# Patient Record
Sex: FEMALE | Race: WHITE | NOT HISPANIC OR LATINO
[De-identification: names, ages, dates, MRNs, and addresses within clinical notes are randomized per-mention and may not be internally consistent; named-entity substitution may affect disease eponyms.]

---

## 2017-03-26 ENCOUNTER — RX RENEWAL (OUTPATIENT)
Age: 82
End: 2017-03-26

## 2017-04-04 ENCOUNTER — APPOINTMENT (OUTPATIENT)
Dept: INTERNAL MEDICINE | Facility: CLINIC | Age: 82
End: 2017-04-04

## 2017-04-04 VITALS
BODY MASS INDEX: 24.41 KG/M2 | WEIGHT: 143 LBS | TEMPERATURE: 97.9 F | DIASTOLIC BLOOD PRESSURE: 62 MMHG | HEIGHT: 64 IN | SYSTOLIC BLOOD PRESSURE: 110 MMHG

## 2017-06-20 ENCOUNTER — LABORATORY RESULT (OUTPATIENT)
Age: 82
End: 2017-06-20

## 2017-06-20 ENCOUNTER — NON-APPOINTMENT (OUTPATIENT)
Age: 82
End: 2017-06-20

## 2017-06-20 ENCOUNTER — APPOINTMENT (OUTPATIENT)
Dept: INTERNAL MEDICINE | Facility: CLINIC | Age: 82
End: 2017-06-20

## 2017-06-20 VITALS
TEMPERATURE: 97.5 F | SYSTOLIC BLOOD PRESSURE: 140 MMHG | WEIGHT: 144 LBS | BODY MASS INDEX: 24.59 KG/M2 | DIASTOLIC BLOOD PRESSURE: 88 MMHG | HEIGHT: 64 IN

## 2017-06-20 DIAGNOSIS — H91.90 UNSPECIFIED HEARING LOSS, UNSPECIFIED EAR: ICD-10-CM

## 2017-06-20 RX ORDER — FAMOTIDINE 40 MG/1
40 TABLET, FILM COATED ORAL
Qty: 90 | Refills: 3 | Status: DISCONTINUED | COMMUNITY
Start: 2017-04-04 | End: 2017-06-20

## 2017-06-23 LAB
ALBUMIN SERPL ELPH-MCNC: 3.9 G/DL
ALP BLD-CCNC: 66 U/L
ALT SERPL-CCNC: 14 U/L
ANION GAP SERPL CALC-SCNC: 15 MMOL/L
APPEARANCE: CLEAR
AST SERPL-CCNC: 25 U/L
BASOPHILS # BLD AUTO: 0.08 K/UL
BASOPHILS NFR BLD AUTO: 3.5 %
BILIRUB SERPL-MCNC: 0.4 MG/DL
BILIRUBIN URINE: NEGATIVE
BLOOD URINE: NEGATIVE
BUN SERPL-MCNC: 14 MG/DL
CALCIUM SERPL-MCNC: 9.4 MG/DL
CHLORIDE SERPL-SCNC: 101 MMOL/L
CHOLEST SERPL-MCNC: 194 MG/DL
CHOLEST/HDLC SERPL: 3 RATIO
CO2 SERPL-SCNC: 22 MMOL/L
COLOR: YELLOW
CREAT SERPL-MCNC: 0.83 MG/DL
EOSINOPHIL # BLD AUTO: 0.02 K/UL
EOSINOPHIL NFR BLD AUTO: 0.9 %
GLUCOSE QUALITATIVE U: NORMAL MG/DL
GLUCOSE SERPL-MCNC: 80 MG/DL
HCT VFR BLD CALC: 42.5 %
HDLC SERPL-MCNC: 65 MG/DL
HGB BLD-MCNC: 13.1 G/DL
KETONES URINE: NEGATIVE
LDLC SERPL CALC-MCNC: 116 MG/DL
LDLC SERPL DIRECT ASSAY-MCNC: 122 MG/DL
LEUKOCYTE ESTERASE URINE: NEGATIVE
LYMPHOCYTES # BLD AUTO: 1.49 K/UL
LYMPHOCYTES NFR BLD AUTO: 64.3 %
MAN DIFF?: YES
MCHC RBC-ENTMCNC: 28.5 PG
MCHC RBC-ENTMCNC: 30.8 GM/DL
MCV RBC AUTO: 92.6 FL
MONOCYTES # BLD AUTO: 0.5 K/UL
MONOCYTES NFR BLD AUTO: 21.7 %
NEUTROPHILS # BLD AUTO: 0.08 K/UL
NEUTROPHILS NFR BLD AUTO: 3.5 %
NITRITE URINE: NEGATIVE
PH URINE: 7
PLATELET # BLD AUTO: 217 K/UL
POTASSIUM SERPL-SCNC: 4.7 MMOL/L
PROT SERPL-MCNC: 8 G/DL
PROTEIN URINE: NEGATIVE MG/DL
RBC # BLD: 4.59 M/UL
RBC # FLD: 14.6 %
SODIUM SERPL-SCNC: 138 MMOL/L
SPECIFIC GRAVITY URINE: 1.01
T4 FREE SERPL-MCNC: 1.3 NG/DL
TRIGL SERPL-MCNC: 67 MG/DL
TSH SERPL-ACNC: 1.23 UIU/ML
UROBILINOGEN URINE: NORMAL MG/DL
WBC # FLD AUTO: 2.32 K/UL

## 2017-06-28 ENCOUNTER — RX RENEWAL (OUTPATIENT)
Age: 82
End: 2017-06-28

## 2017-10-31 ENCOUNTER — MEDICATION RENEWAL (OUTPATIENT)
Age: 82
End: 2017-10-31

## 2017-11-02 ENCOUNTER — RX RENEWAL (OUTPATIENT)
Age: 82
End: 2017-11-02

## 2018-01-29 ENCOUNTER — TRANSCRIPTION ENCOUNTER (OUTPATIENT)
Age: 83
End: 2018-01-29

## 2018-03-07 ENCOUNTER — RX RENEWAL (OUTPATIENT)
Age: 83
End: 2018-03-07

## 2018-05-09 ENCOUNTER — RX RENEWAL (OUTPATIENT)
Age: 83
End: 2018-05-09

## 2018-07-03 ENCOUNTER — APPOINTMENT (OUTPATIENT)
Dept: INTERNAL MEDICINE | Facility: CLINIC | Age: 83
End: 2018-07-03
Payer: MEDICARE

## 2018-07-03 ENCOUNTER — LABORATORY RESULT (OUTPATIENT)
Age: 83
End: 2018-07-03

## 2018-07-03 VITALS
BODY MASS INDEX: 23.9 KG/M2 | WEIGHT: 140 LBS | TEMPERATURE: 97.3 F | HEIGHT: 64 IN | SYSTOLIC BLOOD PRESSURE: 122 MMHG | DIASTOLIC BLOOD PRESSURE: 70 MMHG

## 2018-07-03 DIAGNOSIS — M81.0 AGE-RELATED OSTEOPOROSIS W/OUT CURRENT PATHOLOGICAL FRACTURE: ICD-10-CM

## 2018-07-03 DIAGNOSIS — K21.9 GASTRO-ESOPHAGEAL REFLUX DISEASE W/OUT ESOPHAGITIS: ICD-10-CM

## 2018-07-03 PROCEDURE — 94010 BREATHING CAPACITY TEST: CPT

## 2018-07-03 PROCEDURE — 36415 COLL VENOUS BLD VENIPUNCTURE: CPT

## 2018-07-03 PROCEDURE — 99214 OFFICE O/P EST MOD 30 MIN: CPT | Mod: 25

## 2018-07-03 PROCEDURE — 93000 ELECTROCARDIOGRAM COMPLETE: CPT

## 2018-07-03 NOTE — PHYSICAL EXAM
[No Acute Distress] : no acute distress [Well Nourished] : well nourished [Well Developed] : well developed [No JVD] : no jugular venous distention [Supple] : supple [No Lymphadenopathy] : no lymphadenopathy [No Respiratory Distress] : no respiratory distress  [Clear to Auscultation] : lungs were clear to auscultation bilaterally [No Accessory Muscle Use] : no accessory muscle use [Normal Rate] : normal rate  [Regular Rhythm] : with a regular rhythm [Normal S1, S2] : normal S1 and S2 [No Carotid Bruits] : no carotid bruits [No Edema] : there was no peripheral edema [Soft] : abdomen soft [Non Tender] : non-tender [Non-distended] : non-distended [No HSM] : no HSM [Normal Bowel Sounds] : normal bowel sounds [de-identified] : 2/6 SM [de-identified] : RUE with lymphedema [de-identified] : right supraclavicular area with single red flat tiny lesion

## 2018-07-03 NOTE — HISTORY OF PRESENT ILLNESS
[de-identified] : \par Has a rash right neck for 10 days; burning; one singular bump\par Has appointment for dermatology next week\par \par Sees cardiology for palpitations - did echo; may get a stress test - but palpitations have subsided\par \par h/o breast cancer reviewed:\par \par s/p left MRM, then right lumpectomy with RT - then had calcifications found - so had MRM on right  with reconstruction ; has not followed up with breast surgeon or plastic surgeon\par \par \par \par

## 2018-07-03 NOTE — ASSESSMENT
[FreeTextEntry1] : \par ?shingles - single lesion; can see derm\par \par osteoporosis - DEXA\par \par mur,ur - sees cardiology\par \par \par \par hcm\par colonoscopy 2019\par gyn\par

## 2018-07-03 NOTE — HEALTH RISK ASSESSMENT
[Very Good] : ~his/her~  mood as very good [] : No [MammogramComments] : has not seen gyn [ColonoscopyDate] : 2014 [ColonoscopyComments] : polyp [de-identified] : Patient sees an ophthalmologist regularly [de-identified] : Patient sees a dentist regularly

## 2018-07-03 NOTE — REVIEW OF SYSTEMS
[Constipation] : constipation [Negative] : Genitourinary [Fever] : no fever [Night Sweats] : no night sweats [FreeTextEntry7] : drinks senna tea

## 2018-07-05 LAB
APPEARANCE: CLEAR
BASOPHILS # BLD AUTO: 0.02 K/UL
BASOPHILS NFR BLD AUTO: 0.8 %
BILIRUBIN URINE: NEGATIVE
BLOOD URINE: NEGATIVE
CHOLEST SERPL-MCNC: 203 MG/DL
CHOLEST/HDLC SERPL: 2.9 RATIO
COLOR: YELLOW
EOSINOPHIL # BLD AUTO: 0.06 K/UL
EOSINOPHIL NFR BLD AUTO: 2.5 %
GLUCOSE QUALITATIVE U: NEGATIVE MG/DL
HCT VFR BLD CALC: 41.9 %
HDLC SERPL-MCNC: 69 MG/DL
HGB BLD-MCNC: 12.8 G/DL
IMM GRANULOCYTES NFR BLD AUTO: 0 %
KETONES URINE: NEGATIVE
LDLC SERPL CALC-MCNC: 114 MG/DL
LDLC SERPL DIRECT ASSAY-MCNC: 124 MG/DL
LEUKOCYTE ESTERASE URINE: NEGATIVE
LYMPHOCYTES # BLD AUTO: 1.33 K/UL
LYMPHOCYTES NFR BLD AUTO: 55.9 %
MAN DIFF?: NORMAL
MCHC RBC-ENTMCNC: 27.6 PG
MCHC RBC-ENTMCNC: 30.5 GM/DL
MCV RBC AUTO: 90.3 FL
MONOCYTES # BLD AUTO: 0.45 K/UL
MONOCYTES NFR BLD AUTO: 18.9 %
NEUTROPHILS # BLD AUTO: 0.52 K/UL
NEUTROPHILS NFR BLD AUTO: 21.9 %
NITRITE URINE: NEGATIVE
PH URINE: 7.5
PLATELET # BLD AUTO: 218 K/UL
PROTEIN URINE: NEGATIVE MG/DL
RBC # BLD: 4.64 M/UL
RBC # FLD: 14.7 %
SPECIFIC GRAVITY URINE: 1.01
T4 FREE SERPL-MCNC: 1.4 NG/DL
TRIGL SERPL-MCNC: 100 MG/DL
TSH SERPL-ACNC: 1.97 UIU/ML
UROBILINOGEN URINE: NEGATIVE MG/DL
WBC # FLD AUTO: 2.38 K/UL

## 2018-07-18 ENCOUNTER — RX RENEWAL (OUTPATIENT)
Age: 83
End: 2018-07-18

## 2018-09-13 ENCOUNTER — RX RENEWAL (OUTPATIENT)
Age: 83
End: 2018-09-13

## 2018-10-04 ENCOUNTER — RESULT REVIEW (OUTPATIENT)
Age: 83
End: 2018-10-04

## 2018-12-17 ENCOUNTER — RX RENEWAL (OUTPATIENT)
Age: 83
End: 2018-12-17

## 2019-01-02 ENCOUNTER — TRANSCRIPTION ENCOUNTER (OUTPATIENT)
Age: 84
End: 2019-01-02

## 2019-06-18 ENCOUNTER — RX RENEWAL (OUTPATIENT)
Age: 84
End: 2019-06-18

## 2019-07-02 ENCOUNTER — RX RENEWAL (OUTPATIENT)
Age: 84
End: 2019-07-02

## 2019-07-30 ENCOUNTER — APPOINTMENT (OUTPATIENT)
Dept: INTERNAL MEDICINE | Facility: CLINIC | Age: 84
End: 2019-07-30
Payer: MEDICARE

## 2019-07-30 ENCOUNTER — LABORATORY RESULT (OUTPATIENT)
Age: 84
End: 2019-07-30

## 2019-07-30 VITALS
WEIGHT: 144 LBS | SYSTOLIC BLOOD PRESSURE: 110 MMHG | DIASTOLIC BLOOD PRESSURE: 60 MMHG | OXYGEN SATURATION: 97 % | HEIGHT: 64 IN | BODY MASS INDEX: 24.59 KG/M2 | HEART RATE: 76 BPM

## 2019-07-30 DIAGNOSIS — Z00.00 ENCOUNTER FOR GENERAL ADULT MEDICAL EXAMINATION W/OUT ABNORMAL FINDINGS: ICD-10-CM

## 2019-07-30 PROCEDURE — G0438: CPT

## 2019-07-30 PROCEDURE — 99214 OFFICE O/P EST MOD 30 MIN: CPT | Mod: 25

## 2019-07-30 PROCEDURE — 36415 COLL VENOUS BLD VENIPUNCTURE: CPT

## 2019-07-30 PROCEDURE — 93000 ELECTROCARDIOGRAM COMPLETE: CPT

## 2019-07-30 PROCEDURE — 94010 BREATHING CAPACITY TEST: CPT

## 2019-07-31 LAB
ALBUMIN SERPL ELPH-MCNC: 4 G/DL
ALP BLD-CCNC: 59 U/L
ALT SERPL-CCNC: 15 U/L
ANION GAP SERPL CALC-SCNC: 12 MMOL/L
AST SERPL-CCNC: 26 U/L
BASOPHILS # BLD AUTO: 0.03 K/UL
BASOPHILS NFR BLD AUTO: 1.4 %
BILIRUB SERPL-MCNC: 0.5 MG/DL
BUN SERPL-MCNC: 16 MG/DL
CALCIUM SERPL-MCNC: 9.1 MG/DL
CHLORIDE SERPL-SCNC: 105 MMOL/L
CHOLEST SERPL-MCNC: 196 MG/DL
CHOLEST/HDLC SERPL: 3 RATIO
CO2 SERPL-SCNC: 23 MMOL/L
CREAT SERPL-MCNC: 0.76 MG/DL
EOSINOPHIL # BLD AUTO: 0.02 K/UL
EOSINOPHIL NFR BLD AUTO: 1 %
GLUCOSE SERPL-MCNC: 84 MG/DL
HCT VFR BLD CALC: 40.3 %
HDLC SERPL-MCNC: 66 MG/DL
HGB BLD-MCNC: 12.6 G/DL
IMM GRANULOCYTES NFR BLD AUTO: 0 %
LDLC SERPL CALC-MCNC: 119 MG/DL
LDLC SERPL DIRECT ASSAY-MCNC: 130 MG/DL
LYMPHOCYTES # BLD AUTO: 1.09 K/UL
LYMPHOCYTES NFR BLD AUTO: 52.2 %
MAN DIFF?: NORMAL
MCHC RBC-ENTMCNC: 28.3 PG
MCHC RBC-ENTMCNC: 31.3 GM/DL
MCV RBC AUTO: 90.6 FL
MONOCYTES # BLD AUTO: 0.58 K/UL
MONOCYTES NFR BLD AUTO: 27.8 %
NEUTROPHILS # BLD AUTO: 0.37 K/UL
NEUTROPHILS NFR BLD AUTO: 17.6 %
PLATELET # BLD AUTO: 164 K/UL
POTASSIUM SERPL-SCNC: 4.3 MMOL/L
PROT SERPL-MCNC: 7.6 G/DL
RBC # BLD: 4.45 M/UL
RBC # FLD: 14 %
SODIUM SERPL-SCNC: 140 MMOL/L
T4 FREE SERPL-MCNC: 1.3 NG/DL
TRIGL SERPL-MCNC: 56 MG/DL
TSH SERPL-ACNC: 1.85 UIU/ML
WBC # FLD AUTO: 2.09 K/UL

## 2019-08-07 ENCOUNTER — APPOINTMENT (OUTPATIENT)
Dept: INTERNAL MEDICINE | Facility: CLINIC | Age: 84
End: 2019-08-07
Payer: MEDICARE

## 2019-08-07 PROCEDURE — 77080 DXA BONE DENSITY AXIAL: CPT | Mod: GA

## 2019-09-23 ENCOUNTER — MEDICATION RENEWAL (OUTPATIENT)
Age: 84
End: 2019-09-23

## 2019-09-24 ENCOUNTER — RX RENEWAL (OUTPATIENT)
Age: 84
End: 2019-09-24

## 2019-10-12 ENCOUNTER — TRANSCRIPTION ENCOUNTER (OUTPATIENT)
Age: 84
End: 2019-10-12

## 2019-12-04 ENCOUNTER — APPOINTMENT (OUTPATIENT)
Dept: INTERNAL MEDICINE | Facility: CLINIC | Age: 84
End: 2019-12-04
Payer: MEDICARE

## 2019-12-04 VITALS
HEART RATE: 73 BPM | TEMPERATURE: 98.1 F | SYSTOLIC BLOOD PRESSURE: 120 MMHG | WEIGHT: 142 LBS | DIASTOLIC BLOOD PRESSURE: 80 MMHG | BODY MASS INDEX: 24.37 KG/M2 | OXYGEN SATURATION: 96 %

## 2019-12-04 PROCEDURE — 94010 BREATHING CAPACITY TEST: CPT

## 2019-12-04 PROCEDURE — 99213 OFFICE O/P EST LOW 20 MIN: CPT | Mod: 25

## 2020-01-02 ENCOUNTER — RX RENEWAL (OUTPATIENT)
Age: 85
End: 2020-01-02

## 2020-02-21 ENCOUNTER — TRANSCRIPTION ENCOUNTER (OUTPATIENT)
Age: 85
End: 2020-02-21

## 2020-03-03 ENCOUNTER — APPOINTMENT (OUTPATIENT)
Dept: INTERNAL MEDICINE | Facility: CLINIC | Age: 85
End: 2020-03-03
Payer: MEDICARE

## 2020-03-03 VITALS
HEIGHT: 64 IN | BODY MASS INDEX: 24.59 KG/M2 | OXYGEN SATURATION: 96 % | DIASTOLIC BLOOD PRESSURE: 80 MMHG | WEIGHT: 144 LBS | HEART RATE: 79 BPM | TEMPERATURE: 97.6 F | SYSTOLIC BLOOD PRESSURE: 140 MMHG

## 2020-03-03 PROCEDURE — 99214 OFFICE O/P EST MOD 30 MIN: CPT | Mod: 25

## 2020-03-03 PROCEDURE — 93000 ELECTROCARDIOGRAM COMPLETE: CPT

## 2020-03-03 RX ORDER — PRAMOXINE HYDROCHLORIDE AND HYDROCORTISONE ACETATE 10; 25 MG/G; MG/G
2.5-1 CREAM TOPICAL
Qty: 1 | Refills: 3 | Status: DISCONTINUED | COMMUNITY
Start: 2018-05-09 | End: 2020-03-03

## 2020-03-03 NOTE — CONSULT LETTER
[( Thank you for referring [unfilled] for consultation for _____ )] : Thank you for referring [unfilled] for consultation for [unfilled] [Consult Closing:] : Thank you very much for allowing me to participate in the care of this patient.  If you have any questions, please do not hesitate to contact me. [Please see my note below.] : Please see my note below.

## 2020-03-03 NOTE — PHYSICAL EXAM
[No Acute Distress] : no acute distress [Well Nourished] : well nourished [Well-Appearing] : well-appearing [Well Developed] : well developed [Normal Sclera/Conjunctiva] : normal sclera/conjunctiva [EOMI] : extraocular movements intact [PERRL] : pupils equal round and reactive to light [Normal Outer Ear/Nose] : the outer ears and nose were normal in appearance [Normal Oropharynx] : the oropharynx was normal [No JVD] : no jugular venous distention [No Lymphadenopathy] : no lymphadenopathy [Supple] : supple [No Respiratory Distress] : no respiratory distress  [Thyroid Normal, No Nodules] : the thyroid was normal and there were no nodules present [No Accessory Muscle Use] : no accessory muscle use [Clear to Auscultation] : lungs were clear to auscultation bilaterally [Normal Rate] : normal rate  [Regular Rhythm] : with a regular rhythm [Normal S1, S2] : normal S1 and S2 [No Carotid Bruits] : no carotid bruits [No Abdominal Bruit] : a ~M bruit was not heard ~T in the abdomen [Pedal Pulses Present] : the pedal pulses are present [No Varicosities] : no varicosities [No Palpable Aorta] : no palpable aorta [No Extremity Clubbing/Cyanosis] : no extremity clubbing/cyanosis [Non Tender] : non-tender [Soft] : abdomen soft [Non-distended] : non-distended [No Masses] : no abdominal mass palpated [No HSM] : no HSM [Normal Bowel Sounds] : normal bowel sounds [Normal Anterior Cervical Nodes] : no anterior cervical lymphadenopathy [Normal Posterior Cervical Nodes] : no posterior cervical lymphadenopathy [No CVA Tenderness] : no CVA  tenderness [No Spinal Tenderness] : no spinal tenderness [No Joint Swelling] : no joint swelling [Grossly Normal Strength/Tone] : grossly normal strength/tone [Coordination Grossly Intact] : coordination grossly intact [No Rash] : no rash [Normal Gait] : normal gait [No Focal Deficits] : no focal deficits [Normal Affect] : the affect was normal [Deep Tendon Reflexes (DTR)] : deep tendon reflexes were 2+ and symmetric [Normal Insight/Judgement] : insight and judgment were intact [de-identified] : 2/6 SM [de-identified] : RUE lymphedema without change

## 2020-03-03 NOTE — HISTORY OF PRESENT ILLNESS
[No Adverse Anesthesia Reaction] : no adverse anesthesia reaction in self or family member [(Patient denies any chest pain, claudication, dyspnea on exertion, orthopnea, palpitations or syncope)] : Patient denies any chest pain, claudication, dyspnea on exertion, orthopnea, palpitations or syncope [Atrial Fibrillation] : no atrial fibrillation [Coronary Artery Disease] : no coronary artery disease [Recent Myocardial Infarction] : no recent myocardial infarction [Asthma] : no asthma [COPD] : no COPD [Sleep Apnea] : no sleep apnea [Smoker] : not a smoker [Chronic Anticoagulation] : no chronic anticoagulation [Chronic Kidney Disease] : no chronic kidney disease [Diabetes] : no diabetes [FreeTextEntry1] : Cataract surgeries [FreeTextEntry2] : right 3/9/20; left 3/23/20 [FreeTextEntry3] : Dr. Rodrigues [FreeTextEntry4] : \par Scheduled for cataract surgery (first right eye 3/9/20, then left eye 3/23/20)

## 2020-03-03 NOTE — ASSESSMENT
[Patient Optimized for Surgery] : Patient optimized for surgery [FreeTextEntry4] : \par There are no absolute contraindications to the proposed procedure at this time.\par

## 2020-06-22 ENCOUNTER — APPOINTMENT (OUTPATIENT)
Dept: INTERNAL MEDICINE | Facility: CLINIC | Age: 85
End: 2020-06-22
Payer: MEDICARE

## 2020-06-22 ENCOUNTER — LABORATORY RESULT (OUTPATIENT)
Age: 85
End: 2020-06-22

## 2020-06-22 VITALS
WEIGHT: 141 LBS | SYSTOLIC BLOOD PRESSURE: 114 MMHG | TEMPERATURE: 98.6 F | DIASTOLIC BLOOD PRESSURE: 78 MMHG | BODY MASS INDEX: 24.2 KG/M2

## 2020-06-22 DIAGNOSIS — Z01.818 ENCOUNTER FOR OTHER PREPROCEDURAL EXAMINATION: ICD-10-CM

## 2020-06-22 PROCEDURE — 99213 OFFICE O/P EST LOW 20 MIN: CPT | Mod: 25

## 2020-06-22 PROCEDURE — 36415 COLL VENOUS BLD VENIPUNCTURE: CPT

## 2020-06-24 ENCOUNTER — APPOINTMENT (OUTPATIENT)
Dept: INTERNAL MEDICINE | Facility: CLINIC | Age: 85
End: 2020-06-24
Payer: MEDICARE

## 2020-06-24 DIAGNOSIS — R21 RASH AND OTHER NONSPECIFIC SKIN ERUPTION: ICD-10-CM

## 2020-06-24 DIAGNOSIS — Z87.09 PERSONAL HISTORY OF OTHER DISEASES OF THE RESPIRATORY SYSTEM: ICD-10-CM

## 2020-06-24 DIAGNOSIS — Z87.19 PERSONAL HISTORY OF OTHER DISEASES OF THE DIGESTIVE SYSTEM: ICD-10-CM

## 2020-06-24 DIAGNOSIS — H26.9 UNSPECIFIED CATARACT: ICD-10-CM

## 2020-06-24 DIAGNOSIS — L03.90 CELLULITIS, UNSPECIFIED: ICD-10-CM

## 2020-06-24 DIAGNOSIS — Z86.69 PERSONAL HISTORY OF OTHER DISEASES OF THE NERVOUS SYSTEM AND SENSE ORGANS: ICD-10-CM

## 2020-06-24 LAB
ALBUMIN SERPL ELPH-MCNC: 3.9 G/DL
ALP BLD-CCNC: 64 U/L
ALT SERPL-CCNC: 12 U/L
ANION GAP SERPL CALC-SCNC: 11 MMOL/L
AST SERPL-CCNC: 27 U/L
BASOPHILS # BLD AUTO: 0.02 K/UL
BASOPHILS NFR BLD AUTO: 0.9 %
BILIRUB SERPL-MCNC: 0.3 MG/DL
BUN SERPL-MCNC: 17 MG/DL
CALCIUM SERPL-MCNC: 9.6 MG/DL
CHLORIDE SERPL-SCNC: 101 MMOL/L
CO2 SERPL-SCNC: 24 MMOL/L
CREAT SERPL-MCNC: 0.65 MG/DL
EOSINOPHIL # BLD AUTO: 0.02 K/UL
EOSINOPHIL NFR BLD AUTO: 0.9 %
GLUCOSE SERPL-MCNC: 83 MG/DL
HCT VFR BLD CALC: 39.1 %
HGB BLD-MCNC: 12.2 G/DL
LYMPHOCYTES # BLD AUTO: 1.47 K/UL
LYMPHOCYTES NFR BLD AUTO: 55.6 %
MAN DIFF?: NORMAL
MCHC RBC-ENTMCNC: 28.5 PG
MCHC RBC-ENTMCNC: 31.2 GM/DL
MCV RBC AUTO: 91.4 FL
MONOCYTES # BLD AUTO: 0.5 K/UL
MONOCYTES NFR BLD AUTO: 19.1 %
NEUTROPHILS # BLD AUTO: 0.62 K/UL
NEUTROPHILS NFR BLD AUTO: 23.5 %
PLATELET # BLD AUTO: 197 K/UL
POTASSIUM SERPL-SCNC: 4.4 MMOL/L
PROT SERPL-MCNC: 7.7 G/DL
RBC # BLD: 4.28 M/UL
RBC # FLD: 13.1 %
SAVE SPECIMEN: NORMAL
SODIUM SERPL-SCNC: 136 MMOL/L
WBC # FLD AUTO: 2.64 K/UL

## 2020-06-24 PROCEDURE — 99441: CPT | Mod: 95

## 2020-10-22 ENCOUNTER — RX RENEWAL (OUTPATIENT)
Age: 85
End: 2020-10-22

## 2020-10-27 ENCOUNTER — APPOINTMENT (OUTPATIENT)
Dept: INTERNAL MEDICINE | Facility: CLINIC | Age: 85
End: 2020-10-27
Payer: MEDICARE

## 2020-10-27 ENCOUNTER — LABORATORY RESULT (OUTPATIENT)
Age: 85
End: 2020-10-27

## 2020-10-27 PROCEDURE — 99214 OFFICE O/P EST MOD 30 MIN: CPT | Mod: PD

## 2020-10-28 ENCOUNTER — INPATIENT (INPATIENT)
Facility: HOSPITAL | Age: 85
LOS: 1 days | Discharge: HOME | DRG: 948 | End: 2020-10-30
Attending: INTERNAL MEDICINE | Admitting: INTERNAL MEDICINE
Payer: MEDICARE

## 2020-10-28 ENCOUNTER — APPOINTMENT (OUTPATIENT)
Dept: INTERNAL MEDICINE | Facility: CLINIC | Age: 85
End: 2020-10-28
Payer: MEDICARE

## 2020-10-28 VITALS
WEIGHT: 143.96 LBS | HEIGHT: 63 IN | SYSTOLIC BLOOD PRESSURE: 117 MMHG | TEMPERATURE: 97 F | DIASTOLIC BLOOD PRESSURE: 76 MMHG | HEART RATE: 84 BPM | RESPIRATION RATE: 18 BRPM | OXYGEN SATURATION: 97 %

## 2020-10-28 DIAGNOSIS — Z98.49 CATARACT EXTRACTION STATUS, UNSPECIFIED EYE: Chronic | ICD-10-CM

## 2020-10-28 DIAGNOSIS — D72.819 DECREASED WHITE BLOOD CELL COUNT, UNSPECIFIED: ICD-10-CM

## 2020-10-28 DIAGNOSIS — Z90.13 ACQUIRED ABSENCE OF BILATERAL BREASTS AND NIPPLES: Chronic | ICD-10-CM

## 2020-10-28 DIAGNOSIS — R11.0 NAUSEA: ICD-10-CM

## 2020-10-28 LAB
ALBUMIN SERPL ELPH-MCNC: 3 G/DL — LOW (ref 3.3–5)
ALBUMIN SERPL ELPH-MCNC: 3.1 G/DL — LOW (ref 3.3–5)
ALBUMIN SERPL ELPH-MCNC: 3.9 G/DL
ALP BLD-CCNC: 435 U/L
ALP SERPL-CCNC: 412 U/L — HIGH (ref 40–120)
ALP SERPL-CCNC: 413 U/L — HIGH (ref 40–120)
ALT FLD-CCNC: 137 U/L — HIGH (ref 10–45)
ALT FLD-CCNC: 155 U/L — HIGH (ref 10–45)
ALT SERPL-CCNC: 208 U/L
ANION GAP SERPL CALC-SCNC: 11 MMOL/L — SIGNIFICANT CHANGE UP (ref 5–17)
ANION GAP SERPL CALC-SCNC: 11 MMOL/L — SIGNIFICANT CHANGE UP (ref 5–17)
ANION GAP SERPL CALC-SCNC: 18 MMOL/L
ANISOCYTOSIS BLD QL: SLIGHT — SIGNIFICANT CHANGE UP
APPEARANCE UR: ABNORMAL
APTT BLD: 22 SEC — LOW (ref 27.5–35.5)
AST SERPL-CCNC: 109 U/L — HIGH (ref 10–40)
AST SERPL-CCNC: 145 U/L
AST SERPL-CCNC: 94 U/L — HIGH (ref 10–40)
BACTERIA # UR AUTO: ABNORMAL
BASE EXCESS BLDV CALC-SCNC: 3.1 MMOL/L — HIGH (ref -2–2)
BASOPHILS # BLD AUTO: 0 K/UL — SIGNIFICANT CHANGE UP (ref 0–0.2)
BASOPHILS # BLD AUTO: 0.01 K/UL
BASOPHILS NFR BLD AUTO: 0 % — SIGNIFICANT CHANGE UP (ref 0–2)
BASOPHILS NFR BLD AUTO: 0.6 %
BILIRUB SERPL-MCNC: 2 MG/DL — HIGH (ref 0.2–1.2)
BILIRUB SERPL-MCNC: 2.6 MG/DL — HIGH (ref 0.2–1.2)
BILIRUB SERPL-MCNC: 2.9 MG/DL
BILIRUB UR-MCNC: ABNORMAL
BUN SERPL-MCNC: 12 MG/DL
BUN SERPL-MCNC: 12 MG/DL — SIGNIFICANT CHANGE UP (ref 7–23)
BUN SERPL-MCNC: 9 MG/DL — SIGNIFICANT CHANGE UP (ref 7–23)
CA-I SERPL-SCNC: 1.1 MMOL/L — LOW (ref 1.12–1.3)
CALCIUM SERPL-MCNC: 8.6 MG/DL — SIGNIFICANT CHANGE UP (ref 8.4–10.5)
CALCIUM SERPL-MCNC: 8.7 MG/DL — SIGNIFICANT CHANGE UP (ref 8.4–10.5)
CALCIUM SERPL-MCNC: 9.1 MG/DL
CHLORIDE BLDV-SCNC: 104 MMOL/L — SIGNIFICANT CHANGE UP (ref 96–108)
CHLORIDE SERPL-SCNC: 104 MMOL/L — SIGNIFICANT CHANGE UP (ref 96–108)
CHLORIDE SERPL-SCNC: 93 MMOL/L
CHLORIDE SERPL-SCNC: 99 MMOL/L — SIGNIFICANT CHANGE UP (ref 96–108)
CO2 BLDV-SCNC: 28 MMOL/L — SIGNIFICANT CHANGE UP (ref 22–30)
CO2 SERPL-SCNC: 19 MMOL/L
CO2 SERPL-SCNC: 23 MMOL/L — SIGNIFICANT CHANGE UP (ref 22–31)
CO2 SERPL-SCNC: 24 MMOL/L — SIGNIFICANT CHANGE UP (ref 22–31)
COLOR SPEC: ABNORMAL
COMMENT - URINE: SIGNIFICANT CHANGE UP
CREAT SERPL-MCNC: 0.68 MG/DL — SIGNIFICANT CHANGE UP (ref 0.5–1.3)
CREAT SERPL-MCNC: 0.7 MG/DL — SIGNIFICANT CHANGE UP (ref 0.5–1.3)
CREAT SERPL-MCNC: 0.78 MG/DL
DACRYOCYTES BLD QL SMEAR: SLIGHT — SIGNIFICANT CHANGE UP
DIFF PNL FLD: ABNORMAL
ELLIPTOCYTES BLD QL SMEAR: SLIGHT — SIGNIFICANT CHANGE UP
EOSINOPHIL # BLD AUTO: 0.07 K/UL
EOSINOPHIL # BLD AUTO: 0.17 K/UL — SIGNIFICANT CHANGE UP (ref 0–0.5)
EOSINOPHIL NFR BLD AUTO: 4.4 %
EOSINOPHIL NFR BLD AUTO: 6.2 % — HIGH (ref 0–6)
EPI CELLS # UR: 1 /HPF — SIGNIFICANT CHANGE UP
GAS PNL BLDV: 132 MMOL/L — LOW (ref 135–145)
GAS PNL BLDV: SIGNIFICANT CHANGE UP
GAS PNL BLDV: SIGNIFICANT CHANGE UP
GIANT PLATELETS BLD QL SMEAR: PRESENT — SIGNIFICANT CHANGE UP
GLUCOSE BLDV-MCNC: 111 MG/DL — HIGH (ref 70–99)
GLUCOSE SERPL-MCNC: 114 MG/DL — HIGH (ref 70–99)
GLUCOSE SERPL-MCNC: 126 MG/DL
GLUCOSE SERPL-MCNC: 135 MG/DL — HIGH (ref 70–99)
GLUCOSE UR QL: NEGATIVE — SIGNIFICANT CHANGE UP
HCO3 BLDV-SCNC: 27 MMOL/L — SIGNIFICANT CHANGE UP (ref 21–29)
HCT VFR BLD CALC: 34.9 % — SIGNIFICANT CHANGE UP (ref 34.5–45)
HCT VFR BLD CALC: 42.9 %
HCT VFR BLDA CALC: 43 % — SIGNIFICANT CHANGE UP (ref 39–50)
HGB BLD CALC-MCNC: 13.9 G/DL — SIGNIFICANT CHANGE UP (ref 11.5–15.5)
HGB BLD-MCNC: 11.5 G/DL — SIGNIFICANT CHANGE UP (ref 11.5–15.5)
HGB BLD-MCNC: 13.6 G/DL
HYALINE CASTS # UR AUTO: 0 /LPF — SIGNIFICANT CHANGE UP (ref 0–7)
IMM GRANULOCYTES NFR BLD AUTO: 0.6 %
INR BLD: 1.19 RATIO — HIGH (ref 0.88–1.16)
KETONES UR-MCNC: NEGATIVE — SIGNIFICANT CHANGE UP
LACTATE BLDV-MCNC: 1.4 MMOL/L — SIGNIFICANT CHANGE UP (ref 0.7–2)
LEUKOCYTE ESTERASE UR-ACNC: ABNORMAL
LIDOCAIN IGE QN: 29 U/L — SIGNIFICANT CHANGE UP (ref 7–60)
LYMPHOCYTES # BLD AUTO: 0.49 K/UL
LYMPHOCYTES # BLD AUTO: 1.03 K/UL — SIGNIFICANT CHANGE UP (ref 1–3.3)
LYMPHOCYTES # BLD AUTO: 38 % — SIGNIFICANT CHANGE UP (ref 13–44)
LYMPHOCYTES NFR BLD AUTO: 30.6 %
MACROCYTES BLD QL: SLIGHT — SIGNIFICANT CHANGE UP
MAN DIFF?: NORMAL
MANUAL SMEAR VERIFICATION: SIGNIFICANT CHANGE UP
MCHC RBC-ENTMCNC: 28.1 PG — SIGNIFICANT CHANGE UP (ref 27–34)
MCHC RBC-ENTMCNC: 28.5 PG
MCHC RBC-ENTMCNC: 31.7 GM/DL
MCHC RBC-ENTMCNC: 33 GM/DL — SIGNIFICANT CHANGE UP (ref 32–36)
MCV RBC AUTO: 85.3 FL — SIGNIFICANT CHANGE UP (ref 80–100)
MCV RBC AUTO: 89.7 FL
MONOCYTES # BLD AUTO: 0.39 K/UL
MONOCYTES # BLD AUTO: 0.7 K/UL — SIGNIFICANT CHANGE UP (ref 0–0.9)
MONOCYTES NFR BLD AUTO: 24.4 %
MONOCYTES NFR BLD AUTO: 25.7 % — HIGH (ref 2–14)
NEUTROPHILS # BLD AUTO: 0.63 K/UL
NEUTROPHILS # BLD AUTO: 0.82 K/UL — LOW (ref 1.8–7.4)
NEUTROPHILS NFR BLD AUTO: 30.1 % — LOW (ref 43–77)
NEUTROPHILS NFR BLD AUTO: 39.4 %
NITRITE UR-MCNC: NEGATIVE — SIGNIFICANT CHANGE UP
PCO2 BLDV: 40 MMHG — SIGNIFICANT CHANGE UP (ref 35–50)
PH BLDV: 7.44 — SIGNIFICANT CHANGE UP (ref 7.35–7.45)
PH UR: 6 — SIGNIFICANT CHANGE UP (ref 5–8)
PLAT MORPH BLD: ABNORMAL
PLATELET # BLD AUTO: 111 K/UL
PLATELET # BLD AUTO: 131 K/UL — LOW (ref 150–400)
PO2 BLDV: 61 MMHG — HIGH (ref 25–45)
POIKILOCYTOSIS BLD QL AUTO: SLIGHT — SIGNIFICANT CHANGE UP
POTASSIUM BLDV-SCNC: 3.7 MMOL/L — SIGNIFICANT CHANGE UP (ref 3.5–5.3)
POTASSIUM SERPL-MCNC: 4 MMOL/L — SIGNIFICANT CHANGE UP (ref 3.5–5.3)
POTASSIUM SERPL-MCNC: 4.1 MMOL/L — SIGNIFICANT CHANGE UP (ref 3.5–5.3)
POTASSIUM SERPL-SCNC: 4 MMOL/L
POTASSIUM SERPL-SCNC: 4 MMOL/L — SIGNIFICANT CHANGE UP (ref 3.5–5.3)
POTASSIUM SERPL-SCNC: 4.1 MMOL/L — SIGNIFICANT CHANGE UP (ref 3.5–5.3)
PROT SERPL-MCNC: 7.2 G/DL — SIGNIFICANT CHANGE UP (ref 6–8.3)
PROT SERPL-MCNC: 7.2 G/DL — SIGNIFICANT CHANGE UP (ref 6–8.3)
PROT SERPL-MCNC: 8 G/DL
PROT UR-MCNC: SIGNIFICANT CHANGE UP
PROTHROM AB SERPL-ACNC: 14.2 SEC — HIGH (ref 10.6–13.6)
RBC # BLD: 4.09 M/UL — SIGNIFICANT CHANGE UP (ref 3.8–5.2)
RBC # BLD: 4.78 M/UL
RBC # FLD: 13.2 % — SIGNIFICANT CHANGE UP (ref 10.3–14.5)
RBC # FLD: 13.9 %
RBC BLD AUTO: ABNORMAL
RBC CASTS # UR COMP ASSIST: 3 /HPF — SIGNIFICANT CHANGE UP (ref 0–4)
SAO2 % BLDV: 92 % — HIGH (ref 67–88)
SARS-COV-2 RNA SPEC QL NAA+PROBE: SIGNIFICANT CHANGE UP
SAVE SPECIMEN: NORMAL
SMUDGE CELLS # BLD: PRESENT — SIGNIFICANT CHANGE UP
SODIUM SERPL-SCNC: 131 MMOL/L
SODIUM SERPL-SCNC: 134 MMOL/L — LOW (ref 135–145)
SODIUM SERPL-SCNC: 138 MMOL/L — SIGNIFICANT CHANGE UP (ref 135–145)
SP GR SPEC: 1.01 — SIGNIFICANT CHANGE UP (ref 1.01–1.02)
UROBILINOGEN FLD QL: NEGATIVE — SIGNIFICANT CHANGE UP
WBC # BLD: 2.72 K/UL — LOW (ref 3.8–10.5)
WBC # FLD AUTO: 1.6 K/UL
WBC # FLD AUTO: 2.72 K/UL — LOW (ref 3.8–10.5)
WBC UR QL: 8 /HPF — HIGH (ref 0–5)

## 2020-10-28 PROCEDURE — 99220: CPT | Mod: CS

## 2020-10-28 PROCEDURE — 99442: CPT | Mod: 95

## 2020-10-28 PROCEDURE — 76705 ECHO EXAM OF ABDOMEN: CPT | Mod: 26,RT

## 2020-10-28 PROCEDURE — 93010 ELECTROCARDIOGRAM REPORT: CPT

## 2020-10-28 PROCEDURE — 74177 CT ABD & PELVIS W/CONTRAST: CPT | Mod: 26

## 2020-10-28 RX ORDER — CEFTRIAXONE 500 MG/1
1000 INJECTION, POWDER, FOR SOLUTION INTRAMUSCULAR; INTRAVENOUS ONCE
Refills: 0 | Status: COMPLETED | OUTPATIENT
Start: 2020-10-28 | End: 2020-10-28

## 2020-10-28 RX ORDER — ONDANSETRON 8 MG/1
4 TABLET, FILM COATED ORAL ONCE
Refills: 0 | Status: COMPLETED | OUTPATIENT
Start: 2020-10-28 | End: 2020-10-28

## 2020-10-28 RX ORDER — SODIUM CHLORIDE 9 MG/ML
500 INJECTION INTRAMUSCULAR; INTRAVENOUS; SUBCUTANEOUS ONCE
Refills: 0 | Status: COMPLETED | OUTPATIENT
Start: 2020-10-28 | End: 2020-10-28

## 2020-10-28 RX ORDER — SODIUM CHLORIDE 9 MG/ML
1000 INJECTION INTRAMUSCULAR; INTRAVENOUS; SUBCUTANEOUS
Refills: 0 | Status: DISCONTINUED | OUTPATIENT
Start: 2020-10-28 | End: 2020-10-30

## 2020-10-28 RX ADMIN — ONDANSETRON 4 MILLIGRAM(S): 8 TABLET, FILM COATED ORAL at 13:55

## 2020-10-28 RX ADMIN — SODIUM CHLORIDE 60 MILLILITER(S): 9 INJECTION INTRAMUSCULAR; INTRAVENOUS; SUBCUTANEOUS at 20:21

## 2020-10-28 RX ADMIN — CEFTRIAXONE 100 MILLIGRAM(S): 500 INJECTION, POWDER, FOR SOLUTION INTRAMUSCULAR; INTRAVENOUS at 17:34

## 2020-10-28 RX ADMIN — SODIUM CHLORIDE 500 MILLILITER(S): 9 INJECTION INTRAMUSCULAR; INTRAVENOUS; SUBCUTANEOUS at 16:14

## 2020-10-28 NOTE — ED CDU PROVIDER INITIAL DAY NOTE - MEDICAL DECISION MAKING DETAILS
Attending MD Harris:  85y F pmhx GERD, Breast Ca s/p b/l mastectomy presents to ED c/o hematuria x 3 days. Pt reports dark urine last week, now with blood in urine. Pt referred to ED by PCP Dr. Gomez due to abnormal labs yesterday, elevated LFTs.   CT abdomen negative for acute pathology. RUQ US without gallstones. Gallbladder wall thickening. Right hepatic lobe calcified granuloma. Plan for trending LFTs, IVF, antiemetics, with Hepatology consult in CDU. see attending attestation

## 2020-10-28 NOTE — ED PROVIDER NOTE - NS ED ROS FT
GEN: Pt elderly, fatigued in NAD, A&Ox3.  PSYCH: Affect and mood appropriate.  EYES: Sclera white w/o injection, EOMI, PERRLA.   ENT: MM dry. Neck supple FROM. Airway patent.  RESP: No chest wall tenderness, CTA b/l, no wheezes, rales, or rhonchi.   CARDIAC: RRR, clear distinct S1, S2, +murmur.  ABD: Abdomen soft, +epigastric ttp. No CVAT b/l.  MSK: Moving all extremities.  NEURO: No focal motor or sensory deficits.  VASC: Radial and dorsalis pedis pulses 2+ b/l. No edema or calf tenderness.  SKIN: No rashes or lesions.

## 2020-10-28 NOTE — ED PROVIDER NOTE - PROGRESS NOTE DETAILS
Received sign out, pt's urine reviewed, started on rocephin, urine culture sent. Possible bilirubinemia causing discoloration of urine that appeared bloody. Will admit patient, Dr. Gomez paged. RGUJRAL Pt d/w on call Dr. Celeste, family updated, will admit to CDU for hepatology consult, "angeline" emailed with pt information. -Fracisco Polanco PA-C

## 2020-10-28 NOTE — ED CDU PROVIDER INITIAL DAY NOTE - OBJECTIVE STATEMENT
85y F pmhx GERD, Breast Ca s/p b/l mastectomy presents to ED c/o hematuria x 3 days. Pt reports dark urine last week, now with blood in urine. Pt referred to ED by PCP Dr. Gomez due to abnormal labs yesterday, elevated LFTs as per HIE. Pt also endorses poor appetite 2/2 nausea and has noticed "white flakes" in her stool. Able to PO tolerate. +Urinary frequency Denies fever, chills, cough, abdominal pain, vomiting, dysuria, diarrhea, dark or bloody stools.  ED course: LFTs and Bilirubin elevated. CT abdomen negative for acute pathology. RUQ US without gallstones. Gallbladder wall thickening. Right hepatic lobe calcified granuloma. Plan for trending LFTs, IVF, antiemetics, with Hepatology consult in CDU. Steady ambulation.

## 2020-10-28 NOTE — ED CDU PROVIDER INITIAL DAY NOTE - NS ED ROS FT
Constitutional: No fever or chills  Eyes: No visual changes  CV: No chest pain or lower extremity edema  Resp: No SOB no cough  GI: No abd pain. + nausea No vomiting. No diarrhea. No constipation.   : No dysuria +hematuria.   MSK: No musculoskeletal pain  Skin: No rash  Psych: No complaints   Neuro: No headache. No numbness or tingling. No weakness.  +decreased PO intake

## 2020-10-28 NOTE — ED ADULT NURSE NOTE - OBJECTIVE STATEMENT
Pt is a 85y F with hx: GERD, Breast Ca s/p b/l mastectomy came to ED c/o hematuria x 3 days. Pt reports dark urine last week, now with blood in urine. Pt was told by her PMD to come to the ER. Pt also c/o poor appetite. Pt alert and orientedX4. No distress. Breathing easy and non labored. Pt ambulatory. No chilss. no diaphoresis. Pt mildly anxious. Emotional support offered.

## 2020-10-28 NOTE — ED CDU PROVIDER DISPOSITION NOTE - NSFOLLOWUPINSTRUCTIONS_ED_ALL_ED_FT
1. Rest. Stay hydrated.   2. Continue your current home medications.  3. Follow-up with your medical doctor in 2-3 days.  Bring your results with you for follow-up.  4. Return to the ER if you have any new or worsening symptoms, yellowing of your skin, vomiting, inability to eat/drink, abdominal pain, back pain, chest pain, difficulty breathing, fevers, chills, weakness, or any other concerns.    ***HEPATOLOGY RECCS 68

## 2020-10-28 NOTE — ED PROVIDER NOTE - CLINICAL SUMMARY MEDICAL DECISION MAKING FREE TEXT BOX
Attending MD Harris:  85y F pmhx GERD, Breast Ca s/p b/l mastectomy presents to ED c/o hematuria x 3 days. Pt reports dark urine last week, now with blood in urine. Pt referred to ED by PCP Dr. Gomez due to abnormal labs yesterday, elevated LFTs.   CT abdomen negative for acute pathology. RUQ US without gallstones. Gallbladder wall thickening. Right hepatic lobe calcified granuloma. Plan for trending LFTs, IVF, antiemetics, with Hepatology consult in CDU.

## 2020-10-28 NOTE — ED PROVIDER NOTE - ATTENDING CONTRIBUTION TO CARE
Attending MD Harris:  I personally have seen and examined this patient.  PAt note reviewed and agree on plan of care and except where noted.

## 2020-10-28 NOTE — ED CDU PROVIDER INITIAL DAY NOTE - ATTENDING CONTRIBUTION TO CARE
Attending MD Harris:   I personally have seen and examined this patient.  Physician assistant note reviewed and agree on plan of care and except where noted. RENITAUJNEFTALY 84yo f presented with transaminitis and hyperbilirubinemia. CT and US reviewed. Admit patient to CDU to treat for UTI, monitor LFTS and hepatology consult.

## 2020-10-28 NOTE — ED CDU PROVIDER INITIAL DAY NOTE - DETAILS
elevated LFTs  -trend labs  -vitals every 4 hours  -frequent reevaluations  -MCAKY Berrios  -serial abdominal exams  -IVF  -antiemetics  -Hepatology consult placed by ED team elevated LFTs  -trend labs  -abx for UTI  -vitals every 4 hours  -frequent reevaluations  -MCKAY Gujral  -serial abdominal exams  -IVF  -antiemetics  -Hepatology consult placed by ED team

## 2020-10-28 NOTE — ED CDU PROVIDER INITIAL DAY NOTE - PHYSICAL EXAMINATION
GEN: Pt elderly, A&Ox3.  PSYCH: Affect and mood appropriate.  EYES: Sclera white w/o injection, EOMI, PERRLA.   ENT: MM dry. Neck supple FROM. Airway patent.  RESP: No chest wall tenderness, CTA b/l, no wheezes, rales, or rhonchi.   CARDIAC: RRR, clear distinct S1, S2, +murmur.  ABD: Abdomen soft, +epigastric ttp. No CVAT b/l.  MSK: Moving all extremities.  NEURO: No focal motor or sensory deficits.  VASC: No edema or calf tenderness.  SKIN: No rashes or lesions.

## 2020-10-28 NOTE — ED CDU PROVIDER DISPOSITION NOTE - ATTENDING CONTRIBUTION TO CARE
Attending MD Harris:   I personally have seen and examined this patient.  Physician assistant note reviewed and agree on plan of care and except where noted.  85y F pmhx GERD, Breast Ca s/p b/l mastectomy presents to ED c/o hematuria x 3 days. Pt reports dark urine last week, now with blood in urine. Pt referred to ED by PCP Dr. Gomez due to abnormal labs yesterday, elevated LFTs.   Hepatology consult in CDU recommended admission for further workup.

## 2020-10-28 NOTE — ED PROVIDER NOTE - OBJECTIVE STATEMENT
85y F pmhx GERD, Breast Ca s/p b/l mastectomy presents to ED c/o hematuria x 3 days. Pt reports dark urine last week, now with blood in urine. Pt referred to ED by PCP Dr. Gomez due to abnormal labs yesterday, elevated LFTs as per HIE. Pt also endorses poor appetite 2/2 nausea and has noticed "white flakes" in her stool. Denies fever, chills, abdominal pain, vomiting, dysuria, diarrhea, dark or bloody stools.

## 2020-10-28 NOTE — ED PROVIDER NOTE - PHYSICAL EXAMINATION
Exam: A & O x 3, NAD, HEENT WNL and no facial asymmetry; lungs CTAB, HEART: Reg rate. Normal S1, S2. loud systolic murmur heard throughout precordium; abdomen soft NTND; extremities with no edema; skin with no rashes, neuro exam non focal with no motor or sensory deficits.

## 2020-10-28 NOTE — ED CDU PROVIDER DISPOSITION NOTE - CLINICAL COURSE
85y F pmhx GERD, Breast Ca s/p b/l mastectomy presents to ED c/o hematuria x 3 days. Pt reports dark urine last week, now with blood in urine. Pt referred to ED by PCP Dr. Gomez due to abnormal labs yesterday, elevated LFTs as per HIE. Pt also endorses poor appetite 2/2 nausea and has noticed "white flakes" in her stool. Able to PO tolerate. +Urinary frequency Denies fever, chills, cough, abdominal pain, vomiting, dysuria, diarrhea, dark or bloody stools.  ED course: LFTs and Bilirubin elevated. CT abdomen negative for acute pathology. RUQ US without gallstones. Gallbladder wall thickening. Right hepatic lobe calcified granuloma. Plan for trending LFTs, IVF, antiemetics, with Hepatology consult in CDU. 85y F pmhx GERD, Breast Ca s/p b/l mastectomy presents to ED c/o hematuria x 3 days. Pt reports dark urine last week, now with blood in urine. Pt referred to ED by PCP Dr. Gomez due to abnormal labs yesterday, elevated LFTs as per HIE. Pt also endorses poor appetite 2/2 nausea and has noticed "white flakes" in her stool. Able to PO tolerate. +Urinary frequency Denies fever, chills, cough, abdominal pain, vomiting, dysuria, diarrhea, dark or bloody stools.  ED course: LFTs and Bilirubin elevated. CT abdomen negative for acute pathology. RUQ US without gallstones. Gallbladder wall thickening. Right hepatic lobe calcified granuloma. Plan for trending LFTs, IVF, antiemetics, with Hepatology consult in CDU.   as per Hepatology- pt needs to be admitted for MRI. pt admitted to medicine.

## 2020-10-28 NOTE — ED CDU PROVIDER INITIAL DAY NOTE - FAMILY HISTORY
Father  Still living? Unknown  Family history of MI (myocardial infarction), Age at diagnosis: Age Unknown

## 2020-10-29 DIAGNOSIS — K21.9 GASTRO-ESOPHAGEAL REFLUX DISEASE WITHOUT ESOPHAGITIS: ICD-10-CM

## 2020-10-29 DIAGNOSIS — C50.919 MALIGNANT NEOPLASM OF UNSPECIFIED SITE OF UNSPECIFIED FEMALE BREAST: ICD-10-CM

## 2020-10-29 DIAGNOSIS — M81.0 AGE-RELATED OSTEOPOROSIS WITHOUT CURRENT PATHOLOGICAL FRACTURE: ICD-10-CM

## 2020-10-29 DIAGNOSIS — Z29.9 ENCOUNTER FOR PROPHYLACTIC MEASURES, UNSPECIFIED: ICD-10-CM

## 2020-10-29 DIAGNOSIS — R01.1 CARDIAC MURMUR, UNSPECIFIED: ICD-10-CM

## 2020-10-29 DIAGNOSIS — R74.01 ELEVATION OF LEVELS OF LIVER TRANSAMINASE LEVELS: ICD-10-CM

## 2020-10-29 LAB
ALBUMIN SERPL ELPH-MCNC: 2.9 G/DL — LOW (ref 3.3–5)
ALP SERPL-CCNC: 405 U/L — HIGH (ref 40–120)
ALT FLD-CCNC: 127 U/L — HIGH (ref 10–45)
ANION GAP SERPL CALC-SCNC: 12 MMOL/L — SIGNIFICANT CHANGE UP (ref 5–17)
AST SERPL-CCNC: 81 U/L — HIGH (ref 10–40)
BILIRUB SERPL-MCNC: 2 MG/DL — HIGH (ref 0.2–1.2)
BUN SERPL-MCNC: 8 MG/DL — SIGNIFICANT CHANGE UP (ref 7–23)
CALCIUM SERPL-MCNC: 8.1 MG/DL — LOW (ref 8.4–10.5)
CHLORIDE SERPL-SCNC: 105 MMOL/L — SIGNIFICANT CHANGE UP (ref 96–108)
CO2 SERPL-SCNC: 21 MMOL/L — LOW (ref 22–31)
CREAT SERPL-MCNC: 0.65 MG/DL — SIGNIFICANT CHANGE UP (ref 0.5–1.3)
GGT SERPL-CCNC: 382 U/L — HIGH (ref 8–40)
GLUCOSE SERPL-MCNC: 94 MG/DL — SIGNIFICANT CHANGE UP (ref 70–99)
HAV IGM SER-ACNC: ABNORMAL
HAV IGM SER-ACNC: SIGNIFICANT CHANGE UP
HBV CORE IGM SER-ACNC: SIGNIFICANT CHANGE UP
HBV CORE IGM SER-ACNC: SIGNIFICANT CHANGE UP
HBV SURFACE AG SER-ACNC: SIGNIFICANT CHANGE UP
HBV SURFACE AG SER-ACNC: SIGNIFICANT CHANGE UP
HCT VFR BLD CALC: 33.3 % — LOW (ref 34.5–45)
HCV AB S/CO SERPL IA: 0.2 S/CO — SIGNIFICANT CHANGE UP (ref 0–0.99)
HCV AB S/CO SERPL IA: 0.24 S/CO — SIGNIFICANT CHANGE UP (ref 0–0.99)
HCV AB SERPL-IMP: SIGNIFICANT CHANGE UP
HCV AB SERPL-IMP: SIGNIFICANT CHANGE UP
HGB BLD-MCNC: 10.6 G/DL — LOW (ref 11.5–15.5)
MCHC RBC-ENTMCNC: 27.8 PG — SIGNIFICANT CHANGE UP (ref 27–34)
MCHC RBC-ENTMCNC: 31.8 GM/DL — LOW (ref 32–36)
MCV RBC AUTO: 87.4 FL — SIGNIFICANT CHANGE UP (ref 80–100)
NRBC # BLD: 0 /100 WBCS — SIGNIFICANT CHANGE UP (ref 0–0)
PLATELET # BLD AUTO: 123 K/UL — LOW (ref 150–400)
POTASSIUM SERPL-MCNC: 3.7 MMOL/L — SIGNIFICANT CHANGE UP (ref 3.5–5.3)
POTASSIUM SERPL-SCNC: 3.7 MMOL/L — SIGNIFICANT CHANGE UP (ref 3.5–5.3)
PROT SERPL-MCNC: 6.8 G/DL — SIGNIFICANT CHANGE UP (ref 6–8.3)
RBC # BLD: 3.81 M/UL — SIGNIFICANT CHANGE UP (ref 3.8–5.2)
RBC # FLD: 13.5 % — SIGNIFICANT CHANGE UP (ref 10.3–14.5)
SODIUM SERPL-SCNC: 138 MMOL/L — SIGNIFICANT CHANGE UP (ref 135–145)
WBC # BLD: 3.3 K/UL — LOW (ref 3.8–10.5)
WBC # FLD AUTO: 3.3 K/UL — LOW (ref 3.8–10.5)

## 2020-10-29 PROCEDURE — 71046 X-RAY EXAM CHEST 2 VIEWS: CPT | Mod: 26

## 2020-10-29 PROCEDURE — 99217: CPT | Mod: CS

## 2020-10-29 PROCEDURE — 99222 1ST HOSP IP/OBS MODERATE 55: CPT | Mod: GC

## 2020-10-29 PROCEDURE — 99223 1ST HOSP IP/OBS HIGH 75: CPT

## 2020-10-29 RX ORDER — ZOLPIDEM TARTRATE 10 MG/1
5 TABLET ORAL AT BEDTIME
Refills: 0 | Status: DISCONTINUED | OUTPATIENT
Start: 2020-10-29 | End: 2020-10-30

## 2020-10-29 RX ORDER — IBANDRONATE SODIUM 150 MG/1
1 TABLET ORAL
Qty: 0 | Refills: 0 | DISCHARGE

## 2020-10-29 RX ORDER — PANTOPRAZOLE SODIUM 20 MG/1
1 TABLET, DELAYED RELEASE ORAL
Qty: 0 | Refills: 0 | DISCHARGE

## 2020-10-29 RX ORDER — CEFTRIAXONE 500 MG/1
1000 INJECTION, POWDER, FOR SOLUTION INTRAMUSCULAR; INTRAVENOUS EVERY 24 HOURS
Refills: 0 | Status: DISCONTINUED | OUTPATIENT
Start: 2020-10-29 | End: 2020-10-29

## 2020-10-29 RX ORDER — IBUPROFEN 200 MG
400 TABLET ORAL ONCE
Refills: 0 | Status: COMPLETED | OUTPATIENT
Start: 2020-10-29 | End: 2020-10-29

## 2020-10-29 RX ORDER — ONDANSETRON 8 MG/1
4 TABLET, FILM COATED ORAL EVERY 6 HOURS
Refills: 0 | Status: DISCONTINUED | OUTPATIENT
Start: 2020-10-29 | End: 2020-10-30

## 2020-10-29 RX ORDER — PANTOPRAZOLE SODIUM 20 MG/1
40 TABLET, DELAYED RELEASE ORAL
Refills: 0 | Status: DISCONTINUED | OUTPATIENT
Start: 2020-10-29 | End: 2020-10-29

## 2020-10-29 RX ORDER — PANTOPRAZOLE SODIUM 20 MG/1
40 TABLET, DELAYED RELEASE ORAL
Refills: 0 | Status: DISCONTINUED | OUTPATIENT
Start: 2020-10-29 | End: 2020-10-30

## 2020-10-29 RX ORDER — SENNA PLUS 8.6 MG/1
1 TABLET ORAL
Qty: 0 | Refills: 0 | DISCHARGE

## 2020-10-29 RX ADMIN — ONDANSETRON 4 MILLIGRAM(S): 8 TABLET, FILM COATED ORAL at 18:20

## 2020-10-29 RX ADMIN — Medication 400 MILLIGRAM(S): at 19:20

## 2020-10-29 RX ADMIN — CEFTRIAXONE 100 MILLIGRAM(S): 500 INJECTION, POWDER, FOR SOLUTION INTRAMUSCULAR; INTRAVENOUS at 15:29

## 2020-10-29 RX ADMIN — Medication 400 MILLIGRAM(S): at 18:20

## 2020-10-29 RX ADMIN — Medication 10 MILLIGRAM(S): at 21:40

## 2020-10-29 NOTE — H&P ADULT - NSICDXFAMILYHX_GEN_ALL_CORE_FT
FAMILY HISTORY:  FH: Alzheimers disease, father  FH: cerebral aneurysm, mother  of this    Father  Still living? Unknown  Family history of MI (myocardial infarction), Age at diagnosis: Age Unknown

## 2020-10-29 NOTE — CONSULT NOTE ADULT - ATTENDING COMMENTS
Patient with nausea and developed dark urine and itching and now with cholestatic liver tests.  Suggest abdominal MRI w/wo contrast and with MRCP to define biliary anatomy and r/o biliary stone.  Also pursue evaluation for other causes of liver disease.

## 2020-10-29 NOTE — H&P ADULT - PROBLEM SELECTOR PLAN 1
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  - does not appear to have active infection will DC ceftriaxone    Thrombocytopenia  - monitor counts

## 2020-10-29 NOTE — H&P ADULT - NSICDXPASTSURGICALHX_GEN_ALL_CORE_FT
PAST SURGICAL HISTORY:  H/O bilateral mastectomy + breast implants    History of cataract surgery, unspecified laterality

## 2020-10-29 NOTE — H&P ADULT - PROBLEM SELECTOR PLAN 5
dvt ppx lovenox  diet: regular  consults: hepatology  -ambien for insomnia (home med) - Has very loud systolic murmur on physical exam, patient says she sees Dr. Pj Denise from cardiology and has had echocardiograms in the past  - she is asymptomatic - no chest pain/syncope/dyspnea/MEADE  - ordered echo, alternatively can reach out to cardiologist's office tomorrow for past echo results

## 2020-10-29 NOTE — ED CDU PROVIDER SUBSEQUENT DAY NOTE - MEDICAL DECISION MAKING DETAILS
Calm
Attending MD Harris:  85y F pmhx GERD, Breast Ca s/p b/l mastectomy presents to ED c/o hematuria x 3 days. Pt reports dark urine last week, now with blood in urine. Pt referred to ED by PCP Dr. Gomez due to abnormal labs yesterday, elevated LFTs.   CT abdomen negative for acute pathology. RUQ US without gallstones. Gallbladder wall thickening. Right hepatic lobe calcified granuloma. Plan for trending LFTs, IVF, antiemetics, with Hepatology consult in CDU.

## 2020-10-29 NOTE — CONSULT NOTE ADULT - SUBJECTIVE AND OBJECTIVE BOX
PATIENT: GINO GRAHAM, MRN: [id number]    CHIEF COMPLAINT: Patient is a 85y old  Female who presents with a chief complaint of     HPI: 84yo F w/ h/o GERD, breast CA (s/p B/L lumpectomy followed by B/L mastectomy 40-50y ago), RUE lymphedema, osteoarthritis/osteoporosis (on monthly ibandronate), HLD, sent to the ED by o/p GI (and PCP) Dr. Gomez for elevated LFTs. Pt has also complained of nausea a/w poor appetite, increased burping/gas, and blood in her urine for the past 2-3d. At time of encounter pt reports resolution of her nausea/discomfort, has improved appetite and is tolerating her meals, and reports that her urine is no longer discolored. Does report 4-5/10, nonradiating RUQ abdominal pain that she noted during her RUQ US, but states that the only thing that exacerbates this pain is very deep palpation, and it is not present otherwise. Denies fever, chills, vomiting, dysuria, diarrhea, dark or bloody stools. Denies sick contacts, or recent travel (returned from trip to [place] in Feb; last out of country travel - [id number]). Denies use of new medications, or use of any supplements/herbal remedies. Reports compliance with prescribed medications.    Last EGD 20y ago, to evaluate heartburn, as per pt unremarkable. Last colonoscopy and echo 6-7y ago, also WNL as per pt. Ex-smoker (smoked 2-3 cigarettes x10y; quit many years ago). Denies alcohol use (drinks ~1 glass of wine/month). Denies illicit drug use. Denies personal or family history of GI disorders/Fer (except for grandaughter +celiac disease), diabetes, heart disease, autoimmune/thyroid disorders. Ambulates independently, able to complete all ADLs independently. Lives alone w/ her ; has had three children, all of whom are healthy.     REVIEW OF SYSTEMS:    Constitutional:     [X ] negative [ ] fevers [ ] chills [ ] weight loss [ ] weight gain  HEENT:                  [X ] negative [ ] dry eyes [ ] eye irritation [ ] postnasal drip [ ] nasal congestion  CV:                         [X ] negative  [ ] chest pain [ ] orthopnea [ ] palpitations [ ] murmur  Resp:                     [X ] negative [ ] cough [ ] shortness of breath [ ] dyspnea [ ] wheezing [ ] sputum [ ] hemoptysis  GI:                          [X ] negative [ ] nausea [ ] vomiting [ ] diarrhea [ ] constipation [ ] abd pain [ ] dysphagia   :                        [X ] negative [ ] dysuria [ ] nocturia [ ] hematuria [ ] increased urinary frequency  Musculoskeletal: [X ] negative [ ] back pain [ ] myalgias [ ] arthralgias [ ] fracture  Skin:                       [X ] negative [ ] rash [ ] itch  Neurological:        [X ] negative [ ] headache [ ] dizziness [ ] syncope [ ] weakness [ ] numbness  Psychiatric:           [X ] negative [ ] anxiety [ ] depression  Endocrine:            [X ] negative [ ] diabetes [ ] thyroid problem  Heme/Lymph:      [X ] negative [ ] anemia [ ] bleeding problem  Allergic/Immune: [ X] negative [ ] itchy eyes [ ] nasal discharge [ ] hives [ ] angioedema    [X ] All other systems negative  [ ] Unable to assess ROS because ________.    MEDICATIONS:  MEDICATIONS  (STANDING):  sodium chloride 0.9%. 1000 milliLiter(s) (60 mL/Hr) IV Continuous <Continuous>    MEDICATIONS  (PRN):    ALLERGIES: Allergies    No Known Allergies    Intolerances        OBJECTIVE:  ICU Vital Signs Last 24 Hrs  T(C): 36.4 (29 Oct 2020 08:00), Max: 37.2 (29 Oct 2020 04:00)  T(F): 97.5 (29 Oct 2020 08:00), Max: 98.9 (29 Oct 2020 04:00)  HR: 81 (29 Oct 2020 08:00) (70 - 94)  BP: 108/70 (29 Oct 2020 08:00) (108/70 - 145/78)  BP(mean): --  ABP: --  ABP(mean): --  RR: 18 (29 Oct 2020 08:00) (17 - 20)  SpO2: 96% (29 Oct 2020 08:00) (88% - 97%)      CAPILLARY BLOOD GLUCOSE  CAPILLARY BLOOD GLUCOSE        I&O's Summary    Daily Height in cm: 160.02 (28 Oct 2020 12:21)    Daily     PHYSICAL EXAMINATION:  General: Comfortable, no acute distress, cooperative with exam.  HEENT: PERRLA, EOMI, moist mucous membranes.  Respiratory: CTAB, normal respiratory effort, no coughing, wheezes, crackles, or rales.  CV: RRR, S1S2, +murmur, no rubs or gallops. No JVD. Distal pulses intact.  Abdominal: Soft, nontender, nondistended, no rebound or guarding, normal bowel sounds.  Neurology: AOx3, no focal neuro defects, LUGO x 4.  Extremities: trace edema, + Peripheral pulses.  Incisions:   Tubes:    LABS:                          10.6   3.30  )-----------( 123      ( 29 Oct 2020 06:20 )             33.3     10-29    138  |  105  |  8   ----------------------------<  94  3.7   |  21<L>  |  0.65    Ca    8.1<L>      29 Oct 2020 06:20    TPro  6.8  /  Alb  2.9<L>  /  TBili  2.0<H>  /  DBili  x   /  AST  81<H>  /  ALT  127<H>  /  AlkPhos  405<H>  10-29    LIVER FUNCTIONS - ( 29 Oct 2020 06:20 )  Alb: 2.9 g/dL / Pro: 6.8 g/dL / ALK PHOS: 405 U/L / ALT: 127 U/L / AST: 81 U/L / GGT: x           PT/INR - ( 28 Oct 2020 13:49 )   PT: 14.2 sec;   INR: 1.19 ratio       PTT - ( 28 Oct 2020 13:49 )  PTT:22.0 sec    Urinalysis Basic - ( 28 Oct 2020 15:43 )    Color: Dark Yellow / Appearance: Slightly Turbid / S.012 / pH: x  Gluc: x / Ketone: Negative  / Bili: Small / Urobili: Negative   Blood: x / Protein: Trace / Nitrite: Negative   Leuk Esterase: Moderate / RBC: 3 /hpf / WBC 8 /HPF   Sq Epi: x / Non Sq Epi: 1 /hpf / Bacteria: Few    TELEMETRY: N/A    EKG: N/A    IMAGING:   < from: CT Abdomen and Pelvis w/ IV Cont (10.28.20 @ 15:30) >    EXAM:  CT ABDOMEN AND PELVIS IC                        PROCEDURE DATE:  10/28/2020      INTERPRETATION:  CLINICAL INFORMATION: Epigastric pain.    COMPARISON: None.    PROCEDURE:  CT of the Abdomen and Pelvis was performed with intravenous contrast.  Intravenous contrast: 90 ml Omnipaque 350. 10 ml discarded.  Oral contrast: None.  Sagittal and coronal reformats were performed.    FINDINGS:  LOWER CHEST: Bilateral breast implants with contained rupture. Cardiomegaly. Coronary calcification. Mild bibasilar subsegmental atelectasis.    LIVER: Right hepatic lobe calcified granuloma. A few subcentimeter hypodense hepatic foci, too small to characterize.  BILE DUCTS: Normal caliber.  GALLBLADDER: Within normal limits.  SPLEEN: Subcentimeter hypodense focus, too small to characterize.  PANCREAS: Within normal limits.  ADRENALS: Within normal limits.  KIDNEYS/URETERS: A 3 mm nonobstructing left renal calculus. No hydronephrosis. Bilateral renal cysts.    BLADDER: Within normal limits.  REPRODUCTIVE ORGANS: Uterus and adnexa within normal limits.    BOWEL: Small hiatal hernia. No bowel obstruction. Appendix not visualized. No right lower quadrant inflammation.  PERITONEUM: No ascites.  VESSELS: Atherosclerotic changes.  RETROPERITONEUM/LYMPH NODES: Several subcentimeter short axis upper retroperitoneal nodes, nonspecific.  ABDOMINAL WALL: Within normal limits.  BONES: Degenerative changes.    IMPRESSION:  No acute pathology.    ANNE MENDEZ MD; Attending Radiologist  This document has been electronically signed. Oct 28 2020  3:45PM    < end of copied text >

## 2020-10-29 NOTE — H&P ADULT - ASSESSMENT
86 yo female w/pmh GERD, breast CA (s/p B/L lumpectomy followed by B/L mastectomy 40-50y ago), RUE lymphedema, osteoarthritis/osteoporosis (on monthly ibandronate), HLD, sent to the ED by o/p GI (and PCP) Dr. Gomez for elevated LFTs. Hepatology consulted. US abdomen and CT abdomen/pelvis were negative for stones. Admitted for further work-up including MRCP.

## 2020-10-29 NOTE — ED CDU PROVIDER SUBSEQUENT DAY NOTE - PROGRESS NOTE DETAILS
At 3am patient complained of 10/10 headache, asking to take her own Tylenol. Took Tylenol 1000mg. At 4am patient states that headache improved that she was able to fall asleep. Pt O2 sat 87% on room air with good waves form. Denies SOB. Not tachypneic. Speaking in full sentences. Placed on 2L NC O2 sat increased to 93%-94%. MCKAY Mccray, will order CXR and monitor closely. - Izabela Umana PA-C Transport at bedside to  patient. Patient states that she is unsure if she wants to have xrays performed as she was told yesterday that anything she does in observation might not be covered by her insurance. Explained that we believe that this test is very important as she is hypoxic. Will call rep to speak with patient. - Izabela Umana PA-C Patient spoke with Rep Boo. Patient OK with xray. En route to xray at this time. - Izabela Umana PA-C CXR negative for acute pathology. Patient denies upper respiratory symptoms.   O2 sat in the low-mid 90s while patient awake. O2 sat appears to drop to 89% on room air while sleeping. O2 sat decrease appears at this time corresponds with patient sleeping. Patient evaluated by Dr. Holbrook at bedside, plan to ambulate patient with O2 sat and monitor closely. - Izabela Umana PA-C Patient ambulated without respiratory distress.  O2 sat average 90-91%.   Lowest 88%. - Izabela Umana PA-C MCKAY Robbins. As patient not in any respiratory distress, with negative CXR would continue to monitor in CDU. - Izabela Umana PA-C CDU NOTE PA [place]t: pt resting comfortably, feels well without complaint. NAD VSS. abdomen- soft, NT, ND. CDU NOTE MARGARITO Martin: pt resting comfortably, feels well without complaint. NAD VSS.  pt seen by Hepatology resident- Hepatology attending will be by later for final recs. CDU NOTE MARGARITO Martin: as per Hepatology- admit for MRCP.  pt admitted to medicine.

## 2020-10-29 NOTE — H&P ADULT - NSHPLABSRESULTS_GEN_ALL_CORE
Personally reviewed:    EKG: LHV, QTc 469, NSR, no ST changes    CXR: clear lungs, no effusions or infiltrates, b/l lower opacities are breast implants

## 2020-10-29 NOTE — H&P ADULT - HISTORY OF PRESENT ILLNESS
84 yo female w/pmh GERD, breast CA (s/p B/L lumpectomy followed by B/L mastectomy 40-50y ago), RUE lymphedema, osteoarthritis/osteoporosis (on monthly ibandronate), HLD, sent to the ED by o/p GI (and PCP) Dr. Gomez for elevated LFTs. Patient also describes decreased appetite over the last three days w/o weight loss, nausea, dark urine, and constipation. She has been able to eat only some of the foods that are given here during meals. No vomiting. She denies any changes in color of her skin. She originally thought there was blood in her urine, causing it to be so dark. No melena or hematochezia. Denies fevers, chills, chest pain, dyspnea. She denies taking any OTC herbal meds or supplements. Currently also having a headache. Denies abdominal pain, says she only had some in the right/center of her abdomen during ultrasound exam. No family history of liver disease.    In the ED, patient was hemodynamically stable, afebrile, had elevated AST/ALT/ALP/bili. Patient was admitted to CDU at first, hepatology consulted. US abdomen and CT abdomen/pelvis were negative for stones. Patient admitted for MRCP and further work-up.

## 2020-10-29 NOTE — CONSULT NOTE ADULT - ASSESSMENT
ASSESSMENT: HPI: 86yo F w/ h/o GERD, breast CA (s/p B/L lumpectomy followed by B/L mastectomy 40-50y ago), RUE lymphedema, osteoarthritis/osteoporosis (on monthly ibandronate), HLD, sent to the ED by o/p GI (and PCP) Dr. Gomez for elevated LFTs. CT A/P, RUQ US negative.    IMPRESSION:  #Transaminitis:    RECOMMENDATIONS:    *INCOMPLETE NOTE*  All recommendations are not final; PENDING ATTENDING JANA Chau MD  Internal Medicine PGY-1  Hepatology Consult Service ASSESSMENT: HPI: 84yo F w/ h/o GERD, breast CA (s/p B/L lumpectomy followed by B/L mastectomy 40-50y ago), RUE lymphedema, osteoarthritis/osteoporosis (on monthly ibandronate), HLD, sent to the ED by o/p GI (and PCP) Dr. Gomez for elevated liver enzymes. CT A/P, RUQ US negative.    IMPRESSION:  #Elevated Liver Enzymes: Pts clinical symptoms (nausea, dark urine, itching) and cholestatic pattern on labs suggestive of bile duct obstruction; differential includes autoimmune hepatitis although less likely. Viral hepatitis panel negative. Liver enzymes still elevated but improving. Pt will need further imaging of the biliary system.    RECOMMENDATIONS:  -MRI w/ and w/out contrast with MRCP to evaluate for bile duct obstruction  -Please send KRYSTYNA, AMA, SMA, quantitative immunoglobulins, and GGT to kay for autoimmune hepatitis    All recommendations are not final; PENDING ATTENDING KAY Chau MD  Internal Medicine PGY-1  Hepatology Consult Service

## 2020-10-29 NOTE — ED ADULT NURSE REASSESSMENT NOTE - NS ED NURSE REASSESS COMMENT FT1
Pt found to be hypoxic @ 88% on room air. Pt placed on 2L NC & is sating 91-94%. MARGARITO Gurrola aware. Will continue to monitor,
Report received from OMAR Narayanan. pt in no acute distress. pt to ambulate to bathroom to give clean catch urine sample. VS seen as updated
pt going off unit to CT
pt off unit at US, pt to receive ceftriaxone upon return to ED
Pt received from OMAR Gonzalez. Pt oriented to CDU & plan of care was discussed. Pt states she is feeling well. Pt denies any pain at the moment and denies any urinary symptoms. Pt denies any nausea and is requesting food. Safety & comfort measures maintained. Call bell in reach. Will continue to monitor.

## 2020-10-29 NOTE — H&P ADULT - NSHPSOCIALHISTORY_GEN_ALL_CORE
used to smoke cigarettes quit many years ago, drinks a glass of wine once a month, no illicit drug use

## 2020-10-29 NOTE — ED CDU PROVIDER SUBSEQUENT DAY NOTE - ATTENDING CONTRIBUTION TO CARE
Attending MD Harris:  I personally have seen and examined this patient.  Resident note reviewed and agree on plan of care and except where noted.

## 2020-10-29 NOTE — H&P ADULT - NSHPPHYSICALEXAM_GEN_ALL_CORE
Vital Signs Last 24 Hrs  T(C): 36.8 (29 Oct 2020 15:58), Max: 37.2 (29 Oct 2020 04:00)  T(F): 98.3 (29 Oct 2020 15:58), Max: 98.9 (29 Oct 2020 04:00)  HR: 84 (29 Oct 2020 15:58) (72 - 94)  BP: 126/78 (29 Oct 2020 15:58) (108/70 - 145/78)  BP(mean): --  RR: 18 (29 Oct 2020 15:58) (17 - 18)  SpO2: 93% (29 Oct 2020 15:58) (88% - 96%)    PHYSICAL EXAM:  GENERAL:  Well appearing, in NAD  HEAD:  NCAT  EYES: PERRLA, conjunctiva clear no scleral icterus  NECK: Supple, No JVD  CHEST/LUNG: CTA B/L. No w/r/r.  HEART: Reg rate. Normal S1, S2. loud systolic murmur heard throughout precordium  ABDOMEN: SNTND. Bowel sounds present  EXTREMITIES:  2+ Peripheral Pulses, No clubbing, cyanosis, edema.  PSYCH: AAOx3, appropriate affect  NEUROLOGY: grossly non-focal  SKIN: No rashes or lesions, no jaundice

## 2020-10-29 NOTE — ED CDU PROVIDER SUBSEQUENT DAY NOTE - HISTORY
Patient seen at bedside. VSS. Patient resting comfortably, no complaints.  Will reevaluate.  Plan for Hepatology consult and trending LFT in the setting of dark urine and elevated outpatient LFTs.- Izabela Umana PA-C

## 2020-10-29 NOTE — H&P ADULT - NSHPREVIEWOFSYSTEMS_GEN_ALL_CORE
REVIEW OF SYSTEMS:    CONSTITUTIONAL: No weakness, weight loss, fevers or chills  EYES/ENT: No visual changes;  No vertigo or throat pain   NECK: No pain or stiffness  RESPIRATORY: No cough, wheezing, hemoptysis; No shortness of breath  CARDIOVASCULAR: No chest pain or palpitations, MEADE  GASTROINTESTINAL: No abdominal or epigastric pain. No vomiting, or hematemesis; No diarrhea. No melena or hematochezia. +nausea, constipation.   GENITOURINARY: No dysuria, frequency, +dark urine  NEUROLOGICAL: No numbness or weakness, AAOX3  SKIN: No itching, rashes  MUSCULOSKELETAL: no joint erythema, no joint swelling  PSYCHIATRIC: no depression, no anxiety

## 2020-10-30 ENCOUNTER — TRANSCRIPTION ENCOUNTER (OUTPATIENT)
Age: 85
End: 2020-10-30

## 2020-10-30 VITALS
SYSTOLIC BLOOD PRESSURE: 119 MMHG | HEART RATE: 80 BPM | RESPIRATION RATE: 18 BRPM | DIASTOLIC BLOOD PRESSURE: 74 MMHG | TEMPERATURE: 98 F | OXYGEN SATURATION: 93 %

## 2020-10-30 DIAGNOSIS — E87.8 OTHER DISORDERS OF ELECTROLYTE AND FLUID BALANCE, NOT ELSEWHERE CLASSIFIED: ICD-10-CM

## 2020-10-30 DIAGNOSIS — K59.00 CONSTIPATION, UNSPECIFIED: ICD-10-CM

## 2020-10-30 LAB
ALBUMIN SERPL ELPH-MCNC: 2.9 G/DL — LOW (ref 3.3–5)
ALP SERPL-CCNC: 397 U/L — HIGH (ref 40–120)
ALT FLD-CCNC: 104 U/L — HIGH (ref 10–45)
ANION GAP SERPL CALC-SCNC: 9 MMOL/L — SIGNIFICANT CHANGE UP (ref 5–17)
APPEARANCE: CLEAR
APTT BLD: 32.3 SEC — SIGNIFICANT CHANGE UP (ref 27.5–35.5)
AST SERPL-CCNC: 61 U/L — HIGH (ref 10–40)
BASOPHILS # BLD AUTO: 0.03 K/UL — SIGNIFICANT CHANGE UP (ref 0–0.2)
BASOPHILS NFR BLD AUTO: 1 % — SIGNIFICANT CHANGE UP (ref 0–2)
BILIRUB SERPL-MCNC: 1.1 MG/DL — SIGNIFICANT CHANGE UP (ref 0.2–1.2)
BILIRUBIN URINE: ABNORMAL
BLOOD URINE: NORMAL
BUN SERPL-MCNC: 9 MG/DL — SIGNIFICANT CHANGE UP (ref 7–23)
CALCIUM SERPL-MCNC: 8.1 MG/DL — LOW (ref 8.4–10.5)
CHLORIDE SERPL-SCNC: 104 MMOL/L — SIGNIFICANT CHANGE UP (ref 96–108)
CO2 SERPL-SCNC: 24 MMOL/L — SIGNIFICANT CHANGE UP (ref 22–31)
COLOR: ABNORMAL
CREAT SERPL-MCNC: 0.54 MG/DL — SIGNIFICANT CHANGE UP (ref 0.5–1.3)
CULTURE RESULTS: NO GROWTH — SIGNIFICANT CHANGE UP
EOSINOPHIL # BLD AUTO: 0.2 K/UL — SIGNIFICANT CHANGE UP (ref 0–0.5)
EOSINOPHIL NFR BLD AUTO: 7 % — HIGH (ref 0–6)
GLUCOSE QUALITATIVE U: NEGATIVE
GLUCOSE SERPL-MCNC: 82 MG/DL — SIGNIFICANT CHANGE UP (ref 70–99)
HCT VFR BLD CALC: 31.3 % — LOW (ref 34.5–45)
HGB BLD-MCNC: 10.4 G/DL — LOW (ref 11.5–15.5)
IGA FLD-MCNC: 943 MG/DL — HIGH (ref 84–499)
IGG FLD-MCNC: 1646 MG/DL — SIGNIFICANT CHANGE UP (ref 610–1660)
IGM SERPL-MCNC: 544 MG/DL — HIGH (ref 35–242)
INR BLD: 1.12 RATIO — SIGNIFICANT CHANGE UP (ref 0.88–1.16)
KETONES URINE: NORMAL
LEUKOCYTE ESTERASE URINE: NEGATIVE
LYMPHOCYTES # BLD AUTO: 0.99 K/UL — LOW (ref 1–3.3)
LYMPHOCYTES # BLD AUTO: 35 % — SIGNIFICANT CHANGE UP (ref 13–44)
MAGNESIUM SERPL-MCNC: 1.9 MG/DL — SIGNIFICANT CHANGE UP (ref 1.6–2.6)
MANUAL SMEAR VERIFICATION: SIGNIFICANT CHANGE UP
MCHC RBC-ENTMCNC: 28.6 PG — SIGNIFICANT CHANGE UP (ref 27–34)
MCHC RBC-ENTMCNC: 33.2 GM/DL — SIGNIFICANT CHANGE UP (ref 32–36)
MCV RBC AUTO: 86 FL — SIGNIFICANT CHANGE UP (ref 80–100)
MONOCYTES # BLD AUTO: 0.43 K/UL — SIGNIFICANT CHANGE UP (ref 0–0.9)
MONOCYTES NFR BLD AUTO: 15 % — HIGH (ref 2–14)
NEUTROPHILS # BLD AUTO: 1.19 K/UL — LOW (ref 1.8–7.4)
NEUTROPHILS NFR BLD AUTO: 42 % — LOW (ref 43–77)
NITRITE URINE: NEGATIVE
NRBC # BLD: 0 /100 — SIGNIFICANT CHANGE UP (ref 0–0)
PH URINE: 6.5
PHOSPHATE SERPL-MCNC: 2.2 MG/DL — LOW (ref 2.5–4.5)
PLAT MORPH BLD: NORMAL — SIGNIFICANT CHANGE UP
PLATELET # BLD AUTO: 131 K/UL — LOW (ref 150–400)
POTASSIUM SERPL-MCNC: 3.4 MMOL/L — LOW (ref 3.5–5.3)
POTASSIUM SERPL-SCNC: 3.4 MMOL/L — LOW (ref 3.5–5.3)
PROT SERPL-MCNC: 7.1 G/DL — SIGNIFICANT CHANGE UP (ref 6–8.3)
PROTEIN URINE: NORMAL
PROTHROM AB SERPL-ACNC: 13.1 SEC — SIGNIFICANT CHANGE UP (ref 10.6–13.6)
RBC # BLD: 3.64 M/UL — LOW (ref 3.8–5.2)
RBC # FLD: 13.6 % — SIGNIFICANT CHANGE UP (ref 10.3–14.5)
RBC BLD AUTO: SIGNIFICANT CHANGE UP
SARS-COV-2 IGG SERPL QL IA: NEGATIVE — SIGNIFICANT CHANGE UP
SARS-COV-2 IGM SERPL IA-ACNC: <3.8 AU/ML — SIGNIFICANT CHANGE UP
SODIUM SERPL-SCNC: 137 MMOL/L — SIGNIFICANT CHANGE UP (ref 135–145)
SPECIFIC GRAVITY URINE: 1.01
SPECIMEN SOURCE: SIGNIFICANT CHANGE UP
UROBILINOGEN URINE: NORMAL
WBC # BLD: 2.84 K/UL — LOW (ref 3.8–10.5)
WBC # FLD AUTO: 2.84 K/UL — LOW (ref 3.8–10.5)

## 2020-10-30 PROCEDURE — U0003: CPT

## 2020-10-30 PROCEDURE — 86255 FLUORESCENT ANTIBODY SCREEN: CPT

## 2020-10-30 PROCEDURE — 86038 ANTINUCLEAR ANTIBODIES: CPT

## 2020-10-30 PROCEDURE — 96374 THER/PROPH/DIAG INJ IV PUSH: CPT

## 2020-10-30 PROCEDURE — 86769 SARS-COV-2 COVID-19 ANTIBODY: CPT

## 2020-10-30 PROCEDURE — 84295 ASSAY OF SERUM SODIUM: CPT

## 2020-10-30 PROCEDURE — 99285 EMERGENCY DEPT VISIT HI MDM: CPT | Mod: 25

## 2020-10-30 PROCEDURE — 82784 ASSAY IGA/IGD/IGG/IGM EACH: CPT

## 2020-10-30 PROCEDURE — 82565 ASSAY OF CREATININE: CPT

## 2020-10-30 PROCEDURE — 83735 ASSAY OF MAGNESIUM: CPT

## 2020-10-30 PROCEDURE — 82803 BLOOD GASES ANY COMBINATION: CPT

## 2020-10-30 PROCEDURE — 82330 ASSAY OF CALCIUM: CPT

## 2020-10-30 PROCEDURE — 74177 CT ABD & PELVIS W/CONTRAST: CPT

## 2020-10-30 PROCEDURE — 84100 ASSAY OF PHOSPHORUS: CPT

## 2020-10-30 PROCEDURE — 80074 ACUTE HEPATITIS PANEL: CPT

## 2020-10-30 PROCEDURE — 83605 ASSAY OF LACTIC ACID: CPT

## 2020-10-30 PROCEDURE — 85610 PROTHROMBIN TIME: CPT

## 2020-10-30 PROCEDURE — 85014 HEMATOCRIT: CPT

## 2020-10-30 PROCEDURE — 96376 TX/PRO/DX INJ SAME DRUG ADON: CPT

## 2020-10-30 PROCEDURE — 85025 COMPLETE CBC W/AUTO DIFF WBC: CPT

## 2020-10-30 PROCEDURE — 85730 THROMBOPLASTIN TIME PARTIAL: CPT

## 2020-10-30 PROCEDURE — 74183 MRI ABD W/O CNTR FLWD CNTR: CPT | Mod: 26

## 2020-10-30 PROCEDURE — 82977 ASSAY OF GGT: CPT

## 2020-10-30 PROCEDURE — 99239 HOSP IP/OBS DSCHRG MGMT >30: CPT

## 2020-10-30 PROCEDURE — 74183 MRI ABD W/O CNTR FLWD CNTR: CPT

## 2020-10-30 PROCEDURE — 82947 ASSAY GLUCOSE BLOOD QUANT: CPT

## 2020-10-30 PROCEDURE — 83690 ASSAY OF LIPASE: CPT

## 2020-10-30 PROCEDURE — A9585: CPT

## 2020-10-30 PROCEDURE — 71046 X-RAY EXAM CHEST 2 VIEWS: CPT

## 2020-10-30 PROCEDURE — 93005 ELECTROCARDIOGRAM TRACING: CPT

## 2020-10-30 PROCEDURE — 85018 HEMOGLOBIN: CPT

## 2020-10-30 PROCEDURE — 99232 SBSQ HOSP IP/OBS MODERATE 35: CPT | Mod: GC

## 2020-10-30 PROCEDURE — 82435 ASSAY OF BLOOD CHLORIDE: CPT

## 2020-10-30 PROCEDURE — 96375 TX/PRO/DX INJ NEW DRUG ADDON: CPT

## 2020-10-30 PROCEDURE — G0378: CPT

## 2020-10-30 PROCEDURE — 87086 URINE CULTURE/COLONY COUNT: CPT

## 2020-10-30 PROCEDURE — 84132 ASSAY OF SERUM POTASSIUM: CPT

## 2020-10-30 PROCEDURE — 81001 URINALYSIS AUTO W/SCOPE: CPT

## 2020-10-30 PROCEDURE — 76705 ECHO EXAM OF ABDOMEN: CPT

## 2020-10-30 PROCEDURE — 86381 MITOCHONDRIAL ANTIBODY EACH: CPT

## 2020-10-30 PROCEDURE — 80053 COMPREHEN METABOLIC PANEL: CPT

## 2020-10-30 RX ORDER — POTASSIUM CHLORIDE 20 MEQ
40 PACKET (EA) ORAL ONCE
Refills: 0 | Status: COMPLETED | OUTPATIENT
Start: 2020-10-30 | End: 2020-10-30

## 2020-10-30 RX ORDER — POLYETHYLENE GLYCOL 3350 17 G/17G
17 POWDER, FOR SOLUTION ORAL ONCE
Refills: 0 | Status: COMPLETED | OUTPATIENT
Start: 2020-10-30 | End: 2020-10-30

## 2020-10-30 RX ORDER — SENNA PLUS 8.6 MG/1
2 TABLET ORAL ONCE
Refills: 0 | Status: COMPLETED | OUTPATIENT
Start: 2020-10-30 | End: 2020-10-30

## 2020-10-30 RX ORDER — SODIUM,POTASSIUM PHOSPHATES 278-250MG
1 POWDER IN PACKET (EA) ORAL
Refills: 0 | Status: DISCONTINUED | OUTPATIENT
Start: 2020-10-30 | End: 2020-10-30

## 2020-10-30 RX ADMIN — POLYETHYLENE GLYCOL 3350 17 GRAM(S): 17 POWDER, FOR SOLUTION ORAL at 12:49

## 2020-10-30 RX ADMIN — SODIUM CHLORIDE 60 MILLILITER(S): 9 INJECTION INTRAMUSCULAR; INTRAVENOUS; SUBCUTANEOUS at 06:32

## 2020-10-30 RX ADMIN — Medication 1 TABLET(S): at 12:52

## 2020-10-30 RX ADMIN — PANTOPRAZOLE SODIUM 40 MILLIGRAM(S): 20 TABLET, DELAYED RELEASE ORAL at 12:48

## 2020-10-30 RX ADMIN — Medication 1 TABLET(S): at 12:48

## 2020-10-30 RX ADMIN — Medication 1 PACKET(S): at 17:08

## 2020-10-30 RX ADMIN — Medication 40 MILLIEQUIVALENT(S): at 12:49

## 2020-10-30 RX ADMIN — SENNA PLUS 2 TABLET(S): 8.6 TABLET ORAL at 12:48

## 2020-10-30 NOTE — PROGRESS NOTE ADULT - ATTENDING COMMENTS
Patient presented with nausea and pruritus , now resolved and cholestatic liver tests now improving. MRI preliminary results do not show abnormality of biliary tract.  Suggest await final read of MRI and if no abnormality identified, suggest patient be discharged in care of her primary gastroenterologist Dr Seferino Gomez.  No evidence of infection and no indication for continued antibiotics.  It is possible patient passed gallstone.

## 2020-10-30 NOTE — PROGRESS NOTE ADULT - SUBJECTIVE AND OBJECTIVE BOX
PATIENT: GINO GRAHAM, MRN: [id number]    CHIEF COMPLAINT: Patient is a 85y old  Female who presents with a chief complaint of dark urine (29 Oct 2020 17:24)    INTERVAL HISTORY/OVERNIGHT EVENTS: No overnight events. At bedside, patient has been sleeping well. Denies N/V/D/C. Denies abdominal pain. Denies chest pain or SOB, cough. Has been ambulating with assistance. Oriented to person, place, and time. Breathing comfortably on room air.    REVIEW OF SYSTEMS:    Constitutional:     [X ] negative [ ] fevers [ ] chills [ ] weight loss [ ] weight gain  HEENT:                  [X ] negative [ ] dry eyes [ ] eye irritation [ ] postnasal drip [ ] nasal congestion  CV:                         [X ] negative  [ ] chest pain [ ] orthopnea [ ] palpitations [ ] murmur  Resp:                     [X ] negative [ ] cough [ ] shortness of breath [ ] dyspnea [ ] wheezing [ ] sputum [ ] hemoptysis  GI:                          [X ] negative [ ] nausea [ ] vomiting [ ] diarrhea [ ] constipation [ ] abd pain [ ] dysphagia   :                        [X ] negative [ ] dysuria [ ] nocturia [ ] hematuria [ ] increased urinary frequency  Musculoskeletal: [X ] negative [ ] back pain [ ] myalgias [ ] arthralgias [ ] fracture  Skin:                       [X ] negative [ ] rash [ ] itch  Neurological:        [X ] negative [ ] headache [ ] dizziness [ ] syncope [ ] weakness [ ] numbness  Psychiatric:           [X ] negative [ ] anxiety [ ] depression  Endocrine:            [X ] negative [ ] diabetes [ ] thyroid problem  Heme/Lymph:      [X ] negative [ ] anemia [ ] bleeding problem  Allergic/Immune: [X ] negative [ ] itchy eyes [ ] nasal discharge [ ] hives [ ] angioedema    [X ] All other systems negative  [ ] Unable to assess ROS because ________.    MEDICATIONS:  MEDICATIONS  (STANDING):  calcium carbonate 1250 mG  + Vitamin D (OsCal 500 + D) 1 Tablet(s) Oral daily  multivitamin 1 Tablet(s) Oral daily  pantoprazole    Tablet 40 milliGRAM(s) Oral two times a day  potassium chloride    Tablet ER 40 milliEquivalent(s) Oral once  potassium phosphate / sodium phosphate Powder (PHOS-NaK) 1 Packet(s) Oral two times a day  sodium chloride 0.9%. 1000 milliLiter(s) (60 mL/Hr) IV Continuous <Continuous>    MEDICATIONS  (PRN):  ondansetron Injectable 4 milliGRAM(s) IV Push every 6 hours PRN Nausea  zolpidem 5 milliGRAM(s) Oral at bedtime PRN Insomnia      ALLERGIES: Allergies    No Known Allergies    Intolerances        OBJECTIVE:  ICU Vital Signs Last 24 Hrs  T(C): 36.6 (30 Oct 2020 04:33), Max: 37.5 (29 Oct 2020 18:36)  T(F): 97.9 (30 Oct 2020 04:33), Max: 99.5 (29 Oct 2020 18:36)  HR: 84 (30 Oct 2020 04:33) (80 - 90)  BP: 135/75 (30 Oct 2020 04:33) (114/71 - 138/77)  BP(mean): --  ABP: --  ABP(mean): --  RR: 18 (30 Oct 2020 04:33) (16 - 18)  SpO2: 92% (30 Oct 2020 04:33) (92% - 95%)      CAPILLARY BLOOD GLUCOSE  CAPILLARY BLOOD GLUCOSE      I&O's Summary    Daily Height in cm: 160.02 (29 Oct 2020 18:36)    Daily     PHYSICAL EXAMINATION:  General: Comfortable, no acute distress, cooperative with exam.  HEENT: PERRLA, EOMI, moist mucous membranes.  Respiratory: CTAB, normal respiratory effort, no coughing, wheezes, crackles, or rales.  CV: RRR, S1S2, +murmur, no rubs or gallops. No JVD. Distal pulses intact.  Abdominal: Soft, nontender, nondistended, no rebound or guarding, normal bowel sounds.  Neurology: AOx3, no focal neuro defects, LUGO x 4.  Extremities: trace edema, + Peripheral pulses.  Incisions:   Tubes:    LABS:                          10.4   2.84  )-----------( 131      ( 30 Oct 2020 07:06 )             31.3     10-30    137  |  104  |  9   ----------------------------<  82  3.4<L>   |  24  |  0.54    Ca    8.1<L>      30 Oct 2020 07:03  Phos  2.2     10-30  Mg     1.9     10-30    TPro  7.1  /  Alb  2.9<L>  /  TBili  1.1  /  DBili  x   /  AST  61<H>  /  ALT  104<H>  /  AlkPhos  397<H>  10-30    LIVER FUNCTIONS - ( 30 Oct 2020 07:03 )  Alb: 2.9 g/dL / Pro: 7.1 g/dL / ALK PHOS: 397 U/L / ALT: 104 U/L / AST: 61 U/L / GGT: x           PT/INR - ( 30 Oct 2020 08:37 )   PT: 13.1 sec;   INR: 1.12 ratio      PTT - ( 30 Oct 2020 08:37 )  PTT:32.3 sec        Urinalysis Basic - ( 28 Oct 2020 15:43 )    Color: Dark Yellow / Appearance: Slightly Turbid / S.012 / pH: x  Gluc: x / Ketone: Negative  / Bili: Small / Urobili: Negative   Blood: x / Protein: Trace / Nitrite: Negative   Leuk Esterase: Moderate / RBC: 3 /hpf / WBC 8 /HPF   Sq Epi: x / Non Sq Epi: 1 /hpf / Bacteria: Few      TELEMETRY:     EKG:     IMAGING: No new images or tests

## 2020-10-30 NOTE — PROGRESS NOTE ADULT - ASSESSMENT
ASSESSMENT: HPI: 86yo F w/ h/o GERD, breast CA (s/p B/L lumpectomy followed by B/L mastectomy 40-50y ago), RUE lymphedema, osteoarthritis/osteoporosis (on monthly ibandronate), HLD, sent to the ED by o/p GI (and PCP) Dr. Gomez for elevated liver enzymes. CT A/P, RUQ US negative.    IMPRESSION:  #Elevated Liver Enzymes: Pts clinical symptoms (nausea, dark urine, itching) and cholestatic pattern on labs suggestive of bile duct obstruction; differential includes autoimmune hepatitis although less likely. Viral hepatitis panel negative. GGT elevated. Liver enzymes still elevated but improving. Pt will need further imaging of the biliary system.    RECOMMENDATIONS:  -F/u MRI w/ and w/out contrast with MRCP to evaluate for bile duct obstruction  -F/u KRYSTYNA, AMA, SMA, quantitative immunoglobulins, to eval for autoimmune hepatitis    *INCOMPLETE NOTE*  All recommendations are not final; PENDING ATTENDING EVAL    Meghann Chau MD  Internal Medicine PGY-1  Hepatology Consult Service ASSESSMENT: HPI: 86yo F w/ h/o GERD, breast CA (s/p B/L lumpectomy followed by B/L mastectomy 40-50y ago), RUE lymphedema, osteoarthritis/osteoporosis (on monthly ibandronate), HLD, sent to the ED by o/p GI (and PCP) Dr. Gomez for elevated liver enzymes. CT A/P, RUQ US negative.    IMPRESSION:  #Elevated Liver Enzymes: Pts clinical symptoms (nausea, dark urine, itching) and cholestatic pattern on labs suggestive of bile duct obstruction; differential includes autoimmune hepatitis although less likely. Viral hepatitis panel negative. GGT elevated. Liver enzymes still elevated but improving. Pt will need further imaging of the biliary system.    RECOMMENDATIONS:  -F/u MRI w/ and w/out contrast with MRCP; likely negative, but pending final read  -If MR/MRCP negative, pt likely can be discharged w/ close follow up in 1wk with outpatient GI/PCP Dr. Gomez for further evaluation (EGD, EUS)  -F/u KRYSTYNA, AMA, SMA, quantitative immunoglobulins, to eval for autoimmune hepatitis    All recommendations are not final; PENDING ATTENDING JANA Chau MD  Internal Medicine PGY-1  Hepatology Consult Service

## 2020-10-30 NOTE — DISCHARGE NOTE PROVIDER - HOSPITAL COURSE
84 y/o F wit PMHx of GERD, breast CA (s/p B/L lumpectomy followed by B/L mastectomy 40-50y ago), RUE lymphedema, osteoarthritis/osteoporosis (on monthly ibandronate), HLD, sent to the ED by o/p GI (and PCP) Dr. Gomez for elevated liver enzymes. CT A/P, RUQ US negative. s/p MRCP which showed no abnormalities. Pt cleared for DC by hepatology with follow with Dr. Courtney and Dr. Gomez in 1 week.     Pt cleared for DC by Dr. Jama (medicine attending) to follow up with GI, PCP and hepatology as OP. 86 y/o F wit PMHx of GERD, breast CA (s/p B/L lumpectomy followed by B/L mastectomy 40-50y ago), RUE lymphedema, osteoarthritis/osteoporosis (on monthly ibandronate), HLD, sent to the ED by o/p GI (and PCP) Dr. Gomez for elevated liver enzymes. CT A/P, RUQ US negative. s/p MRCP which showed no abnormalities. Pt cleared for DC by hepatology with follow with Dr. Gomez in 1 week for EGD/EUS and results of lab work that was sent here.      Pt cleared for DC by Dr. Jama (medicine attending) to follow up with GI, PCP and hepatology as OP.

## 2020-10-30 NOTE — DISCHARGE NOTE PROVIDER - NSDCCPCAREPLAN_GEN_ALL_CORE_FT
PRINCIPAL DISCHARGE DIAGNOSIS  Diagnosis: Transaminitis  Assessment and Plan of Treatment: Sent to the ED by o/p GI (and PCP) Dr. Gomez for elevated liver enzymes. CT A/P, RUQ US negative. s/p MRCP which showed no abnormalities. Pt cleared for DC by hepatology with follow with Dr. Courtney and Dr. Gomez in 1 week.          PRINCIPAL DISCHARGE DIAGNOSIS  Diagnosis: Transaminitis  Assessment and Plan of Treatment: Sent to the ED by o/p GI (and PCP) Dr. Gomez for elevated liver enzymes. CT A/P, RUQ US negative. s/p MRCP which showed no abnormalities. Pt cleared for DC by hepatology with follow with Dr. Gomez in 1 week for EGD/EUS and results of lab work that was sent here.

## 2020-10-30 NOTE — PROGRESS NOTE ADULT - SUBJECTIVE AND OBJECTIVE BOX
[facility] Division of Hospital Medicine  Saundra Jama MD  Pager (M-F, 8A-5P): [phone]  Other Times:  [phone]    Patient is a 85y old  Female who presents with a chief complaint of dark urine (29 Oct 2020 17:24)      SUBJECTIVE / OVERNIGHT EVENTS:    Overnight, no significant acute event was reported.    VS: afebrile, 84, 135/75, RA    Patient was examined this morning. She reports feeling ok. Urine color is improving to yellow. She still feels contipated and passed small amount of stool yesterday after receiving the Doculax supp. She denies any headache, fever, dizziness, chest pain, N/V, chest pain, abdominal pain, dysuria.       ADDITIONAL REVIEW OF SYSTEMS:    MEDICATIONS  (STANDING):  calcium carbonate 1250 mG  + Vitamin D (OsCal 500 + D) 1 Tablet(s) Oral daily  multivitamin 1 Tablet(s) Oral daily  pantoprazole    Tablet 40 milliGRAM(s) Oral two times a day  potassium chloride    Tablet ER 40 milliEquivalent(s) Oral once  potassium phosphate / sodium phosphate Powder (PHOS-NaK) 1 Packet(s) Oral two times a day  sodium chloride 0.9%. 1000 milliLiter(s) (60 mL/Hr) IV Continuous <Continuous>    MEDICATIONS  (PRN):  ondansetron Injectable 4 milliGRAM(s) IV Push every 6 hours PRN Nausea  zolpidem 5 milliGRAM(s) Oral at bedtime PRN Insomnia      CAPILLARY BLOOD GLUCOSE        I&O's Summary      PHYSICAL EXAM:  Vital Signs Last 24 Hrs  T(C): 36.6 (30 Oct 2020 04:33), Max: 37.5 (29 Oct 2020 18:36)  T(F): 97.9 (30 Oct 2020 04:33), Max: 99.5 (29 Oct 2020 18:36)  HR: 84 (30 Oct 2020 04:33) (80 - 90)  BP: 135/75 (30 Oct 2020 04:33) (114/71 - 138/77)  BP(mean): --  RR: 18 (30 Oct 2020 04:33) (16 - 18)  SpO2: 92% (30 Oct 2020 04:33) (92% - 95%)      GENERAL:  Well appearing, well developed, in NAD  EYES: PERRL, conjunctiva clear no scleral icterus  NECK: Supple, No JVD  CHEST/LUNG: CTA B/L. No w/r/r  HEART: Reg rate. Normal S1, S2. loud systolic murmur heard throughout precordium  ABDOMEN: SNTND. Bowel sounds present  EXTREMITIES:  2+ Peripheral Pulses, No clubbing, cyanosis, edema.  PSYCH: AAOx3, appropriate affect  NEUROLOGY: grossly non-focal  SKIN: No rashes or lesions, no jaundice      LABS:                        10.4   2.84  )-----------( 131      ( 30 Oct 2020 07:06 )             31.3     10-30    137  |  104  |  9   ----------------------------<  82  3.4<L>   |  24  |  0.54    Ca    8.1<L>      30 Oct 2020 07:03  Phos  2.2     10-30  Mg     1.9     10-30    TPro  7.1  /  Alb  2.9<L>  /  TBili  1.1  /  DBili  x   /  AST  61<H>  /  ALT  104<H>  /  AlkPhos  397<H>  10-30    PT/INR - ( 30 Oct 2020 08:37 )   PT: 13.1 sec;   INR: 1.12 ratio         PTT - ( 30 Oct 2020 08:37 )  PTT:32.3 sec      Urinalysis Basic - ( 28 Oct 2020 15:43 )    Color: Dark Yellow / Appearance: Slightly Turbid / S.012 / pH: x  Gluc: x / Ketone: Negative  / Bili: Small / Urobili: Negative   Blood: x / Protein: Trace / Nitrite: Negative   Leuk Esterase: Moderate / RBC: 3 /hpf / WBC 8 /HPF   Sq Epi: x / Non Sq Epi: 1 /hpf / Bacteria: Few        Culture - Urine (collected 28 Oct 2020 19:05)  Source: .Urine Clean Catch (Midstream)  Final Report (30 Oct 2020 04:28):    No growth        RADIOLOGY & ADDITIONAL TESTS:  Results Reviewed:   Imaging Personally Reviewed:  Electrocardiogram Personally Reviewed:    COORDINATION OF CARE:  Care Discussed with Consultants/Other Providers [Y/N]:  Prior or Outpatient Records Reviewed [Y/N]:

## 2020-10-30 NOTE — DISCHARGE NOTE NURSING/CASE MANAGEMENT/SOCIAL WORK - PATIENT PORTAL LINK FT
You can access the FollowMyHealth Patient Portal offered by [facility] by registering at the following website: [email or web address]. By joining [employer]’s FollowMyHealth portal, you will also be able to view your health information using other applications (apps) compatible with our system.

## 2020-10-30 NOTE — PROGRESS NOTE ADULT - PROBLEM SELECTOR PLAN 5
- Has very loud systolic murmur on physical exam, patient says she sees Dr. Pj Denise from cardiology and has had echocardiograms in the past  - she is asymptomatic - no chest pain/syncope/dyspnea/MEADE  - F/U ECHO , will try reaching out to cardiologist's office for past echo results

## 2020-10-30 NOTE — PROGRESS NOTE ADULT - ATTENDING COMMENTS
Case and plan discussed with ACP: Veronika Case and plan discussed with ACP: Veronika    MRI/MRCP: No hepatobiliary abnormality. no biliary ductal dilatation or choledocholithiasis.    Case discussed with Dr. Courtney.    Patient to follow up outpatient with PCP/GI : Dr. Gomez (sent an email to notify of discharge plan and follow-up as he is not in office today); outpatient EGD, EUS; F/U AMA, KRYSTYNA, SMA, Ig quant    Discharge planning >40 minutes

## 2020-10-30 NOTE — PROGRESS NOTE ADULT - PROBLEM SELECTOR PLAN 1
- CT abd/pelv: right hepatic lobe calcified granuloma, but normal gallbladder and ducts  - hepatitis A/B/C serologies negative  - US abdomen: no stones or sludge, diffuse gallbladder wall thickening, measuring up to 1.2 cm  - LFTs slightly trended down  -   - Thrombocytopenia PLTc 131 - monitor    Hepatology team consulted - consult note reviewed. Plan for MRI/MRCP today    Plan  - Trend CMP, INR daily  - F/U MRI/MRCP results  - F/U KRYSTYNA, AMA, SMA, Ig quant  - F/U hepatology team for further recs

## 2020-10-30 NOTE — DISCHARGE NOTE PROVIDER - NSDCMRMEDTOKEN_GEN_ALL_CORE_FT
Ambien 5 mg oral tablet: 1 tab(s) orally once a day (at bedtime)  Calcium 500+D oral tablet, chewable: 1 tab(s) orally 2 times a day  ibandronate 150 mg oral tablet: 1 tab(s) orally once a month  Multiple Vitamins oral tablet: 1 tab(s) orally once a day  pantoprazole 40 mg oral delayed release tablet: 1 tab(s) orally 2 times a day  Senna 8.6 mg oral tablet: 1 tab(s) orally once a day (at bedtime)

## 2020-10-31 LAB
ANA PAT FLD IF-IMP: ABNORMAL
ANA TITR SER: ABNORMAL
MITOCHONDRIA AB SER-ACNC: SIGNIFICANT CHANGE UP
SMOOTH MUSCLE AB SER-ACNC: ABNORMAL

## 2020-11-02 ENCOUNTER — NON-APPOINTMENT (OUTPATIENT)
Age: 85
End: 2020-11-02

## 2020-11-02 PROBLEM — C50.919 MALIGNANT NEOPLASM OF UNSPECIFIED SITE OF UNSPECIFIED FEMALE BREAST: Chronic | Status: ACTIVE | Noted: 2020-10-28

## 2020-11-10 ENCOUNTER — APPOINTMENT (OUTPATIENT)
Dept: INTERNAL MEDICINE | Facility: CLINIC | Age: 85
End: 2020-11-10
Payer: MEDICARE

## 2020-11-10 VITALS
WEIGHT: 140 LBS | BODY MASS INDEX: 23.9 KG/M2 | DIASTOLIC BLOOD PRESSURE: 70 MMHG | HEIGHT: 64 IN | SYSTOLIC BLOOD PRESSURE: 120 MMHG | TEMPERATURE: 97.9 F

## 2020-11-10 DIAGNOSIS — R82.998 OTHER ABNORMAL FINDINGS IN URINE: ICD-10-CM

## 2020-11-10 PROCEDURE — 99214 OFFICE O/P EST MOD 30 MIN: CPT

## 2020-11-11 PROBLEM — R82.998 DARK URINE: Status: ACTIVE | Noted: 2020-10-27

## 2020-12-05 ENCOUNTER — LABORATORY RESULT (OUTPATIENT)
Age: 85
End: 2020-12-05

## 2020-12-05 ENCOUNTER — APPOINTMENT (OUTPATIENT)
Dept: INTERNAL MEDICINE | Facility: CLINIC | Age: 85
End: 2020-12-05
Payer: MEDICARE

## 2020-12-05 VITALS
DIASTOLIC BLOOD PRESSURE: 80 MMHG | HEIGHT: 64 IN | SYSTOLIC BLOOD PRESSURE: 138 MMHG | WEIGHT: 140 LBS | OXYGEN SATURATION: 98 % | BODY MASS INDEX: 23.9 KG/M2 | TEMPERATURE: 97.3 F | HEART RATE: 80 BPM

## 2020-12-05 DIAGNOSIS — R79.89 OTHER SPECIFIED ABNORMAL FINDINGS OF BLOOD CHEMISTRY: ICD-10-CM

## 2020-12-05 DIAGNOSIS — K81.9 CHOLECYSTITIS, UNSPECIFIED: ICD-10-CM

## 2020-12-05 DIAGNOSIS — Z09 ENCOUNTER FOR FOLLOW-UP EXAMINATION AFTER COMPLETED TREATMENT FOR CONDITIONS OTHER THAN MALIGNANT NEOPLASM: ICD-10-CM

## 2020-12-05 PROCEDURE — 36415 COLL VENOUS BLD VENIPUNCTURE: CPT

## 2020-12-05 PROCEDURE — 99214 OFFICE O/P EST MOD 30 MIN: CPT | Mod: 25

## 2020-12-11 ENCOUNTER — NON-APPOINTMENT (OUTPATIENT)
Age: 85
End: 2020-12-11

## 2020-12-11 LAB
ALBUMIN SERPL ELPH-MCNC: 3.7 G/DL
ALP BLD-CCNC: 80 U/L
ALT SERPL-CCNC: 15 U/L
AMYLASE/CREAT SERPL: 72 U/L
ANION GAP SERPL CALC-SCNC: 11 MMOL/L
AST SERPL-CCNC: 28 U/L
BASOPHILS # BLD AUTO: 0.05 K/UL
BASOPHILS NFR BLD AUTO: 1.9 %
BILIRUB SERPL-MCNC: 0.5 MG/DL
BUN SERPL-MCNC: 15 MG/DL
CALCIUM SERPL-MCNC: 9.6 MG/DL
CHLORIDE SERPL-SCNC: 102 MMOL/L
CO2 SERPL-SCNC: 24 MMOL/L
CREAT SERPL-MCNC: 0.66 MG/DL
EOSINOPHIL # BLD AUTO: 0.04 K/UL
EOSINOPHIL NFR BLD AUTO: 1.5 %
GLUCOSE SERPL-MCNC: 77 MG/DL
HCT VFR BLD CALC: 38.2 %
HGB BLD-MCNC: 11.6 G/DL
IMM GRANULOCYTES NFR BLD AUTO: 0 %
LPL SERPL-CCNC: 22 U/L
LYMPHOCYTES # BLD AUTO: 0.88 K/UL
LYMPHOCYTES NFR BLD AUTO: 32.7 %
MAN DIFF?: NORMAL
MCHC RBC-ENTMCNC: 27.8 PG
MCHC RBC-ENTMCNC: 30.4 GM/DL
MCV RBC AUTO: 91.6 FL
MONOCYTES # BLD AUTO: 0.68 K/UL
MONOCYTES NFR BLD AUTO: 25.3 %
NEUTROPHILS # BLD AUTO: 1.04 K/UL
NEUTROPHILS NFR BLD AUTO: 38.6 %
PLATELET # BLD AUTO: 182 K/UL
POTASSIUM SERPL-SCNC: 4.4 MMOL/L
PROT SERPL-MCNC: 8.4 G/DL
RBC # BLD: 4.17 M/UL
RBC # FLD: 14.6 %
SAVE SPECIMEN: NORMAL
SODIUM SERPL-SCNC: 137 MMOL/L
WBC # FLD AUTO: 2.69 K/UL

## 2020-12-28 ENCOUNTER — RX RENEWAL (OUTPATIENT)
Age: 85
End: 2020-12-28

## 2021-04-26 ENCOUNTER — APPOINTMENT (OUTPATIENT)
Dept: GASTROENTEROLOGY | Facility: CLINIC | Age: 86
End: 2021-04-26
Payer: MEDICARE

## 2021-04-26 ENCOUNTER — LABORATORY RESULT (OUTPATIENT)
Age: 86
End: 2021-04-26

## 2021-04-26 VITALS
BODY MASS INDEX: 24.59 KG/M2 | HEART RATE: 75 BPM | TEMPERATURE: 97.9 F | DIASTOLIC BLOOD PRESSURE: 70 MMHG | HEIGHT: 64 IN | WEIGHT: 144 LBS | OXYGEN SATURATION: 99 % | SYSTOLIC BLOOD PRESSURE: 130 MMHG

## 2021-04-26 PROCEDURE — 99214 OFFICE O/P EST MOD 30 MIN: CPT | Mod: 25

## 2021-04-26 PROCEDURE — 36415 COLL VENOUS BLD VENIPUNCTURE: CPT

## 2021-04-26 NOTE — HISTORY OF PRESENT ILLNESS
[FreeTextEntry1] : \par was hospitalized at [facility] 10/28/20 - 10/30/20 with elevated LFTs; had been seen for dark urine and sdizziness\par In [facility], had US, CT, MRCP, all (-); she was discharged home\par Has felt fine since\par Had normal LFTs in December 2020\par \par Feels fine - but has bubbles in urine for the last 3-4 weeks\par A little darker than normal\par no fevers\par no abd pain - just gassy at times\par takes senna tea to keep regular\par No dysuria\par no change in weight\par \par Last colonoscopy 7 years ago (had a polyp)\par

## 2021-04-26 NOTE — ASSESSMENT
[FreeTextEntry1] : \par Pneumaturia\par \par Check UA, labs\par Order CT AP\par Colonoscopy\par Ultimately likely will need  as well

## 2021-04-28 ENCOUNTER — OUTPATIENT (OUTPATIENT)
Dept: OUTPATIENT SERVICES | Facility: HOSPITAL | Age: 86
LOS: 1 days | End: 2021-04-28
Payer: MEDICARE

## 2021-04-28 ENCOUNTER — RESULT REVIEW (OUTPATIENT)
Age: 86
End: 2021-04-28

## 2021-04-28 ENCOUNTER — APPOINTMENT (OUTPATIENT)
Dept: CT IMAGING | Facility: CLINIC | Age: 86
End: 2021-04-28
Payer: MEDICARE

## 2021-04-28 DIAGNOSIS — Z90.13 ACQUIRED ABSENCE OF BILATERAL BREASTS AND NIPPLES: Chronic | ICD-10-CM

## 2021-04-28 DIAGNOSIS — Z98.49 CATARACT EXTRACTION STATUS, UNSPECIFIED EYE: Chronic | ICD-10-CM

## 2021-04-28 DIAGNOSIS — R39.89 OTHER SYMPTOMS AND SIGNS INVOLVING THE GENITOURINARY SYSTEM: ICD-10-CM

## 2021-04-28 PROCEDURE — 74177 CT ABD & PELVIS W/CONTRAST: CPT | Mod: 26,MG

## 2021-04-28 PROCEDURE — G1004: CPT

## 2021-04-28 PROCEDURE — 74177 CT ABD & PELVIS W/CONTRAST: CPT

## 2021-05-08 ENCOUNTER — TRANSCRIPTION ENCOUNTER (OUTPATIENT)
Age: 86
End: 2021-05-08

## 2021-05-17 DIAGNOSIS — Z01.818 ENCOUNTER FOR OTHER PREPROCEDURAL EXAMINATION: ICD-10-CM

## 2021-05-18 ENCOUNTER — APPOINTMENT (OUTPATIENT)
Dept: DISASTER EMERGENCY | Facility: CLINIC | Age: 86
End: 2021-05-18

## 2021-05-18 LAB — SARS-COV-2 N GENE NPH QL NAA+PROBE: NOT DETECTED

## 2021-05-22 ENCOUNTER — APPOINTMENT (OUTPATIENT)
Dept: GASTROENTEROLOGY | Facility: CLINIC | Age: 86
End: 2021-05-22
Payer: MEDICARE

## 2021-05-22 DIAGNOSIS — R39.89 OTHER SYMPTOMS AND SIGNS INVOLVING THE GENITOURINARY SYSTEM: ICD-10-CM

## 2021-05-22 PROCEDURE — 45385 COLONOSCOPY W/LESION REMOVAL: CPT

## 2021-05-22 PROCEDURE — 45380 COLONOSCOPY AND BIOPSY: CPT | Mod: 59

## 2021-07-08 ENCOUNTER — APPOINTMENT (OUTPATIENT)
Dept: UROLOGY | Facility: CLINIC | Age: 86
End: 2021-07-08
Payer: MEDICARE

## 2021-07-08 VITALS
HEIGHT: 64 IN | WEIGHT: 142 LBS | HEART RATE: 73 BPM | DIASTOLIC BLOOD PRESSURE: 71 MMHG | TEMPERATURE: 98 F | BODY MASS INDEX: 24.24 KG/M2 | SYSTOLIC BLOOD PRESSURE: 170 MMHG

## 2021-07-08 DIAGNOSIS — R82.90 UNSPECIFIED ABNORMAL FINDINGS IN URINE: ICD-10-CM

## 2021-07-08 PROCEDURE — 99203 OFFICE O/P NEW LOW 30 MIN: CPT

## 2021-07-08 NOTE — REVIEW OF SYSTEMS
[Constipation] : constipation [Heartburn] : heartburn [Wake up at night to urinate  How many times?  ___] : wakes up to urinate [unfilled] times during the night [Itching] : itching [Negative] : Heme/Lymph

## 2021-07-14 PROBLEM — R82.90 ABNORMAL URINE FINDINGS: Status: ACTIVE | Noted: 2021-07-14

## 2021-07-14 NOTE — ASSESSMENT
[FreeTextEntry1] : We discussed the patient's complaints of bubbles in the urine.  I have explained to her that without true passage of air in her urine, it is highly unlikely that she has any significant abnormality.  I do not think bubbles in the toilet bowl represent any significant issue.  Her urinalysis is normal.  Her renal function is normal.  We discussed pneumaturia in general.  She has no risk factors for developing a fistula to the urinary tract and I do not think that there is any concern here for fistula.  I do not think she needs any further urologic evaluation at this time.  If she develops any new onset urinary complaints I did advise her to contact me at any time.  Otherwise she can follow-up as needed.

## 2021-07-14 NOTE — HISTORY OF PRESENT ILLNESS
[FreeTextEntry1] : Silvia Cloud presents to the office today.  She is an 86-year-old woman referred by Dr. Gomez for evaluation of possible pneumaturia.  The patient had described bubbles in her urine.  She had been hospitalized last year with dark-colored urine but no hematuria.  She has not had any urinary discomfort and also denies any changes in urinary patterns including any urgency or frequency.  There has been no incontinence.  The patient has reported "bubbles in the urine."  I asked her more specifically about this.  I have asked her if she feels any air passing per urethra or whether or not the bubble she is describing are present in the toilet bowl after she passes her urine.  It is the latter and she denies passing any air per urethra.  She has also undergone a relatively recent urinalyses which have not shown any significant abnormalities to suggest infection.  She otherwise feels well.  She has no suprapubic pain or discomfort.  She denies any history of pelvic radiation, diverticulitis, or pelvic malignancy.

## 2021-07-27 ENCOUNTER — RX RENEWAL (OUTPATIENT)
Age: 86
End: 2021-07-27

## 2021-08-19 ENCOUNTER — RX RENEWAL (OUTPATIENT)
Age: 86
End: 2021-08-19

## 2021-08-20 ENCOUNTER — RX RENEWAL (OUTPATIENT)
Age: 86
End: 2021-08-20

## 2021-09-20 ENCOUNTER — RX RENEWAL (OUTPATIENT)
Age: 86
End: 2021-09-20

## 2021-10-05 ENCOUNTER — RX RENEWAL (OUTPATIENT)
Age: 86
End: 2021-10-05

## 2022-02-25 LAB
ALBUMIN SERPL ELPH-MCNC: 3.8 G/DL
ALP BLD-CCNC: 69 U/L
ALT SERPL-CCNC: 14 U/L
ANION GAP SERPL CALC-SCNC: 10 MMOL/L
APPEARANCE: CLEAR
APPEARANCE: CLEAR
AST SERPL-CCNC: 28 U/L
BACTERIA: NEGATIVE
BASOPHILS # BLD AUTO: 0.02 K/UL
BASOPHILS NFR BLD AUTO: 0.9 %
BILIRUB SERPL-MCNC: 0.3 MG/DL
BILIRUBIN URINE: NEGATIVE
BILIRUBIN URINE: NEGATIVE
BLOOD URINE: NEGATIVE
BLOOD URINE: NEGATIVE
BUN SERPL-MCNC: 17 MG/DL
CALCIUM SERPL-MCNC: 9.5 MG/DL
CHLORIDE SERPL-SCNC: 105 MMOL/L
CO2 SERPL-SCNC: 23 MMOL/L
COLOR: COLORLESS
COLOR: COLORLESS
CREAT SERPL-MCNC: 0.77 MG/DL
EOSINOPHIL # BLD AUTO: 0.06 K/UL
EOSINOPHIL NFR BLD AUTO: 2.6 %
GLUCOSE QUALITATIVE U: NEGATIVE
GLUCOSE QUALITATIVE U: NEGATIVE
GLUCOSE SERPL-MCNC: 88 MG/DL
HCT VFR BLD CALC: 38.2 %
HGB BLD-MCNC: 12.2 G/DL
HYALINE CASTS: 0 /LPF
KETONES URINE: NEGATIVE
KETONES URINE: NEGATIVE
LEUKOCYTE ESTERASE URINE: NEGATIVE
LEUKOCYTE ESTERASE URINE: NEGATIVE
LYMPHOCYTES # BLD AUTO: 1.2 K/UL
LYMPHOCYTES NFR BLD AUTO: 49.6 %
MAN DIFF?: NORMAL
MCHC RBC-ENTMCNC: 28.2 PG
MCHC RBC-ENTMCNC: 31.9 GM/DL
MCV RBC AUTO: 88.4 FL
MICROSCOPIC-UA: NORMAL
MONOCYTES # BLD AUTO: 0.45 K/UL
MONOCYTES NFR BLD AUTO: 18.6 %
NEUTROPHILS # BLD AUTO: 0.68 K/UL
NEUTROPHILS NFR BLD AUTO: 27.4 %
NITRITE URINE: NEGATIVE
NITRITE URINE: NEGATIVE
PH URINE: 6
PH URINE: 6
PLATELET # BLD AUTO: 212 K/UL
POTASSIUM SERPL-SCNC: 4.6 MMOL/L
PROT SERPL-MCNC: 8.3 G/DL
PROTEIN URINE: NEGATIVE
PROTEIN URINE: NEGATIVE
RBC # BLD: 4.32 M/UL
RBC # FLD: 14.3 %
RED BLOOD CELLS URINE: 1 /HPF
SODIUM SERPL-SCNC: 138 MMOL/L
SPECIFIC GRAVITY URINE: 1.01
SPECIFIC GRAVITY URINE: 1.01
SQUAMOUS EPITHELIAL CELLS: 0 /HPF
UROBILINOGEN URINE: NORMAL
UROBILINOGEN URINE: NORMAL
WBC # FLD AUTO: 2.41 K/UL
WHITE BLOOD CELLS URINE: 0 /HPF

## 2022-03-03 ENCOUNTER — RX RENEWAL (OUTPATIENT)
Age: 87
End: 2022-03-03

## 2022-03-10 ENCOUNTER — NON-APPOINTMENT (OUTPATIENT)
Age: 87
End: 2022-03-10

## 2022-03-14 ENCOUNTER — NON-APPOINTMENT (OUTPATIENT)
Age: 87
End: 2022-03-14

## 2022-03-14 ENCOUNTER — APPOINTMENT (OUTPATIENT)
Dept: CARDIOLOGY | Facility: CLINIC | Age: 87
End: 2022-03-14
Payer: MEDICARE

## 2022-03-14 VITALS
WEIGHT: 140 LBS | BODY MASS INDEX: 24.03 KG/M2 | OXYGEN SATURATION: 96 % | SYSTOLIC BLOOD PRESSURE: 128 MMHG | DIASTOLIC BLOOD PRESSURE: 60 MMHG | HEART RATE: 74 BPM

## 2022-03-14 VITALS — DIASTOLIC BLOOD PRESSURE: 72 MMHG | SYSTOLIC BLOOD PRESSURE: 124 MMHG

## 2022-03-14 DIAGNOSIS — R01.1 CARDIAC MURMUR, UNSPECIFIED: ICD-10-CM

## 2022-03-14 PROCEDURE — 99204 OFFICE O/P NEW MOD 45 MIN: CPT

## 2022-03-14 PROCEDURE — 93000 ELECTROCARDIOGRAM COMPLETE: CPT

## 2022-03-15 ENCOUNTER — APPOINTMENT (OUTPATIENT)
Dept: CARDIOLOGY | Facility: CLINIC | Age: 87
End: 2022-03-15
Payer: MEDICARE

## 2022-03-15 PROCEDURE — 93306 TTE W/DOPPLER COMPLETE: CPT

## 2022-03-17 ENCOUNTER — NON-APPOINTMENT (OUTPATIENT)
Age: 87
End: 2022-03-17

## 2022-03-24 ENCOUNTER — APPOINTMENT (OUTPATIENT)
Dept: CARDIOLOGY | Facility: CLINIC | Age: 87
End: 2022-03-24
Payer: MEDICARE

## 2022-03-24 ENCOUNTER — NON-APPOINTMENT (OUTPATIENT)
Age: 87
End: 2022-03-24

## 2022-03-24 VITALS
OXYGEN SATURATION: 96 % | DIASTOLIC BLOOD PRESSURE: 60 MMHG | BODY MASS INDEX: 24.03 KG/M2 | HEART RATE: 67 BPM | SYSTOLIC BLOOD PRESSURE: 130 MMHG | WEIGHT: 140 LBS

## 2022-03-24 PROCEDURE — 99214 OFFICE O/P EST MOD 30 MIN: CPT

## 2022-03-24 PROCEDURE — 93000 ELECTROCARDIOGRAM COMPLETE: CPT

## 2022-03-24 NOTE — REASON FOR VISIT
[Arrhythmia/ECG Abnorrmalities] : arrhythmia/ECG abnormalities [Structural Heart and Valve Disease] : structural heart and valve disease [Hypertension] : hypertension show

## 2022-03-24 NOTE — HISTORY OF PRESENT ILLNESS
[FreeTextEntry1] : PCP: Dr. Andry Gomez.  \par She has a longstanding history of a heart murmur.  \par She denies a history of hypertension, diabetes mellitus.  \par She is currently on no cardiac medications.\par Her last echocardiogram was 7/13/16: LVEF 70%, BSH, Increased E/e'. LVOT 5 mm Hg, increasing to 15 mm Hg with Valsava. Mild AR. Moderate MR/TR.\par Had normal nuclear stress test 3/4/2010.\par In [place], she experienced intermittent "pressure" or an "achy" feeling in her left upper chest occasionally radiating to her right upper chest, for approximately 2 months. It would typically last 10 to 15 minutes, and recur 4 to 5 days later.  The discomfort was nonexertional, and was unrelated to breathing, eating, or position.  It was not associated with shortness of breath, palpitations, or dizziness. She has not had any chest discomfort since returning from [place] 3 weeks ago.  \par She denies shortness of breath, palpitations, and dizziness.

## 2022-03-25 ENCOUNTER — RESULT CHARGE (OUTPATIENT)
Age: 87
End: 2022-03-25

## 2022-03-25 ENCOUNTER — TRANSCRIPTION ENCOUNTER (OUTPATIENT)
Age: 87
End: 2022-03-25

## 2022-03-26 ENCOUNTER — RX RENEWAL (OUTPATIENT)
Age: 87
End: 2022-03-26

## 2022-03-26 VITALS — SYSTOLIC BLOOD PRESSURE: 124 MMHG | DIASTOLIC BLOOD PRESSURE: 72 MMHG

## 2022-03-26 NOTE — REASON FOR VISIT
[Arrhythmia/ECG Abnorrmalities] : arrhythmia/ECG abnormalities [Structural Heart and Valve Disease] : structural heart and valve disease [Hypertension] : hypertension

## 2022-04-06 ENCOUNTER — NON-APPOINTMENT (OUTPATIENT)
Age: 87
End: 2022-04-06

## 2022-04-11 ENCOUNTER — APPOINTMENT (OUTPATIENT)
Dept: INTERNAL MEDICINE | Facility: CLINIC | Age: 87
End: 2022-04-11

## 2022-04-26 ENCOUNTER — NON-APPOINTMENT (OUTPATIENT)
Age: 87
End: 2022-04-26

## 2022-04-26 ENCOUNTER — APPOINTMENT (OUTPATIENT)
Dept: CARDIOLOGY | Facility: CLINIC | Age: 87
End: 2022-04-26
Payer: MEDICARE

## 2022-04-26 VITALS — BODY MASS INDEX: 24.03 KG/M2 | WEIGHT: 140 LBS | HEART RATE: 71 BPM | OXYGEN SATURATION: 96 %

## 2022-04-26 PROCEDURE — 93000 ELECTROCARDIOGRAM COMPLETE: CPT

## 2022-04-26 PROCEDURE — 99214 OFFICE O/P EST MOD 30 MIN: CPT

## 2022-04-28 ENCOUNTER — RESULT CHARGE (OUTPATIENT)
Age: 87
End: 2022-04-28

## 2022-09-28 ENCOUNTER — RX RENEWAL (OUTPATIENT)
Age: 87
End: 2022-09-28

## 2022-10-17 ENCOUNTER — RX RENEWAL (OUTPATIENT)
Age: 87
End: 2022-10-17

## 2022-10-27 ENCOUNTER — LABORATORY RESULT (OUTPATIENT)
Age: 87
End: 2022-10-27

## 2022-10-27 ENCOUNTER — NON-APPOINTMENT (OUTPATIENT)
Age: 87
End: 2022-10-27

## 2022-10-27 ENCOUNTER — APPOINTMENT (OUTPATIENT)
Dept: CARDIOLOGY | Facility: CLINIC | Age: 87
End: 2022-10-27

## 2022-10-27 VITALS
OXYGEN SATURATION: 95 % | BODY MASS INDEX: 23.22 KG/M2 | SYSTOLIC BLOOD PRESSURE: 102 MMHG | HEART RATE: 66 BPM | WEIGHT: 136 LBS | HEIGHT: 64 IN | DIASTOLIC BLOOD PRESSURE: 70 MMHG

## 2022-10-27 VITALS — DIASTOLIC BLOOD PRESSURE: 68 MMHG | SYSTOLIC BLOOD PRESSURE: 122 MMHG

## 2022-10-27 PROCEDURE — 93306 TTE W/DOPPLER COMPLETE: CPT

## 2022-10-27 PROCEDURE — 99214 OFFICE O/P EST MOD 30 MIN: CPT

## 2022-10-27 PROCEDURE — 93000 ELECTROCARDIOGRAM COMPLETE: CPT

## 2022-10-27 PROCEDURE — 36415 COLL VENOUS BLD VENIPUNCTURE: CPT

## 2022-10-27 PROCEDURE — 93040 RHYTHM ECG WITH REPORT: CPT | Mod: 59

## 2022-10-27 NOTE — HISTORY OF PRESENT ILLNESS
[FreeTextEntry1] : 10/27/2022 - Office visit:\par She has had several episodes over the last 6 to 7 weeks of waking up at night with her heart racing.  She had a particularly significant episode several nights ago, lasting 45 minutes.  There was no associated chest pain, shortness of breath, or dizziness.  She denies chest pain, shortness of breath, and dizziness.\par

## 2022-11-02 ENCOUNTER — APPOINTMENT (OUTPATIENT)
Dept: CARDIOLOGY | Facility: CLINIC | Age: 87
End: 2022-11-02

## 2022-11-02 PROCEDURE — ZZZZZ: CPT

## 2022-11-06 LAB
ALBUMIN SERPL ELPH-MCNC: 3.5 G/DL
ALP BLD-CCNC: 63 U/L
ALT SERPL-CCNC: 12 U/L
ANION GAP SERPL CALC-SCNC: 13 MMOL/L
AST SERPL-CCNC: 25 U/L
BASOPHILS # BLD AUTO: 0.04 K/UL
BASOPHILS NFR BLD AUTO: 1.6 %
BILIRUB SERPL-MCNC: 0.4 MG/DL
BUN SERPL-MCNC: 21 MG/DL
CALCIUM SERPL-MCNC: 8.9 MG/DL
CHLORIDE SERPL-SCNC: 103 MMOL/L
CO2 SERPL-SCNC: 22 MMOL/L
CREAT SERPL-MCNC: 0.83 MG/DL
EGFR: 69 ML/MIN/1.73M2
EOSINOPHIL # BLD AUTO: 0.07 K/UL
EOSINOPHIL NFR BLD AUTO: 3.1 %
HCT VFR BLD CALC: 35.8 %
HGB BLD-MCNC: 11.5 G/DL
LYMPHOCYTES # BLD AUTO: 0.87 K/UL
LYMPHOCYTES NFR BLD AUTO: 37.2 %
MAGNESIUM SERPL-MCNC: 2.1 MG/DL
MAN DIFF?: NORMAL
MCHC RBC-ENTMCNC: 28.5 PG
MCHC RBC-ENTMCNC: 32.1 GM/DL
MCV RBC AUTO: 88.6 FL
MONOCYTES # BLD AUTO: 0.56 K/UL
MONOCYTES NFR BLD AUTO: 24 %
NEUTROPHILS # BLD AUTO: 0.8 K/UL
NEUTROPHILS NFR BLD AUTO: 34.1 %
PLATELET # BLD AUTO: 186 K/UL
POTASSIUM SERPL-SCNC: 4.8 MMOL/L
PROT SERPL-MCNC: 8 G/DL
RBC # BLD: 4.04 M/UL
RBC # FLD: 15.2 %
SODIUM SERPL-SCNC: 138 MMOL/L
T3 SERPL-MCNC: 119 NG/DL
T3RU NFR SERPL: 0.8 TBI
T4 SERPL-MCNC: 8.9 UG/DL
TSH SERPL-ACNC: 1.95 UIU/ML
WBC # FLD AUTO: 2.35 K/UL

## 2022-11-20 ENCOUNTER — INPATIENT (INPATIENT)
Facility: HOSPITAL | Age: 87
LOS: 0 days | Discharge: HOME | DRG: 149 | End: 2022-11-21
Attending: INTERNAL MEDICINE | Admitting: PSYCHIATRY & NEUROLOGY
Payer: COMMERCIAL

## 2022-11-20 VITALS
RESPIRATION RATE: 16 BRPM | SYSTOLIC BLOOD PRESSURE: 170 MMHG | WEIGHT: 139.99 LBS | HEIGHT: 62 IN | HEART RATE: 73 BPM | TEMPERATURE: 98 F | DIASTOLIC BLOOD PRESSURE: 71 MMHG | OXYGEN SATURATION: 96 %

## 2022-11-20 DIAGNOSIS — Z90.13 ACQUIRED ABSENCE OF BILATERAL BREASTS AND NIPPLES: Chronic | ICD-10-CM

## 2022-11-20 DIAGNOSIS — Z98.49 CATARACT EXTRACTION STATUS, UNSPECIFIED EYE: Chronic | ICD-10-CM

## 2022-11-20 DIAGNOSIS — R42 DIZZINESS AND GIDDINESS: ICD-10-CM

## 2022-11-20 LAB
ALBUMIN SERPL ELPH-MCNC: 3.7 G/DL — SIGNIFICANT CHANGE UP (ref 3.3–5)
ALP SERPL-CCNC: 71 U/L — SIGNIFICANT CHANGE UP (ref 40–120)
ALT FLD-CCNC: 15 U/L — SIGNIFICANT CHANGE UP (ref 10–45)
ANION GAP SERPL CALC-SCNC: 12 MMOL/L — SIGNIFICANT CHANGE UP (ref 5–17)
ANISOCYTOSIS BLD QL: SLIGHT — SIGNIFICANT CHANGE UP
APTT BLD: 28.2 SEC — SIGNIFICANT CHANGE UP (ref 27.5–35.5)
AST SERPL-CCNC: 40 U/L — SIGNIFICANT CHANGE UP (ref 10–40)
BASOPHILS # BLD AUTO: 0.02 K/UL — SIGNIFICANT CHANGE UP (ref 0–0.2)
BASOPHILS NFR BLD AUTO: 0.9 % — SIGNIFICANT CHANGE UP (ref 0–2)
BILIRUB SERPL-MCNC: 0.5 MG/DL — SIGNIFICANT CHANGE UP (ref 0.2–1.2)
BUN SERPL-MCNC: 20 MG/DL — SIGNIFICANT CHANGE UP (ref 7–23)
CALCIUM SERPL-MCNC: 9 MG/DL — SIGNIFICANT CHANGE UP (ref 8.4–10.5)
CHLORIDE SERPL-SCNC: 103 MMOL/L — SIGNIFICANT CHANGE UP (ref 96–108)
CO2 SERPL-SCNC: 21 MMOL/L — LOW (ref 22–31)
CREAT SERPL-MCNC: 0.61 MG/DL — SIGNIFICANT CHANGE UP (ref 0.5–1.3)
DACRYOCYTES BLD QL SMEAR: SLIGHT — SIGNIFICANT CHANGE UP
EGFR: 88 ML/MIN/1.73M2 — SIGNIFICANT CHANGE UP
EOSINOPHIL # BLD AUTO: 0.03 K/UL — SIGNIFICANT CHANGE UP (ref 0–0.5)
EOSINOPHIL NFR BLD AUTO: 1.8 % — SIGNIFICANT CHANGE UP (ref 0–6)
FLUAV AG NPH QL: SIGNIFICANT CHANGE UP
FLUBV AG NPH QL: SIGNIFICANT CHANGE UP
GLUCOSE SERPL-MCNC: 107 MG/DL — HIGH (ref 70–99)
HCT VFR BLD CALC: 39.6 % — SIGNIFICANT CHANGE UP (ref 34.5–45)
HGB BLD-MCNC: 12.5 G/DL — SIGNIFICANT CHANGE UP (ref 11.5–15.5)
INR BLD: 1.12 RATIO — SIGNIFICANT CHANGE UP (ref 0.88–1.16)
LYMPHOCYTES # BLD AUTO: 0.31 K/UL — LOW (ref 1–3.3)
LYMPHOCYTES # BLD AUTO: 16.8 % — SIGNIFICANT CHANGE UP (ref 13–44)
MANUAL SMEAR VERIFICATION: SIGNIFICANT CHANGE UP
MCHC RBC-ENTMCNC: 28.5 PG — SIGNIFICANT CHANGE UP (ref 27–34)
MCHC RBC-ENTMCNC: 31.6 GM/DL — LOW (ref 32–36)
MCV RBC AUTO: 90.2 FL — SIGNIFICANT CHANGE UP (ref 80–100)
MONOCYTES # BLD AUTO: 0.41 K/UL — SIGNIFICANT CHANGE UP (ref 0–0.9)
MONOCYTES NFR BLD AUTO: 22.1 % — HIGH (ref 2–14)
NEUTROPHILS # BLD AUTO: 0.99 K/UL — LOW (ref 1.8–7.4)
NEUTROPHILS NFR BLD AUTO: 51.3 % — SIGNIFICANT CHANGE UP (ref 43–77)
NEUTS BAND # BLD: 2.7 % — SIGNIFICANT CHANGE UP (ref 0–8)
OVALOCYTES BLD QL SMEAR: SLIGHT — SIGNIFICANT CHANGE UP
PLAT MORPH BLD: NORMAL — SIGNIFICANT CHANGE UP
PLATELET # BLD AUTO: 163 K/UL — SIGNIFICANT CHANGE UP (ref 150–400)
POIKILOCYTOSIS BLD QL AUTO: SLIGHT — SIGNIFICANT CHANGE UP
POTASSIUM SERPL-MCNC: 4.5 MMOL/L — SIGNIFICANT CHANGE UP (ref 3.5–5.3)
POTASSIUM SERPL-SCNC: 4.5 MMOL/L — SIGNIFICANT CHANGE UP (ref 3.5–5.3)
PROT SERPL-MCNC: 9 G/DL — HIGH (ref 6–8.3)
PROTHROM AB SERPL-ACNC: 12.9 SEC — SIGNIFICANT CHANGE UP (ref 10.5–13.4)
RBC # BLD: 4.39 M/UL — SIGNIFICANT CHANGE UP (ref 3.8–5.2)
RBC # FLD: 14.4 % — SIGNIFICANT CHANGE UP (ref 10.3–14.5)
RBC BLD AUTO: ABNORMAL
RSV RNA NPH QL NAA+NON-PROBE: SIGNIFICANT CHANGE UP
SARS-COV-2 RNA SPEC QL NAA+PROBE: DETECTED
SODIUM SERPL-SCNC: 136 MMOL/L — SIGNIFICANT CHANGE UP (ref 135–145)
TROPONIN T, HIGH SENSITIVITY RESULT: 8 NG/L — SIGNIFICANT CHANGE UP (ref 0–51)
VARIANT LYMPHS # BLD: 4.4 % — SIGNIFICANT CHANGE UP (ref 0–6)
WBC # BLD: 1.84 K/UL — LOW (ref 3.8–10.5)
WBC # FLD AUTO: 1.84 K/UL — LOW (ref 3.8–10.5)

## 2022-11-20 PROCEDURE — 99285 EMERGENCY DEPT VISIT HI MDM: CPT | Mod: CS

## 2022-11-20 PROCEDURE — 70498 CT ANGIOGRAPHY NECK: CPT | Mod: 26,MA

## 2022-11-20 PROCEDURE — 70496 CT ANGIOGRAPHY HEAD: CPT | Mod: 26,MA

## 2022-11-20 PROCEDURE — 71045 X-RAY EXAM CHEST 1 VIEW: CPT | Mod: 26

## 2022-11-20 PROCEDURE — 74177 CT ABD & PELVIS W/CONTRAST: CPT | Mod: 26,MA

## 2022-11-20 RX ORDER — POLYETHYLENE GLYCOL 3350 17 G/17G
17 POWDER, FOR SOLUTION ORAL DAILY
Refills: 0 | Status: DISCONTINUED | OUTPATIENT
Start: 2022-11-20 | End: 2022-11-21

## 2022-11-20 RX ORDER — CLOPIDOGREL BISULFATE 75 MG/1
75 TABLET, FILM COATED ORAL DAILY
Refills: 0 | Status: DISCONTINUED | OUTPATIENT
Start: 2022-11-21 | End: 2022-11-21

## 2022-11-20 RX ORDER — ASPIRIN/CALCIUM CARB/MAGNESIUM 324 MG
81 TABLET ORAL DAILY
Refills: 0 | Status: DISCONTINUED | OUTPATIENT
Start: 2022-11-20 | End: 2022-11-21

## 2022-11-20 RX ORDER — ZOLPIDEM TARTRATE 10 MG/1
1 TABLET ORAL
Qty: 0 | Refills: 0 | DISCHARGE

## 2022-11-20 RX ORDER — MECLIZINE HCL 12.5 MG
12.5 TABLET ORAL ONCE
Refills: 0 | Status: COMPLETED | OUTPATIENT
Start: 2022-11-20 | End: 2022-11-20

## 2022-11-20 RX ORDER — CLOPIDOGREL BISULFATE 75 MG/1
300 TABLET, FILM COATED ORAL ONCE
Refills: 0 | Status: COMPLETED | OUTPATIENT
Start: 2022-11-20 | End: 2022-11-20

## 2022-11-20 RX ORDER — SODIUM CHLORIDE 9 MG/ML
1000 INJECTION INTRAMUSCULAR; INTRAVENOUS; SUBCUTANEOUS ONCE
Refills: 0 | Status: COMPLETED | OUTPATIENT
Start: 2022-11-20 | End: 2022-11-20

## 2022-11-20 RX ORDER — LANOLIN ALCOHOL/MO/W.PET/CERES
5 CREAM (GRAM) TOPICAL AT BEDTIME
Refills: 0 | Status: DISCONTINUED | OUTPATIENT
Start: 2022-11-20 | End: 2022-11-21

## 2022-11-20 RX ORDER — SENNA PLUS 8.6 MG/1
2 TABLET ORAL AT BEDTIME
Refills: 0 | Status: DISCONTINUED | OUTPATIENT
Start: 2022-11-20 | End: 2022-11-21

## 2022-11-20 RX ORDER — METOPROLOL TARTRATE 50 MG
1 TABLET ORAL
Qty: 0 | Refills: 0 | DISCHARGE

## 2022-11-20 RX ORDER — ATORVASTATIN CALCIUM 80 MG/1
80 TABLET, FILM COATED ORAL AT BEDTIME
Refills: 0 | Status: DISCONTINUED | OUTPATIENT
Start: 2022-11-20 | End: 2022-11-21

## 2022-11-20 RX ADMIN — SODIUM CHLORIDE 1000 MILLILITER(S): 9 INJECTION INTRAMUSCULAR; INTRAVENOUS; SUBCUTANEOUS at 14:46

## 2022-11-20 RX ADMIN — ATORVASTATIN CALCIUM 80 MILLIGRAM(S): 80 TABLET, FILM COATED ORAL at 23:07

## 2022-11-20 RX ADMIN — SENNA PLUS 2 TABLET(S): 8.6 TABLET ORAL at 23:07

## 2022-11-20 RX ADMIN — Medication 12.5 MILLIGRAM(S): at 14:45

## 2022-11-20 RX ADMIN — Medication 5 MILLIGRAM(S): at 23:06

## 2022-11-20 RX ADMIN — CLOPIDOGREL BISULFATE 300 MILLIGRAM(S): 75 TABLET, FILM COATED ORAL at 23:06

## 2022-11-20 RX ADMIN — Medication 81 MILLIGRAM(S): at 23:07

## 2022-11-20 NOTE — H&P ADULT - HISTORY OF PRESENT ILLNESS
87y (1935) woman with a PMHx significant h/o breast ca in the past presenting with dizziness, n/v, and headache. Symptoms began this am when she woke up at 7am. Describes the dizziness as seeing the room spinning and being unable to walk and vomited one time. dizziness has decreased through out the day but walking is still significantly difficult. Patient witnessed almost falling on exam. Nausea and headache have resolved. Nothing like this has ever happened before. Dizziness with movement such as head turning and improves with holding still.

## 2022-11-20 NOTE — ED PROVIDER NOTE - OBJECTIVE STATEMENT
Attending Poornima Celeste: 87 y female h/o breast ca in the past presenting with n/v and headache. symptoms began this am when she woke up at 7am. last normal around 11pm. pt reports posterior headache and dizziness associated with n/v. no h/o similar in the past. no falls or head trauma. symptmos unchnaged with moving her eye. no vision changes or eye pain. pt denies any abdominal pain. no dysuria. no hearing changes. did just submit a holter monitor. no chest pain or sob

## 2022-11-20 NOTE — CONSULT NOTE ADULT - SUBJECTIVE AND OBJECTIVE BOX
Neurology - Consult Note    -  Spectra: [id number] ([facility]), [phone] ([facility])  -    HPI: Patient GINO GRAHAM is a 87y (1935) woman with a PMHx significant h/o breast ca in the past presenting with dizziness, n/v, and headache. Symptoms began this am when she woke up at 7am. Last normal around 11pm. patient reports posterior headache and dizziness.       Review of Systems: refer to hpi    Allergies:      PMHx/PSHx/Family Hx: As above, otherwise see below   Breast cancer      Social Hx:  No current use of tobacco, alcohol, or illicit drugs      Medications:  MEDICATIONS  (STANDING):    MEDICATIONS  (PRN):      Vitals:  T(C): 36.7 (11-20-22 @ 15:35), Max: 36.7 (11-20-22 @ 15:35)  HR: 78 (11-20-22 @ 15:35) (73 - 78)  BP: 140/70 (11-20-22 @ 15:35) (140/70 - 170/71)  RR: 18 (11-20-22 @ 15:35) (16 - 19)  SpO2: 97% (11-20-22 @ 15:35) (96% - 97%)      Neurologic Exam:  Mental status - Awake, Alert, Oriented to person, place, and time. Speech fluent, repetition and naming intact. Follows simple commands. Attention/concentration, recent and remote memory (including registration and recall), and fund of knowledge intact    Cranial nerves - PERRLA, VFF, EOMI, face sensation (V1-V3) intact b/l, facial strength intact without asymmetry b/l, hearing intact b/l, palate with symmetric elevation, trapezius 5/5 strength b/l, tongue midline on protrusion with full lateral movement    Motor - Normal bulk and tone throughout. No pronator drift.  Strength testing            Deltoid      Biceps      Triceps         R            5                 5               5                     5                               L             5                 5               5                     5                                           Hip Flexion      Knee Extension    Dorsiflexion    Plantar Flexion  R              5                           5                       5                           5                          L              5                           5                        5                           5                               Sensation - Light touch intact throughout    DTR's -             Biceps      Triceps     Brachioradialis      Patellar    Ankle    Toes/plantar response  R             2+             2+                  2+                       2+            2+                 Down  L              2+             2+                 2+                        2+           2+                 Down    Coordination - Finger to Nose intact b/l. No tremors appreciated    Gait and station - Normal casual gait. Romberg (-)    Labs:                        12.5   1.84  )-----------( 163      ( 20 Nov 2022 12:26 )             39.6     11-20    136  |  103  |  20  ----------------------------<  107<H>  4.5   |  21<L>  |  0.61    Ca    9.0      20 Nov 2022 12:26  Phos  3.5     11-20  Mg     2.0     11-20    TPro  9.0<H>  /  Alb  3.7  /  TBili  0.5  /  DBili  x   /  AST  40  /  ALT  15  /  AlkPhos  71  11-20      POCT Blood Glucose.: 90 mg/dL (20 Nov 2022 13:13)    LIVER FUNCTIONS - ( 20 Nov 2022 12:26 )  Alb: 3.7 g/dL / Pro: 9.0 g/dL / ALK PHOS: 71 U/L / ALT: 15 U/L / AST: 40 U/L / GGT: x           PT/INR - ( 20 Nov 2022 12:26 )   PT: 12.9 sec;   INR: 1.12 ratio       PTT - ( 20 Nov 2022 12:26 )  PTT:28.2 sec    Radiology:  CT Head No Cont:  (20 Nov 2022 12:51)  NONCONTRAST HEAD CT SCAN:  No acute intracranial hemorrhage or mass effect.  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  CT ANGIOGRAPHY BRAIN:  Poor visualization of the intracranial portion of the left vertebral artery, which appears to terminate as the left PICA.

## 2022-11-20 NOTE — H&P ADULT - ATTENDING COMMENTS
Ms. Peck is an 87-year-old woman with history of breast CA, who presented with dizziness sudden onset of nausea and vomiting.  Today on neurological exam she has no focal neurological deficit.  Her dizziness has resolved.  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  She has right dominant vertebral artery  She was also diagnosed with asymptomatic Covid.  Likely diagnosis is peripheral vertigo, recommend she receive vestibular therapy as outpatient and perhaps an ENT consult if necessary  At this point there is no indication to add Plavix, therefore we will continue her only on aspirin 81 mg.  No objection to discharge if cleared from physical therapy standpoint.

## 2022-11-20 NOTE — ED PROVIDER NOTE - PROGRESS NOTE DETAILS
Peter PATEL PGY-3: pt reassessed at bedside. COVID+. lab and CT results discussed with her and family at bedside, all questions answered.  Still endorsing dizziness exacerbated with movment; will give meclizine and reassess. will ambulate, if able to walk will dc home. Pt informed of enlarged thyroid and need to follow up with brain MRI Peter PATEL PGY-3: pt reassessed at bedside, feeling improved after meclizine. will ambulate and discharge Peter PATEL PGY-3: patient failed ambulation. Stated improvement of sxs after meclizine, however fell to the R after taking ~3 steps. Neuro paged Peter PATEL PGY-3: spoke with neuro who will come eval patient Peter PATEL PGY-3: neuro at bedside

## 2022-11-20 NOTE — ED PROVIDER NOTE - IV ALTEPLASE EXCL REL HIDDEN
Benefits, risks, and possible complications of procedure explained to patient/caregiver who verbalized understanding and gave verbal consent. show

## 2022-11-20 NOTE — ED PROVIDER NOTE - PHYSICAL EXAMINATION
Attending Poornima Celeste: Gen: NAD, heent: atrauamtic, eomi, perrla, mmm, op pink, uvula midline, neck; nttp, no nuchal rigidity, chest: nttp, no crepitus, cv: rrr, +murmur, lungs: ctab, abd: soft, nontender, nondistended, no peritoneal signs, +BS, no guarding, ext: wwp, neg homans, skin: no rash, neuro: awake and alert, following commands, speech clear, sensation and strength intact, no focal deficits, no protonator drift

## 2022-11-20 NOTE — H&P ADULT - NSHPPHYSICALEXAM_GEN_ALL_CORE
Neurologic Exam:  Mental status - Awake, Alert, Oriented to person, place, and time. Speech fluent, repetition and naming intact. Follows simple commands. Attention/concentration, recent and remote memory (including registration and recall), and fund of knowledge intact    Cranial nerves - PERRLA, VFF, EOMI, face sensation (V1-V3) intact b/l, facial strength intact without asymmetry b/l, hearing intact b/l, palate with symmetric elevation, trapezius 5/5 strength b/l, tongue midline on protrusion with full lateral movement    Motor - . No pronator drift.  Strength testing            Deltoid      Biceps      Triceps         R            5                 5               5                     5                               L             5                 5               5                     5                                           Hip Flexion      Knee Extension    Dorsiflexion    Plantar Flexion  R              5                           5                       5                           5                          L              5                           5                        5                           5                               Sensation - Light touch intact throughout    DTR's -             Biceps      Triceps     Brachioradialis      Patellar    Ankle    Toes/plantar response  R             2+             2+                  2+                       2+            2+                 Down  L              2+             2+                 2+                        2+           2+                 Down    Coordination - Finger to Nose intact b/l. heel to shin intact. No tremors appreciated    Gait and station - deferred due to recent almost fall on ED physicians exam and sways even when standing with eyes open, Romberg (+)    HINTS  leftward beating nystagmus on leftward gaze  head impulse exam: corrective horizontal saccades (with multiples)  vertical skew: left eye adjusts horizontal on vertical skew

## 2022-11-20 NOTE — H&P ADULT - ASSESSMENT
Assessment: 87y (1935) woman with a PMHx significant h/o breast ca in the past presenting with dizziness, n/v, and headache. Symptoms began this am when she woke up at 7am. Describes the dizziness as seeing the room spinning and being unable to walk and vomited one time. dizziness has decreased through out the day but walking is still significantly difficult. Patient witnessed almost falling on exam. Nausea and headache have resolved. Nothing like this has ever happened before. Dizziness with movement such as head turning and improves with holding still. HINTS exam notable for leftward beating nystagmus on leftward gazehead impulse exam: corrective horizontal saccades (with multiples)vertical skew: left eye adjusts horizontal on vertical skew. CTA shows severe attenuation of the distal V3 segment of the left vertebral artery      Impression sudden onset of vertigo upon waking with nausea, headache, gait disturbances (improving) 2/2 brain dysfunction. unknown etiology at this time pending work-up      Plan  []  Aspirin 81 and Plavix 300mg loading dose then [id number] for 3 weeks followed by ASA 81 alone per CHANCE trial  []  Atorvastatin 80, titrate to LDL<70  []  MRI brain w/o  []  MRA H/N, with NOVA  [] TTE   []  DVT prophylaxis   []  Telemonitoring;  Neurochecks per unit protocol  []  Permissive HTN up to 220/120 mmHg for first 24 hours after the symptom onset followed by gradual normotension.   []  Please send HbA1C and Lipid Panel  []  PT/OT evaluation  []  NPO until clears Dysphagia screen, otherwise Swallow evaluation      Case discussed with Stroke Fellow Dr. Jon Dela Cruz

## 2022-11-20 NOTE — ED PROVIDER NOTE - ATTENDING CONTRIBUTION TO CARE
Attending MD Poornima Celeste:  I personally have seen and examined this patient.  Resident note reviewed and agree on plan of care and except where noted.  See HPI, PE, and MDM for details.

## 2022-11-20 NOTE — ED ADULT NURSE NOTE - OBJECTIVE STATEMENT
88 y/o F A&Ox3 PMH Breast CA PSH mastectomy presents to the ED via EMS c/o dizziness. Pt reports waking up this morning @ 0700 feeling "like the room was spinning". Pt endorses nausea and vomiting. Upon ED arrival pt is well appearing. Breathing is even and unlabored. Skin is warm, dry & in tact. Neuro checks being completed and placed in pt paper chart. Denies CP, SOB, HA, numbness, tingling, vision changes, urinary s/s. IV access obtained. Call bell within reach, comfort & safety provided.

## 2022-11-20 NOTE — ED PROVIDER NOTE - CLINICAL SUMMARY MEDICAL DECISION MAKING FREE TEXT BOX
Peter PATEL PGY-3: 86 yo F no med hx presneting with dizziness and posterior HA since 7 AM today (5 hrs ago). Pt out of tpa window, will obtain CT/CTA head and neck. Cardiac workup, reassess. Peter PATEL PGY-3: 88 yo F no med hx presneting with dizziness and posterior HA since 7 AM today (5 hrs ago). Pt out of tpa window. will obtain CT/CTA head and neck to eval posterior circulation. Labs, imaging, and reassess. Peter PATEL PGY-3: 86 yo F no med hx presneting with dizziness and posterior HA since 7 AM today (5 hrs ago). Pt out of tpa window. will obtain CT/CTA head and neck to eval posterior circulation. Labs, imaging, and reassess.  Attending Poornima Celeste: 86 yo female presenting with dizziness, headache and not feeling well. noticed symptoms when she woke up this am. last known normal last evening. no falls or trauma. no h/o similar. on exam pt awake and alert. normal finger to nose and no protonotar drift pt with diffiuculy amblating. out of window for TPA. unclear exact last known normal and less liekly large vessel occlusion based on exam. no evidence of metabolic derrangement. no bruit on exam. pt is found to be covid positive which could be contributing as well. cta head and neck ordered to evaluate for dissection or occlusion, will likely dw neuro.

## 2022-11-20 NOTE — ED PROVIDER NOTE - NSFOLLOWUPINSTRUCTIONS_ED_ALL_ED_FT
You were prescribed meclizine for dizziness. Take your meclizine medication every 8 hours as needed for dizziness. If you do not feel dizzy do not take medication.     Follow up with a neurologist. You need to get a follow up brain MRI for further imaging; bring your paperwork with you to your appointments (see CT results attached).     You were fund to     Dizziness    WHAT YOU NEED TO KNOW   Dizziness is a common problem. It makes you feel unsteady or light-headed. You may feel like you are about to pass out (faint). Dizziness can lead to getting hurt if you stumble or fall. Dizziness can be caused by many things, including:    Medicines.  Not having enough water in your body (dehydration).  Illness.    Follow these instructions at home:  Eating and drinking    Drink enough fluid to keep your pee (urine) clear or pale yellow. This helps to keep you from getting dehydrated. Try to drink more clear fluids, such as water.  Do not drink alcohol.  Limit how much caffeine you drink or eat, if your doctor tells you to do that.  Limit how much salt (sodium) you drink or eat, if your doctor tells you to do that.     Activity    Avoid making quick movements.  When you stand up from sitting in a chair, steady yourself until you feel okay.  In the morning, first sit up on the side of the bed. When you feel okay, stand slowly while you hold onto something. Do this until you know that your balance is fine.  If you need to  one place for a long time, move your legs often. Tighten and relax the muscles in your legs while you are standing.  Do not drive or use heavy machinery if you feel dizzy.  Avoid bending down if you feel dizzy. Place items in your home so you can reach them easily without leaning over.     Lifestyle    Do not use any products that contain nicotine or tobacco, such as cigarettes and e-cigarettes. If you need help quitting, ask your doctor.  Try to lower your stress level. You can do this by using methods such as yoga or meditation. Talk with your doctor if you need help.    General instructions    Watch your dizziness for any changes.  Take over-the-counter and prescription medicines only as told by your doctor. Talk with your doctor if you think that you are dizzy because of a medicine that you are taking.  Tell a friend or a family member that you are feeling dizzy. If he or she notices any changes in your behavior, have this person call your doctor.  Keep all follow-up visits as told by your doctor. This is important.    Contact a doctor if:  Your dizziness does not go away.  Your dizziness or light-headedness gets worse.  You feel sick to your stomach (nauseous).  You have trouble hearing.  You have new symptoms.  You are unsteady on your feet.  You feel like the room is spinning.    Get help right away if:  You throw up (vomit) or have watery poop (diarrhea), and you cannot eat or drink anything.  You have trouble:  Talking.  Walking.  Swallowing.  Using your arms, hands, or legs.  You feel generally weak.  You are not thinking clearly, or you have trouble forming sentences. A friend or family member may notice this.  You have:  Chest pain.  Pain in your belly (abdomen).  Shortness of breath.  Sweating.  Your vision changes.  You are bleeding.  You have a very bad headache.  You have neck pain or a stiff neck.  You have a fever.    These symptoms may be an emergency. Do not wait to see if the symptoms will go away. Get medical help right away. Call your local emergency services (911 in the [place] Do not drive yourself to the hospital.    Summary  Dizziness makes you feel unsteady or light-headed. You may feel like you are about to pass out (faint).  Drink enough fluid to keep your pee (urine) clear or pale yellow. Do not drink alcohol.  Avoid making quick movements if you feel dizzy.  Watch your dizziness for any changes.    ADDITIONAL NOTES AND INSTRUCTIONS    Please follow up with your Primary MD in 24-48 hr.  Seek immediate medical care for any new/worsening signs or symptoms.

## 2022-11-20 NOTE — H&P ADULT - NSHPLABSRESULTS_GEN_ALL_CORE
Labs:                        12.5   1.84  )-----------( 163      ( 20 Nov 2022 12:26 )             39.6     11-20    136  |  103  |  20  ----------------------------<  107<H>  4.5   |  21<L>  |  0.61    Ca    9.0      20 Nov 2022 12:26  Phos  3.5     11-20  Mg     2.0     11-20    TPro  9.0<H>  /  Alb  3.7  /  TBili  0.5  /  DBili  x   /  AST  40  /  ALT  15  /  AlkPhos  71  11-20      POCT Blood Glucose.: 90 mg/dL (20 Nov 2022 13:13)    LIVER FUNCTIONS - ( 20 Nov 2022 12:26 )  Alb: 3.7 g/dL / Pro: 9.0 g/dL / ALK PHOS: 71 U/L / ALT: 15 U/L / AST: 40 U/L / GGT: x           PT/INR - ( 20 Nov 2022 12:26 )   PT: 12.9 sec;   INR: 1.12 ratio       PTT - ( 20 Nov 2022 12:26 )  PTT:28.2 sec    Radiology:  CT Head No Cont:  (20 Nov 2022 12:51)  NONCONTRAST HEAD CT SCAN:  No acute intracranial hemorrhage or mass effect.  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  CT ANGIOGRAPHY BRAIN:  Poor visualization of the intracranial portion of the left vertebral artery, which appears to terminate as the left PICA.

## 2022-11-21 ENCOUNTER — TRANSCRIPTION ENCOUNTER (OUTPATIENT)
Age: 87
End: 2022-11-21

## 2022-11-21 VITALS
RESPIRATION RATE: 23 BRPM | DIASTOLIC BLOOD PRESSURE: 71 MMHG | OXYGEN SATURATION: 96 % | HEART RATE: 91 BPM | SYSTOLIC BLOOD PRESSURE: 136 MMHG

## 2022-11-21 DIAGNOSIS — I63.9 CEREBRAL INFARCTION, UNSPECIFIED: ICD-10-CM

## 2022-11-21 DIAGNOSIS — R00.2 PALPITATIONS: ICD-10-CM

## 2022-11-21 DIAGNOSIS — I10 ESSENTIAL (PRIMARY) HYPERTENSION: ICD-10-CM

## 2022-11-21 LAB
A1C WITH ESTIMATED AVERAGE GLUCOSE RESULT: 5.4 % — SIGNIFICANT CHANGE UP (ref 4–5.6)
ANION GAP SERPL CALC-SCNC: 11 MMOL/L — SIGNIFICANT CHANGE UP (ref 5–17)
BUN SERPL-MCNC: 13 MG/DL — SIGNIFICANT CHANGE UP (ref 7–23)
CALCIUM SERPL-MCNC: 8.7 MG/DL — SIGNIFICANT CHANGE UP (ref 8.4–10.5)
CHLORIDE SERPL-SCNC: 105 MMOL/L — SIGNIFICANT CHANGE UP (ref 96–108)
CHOLEST SERPL-MCNC: 151 MG/DL — SIGNIFICANT CHANGE UP
CO2 SERPL-SCNC: 23 MMOL/L — SIGNIFICANT CHANGE UP (ref 22–31)
CREAT SERPL-MCNC: 0.64 MG/DL — SIGNIFICANT CHANGE UP (ref 0.5–1.3)
EGFR: 87 ML/MIN/1.73M2 — SIGNIFICANT CHANGE UP
ESTIMATED AVERAGE GLUCOSE: 108 MG/DL — SIGNIFICANT CHANGE UP (ref 68–114)
GLUCOSE SERPL-MCNC: 81 MG/DL — SIGNIFICANT CHANGE UP (ref 70–99)
HCT VFR BLD CALC: 36.1 % — SIGNIFICANT CHANGE UP (ref 34.5–45)
HDLC SERPL-MCNC: 50 MG/DL — LOW
HGB BLD-MCNC: 11.6 G/DL — SIGNIFICANT CHANGE UP (ref 11.5–15.5)
LIPID PNL WITH DIRECT LDL SERPL: 89 MG/DL — SIGNIFICANT CHANGE UP
MCHC RBC-ENTMCNC: 28.4 PG — SIGNIFICANT CHANGE UP (ref 27–34)
MCHC RBC-ENTMCNC: 32.1 GM/DL — SIGNIFICANT CHANGE UP (ref 32–36)
MCV RBC AUTO: 88.5 FL — SIGNIFICANT CHANGE UP (ref 80–100)
NON HDL CHOLESTEROL: 100 MG/DL — SIGNIFICANT CHANGE UP
NRBC # BLD: 0 /100 WBCS — SIGNIFICANT CHANGE UP (ref 0–0)
PA ADP PRP-ACNC: 288 PRU — SIGNIFICANT CHANGE UP (ref 194–417)
PLATELET # BLD AUTO: 173 K/UL — SIGNIFICANT CHANGE UP (ref 150–400)
POTASSIUM SERPL-MCNC: 3.4 MMOL/L — LOW (ref 3.5–5.3)
POTASSIUM SERPL-SCNC: 3.4 MMOL/L — LOW (ref 3.5–5.3)
RBC # BLD: 4.08 M/UL — SIGNIFICANT CHANGE UP (ref 3.8–5.2)
RBC # FLD: 14.4 % — SIGNIFICANT CHANGE UP (ref 10.3–14.5)
SODIUM SERPL-SCNC: 139 MMOL/L — SIGNIFICANT CHANGE UP (ref 135–145)
TRIGL SERPL-MCNC: 59 MG/DL — SIGNIFICANT CHANGE UP
WBC # BLD: 1.99 K/UL — LOW (ref 3.8–10.5)
WBC # FLD AUTO: 1.99 K/UL — LOW (ref 3.8–10.5)

## 2022-11-21 PROCEDURE — 74177 CT ABD & PELVIS W/CONTRAST: CPT | Mod: MA

## 2022-11-21 PROCEDURE — 99285 EMERGENCY DEPT VISIT HI MDM: CPT

## 2022-11-21 PROCEDURE — 97161 PT EVAL LOW COMPLEX 20 MIN: CPT

## 2022-11-21 PROCEDURE — 85576 BLOOD PLATELET AGGREGATION: CPT

## 2022-11-21 PROCEDURE — 84484 ASSAY OF TROPONIN QUANT: CPT

## 2022-11-21 PROCEDURE — 83735 ASSAY OF MAGNESIUM: CPT

## 2022-11-21 PROCEDURE — 70553 MRI BRAIN STEM W/O & W/DYE: CPT

## 2022-11-21 PROCEDURE — 84100 ASSAY OF PHOSPHORUS: CPT

## 2022-11-21 PROCEDURE — 70547 MR ANGIOGRAPHY NECK W/O DYE: CPT

## 2022-11-21 PROCEDURE — 80061 LIPID PANEL: CPT

## 2022-11-21 PROCEDURE — 87637 SARSCOV2&INF A&B&RSV AMP PRB: CPT

## 2022-11-21 PROCEDURE — 36415 COLL VENOUS BLD VENIPUNCTURE: CPT

## 2022-11-21 PROCEDURE — 97165 OT EVAL LOW COMPLEX 30 MIN: CPT

## 2022-11-21 PROCEDURE — 36000 PLACE NEEDLE IN VEIN: CPT

## 2022-11-21 PROCEDURE — 70496 CT ANGIOGRAPHY HEAD: CPT | Mod: MA

## 2022-11-21 PROCEDURE — 80053 COMPREHEN METABOLIC PANEL: CPT

## 2022-11-21 PROCEDURE — 70450 CT HEAD/BRAIN W/O DYE: CPT | Mod: MA

## 2022-11-21 PROCEDURE — 70547 MR ANGIOGRAPHY NECK W/O DYE: CPT | Mod: 26

## 2022-11-21 PROCEDURE — 82550 ASSAY OF CK (CPK): CPT

## 2022-11-21 PROCEDURE — 99223 1ST HOSP IP/OBS HIGH 75: CPT | Mod: FS

## 2022-11-21 PROCEDURE — 70498 CT ANGIOGRAPHY NECK: CPT | Mod: MA

## 2022-11-21 PROCEDURE — 80048 BASIC METABOLIC PNL TOTAL CA: CPT

## 2022-11-21 PROCEDURE — 85730 THROMBOPLASTIN TIME PARTIAL: CPT

## 2022-11-21 PROCEDURE — A9585: CPT

## 2022-11-21 PROCEDURE — 70544 MR ANGIOGRAPHY HEAD W/O DYE: CPT | Mod: 26,59

## 2022-11-21 PROCEDURE — 71045 X-RAY EXAM CHEST 1 VIEW: CPT

## 2022-11-21 PROCEDURE — 82962 GLUCOSE BLOOD TEST: CPT

## 2022-11-21 PROCEDURE — 83036 HEMOGLOBIN GLYCOSYLATED A1C: CPT

## 2022-11-21 PROCEDURE — 85025 COMPLETE CBC W/AUTO DIFF WBC: CPT

## 2022-11-21 PROCEDURE — 70544 MR ANGIOGRAPHY HEAD W/O DYE: CPT

## 2022-11-21 PROCEDURE — 70553 MRI BRAIN STEM W/O & W/DYE: CPT | Mod: 26

## 2022-11-21 PROCEDURE — 85610 PROTHROMBIN TIME: CPT

## 2022-11-21 PROCEDURE — 85027 COMPLETE CBC AUTOMATED: CPT

## 2022-11-21 RX ORDER — ENOXAPARIN SODIUM 100 MG/ML
40 INJECTION SUBCUTANEOUS EVERY 24 HOURS
Refills: 0 | Status: DISCONTINUED | OUTPATIENT
Start: 2022-11-21 | End: 2022-11-21

## 2022-11-21 RX ORDER — ASPIRIN/CALCIUM CARB/MAGNESIUM 324 MG
1 TABLET ORAL
Qty: 0 | Refills: 0 | DISCHARGE
Start: 2022-11-21

## 2022-11-21 RX ORDER — POTASSIUM CHLORIDE 20 MEQ
40 PACKET (EA) ORAL ONCE
Refills: 0 | Status: COMPLETED | OUTPATIENT
Start: 2022-11-21 | End: 2022-11-21

## 2022-11-21 RX ADMIN — Medication 1 TABLET(S): at 15:09

## 2022-11-21 RX ADMIN — Medication 40 MILLIEQUIVALENT(S): at 08:55

## 2022-11-21 RX ADMIN — ENOXAPARIN SODIUM 40 MILLIGRAM(S): 100 INJECTION SUBCUTANEOUS at 14:41

## 2022-11-21 RX ADMIN — Medication 81 MILLIGRAM(S): at 14:40

## 2022-11-21 NOTE — DISCHARGE NOTE PROVIDER - CARE PROVIDERS DIRECT ADDRESSES
,[email or web address],DirectAddress_Unknown

## 2022-11-21 NOTE — DISCHARGE NOTE PROVIDER - PROVIDER TOKENS
PROVIDER:[TOKEN:[[id number]],FOLLOWUP:[2 weeks]],PROVIDER:[TOKEN:[[id number]],FOLLOWUP:[2 weeks]]

## 2022-11-21 NOTE — OCCUPATIONAL THERAPY INITIAL EVALUATION ADULT - PERTINENT HX OF CURRENT PROBLEM, REHAB EVAL
87y (1935) woman with a PMHx significant h/o breast ca in the past presenting with dizziness, n/v, and headache. Describes the dizziness as seeing the room spinning and being unable to walk and vomited one time. dizziness has decreased through out the day but walking is still significantly difficult. Patient witnessed almost falling on exam. Nausea and headache have resolved. Nothing like this has ever happened before. Dizziness with movement such as head turning and improves with holding still.     NONCONTRAST HEAD CT SCAN 11/20: No acute intracranial hemorrhage or mass effect.  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 No hemodynamically significant stenosis of the internal carotid arteries by NASCET criteria.  CT ANGIOGRAPHY BRAIN 11/20: Poor visualization of the intracranial portion of the left vertebral artery, which appears to terminate as the left PICA. Otherwise, no large vessel occlusion, significant stenosis, or saccular aneurysm.

## 2022-11-21 NOTE — PHYSICAL THERAPY INITIAL EVALUATION ADULT - GENERAL OBSERVATIONS, REHAB EVAL
received supine with head of bed elevated +external female catheter, +bilateral sequential compression devices

## 2022-11-21 NOTE — PHYSICAL THERAPY INITIAL EVALUATION ADULT - PERTINENT HX OF CURRENT PROBLEM, REHAB EVAL
PMHx: breast ca. presenting with dizziness, n/v, & headache, began this am when she woke up at 7am. Describes the dizziness as seeing the room spinning and being unable to walk and vomited one time. dizziness has decreased through out the day but walking is still significantly difficult. Pt witnessed almost falling on exam. Nausea and headache have resolved. Dizziness with movement such as head turning and improves with holding still. HINTS exam notable for leftward beating nystagmus on leftward gazehead impulse exam: corrective horizontal saccades (with multiples)vertical skew: left eye adjusts horizontal on vertical skew. CTA shows severe attenuation of the distal V3 segment of the left vertebral artery    CTH: No acute ICH or mass effect.  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 No hemodynamically significant stenosis of the ICAs by NASCET criteria.  CTA BRAIN: Poor visualization of the intracranial portion of the L vertebral artery, which appears to terminate as the L PICA.  CT abd/pelvis: neg PMHx: breast ca. presenting with dizziness, n/v, & headache, began this am when she woke up at 7am. Describes the dizziness as seeing the room spinning and being unable to walk and vomited one time. dizziness has decreased through out the day but walking is still significantly difficult. Pt witnessed almost falling on exam. Nausea and headache have resolved. Dizziness with movement such as head turning and improves with holding still. HINTS exam notable for leftward beating nystagmus on leftward gaze head impulse exam: corrective horizontal saccades (with multiples)vertical skew: left eye adjusts horizontal on vertical skew. CTA shows severe attenuation of the distal V3 segment of the left vertebral artery    CTH: No acute ICH or mass effect.  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 No hemodynamically significant stenosis of the ICAs by NASCET criteria.  CTA BRAIN: Poor visualization of the intracranial portion of the L vertebral artery, which appears to terminate as the L PICA.  CT abd/pelvis: neg

## 2022-11-21 NOTE — DISCHARGE NOTE PROVIDER - CARE PROVIDER_API CALL
Teresita Borja)  Otolaryngology  [address] [place] [address], [address]  [place] [address]  Phone: [phone]  Fax: [phone]  Follow Up Time: 2 weeks    Bharat Puga ()  Cardiology; Internal Medicine  [address] [facility], [address]  [place] [address]  Phone: [phone]  Fax: [phone]  Follow Up Time: 2 weeks

## 2022-11-21 NOTE — DISCHARGE NOTE PROVIDER - HOSPITAL COURSE
Ms. Peck is an 87-year-old woman with history of breast CA, who presented with dizziness sudden onset of nausea and vomiting.  Today on neurological exam she has no focal neurological deficit.  Her dizziness has resolved.  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  She has right dominant vertebral artery  She was also diagnosed with asymptomatic Covid.  Likely diagnosis is peripheral vertigo, recommend she receive vestibular therapy as outpatient and followup with ENT as outpatient.   At this point there is no indication to add Plavix, therefore we will continue her only on aspirin 81 mg.  Neurologically clear for discharge. PT/OT cleared patient for home. Rolling walker given to patient.

## 2022-11-21 NOTE — CONSULT NOTE ADULT - ASSESSMENT
Assessment:       Impression      Plan
87y female with h/o breast ca in the past presenting with dizziness, n/v, and headache. Nausea and headache have resolved. She had history of palpitations, just finished wearing a holter monitor.  She had COVID in August and tested + again now, no sick contact, no recent fevers, no resp or GI symptoms  She has leukopenia and was seen by heme-onc in a past and as per pt had BM bx    PLAN:  Pt is not interested in Rx with RDV or PAxlovid which was offered to her  I also advised to f/u with heme-onc again due to ongoing leukopenia, which may be due to COVID, but she had hematologic w/u in a past  No absolute objections to dc from ID viewpoint
87y (1935) woman with a PMHx significant h/o breast ca in the past presenting with dizziness, n/v, and headache.

## 2022-11-21 NOTE — DISCHARGE NOTE NURSING/CASE MANAGEMENT/SOCIAL WORK - NSDCPEFALRISK_GEN_ALL_CORE
For information on Fall & Injury Prevention, visit: [email or web address] OR  [email or web address] OR  https://www.cdc.gov/steadi/patient.html

## 2022-11-21 NOTE — CONSULT NOTE ADULT - SUBJECTIVE AND OBJECTIVE BOX
HPI:   Patient is a 87y female with    REVIEW OF SYSTEMS:  All other review of systems negative (Comprehensive ROS)    PAST MEDICAL & SURGICAL HISTORY:  Breast cancer  s/p b/l mastectomy      H/O bilateral mastectomy  + breast implants      History of cataract surgery, unspecified laterality          Allergies    No Known Allergies    Intolerances        Antimicrobials Day #      Other Medications:  aspirin  chewable 81 milliGRAM(s) Oral daily  atorvastatin 80 milliGRAM(s) Oral at bedtime  clopidogrel Tablet 75 milliGRAM(s) Oral daily  enoxaparin Injectable 40 milliGRAM(s) SubCutaneous every 24 hours  melatonin 5 milliGRAM(s) Oral at bedtime  multivitamin 1 Tablet(s) Oral daily  polyethylene glycol 3350 17 Gram(s) Oral daily PRN  senna 2 Tablet(s) Oral at bedtime      FAMILY HISTORY:  Family history of MI (myocardial infarction) (Father)    FH: Alzheimers disease  father    FH: cerebral aneurysm  mother  of this        SOCIAL HISTORY:  Smoking:     ETOH:     Drug Use:     Single     T(F): 97.8 (22 @ 08:00), Max: 98.1 (22 @ 15:35)  HR: 75 (22 @ 10:23)  BP: 143/80 (22 @ 10:23)  RR: 31 (22 @ 10:23)  SpO2: 95% (22 @ 10:23)  Wt(kg): --    PHYSICAL EXAM:  General: alert, no acute distress  Eyes:  anicteric, no conjunctival injection, no discharge  Oropharynx: no lesions or injection 	  Neck: supple, without adenopathy  Lungs: clear to auscultation  Heart: regular rate and rhythm; no murmur, rubs or gallops  Abdomen: soft, nondistended, nontender, without mass or organomegaly  Skin: no lesions  Extremities: no clubbing, cyanosis, or edema  Neurologic: alert, oriented, moves all extremities    LAB RESULTS:                        11.6   1.99  )-----------( 173      ( 2022 05:13 )             36.1         139  |  105  |  13  ----------------------------<  81  3.4<L>   |  23  |  0.64    Ca    8.7      2022 05:13  Phos  3.5     11-20  Mg     2.0     -20    TPro  9.0<H>  /  Alb  3.7  /  TBili  0.5  /  DBili  x   /  AST  40  /  ALT  15  /  AlkPhos  71  11-20    LIVER FUNCTIONS - ( 2022 12:26 )  Alb: 3.7 g/dL / Pro: 9.0 g/dL / ALK PHOS: 71 U/L / ALT: 15 U/L / AST: 40 U/L / GGT: x               MICROBIOLOGY REVIEWED:    RADIOLOGY REVIEWED:   HPI:   Patient is a 87y female with h/o breast ca in the past presenting with dizziness, n/v, and headache. Nausea and headache have resolved. She had history of palpitations, just finished wearing a holter monitor.  She had COVID in August and tested + again now, no sick contact, no recent fevers, no resp or GI symptoms    REVIEW OF SYSTEMS:  All other review of systems negative (Comprehensive ROS)    PAST MEDICAL & SURGICAL HISTORY:  Breast cancer  s/p b/l mastectomy      H/O bilateral mastectomy  + breast implants      History of cataract surgery, unspecified laterality          Allergies    No Known Allergies    Intolerances        Antimicrobials Day #      Other Medications:  aspirin  chewable 81 milliGRAM(s) Oral daily  atorvastatin 80 milliGRAM(s) Oral at bedtime  clopidogrel Tablet 75 milliGRAM(s) Oral daily  enoxaparin Injectable 40 milliGRAM(s) SubCutaneous every 24 hours  melatonin 5 milliGRAM(s) Oral at bedtime  multivitamin 1 Tablet(s) Oral daily  polyethylene glycol 3350 17 Gram(s) Oral daily PRN  senna 2 Tablet(s) Oral at bedtime      FAMILY HISTORY:  Family history of MI (myocardial infarction) (Father)    FH: Alzheimers disease  father    FH: cerebral aneurysm  mother  of this        SOCIAL HISTORY:  Smoking:     ETOH:     Drug Use:     Single     T(F): 97.8 (22 @ 08:00), Max: 98.1 (22 @ 15:35)  HR: 75 (22 @ 10:23)  BP: 143/80 (22 @ 10:23)  RR: 31 (22 @ 10:23)  SpO2: 95% (22 @ 10:23)  Wt(kg): --    PHYSICAL EXAM:  General: alert, no acute distress  Eyes:  anicteric, no conjunctival injection, no discharge  Oropharynx: no lesions or injection 	  Neck: supple, without adenopathy  Lungs: clear to auscultation  Heart: regular rate and rhythm; no murmur, rubs or gallops  Abdomen: soft, nondistended, nontender, without mass or organomegaly  Skin: no lesions  Extremities: no clubbing, cyanosis, or edema  Neurologic: alert, oriented, moves all extremities    LAB RESULTS:                        11.6   1.99  )-----------( 173      ( 2022 05:13 )             36.1         139  |  105  |  13  ----------------------------<  81  3.4<L>   |  23  |  0.64    Ca    8.7      2022 05:13  Phos  3.5       Mg     2.0         TPro  9.0<H>  /  Alb  3.7  /  TBili  0.5  /  DBili  x   /  AST  40  /  ALT  15  /  AlkPhos  71  11-20    LIVER FUNCTIONS - ( 2022 12:26 )  Alb: 3.7 g/dL / Pro: 9.0 g/dL / ALK PHOS: 71 U/L / ALT: 15 U/L / AST: 40 U/L / GGT: x               MICROBIOLOGY REVIEWED:    RADIOLOGY REVIEWED:

## 2022-11-21 NOTE — CONSULT NOTE ADULT - SUBJECTIVE AND OBJECTIVE BOX
CHIEF COMPLAINT:  Stroke     HISTORY OF PRESENT ILLNESS: 87y (1935) woman with a PMHx significant h/o breast ca in the past presenting with dizziness, n/v, and headache. Symptoms began this am when she woke up at 7am. Describes the dizziness as seeing the room spinning and being unable to walk and vomited one time. dizziness has decreased through out the day but walking is still significantly difficult. Patient witnessed almost falling on exam. Nausea and headache have resolved. Nothing like this has ever happened before. Dizziness with movement such as head turning and improves with holding still.     Cardiology consulted for cardiac management.  Pt OOB in chair, just finished working with PT.  Pt with history of palpitations, just finished wearing a holter monitor.  Follows with Dr. Miller, Cardiologist.  Currently denies SOB, chest pain, palpitations.     PAST MEDICAL & SURGICAL HISTORY:  Breast cancer  s/p b/l mastectomy    H/O bilateral mastectomy  + breast implants    History of cataract surgery, unspecified laterality    MEDICATIONS:  aspirin  chewable 81 milliGRAM(s) Oral daily  clopidogrel Tablet 75 milliGRAM(s) Oral daily    melatonin 5 milliGRAM(s) Oral at bedtime    polyethylene glycol 3350 17 Gram(s) Oral daily PRN  senna 2 Tablet(s) Oral at bedtime    atorvastatin 80 milliGRAM(s) Oral at bedtime    multivitamin 1 Tablet(s) Oral daily    FAMILY HISTORY:  Family history of MI (myocardial infarction) (Father)    FH: Alzheimers disease  father    FH: cerebral aneurysm  mother  of this    SOCIAL HISTORY:    [ ] Non-smoker  [ ] Smoker  [ ] Alcohol    Allergies    No Known Allergies    Intolerances    REVIEW OF SYSTEMS:  CONSTITUTIONAL: No fever, weight loss, + fatigue  EYES: No eye pain, visual disturbances, or discharge  ENMT:  No difficulty hearing, tinnitus, vertigo; No sinus or throat pain  NECK: No pain or stiffness  RESPIRATORY: No cough, wheezing, chills or hemoptysis; No Shortness of Breath  CARDIOVASCULAR: No chest pain, palpitations, passing out, dizziness, or leg swelling  GASTROINTESTINAL: No abdominal or epigastric pain. No nausea, vomiting, or hematemesis; No diarrhea or constipation. No melena or hematochezia.  GENITOURINARY: No dysuria, frequency, hematuria, or incontinence  NEUROLOGICAL: No headaches, memory loss, loss of strength, numbness, or tremors  SKIN: No itching, burning, rashes, or lesions   LYMPH Nodes: No enlarged glands  ENDOCRINE: No heat or cold intolerance; No hair loss  MUSCULOSKELETAL: No joint pain or swelling; No muscle, back, or extremity pain  PSYCHIATRIC: No depression, anxiety, mood swings, or difficulty sleeping  HEME/LYMPH: No easy bruising, or bleeding gums  ALLERY AND IMMUNOLOGIC: No hives or eczema	    [ ] All others negative	  [ ] Unable to obtain    PHYSICAL EXAM:  T(C): 36.6 (22 @ 08:00), Max: 36.7 (22 @ 15:35)  HR: 75 (22 @ 10:23) (73 - 86)  BP: 143/80 (22 @ 10:23) (109/62 - 170/71)  RR: 31 (22 @ 10:23) (16 - 31)  SpO2: 95% (22 @ 10:23) (92% - 98%)  Wt(kg): --  I&O's Summary    2022 07:01  -  2022 07:00  --------------------------------------------------------  IN: 350 mL / OUT: 1200 mL / NET: -850 mL    Appearance: Normal	  HEENT:   Normal oral mucosa, PERRL, EOMI	  Lymphatic: No lymphadenopathy  Cardiovascular: Normal S1 S2, No JVD, No murmurs, No edema  Respiratory: Lungs clear to auscultation	  Psychiatry: A & O x 3, Mood & affect appropriate  Gastrointestinal:  Soft, Non-tender, + BS	  Skin: No rashes, No ecchymoses, No cyanosis	  Mental status - Awake, Alert, Oriented to person, place, and time. Speech fluent, repetition and naming intact. Follows simple commands. Attention/concentration, recent and remote memory (including registration and recall), and fund of knowledge intact  Cranial nerves - PERRLA, VFF, EOMI, face sensation (V1-V3) intact b/l, facial strength intact without asymmetry b/l, hearing intact b/l, palate with symmetric elevation, trapezius 5/5 strength b/l, tongue midline on protrusion with full lateral movement  Motor - . No pronator drift.  Strength testing            Deltoid      Biceps      Triceps         R            5                 5               5                     5                               L             5                 5               5                     5                                         Hip Flexion      Knee Extension    Dorsiflexion    Plantar Flexion  R              5                           5                       5                           5                          L              5                           5                        5                           5                             Sensation - Light touch intact throughout  DTR's -             Biceps      Triceps     Brachioradialis      Patellar    Ankle    Toes/plantar response  R             2+             2+                  2+                       2+            2+                 Down  L              2+             2+                 2+                        2+           2+                 Down  Coordination - Finger to Nose intact b/l. heel to shin intact. No tremors appreciated  Gait and station - deferred due to recent almost fall on ED physicians exam and sways even when standing with eyes open, Romberg (+)  Vascular: Peripheral pulses palpable 2+ bilaterally    TELEMETRY: SR, with frequent PAT x3 secs, SVT 6.4 secs to 150s  ECG:  	  RADIOLOGY: < from: CT Abdomen and Pelvis w/ IV Cont (22 @ 13:05) >  ACC: [id number] EXAM:  CT ABDOMEN AND PELVIS IC                          PROCEDURE DATE:  2022      INTERPRETATION:  CLINICAL INFORMATION: Abdominal pain    COMPARISON: CT abdomen pelvis 2021    CONTRAST/COMPLICATIONS:  IV Contrast: Omnipaque 350  80 cc administered   20 cc discarded  Oral Contrast: NONE  Complications: None reported at time of study completion    PROCEDURE:  CT of the Abdomen and Pelvis was performed.  Sagittal and coronal reformats were performed.    FINDINGS:  LOWER CHEST: Bilateral breast implants, with evidence of contained   rupture of the right implant. Cardiomegaly. Small right pleural effusion.   Bibasilar intralobular septal thickening. Subsegmental atelectasis versus   scarring right lower lobe posteromedially.    LIVER: Left hepatic lobe subcentimeter hypodensity too small to   characterize. Several calcified granulomata.  BILE DUCTS: Normal caliber.  GALLBLADDER: Within normal limits.  SPLEEN: Within normal limits.  PANCREAS: Some degree of atrophy  ADRENALS: Within normal limits.  KIDNEYS/URETERS: Bilateral renal cysts. Bilateral extrarenal pelvises.   Contrast is seen within the collecting system. Small nonobstructing left   renal calculus.    BLADDER: Layering contrast material.  REPRODUCTIVE ORGANS: Uterus and adnexa within normal limits.    BOWEL: No bowel obstruction. Appendix is not visualized. Small hiatal   hernia.  PERITONEUM: No ascites.  VESSELS: Atherosclerotic changes.  RETROPERITONEUM/LYMPH NODES: No lymphadenopathy.  ABDOMINAL WALL: Bilateral gluteal injection granulomas.  BONES: Degenerative changes most notably at the L4-5 disc space with   vacuum disc phenomenon.    IMPRESSION:    No acute pathology within the abdomen or pelvis to suggest etiology for   patient's pain.    --- End of Report ---    < end of copied text >  < from: CT Angio Neck w/ IV Cont (22 @ 12:52) >  ACC: [id number] EXAM:  CT BRAIN                        ACC: [id number] EXAM:  CT ANGIO NECK (W)AW IC                        ACC: [id number] EXAM:  CT ANGIO BRAIN (W)AW IC                          PROCEDURE DATE:  2022      INTERPRETATION:  CLINICAL INFORMATION: Dizziness.    TECHNIQUE:  1.  Noncontrast head CT scan was performed reconstructed into 5 mm thick   axial slices.  2.  Contrast enhanced CT angiography of the neck and head was performed.     MIP reformats were generated.    Intravenous contrast: 160 cc of Omnipaque-350 mg/ml were administered,   and 0 cc were discarded.    COMPARISON STUDY: None.    FINDINGS:  NONCONTRAST HEAD CT SCAN:  There is no acute intra-axial or extra axial hemorrhage. There is no mass   effect or shift of the midline. The basal cisterns are not effaced. There   is age appropriate cerebral volume loss with prominence of the ventricles   and sulci. Patchy periventricular and subcortical white matter   hypodensities are nonspecific, although likely dueto mild chronic   microangiopathy. Gray-white matter differentiation is preserved. There is   no CT evidence of an acute, large territorial infarct.    Left maxillary sinus polypoid mucosal thickening. Small mucous retention   cyst versus polyp in the left sphenoid sinus. Bilateral intraocular lens   replacements.    The calvarium and skull base are intact.    CT ANGIOGRAPHY NECK:  Thoracic aorta and branch vessels:  No occlusion, flow limiting stenosis   or dissection. Calcified and noncalcified plaque of the aorta and great   vessel origins.    Right carotid system: No occlusion, hemodynamically significant carotid   stenosis using NASCET criteria or dissection.    Left carotid system: No occlusion, hemodynamically significant carotid   stenosis using NASCET criteria or dissection.    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   Otherwise, patent bilateral vertebral arteries, with severe attenuation   of the distal V3 segment of the left vertebral artery just as it pierces   the dura. Short segment irregularity of the right distal V2 segment at   the C2 level.    Soft tissues of the neck: Enlarged heterogenous thyroid gland..    Visualized spine: Degenerative changes..    Visualized upper chest:  Bilateral bronchiectasis with right apical   consolidation likely representing fibrosis. Additional patchy nonspecific   groundglass opacities in both lung apices. Layering right pleural   effusion. Partially imaged bilateral breast implants.    CT ANGIOGRAPHY BRAIN:  Internal carotid arteries: No occlusion, flow-limiting stenosis or   aneurysm. Atherosclerotic calcifications of the bilateral cavernous and   supraclinoid ICAs.    Anterior cerebral arteries: No occlusion, flow-limiting stenosis or   aneurysm    Middle cerebral arteries: No occlusion, flow-limiting stenosis or   aneurysm.    Anterior communicating artery: Patent without aneurysm.    Posterior communicating arteries: Patent bilaterally without aneurysm.    Posterior cerebral arteries: No occlusion, flow-limiting stenosis or   aneurysm.    Vertebrobasilar: Poor visualization of the left vertebral artery V4   segment, which appears to terminate as the left posterior inferior   cerebellar artery. Mild atherosclerotic calcifications of the  intracranial right V4 segment without significant stenosis.    IMPRESSION:  NONCONTRAST HEAD CT SCAN:  No acute intracranial hemorrhage or mass effect.    CT ANGIOGRAPHY NECK:  Severe attenuation of the distal V3 segment of the left vertebral artery   extending intracranially, which may be due to a combination of congenital   hypoplasia and atherosclerotic disease, although further evaluation MRI   is offered in the absence of any prior outside studies to exclude   dissection.    No hemodynamically significant stenosis of the internal carotid arteries   by NASCET criteria.    CT ANGIOGRAPHY BRAIN:  Poor visualization of the intracranial portion of the left vertebral   artery, which appears to terminate as the left PICA.    Otherwise, no large vessel occlusion, significant stenosis, or saccular   aneurysm.    --- End of Report ---    < end of copied text >  < from: CT Angio Head w/ IV Cont (22 @ 12:51) >  ACC: [id number] EXAM:  CT BRAIN                        ACC: [id number] EXAM:  CT ANGIO NECK (W)AW IC                        ACC: [id number] EXAM:  CT ANGIO BRAIN (W)AW IC                          PROCEDURE DATE:  2022      INTERPRETATION:  CLINICAL INFORMATION: Dizziness.    TECHNIQUE:  1.  Noncontrast head CT scan was performed reconstructed into 5 mm thick   axial slices.  2.  Contrast enhanced CT angiography of the neck and head was performed.     MIP reformats were generated.    Intravenous contrast: 160 cc of Omnipaque-350 mg/ml were administered,   and 0 cc were discarded.    COMPARISON STUDY: None.    FINDINGS:  NONCONTRAST HEAD CT SCAN:  There is no acute intra-axial or extra axial hemorrhage. There is no mass   effect or shift of the midline. The basal cisterns are not effaced. There   is age appropriate cerebral volume loss with prominence of the ventricles   and sulci. Patchy periventricular and subcortical white matter   hypodensities are nonspecific, although likely dueto mild chronic   microangiopathy. Gray-white matter differentiation is preserved. There is   no CT evidence of an acute, large territorial infarct.    Left maxillary sinus polypoid mucosal thickening. Small mucous retention   cyst versus polyp in the left sphenoid sinus. Bilateral intraocular lens   replacements.    The calvarium and skull base are intact.    CT ANGIOGRAPHY NECK:  Thoracic aorta and branch vessels:  No occlusion, flow limiting stenosis   or dissection. Calcified and noncalcified plaque of the aorta and great   vessel origins.    Right carotid system: No occlusion, hemodynamically significant carotid   stenosis using NASCET criteria or dissection.    Left carotid system: No occlusion, hemodynamically significant carotid   stenosis using NASCET criteria or dissection.    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   Otherwise, patent bilateral vertebral arteries, with severe attenuation   of the distal V3 segment of the left vertebral artery just as it pierces   the dura. Short segment irregularity of the right distal V2 segment at   the C2 level.    Soft tissues of the neck: Enlarged heterogenous thyroid gland..    Visualized spine: Degenerative changes..    Visualized upper chest:  Bilateral bronchiectasis with right apical   consolidation likely representing fibrosis. Additional patchy nonspecific   groundglass opacities in both lung apices. Layering right pleural   effusion. Partially imaged bilateral breast implants.    CT ANGIOGRAPHY BRAIN:  Internal carotid arteries: No occlusion, flow-limiting stenosis or   aneurysm. Atherosclerotic calcifications of the bilateral cavernous and   supraclinoid ICAs.    Anterior cerebral arteries: No occlusion, flow-limiting stenosis or   aneurysm    Middle cerebral arteries: No occlusion, flow-limiting stenosis or   aneurysm.    Anterior communicating artery: Patent without aneurysm.    Posterior communicating arteries: Patent bilaterally without aneurysm.    Posterior cerebral arteries: No occlusion, flow-limiting stenosis or   aneurysm.    Vertebrobasilar: Poor visualization of the left vertebral artery V4   segment, which appears to terminate as the left posterior inferior   cerebellar artery. Mild atherosclerotic calcifications of the  intracranial right V4 segment without significant stenosis.    IMPRESSION:  NONCONTRAST HEAD CT SCAN:  No acute intracranial hemorrhage or mass effect.    CT ANGIOGRAPHY NECK:  Severe attenuation of the distal V3 segment of the left vertebral artery   extending intracranially, which may be due to a combination of congenital   hypoplasia and atherosclerotic disease, although further evaluation MRI   is offered in the absence of any prior outside studies to exclude   dissection.    No hemodynamically significant stenosis of the internal carotid arteries   by NASCET criteria.    CT ANGIOGRAPHY BRAIN:  Poor visualization of the intracranial portion of the left vertebral   artery, which appears to terminate as the left PICA.    Otherwise, no large vessel occlusion, significant stenosis, or saccular   aneurysm.    --- End of Report ---    < end of copied text >    OTHER: 	  	  LABS:	 	    CARDIAC MARKERS: Troponin T, High Sensitivity Result: 8: Specimen not hemolyzed                      11.6   1.99  )-----------( 173      ( 2022 05:13 )             36.1         139  |  105  |  13  ----------------------------<  81  3.4<L>   |  23  |  0.64    Ca    8.7      2022 05:13  Phos  3.5       Mg     2.0         TPro  9.0<H>  /  Alb  3.7  /  TBili  0.5  /  DBili  x   /  AST  40  /  ALT  15  /  AlkPhos  71      proBNP:   Lipid Profile: Lipid Profile (22 @ 05:13)   Cholesterol, Serum: 151 mg/dL   Triglycerides, Serum: 59 mg/dL   HDL Cholesterol, Serum: 50 mg/dL   Non HDL Cholesterol: 100 LDL Cholesterol Calculated: 89 mg/dL (22 @ 05:13)   HgA1c: A1C with Estimated Average Glucose Result: 5.4: Method: Immunoassay    TSH:

## 2022-11-21 NOTE — CONSULT NOTE ADULT - PROBLEM SELECTOR RECOMMENDATION 9
CTA shows severe attenuation of the distal V3 segment of the left vertebral artery  ASA/Plavix/Statin  pending TTE  monitor on tele   neuro checks  management as per stroke team

## 2022-11-21 NOTE — DISCHARGE NOTE NURSING/CASE MANAGEMENT/SOCIAL WORK - PATIENT PORTAL LINK FT
You can access the FollowMyHealth Patient Portal offered by [facility] by registering at the following website: [email or web address]. By joining [email or web address]’s FollowMyHealth portal, you will also be able to view your health information using other applications (apps) compatible with our system.

## 2022-11-21 NOTE — DISCHARGE NOTE PROVIDER - NSDCCPCAREPLAN_GEN_ALL_CORE_FT
PRINCIPAL DISCHARGE DIAGNOSIS  Diagnosis: Peripheral vertigo  Assessment and Plan of Treatment: Please follow up with neurologist in 1 week. The office will call you to schedule an appointment, if you do not hear from them please call [phone]. Continue taking medications as prescribed. If you were prescribed a statin for your cholesterol please make sure you have your liver enzymes checked with your primary care physician. Monitor your blood pressure. Reduce fat, cholesterol and salt in your diet. Increase intake of fruits and vegetables. Limit alcohol to minimum and do not smoke. You may be at risk for falling, make changes to your home to help you walk easier. Keep up to date on vaccinations.  If you experience any symptoms of facial drooping, slurred speech, arm or leg weakness, severe headache, vision changes or any worsening symptoms, notify provider immediately and return to ER.

## 2022-11-21 NOTE — DISCHARGE NOTE PROVIDER - NSDCMRMEDTOKEN_GEN_ALL_CORE_FT
aspirin 81 mg oral tablet, chewable: 1 tab(s) orally once a day  Calcium 500+D oral tablet, chewable: 1 tab(s) orally 2 times a day  ibandronate 150 mg oral tablet: 1 tab(s) orally once a month  metoprolol succinate 25 mg oral tablet, extended release: 1 tab(s) orally once a day  Multiple Vitamins oral tablet: 1 tab(s) orally once a day  outpatient occupational therapy: outpatient occupational therapy    diagnosis: peripheral vertigo  outpatient physical therapy: outpatient physical therapy    diagnosis: peripheral vertigo  outpatient vestibular therapy: outpatient vestibular therapy    diagnosis: stroke  pantoprazole 40 mg oral delayed release tablet: 1 tab(s) orally 2 times a day  Senna 8.6 mg oral tablet: 1 tab(s) orally once a day (at bedtime)

## 2022-11-21 NOTE — CONSULT NOTE ADULT - PROBLEM SELECTOR RECOMMENDATION 2
hx of palpitations, just finished wearing holter monitor x2 wks    - was started on metoprolol?  tele with PACs, frequent PAT x3secs, SVT x6secs to 150s  restart toprol 25mg PO daily  monitor on tele

## 2022-12-01 ENCOUNTER — NON-APPOINTMENT (OUTPATIENT)
Age: 87
End: 2022-12-01

## 2022-12-09 ENCOUNTER — RX RENEWAL (OUTPATIENT)
Age: 87
End: 2022-12-09

## 2022-12-16 ENCOUNTER — RX RENEWAL (OUTPATIENT)
Age: 87
End: 2022-12-16

## 2022-12-16 RX ORDER — VERAPAMIL HYDROCHLORIDE 120 MG/1
120 CAPSULE, EXTENDED RELEASE ORAL
Qty: 90 | Refills: 3 | Status: DISCONTINUED | COMMUNITY
Start: 2022-04-06 | End: 2022-12-16

## 2022-12-29 ENCOUNTER — NON-APPOINTMENT (OUTPATIENT)
Age: 87
End: 2022-12-29

## 2022-12-29 ENCOUNTER — APPOINTMENT (OUTPATIENT)
Dept: CARDIOLOGY | Facility: CLINIC | Age: 87
End: 2022-12-29
Payer: MEDICARE

## 2022-12-29 VITALS — DIASTOLIC BLOOD PRESSURE: 66 MMHG | SYSTOLIC BLOOD PRESSURE: 112 MMHG

## 2022-12-29 VITALS
WEIGHT: 144 LBS | DIASTOLIC BLOOD PRESSURE: 64 MMHG | BODY MASS INDEX: 24.72 KG/M2 | OXYGEN SATURATION: 95 % | SYSTOLIC BLOOD PRESSURE: 110 MMHG | HEART RATE: 68 BPM

## 2022-12-29 DIAGNOSIS — U07.1 COVID-19: ICD-10-CM

## 2022-12-29 DIAGNOSIS — R60.9 EDEMA, UNSPECIFIED: ICD-10-CM

## 2022-12-29 PROCEDURE — 93000 ELECTROCARDIOGRAM COMPLETE: CPT

## 2022-12-29 PROCEDURE — 93040 RHYTHM ECG WITH REPORT: CPT | Mod: 59

## 2022-12-29 PROCEDURE — 99214 OFFICE O/P EST MOD 30 MIN: CPT | Mod: CS

## 2023-01-02 ENCOUNTER — NON-APPOINTMENT (OUTPATIENT)
Age: 88
End: 2023-01-02

## 2023-02-04 NOTE — ED ADULT NURSE NOTE - NS ED NOTE  TALK SOMEONE YN
No Detail Level: Simple Number Of Curettages: 3 Size Of Lesion In Cm: 1 Size Of Lesion After Curettage: 1.4 Add Intralesional Injection: No Anesthesia Type: 1% lidocaine with epinephrine Cautery Type: cryotherapy What Was Performed First?: Curettage Final Size Statement: The size of the lesion after curettage was Additional Information: (Optional): The wound was cleaned, and a pressure dressing was applied.  The patient received detailed post-op instructions. Consent was obtained from the patient. The risks, benefits and alternatives to therapy were discussed in detail. Specifically, the risks of infection, scarring, bleeding, prolonged wound healing, nerve injury, incomplete removal, allergy to anesthesia and recurrence were addressed. Alternatives to ED&C, such as: surgical removal and XRT were also discussed.  Prior to the procedure, the treatment site was clearly identified and confirmed by the patient. All components of Universal Protocol/PAUSE Rule completed. Post-Care Instructions: I reviewed with the patient in detail post-care instructions. Patient is to keep the area dry for 48 hours, and not to engage in any swimming until the area is healed. Should the patient develop any fevers, chills, bleeding, severe pain patient will contact the office immediately. Bill As A Line Item Or As Units: Line Item

## 2023-03-20 ENCOUNTER — RX RENEWAL (OUTPATIENT)
Age: 88
End: 2023-03-20

## 2023-03-28 NOTE — PHYSICAL THERAPY INITIAL EVALUATION ADULT - LEVEL OF INDEPENDENCE: STAND/SIT, REHAB EVAL
Anesthesia Transfer of Care Note    Patient: Micaela Hernandez    Procedure(s) Performed: Procedure(s) (LRB):  HYSTERECTOMY, TOTAL, LAPAROSCOPIC (N/A)  POSSIBLE HYSTERECTOMY, TOTAL, ABDOMINAL (N/A)    Patient location: PACU    Anesthesia Type: general    Transport from OR: Transported from OR on room air with adequate spontaneous ventilation    Post assessment: no apparent anesthetic complications    Post vital signs: stable    Level of consciousness: awake    Nausea/Vomiting: no nausea/vomiting    Complications: none    Transfer of care protocol was followed      Last vitals:   Visit Vitals  BP (!) 148/66   Pulse 80   Temp 36.4 °C (97.5 °F) (Oral)   Resp (!) 21   Wt 134 kg (295 lb 6 oz)   LMP 02/27/2023 (Approximate)   SpO2 100%   Breastfeeding No   BMI 52.32 kg/m²     
contact guard

## 2023-05-12 ENCOUNTER — NON-APPOINTMENT (OUTPATIENT)
Age: 88
End: 2023-05-12

## 2023-05-12 ENCOUNTER — APPOINTMENT (OUTPATIENT)
Dept: CARDIOLOGY | Facility: CLINIC | Age: 88
End: 2023-05-12
Payer: MEDICARE

## 2023-05-12 VITALS
HEART RATE: 67 BPM | OXYGEN SATURATION: 95 % | DIASTOLIC BLOOD PRESSURE: 64 MMHG | WEIGHT: 131 LBS | BODY MASS INDEX: 22.49 KG/M2 | SYSTOLIC BLOOD PRESSURE: 140 MMHG

## 2023-05-12 DIAGNOSIS — I34.0 NONRHEUMATIC MITRAL (VALVE) INSUFFICIENCY: ICD-10-CM

## 2023-05-12 DIAGNOSIS — R00.2 PALPITATIONS: ICD-10-CM

## 2023-05-12 PROCEDURE — 93000 ELECTROCARDIOGRAM COMPLETE: CPT

## 2023-05-12 PROCEDURE — 93040 RHYTHM ECG WITH REPORT: CPT | Mod: 59

## 2023-05-12 PROCEDURE — 99214 OFFICE O/P EST MOD 30 MIN: CPT

## 2023-05-21 ENCOUNTER — RX RENEWAL (OUTPATIENT)
Age: 88
End: 2023-05-21

## 2023-05-30 ENCOUNTER — RESULT CHARGE (OUTPATIENT)
Age: 88
End: 2023-05-30

## 2023-05-31 PROBLEM — R00.2 PALPITATIONS: Status: ACTIVE | Noted: 2022-10-27

## 2023-05-31 PROBLEM — I34.0 MITRAL REGURGITATION: Status: ACTIVE | Noted: 2022-03-14

## 2023-06-08 ENCOUNTER — APPOINTMENT (OUTPATIENT)
Dept: GASTROENTEROLOGY | Facility: CLINIC | Age: 88
End: 2023-06-08
Payer: MEDICARE

## 2023-06-08 ENCOUNTER — LABORATORY RESULT (OUTPATIENT)
Age: 88
End: 2023-06-08

## 2023-06-08 VITALS
BODY MASS INDEX: 22.15 KG/M2 | HEIGHT: 63 IN | DIASTOLIC BLOOD PRESSURE: 81 MMHG | RESPIRATION RATE: 18 BRPM | WEIGHT: 125 LBS | SYSTOLIC BLOOD PRESSURE: 127 MMHG | OXYGEN SATURATION: 96 % | HEART RATE: 78 BPM

## 2023-06-08 PROCEDURE — 36415 COLL VENOUS BLD VENIPUNCTURE: CPT

## 2023-06-08 PROCEDURE — 99214 OFFICE O/P EST MOD 30 MIN: CPT | Mod: 25

## 2023-06-13 ENCOUNTER — NON-APPOINTMENT (OUTPATIENT)
Age: 88
End: 2023-06-13

## 2023-06-14 LAB
ALBUMIN MFR SERPL ELPH: 41.6 %
ALBUMIN SERPL ELPH-MCNC: 3.7 G/DL
ALBUMIN SERPL-MCNC: 3.6 G/DL
ALBUMIN/GLOB SERPL: 0.7 RATIO
ALP BLD-CCNC: 65 U/L
ALPHA1 GLOB MFR SERPL ELPH: 3.7 %
ALPHA1 GLOB SERPL ELPH-MCNC: 0.3 G/DL
ALPHA2 GLOB MFR SERPL ELPH: 8.7 %
ALPHA2 GLOB SERPL ELPH-MCNC: 0.7 G/DL
ALT SERPL-CCNC: 15 U/L
AMYLASE/CREAT SERPL: 76 U/L
ANION GAP SERPL CALC-SCNC: 13 MMOL/L
AST SERPL-CCNC: 44 U/L
B-GLOBULIN MFR SERPL ELPH: 17.9 %
B-GLOBULIN SERPL ELPH-MCNC: 1.5 G/DL
BILIRUB SERPL-MCNC: 0.4 MG/DL
BUN SERPL-MCNC: 22 MG/DL
CALCIUM SERPL-MCNC: 9.3 MG/DL
CHLORIDE SERPL-SCNC: 102 MMOL/L
CO2 SERPL-SCNC: 24 MMOL/L
CREAT SERPL-MCNC: 1.02 MG/DL
DEPRECATED KAPPA LC FREE/LAMBDA SER: 1.5 RATIO
EGFR: 54 ML/MIN/1.73M2
ESTIMATED AVERAGE GLUCOSE: 114 MG/DL
GAMMA GLOB FLD ELPH-MCNC: 2.4 G/DL
GAMMA GLOB MFR SERPL ELPH: 28.1 %
GLUCOSE SERPL-MCNC: 98 MG/DL
HBA1C MFR BLD HPLC: 5.6 %
IGA SER QL IEP: 1071 MG/DL
IGG SER QL IEP: 2365 MG/DL
IGM SER QL IEP: 176 MG/DL
INTERPRETATION SERPL IEP-IMP: NORMAL
KAPPA LC CSF-MCNC: 6.59 MG/DL
KAPPA LC SERPL-MCNC: 9.86 MG/DL
LPL SERPL-CCNC: 33 U/L
M PROTEIN MFR SERPL ELPH: 3.5 %
M PROTEIN SPEC IFE-MCNC: NORMAL
MONOCLON BAND OBS SERPL: 0.3 G/DL
POTASSIUM SERPL-SCNC: 5.3 MMOL/L
PROT SERPL-MCNC: 8.6 G/DL
SODIUM SERPL-SCNC: 139 MMOL/L
T4 FREE SERPL-MCNC: 1.5 NG/DL
TSH SERPL-ACNC: 2.8 UIU/ML

## 2023-06-16 ENCOUNTER — OUTPATIENT (OUTPATIENT)
Dept: OUTPATIENT SERVICES | Facility: HOSPITAL | Age: 88
LOS: 1 days | Discharge: HOME | End: 2023-06-16

## 2023-06-16 DIAGNOSIS — D47.2 MONOCLONAL GAMMOPATHY: ICD-10-CM

## 2023-06-16 DIAGNOSIS — D69.6 THROMBOCYTOPENIA, UNSPECIFIED: ICD-10-CM

## 2023-06-16 DIAGNOSIS — D70.9 NEUTROPENIA, UNSPECIFIED: ICD-10-CM

## 2023-06-16 DIAGNOSIS — Z98.49 CATARACT EXTRACTION STATUS, UNSPECIFIED EYE: Chronic | ICD-10-CM

## 2023-06-16 DIAGNOSIS — Z90.13 ACQUIRED ABSENCE OF BILATERAL BREASTS AND NIPPLES: Chronic | ICD-10-CM

## 2023-06-19 ENCOUNTER — LABORATORY RESULT (OUTPATIENT)
Age: 88
End: 2023-06-19

## 2023-06-19 ENCOUNTER — RESULT REVIEW (OUTPATIENT)
Age: 88
End: 2023-06-19

## 2023-06-19 ENCOUNTER — APPOINTMENT (OUTPATIENT)
Age: 88
End: 2023-06-19
Payer: MEDICARE

## 2023-06-19 VITALS
SYSTOLIC BLOOD PRESSURE: 159 MMHG | WEIGHT: 131.59 LBS | DIASTOLIC BLOOD PRESSURE: 72 MMHG | HEART RATE: 66 BPM | RESPIRATION RATE: 17 BRPM | OXYGEN SATURATION: 97 % | BODY MASS INDEX: 23.32 KG/M2 | HEIGHT: 62.99 IN

## 2023-06-19 LAB
BASOPHILS # BLD AUTO: 0 K/UL — SIGNIFICANT CHANGE UP (ref 0–0.2)
BASOPHILS NFR BLD AUTO: 0 % — SIGNIFICANT CHANGE UP (ref 0–2)
EOSINOPHIL # BLD AUTO: 0.03 K/UL — SIGNIFICANT CHANGE UP (ref 0–0.5)
EOSINOPHIL NFR BLD AUTO: 1 % — SIGNIFICANT CHANGE UP (ref 0–6)
ERYTHROCYTE [SEDIMENTATION RATE] IN BLOOD: 58 MM/HR — HIGH (ref 0–20)
HCT VFR BLD CALC: 37.7 % — SIGNIFICANT CHANGE UP (ref 34.5–45)
HGB BLD-MCNC: 11.9 G/DL — SIGNIFICANT CHANGE UP (ref 11.5–15.5)
LYMPHOCYTES # BLD AUTO: 0.85 K/UL — LOW (ref 1–3.3)
LYMPHOCYTES # BLD AUTO: 31 % — SIGNIFICANT CHANGE UP (ref 13–44)
MCHC RBC-ENTMCNC: 28.4 PG — SIGNIFICANT CHANGE UP (ref 27–34)
MCHC RBC-ENTMCNC: 31.6 G/DL — LOW (ref 32–36)
MCV RBC AUTO: 90 FL — SIGNIFICANT CHANGE UP (ref 80–100)
MONOCYTES # BLD AUTO: 0.47 K/UL — SIGNIFICANT CHANGE UP (ref 0–0.9)
MONOCYTES NFR BLD AUTO: 17 % — HIGH (ref 2–14)
NEUTROPHILS # BLD AUTO: 1.4 K/UL — LOW (ref 1.8–7.4)
NEUTROPHILS NFR BLD AUTO: 51 % — SIGNIFICANT CHANGE UP (ref 43–77)
NRBC # BLD: 0 /100 — SIGNIFICANT CHANGE UP (ref 0–0)
NRBC # BLD: SIGNIFICANT CHANGE UP /100 WBCS (ref 0–0)
PLAT MORPH BLD: NORMAL — SIGNIFICANT CHANGE UP
PLATELET # BLD AUTO: 181 K/UL — SIGNIFICANT CHANGE UP (ref 150–400)
RBC # BLD: 4.19 M/UL — SIGNIFICANT CHANGE UP (ref 3.8–5.2)
RBC # FLD: 14.6 % — HIGH (ref 10.3–14.5)
RBC BLD AUTO: SIGNIFICANT CHANGE UP
WBC # BLD: 2.75 K/UL — LOW (ref 3.8–10.5)
WBC # FLD AUTO: 2.75 K/UL — LOW (ref 3.8–10.5)

## 2023-06-19 PROCEDURE — 99205 OFFICE O/P NEW HI 60 MIN: CPT

## 2023-06-19 NOTE — ASSESSMENT
[FreeTextEntry1] : Ms. GRAHAM 's questions were answered to her satisfaction. \par She  expressed her  understanding and willingness to comply with the above recommendations, and  will return to the office in 2-3 weeks.\par \par \par \par \par

## 2023-06-19 NOTE — HISTORY OF PRESENT ILLNESS
[de-identified] : 88F, non-smoker PMHx tachycardia , GERD, osteoarthritis/osteoporosis, breast cancer, sciatica, insomnia, s/p cataract surgery, referred for hematologic consultation of chronic neutropenia. \par Ms. GRAHAM was hospitalized in 2022 with sudden onset nausea ,vomiting and dizziness; physical exam showed no focal neurologic deficit.  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  \par She was diagnosed with peripheral vertigo and was recommended vestibular therapy.  She is compliant with aspirin; her medication list includes metoprolol, ibandronate, zolpidem, pantoprazole and vitamin supplements.  A CT A/P from 2022 showed no acute pathology within the abdomen, chest or pelvis.\par Physical examination is remarkable for a patient younger than stated age, with bilateral mastectomy and silicone breast reconstruction, wearing hearing aids\par Patient's chief complaints are weight loss (approximately 10 pounds in the past 4-month), fatigue, occasional vertigo, and lack of energy\par Patient gives family history of; endorses no pertinent family history of hematologic disorders.  Her mother  at age 49 of a stroke; father  at age 81 and had tuberculosis laboratory values consistent with leukopenia since at least , with WBC fluctuant between 1.6 and 3.3K, with high percentage of monocytes around 30%, and an ANC fluctuant between 600 and 1,200 .  Her platelet count has also been fluctuant close to lower range of normal between 115 and 130,000 for the past 2 years.  Patient denies recent traveling or close contact with family members.  She had bone marrow studies in the past for neutropenia, but was told they were unrevealing, hence she interrupted regular hematologic follow-up.\par Patient is of [employer] descent, , has 3 children, denies tobacco or alcohol use, she is retired.  Here accompanied by daughter, Teresita.\par \par \par  [FreeTextEntry1] : expectant surveillance\par \par

## 2023-06-19 NOTE — REASON FOR VISIT
[Initial Consultation] : an initial consultation for [Family Member] : family member [FreeTextEntry2] : neutropenia; monocytosis.

## 2023-06-19 NOTE — CONSULT LETTER
[Dear  ___] : Dear  [unfilled], [Consult Letter:] : I had the pleasure of evaluating your patient, [unfilled]. [Please see my note below.] : Please see my note below. [Consult Closing:] : Thank you very much for allowing me to participate in the care of this patient.  If you have any questions, please do not hesitate to contact me. [Sincerely,] : Sincerely, [FreeTextEntry2] : Andry Gomez MD [FreeTextEntry3] : YASMEEN DOUGLASS M.D.\par Hematology/ Oncology\par [facility] \par [facility]\par [address]\par [place], [place] [address]\par Telephone: [phone]\par Fax: [phone]\par \par \par \par \par \par

## 2023-06-25 ENCOUNTER — RX RENEWAL (OUTPATIENT)
Age: 88
End: 2023-06-25

## 2023-06-26 ENCOUNTER — RESULT REVIEW (OUTPATIENT)
Age: 88
End: 2023-06-26

## 2023-06-26 ENCOUNTER — APPOINTMENT (OUTPATIENT)
Age: 88
End: 2023-06-26
Payer: MEDICARE

## 2023-06-26 ENCOUNTER — LABORATORY RESULT (OUTPATIENT)
Age: 88
End: 2023-06-26

## 2023-06-26 VITALS
BODY MASS INDEX: 23.24 KG/M2 | WEIGHT: 131.15 LBS | RESPIRATION RATE: 16 BRPM | TEMPERATURE: 98 F | HEART RATE: 60 BPM | SYSTOLIC BLOOD PRESSURE: 150 MMHG | OXYGEN SATURATION: 96 % | DIASTOLIC BLOOD PRESSURE: 67 MMHG

## 2023-06-26 DIAGNOSIS — D70.4 CYCLIC NEUTROPENIA: ICD-10-CM

## 2023-06-26 LAB
BASOPHILS # BLD AUTO: 0.03 K/UL — SIGNIFICANT CHANGE UP (ref 0–0.2)
BASOPHILS NFR BLD AUTO: 1.2 % — SIGNIFICANT CHANGE UP (ref 0–2)
EOSINOPHIL # BLD AUTO: 0.04 K/UL — SIGNIFICANT CHANGE UP (ref 0–0.5)
EOSINOPHIL NFR BLD AUTO: 1.6 % — SIGNIFICANT CHANGE UP (ref 0–6)
HCT VFR BLD CALC: 35 % — SIGNIFICANT CHANGE UP (ref 34.5–45)
HGB BLD-MCNC: 11.3 G/DL — LOW (ref 11.5–15.5)
IMM GRANULOCYTES NFR BLD AUTO: 0.4 % — SIGNIFICANT CHANGE UP (ref 0–0.9)
LYMPHOCYTES # BLD AUTO: 0.92 K/UL — LOW (ref 1–3.3)
LYMPHOCYTES # BLD AUTO: 35.9 % — SIGNIFICANT CHANGE UP (ref 13–44)
MCHC RBC-ENTMCNC: 28.5 PG — SIGNIFICANT CHANGE UP (ref 27–34)
MCHC RBC-ENTMCNC: 32.3 G/DL — SIGNIFICANT CHANGE UP (ref 32–36)
MCV RBC AUTO: 88.2 FL — SIGNIFICANT CHANGE UP (ref 80–100)
MONOCYTES # BLD AUTO: 0.65 K/UL — SIGNIFICANT CHANGE UP (ref 0–0.9)
MONOCYTES NFR BLD AUTO: 25.4 % — HIGH (ref 2–14)
NEUTROPHILS # BLD AUTO: 0.91 K/UL — LOW (ref 1.8–7.4)
NEUTROPHILS NFR BLD AUTO: 35.5 % — LOW (ref 43–77)
NRBC # BLD: 0 /100 WBCS — SIGNIFICANT CHANGE UP (ref 0–0)
PLATELET # BLD AUTO: 144 K/UL — LOW (ref 150–400)
RBC # BLD: 3.97 M/UL — SIGNIFICANT CHANGE UP (ref 3.8–5.2)
RBC # FLD: 14.2 % — SIGNIFICANT CHANGE UP (ref 10.3–14.5)
WBC # BLD: 2.56 K/UL — LOW (ref 3.8–10.5)
WBC # FLD AUTO: 2.56 K/UL — LOW (ref 3.8–10.5)

## 2023-06-26 PROCEDURE — 38222 DX BONE MARROW BX & ASPIR: CPT

## 2023-06-26 NOTE — PROCEDURE
[Bone Marrow Biopsy] : bone marrow biopsy [Bone Marrow Aspiration] : bone marrow aspiration  [Patient] : the patient [Verbal Consent Obtained] : verbal consent was obtained prior to the procedure [Patient identification verified] : patient identification verified [Procedure verified and consent obtained] : procedure verified and consent obtained [Laterality verified and correct site marked] : laterality verified and correct site marked [Left] : site: left [Correct positioning] : correct positioning [Prone] : prone [Superior iliac spine was identified] : the superior iliac spine was identified. [The left posterior iliac crest was prepped with betadine and draped, using sterile technique.] : The left posterior iliac crest was prepped with betadine and draped, using sterile technique. [Lidocaine was injected and into the periosteum overlying the site.] : Lidocaine was injected and into the periosteum overlying the site. [Aspirate] : aspirate [Cytogenetics] : cytogenetics [FISH] : FISH [Biopsy] : biopsy [Flow Cytometry] : flow cytometry [] : The patient was instructed to remove the bandage the following AM. The patient may bathe. Acetaminophen may be taken for discomfort, as per package directions.If there are any other problems, the patient was instructed to call the office. The patient verbalized understanding, and is aware of the office contact numbers. [FreeTextEntry1] : neutropenia [FreeTextEntry2] : 10 cc of lidocaine was used for the procedure. \par \par WBC: 2.56\par Hgb: 11.3\par Hct: 35.0\par Plts: 144\par \par Bone marrow aspiration and biopsy were done. MDS and CMMoL panel requested.\par

## 2023-07-07 NOTE — ED PROVIDER NOTE - PROGRESS NOTE ADDITIONAL2
Occupational Therapy Progress Note/ Updated Plan of Care     Name: Maxx Castro  Clinic Number: [id number]    Therapy Diagnosis:   Encounter Diagnoses   Name Primary?    At moderate risk for impaired skin integrity Yes    PVD (peripheral vascular disease)     Venous stasis dermatitis of both lower extremities      Physician: Ramirez Cardenas NP    Visit Date: 7/7/2023  Physician Orders: Evaluation and treatment  Medical Diagnosis: I89.0 (ICD-10-CM) - Lymphedema  Evaluation Date: 1/12/2023  Insurance Authorization period Expiration: 01/12/2022-01/06/2025  Plan of Care Certification Period: 04/12/2023-07/12/2023     Visit # / Visits Authortized: 21/99  Time In: 7:30  Time Out: 8:30  Total Billable Time: 60 minutes- Manual therapy     Precautions: Standard  Subjective     Patient reports: He reports that the skin of his legs is feeling thicker.  He has had a couple of scrapes on the legs and in the past they would weep; however, this time they healed quickly without weeping.  He was compliant with the compression bandage wearing schedule.  Functional change: none    Pain: 2/10  Location: bilateral lower legs     Objective     Response to previous treatment: He had a visible and measurable reduction of swelling in Bilateral lower extremities.  There is an area of darkened skin that is becoming more mobile on the anterior surface of the lower legs.  The soft tissue restriction is loosing on the mid lower leg.  The skin is not as dry and flaky and is more substantial.  The skin color is lightening. Hair growth is returning on the anterior surface of the lower legs.  Treatment:   Maxx received the following manual therapy techniques:- Manual Lymphatic Drainage was performed and compression bandages and kinesio tape was applied to the: Bilateral lower extremities for 60 minutes.    MANUAL LYMPHATIC DRAINAGE:    While seated with both legs in a dependent position drainage of entire Lower Extremities especially lower leg, ankle, and  foot with return proximally,  Use of Aquaphor due to dryness.   Educated in self massage to abdominal areas, Both inguinal areas, thigh, and remaining Lower extremities within reach.  Moisturizer was applied to Bilateral lower extremities.    Kinesio tape was applied to the Bilateral lower extremities in a basketweave application with 25% tension from proximal to distal on the medial and lateral surface to increase lymph uptake and direct lymph flow.     Accupressure applied to the malleolus and flexion and extension and rotations on Bilateral lower extremities.    He was placed into the compression stockings of 20-30 mmHG and shown how to wili using a folding technique and gloves.      Home Exercises Provided and Patient Education Provided     Education provided:      PATIENT/FAMILY Education: bandaging wear schedule,  Home Exercise Program,  Beginning of self massage,  Self or assisted bandaging, compression options, and Risk reduction    Written Home Exercises Provided: Don demonstrated good  understanding of the education provided.       Lower Extremity Girth Measurements (in centimeters)  LANDMARK  Right Lower Extremity  Left Lower Extremity    Superior border of the Patella +10 65.5 65.5   Knee 55 53.5   40 cm  48 49   30 cm 50 51   20 cm  39.5 37   10 cm  30 30   Malleolus 29.7 30.5   Ankle- heel to dorsum of foot 34.5 36   Arch 25.8 25.5   Total Girth Measurement  378 378   Total Girth Difference 0.3     Total Girth Reduction  7.8  7.5   Bilateral Limb Involvement  Moderate- 10-cm-15 cm TGD or < 5 cm GGD  Moderate/Severe- 15.1-20 cm TGD or 5.1 cm -10 cm GGD  Severe => 20 cm TGD or >10 cm GGD  Assessment   The legs were visibly and measurably smaller. There is some soft tissue restriction on Bilateral lower extremities anterior surface that is preventing good lymph drainage.  Kinesio tape is being used as a manual technique to increase skin mobility, lymph uptake and direct lymph flow.  He tolerated Manual  lymph drainage without an issue.  He reports that normally if he bumps his leg and it causes a small sore that it used to weep from the sore and not coagulate but this time it did not weep.  He now has hair growing on her Left lower extremity. Skin integrity is improving.  He was measured for compression stockings and provided with ordering information. He has received his compression stockings. He was shown how to wili the compression stockings using a folding technique and gloves.  He has had a reduction of swelling in Bilateral lower extremities and increase in skin integrity.  He is now ready to be placed on hold for 4 weeks to allow him to independently manage his condition.  He will return in 4 weeks for a follow up reassessment.     Maxx Is progressing well towards his goals.   Patient prognosis is Good.     Patient will continue to benefit from skilled outpatient occupational therapy to address the deficits listed in the problem list box on initial evaluation, provide pt/family education and to maximize pt's level of independence in the home and community environment.     Patient's spiritual, cultural and educational needs considered and pt agreeable to plan of care and goals.     Anticipated barriers to occupational therapy: limited lower body reach    Goals:      Short Term Goals: (4 weeks)  Met   Patient to be Independent and compliant with Home Exercise Program to increase lymphatic flow.  Met   Decrease girth in Bilateral lower extremities by 8 cm for improved functional use of Bilateral Lower Extremities.  Met  Patient will demonstrate 100% knowledge of lymphedema precautions and signs of infection.   Met   Patient will perform self-bandaging techniques.  Met   Patient will perform self lymph drainage techniques to increase lymph uptake and direct lymph flow.  Met   Patient will tolerate daily activities with multilayered bandaging or compression garment.  In Progress  Skin integrity improve to Good,  skin will be superficially hydrated, fungal infection will resolve, color will fade to pink and temperature will be room temperature.     Long Term Goals: (8 weeks)  In Progress  Decrease girth in Bilateral lower extremities by 12 cm for improved functional use of Bilateral lower extremities.  Met   Patient will show reduction in girth to mild or less with improved contour of limb.  Met   Patient to wili/doff compression garment with daily compliance.   In Progress  Patient will demonstrate the ability to independently manage condition by discharge.     Plan      Lymphedema therapy will be placed on hold for 4 weeks to allow him to independently manage his condition.  He will return in 4 weeks for a follow up reassessment.      MIHIR Saeed, MARIPOSAT                             Additional Progress Note...

## 2023-07-10 ENCOUNTER — APPOINTMENT (OUTPATIENT)
Age: 88
End: 2023-07-10
Payer: MEDICARE

## 2023-07-10 DIAGNOSIS — D72.819 DECREASED WHITE BLOOD CELL COUNT, UNSPECIFIED: ICD-10-CM

## 2023-07-10 PROCEDURE — 99215 OFFICE O/P EST HI 40 MIN: CPT

## 2023-07-10 NOTE — HISTORY OF PRESENT ILLNESS
[de-identified] : 88F, non-smoker PMHx tachycardia , GERD, osteoarthritis/osteoporosis, breast cancer, sciatica, insomnia, s/p cataract surgery, referred for hematologic consultation of chronic neutropenia. 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  Lymphocyte immunophenotypic findings show no diagnostic abnormalities\par \par \par  [FreeTextEntry1] : expectant surveillance\par \par  [de-identified] : Patient returning for follow-up to discuss results of bone marrow studies from 6/26/2023.  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  She tolerated procedure well with no residual local discomfort.\par Hematologic profile consistent with mild thrombocytopenia and WBC ~2.5K; no reported infections.\par Accompanied by daughter, Teresita.

## 2023-07-10 NOTE — ASSESSMENT
[FreeTextEntry1] : Ms. GRAHAM 's questions were answered to her satisfaction. \par She  expressed her  understanding and willingness to comply with the above recommendations, and  will return to the office in 6 months.\par \par

## 2023-07-19 RX ORDER — HYDROCORTISONE ACETATE, PRAMOXINE HCL 2.5; 1 G/100G; G/100G
2.5-1 CREAM TOPICAL
Qty: 30 | Refills: 2 | Status: ACTIVE | COMMUNITY
Start: 2021-08-20 | End: 1900-01-01

## 2023-08-13 ENCOUNTER — RX RENEWAL (OUTPATIENT)
Age: 88
End: 2023-08-13

## 2023-09-13 ENCOUNTER — RX RENEWAL (OUTPATIENT)
Age: 88
End: 2023-09-13

## 2023-10-12 ENCOUNTER — APPOINTMENT (OUTPATIENT)
Dept: CARDIOLOGY | Facility: CLINIC | Age: 88
End: 2023-10-12
Payer: MEDICARE

## 2023-10-12 VITALS
WEIGHT: 130 LBS | DIASTOLIC BLOOD PRESSURE: 72 MMHG | SYSTOLIC BLOOD PRESSURE: 140 MMHG | BODY MASS INDEX: 23.04 KG/M2 | HEART RATE: 66 BPM | OXYGEN SATURATION: 95 %

## 2023-10-12 DIAGNOSIS — Q24.8 OTHER SPECIFIED CONGENITAL MALFORMATIONS OF HEART: ICD-10-CM

## 2023-10-12 DIAGNOSIS — R07.9 CHEST PAIN, UNSPECIFIED: ICD-10-CM

## 2023-10-12 DIAGNOSIS — R94.31 ABNORMAL ELECTROCARDIOGRAM [ECG] [EKG]: ICD-10-CM

## 2023-10-12 DIAGNOSIS — R06.09 OTHER FORMS OF DYSPNEA: ICD-10-CM

## 2023-10-12 DIAGNOSIS — I47.10 SUPRAVENTRICULAR TACHYCARDIA, UNSPECIFIED: ICD-10-CM

## 2023-10-12 PROCEDURE — 99215 OFFICE O/P EST HI 40 MIN: CPT

## 2023-10-12 PROCEDURE — 93040 RHYTHM ECG WITH REPORT: CPT | Mod: 59

## 2023-10-12 PROCEDURE — 93000 ELECTROCARDIOGRAM COMPLETE: CPT

## 2023-10-16 ENCOUNTER — NON-APPOINTMENT (OUTPATIENT)
Age: 88
End: 2023-10-16

## 2023-10-16 ENCOUNTER — RESULT CHARGE (OUTPATIENT)
Age: 88
End: 2023-10-16

## 2023-10-17 ENCOUNTER — NON-APPOINTMENT (OUTPATIENT)
Age: 88
End: 2023-10-17

## 2023-10-17 PROBLEM — I47.10 SVT (SUPRAVENTRICULAR TACHYCARDIA): Status: ACTIVE | Noted: 2022-12-29

## 2023-10-17 PROBLEM — Q24.8 LEFT VENTRICULAR OUTFLOW TRACT OBSTRUCTION: Status: ACTIVE | Noted: 2022-03-26

## 2023-10-17 PROBLEM — R07.9 CHEST PAIN: Status: ACTIVE | Noted: 2022-03-14

## 2023-10-17 PROBLEM — R06.09 DOE (DYSPNEA ON EXERTION): Status: ACTIVE | Noted: 2023-10-17

## 2023-10-17 PROBLEM — R94.31 ABNORMAL EKG: Status: ACTIVE | Noted: 2022-03-14

## 2023-10-17 RX ORDER — METOPROLOL SUCCINATE 25 MG/1
25 TABLET, EXTENDED RELEASE ORAL DAILY
Qty: 360 | Refills: 3 | Status: ACTIVE | COMMUNITY
Start: 2022-03-18

## 2023-10-27 ENCOUNTER — APPOINTMENT (OUTPATIENT)
Dept: INTERNAL MEDICINE | Facility: CLINIC | Age: 88
End: 2023-10-27
Payer: MEDICARE

## 2023-10-27 VITALS
HEIGHT: 62.99 IN | TEMPERATURE: 98.6 F | HEART RATE: 74 BPM | DIASTOLIC BLOOD PRESSURE: 80 MMHG | SYSTOLIC BLOOD PRESSURE: 148 MMHG | OXYGEN SATURATION: 95 % | WEIGHT: 130 LBS | BODY MASS INDEX: 23.04 KG/M2

## 2023-10-27 DIAGNOSIS — R05.9 COUGH, UNSPECIFIED: ICD-10-CM

## 2023-10-27 DIAGNOSIS — J02.9 ACUTE PHARYNGITIS, UNSPECIFIED: ICD-10-CM

## 2023-10-27 DIAGNOSIS — R53.83 OTHER FATIGUE: ICD-10-CM

## 2023-10-27 DIAGNOSIS — J20.9 ACUTE BRONCHITIS, UNSPECIFIED: ICD-10-CM

## 2023-10-27 PROCEDURE — 99214 OFFICE O/P EST MOD 30 MIN: CPT

## 2023-10-27 RX ORDER — AMOXICILLIN AND CLAVULANATE POTASSIUM 875; 125 MG/1; MG/1
875-125 TABLET, COATED ORAL
Qty: 14 | Refills: 0 | Status: DISCONTINUED | COMMUNITY
Start: 2023-10-27 | End: 2023-10-27

## 2023-10-30 LAB
INFLUENZA A RESULT: NOT DETECTED
INFLUENZA B RESULT: NOT DETECTED
RESP SYN VIRUS RESULT: NOT DETECTED
S PYO DNA THROAT QL NAA+PROBE: NOT DETECTED
SARS-COV-2 RESULT: NOT DETECTED

## 2023-10-31 ENCOUNTER — APPOINTMENT (OUTPATIENT)
Dept: CARDIOLOGY | Facility: CLINIC | Age: 88
End: 2023-10-31

## 2023-12-01 ENCOUNTER — APPOINTMENT (OUTPATIENT)
Dept: INTERNAL MEDICINE | Facility: CLINIC | Age: 88
End: 2023-12-01
Payer: MEDICARE

## 2023-12-01 VITALS
BODY MASS INDEX: 23.37 KG/M2 | WEIGHT: 127 LBS | HEART RATE: 71 BPM | OXYGEN SATURATION: 97 % | DIASTOLIC BLOOD PRESSURE: 79 MMHG | SYSTOLIC BLOOD PRESSURE: 125 MMHG | HEIGHT: 62 IN

## 2023-12-01 DIAGNOSIS — R63.4 ABNORMAL WEIGHT LOSS: ICD-10-CM

## 2023-12-01 PROCEDURE — 99214 OFFICE O/P EST MOD 30 MIN: CPT

## 2023-12-04 ENCOUNTER — LABORATORY RESULT (OUTPATIENT)
Age: 88
End: 2023-12-04

## 2023-12-04 ENCOUNTER — APPOINTMENT (OUTPATIENT)
Dept: GASTROENTEROLOGY | Facility: CLINIC | Age: 88
End: 2023-12-04
Payer: MEDICARE

## 2023-12-04 PROCEDURE — 43239 EGD BIOPSY SINGLE/MULTIPLE: CPT

## 2023-12-05 LAB
ALBUMIN SERPL ELPH-MCNC: 3.4 G/DL
ALP BLD-CCNC: 72 U/L
ALT SERPL-CCNC: 14 U/L
AMYLASE/CREAT SERPL: 76 U/L
ANION GAP SERPL CALC-SCNC: 11 MMOL/L
AST SERPL-CCNC: 27 U/L
BILIRUB SERPL-MCNC: 0.3 MG/DL
BUN SERPL-MCNC: 19 MG/DL
CALCIUM SERPL-MCNC: 9.4 MG/DL
CHLORIDE SERPL-SCNC: 103 MMOL/L
CO2 SERPL-SCNC: 24 MMOL/L
CREAT SERPL-MCNC: 0.82 MG/DL
EGFR: 70 ML/MIN/1.73M2
GLUCOSE SERPL-MCNC: 152 MG/DL
LPL SERPL-CCNC: 28 U/L
POTASSIUM SERPL-SCNC: 4.5 MMOL/L
PROT SERPL-MCNC: 8.2 G/DL
SODIUM SERPL-SCNC: 137 MMOL/L
T4 FREE SERPL-MCNC: 1.4 NG/DL
TSH SERPL-ACNC: 1.9 UIU/ML

## 2024-02-09 NOTE — PATIENT PROFILE ADULT - FALL HARM RISK - HARM RISK INTERVENTIONS
Xray Chest 1 View- PORTABLE-Urgent
PRINCIPAL DISCHARGE DIAGNOSIS  Diagnosis: Influenza A  Assessment and Plan of Treatment: Continue tamiflu as ordered. Follow up with Dr Oakes      SECONDARY DISCHARGE DIAGNOSES  Diagnosis: Acute respiratory failure with hypoxia  Assessment and Plan of Treatment:     
Assistance with ambulation/Assistance OOB with selected safe patient handling equipment/Communicate Risk of Fall with Harm to all staff/Discuss with provider need for PT consult/Monitor gait and stability/Provide patient with walking aids - walker, cane, crutches/Reinforce activity limits and safety measures with patient and family/Sit up slowly, dangle for a short time, stand at bedside before walking/Tailored Fall Risk Interventions/Visual Cue: Yellow wristband and red socks/Bed in lowest position, wheels locked, appropriate side rails in place/Call bell, personal items and telephone in reach/Instruct patient to call for assistance before getting out of bed or chair/Non-slip footwear when patient is out of bed/[place] to call system/Physically safe environment - no spills, clutter or unnecessary equipment/Purposeful Proactive Rounding/Room/bathroom lighting operational, light cord in reach

## 2024-02-20 ENCOUNTER — OUTPATIENT (OUTPATIENT)
Dept: OUTPATIENT SERVICES | Facility: HOSPITAL | Age: 89
LOS: 1 days | Discharge: HOME | End: 2024-02-20

## 2024-02-20 DIAGNOSIS — Z98.49 CATARACT EXTRACTION STATUS, UNSPECIFIED EYE: Chronic | ICD-10-CM

## 2024-02-20 DIAGNOSIS — D70.9 NEUTROPENIA, UNSPECIFIED: ICD-10-CM

## 2024-02-20 DIAGNOSIS — D47.2 MONOCLONAL GAMMOPATHY: ICD-10-CM

## 2024-02-20 DIAGNOSIS — D69.6 THROMBOCYTOPENIA, UNSPECIFIED: ICD-10-CM

## 2024-02-20 DIAGNOSIS — Z90.13 ACQUIRED ABSENCE OF BILATERAL BREASTS AND NIPPLES: Chronic | ICD-10-CM

## 2024-03-04 ENCOUNTER — APPOINTMENT (OUTPATIENT)
Dept: HEMATOLOGY ONCOLOGY | Facility: CLINIC | Age: 89
End: 2024-03-04
Payer: MEDICARE

## 2024-03-05 NOTE — HISTORY OF PRESENT ILLNESS
[de-identified] : Here for follow up  after  8 month since last encounter.  Underwent EGD in December 2024 due to weight loss (Dr. Andry Gomez); findings consistent with erosive gastritis.  Patient started on PPI.  Remains minimally active, states her appetite is fair.  Denies B symptoms.  Reports no recent hospitalization.  Most recent blood work showed WBC 2.56, platelet 144,000 and hemoglobin 11.3 g/dL.. Ms. GRAHAM's medication list reviewed. Continues follow up with her general practitioner. Accompanied by daughter, Teresita. [de-identified] : 88F, non-smoker PMHx tachycardia , GERD, osteoarthritis/osteoporosis, breast cancer, sciatica, insomnia, s/p cataract surgery, referred for hematologic consultation of chronic neutropenia.   CASE SYNOPSIS: 6/26/2023  Bone marrow aspirate/biopsy: Cellular marrow (40%) with slightly erythroid predominant, maturing trilineage hematopoiesis. Plasma cell neoplasm: 5% plasma cells by differential counts, 3-5% by  immunostain; monotypic plasma cell population (0.66% of total cells; 81.3% of plasma cell) detected by flow cytometry. Onkosight Myeloid Seq: Detected  ASXL1 p.[id number] S variant of potential clinical significance Flow cytometry:  Myeloid immunophenotypic findings show normal myeloid granularity, no increase in myeloid immaturity, and normal myelomonocytic antigens maturation pattern.  Lymphocyte immunophenotypic findings show no diagnostic abnormalities  12/4/2023-EGD: Nonerosive gastritis.  [FreeTextEntry1] : expectant surveillance\par  \par

## 2024-03-18 ENCOUNTER — RESULT REVIEW (OUTPATIENT)
Age: 89
End: 2024-03-18

## 2024-03-18 ENCOUNTER — APPOINTMENT (OUTPATIENT)
Dept: HEMATOLOGY ONCOLOGY | Facility: CLINIC | Age: 89
End: 2024-03-18
Payer: MEDICARE

## 2024-03-18 DIAGNOSIS — D75.9 DISEASE OF BLOOD AND BLOOD-FORMING ORGANS, UNSPECIFIED: ICD-10-CM

## 2024-03-18 DIAGNOSIS — D47.2 MONOCLONAL GAMMOPATHY: ICD-10-CM

## 2024-03-18 LAB
ALBUMIN SERPL ELPH-MCNC: 3.6 G/DL
ALP BLD-CCNC: 71 U/L
ALT SERPL-CCNC: 10 U/L
ANION GAP SERPL CALC-SCNC: 10 MMOL/L
AST SERPL-CCNC: 26 U/L
B2 MICROGLOB SERPL-MCNC: 2.7 MG/L
BASOPHILS # BLD AUTO: 0.04 K/UL — SIGNIFICANT CHANGE UP (ref 0–0.2)
BASOPHILS NFR BLD AUTO: 1.9 % — SIGNIFICANT CHANGE UP (ref 0–2)
BILIRUB SERPL-MCNC: 0.4 MG/DL
BUN SERPL-MCNC: 18 MG/DL
CALCIUM SERPL-MCNC: 8.9 MG/DL
CHLORIDE SERPL-SCNC: 104 MMOL/L
CO2 SERPL-SCNC: 24 MMOL/L
CREAT SERPL-MCNC: 0.79 MG/DL
EGFR: 73 ML/MIN/1.73M2
EOSINOPHIL # BLD AUTO: 0.03 K/UL — SIGNIFICANT CHANGE UP (ref 0–0.5)
EOSINOPHIL NFR BLD AUTO: 1.4 % — SIGNIFICANT CHANGE UP (ref 0–6)
ERYTHROCYTE [SEDIMENTATION RATE] IN BLOOD: 44 MM/HR — HIGH (ref 0–20)
GLUCOSE SERPL-MCNC: 162 MG/DL
HCT VFR BLD CALC: 37.7 % — SIGNIFICANT CHANGE UP (ref 34.5–45)
HGB BLD-MCNC: 12.2 G/DL — SIGNIFICANT CHANGE UP (ref 11.5–15.5)
IMM GRANULOCYTES NFR BLD AUTO: 0 % — SIGNIFICANT CHANGE UP (ref 0–0.9)
LDH SERPL-CCNC: 170 U/L
LYMPHOCYTES # BLD AUTO: 0.85 K/UL — LOW (ref 1–3.3)
LYMPHOCYTES # BLD AUTO: 40.9 % — SIGNIFICANT CHANGE UP (ref 13–44)
MCHC RBC-ENTMCNC: 28.8 PG — SIGNIFICANT CHANGE UP (ref 27–34)
MCHC RBC-ENTMCNC: 32.4 G/DL — SIGNIFICANT CHANGE UP (ref 32–36)
MCV RBC AUTO: 88.9 FL — SIGNIFICANT CHANGE UP (ref 80–100)
MONOCYTES # BLD AUTO: 0.59 K/UL — SIGNIFICANT CHANGE UP (ref 0–0.9)
MONOCYTES NFR BLD AUTO: 28.4 % — HIGH (ref 2–14)
NEUTROPHILS # BLD AUTO: 0.57 K/UL — LOW (ref 1.8–7.4)
NEUTROPHILS NFR BLD AUTO: 27.4 % — LOW (ref 43–77)
NRBC # BLD: 0 /100 WBCS — SIGNIFICANT CHANGE UP (ref 0–0)
PLAT MORPH BLD: NORMAL — SIGNIFICANT CHANGE UP
PLATELET # BLD AUTO: 191 K/UL — SIGNIFICANT CHANGE UP (ref 150–400)
POTASSIUM SERPL-SCNC: 4.5 MMOL/L
PROT SERPL-MCNC: 7.6 G/DL
RBC # BLD: 4.24 M/UL — SIGNIFICANT CHANGE UP (ref 3.8–5.2)
RBC # FLD: 14.4 % — SIGNIFICANT CHANGE UP (ref 10.3–14.5)
RBC BLD AUTO: ABNORMAL
SODIUM SERPL-SCNC: 138 MMOL/L
TARGETS BLD QL SMEAR: SLIGHT — SIGNIFICANT CHANGE UP
WBC # BLD: 2.08 K/UL — LOW (ref 3.8–10.5)
WBC # FLD AUTO: 2.08 K/UL — LOW (ref 3.8–10.5)

## 2024-03-18 PROCEDURE — 99214 OFFICE O/P EST MOD 30 MIN: CPT

## 2024-03-18 NOTE — HISTORY OF PRESENT ILLNESS
[de-identified] : 88F, non-smoker PMHx tachycardia , GERD, osteoarthritis/osteoporosis, breast cancer, sciatica, insomnia, s/p cataract surgery, referred for hematologic consultation of chronic neutropenia. \par  \par  CASE SYNOPSIS:\par  6/26/2023 \par  Bone marrow aspirate/biopsy:\par  Cellular marrow (40%) with slightly erythroid predominant, maturing trilineage hematopoiesis.\par  Plasma cell neoplasm: 5% plasma cells by differential counts, 3-5% by  immunostain; monotypic plasma cell population (0.66% of total cells; 81.3% of plasma cell) detected by flow cytometry.\par  Onkosight Myeloid Seq: Detected  ASXL1 p.[id number] S variant of potential clinical significance\par  Flow cytometry: \par  Myeloid immunophenotypic findings show normal myeloid granularity, no increase in myeloid immaturity, and normal myelomonocytic antigens maturation pattern.  Lymphocyte immunophenotypic findings show no diagnostic abnormalities\par  \par  \par   [FreeTextEntry1] : expectant surveillance\par  \par   [de-identified] : Here for follow up after 8 month since last encounter. Underwent EGD in December 2024 due to weight loss (Dr. Andry Gomez); findings consistent with erosive gastritis. Patient started on PPI. Remains minimally active, states her appetite is fair. Denies B symptoms. Reports no recent hospitalization. Most recent blood work showed WBC 2.56, platelet 144,000 and hemoglobin 11.3 g/dL.. Ms. GRAHAM's medication list reviewed. Continues follow up with her general practitioner. Accompanied by daughter, Teresita.

## 2024-03-18 NOTE — ASSESSMENT
[FreeTextEntry1] : Ms. GRAHAM 's questions were answered to her satisfaction.  She  expressed her  understanding and willingness to comply with the above recommendations, and  will return to the office in 6 months.

## 2024-03-27 LAB — M PROTEIN SPEC IFE-MCNC: NORMAL

## 2024-04-11 ENCOUNTER — RX RENEWAL (OUTPATIENT)
Age: 89
End: 2024-04-11

## 2024-05-10 ENCOUNTER — NON-APPOINTMENT (OUTPATIENT)
Age: 89
End: 2024-05-10

## 2024-06-06 ENCOUNTER — NON-APPOINTMENT (OUTPATIENT)
Age: 89
End: 2024-06-06

## 2024-06-06 ENCOUNTER — APPOINTMENT (OUTPATIENT)
Dept: CARDIOLOGY | Facility: CLINIC | Age: 89
End: 2024-06-06
Payer: MEDICARE

## 2024-06-06 VITALS
HEIGHT: 62 IN | OXYGEN SATURATION: 95 % | BODY MASS INDEX: 24.29 KG/M2 | HEART RATE: 70 BPM | SYSTOLIC BLOOD PRESSURE: 116 MMHG | WEIGHT: 132 LBS | DIASTOLIC BLOOD PRESSURE: 71 MMHG

## 2024-06-06 PROCEDURE — 99205 OFFICE O/P NEW HI 60 MIN: CPT

## 2024-06-06 PROCEDURE — 93000 ELECTROCARDIOGRAM COMPLETE: CPT

## 2024-06-06 RX ORDER — BENZONATATE 200 MG/1
200 CAPSULE ORAL 3 TIMES DAILY
Qty: 30 | Refills: 1 | Status: DISCONTINUED | COMMUNITY
Start: 2023-10-27 | End: 2024-06-06

## 2024-06-06 RX ORDER — METHYLPREDNISOLONE 4 MG/1
4 TABLET ORAL
Qty: 1 | Refills: 0 | Status: DISCONTINUED | COMMUNITY
Start: 2023-10-27 | End: 2024-06-06

## 2024-06-07 NOTE — ASSESSMENT
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 The blood pressure would easily tolerate the addition of a non-dihydropyridine calcium channel blocker, nor would these agents, with their modest effects, have much impact on gradients of this magnitude.   Plan: Initiate process to prescribe mavacamten.   The mechanism of LVOT obstruction was demonstrated to Ms. Cloud with a representative echo image. The rationale for the use of mavacamten, and the method by which this agent is dispensed and monitored, was discussed at length.   Total time, including time spent reviewing previous echocardiograms, 75 minutes.

## 2024-06-07 NOTE — CARDIOLOGY SUMMARY
[de-identified] : Sinus rhythm @ 71/min. Left atrial enlargement. Left ventricular hypertrophy with repolarization abnormality.

## 2024-06-07 NOTE — REASON FOR VISIT
[FreeTextEntry1] : New referral for HCM. Ms. Silvia Cloud is a delightful 89-year-old woman with obstructive hypertrophic cardiomyopathy. Treatment with metoprolol succinate was started three to four years ago, despite which she has NYHA Class 2-3 symptoms. She states that she walks on level ground "normally". When questioned, she admits that she walks slower than she used to but has been attributing this gradual decline to the passage of time. She finds it arduous to ascend inclined.  I reviewed the images from an echocardiogram performed within the [facility] system from 10/27/22 and the report of an outside echocardiogram from 5/31/2024. These studies are concordant. The more recent study (from six days ago) reports a hyperdynamic (EF ~ 80%). The septal thickness is approximately 1.5 cm. The peak Valsalva LVOT gradient was 107 mmHg.

## 2024-06-07 NOTE — REVIEW OF SYSTEMS
[Dyspnea on exertion] : dyspnea during exertion [Negative] : Psychiatric [Chest Discomfort] : no chest discomfort [Lower Ext Edema] : no extremity edema [Palpitations] : no palpitations [Syncope] : no syncope [FreeTextEntry5] : See HPI

## 2024-06-07 NOTE — PHYSICAL EXAM
[Normal S1, S2] : normal S1, S2 [Murmur] : murmur [Normal] : alert and oriented, normal memory [de-identified] : Grade 2/6 apical systolic murmur

## 2024-06-12 RX ORDER — MAVACAMTEN 5 MG/1
5 CAPSULE, GELATIN COATED ORAL
Qty: 30 | Refills: 3 | Status: ACTIVE | COMMUNITY
Start: 2024-06-11 | End: 1900-01-01

## 2024-06-26 DIAGNOSIS — I42.2 OTHER HYPERTROPHIC CARDIOMYOPATHY: ICD-10-CM

## 2024-07-09 ENCOUNTER — RX RENEWAL (OUTPATIENT)
Age: 89
End: 2024-07-09

## 2024-07-16 ENCOUNTER — OUTPATIENT (OUTPATIENT)
Dept: OUTPATIENT SERVICES | Facility: HOSPITAL | Age: 89
LOS: 1 days | End: 2024-07-16
Payer: MEDICARE

## 2024-07-16 ENCOUNTER — APPOINTMENT (OUTPATIENT)
Dept: CV DIAGNOSITCS | Facility: HOSPITAL | Age: 89
End: 2024-07-16

## 2024-07-16 ENCOUNTER — RESULT REVIEW (OUTPATIENT)
Age: 89
End: 2024-07-16

## 2024-07-16 DIAGNOSIS — Z98.49 CATARACT EXTRACTION STATUS, UNSPECIFIED EYE: Chronic | ICD-10-CM

## 2024-07-16 DIAGNOSIS — Z90.13 ACQUIRED ABSENCE OF BILATERAL BREASTS AND NIPPLES: Chronic | ICD-10-CM

## 2024-07-16 DIAGNOSIS — I42.2 OTHER HYPERTROPHIC CARDIOMYOPATHY: ICD-10-CM

## 2024-07-16 PROCEDURE — 93306 TTE W/DOPPLER COMPLETE: CPT | Mod: 26

## 2024-07-16 PROCEDURE — 93306 TTE W/DOPPLER COMPLETE: CPT

## 2024-08-02 ENCOUNTER — APPOINTMENT (OUTPATIENT)
Dept: CV DIAGNOSITCS | Facility: HOSPITAL | Age: 89
End: 2024-08-02

## 2024-08-02 ENCOUNTER — RESULT REVIEW (OUTPATIENT)
Age: 89
End: 2024-08-02

## 2024-08-02 ENCOUNTER — OUTPATIENT (OUTPATIENT)
Dept: OUTPATIENT SERVICES | Facility: HOSPITAL | Age: 89
LOS: 1 days | End: 2024-08-02
Payer: MEDICARE

## 2024-08-02 DIAGNOSIS — I42.2 OTHER HYPERTROPHIC CARDIOMYOPATHY: ICD-10-CM

## 2024-08-02 DIAGNOSIS — Z90.13 ACQUIRED ABSENCE OF BILATERAL BREASTS AND NIPPLES: Chronic | ICD-10-CM

## 2024-08-02 DIAGNOSIS — Z98.49 CATARACT EXTRACTION STATUS, UNSPECIFIED EYE: Chronic | ICD-10-CM

## 2024-08-02 PROCEDURE — 93306 TTE W/DOPPLER COMPLETE: CPT | Mod: 26

## 2024-08-02 PROCEDURE — 93306 TTE W/DOPPLER COMPLETE: CPT

## 2024-08-02 PROCEDURE — 76376 3D RENDER W/INTRP POSTPROCES: CPT | Mod: 26

## 2024-08-02 PROCEDURE — 76376 3D RENDER W/INTRP POSTPROCES: CPT

## 2024-08-20 ENCOUNTER — RX RENEWAL (OUTPATIENT)
Age: 89
End: 2024-08-20

## 2024-09-15 ENCOUNTER — OUTPATIENT (OUTPATIENT)
Dept: OUTPATIENT SERVICES | Facility: HOSPITAL | Age: 89
LOS: 1 days | Discharge: HOME | End: 2024-09-15

## 2024-09-15 DIAGNOSIS — Z90.13 ACQUIRED ABSENCE OF BILATERAL BREASTS AND NIPPLES: Chronic | ICD-10-CM

## 2024-09-15 DIAGNOSIS — D47.2 MONOCLONAL GAMMOPATHY: ICD-10-CM

## 2024-09-15 DIAGNOSIS — D70.9 NEUTROPENIA, UNSPECIFIED: ICD-10-CM

## 2024-09-15 DIAGNOSIS — Z98.49 CATARACT EXTRACTION STATUS, UNSPECIFIED EYE: Chronic | ICD-10-CM

## 2024-09-15 DIAGNOSIS — D69.6 THROMBOCYTOPENIA, UNSPECIFIED: ICD-10-CM

## 2024-09-17 ENCOUNTER — RESULT REVIEW (OUTPATIENT)
Age: 89
End: 2024-09-17

## 2024-09-17 ENCOUNTER — APPOINTMENT (OUTPATIENT)
Dept: HEMATOLOGY ONCOLOGY | Facility: CLINIC | Age: 89
End: 2024-09-17

## 2024-09-17 LAB
BASOPHILS # BLD AUTO: 0.03 K/UL — SIGNIFICANT CHANGE UP (ref 0–0.2)
BASOPHILS NFR BLD AUTO: 1.3 % — SIGNIFICANT CHANGE UP (ref 0–2)
EOSINOPHIL # BLD AUTO: 0.03 K/UL — SIGNIFICANT CHANGE UP (ref 0–0.5)
EOSINOPHIL NFR BLD AUTO: 1.3 % — SIGNIFICANT CHANGE UP (ref 0–6)
ERYTHROCYTE [SEDIMENTATION RATE] IN BLOOD: 39 MM/HR — HIGH (ref 0–20)
HCT VFR BLD CALC: 37.4 % — SIGNIFICANT CHANGE UP (ref 34.5–45)
HGB BLD-MCNC: 12 G/DL — SIGNIFICANT CHANGE UP (ref 11.5–15.5)
IMM GRANULOCYTES NFR BLD AUTO: 0.4 % — SIGNIFICANT CHANGE UP (ref 0–0.9)
LYMPHOCYTES # BLD AUTO: 0.58 K/UL — LOW (ref 1–3.3)
LYMPHOCYTES # BLD AUTO: 25.7 % — SIGNIFICANT CHANGE UP (ref 13–44)
MCHC RBC-ENTMCNC: 28.5 PG — SIGNIFICANT CHANGE UP (ref 27–34)
MCHC RBC-ENTMCNC: 32.1 G/DL — SIGNIFICANT CHANGE UP (ref 32–36)
MCV RBC AUTO: 88.8 FL — SIGNIFICANT CHANGE UP (ref 80–100)
MONOCYTES # BLD AUTO: 0.62 K/UL — SIGNIFICANT CHANGE UP (ref 0–0.9)
MONOCYTES NFR BLD AUTO: 27.4 % — HIGH (ref 2–14)
NEUTROPHILS # BLD AUTO: 0.99 K/UL — LOW (ref 1.8–7.4)
NEUTROPHILS NFR BLD AUTO: 43.9 % — SIGNIFICANT CHANGE UP (ref 43–77)
NRBC # BLD: 0 /100 WBCS — SIGNIFICANT CHANGE UP (ref 0–0)
NRBC BLD-RTO: 0 /100 WBCS — SIGNIFICANT CHANGE UP (ref 0–0)
PLAT MORPH BLD: NORMAL — SIGNIFICANT CHANGE UP
PLATELET # BLD AUTO: 163 K/UL — SIGNIFICANT CHANGE UP (ref 150–400)
RBC # BLD: 4.21 M/UL — SIGNIFICANT CHANGE UP (ref 3.8–5.2)
RBC # FLD: 15.2 % — HIGH (ref 10.3–14.5)
RBC BLD AUTO: SIGNIFICANT CHANGE UP
WBC # BLD: 2.26 K/UL — LOW (ref 3.8–10.5)
WBC # FLD AUTO: 2.26 K/UL — LOW (ref 3.8–10.5)

## 2024-09-18 LAB
ALBUMIN SERPL ELPH-MCNC: 3.5 G/DL
ALP BLD-CCNC: 71 U/L
ALT SERPL-CCNC: 11 U/L
ANION GAP SERPL CALC-SCNC: 9 MMOL/L
AST SERPL-CCNC: 24 U/L
B2 MICROGLOB SERPL-MCNC: 2.7 MG/L
BILIRUB SERPL-MCNC: 0.3 MG/DL
BUN SERPL-MCNC: 18 MG/DL
CALCIUM SERPL-MCNC: 9.1 MG/DL
CHLORIDE SERPL-SCNC: 104 MMOL/L
CO2 SERPL-SCNC: 27 MMOL/L
CREAT SERPL-MCNC: 0.8 MG/DL
DEPRECATED KAPPA LC FREE/LAMBDA SER: 1.4 RATIO
EGFR: 71 ML/MIN/1.73M2
FERRITIN SERPL-MCNC: 167 NG/ML
GLUCOSE SERPL-MCNC: 174 MG/DL
IGA SER QL IEP: 928 MG/DL
IGG SER QL IEP: 1982 MG/DL
IGM SER QL IEP: 148 MG/DL
KAPPA LC CSF-MCNC: 4.91 MG/DL
KAPPA LC SERPL-MCNC: 6.86 MG/DL
LDH SERPL-CCNC: 166 U/L
POTASSIUM SERPL-SCNC: 4.2 MMOL/L
PROT SERPL-MCNC: 7.4 G/DL
SODIUM SERPL-SCNC: 140 MMOL/L

## 2024-09-22 LAB
ALBUMIN MFR SERPL ELPH: 45.7 %
ALBUMIN SERPL-MCNC: 3.4 G/DL
ALBUMIN/GLOB SERPL: 0.8 RATIO
ALPHA1 GLOB MFR SERPL ELPH: 3.7 %
ALPHA1 GLOB SERPL ELPH-MCNC: 0.3 G/DL
ALPHA2 GLOB MFR SERPL ELPH: 8 %
ALPHA2 GLOB SERPL ELPH-MCNC: 0.6 G/DL
B-GLOBULIN MFR SERPL ELPH: 15.5 %
B-GLOBULIN SERPL ELPH-MCNC: 1.1 G/DL
GAMMA GLOB FLD ELPH-MCNC: 2 G/DL
GAMMA GLOB MFR SERPL ELPH: 27.1 %
INTERPRETATION SERPL IEP-IMP: NORMAL
M PROTEIN MFR SERPL ELPH: 2.7 %
M PROTEIN SPEC IFE-MCNC: NORMAL
MONOCLON BAND OBS SERPL: 0.2 G/DL
PROT SERPL-MCNC: 7.4 G/DL
PROT SERPL-MCNC: 7.4 G/DL

## 2024-09-23 ENCOUNTER — APPOINTMENT (OUTPATIENT)
Dept: HEMATOLOGY ONCOLOGY | Facility: CLINIC | Age: 89
End: 2024-09-23
Payer: MEDICARE

## 2024-09-23 DIAGNOSIS — D75.9 DISEASE OF BLOOD AND BLOOD-FORMING ORGANS, UNSPECIFIED: ICD-10-CM

## 2024-09-23 DIAGNOSIS — D47.2 MONOCLONAL GAMMOPATHY: ICD-10-CM

## 2024-09-23 PROCEDURE — 99214 OFFICE O/P EST MOD 30 MIN: CPT

## 2024-09-23 NOTE — HISTORY OF PRESENT ILLNESS
[de-identified] : 89F, non-smoker PMHx tachycardia, GERD, osteoarthritis/osteoporosis, breast cancer, sciatica, insomnia, s/p cataract surgery, referred for hematologic consultation of chronic neutropenia.   CASE SYNOPSIS: 6/26/2023  Bone marrow aspirate/biopsy: Cellular marrow (40%) with slightly erythroid predominant, maturing trilineage hematopoiesis. Plasma cell neoplasm: 5% plasma cells by differential counts, 3-5% by  immunostain; monotypic plasma cell population (0.66% of total cells; 81.3% of plasma cell) detected by flow cytometry. Onkosight Myeloid Seq: Detected  ASXL1 p.[id number] S variant of potential clinical significance Flow cytometry:  Myeloid immunophenotypic findings show normal myeloid granularity, no increase in myeloid immaturity, and normal myelomonocytic antigens maturation pattern.   Lymphocyte immunophenotypic findings show no diagnostic abnormalities    [FreeTextEntry1] : expectant surveillance\par  \par   [de-identified] : Returning for regular hematologic follow-up.  Physical examination unchanged and patient denies new complaints.  So WBC remains below 3K, patient reports no infectious diseases, no hospitalization, no need for antibiotic treatment.  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  Reports no changes in medication.  Recently started on Camzyos for obstructive hypertrophic cardiomyopathy, but shortly thereafter she had to interrupt due to poor tolerance to the medication.  She is here accompanied by her daughter, Teresita.

## 2024-09-23 NOTE — HISTORY OF PRESENT ILLNESS
[de-identified] : 89F, non-smoker PMHx tachycardia, GERD, osteoarthritis/osteoporosis, breast cancer, sciatica, insomnia, s/p cataract surgery, referred for hematologic consultation of chronic neutropenia.   CASE SYNOPSIS: 6/26/2023  Bone marrow aspirate/biopsy: Cellular marrow (40%) with slightly erythroid predominant, maturing trilineage hematopoiesis. Plasma cell neoplasm: 5% plasma cells by differential counts, 3-5% by  immunostain; monotypic plasma cell population (0.66% of total cells; 81.3% of plasma cell) detected by flow cytometry. Onkosight Myeloid Seq: Detected  ASXL1 p.[id number] S variant of potential clinical significance Flow cytometry:  Myeloid immunophenotypic findings show normal myeloid granularity, no increase in myeloid immaturity, and normal myelomonocytic antigens maturation pattern.   Lymphocyte immunophenotypic findings show no diagnostic abnormalities    [FreeTextEntry1] : expectant surveillance\par  \par   [de-identified] : Returning for regular hematologic follow-up.  Physical examination unchanged and patient denies new complaints.  So WBC remains below 3K, patient reports no infectious diseases, no hospitalization, no need for antibiotic treatment.  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  Reports no changes in medication.  Recently started on Camzyos for obstructive hypertrophic cardiomyopathy, but shortly thereafter she had to interrupt due to poor tolerance to the medication.  She is here accompanied by her daughter, Teresita.

## 2024-10-19 ENCOUNTER — RX RENEWAL (OUTPATIENT)
Age: 89
End: 2024-10-19

## 2024-12-02 ENCOUNTER — RX RENEWAL (OUTPATIENT)
Age: 89
End: 2024-12-02

## 2024-12-19 ENCOUNTER — APPOINTMENT (OUTPATIENT)
Dept: INTERNAL MEDICINE | Facility: CLINIC | Age: 89
End: 2024-12-19

## 2025-01-01 ENCOUNTER — INPATIENT (INPATIENT)
Facility: HOSPITAL | Age: 89
LOS: 20 days | Discharge: HOSPICE | DRG: 195 | End: 2025-06-28
Attending: INTERNAL MEDICINE | Admitting: INTERNAL MEDICINE
Payer: MEDICARE

## 2025-01-01 VITALS
DIASTOLIC BLOOD PRESSURE: 75 MMHG | TEMPERATURE: 98 F | OXYGEN SATURATION: 100 % | RESPIRATION RATE: 19 BRPM | SYSTOLIC BLOOD PRESSURE: 108 MMHG | HEART RATE: 82 BPM

## 2025-01-01 VITALS
TEMPERATURE: 98 F | SYSTOLIC BLOOD PRESSURE: 100 MMHG | HEIGHT: 62 IN | RESPIRATION RATE: 20 BRPM | HEART RATE: 121 BPM | DIASTOLIC BLOOD PRESSURE: 65 MMHG | OXYGEN SATURATION: 96 %

## 2025-01-01 DIAGNOSIS — Z98.49 CATARACT EXTRACTION STATUS, UNSPECIFIED EYE: Chronic | ICD-10-CM

## 2025-01-01 DIAGNOSIS — J96.01 ACUTE RESPIRATORY FAILURE WITH HYPOXIA: ICD-10-CM

## 2025-01-01 DIAGNOSIS — I31.39 OTHER PERICARDIAL EFFUSION (NONINFLAMMATORY): ICD-10-CM

## 2025-01-01 DIAGNOSIS — I48.20 CHRONIC ATRIAL FIBRILLATION, UNSPECIFIED: ICD-10-CM

## 2025-01-01 DIAGNOSIS — Z90.13 ACQUIRED ABSENCE OF BILATERAL BREASTS AND NIPPLES: Chronic | ICD-10-CM

## 2025-01-01 DIAGNOSIS — Z51.5 ENCOUNTER FOR PALLIATIVE CARE: ICD-10-CM

## 2025-01-01 DIAGNOSIS — I21.A1 MYOCARDIAL INFARCTION TYPE 2: ICD-10-CM

## 2025-01-01 DIAGNOSIS — A41.9 SEPSIS, UNSPECIFIED ORGANISM: ICD-10-CM

## 2025-01-01 DIAGNOSIS — J18.9 PNEUMONIA, UNSPECIFIED ORGANISM: ICD-10-CM

## 2025-01-01 DIAGNOSIS — R52 PAIN, UNSPECIFIED: ICD-10-CM

## 2025-01-01 DIAGNOSIS — R53.2 FUNCTIONAL QUADRIPLEGIA: ICD-10-CM

## 2025-01-01 DIAGNOSIS — J90 PLEURAL EFFUSION, NOT ELSEWHERE CLASSIFIED: ICD-10-CM

## 2025-01-01 DIAGNOSIS — I95.9 HYPOTENSION, UNSPECIFIED: ICD-10-CM

## 2025-01-01 DIAGNOSIS — I42.1 OBSTRUCTIVE HYPERTROPHIC CARDIOMYOPATHY: ICD-10-CM

## 2025-01-01 DIAGNOSIS — Z71.89 OTHER SPECIFIED COUNSELING: ICD-10-CM

## 2025-01-01 LAB
A1C WITH ESTIMATED AVERAGE GLUCOSE RESULT: 6.1 % — HIGH (ref 4–5.6)
ALBUMIN FLD-MCNC: 0.8 G/DL — SIGNIFICANT CHANGE UP
ALBUMIN FLD-MCNC: 1.9 G/DL — SIGNIFICANT CHANGE UP
ALBUMIN SERPL ELPH-MCNC: 2.2 G/DL — LOW (ref 3.3–5)
ALBUMIN SERPL ELPH-MCNC: 2.3 G/DL — LOW (ref 3.3–5)
ALBUMIN SERPL ELPH-MCNC: 2.3 G/DL — LOW (ref 3.3–5)
ALBUMIN SERPL ELPH-MCNC: 2.4 G/DL — LOW (ref 3.3–5)
ALBUMIN SERPL ELPH-MCNC: 2.5 G/DL — LOW (ref 3.3–5)
ALBUMIN SERPL ELPH-MCNC: 2.6 G/DL — LOW (ref 3.3–5)
ALBUMIN SERPL ELPH-MCNC: 2.7 G/DL — LOW (ref 3.3–5)
ALBUMIN SERPL ELPH-MCNC: 2.8 G/DL — LOW (ref 3.3–5)
ALP SERPL-CCNC: 127 U/L — HIGH (ref 40–120)
ALP SERPL-CCNC: 129 U/L — HIGH (ref 40–120)
ALP SERPL-CCNC: 133 U/L — HIGH (ref 40–120)
ALP SERPL-CCNC: 134 U/L — HIGH (ref 40–120)
ALP SERPL-CCNC: 137 U/L — HIGH (ref 40–120)
ALP SERPL-CCNC: 147 U/L — HIGH (ref 40–120)
ALP SERPL-CCNC: 158 U/L — HIGH (ref 40–120)
ALP SERPL-CCNC: 168 U/L — HIGH (ref 40–120)
ALP SERPL-CCNC: 173 U/L — HIGH (ref 40–120)
ALP SERPL-CCNC: 192 U/L — HIGH (ref 40–120)
ALP SERPL-CCNC: 198 U/L — HIGH (ref 40–120)
ALP SERPL-CCNC: 218 U/L — HIGH (ref 40–120)
ALP SERPL-CCNC: 233 U/L — HIGH (ref 40–120)
ALT FLD-CCNC: 18 U/L — SIGNIFICANT CHANGE UP (ref 10–45)
ALT FLD-CCNC: 19 U/L — SIGNIFICANT CHANGE UP (ref 10–45)
ALT FLD-CCNC: 20 U/L — SIGNIFICANT CHANGE UP (ref 10–45)
ALT FLD-CCNC: 20 U/L — SIGNIFICANT CHANGE UP (ref 10–45)
ALT FLD-CCNC: 22 U/L — SIGNIFICANT CHANGE UP (ref 10–45)
ALT FLD-CCNC: 23 U/L — SIGNIFICANT CHANGE UP (ref 10–45)
ALT FLD-CCNC: 28 U/L — SIGNIFICANT CHANGE UP (ref 10–45)
ALT FLD-CCNC: 29 U/L — SIGNIFICANT CHANGE UP (ref 10–45)
ALT FLD-CCNC: 30 U/L — SIGNIFICANT CHANGE UP (ref 10–45)
ALT FLD-CCNC: 35 U/L — SIGNIFICANT CHANGE UP (ref 10–45)
ALT FLD-CCNC: 38 U/L — SIGNIFICANT CHANGE UP (ref 10–45)
ALT FLD-CCNC: 43 U/L — SIGNIFICANT CHANGE UP (ref 10–45)
ALT FLD-CCNC: 46 U/L — HIGH (ref 10–45)
ANION GAP SERPL CALC-SCNC: 10 MMOL/L — SIGNIFICANT CHANGE UP (ref 5–17)
ANION GAP SERPL CALC-SCNC: 11 MMOL/L — SIGNIFICANT CHANGE UP (ref 5–17)
ANION GAP SERPL CALC-SCNC: 12 MMOL/L — SIGNIFICANT CHANGE UP (ref 5–17)
ANION GAP SERPL CALC-SCNC: 13 MMOL/L — SIGNIFICANT CHANGE UP (ref 5–17)
ANION GAP SERPL CALC-SCNC: 16 MMOL/L — SIGNIFICANT CHANGE UP (ref 5–17)
ANION GAP SERPL CALC-SCNC: 8 MMOL/L — SIGNIFICANT CHANGE UP (ref 5–17)
ANION GAP SERPL CALC-SCNC: 8 MMOL/L — SIGNIFICANT CHANGE UP (ref 5–17)
ANION GAP SERPL CALC-SCNC: 9 MMOL/L — SIGNIFICANT CHANGE UP (ref 5–17)
ANISOCYTOSIS BLD QL: SIGNIFICANT CHANGE UP
ANISOCYTOSIS BLD QL: SLIGHT — SIGNIFICANT CHANGE UP
ANISOCYTOSIS BLD QL: SLIGHT — SIGNIFICANT CHANGE UP
APPEARANCE UR: CLEAR — SIGNIFICANT CHANGE UP
APTT BLD: 25.1 SEC — LOW (ref 26.1–36.8)
APTT BLD: 25.1 SEC — LOW (ref 26.1–36.8)
APTT BLD: 25.9 SEC — LOW (ref 26.1–36.8)
APTT BLD: 27.9 SEC — SIGNIFICANT CHANGE UP (ref 26.1–36.8)
APTT BLD: 51.4 SEC — HIGH (ref 26.1–36.8)
APTT BLD: 55.7 SEC — HIGH (ref 26.1–36.8)
APTT BLD: 65.1 SEC — HIGH (ref 26.1–36.8)
AST SERPL-CCNC: 23 U/L — SIGNIFICANT CHANGE UP (ref 10–40)
AST SERPL-CCNC: 25 U/L — SIGNIFICANT CHANGE UP (ref 10–40)
AST SERPL-CCNC: 27 U/L — SIGNIFICANT CHANGE UP (ref 10–40)
AST SERPL-CCNC: 27 U/L — SIGNIFICANT CHANGE UP (ref 10–40)
AST SERPL-CCNC: 28 U/L — SIGNIFICANT CHANGE UP (ref 10–40)
AST SERPL-CCNC: 28 U/L — SIGNIFICANT CHANGE UP (ref 10–40)
AST SERPL-CCNC: 32 U/L — SIGNIFICANT CHANGE UP (ref 10–40)
AST SERPL-CCNC: 35 U/L — SIGNIFICANT CHANGE UP (ref 10–40)
AST SERPL-CCNC: 41 U/L — HIGH (ref 10–40)
AST SERPL-CCNC: 47 U/L — HIGH (ref 10–40)
AST SERPL-CCNC: 51 U/L — HIGH (ref 10–40)
AST SERPL-CCNC: 53 U/L — HIGH (ref 10–40)
AST SERPL-CCNC: 61 U/L — HIGH (ref 10–40)
B PERT IGG+IGM PNL SER: ABNORMAL
B PERT IGG+IGM PNL SER: ABNORMAL
BASE EXCESS BLDA CALC-SCNC: 1.3 MMOL/L — SIGNIFICANT CHANGE UP (ref -2–3)
BASE EXCESS BLDA CALC-SCNC: 4 MMOL/L — HIGH (ref -2–3)
BASOPHILS # BLD AUTO: 0 K/UL — SIGNIFICANT CHANGE UP (ref 0–0.2)
BASOPHILS # BLD AUTO: 0 K/UL — SIGNIFICANT CHANGE UP (ref 0–0.2)
BASOPHILS # BLD AUTO: 0.03 K/UL — SIGNIFICANT CHANGE UP (ref 0–0.2)
BASOPHILS # BLD AUTO: 0.04 K/UL — SIGNIFICANT CHANGE UP (ref 0–0.2)
BASOPHILS # BLD AUTO: 0.14 K/UL — SIGNIFICANT CHANGE UP (ref 0–0.2)
BASOPHILS NFR BLD AUTO: 0 % — SIGNIFICANT CHANGE UP (ref 0–2)
BASOPHILS NFR BLD AUTO: 0 % — SIGNIFICANT CHANGE UP (ref 0–2)
BASOPHILS NFR BLD AUTO: 1 % — SIGNIFICANT CHANGE UP (ref 0–2)
BASOPHILS NFR BLD AUTO: 1.2 % — SIGNIFICANT CHANGE UP (ref 0–2)
BASOPHILS NFR BLD AUTO: 1.3 % — SIGNIFICANT CHANGE UP (ref 0–2)
BASOPHILS NFR BLD AUTO: 1.9 % — SIGNIFICANT CHANGE UP (ref 0–2)
BASOPHILS NFR BLD AUTO: 5.3 % — HIGH (ref 0–2)
BILIRUB SERPL-MCNC: 0.3 MG/DL — SIGNIFICANT CHANGE UP (ref 0.2–1.2)
BILIRUB SERPL-MCNC: 0.4 MG/DL — SIGNIFICANT CHANGE UP (ref 0.2–1.2)
BILIRUB SERPL-MCNC: 0.5 MG/DL — SIGNIFICANT CHANGE UP (ref 0.2–1.2)
BILIRUB SERPL-MCNC: 0.6 MG/DL — SIGNIFICANT CHANGE UP (ref 0.2–1.2)
BILIRUB SERPL-MCNC: 0.7 MG/DL — SIGNIFICANT CHANGE UP (ref 0.2–1.2)
BILIRUB SERPL-MCNC: 0.7 MG/DL — SIGNIFICANT CHANGE UP (ref 0.2–1.2)
BILIRUB SERPL-MCNC: 0.8 MG/DL — SIGNIFICANT CHANGE UP (ref 0.2–1.2)
BILIRUB SERPL-MCNC: 0.9 MG/DL — SIGNIFICANT CHANGE UP (ref 0.2–1.2)
BILIRUB UR-MCNC: NEGATIVE — SIGNIFICANT CHANGE UP
BLD GP AB SCN SERPL QL: NEGATIVE — SIGNIFICANT CHANGE UP
BLD GP AB SCN SERPL QL: NEGATIVE — SIGNIFICANT CHANGE UP
BUN SERPL-MCNC: 18 MG/DL — SIGNIFICANT CHANGE UP (ref 7–23)
BUN SERPL-MCNC: 19 MG/DL — SIGNIFICANT CHANGE UP (ref 7–23)
BUN SERPL-MCNC: 20 MG/DL — SIGNIFICANT CHANGE UP (ref 7–23)
BUN SERPL-MCNC: 20 MG/DL — SIGNIFICANT CHANGE UP (ref 7–23)
BUN SERPL-MCNC: 22 MG/DL — SIGNIFICANT CHANGE UP (ref 7–23)
BUN SERPL-MCNC: 23 MG/DL — SIGNIFICANT CHANGE UP (ref 7–23)
BUN SERPL-MCNC: 25 MG/DL — HIGH (ref 7–23)
BUN SERPL-MCNC: 28 MG/DL — HIGH (ref 7–23)
BUN SERPL-MCNC: 31 MG/DL — HIGH (ref 7–23)
BUN SERPL-MCNC: 34 MG/DL — HIGH (ref 7–23)
BURR CELLS BLD QL SMEAR: PRESENT — SIGNIFICANT CHANGE UP
CALCIUM SERPL-MCNC: 8 MG/DL — LOW (ref 8.4–10.5)
CALCIUM SERPL-MCNC: 8.1 MG/DL — LOW (ref 8.4–10.5)
CALCIUM SERPL-MCNC: 8.3 MG/DL — LOW (ref 8.4–10.5)
CALCIUM SERPL-MCNC: 8.4 MG/DL — SIGNIFICANT CHANGE UP (ref 8.4–10.5)
CALCIUM SERPL-MCNC: 8.5 MG/DL — SIGNIFICANT CHANGE UP (ref 8.4–10.5)
CALCIUM SERPL-MCNC: 8.6 MG/DL — SIGNIFICANT CHANGE UP (ref 8.4–10.5)
CALCIUM SERPL-MCNC: 8.6 MG/DL — SIGNIFICANT CHANGE UP (ref 8.4–10.5)
CALCIUM SERPL-MCNC: 8.8 MG/DL — SIGNIFICANT CHANGE UP (ref 8.4–10.5)
CALCIUM SERPL-MCNC: 9 MG/DL — SIGNIFICANT CHANGE UP (ref 8.4–10.5)
CALCIUM SERPL-MCNC: 9 MG/DL — SIGNIFICANT CHANGE UP (ref 8.4–10.5)
CHLORIDE SERPL-SCNC: 100 MMOL/L — SIGNIFICANT CHANGE UP (ref 96–108)
CHLORIDE SERPL-SCNC: 101 MMOL/L — SIGNIFICANT CHANGE UP (ref 96–108)
CHLORIDE SERPL-SCNC: 102 MMOL/L — SIGNIFICANT CHANGE UP (ref 96–108)
CHLORIDE SERPL-SCNC: 104 MMOL/L — SIGNIFICANT CHANGE UP (ref 96–108)
CHLORIDE SERPL-SCNC: 96 MMOL/L — SIGNIFICANT CHANGE UP (ref 96–108)
CHLORIDE SERPL-SCNC: 96 MMOL/L — SIGNIFICANT CHANGE UP (ref 96–108)
CHLORIDE SERPL-SCNC: 97 MMOL/L — SIGNIFICANT CHANGE UP (ref 96–108)
CHLORIDE SERPL-SCNC: 98 MMOL/L — SIGNIFICANT CHANGE UP (ref 96–108)
CHLORIDE SERPL-SCNC: 99 MMOL/L — SIGNIFICANT CHANGE UP (ref 96–108)
CHOLEST SERPL-MCNC: 129 MG/DL — SIGNIFICANT CHANGE UP
CO2 BLDA-SCNC: 26 MMOL/L — HIGH (ref 19–24)
CO2 BLDA-SCNC: 31 MMOL/L — HIGH (ref 19–24)
CO2 SERPL-SCNC: 19 MMOL/L — LOW (ref 22–31)
CO2 SERPL-SCNC: 20 MMOL/L — LOW (ref 22–31)
CO2 SERPL-SCNC: 21 MMOL/L — LOW (ref 22–31)
CO2 SERPL-SCNC: 21 MMOL/L — LOW (ref 22–31)
CO2 SERPL-SCNC: 22 MMOL/L — SIGNIFICANT CHANGE UP (ref 22–31)
CO2 SERPL-SCNC: 23 MMOL/L — SIGNIFICANT CHANGE UP (ref 22–31)
CO2 SERPL-SCNC: 25 MMOL/L — SIGNIFICANT CHANGE UP (ref 22–31)
CO2 SERPL-SCNC: 26 MMOL/L — SIGNIFICANT CHANGE UP (ref 22–31)
CO2 SERPL-SCNC: 26 MMOL/L — SIGNIFICANT CHANGE UP (ref 22–31)
COLOR FLD: ABNORMAL
COLOR FLD: ABNORMAL
COLOR SPEC: SIGNIFICANT CHANGE UP
CREAT SERPL-MCNC: 0.54 MG/DL — SIGNIFICANT CHANGE UP (ref 0.5–1.3)
CREAT SERPL-MCNC: 0.57 MG/DL — SIGNIFICANT CHANGE UP (ref 0.5–1.3)
CREAT SERPL-MCNC: 0.58 MG/DL — SIGNIFICANT CHANGE UP (ref 0.5–1.3)
CREAT SERPL-MCNC: 0.58 MG/DL — SIGNIFICANT CHANGE UP (ref 0.5–1.3)
CREAT SERPL-MCNC: 0.61 MG/DL — SIGNIFICANT CHANGE UP (ref 0.5–1.3)
CREAT SERPL-MCNC: 0.62 MG/DL — SIGNIFICANT CHANGE UP (ref 0.5–1.3)
CREAT SERPL-MCNC: 0.63 MG/DL — SIGNIFICANT CHANGE UP (ref 0.5–1.3)
CREAT SERPL-MCNC: 0.63 MG/DL — SIGNIFICANT CHANGE UP (ref 0.5–1.3)
CREAT SERPL-MCNC: 0.65 MG/DL — SIGNIFICANT CHANGE UP (ref 0.5–1.3)
CREAT SERPL-MCNC: 0.68 MG/DL — SIGNIFICANT CHANGE UP (ref 0.5–1.3)
CREAT SERPL-MCNC: 0.68 MG/DL — SIGNIFICANT CHANGE UP (ref 0.5–1.3)
CREAT SERPL-MCNC: 0.7 MG/DL — SIGNIFICANT CHANGE UP (ref 0.5–1.3)
CREAT SERPL-MCNC: 0.77 MG/DL — SIGNIFICANT CHANGE UP (ref 0.5–1.3)
CREAT SERPL-MCNC: 0.81 MG/DL — SIGNIFICANT CHANGE UP (ref 0.5–1.3)
CREAT SERPL-MCNC: 0.84 MG/DL — SIGNIFICANT CHANGE UP (ref 0.5–1.3)
CREAT SERPL-MCNC: 0.89 MG/DL — SIGNIFICANT CHANGE UP (ref 0.5–1.3)
CREAT SERPL-MCNC: 0.89 MG/DL — SIGNIFICANT CHANGE UP (ref 0.5–1.3)
CULTURE RESULTS: SIGNIFICANT CHANGE UP
D DIMER BLD IA.RAPID-MCNC: 5056 NG/ML DDU — HIGH
DIFF PNL FLD: NEGATIVE — SIGNIFICANT CHANGE UP
EGFR: 62 ML/MIN/1.73M2 — SIGNIFICANT CHANGE UP
EGFR: 67 ML/MIN/1.73M2 — SIGNIFICANT CHANGE UP
EGFR: 67 ML/MIN/1.73M2 — SIGNIFICANT CHANGE UP
EGFR: 70 ML/MIN/1.73M2 — SIGNIFICANT CHANGE UP
EGFR: 70 ML/MIN/1.73M2 — SIGNIFICANT CHANGE UP
EGFR: 74 ML/MIN/1.73M2 — SIGNIFICANT CHANGE UP
EGFR: 74 ML/MIN/1.73M2 — SIGNIFICANT CHANGE UP
EGFR: 83 ML/MIN/1.73M2 — SIGNIFICANT CHANGE UP
EGFR: 83 ML/MIN/1.73M2 — SIGNIFICANT CHANGE UP
EGFR: 84 ML/MIN/1.73M2 — SIGNIFICANT CHANGE UP
EGFR: 85 ML/MIN/1.73M2 — SIGNIFICANT CHANGE UP
EGFR: 86 ML/MIN/1.73M2 — SIGNIFICANT CHANGE UP
EGFR: 87 ML/MIN/1.73M2 — SIGNIFICANT CHANGE UP
EGFR: 88 ML/MIN/1.73M2 — SIGNIFICANT CHANGE UP
EGFR: 88 ML/MIN/1.73M2 — SIGNIFICANT CHANGE UP
EOSINOPHIL # BLD AUTO: 0 K/UL — SIGNIFICANT CHANGE UP (ref 0–0.5)
EOSINOPHIL # BLD AUTO: 0 K/UL — SIGNIFICANT CHANGE UP (ref 0–0.5)
EOSINOPHIL # BLD AUTO: 0.04 K/UL — SIGNIFICANT CHANGE UP (ref 0–0.5)
EOSINOPHIL # BLD AUTO: 0.06 K/UL — SIGNIFICANT CHANGE UP (ref 0–0.5)
EOSINOPHIL # BLD AUTO: 0.08 K/UL — SIGNIFICANT CHANGE UP (ref 0–0.5)
EOSINOPHIL # BLD AUTO: 0.1 K/UL — SIGNIFICANT CHANGE UP (ref 0–0.5)
EOSINOPHIL # BLD AUTO: 0.14 K/UL — SIGNIFICANT CHANGE UP (ref 0–0.5)
EOSINOPHIL NFR BLD AUTO: 0 % — SIGNIFICANT CHANGE UP (ref 0–6)
EOSINOPHIL NFR BLD AUTO: 0 % — SIGNIFICANT CHANGE UP (ref 0–6)
EOSINOPHIL NFR BLD AUTO: 1.2 % — SIGNIFICANT CHANGE UP (ref 0–6)
EOSINOPHIL NFR BLD AUTO: 1.9 % — SIGNIFICANT CHANGE UP (ref 0–6)
EOSINOPHIL NFR BLD AUTO: 2.6 % — SIGNIFICANT CHANGE UP (ref 0–6)
EOSINOPHIL NFR BLD AUTO: 4.8 % — SIGNIFICANT CHANGE UP (ref 0–6)
EOSINOPHIL NFR BLD AUTO: 5.3 % — SIGNIFICANT CHANGE UP (ref 0–6)
ESTIMATED AVERAGE GLUCOSE: 128 MG/DL — HIGH (ref 68–114)
FLUAV AG NPH QL: SIGNIFICANT CHANGE UP
FLUAV AG NPH QL: SIGNIFICANT CHANGE UP
FLUBV AG NPH QL: SIGNIFICANT CHANGE UP
FLUBV AG NPH QL: SIGNIFICANT CHANGE UP
FLUID INTAKE SUBSTANCE CLASS: SIGNIFICANT CHANGE UP
FLUID INTAKE SUBSTANCE CLASS: SIGNIFICANT CHANGE UP
GAS PNL BLDA: SIGNIFICANT CHANGE UP
GAS PNL BLDV: SIGNIFICANT CHANGE UP
GLUCOSE BLDC GLUCOMTR-MCNC: 108 MG/DL — HIGH (ref 70–99)
GLUCOSE BLDC GLUCOMTR-MCNC: 114 MG/DL — HIGH (ref 70–99)
GLUCOSE BLDC GLUCOMTR-MCNC: 116 MG/DL — HIGH (ref 70–99)
GLUCOSE BLDC GLUCOMTR-MCNC: 125 MG/DL — HIGH (ref 70–99)
GLUCOSE BLDC GLUCOMTR-MCNC: 131 MG/DL — HIGH (ref 70–99)
GLUCOSE BLDC GLUCOMTR-MCNC: 136 MG/DL — HIGH (ref 70–99)
GLUCOSE BLDC GLUCOMTR-MCNC: 137 MG/DL — HIGH (ref 70–99)
GLUCOSE BLDC GLUCOMTR-MCNC: 140 MG/DL — HIGH (ref 70–99)
GLUCOSE BLDC GLUCOMTR-MCNC: 142 MG/DL — HIGH (ref 70–99)
GLUCOSE BLDC GLUCOMTR-MCNC: 143 MG/DL — HIGH (ref 70–99)
GLUCOSE BLDC GLUCOMTR-MCNC: 221 MG/DL — HIGH (ref 70–99)
GLUCOSE BLDC GLUCOMTR-MCNC: 90 MG/DL — SIGNIFICANT CHANGE UP (ref 70–99)
GLUCOSE BLDC GLUCOMTR-MCNC: 95 MG/DL — SIGNIFICANT CHANGE UP (ref 70–99)
GLUCOSE FLD-MCNC: 103 MG/DL — SIGNIFICANT CHANGE UP
GLUCOSE FLD-MCNC: 109 MG/DL — SIGNIFICANT CHANGE UP
GLUCOSE SERPL-MCNC: 101 MG/DL — HIGH (ref 70–99)
GLUCOSE SERPL-MCNC: 104 MG/DL — HIGH (ref 70–99)
GLUCOSE SERPL-MCNC: 105 MG/DL — HIGH (ref 70–99)
GLUCOSE SERPL-MCNC: 106 MG/DL — HIGH (ref 70–99)
GLUCOSE SERPL-MCNC: 107 MG/DL — HIGH (ref 70–99)
GLUCOSE SERPL-MCNC: 108 MG/DL — HIGH (ref 70–99)
GLUCOSE SERPL-MCNC: 109 MG/DL — HIGH (ref 70–99)
GLUCOSE SERPL-MCNC: 110 MG/DL — HIGH (ref 70–99)
GLUCOSE SERPL-MCNC: 112 MG/DL — HIGH (ref 70–99)
GLUCOSE SERPL-MCNC: 118 MG/DL — HIGH (ref 70–99)
GLUCOSE SERPL-MCNC: 118 MG/DL — HIGH (ref 70–99)
GLUCOSE SERPL-MCNC: 134 MG/DL — HIGH (ref 70–99)
GLUCOSE SERPL-MCNC: 138 MG/DL — HIGH (ref 70–99)
GLUCOSE SERPL-MCNC: 145 MG/DL — HIGH (ref 70–99)
GLUCOSE SERPL-MCNC: 168 MG/DL — HIGH (ref 70–99)
GLUCOSE SERPL-MCNC: 183 MG/DL — HIGH (ref 70–99)
GLUCOSE SERPL-MCNC: 95 MG/DL — SIGNIFICANT CHANGE UP (ref 70–99)
GLUCOSE UR QL: NEGATIVE MG/DL — SIGNIFICANT CHANGE UP
GRAM STN FLD: SIGNIFICANT CHANGE UP
GRAM STN FLD: SIGNIFICANT CHANGE UP
HCO3 BLDA-SCNC: 25 MMOL/L — SIGNIFICANT CHANGE UP (ref 21–28)
HCO3 BLDA-SCNC: 29 MMOL/L — HIGH (ref 21–28)
HCT VFR BLD CALC: 27.4 % — LOW (ref 34.5–45)
HCT VFR BLD CALC: 29.5 % — LOW (ref 34.5–45)
HCT VFR BLD CALC: 30 % — LOW (ref 34.5–45)
HCT VFR BLD CALC: 30.1 % — LOW (ref 34.5–45)
HCT VFR BLD CALC: 31.5 % — LOW (ref 34.5–45)
HCT VFR BLD CALC: 31.9 % — LOW (ref 34.5–45)
HCT VFR BLD CALC: 32.1 % — LOW (ref 34.5–45)
HCT VFR BLD CALC: 32.4 % — LOW (ref 34.5–45)
HCT VFR BLD CALC: 32.5 % — LOW (ref 34.5–45)
HCT VFR BLD CALC: 33.1 % — LOW (ref 34.5–45)
HCT VFR BLD CALC: 34 % — LOW (ref 34.5–45)
HCT VFR BLD CALC: 34.6 % — SIGNIFICANT CHANGE UP (ref 34.5–45)
HCT VFR BLD CALC: 36.4 % — SIGNIFICANT CHANGE UP (ref 34.5–45)
HCT VFR BLD CALC: 36.7 % — SIGNIFICANT CHANGE UP (ref 34.5–45)
HCT VFR BLD CALC: 37.4 % — SIGNIFICANT CHANGE UP (ref 34.5–45)
HDLC SERPL-MCNC: 24 MG/DL — LOW
HGB BLD-MCNC: 10 G/DL — LOW (ref 11.5–15.5)
HGB BLD-MCNC: 10.1 G/DL — LOW (ref 11.5–15.5)
HGB BLD-MCNC: 10.1 G/DL — LOW (ref 11.5–15.5)
HGB BLD-MCNC: 10.2 G/DL — LOW (ref 11.5–15.5)
HGB BLD-MCNC: 10.3 G/DL — LOW (ref 11.5–15.5)
HGB BLD-MCNC: 10.3 G/DL — LOW (ref 11.5–15.5)
HGB BLD-MCNC: 10.5 G/DL — LOW (ref 11.5–15.5)
HGB BLD-MCNC: 10.5 G/DL — LOW (ref 11.5–15.5)
HGB BLD-MCNC: 11 G/DL — LOW (ref 11.5–15.5)
HGB BLD-MCNC: 11.2 G/DL — LOW (ref 11.5–15.5)
HGB BLD-MCNC: 11.6 G/DL — SIGNIFICANT CHANGE UP (ref 11.5–15.5)
HGB BLD-MCNC: 8.6 G/DL — LOW (ref 11.5–15.5)
HGB BLD-MCNC: 9.2 G/DL — LOW (ref 11.5–15.5)
HGB BLD-MCNC: 9.4 G/DL — LOW (ref 11.5–15.5)
HGB BLD-MCNC: 9.8 G/DL — LOW (ref 11.5–15.5)
HOROWITZ INDEX BLDA+IHG-RTO: 36 — SIGNIFICANT CHANGE UP
HYPOCHROMIA BLD QL: SLIGHT — SIGNIFICANT CHANGE UP
IMM GRANULOCYTES NFR BLD AUTO: 0.5 % — SIGNIFICANT CHANGE UP (ref 0–0.9)
IMM GRANULOCYTES NFR BLD AUTO: 1 % — HIGH (ref 0–0.9)
IMM GRANULOCYTES NFR BLD AUTO: 1 % — HIGH (ref 0–0.9)
IMM GRANULOCYTES NFR BLD AUTO: 1.2 % — HIGH (ref 0–0.9)
INR BLD: 1.3 RATIO — HIGH (ref 0.85–1.16)
INR BLD: 1.36 RATIO — HIGH (ref 0.85–1.16)
INR BLD: 1.45 RATIO — HIGH (ref 0.85–1.16)
INR BLD: 1.67 RATIO — HIGH (ref 0.85–1.16)
INR BLD: 2.22 RATIO — HIGH (ref 0.85–1.16)
KETONES UR QL: ABNORMAL MG/DL
LDH SERPL L TO P-CCNC: 122 U/L — SIGNIFICANT CHANGE UP
LDH SERPL L TO P-CCNC: 1935 U/L — SIGNIFICANT CHANGE UP
LDH SERPL L TO P-CCNC: 263 U/L — HIGH (ref 50–242)
LDLC SERPL-MCNC: 89 MG/DL — SIGNIFICANT CHANGE UP
LEUKOCYTE ESTERASE UR-ACNC: NEGATIVE — SIGNIFICANT CHANGE UP
LIPID PNL WITH DIRECT LDL SERPL: 89 MG/DL — SIGNIFICANT CHANGE UP
LYMPHOCYTES # BLD AUTO: 0.14 K/UL — LOW (ref 1–3.3)
LYMPHOCYTES # BLD AUTO: 0.21 K/UL — LOW (ref 1–3.3)
LYMPHOCYTES # BLD AUTO: 0.35 K/UL — LOW (ref 1–3.3)
LYMPHOCYTES # BLD AUTO: 0.44 K/UL — LOW (ref 1–3.3)
LYMPHOCYTES # BLD AUTO: 0.48 K/UL — LOW (ref 1–3.3)
LYMPHOCYTES # BLD AUTO: 0.55 K/UL — LOW (ref 1–3.3)
LYMPHOCYTES # BLD AUTO: 10.3 % — LOW (ref 13–44)
LYMPHOCYTES # BLD AUTO: 15.4 % — SIGNIFICANT CHANGE UP (ref 13–44)
LYMPHOCYTES # BLD AUTO: 17.9 % — SIGNIFICANT CHANGE UP (ref 13–44)
LYMPHOCYTES # BLD AUTO: 2.44 K/UL — SIGNIFICANT CHANGE UP (ref 1–3.3)
LYMPHOCYTES # BLD AUTO: 21.3 % — SIGNIFICANT CHANGE UP (ref 13–44)
LYMPHOCYTES # BLD AUTO: 3 % — LOW (ref 13–44)
LYMPHOCYTES # BLD AUTO: 5.3 % — LOW (ref 13–44)
LYMPHOCYTES # BLD AUTO: 60.5 % — HIGH (ref 13–44)
LYMPHOCYTES # FLD: 8 % — SIGNIFICANT CHANGE UP
LYMPHOCYTES # FLD: 81 % — SIGNIFICANT CHANGE UP
MACROCYTES BLD QL: SLIGHT — SIGNIFICANT CHANGE UP
MAGNESIUM SERPL-MCNC: 2.1 MG/DL — SIGNIFICANT CHANGE UP (ref 1.6–2.6)
MAGNESIUM SERPL-MCNC: 2.1 MG/DL — SIGNIFICANT CHANGE UP (ref 1.6–2.6)
MAGNESIUM SERPL-MCNC: 2.2 MG/DL — SIGNIFICANT CHANGE UP (ref 1.6–2.6)
MAGNESIUM SERPL-MCNC: 2.3 MG/DL — SIGNIFICANT CHANGE UP (ref 1.6–2.6)
MAGNESIUM SERPL-MCNC: 2.4 MG/DL — SIGNIFICANT CHANGE UP (ref 1.6–2.6)
MANUAL SMEAR VERIFICATION: SIGNIFICANT CHANGE UP
MCHC RBC-ENTMCNC: 26.7 PG — LOW (ref 27–34)
MCHC RBC-ENTMCNC: 27 PG — SIGNIFICANT CHANGE UP (ref 27–34)
MCHC RBC-ENTMCNC: 27 PG — SIGNIFICANT CHANGE UP (ref 27–34)
MCHC RBC-ENTMCNC: 27.3 PG — SIGNIFICANT CHANGE UP (ref 27–34)
MCHC RBC-ENTMCNC: 27.4 PG — SIGNIFICANT CHANGE UP (ref 27–34)
MCHC RBC-ENTMCNC: 27.4 PG — SIGNIFICANT CHANGE UP (ref 27–34)
MCHC RBC-ENTMCNC: 27.5 PG — SIGNIFICANT CHANGE UP (ref 27–34)
MCHC RBC-ENTMCNC: 27.6 PG — SIGNIFICANT CHANGE UP (ref 27–34)
MCHC RBC-ENTMCNC: 27.7 PG — SIGNIFICANT CHANGE UP (ref 27–34)
MCHC RBC-ENTMCNC: 27.8 PG — SIGNIFICANT CHANGE UP (ref 27–34)
MCHC RBC-ENTMCNC: 28.1 PG — SIGNIFICANT CHANGE UP (ref 27–34)
MCHC RBC-ENTMCNC: 28.2 PG — SIGNIFICANT CHANGE UP (ref 27–34)
MCHC RBC-ENTMCNC: 28.3 PG — SIGNIFICANT CHANGE UP (ref 27–34)
MCHC RBC-ENTMCNC: 30.2 G/DL — LOW (ref 32–36)
MCHC RBC-ENTMCNC: 30.3 G/DL — LOW (ref 32–36)
MCHC RBC-ENTMCNC: 30.5 G/DL — LOW (ref 32–36)
MCHC RBC-ENTMCNC: 30.7 G/DL — LOW (ref 32–36)
MCHC RBC-ENTMCNC: 30.7 G/DL — LOW (ref 32–36)
MCHC RBC-ENTMCNC: 30.8 G/DL — LOW (ref 32–36)
MCHC RBC-ENTMCNC: 30.9 G/DL — LOW (ref 32–36)
MCHC RBC-ENTMCNC: 31 G/DL — LOW (ref 32–36)
MCHC RBC-ENTMCNC: 31.1 G/DL — LOW (ref 32–36)
MCHC RBC-ENTMCNC: 31.2 G/DL — LOW (ref 32–36)
MCHC RBC-ENTMCNC: 31.4 G/DL — LOW (ref 32–36)
MCHC RBC-ENTMCNC: 31.9 G/DL — LOW (ref 32–36)
MCHC RBC-ENTMCNC: 32.1 G/DL — SIGNIFICANT CHANGE UP (ref 32–36)
MCHC RBC-ENTMCNC: 32.1 G/DL — SIGNIFICANT CHANGE UP (ref 32–36)
MCHC RBC-ENTMCNC: 33.9 G/DL — SIGNIFICANT CHANGE UP (ref 32–36)
MCV RBC AUTO: 83.1 FL — SIGNIFICANT CHANGE UP (ref 80–100)
MCV RBC AUTO: 87 FL — SIGNIFICANT CHANGE UP (ref 80–100)
MCV RBC AUTO: 87.6 FL — SIGNIFICANT CHANGE UP (ref 80–100)
MCV RBC AUTO: 87.7 FL — SIGNIFICANT CHANGE UP (ref 80–100)
MCV RBC AUTO: 87.8 FL — SIGNIFICANT CHANGE UP (ref 80–100)
MCV RBC AUTO: 88 FL — SIGNIFICANT CHANGE UP (ref 80–100)
MCV RBC AUTO: 88.2 FL — SIGNIFICANT CHANGE UP (ref 80–100)
MCV RBC AUTO: 88.3 FL — SIGNIFICANT CHANGE UP (ref 80–100)
MCV RBC AUTO: 88.5 FL — SIGNIFICANT CHANGE UP (ref 80–100)
MCV RBC AUTO: 88.8 FL — SIGNIFICANT CHANGE UP (ref 80–100)
MCV RBC AUTO: 88.9 FL — SIGNIFICANT CHANGE UP (ref 80–100)
MCV RBC AUTO: 88.9 FL — SIGNIFICANT CHANGE UP (ref 80–100)
MCV RBC AUTO: 89.3 FL — SIGNIFICANT CHANGE UP (ref 80–100)
MCV RBC AUTO: 89.4 FL — SIGNIFICANT CHANGE UP (ref 80–100)
MCV RBC AUTO: 89.7 FL — SIGNIFICANT CHANGE UP (ref 80–100)
MICROCYTES BLD QL: SLIGHT — SIGNIFICANT CHANGE UP
MICROCYTES BLD QL: SLIGHT — SIGNIFICANT CHANGE UP
MONOCYTES # BLD AUTO: 0 K/UL — SIGNIFICANT CHANGE UP (ref 0–0.9)
MONOCYTES # BLD AUTO: 0.62 K/UL — SIGNIFICANT CHANGE UP (ref 0–0.9)
MONOCYTES # BLD AUTO: 0.7 K/UL — SIGNIFICANT CHANGE UP (ref 0–0.9)
MONOCYTES # BLD AUTO: 0.81 K/UL — SIGNIFICANT CHANGE UP (ref 0–0.9)
MONOCYTES # BLD AUTO: 0.84 K/UL — SIGNIFICANT CHANGE UP (ref 0–0.9)
MONOCYTES # BLD AUTO: 0.9 K/UL — SIGNIFICANT CHANGE UP (ref 0–0.9)
MONOCYTES # BLD AUTO: 1.42 K/UL — HIGH (ref 0–0.9)
MONOCYTES NFR BLD AUTO: 0 % — LOW (ref 2–14)
MONOCYTES NFR BLD AUTO: 20 % — HIGH (ref 2–14)
MONOCYTES NFR BLD AUTO: 22.5 % — HIGH (ref 2–14)
MONOCYTES NFR BLD AUTO: 23 % — HIGH (ref 2–14)
MONOCYTES NFR BLD AUTO: 26.3 % — HIGH (ref 2–14)
MONOCYTES NFR BLD AUTO: 26.5 % — HIGH (ref 2–14)
MONOCYTES NFR BLD AUTO: 40.6 % — HIGH (ref 2–14)
MONOS+MACROS # FLD: 1 % — SIGNIFICANT CHANGE UP
MONOS+MACROS # FLD: 6 % — SIGNIFICANT CHANGE UP
MRSA PCR RESULT.: SIGNIFICANT CHANGE UP
NEUTROPHILS # BLD AUTO: 0.64 K/UL — LOW (ref 1.8–7.4)
NEUTROPHILS # BLD AUTO: 1.58 K/UL — LOW (ref 1.8–7.4)
NEUTROPHILS # BLD AUTO: 1.59 K/UL — LOW (ref 1.8–7.4)
NEUTROPHILS # BLD AUTO: 1.65 K/UL — LOW (ref 1.8–7.4)
NEUTROPHILS # BLD AUTO: 1.8 K/UL — SIGNIFICANT CHANGE UP (ref 1.8–7.4)
NEUTROPHILS # BLD AUTO: 2.03 K/UL — SIGNIFICANT CHANGE UP (ref 1.8–7.4)
NEUTROPHILS # BLD AUTO: 5.47 K/UL — SIGNIFICANT CHANGE UP (ref 1.8–7.4)
NEUTROPHILS NFR BLD AUTO: 30.9 % — LOW (ref 43–77)
NEUTROPHILS NFR BLD AUTO: 36.7 % — LOW (ref 43–77)
NEUTROPHILS NFR BLD AUTO: 51.2 % — SIGNIFICANT CHANGE UP (ref 43–77)
NEUTROPHILS NFR BLD AUTO: 57.9 % — SIGNIFICANT CHANGE UP (ref 43–77)
NEUTROPHILS NFR BLD AUTO: 59.6 % — SIGNIFICANT CHANGE UP (ref 43–77)
NEUTROPHILS NFR BLD AUTO: 60.2 % — SIGNIFICANT CHANGE UP (ref 43–77)
NEUTROPHILS NFR BLD AUTO: 77 % — SIGNIFICANT CHANGE UP (ref 43–77)
NEUTROPHILS-BODY FLUID: 13 % — SIGNIFICANT CHANGE UP
NEUTROPHILS-BODY FLUID: 91 % — SIGNIFICANT CHANGE UP
NEUTS BAND # BLD: 0.9 % — SIGNIFICANT CHANGE UP (ref 0–8)
NEUTS BAND # BLD: 2.8 % — SIGNIFICANT CHANGE UP (ref 0–8)
NEUTS BAND NFR BLD: 0.9 % — SIGNIFICANT CHANGE UP (ref 0–8)
NEUTS BAND NFR BLD: 2.8 % — SIGNIFICANT CHANGE UP (ref 0–8)
NIGHT BLUE STAIN TISS: SIGNIFICANT CHANGE UP
NITRITE UR-MCNC: NEGATIVE — SIGNIFICANT CHANGE UP
NON-GYNECOLOGICAL CYTOLOGY STUDY: SIGNIFICANT CHANGE UP
NONHDLC SERPL-MCNC: 105 MG/DL — SIGNIFICANT CHANGE UP
NRBC # BLD AUTO: 0 K/UL — SIGNIFICANT CHANGE UP (ref 0–0)
NRBC # BLD: 0 /100 WBCS — SIGNIFICANT CHANGE UP (ref 0–0)
NRBC # FLD: 0 K/UL — SIGNIFICANT CHANGE UP (ref 0–0)
NRBC BLD AUTO-RTO: 0 /100 WBCS — SIGNIFICANT CHANGE UP (ref 0–0)
NRBC BLD AUTO-RTO: 1 /100 WBCS — HIGH (ref 0–0)
NRBC BLD-RTO: 0 /100 WBCS — SIGNIFICANT CHANGE UP (ref 0–0)
NT-PROBNP SERPL-SCNC: HIGH PG/ML (ref 0–300)
NT-PROBNP SERPL-SCNC: HIGH PG/ML (ref 0–300)
OVALOCYTES BLD QL SMEAR: SLIGHT — SIGNIFICANT CHANGE UP
PCO2 BLDA: 33 MMHG — SIGNIFICANT CHANGE UP (ref 32–45)
PCO2 BLDA: 45 MMHG — SIGNIFICANT CHANGE UP (ref 32–45)
PH BLDA: 7.42 — SIGNIFICANT CHANGE UP (ref 7.35–7.45)
PH BLDA: 7.48 — HIGH (ref 7.35–7.45)
PH FLD: 7.55 — SIGNIFICANT CHANGE UP
PH UR: 5.5 — SIGNIFICANT CHANGE UP (ref 5–8)
PHOSPHATE SERPL-MCNC: 2.6 MG/DL — SIGNIFICANT CHANGE UP (ref 2.5–4.5)
PHOSPHATE SERPL-MCNC: 2.7 MG/DL — SIGNIFICANT CHANGE UP (ref 2.5–4.5)
PHOSPHATE SERPL-MCNC: 2.8 MG/DL — SIGNIFICANT CHANGE UP (ref 2.5–4.5)
PHOSPHATE SERPL-MCNC: 2.9 MG/DL — SIGNIFICANT CHANGE UP (ref 2.5–4.5)
PHOSPHATE SERPL-MCNC: 2.9 MG/DL — SIGNIFICANT CHANGE UP (ref 2.5–4.5)
PHOSPHATE SERPL-MCNC: 3 MG/DL — SIGNIFICANT CHANGE UP (ref 2.5–4.5)
PHOSPHATE SERPL-MCNC: 3 MG/DL — SIGNIFICANT CHANGE UP (ref 2.5–4.5)
PHOSPHATE SERPL-MCNC: 3.1 MG/DL — SIGNIFICANT CHANGE UP (ref 2.5–4.5)
PHOSPHATE SERPL-MCNC: 3.2 MG/DL — SIGNIFICANT CHANGE UP (ref 2.5–4.5)
PLAT MORPH BLD: NORMAL — SIGNIFICANT CHANGE UP
PLATELET # BLD AUTO: 261 K/UL — SIGNIFICANT CHANGE UP (ref 150–400)
PLATELET # BLD AUTO: 287 K/UL — SIGNIFICANT CHANGE UP (ref 150–400)
PLATELET # BLD AUTO: 287 K/UL — SIGNIFICANT CHANGE UP (ref 150–400)
PLATELET # BLD AUTO: 294 K/UL — SIGNIFICANT CHANGE UP (ref 150–400)
PLATELET # BLD AUTO: 306 K/UL — SIGNIFICANT CHANGE UP (ref 150–400)
PLATELET # BLD AUTO: 309 K/UL — SIGNIFICANT CHANGE UP (ref 150–400)
PLATELET # BLD AUTO: 310 K/UL — SIGNIFICANT CHANGE UP (ref 150–400)
PLATELET # BLD AUTO: 349 K/UL — SIGNIFICANT CHANGE UP (ref 150–400)
PLATELET # BLD AUTO: 367 K/UL — SIGNIFICANT CHANGE UP (ref 150–400)
PLATELET # BLD AUTO: 417 K/UL — HIGH (ref 150–400)
PLATELET # BLD AUTO: 431 K/UL — HIGH (ref 150–400)
PLATELET # BLD AUTO: 432 K/UL — HIGH (ref 150–400)
PLATELET # BLD AUTO: 452 K/UL — HIGH (ref 150–400)
PLATELET # BLD AUTO: 466 K/UL — HIGH (ref 150–400)
PLATELET # BLD AUTO: 467 K/UL — HIGH (ref 150–400)
PMV BLD: 10 FL — SIGNIFICANT CHANGE UP (ref 7–13)
PMV BLD: 10.1 FL — SIGNIFICANT CHANGE UP (ref 7–13)
PMV BLD: 9.5 FL — SIGNIFICANT CHANGE UP (ref 7–13)
PMV BLD: 9.9 FL — SIGNIFICANT CHANGE UP (ref 7–13)
PO2 BLDA: 110 MMHG — HIGH (ref 83–108)
PO2 BLDA: 82 MMHG — LOW (ref 83–108)
POIKILOCYTOSIS BLD QL AUTO: SIGNIFICANT CHANGE UP
POIKILOCYTOSIS BLD QL AUTO: SLIGHT — SIGNIFICANT CHANGE UP
POLYCHROMASIA BLD QL SMEAR: SIGNIFICANT CHANGE UP
POLYCHROMASIA BLD QL SMEAR: SLIGHT — SIGNIFICANT CHANGE UP
POLYCHROMASIA BLD QL SMEAR: SLIGHT — SIGNIFICANT CHANGE UP
POTASSIUM SERPL-MCNC: 4.4 MMOL/L — SIGNIFICANT CHANGE UP (ref 3.5–5.3)
POTASSIUM SERPL-MCNC: 4.6 MMOL/L — SIGNIFICANT CHANGE UP (ref 3.5–5.3)
POTASSIUM SERPL-MCNC: 4.7 MMOL/L — SIGNIFICANT CHANGE UP (ref 3.5–5.3)
POTASSIUM SERPL-MCNC: 4.8 MMOL/L — SIGNIFICANT CHANGE UP (ref 3.5–5.3)
POTASSIUM SERPL-MCNC: 5 MMOL/L — SIGNIFICANT CHANGE UP (ref 3.5–5.3)
POTASSIUM SERPL-MCNC: 5.2 MMOL/L — SIGNIFICANT CHANGE UP (ref 3.5–5.3)
POTASSIUM SERPL-MCNC: 5.3 MMOL/L — SIGNIFICANT CHANGE UP (ref 3.5–5.3)
POTASSIUM SERPL-MCNC: 5.3 MMOL/L — SIGNIFICANT CHANGE UP (ref 3.5–5.3)
POTASSIUM SERPL-MCNC: 5.4 MMOL/L — HIGH (ref 3.5–5.3)
POTASSIUM SERPL-MCNC: 5.5 MMOL/L — HIGH (ref 3.5–5.3)
POTASSIUM SERPL-MCNC: 5.6 MMOL/L — HIGH (ref 3.5–5.3)
POTASSIUM SERPL-MCNC: 5.7 MMOL/L — HIGH (ref 3.5–5.3)
POTASSIUM SERPL-MCNC: 6.3 MMOL/L — CRITICAL HIGH (ref 3.5–5.3)
POTASSIUM SERPL-SCNC: 4.4 MMOL/L — SIGNIFICANT CHANGE UP (ref 3.5–5.3)
POTASSIUM SERPL-SCNC: 4.6 MMOL/L — SIGNIFICANT CHANGE UP (ref 3.5–5.3)
POTASSIUM SERPL-SCNC: 4.7 MMOL/L — SIGNIFICANT CHANGE UP (ref 3.5–5.3)
POTASSIUM SERPL-SCNC: 4.8 MMOL/L — SIGNIFICANT CHANGE UP (ref 3.5–5.3)
POTASSIUM SERPL-SCNC: 5 MMOL/L — SIGNIFICANT CHANGE UP (ref 3.5–5.3)
POTASSIUM SERPL-SCNC: 5.2 MMOL/L — SIGNIFICANT CHANGE UP (ref 3.5–5.3)
POTASSIUM SERPL-SCNC: 5.3 MMOL/L — SIGNIFICANT CHANGE UP (ref 3.5–5.3)
POTASSIUM SERPL-SCNC: 5.3 MMOL/L — SIGNIFICANT CHANGE UP (ref 3.5–5.3)
POTASSIUM SERPL-SCNC: 5.4 MMOL/L — HIGH (ref 3.5–5.3)
POTASSIUM SERPL-SCNC: 5.5 MMOL/L — HIGH (ref 3.5–5.3)
POTASSIUM SERPL-SCNC: 5.6 MMOL/L — HIGH (ref 3.5–5.3)
POTASSIUM SERPL-SCNC: 5.7 MMOL/L — HIGH (ref 3.5–5.3)
POTASSIUM SERPL-SCNC: 6.3 MMOL/L — CRITICAL HIGH (ref 3.5–5.3)
PROT FLD-MCNC: 2.1 G/DL — SIGNIFICANT CHANGE UP
PROT FLD-MCNC: 4.3 G/DL — SIGNIFICANT CHANGE UP
PROT SERPL-MCNC: 6.1 G/DL — SIGNIFICANT CHANGE UP (ref 6–8.3)
PROT SERPL-MCNC: 6.2 G/DL — SIGNIFICANT CHANGE UP (ref 6–8.3)
PROT SERPL-MCNC: 6.3 G/DL — SIGNIFICANT CHANGE UP (ref 6–8.3)
PROT SERPL-MCNC: 6.5 G/DL — SIGNIFICANT CHANGE UP (ref 6–8.3)
PROT SERPL-MCNC: 6.5 G/DL — SIGNIFICANT CHANGE UP (ref 6–8.3)
PROT SERPL-MCNC: 6.7 G/DL — SIGNIFICANT CHANGE UP (ref 6–8.3)
PROT SERPL-MCNC: 6.8 G/DL — SIGNIFICANT CHANGE UP (ref 6–8.3)
PROT SERPL-MCNC: 6.9 G/DL — SIGNIFICANT CHANGE UP (ref 6–8.3)
PROT SERPL-MCNC: 6.9 G/DL — SIGNIFICANT CHANGE UP (ref 6–8.3)
PROT SERPL-MCNC: 7 G/DL — SIGNIFICANT CHANGE UP (ref 6–8.3)
PROT UR-MCNC: SIGNIFICANT CHANGE UP MG/DL
PROTHROM AB SERPL-ACNC: 14.8 SEC — HIGH (ref 9.9–13.4)
PROTHROM AB SERPL-ACNC: 15.6 SEC — HIGH (ref 9.9–13.4)
PROTHROM AB SERPL-ACNC: 16.5 SEC — HIGH (ref 9.9–13.4)
PROTHROM AB SERPL-ACNC: 19.1 SEC — HIGH (ref 9.9–13.4)
PROTHROM AB SERPL-ACNC: 25.1 SEC — HIGH (ref 9.9–13.4)
RBC # BLD: 3.12 M/UL — LOW (ref 3.8–5.2)
RBC # BLD: 3.36 M/UL — LOW (ref 3.8–5.2)
RBC # BLD: 3.39 M/UL — LOW (ref 3.8–5.2)
RBC # BLD: 3.59 M/UL — LOW (ref 3.8–5.2)
RBC # BLD: 3.59 M/UL — LOW (ref 3.8–5.2)
RBC # BLD: 3.62 M/UL — LOW (ref 3.8–5.2)
RBC # BLD: 3.64 M/UL — LOW (ref 3.8–5.2)
RBC # BLD: 3.66 M/UL — LOW (ref 3.8–5.2)
RBC # BLD: 3.67 M/UL — LOW (ref 3.8–5.2)
RBC # BLD: 3.76 M/UL — LOW (ref 3.8–5.2)
RBC # BLD: 3.84 M/UL — SIGNIFICANT CHANGE UP (ref 3.8–5.2)
RBC # BLD: 3.89 M/UL — SIGNIFICANT CHANGE UP (ref 3.8–5.2)
RBC # BLD: 4.07 M/UL — SIGNIFICANT CHANGE UP (ref 3.8–5.2)
RBC # BLD: 4.17 M/UL — SIGNIFICANT CHANGE UP (ref 3.8–5.2)
RBC # BLD: 4.19 M/UL — SIGNIFICANT CHANGE UP (ref 3.8–5.2)
RBC # FLD: 14 % — SIGNIFICANT CHANGE UP (ref 10.3–14.5)
RBC # FLD: 14.4 % — SIGNIFICANT CHANGE UP (ref 10.3–14.5)
RBC # FLD: 14.6 % — HIGH (ref 10.3–14.5)
RBC # FLD: 15.1 % — HIGH (ref 10.3–14.5)
RBC # FLD: 15.1 % — HIGH (ref 10.3–14.5)
RBC # FLD: 15.2 % — HIGH (ref 10.3–14.5)
RBC # FLD: 15.6 % — HIGH (ref 10.3–14.5)
RBC # FLD: 15.7 % — HIGH (ref 10.3–14.5)
RBC # FLD: 15.8 % — HIGH (ref 10.3–14.5)
RBC # FLD: 15.8 % — HIGH (ref 10.3–14.5)
RBC # FLD: 15.9 % — HIGH (ref 10.3–14.5)
RBC # FLD: 15.9 % — HIGH (ref 10.3–14.5)
RBC # FLD: 16.2 % — HIGH (ref 10.3–14.5)
RBC # FLD: 16.3 % — HIGH (ref 10.3–14.5)
RBC # FLD: 16.3 % — HIGH (ref 10.3–14.5)
RBC BLD AUTO: ABNORMAL
RBC BLD AUTO: ABNORMAL
RBC BLD AUTO: SIGNIFICANT CHANGE UP
RCV VOL RI: <2000 CELLS/UL — SIGNIFICANT CHANGE UP
RCV VOL RI: SIGNIFICANT CHANGE UP CELLS/UL
RH IG SCN BLD-IMP: POSITIVE — SIGNIFICANT CHANGE UP
RH IG SCN BLD-IMP: POSITIVE — SIGNIFICANT CHANGE UP
RSV RNA NPH QL NAA+NON-PROBE: SIGNIFICANT CHANGE UP
RSV RNA NPH QL NAA+NON-PROBE: SIGNIFICANT CHANGE UP
S AUREUS DNA NOSE QL NAA+PROBE: SIGNIFICANT CHANGE UP
SAO2 % BLDA: 98.2 % — HIGH (ref 94–98)
SAO2 % BLDA: 99.8 % — HIGH (ref 94–98)
SARS-COV-2 RNA SPEC QL NAA+PROBE: SIGNIFICANT CHANGE UP
SARS-COV-2 RNA SPEC QL NAA+PROBE: SIGNIFICANT CHANGE UP
SCHISTOCYTES BLD QL AUTO: SLIGHT — SIGNIFICANT CHANGE UP
SODIUM SERPL-SCNC: 127 MMOL/L — LOW (ref 135–145)
SODIUM SERPL-SCNC: 128 MMOL/L — LOW (ref 135–145)
SODIUM SERPL-SCNC: 129 MMOL/L — LOW (ref 135–145)
SODIUM SERPL-SCNC: 130 MMOL/L — LOW (ref 135–145)
SODIUM SERPL-SCNC: 131 MMOL/L — LOW (ref 135–145)
SODIUM SERPL-SCNC: 132 MMOL/L — LOW (ref 135–145)
SODIUM SERPL-SCNC: 132 MMOL/L — LOW (ref 135–145)
SODIUM SERPL-SCNC: 134 MMOL/L — LOW (ref 135–145)
SODIUM SERPL-SCNC: 135 MMOL/L — SIGNIFICANT CHANGE UP (ref 135–145)
SODIUM SERPL-SCNC: 136 MMOL/L — SIGNIFICANT CHANGE UP (ref 135–145)
SODIUM SERPL-SCNC: 136 MMOL/L — SIGNIFICANT CHANGE UP (ref 135–145)
SODIUM SERPL-SCNC: 138 MMOL/L — SIGNIFICANT CHANGE UP (ref 135–145)
SOURCE RESPIRATORY: SIGNIFICANT CHANGE UP
SOURCE RESPIRATORY: SIGNIFICANT CHANGE UP
SP GR SPEC: 1.03 — SIGNIFICANT CHANGE UP (ref 1–1.03)
SPECIMEN SOURCE: SIGNIFICANT CHANGE UP
T4 FREE SERPL-MCNC: 1.6 NG/DL — SIGNIFICANT CHANGE UP (ref 0.9–1.8)
TOTAL NUCLEATED CELL COUNT, BODY FLUID: 7 CELLS/UL — SIGNIFICANT CHANGE UP
TOTAL NUCLEATED CELL COUNT, BODY FLUID: 78 CELLS/UL — SIGNIFICANT CHANGE UP
TRIGL SERPL-MCNC: 80 MG/DL — SIGNIFICANT CHANGE UP
TROPONIN T, HIGH SENSITIVITY RESULT: 274 NG/L — HIGH (ref 0–51)
TROPONIN T, HIGH SENSITIVITY RESULT: 307 NG/L — HIGH (ref 0–51)
TROPONIN T, HIGH SENSITIVITY RESULT: 338 NG/L — HIGH (ref 0–51)
TSH SERPL-MCNC: 3.45 UIU/ML — SIGNIFICANT CHANGE UP (ref 0.27–4.2)
TUBE TYPE: SIGNIFICANT CHANGE UP
TUBE TYPE: SIGNIFICANT CHANGE UP
UFH PPP CHRO-ACNC: 0.05 IU/ML — LOW (ref 0.3–0.7)
UFH PPP CHRO-ACNC: 0.11 IU/ML — LOW (ref 0.3–0.7)
UROBILINOGEN FLD QL: 1 MG/DL — SIGNIFICANT CHANGE UP (ref 0.2–1)
WBC # BLD: 2.07 K/UL — LOW (ref 3.8–10.5)
WBC # BLD: 2.7 K/UL — LOW (ref 3.8–10.5)
WBC # BLD: 3.08 K/UL — LOW (ref 3.8–10.5)
WBC # BLD: 3.11 K/UL — LOW (ref 3.8–10.5)
WBC # BLD: 3.4 K/UL — LOW (ref 3.8–10.5)
WBC # BLD: 3.45 K/UL — LOW (ref 3.8–10.5)
WBC # BLD: 3.6 K/UL — LOW (ref 3.8–10.5)
WBC # BLD: 3.88 K/UL — SIGNIFICANT CHANGE UP (ref 3.8–10.5)
WBC # BLD: 4.02 K/UL — SIGNIFICANT CHANGE UP (ref 3.8–10.5)
WBC # BLD: 4.03 K/UL — SIGNIFICANT CHANGE UP (ref 3.8–10.5)
WBC # BLD: 4.48 K/UL — SIGNIFICANT CHANGE UP (ref 3.8–10.5)
WBC # BLD: 4.52 K/UL — SIGNIFICANT CHANGE UP (ref 3.8–10.5)
WBC # BLD: 5.47 K/UL — SIGNIFICANT CHANGE UP (ref 3.8–10.5)
WBC # BLD: 5.8 K/UL — SIGNIFICANT CHANGE UP (ref 3.8–10.5)
WBC # BLD: 7.11 K/UL — SIGNIFICANT CHANGE UP (ref 3.8–10.5)
WBC # FLD AUTO: 2.07 K/UL — LOW (ref 3.8–10.5)
WBC # FLD AUTO: 2.7 K/UL — LOW (ref 3.8–10.5)
WBC # FLD AUTO: 3.08 K/UL — LOW (ref 3.8–10.5)
WBC # FLD AUTO: 3.11 K/UL — LOW (ref 3.8–10.5)
WBC # FLD AUTO: 3.4 K/UL — LOW (ref 3.8–10.5)
WBC # FLD AUTO: 3.45 K/UL — LOW (ref 3.8–10.5)
WBC # FLD AUTO: 3.6 K/UL — LOW (ref 3.8–10.5)
WBC # FLD AUTO: 3.88 K/UL — SIGNIFICANT CHANGE UP (ref 3.8–10.5)
WBC # FLD AUTO: 4.02 K/UL — SIGNIFICANT CHANGE UP (ref 3.8–10.5)
WBC # FLD AUTO: 4.03 K/UL — SIGNIFICANT CHANGE UP (ref 3.8–10.5)
WBC # FLD AUTO: 4.48 K/UL — SIGNIFICANT CHANGE UP (ref 3.8–10.5)
WBC # FLD AUTO: 4.52 K/UL — SIGNIFICANT CHANGE UP (ref 3.8–10.5)
WBC # FLD AUTO: 5.47 K/UL — SIGNIFICANT CHANGE UP (ref 3.8–10.5)
WBC # FLD AUTO: 5.8 K/UL — SIGNIFICANT CHANGE UP (ref 3.8–10.5)
WBC # FLD AUTO: 7.11 K/UL — SIGNIFICANT CHANGE UP (ref 3.8–10.5)
WBC COUNT.: 7 CELLS/UL — SIGNIFICANT CHANGE UP

## 2025-01-01 PROCEDURE — 71275 CT ANGIOGRAPHY CHEST: CPT

## 2025-01-01 PROCEDURE — 85027 COMPLETE CBC AUTOMATED: CPT

## 2025-01-01 PROCEDURE — 87040 BLOOD CULTURE FOR BACTERIA: CPT

## 2025-01-01 PROCEDURE — 84155 ASSAY OF PROTEIN SERUM: CPT

## 2025-01-01 PROCEDURE — 89051 BODY FLUID CELL COUNT: CPT

## 2025-01-01 PROCEDURE — 85018 HEMOGLOBIN: CPT

## 2025-01-01 PROCEDURE — 93010 ELECTROCARDIOGRAM REPORT: CPT

## 2025-01-01 PROCEDURE — 85610 PROTHROMBIN TIME: CPT

## 2025-01-01 PROCEDURE — 84484 ASSAY OF TROPONIN QUANT: CPT

## 2025-01-01 PROCEDURE — 87640 STAPH A DNA AMP PROBE: CPT

## 2025-01-01 PROCEDURE — 80048 BASIC METABOLIC PNL TOTAL CA: CPT

## 2025-01-01 PROCEDURE — 86900 BLOOD TYPING SEROLOGIC ABO: CPT

## 2025-01-01 PROCEDURE — 85025 COMPLETE CBC W/AUTO DIFF WBC: CPT

## 2025-01-01 PROCEDURE — 88341 IMHCHEM/IMCYTCHM EA ADD ANTB: CPT

## 2025-01-01 PROCEDURE — 93005 ELECTROCARDIOGRAM TRACING: CPT

## 2025-01-01 PROCEDURE — 80053 COMPREHEN METABOLIC PANEL: CPT

## 2025-01-01 PROCEDURE — 71045 X-RAY EXAM CHEST 1 VIEW: CPT | Mod: 26

## 2025-01-01 PROCEDURE — 82803 BLOOD GASES ANY COMBINATION: CPT

## 2025-01-01 PROCEDURE — 99498 ADVNCD CARE PLAN ADDL 30 MIN: CPT | Mod: 25

## 2025-01-01 PROCEDURE — 82945 GLUCOSE OTHER FLUID: CPT

## 2025-01-01 PROCEDURE — 94640 AIRWAY INHALATION TREATMENT: CPT

## 2025-01-01 PROCEDURE — 97162 PT EVAL MOD COMPLEX 30 MIN: CPT

## 2025-01-01 PROCEDURE — 36600 WITHDRAWAL OF ARTERIAL BLOOD: CPT

## 2025-01-01 PROCEDURE — 88342 IMHCHEM/IMCYTCHM 1ST ANTB: CPT

## 2025-01-01 PROCEDURE — 82435 ASSAY OF BLOOD CHLORIDE: CPT

## 2025-01-01 PROCEDURE — 82962 GLUCOSE BLOOD TEST: CPT

## 2025-01-01 PROCEDURE — 97116 GAIT TRAINING THERAPY: CPT

## 2025-01-01 PROCEDURE — 83615 LACTATE (LD) (LDH) ENZYME: CPT

## 2025-01-01 PROCEDURE — 99233 SBSQ HOSP IP/OBS HIGH 50: CPT | Mod: FS

## 2025-01-01 PROCEDURE — 97110 THERAPEUTIC EXERCISES: CPT

## 2025-01-01 PROCEDURE — 99291 CRITICAL CARE FIRST HOUR: CPT

## 2025-01-01 PROCEDURE — 97530 THERAPEUTIC ACTIVITIES: CPT

## 2025-01-01 PROCEDURE — 93308 TTE F-UP OR LMTD: CPT

## 2025-01-01 PROCEDURE — 85730 THROMBOPLASTIN TIME PARTIAL: CPT

## 2025-01-01 PROCEDURE — 99231 SBSQ HOSP IP/OBS SF/LOW 25: CPT

## 2025-01-01 PROCEDURE — 88112 CYTOPATH CELL ENHANCE TECH: CPT | Mod: 26

## 2025-01-01 PROCEDURE — 70450 CT HEAD/BRAIN W/O DYE: CPT

## 2025-01-01 PROCEDURE — 36415 COLL VENOUS BLD VENIPUNCTURE: CPT

## 2025-01-01 PROCEDURE — 84295 ASSAY OF SERUM SODIUM: CPT

## 2025-01-01 PROCEDURE — 87102 FUNGUS ISOLATION CULTURE: CPT

## 2025-01-01 PROCEDURE — 88112 CYTOPATH CELL ENHANCE TECH: CPT

## 2025-01-01 PROCEDURE — C1769: CPT

## 2025-01-01 PROCEDURE — P9059: CPT

## 2025-01-01 PROCEDURE — 83735 ASSAY OF MAGNESIUM: CPT

## 2025-01-01 PROCEDURE — 71045 X-RAY EXAM CHEST 1 VIEW: CPT

## 2025-01-01 PROCEDURE — 99231 SBSQ HOSP IP/OBS SF/LOW 25: CPT | Mod: FS,25

## 2025-01-01 PROCEDURE — 93308 TTE F-UP OR LMTD: CPT | Mod: 26

## 2025-01-01 PROCEDURE — 82042 OTHER SOURCE ALBUMIN QUAN EA: CPT

## 2025-01-01 PROCEDURE — 83880 ASSAY OF NATRIURETIC PEPTIDE: CPT

## 2025-01-01 PROCEDURE — 85014 HEMATOCRIT: CPT

## 2025-01-01 PROCEDURE — 99292 CRITICAL CARE ADDL 30 MIN: CPT

## 2025-01-01 PROCEDURE — 36620 INSERTION CATHETER ARTERY: CPT

## 2025-01-01 PROCEDURE — 84100 ASSAY OF PHOSPHORUS: CPT

## 2025-01-01 PROCEDURE — 99291 CRITICAL CARE FIRST HOUR: CPT | Mod: 25

## 2025-01-01 PROCEDURE — 86901 BLOOD TYPING SEROLOGIC RH(D): CPT

## 2025-01-01 PROCEDURE — 84443 ASSAY THYROID STIM HORMONE: CPT

## 2025-01-01 PROCEDURE — 83036 HEMOGLOBIN GLYCOSYLATED A1C: CPT

## 2025-01-01 PROCEDURE — 99223 1ST HOSP IP/OBS HIGH 75: CPT | Mod: GC

## 2025-01-01 PROCEDURE — 87070 CULTURE OTHR SPECIMN AEROBIC: CPT

## 2025-01-01 PROCEDURE — 33019 PERQ PRCRD DRG INSJ CATH CT: CPT

## 2025-01-01 PROCEDURE — 84439 ASSAY OF FREE THYROXINE: CPT

## 2025-01-01 PROCEDURE — 85520 HEPARIN ASSAY: CPT

## 2025-01-01 PROCEDURE — P9045: CPT

## 2025-01-01 PROCEDURE — 84132 ASSAY OF SERUM POTASSIUM: CPT

## 2025-01-01 PROCEDURE — 82330 ASSAY OF CALCIUM: CPT

## 2025-01-01 PROCEDURE — 0241U: CPT

## 2025-01-01 PROCEDURE — 83986 ASSAY PH BODY FLUID NOS: CPT

## 2025-01-01 PROCEDURE — 99233 SBSQ HOSP IP/OBS HIGH 50: CPT

## 2025-01-01 PROCEDURE — 85379 FIBRIN DEGRADATION QUANT: CPT

## 2025-01-01 PROCEDURE — 99497 ADVNCD CARE PLAN 30 MIN: CPT | Mod: 25

## 2025-01-01 PROCEDURE — 99233 SBSQ HOSP IP/OBS HIGH 50: CPT | Mod: 25

## 2025-01-01 PROCEDURE — 74177 CT ABD & PELVIS W/CONTRAST: CPT

## 2025-01-01 PROCEDURE — 84157 ASSAY OF PROTEIN OTHER: CPT

## 2025-01-01 PROCEDURE — 86850 RBC ANTIBODY SCREEN: CPT

## 2025-01-01 PROCEDURE — C1729: CPT

## 2025-01-01 PROCEDURE — 80061 LIPID PANEL: CPT

## 2025-01-01 PROCEDURE — 81003 URINALYSIS AUTO W/O SCOPE: CPT

## 2025-01-01 PROCEDURE — 87116 MYCOBACTERIA CULTURE: CPT

## 2025-01-01 PROCEDURE — 87205 SMEAR GRAM STAIN: CPT

## 2025-01-01 PROCEDURE — 99231 SBSQ HOSP IP/OBS SF/LOW 25: CPT | Mod: FS

## 2025-01-01 PROCEDURE — 71250 CT THORAX DX C-: CPT

## 2025-01-01 PROCEDURE — 87075 CULTR BACTERIA EXCEPT BLOOD: CPT

## 2025-01-01 PROCEDURE — 87641 MR-STAPH DNA AMP PROBE: CPT

## 2025-01-01 PROCEDURE — 71250 CT THORAX DX C-: CPT | Mod: 26

## 2025-01-01 PROCEDURE — 96374 THER/PROPH/DIAG INJ IV PUSH: CPT

## 2025-01-01 PROCEDURE — 87015 SPECIMEN INFECT AGNT CONCNTJ: CPT

## 2025-01-01 PROCEDURE — 36573 INSJ PICC RS&I 5 YR+: CPT

## 2025-01-01 PROCEDURE — 71275 CT ANGIOGRAPHY CHEST: CPT | Mod: 26

## 2025-01-01 PROCEDURE — 88305 TISSUE EXAM BY PATHOLOGIST: CPT

## 2025-01-01 PROCEDURE — 88342 IMHCHEM/IMCYTCHM 1ST ANTB: CPT | Mod: 26

## 2025-01-01 PROCEDURE — 32555 ASPIRATE PLEURA W/ IMAGING: CPT

## 2025-01-01 PROCEDURE — 83605 ASSAY OF LACTIC ACID: CPT

## 2025-01-01 PROCEDURE — 93325 DOPPLER ECHO COLOR FLOW MAPG: CPT | Mod: 26

## 2025-01-01 PROCEDURE — 74177 CT ABD & PELVIS W/CONTRAST: CPT | Mod: 26

## 2025-01-01 PROCEDURE — 96375 TX/PRO/DX INJ NEW DRUG ADDON: CPT

## 2025-01-01 PROCEDURE — 93325 DOPPLER ECHO COLOR FLOW MAPG: CPT

## 2025-01-01 PROCEDURE — 76937 US GUIDE VASCULAR ACCESS: CPT | Mod: 26,59

## 2025-01-01 PROCEDURE — 87637 SARSCOV2&INF A&B&RSV AMP PRB: CPT

## 2025-01-01 PROCEDURE — 99285 EMERGENCY DEPT VISIT HI MDM: CPT

## 2025-01-01 PROCEDURE — 36430 TRANSFUSION BLD/BLD COMPNT: CPT

## 2025-01-01 PROCEDURE — 82947 ASSAY GLUCOSE BLOOD QUANT: CPT

## 2025-01-01 PROCEDURE — 76937 US GUIDE VASCULAR ACCESS: CPT | Mod: 26

## 2025-01-01 PROCEDURE — 99223 1ST HOSP IP/OBS HIGH 75: CPT

## 2025-01-01 PROCEDURE — 99291 CRITICAL CARE FIRST HOUR: CPT | Mod: GC

## 2025-01-01 PROCEDURE — 70450 CT HEAD/BRAIN W/O DYE: CPT | Mod: 26

## 2025-01-01 PROCEDURE — 88341 IMHCHEM/IMCYTCHM EA ADD ANTB: CPT | Mod: 26

## 2025-01-01 PROCEDURE — 99231 SBSQ HOSP IP/OBS SF/LOW 25: CPT | Mod: 25

## 2025-01-01 PROCEDURE — 99232 SBSQ HOSP IP/OBS MODERATE 35: CPT

## 2025-01-01 PROCEDURE — 88305 TISSUE EXAM BY PATHOLOGIST: CPT | Mod: 26

## 2025-01-01 PROCEDURE — 87206 SMEAR FLUORESCENT/ACID STAI: CPT

## 2025-01-01 RX ORDER — METOPROLOL SUCCINATE 50 MG/1
12.5 TABLET, EXTENDED RELEASE ORAL EVERY 12 HOURS
Refills: 0 | Status: DISCONTINUED | OUTPATIENT
Start: 2025-01-01 | End: 2025-01-01

## 2025-01-01 RX ORDER — APIXABAN 2.5 MG/1
2.5 TABLET, FILM COATED ORAL
Refills: 0 | Status: DISCONTINUED | OUTPATIENT
Start: 2025-01-01 | End: 2025-01-01

## 2025-01-01 RX ORDER — DEXTROMETHORPHAN HBR, GUAIFENESIN 20; 200 MG/10ML; MG/10ML
5 SOLUTION ORAL
Qty: 600 | Refills: 0
Start: 2025-01-01 | End: 2025-07-25

## 2025-01-01 RX ORDER — WHITE PETROLATUM 1 G/G
1 OINTMENT TOPICAL EVERY 12 HOURS
Refills: 0 | Status: DISCONTINUED | OUTPATIENT
Start: 2025-01-01 | End: 2025-01-01

## 2025-01-01 RX ORDER — LEVOFLOXACIN 25 MG/ML
1 SOLUTION ORAL
Qty: 2 | Refills: 0 | DISCHARGE
Start: 2025-01-01 | End: 2025-01-01

## 2025-01-01 RX ORDER — LEVALBUTEROL HYDROCHLORIDE 1.25 MG/3ML
3 SOLUTION RESPIRATORY (INHALATION)
Qty: 360 | Refills: 0
Start: 2025-01-01 | End: 2025-07-25

## 2025-01-01 RX ORDER — LORAZEPAM 4 MG/ML
0.5 VIAL (ML) INJECTION
Qty: 90 | Refills: 0
Start: 2025-01-01 | End: 2025-07-25

## 2025-01-01 RX ORDER — ACETAMINOPHEN 500 MG/5ML
650 LIQUID (ML) ORAL EVERY 6 HOURS
Refills: 0 | Status: COMPLETED | OUTPATIENT
Start: 2025-01-01 | End: 2025-01-01

## 2025-01-01 RX ORDER — METOPROLOL SUCCINATE 50 MG/1
37.5 TABLET, EXTENDED RELEASE ORAL EVERY 6 HOURS
Refills: 0 | Status: DISCONTINUED | OUTPATIENT
Start: 2025-01-01 | End: 2025-01-01

## 2025-01-01 RX ORDER — DEXTROSE 50 % IN WATER 50 %
50 SYRINGE (ML) INTRAVENOUS ONCE
Refills: 0 | Status: COMPLETED | OUTPATIENT
Start: 2025-01-01 | End: 2025-01-01

## 2025-01-01 RX ORDER — TRAZODONE HCL 100 MG
50 TABLET ORAL ONCE
Refills: 0 | Status: COMPLETED | OUTPATIENT
Start: 2025-01-01 | End: 2025-01-01

## 2025-01-01 RX ORDER — VANCOMYCIN HCL IN 5 % DEXTROSE 1.5G/250ML
750 PLASTIC BAG, INJECTION (ML) INTRAVENOUS EVERY 24 HOURS
Refills: 0 | Status: DISCONTINUED | OUTPATIENT
Start: 2025-01-01 | End: 2025-01-01

## 2025-01-01 RX ORDER — SODIUM CHLORIDE 9 G/1000ML
500 INJECTION, SOLUTION INTRAVENOUS ONCE
Refills: 0 | Status: COMPLETED | OUTPATIENT
Start: 2025-01-01 | End: 2025-01-01

## 2025-01-01 RX ORDER — INSULIN LISPRO 100 U/ML
INJECTION, SOLUTION INTRAVENOUS; SUBCUTANEOUS AT BEDTIME
Refills: 0 | Status: DISCONTINUED | OUTPATIENT
Start: 2025-01-01 | End: 2025-01-01

## 2025-01-01 RX ORDER — HEPARIN SODIUM 1000 [USP'U]/ML
INJECTION INTRAVENOUS; SUBCUTANEOUS
Qty: 25000 | Refills: 0 | Status: DISCONTINUED | OUTPATIENT
Start: 2025-01-01 | End: 2025-01-01

## 2025-01-01 RX ORDER — IPRATROPIUM BROMIDE AND ALBUTEROL SULFATE .5; 2.5 MG/3ML; MG/3ML
3 SOLUTION RESPIRATORY (INHALATION) EVERY 6 HOURS
Refills: 0 | Status: DISCONTINUED | OUTPATIENT
Start: 2025-01-01 | End: 2025-01-01

## 2025-01-01 RX ORDER — HEPARIN SODIUM 1000 [USP'U]/ML
5000 INJECTION INTRAVENOUS; SUBCUTANEOUS EVERY 8 HOURS
Refills: 0 | Status: DISCONTINUED | OUTPATIENT
Start: 2025-01-01 | End: 2025-01-01

## 2025-01-01 RX ORDER — METOPROLOL SUCCINATE 50 MG/1
12.5 TABLET, EXTENDED RELEASE ORAL EVERY 6 HOURS
Refills: 0 | Status: DISCONTINUED | OUTPATIENT
Start: 2025-01-01 | End: 2025-01-01

## 2025-01-01 RX ORDER — MIDODRINE HYDROCHLORIDE 5 MG/1
5 TABLET ORAL EVERY 8 HOURS
Refills: 0 | Status: DISCONTINUED | OUTPATIENT
Start: 2025-01-01 | End: 2025-01-01

## 2025-01-01 RX ORDER — METHYLPREDNISOLONE ACETATE 80 MG/ML
40 INJECTION, SUSPENSION INTRA-ARTICULAR; INTRALESIONAL; INTRAMUSCULAR; SOFT TISSUE ONCE
Refills: 0 | Status: COMPLETED | OUTPATIENT
Start: 2025-01-01 | End: 2025-01-01

## 2025-01-01 RX ORDER — FUROSEMIDE 10 MG/ML
20 INJECTION INTRAMUSCULAR; INTRAVENOUS ONCE
Refills: 0 | Status: COMPLETED | OUTPATIENT
Start: 2025-01-01 | End: 2025-01-01

## 2025-01-01 RX ORDER — CEFTRIAXONE 500 MG/1
2000 INJECTION, POWDER, FOR SOLUTION INTRAMUSCULAR; INTRAVENOUS EVERY 24 HOURS
Refills: 0 | Status: COMPLETED | OUTPATIENT
Start: 2025-01-01 | End: 2025-01-01

## 2025-01-01 RX ORDER — ALBUMIN (HUMAN) 12.5 G/50ML
250 INJECTION, SOLUTION INTRAVENOUS ONCE
Refills: 0 | Status: COMPLETED | OUTPATIENT
Start: 2025-01-01 | End: 2025-01-01

## 2025-01-01 RX ORDER — NOREPINEPHRINE BITARTRATE 8 MG
0.02 SOLUTION INTRAVENOUS
Qty: 8 | Refills: 0 | Status: DISCONTINUED | OUTPATIENT
Start: 2025-01-01 | End: 2025-01-01

## 2025-01-01 RX ORDER — METOPROLOL SUCCINATE 50 MG/1
25 TABLET, EXTENDED RELEASE ORAL
Refills: 0 | Status: DISCONTINUED | OUTPATIENT
Start: 2025-01-01 | End: 2025-01-01

## 2025-01-01 RX ORDER — METOPROLOL SUCCINATE 50 MG/1
50 TABLET, EXTENDED RELEASE ORAL
Refills: 0 | Status: DISCONTINUED | OUTPATIENT
Start: 2025-01-01 | End: 2025-01-01

## 2025-01-01 RX ORDER — ALPRAZOLAM 0.5 MG
0.25 TABLET, EXTENDED RELEASE 24 HR ORAL ONCE
Refills: 0 | Status: DISCONTINUED | OUTPATIENT
Start: 2025-01-01 | End: 2025-01-01

## 2025-01-01 RX ORDER — OXYCODONE HYDROCHLORIDE AND ACETAMINOPHEN 10; 325 MG/1; MG/1
1 TABLET ORAL EVERY 4 HOURS
Refills: 0 | Status: DISCONTINUED | OUTPATIENT
Start: 2025-01-01 | End: 2025-01-01

## 2025-01-01 RX ORDER — ACETAMINOPHEN 500 MG/5ML
1000 LIQUID (ML) ORAL EVERY 6 HOURS
Refills: 0 | Status: COMPLETED | OUTPATIENT
Start: 2025-01-01 | End: 2025-01-01

## 2025-01-01 RX ORDER — NALOXONE HYDROCHLORIDE 0.4 MG/ML
1 INJECTION, SOLUTION INTRAMUSCULAR; INTRAVENOUS; SUBCUTANEOUS
Qty: 1 | Refills: 0
Start: 2025-01-01 | End: 2025-07-26

## 2025-01-01 RX ORDER — SENNA 187 MG
2 TABLET ORAL AT BEDTIME
Refills: 0 | Status: DISCONTINUED | OUTPATIENT
Start: 2025-01-01 | End: 2025-01-01

## 2025-01-01 RX ORDER — INSULIN LISPRO 100 U/ML
INJECTION, SOLUTION INTRAVENOUS; SUBCUTANEOUS
Refills: 0 | Status: DISCONTINUED | OUTPATIENT
Start: 2025-01-01 | End: 2025-01-01

## 2025-01-01 RX ORDER — DEXTROMETHORPHAN HBR, GUAIFENESIN 20; 200 MG/10ML; MG/10ML
5 SOLUTION ORAL EVERY 6 HOURS
Refills: 0 | Status: DISCONTINUED | OUTPATIENT
Start: 2025-01-01 | End: 2025-01-01

## 2025-01-01 RX ORDER — ASPIRIN 325 MG
81 TABLET ORAL DAILY
Refills: 0 | Status: DISCONTINUED | OUTPATIENT
Start: 2025-01-01 | End: 2025-01-01

## 2025-01-01 RX ORDER — ACETAMINOPHEN 500 MG/5ML
1000 LIQUID (ML) ORAL ONCE
Refills: 0 | Status: COMPLETED | OUTPATIENT
Start: 2025-01-01 | End: 2025-01-01

## 2025-01-01 RX ORDER — BISACODYL 5 MG
10 TABLET, DELAYED RELEASE (ENTERIC COATED) ORAL DAILY
Refills: 0 | Status: DISCONTINUED | OUTPATIENT
Start: 2025-01-01 | End: 2025-01-01

## 2025-01-01 RX ORDER — POLYETHYLENE GLYCOL 3350 17 G/17G
17 POWDER, FOR SOLUTION ORAL DAILY
Refills: 0 | Status: DISCONTINUED | OUTPATIENT
Start: 2025-01-01 | End: 2025-01-01

## 2025-01-01 RX ORDER — CEFEPIME 2 G/20ML
1000 INJECTION, POWDER, FOR SOLUTION INTRAVENOUS EVERY 8 HOURS
Refills: 0 | Status: DISCONTINUED | OUTPATIENT
Start: 2025-01-01 | End: 2025-01-01

## 2025-01-01 RX ORDER — VANCOMYCIN HCL IN 5 % DEXTROSE 1.5G/250ML
1000 PLASTIC BAG, INJECTION (ML) INTRAVENOUS ONCE
Refills: 0 | Status: COMPLETED | OUTPATIENT
Start: 2025-01-01 | End: 2025-01-01

## 2025-01-01 RX ORDER — METOPROLOL SUCCINATE 50 MG/1
5 TABLET, EXTENDED RELEASE ORAL ONCE
Refills: 0 | Status: COMPLETED | OUTPATIENT
Start: 2025-01-01 | End: 2025-01-01

## 2025-01-01 RX ORDER — QUETIAPINE FUMARATE 25 MG/1
25 TABLET ORAL ONCE
Refills: 0 | Status: COMPLETED | OUTPATIENT
Start: 2025-01-01 | End: 2025-01-01

## 2025-01-01 RX ORDER — BENZONATATE 100 MG
100 CAPSULE ORAL EVERY 8 HOURS
Refills: 0 | Status: DISCONTINUED | OUTPATIENT
Start: 2025-01-01 | End: 2025-01-01

## 2025-01-01 RX ORDER — METOPROLOL SUCCINATE 50 MG/1
1 TABLET, EXTENDED RELEASE ORAL
Qty: 30 | Refills: 0
Start: 2025-01-01 | End: 2025-07-27

## 2025-01-01 RX ORDER — GLYCOPYRROLATE 0.2 MG/ML
1 INJECTION INTRAMUSCULAR; INTRAVENOUS EVERY 8 HOURS
Refills: 0 | Status: DISCONTINUED | OUTPATIENT
Start: 2025-01-01 | End: 2025-01-01

## 2025-01-01 RX ORDER — ERYTHROMYCIN 5 MG/G
1 OINTMENT OPHTHALMIC
Refills: 0 | Status: DISCONTINUED | OUTPATIENT
Start: 2025-01-01 | End: 2025-01-01

## 2025-01-01 RX ORDER — SODIUM ZIRCONIUM CYCLOSILICATE 5 G/5G
5 POWDER, FOR SUSPENSION ORAL ONCE
Refills: 0 | Status: COMPLETED | OUTPATIENT
Start: 2025-01-01 | End: 2025-01-01

## 2025-01-01 RX ORDER — MIDODRINE HYDROCHLORIDE 5 MG/1
20 TABLET ORAL EVERY 8 HOURS
Refills: 0 | Status: DISCONTINUED | OUTPATIENT
Start: 2025-01-01 | End: 2025-01-01

## 2025-01-01 RX ORDER — QUETIAPINE FUMARATE 25 MG/1
12.5 TABLET ORAL AT BEDTIME
Refills: 0 | Status: DISCONTINUED | OUTPATIENT
Start: 2025-01-01 | End: 2025-01-01

## 2025-01-01 RX ORDER — APIXABAN 2.5 MG/1
2.5 TABLET, FILM COATED ORAL EVERY 12 HOURS
Refills: 0 | Status: DISCONTINUED | OUTPATIENT
Start: 2025-01-01 | End: 2025-01-01

## 2025-01-01 RX ORDER — HEPARIN SODIUM 1000 [USP'U]/ML
5000 INJECTION INTRAVENOUS; SUBCUTANEOUS EVERY 6 HOURS
Refills: 0 | Status: DISCONTINUED | OUTPATIENT
Start: 2025-01-01 | End: 2025-01-01

## 2025-01-01 RX ORDER — ONDANSETRON HCL/PF 4 MG/2 ML
4 VIAL (ML) INJECTION ONCE
Refills: 0 | Status: COMPLETED | OUTPATIENT
Start: 2025-01-01 | End: 2025-01-01

## 2025-01-01 RX ORDER — ACETAMINOPHEN 500 MG/5ML
650 LIQUID (ML) ORAL ONCE
Refills: 0 | Status: COMPLETED | OUTPATIENT
Start: 2025-01-01 | End: 2025-01-01

## 2025-01-01 RX ORDER — LORAZEPAM 4 MG/ML
0.5 VIAL (ML) INJECTION
Qty: 1 | Refills: 0
Start: 2025-01-01 | End: 2025-07-25

## 2025-01-01 RX ORDER — NALOXONE HYDROCHLORIDE 0.4 MG/ML
1 INJECTION, SOLUTION INTRAMUSCULAR; INTRAVENOUS; SUBCUTANEOUS
Qty: 1 | Refills: 0
Start: 2025-01-01 | End: 2025-07-25

## 2025-01-01 RX ORDER — METOPROLOL SUCCINATE 50 MG/1
25 TABLET, EXTENDED RELEASE ORAL EVERY 6 HOURS
Refills: 0 | Status: DISCONTINUED | OUTPATIENT
Start: 2025-01-01 | End: 2025-01-01

## 2025-01-01 RX ORDER — GLYCOPYRROLATE 0.2 MG/ML
1 INJECTION INTRAMUSCULAR; INTRAVENOUS
Qty: 90 | Refills: 0
Start: 2025-01-01 | End: 2025-07-25

## 2025-01-01 RX ORDER — ACETAMINOPHEN 500 MG/5ML
650 LIQUID (ML) ORAL EVERY 6 HOURS
Refills: 0 | Status: DISCONTINUED | OUTPATIENT
Start: 2025-01-01 | End: 2025-01-01

## 2025-01-01 RX ORDER — ONDANSETRON HCL/PF 4 MG/2 ML
4 VIAL (ML) INJECTION ONCE
Refills: 0 | Status: DISCONTINUED | OUTPATIENT
Start: 2025-01-01 | End: 2025-01-01

## 2025-01-01 RX ORDER — BENZONATATE 100 MG
1 CAPSULE ORAL
Qty: 15 | Refills: 0
Start: 2025-01-01 | End: 2025-01-01

## 2025-01-01 RX ORDER — LANOLIN/MINERAL OIL/PETROLATUM
1 OINTMENT (GRAM) OPHTHALMIC (EYE)
Refills: 0 | Status: DISCONTINUED | OUTPATIENT
Start: 2025-01-01 | End: 2025-01-01

## 2025-01-01 RX ORDER — PHENYLEPHRINE HCL IN 0.9% NACL 0.5 MG/5ML
0.1 SYRINGE (ML) INTRAVENOUS
Qty: 40 | Refills: 0 | Status: DISCONTINUED | OUTPATIENT
Start: 2025-01-01 | End: 2025-01-01

## 2025-01-01 RX ORDER — MELATONIN 5 MG
3 TABLET ORAL AT BEDTIME
Refills: 0 | Status: DISCONTINUED | OUTPATIENT
Start: 2025-01-01 | End: 2025-01-01

## 2025-01-01 RX ORDER — MELATONIN 5 MG
5 TABLET ORAL AT BEDTIME
Refills: 0 | Status: DISCONTINUED | OUTPATIENT
Start: 2025-01-01 | End: 2025-01-01

## 2025-01-01 RX ORDER — ASPIRIN 325 MG
324 TABLET ORAL ONCE
Refills: 0 | Status: COMPLETED | OUTPATIENT
Start: 2025-01-01 | End: 2025-01-01

## 2025-01-01 RX ORDER — LEVALBUTEROL HYDROCHLORIDE 1.25 MG/3ML
0.63 SOLUTION RESPIRATORY (INHALATION) EVERY 6 HOURS
Refills: 0 | Status: DISCONTINUED | OUTPATIENT
Start: 2025-01-01 | End: 2025-01-01

## 2025-01-01 RX ORDER — ACETAMINOPHEN 500 MG/5ML
100 LIQUID (ML) ORAL ONCE
Refills: 0 | Status: DISCONTINUED | OUTPATIENT
Start: 2025-01-01 | End: 2025-01-01

## 2025-01-01 RX ORDER — CEFEPIME 2 G/20ML
2000 INJECTION, POWDER, FOR SOLUTION INTRAVENOUS ONCE
Refills: 0 | Status: COMPLETED | OUTPATIENT
Start: 2025-01-01 | End: 2025-01-01

## 2025-01-01 RX ORDER — HEPARIN SODIUM 1000 [USP'U]/ML
5000 INJECTION INTRAVENOUS; SUBCUTANEOUS EVERY 12 HOURS
Refills: 0 | Status: DISCONTINUED | OUTPATIENT
Start: 2025-01-01 | End: 2025-01-01

## 2025-01-01 RX ORDER — OXYCODONE HYDROCHLORIDE 30 MG/1
10 TABLET ORAL EVERY 4 HOURS
Refills: 0 | Status: DISCONTINUED | OUTPATIENT
Start: 2025-01-01 | End: 2025-01-01

## 2025-01-01 RX ORDER — BISACODYL 5 MG
1 TABLET, DELAYED RELEASE (ENTERIC COATED) ORAL
Qty: 30 | Refills: 0
Start: 2025-01-01 | End: 2025-07-26

## 2025-01-01 RX ORDER — METOPROLOL SUCCINATE 50 MG/1
50 TABLET, EXTENDED RELEASE ORAL EVERY 6 HOURS
Refills: 0 | Status: DISCONTINUED | OUTPATIENT
Start: 2025-01-01 | End: 2025-01-01

## 2025-01-01 RX ORDER — HEPARIN SODIUM 1000 [USP'U]/ML
2500 INJECTION INTRAVENOUS; SUBCUTANEOUS EVERY 6 HOURS
Refills: 0 | Status: DISCONTINUED | OUTPATIENT
Start: 2025-01-01 | End: 2025-01-01

## 2025-01-01 RX ORDER — MIDODRINE HYDROCHLORIDE 5 MG/1
30 TABLET ORAL EVERY 8 HOURS
Refills: 0 | Status: DISCONTINUED | OUTPATIENT
Start: 2025-01-01 | End: 2025-01-01

## 2025-01-01 RX ORDER — LORAZEPAM 4 MG/ML
0.5 VIAL (ML) INJECTION EVERY 4 HOURS
Refills: 0 | Status: DISCONTINUED | OUTPATIENT
Start: 2025-01-01 | End: 2025-01-01

## 2025-01-01 RX ADMIN — METOPROLOL SUCCINATE 50 MILLIGRAM(S): 50 TABLET, EXTENDED RELEASE ORAL at 05:09

## 2025-01-01 RX ADMIN — LEVALBUTEROL HYDROCHLORIDE 0.63 MILLIGRAM(S): 1.25 SOLUTION RESPIRATORY (INHALATION) at 12:56

## 2025-01-01 RX ADMIN — LEVALBUTEROL HYDROCHLORIDE 0.63 MILLIGRAM(S): 1.25 SOLUTION RESPIRATORY (INHALATION) at 23:14

## 2025-01-01 RX ADMIN — Medication 5 MILLIGRAM(S): at 21:09

## 2025-01-01 RX ADMIN — Medication 4 MILLILITER(S): at 17:28

## 2025-01-01 RX ADMIN — APIXABAN 2.5 MILLIGRAM(S): 2.5 TABLET, FILM COATED ORAL at 09:53

## 2025-01-01 RX ADMIN — Medication 40 MILLIGRAM(S): at 07:03

## 2025-01-01 RX ADMIN — Medication 0.5 MILLIGRAM(S): at 18:50

## 2025-01-01 RX ADMIN — Medication 1 APPLICATION(S): at 06:01

## 2025-01-01 RX ADMIN — SODIUM CHLORIDE 500 MILLILITER(S): 9 INJECTION, SOLUTION INTRAVENOUS at 12:42

## 2025-01-01 RX ADMIN — IPRATROPIUM BROMIDE AND ALBUTEROL SULFATE 3 MILLILITER(S): .5; 2.5 SOLUTION RESPIRATORY (INHALATION) at 23:20

## 2025-01-01 RX ADMIN — LEVALBUTEROL HYDROCHLORIDE 0.63 MILLIGRAM(S): 1.25 SOLUTION RESPIRATORY (INHALATION) at 00:19

## 2025-01-01 RX ADMIN — QUETIAPINE FUMARATE 12.5 MILLIGRAM(S): 25 TABLET ORAL at 21:10

## 2025-01-01 RX ADMIN — Medication 2 TABLET(S): at 21:09

## 2025-01-01 RX ADMIN — METOPROLOL SUCCINATE 50 MILLIGRAM(S): 50 TABLET, EXTENDED RELEASE ORAL at 18:07

## 2025-01-01 RX ADMIN — MIDODRINE HYDROCHLORIDE 30 MILLIGRAM(S): 5 TABLET ORAL at 05:19

## 2025-01-01 RX ADMIN — Medication 650 MILLIGRAM(S): at 23:20

## 2025-01-01 RX ADMIN — Medication 100 MILLIGRAM(S): at 13:31

## 2025-01-01 RX ADMIN — Medication 3 MILLIGRAM(S): at 21:14

## 2025-01-01 RX ADMIN — METOPROLOL SUCCINATE 50 MILLIGRAM(S): 50 TABLET, EXTENDED RELEASE ORAL at 13:04

## 2025-01-01 RX ADMIN — METOPROLOL SUCCINATE 50 MILLIGRAM(S): 50 TABLET, EXTENDED RELEASE ORAL at 14:30

## 2025-01-01 RX ADMIN — LEVALBUTEROL HYDROCHLORIDE 0.63 MILLIGRAM(S): 1.25 SOLUTION RESPIRATORY (INHALATION) at 18:51

## 2025-01-01 RX ADMIN — METOPROLOL SUCCINATE 50 MILLIGRAM(S): 50 TABLET, EXTENDED RELEASE ORAL at 06:06

## 2025-01-01 RX ADMIN — METOPROLOL SUCCINATE 50 MILLIGRAM(S): 50 TABLET, EXTENDED RELEASE ORAL at 17:52

## 2025-01-01 RX ADMIN — MIDODRINE HYDROCHLORIDE 5 MILLIGRAM(S): 5 TABLET ORAL at 14:51

## 2025-01-01 RX ADMIN — Medication 5 MILLIGRAM(S): at 22:10

## 2025-01-01 RX ADMIN — LEVALBUTEROL HYDROCHLORIDE 0.63 MILLIGRAM(S): 1.25 SOLUTION RESPIRATORY (INHALATION) at 12:02

## 2025-01-01 RX ADMIN — Medication 1 APPLICATION(S): at 21:02

## 2025-01-01 RX ADMIN — METOPROLOL SUCCINATE 50 MILLIGRAM(S): 50 TABLET, EXTENDED RELEASE ORAL at 13:52

## 2025-01-01 RX ADMIN — LEVALBUTEROL HYDROCHLORIDE 0.63 MILLIGRAM(S): 1.25 SOLUTION RESPIRATORY (INHALATION) at 17:27

## 2025-01-01 RX ADMIN — APIXABAN 2.5 MILLIGRAM(S): 2.5 TABLET, FILM COATED ORAL at 18:06

## 2025-01-01 RX ADMIN — LEVALBUTEROL HYDROCHLORIDE 0.63 MILLIGRAM(S): 1.25 SOLUTION RESPIRATORY (INHALATION) at 00:15

## 2025-01-01 RX ADMIN — Medication 5 MILLIGRAM(S): at 21:34

## 2025-01-01 RX ADMIN — Medication 40 MILLIGRAM(S): at 05:53

## 2025-01-01 RX ADMIN — Medication 6 MILLIGRAM(S): at 15:00

## 2025-01-01 RX ADMIN — Medication 400 MILLIGRAM(S): at 21:12

## 2025-01-01 RX ADMIN — WHITE PETROLATUM 1 APPLICATION(S): 1 OINTMENT TOPICAL at 18:22

## 2025-01-01 RX ADMIN — LEVALBUTEROL HYDROCHLORIDE 0.63 MILLIGRAM(S): 1.25 SOLUTION RESPIRATORY (INHALATION) at 12:42

## 2025-01-01 RX ADMIN — APIXABAN 2.5 MILLIGRAM(S): 2.5 TABLET, FILM COATED ORAL at 18:50

## 2025-01-01 RX ADMIN — Medication 100 MILLIGRAM(S): at 21:09

## 2025-01-01 RX ADMIN — Medication 1 APPLICATION(S): at 12:34

## 2025-01-01 RX ADMIN — Medication 3 MILLIGRAM(S): at 22:39

## 2025-01-01 RX ADMIN — LEVALBUTEROL HYDROCHLORIDE 0.63 MILLIGRAM(S): 1.25 SOLUTION RESPIRATORY (INHALATION) at 11:54

## 2025-01-01 RX ADMIN — LEVALBUTEROL HYDROCHLORIDE 0.63 MILLIGRAM(S): 1.25 SOLUTION RESPIRATORY (INHALATION) at 23:07

## 2025-01-01 RX ADMIN — MIDODRINE HYDROCHLORIDE 30 MILLIGRAM(S): 5 TABLET ORAL at 13:20

## 2025-01-01 RX ADMIN — METOPROLOL SUCCINATE 50 MILLIGRAM(S): 50 TABLET, EXTENDED RELEASE ORAL at 21:38

## 2025-01-01 RX ADMIN — CEFEPIME 100 MILLIGRAM(S): 2 INJECTION, POWDER, FOR SOLUTION INTRAVENOUS at 05:58

## 2025-01-01 RX ADMIN — Medication 100 MILLIGRAM(S): at 05:42

## 2025-01-01 RX ADMIN — MIDODRINE HYDROCHLORIDE 30 MILLIGRAM(S): 5 TABLET ORAL at 21:02

## 2025-01-01 RX ADMIN — Medication 250 MILLIGRAM(S): at 23:20

## 2025-01-01 RX ADMIN — CEFTRIAXONE 100 MILLIGRAM(S): 500 INJECTION, POWDER, FOR SOLUTION INTRAMUSCULAR; INTRAVENOUS at 21:29

## 2025-01-01 RX ADMIN — Medication 1 APPLICATION(S): at 10:00

## 2025-01-01 RX ADMIN — Medication 500 MILLILITER(S): at 00:46

## 2025-01-01 RX ADMIN — FUROSEMIDE 20 MILLIGRAM(S): 10 INJECTION INTRAMUSCULAR; INTRAVENOUS at 09:35

## 2025-01-01 RX ADMIN — Medication 3 MILLIGRAM(S): at 22:23

## 2025-01-01 RX ADMIN — Medication 6 MILLIGRAM(S): at 13:57

## 2025-01-01 RX ADMIN — IPRATROPIUM BROMIDE AND ALBUTEROL SULFATE 3 MILLILITER(S): .5; 2.5 SOLUTION RESPIRATORY (INHALATION) at 05:04

## 2025-01-01 RX ADMIN — Medication 40 MILLIGRAM(S): at 05:42

## 2025-01-01 RX ADMIN — LEVALBUTEROL HYDROCHLORIDE 0.63 MILLIGRAM(S): 1.25 SOLUTION RESPIRATORY (INHALATION) at 18:07

## 2025-01-01 RX ADMIN — Medication 0.25 MILLIGRAM(S): at 02:30

## 2025-01-01 RX ADMIN — Medication 5 MILLIGRAM(S): at 21:24

## 2025-01-01 RX ADMIN — Medication 100 MILLIGRAM(S): at 14:30

## 2025-01-01 RX ADMIN — LEVALBUTEROL HYDROCHLORIDE 0.63 MILLIGRAM(S): 1.25 SOLUTION RESPIRATORY (INHALATION) at 11:47

## 2025-01-01 RX ADMIN — LEVALBUTEROL HYDROCHLORIDE 0.63 MILLIGRAM(S): 1.25 SOLUTION RESPIRATORY (INHALATION) at 11:00

## 2025-01-01 RX ADMIN — Medication 5 MILLIGRAM(S): at 03:50

## 2025-01-01 RX ADMIN — Medication 40 MILLIGRAM(S): at 06:02

## 2025-01-01 RX ADMIN — Medication 1 APPLICATION(S): at 12:57

## 2025-01-01 RX ADMIN — LEVALBUTEROL HYDROCHLORIDE 0.63 MILLIGRAM(S): 1.25 SOLUTION RESPIRATORY (INHALATION) at 06:23

## 2025-01-01 RX ADMIN — Medication 1 DROP(S): at 06:25

## 2025-01-01 RX ADMIN — Medication 500 MILLILITER(S): at 13:04

## 2025-01-01 RX ADMIN — CEFTRIAXONE 100 MILLIGRAM(S): 500 INJECTION, POWDER, FOR SOLUTION INTRAMUSCULAR; INTRAVENOUS at 22:10

## 2025-01-01 RX ADMIN — LEVALBUTEROL HYDROCHLORIDE 0.63 MILLIGRAM(S): 1.25 SOLUTION RESPIRATORY (INHALATION) at 05:42

## 2025-01-01 RX ADMIN — METOPROLOL SUCCINATE 50 MILLIGRAM(S): 50 TABLET, EXTENDED RELEASE ORAL at 05:20

## 2025-01-01 RX ADMIN — Medication 2 MILLIGRAM(S): at 21:34

## 2025-01-01 RX ADMIN — Medication 3 MILLIGRAM(S): at 16:51

## 2025-01-01 RX ADMIN — Medication 40 MILLIGRAM(S): at 05:17

## 2025-01-01 RX ADMIN — METOPROLOL SUCCINATE 50 MILLIGRAM(S): 50 TABLET, EXTENDED RELEASE ORAL at 12:42

## 2025-01-01 RX ADMIN — Medication 6 MILLIGRAM(S): at 13:28

## 2025-01-01 RX ADMIN — LEVALBUTEROL HYDROCHLORIDE 0.63 MILLIGRAM(S): 1.25 SOLUTION RESPIRATORY (INHALATION) at 18:50

## 2025-01-01 RX ADMIN — Medication 650 MILLIGRAM(S): at 13:09

## 2025-01-01 RX ADMIN — LEVALBUTEROL HYDROCHLORIDE 0.63 MILLIGRAM(S): 1.25 SOLUTION RESPIRATORY (INHALATION) at 05:47

## 2025-01-01 RX ADMIN — Medication 2 MILLIGRAM(S): at 02:56

## 2025-01-01 RX ADMIN — LEVALBUTEROL HYDROCHLORIDE 0.63 MILLIGRAM(S): 1.25 SOLUTION RESPIRATORY (INHALATION) at 17:10

## 2025-01-01 RX ADMIN — Medication 50 MILLIGRAM(S): at 22:54

## 2025-01-01 RX ADMIN — Medication 40 MILLIGRAM(S): at 05:20

## 2025-01-01 RX ADMIN — CEFEPIME 100 MILLIGRAM(S): 2 INJECTION, POWDER, FOR SOLUTION INTRAVENOUS at 14:50

## 2025-01-01 RX ADMIN — QUETIAPINE FUMARATE 12.5 MILLIGRAM(S): 25 TABLET ORAL at 21:02

## 2025-01-01 RX ADMIN — Medication 400 MILLIGRAM(S): at 10:11

## 2025-01-01 RX ADMIN — LEVALBUTEROL HYDROCHLORIDE 0.63 MILLIGRAM(S): 1.25 SOLUTION RESPIRATORY (INHALATION) at 17:32

## 2025-01-01 RX ADMIN — Medication 4 MILLILITER(S): at 17:32

## 2025-01-01 RX ADMIN — METOPROLOL SUCCINATE 50 MILLIGRAM(S): 50 TABLET, EXTENDED RELEASE ORAL at 18:09

## 2025-01-01 RX ADMIN — METOPROLOL SUCCINATE 5 MILLIGRAM(S): 50 TABLET, EXTENDED RELEASE ORAL at 23:27

## 2025-01-01 RX ADMIN — POLYETHYLENE GLYCOL 3350 17 GRAM(S): 17 POWDER, FOR SOLUTION ORAL at 12:51

## 2025-01-01 RX ADMIN — LEVALBUTEROL HYDROCHLORIDE 0.63 MILLIGRAM(S): 1.25 SOLUTION RESPIRATORY (INHALATION) at 05:41

## 2025-01-01 RX ADMIN — MIDODRINE HYDROCHLORIDE 5 MILLIGRAM(S): 5 TABLET ORAL at 21:14

## 2025-01-01 RX ADMIN — Medication 5 MILLIGRAM(S): at 21:17

## 2025-01-01 RX ADMIN — Medication 5 MILLIGRAM(S): at 23:08

## 2025-01-01 RX ADMIN — MIDODRINE HYDROCHLORIDE 30 MILLIGRAM(S): 5 TABLET ORAL at 13:18

## 2025-01-01 RX ADMIN — Medication 500 MILLILITER(S): at 03:17

## 2025-01-01 RX ADMIN — Medication 40 MILLIGRAM(S): at 05:41

## 2025-01-01 RX ADMIN — LEVALBUTEROL HYDROCHLORIDE 0.63 MILLIGRAM(S): 1.25 SOLUTION RESPIRATORY (INHALATION) at 17:28

## 2025-01-01 RX ADMIN — Medication 1 DROP(S): at 17:08

## 2025-01-01 RX ADMIN — Medication 500 MICROGRAM(S): at 13:34

## 2025-01-01 RX ADMIN — METOPROLOL SUCCINATE 50 MILLIGRAM(S): 50 TABLET, EXTENDED RELEASE ORAL at 18:10

## 2025-01-01 RX ADMIN — HEPARIN SODIUM 1200 UNIT(S)/HR: 1000 INJECTION INTRAVENOUS; SUBCUTANEOUS at 15:00

## 2025-01-01 RX ADMIN — APIXABAN 2.5 MILLIGRAM(S): 2.5 TABLET, FILM COATED ORAL at 18:42

## 2025-01-01 RX ADMIN — LEVALBUTEROL HYDROCHLORIDE 0.63 MILLIGRAM(S): 1.25 SOLUTION RESPIRATORY (INHALATION) at 04:06

## 2025-01-01 RX ADMIN — Medication 81 MILLIGRAM(S): at 13:04

## 2025-01-01 RX ADMIN — Medication 324 MILLIGRAM(S): at 21:58

## 2025-01-01 RX ADMIN — CEFTRIAXONE 100 MILLIGRAM(S): 500 INJECTION, POWDER, FOR SOLUTION INTRAMUSCULAR; INTRAVENOUS at 22:04

## 2025-01-01 RX ADMIN — METOPROLOL SUCCINATE 25 MILLIGRAM(S): 50 TABLET, EXTENDED RELEASE ORAL at 05:04

## 2025-01-01 RX ADMIN — METOPROLOL SUCCINATE 50 MILLIGRAM(S): 50 TABLET, EXTENDED RELEASE ORAL at 12:14

## 2025-01-01 RX ADMIN — Medication 2 TABLET(S): at 21:14

## 2025-01-01 RX ADMIN — POLYETHYLENE GLYCOL 3350 17 GRAM(S): 17 POWDER, FOR SOLUTION ORAL at 13:04

## 2025-01-01 RX ADMIN — Medication 5 MILLIGRAM(S): at 21:29

## 2025-01-01 RX ADMIN — METOPROLOL SUCCINATE 12.5 MILLIGRAM(S): 50 TABLET, EXTENDED RELEASE ORAL at 05:19

## 2025-01-01 RX ADMIN — CEFTRIAXONE 100 MILLIGRAM(S): 500 INJECTION, POWDER, FOR SOLUTION INTRAMUSCULAR; INTRAVENOUS at 21:04

## 2025-01-01 RX ADMIN — METOPROLOL SUCCINATE 50 MILLIGRAM(S): 50 TABLET, EXTENDED RELEASE ORAL at 12:32

## 2025-01-01 RX ADMIN — Medication 650 MILLIGRAM(S): at 15:52

## 2025-01-01 RX ADMIN — MIDODRINE HYDROCHLORIDE 20 MILLIGRAM(S): 5 TABLET ORAL at 21:02

## 2025-01-01 RX ADMIN — Medication 1 APPLICATION(S): at 05:06

## 2025-01-01 RX ADMIN — MIDODRINE HYDROCHLORIDE 20 MILLIGRAM(S): 5 TABLET ORAL at 06:02

## 2025-01-01 RX ADMIN — METOPROLOL SUCCINATE 50 MILLIGRAM(S): 50 TABLET, EXTENDED RELEASE ORAL at 12:10

## 2025-01-01 RX ADMIN — Medication 5 MILLIGRAM(S): at 22:14

## 2025-01-01 RX ADMIN — HEPARIN SODIUM 1200 UNIT(S)/HR: 1000 INJECTION INTRAVENOUS; SUBCUTANEOUS at 07:15

## 2025-01-01 RX ADMIN — METOPROLOL SUCCINATE 37.5 MILLIGRAM(S): 50 TABLET, EXTENDED RELEASE ORAL at 21:29

## 2025-01-01 RX ADMIN — Medication 6 MILLIGRAM(S): at 22:05

## 2025-01-01 RX ADMIN — Medication 4 MILLIGRAM(S): at 21:58

## 2025-01-01 RX ADMIN — Medication 1000 MILLIGRAM(S): at 02:43

## 2025-01-01 RX ADMIN — APIXABAN 2.5 MILLIGRAM(S): 2.5 TABLET, FILM COATED ORAL at 17:11

## 2025-01-01 RX ADMIN — Medication 1000 MILLIGRAM(S): at 21:42

## 2025-01-01 RX ADMIN — MIDODRINE HYDROCHLORIDE 5 MILLIGRAM(S): 5 TABLET ORAL at 14:18

## 2025-01-01 RX ADMIN — CEFTRIAXONE 100 MILLIGRAM(S): 500 INJECTION, POWDER, FOR SOLUTION INTRAMUSCULAR; INTRAVENOUS at 22:14

## 2025-01-01 RX ADMIN — LEVALBUTEROL HYDROCHLORIDE 0.63 MILLIGRAM(S): 1.25 SOLUTION RESPIRATORY (INHALATION) at 12:51

## 2025-01-01 RX ADMIN — Medication 2 MILLIGRAM(S): at 21:49

## 2025-01-01 RX ADMIN — Medication 40 MILLIGRAM(S): at 08:51

## 2025-01-01 RX ADMIN — Medication 2 TABLET(S): at 21:34

## 2025-01-01 RX ADMIN — METOPROLOL SUCCINATE 5 MILLIGRAM(S): 50 TABLET, EXTENDED RELEASE ORAL at 18:26

## 2025-01-01 RX ADMIN — Medication 5 MILLIGRAM(S): at 21:59

## 2025-01-01 RX ADMIN — Medication 250 MILLIGRAM(S): at 00:30

## 2025-01-01 RX ADMIN — METOPROLOL SUCCINATE 50 MILLIGRAM(S): 50 TABLET, EXTENDED RELEASE ORAL at 23:07

## 2025-01-01 RX ADMIN — LEVALBUTEROL HYDROCHLORIDE 0.63 MILLIGRAM(S): 1.25 SOLUTION RESPIRATORY (INHALATION) at 11:27

## 2025-01-01 RX ADMIN — Medication 2 MILLIGRAM(S): at 02:41

## 2025-01-01 RX ADMIN — Medication 2.37 MICROGRAM(S)/KG/MIN: at 06:25

## 2025-01-01 RX ADMIN — Medication 1 DROP(S): at 05:20

## 2025-01-01 RX ADMIN — Medication 4 MILLILITER(S): at 05:48

## 2025-01-01 RX ADMIN — LEVALBUTEROL HYDROCHLORIDE 0.63 MILLIGRAM(S): 1.25 SOLUTION RESPIRATORY (INHALATION) at 18:23

## 2025-01-01 RX ADMIN — Medication 1 APPLICATION(S): at 13:34

## 2025-01-01 RX ADMIN — SODIUM ZIRCONIUM CYCLOSILICATE 5 GRAM(S): 5 POWDER, FOR SUSPENSION ORAL at 18:43

## 2025-01-01 RX ADMIN — QUETIAPINE FUMARATE 12.5 MILLIGRAM(S): 25 TABLET ORAL at 21:59

## 2025-01-01 RX ADMIN — Medication 650 MILLIGRAM(S): at 15:39

## 2025-01-01 RX ADMIN — LEVALBUTEROL HYDROCHLORIDE 0.63 MILLIGRAM(S): 1.25 SOLUTION RESPIRATORY (INHALATION) at 18:42

## 2025-01-01 RX ADMIN — Medication 40 MILLIGRAM(S): at 06:24

## 2025-01-01 RX ADMIN — MIDODRINE HYDROCHLORIDE 5 MILLIGRAM(S): 5 TABLET ORAL at 05:04

## 2025-01-01 RX ADMIN — CEFEPIME 100 MILLIGRAM(S): 2 INJECTION, POWDER, FOR SOLUTION INTRAVENOUS at 21:02

## 2025-01-01 RX ADMIN — Medication 40 MILLIGRAM(S): at 09:53

## 2025-01-01 RX ADMIN — LEVALBUTEROL HYDROCHLORIDE 0.63 MILLIGRAM(S): 1.25 SOLUTION RESPIRATORY (INHALATION) at 17:34

## 2025-01-01 RX ADMIN — Medication 100 MILLIGRAM(S): at 06:23

## 2025-01-01 RX ADMIN — METOPROLOL SUCCINATE 50 MILLIGRAM(S): 50 TABLET, EXTENDED RELEASE ORAL at 18:50

## 2025-01-01 RX ADMIN — Medication 1 APPLICATION(S): at 13:01

## 2025-01-01 RX ADMIN — Medication 40 MILLIGRAM(S): at 06:06

## 2025-01-01 RX ADMIN — Medication 100 MILLIGRAM(S): at 13:45

## 2025-01-01 RX ADMIN — LEVALBUTEROL HYDROCHLORIDE 0.63 MILLIGRAM(S): 1.25 SOLUTION RESPIRATORY (INHALATION) at 12:14

## 2025-01-01 RX ADMIN — CEFEPIME 100 MILLIGRAM(S): 2 INJECTION, POWDER, FOR SOLUTION INTRAVENOUS at 22:03

## 2025-01-01 RX ADMIN — Medication 100 MILLIGRAM(S): at 13:01

## 2025-01-01 RX ADMIN — LEVALBUTEROL HYDROCHLORIDE 0.63 MILLIGRAM(S): 1.25 SOLUTION RESPIRATORY (INHALATION) at 12:16

## 2025-01-01 RX ADMIN — POLYETHYLENE GLYCOL 3350 17 GRAM(S): 17 POWDER, FOR SOLUTION ORAL at 13:01

## 2025-01-01 RX ADMIN — LEVALBUTEROL HYDROCHLORIDE 0.63 MILLIGRAM(S): 1.25 SOLUTION RESPIRATORY (INHALATION) at 23:40

## 2025-01-01 RX ADMIN — Medication 3 MILLIGRAM(S): at 09:09

## 2025-01-01 RX ADMIN — APIXABAN 2.5 MILLIGRAM(S): 2.5 TABLET, FILM COATED ORAL at 08:51

## 2025-01-01 RX ADMIN — METOPROLOL SUCCINATE 50 MILLIGRAM(S): 50 TABLET, EXTENDED RELEASE ORAL at 17:11

## 2025-01-01 RX ADMIN — Medication 1000 MILLILITER(S): at 20:56

## 2025-01-01 RX ADMIN — LEVALBUTEROL HYDROCHLORIDE 0.63 MILLIGRAM(S): 1.25 SOLUTION RESPIRATORY (INHALATION) at 11:15

## 2025-01-01 RX ADMIN — LEVALBUTEROL HYDROCHLORIDE 0.63 MILLIGRAM(S): 1.25 SOLUTION RESPIRATORY (INHALATION) at 23:31

## 2025-01-01 RX ADMIN — METOPROLOL SUCCINATE 50 MILLIGRAM(S): 50 TABLET, EXTENDED RELEASE ORAL at 05:17

## 2025-01-01 RX ADMIN — LEVALBUTEROL HYDROCHLORIDE 0.63 MILLIGRAM(S): 1.25 SOLUTION RESPIRATORY (INHALATION) at 18:01

## 2025-01-01 RX ADMIN — METOPROLOL SUCCINATE 25 MILLIGRAM(S): 50 TABLET, EXTENDED RELEASE ORAL at 03:50

## 2025-01-01 RX ADMIN — LEVALBUTEROL HYDROCHLORIDE 0.63 MILLIGRAM(S): 1.25 SOLUTION RESPIRATORY (INHALATION) at 05:01

## 2025-01-01 RX ADMIN — APIXABAN 2.5 MILLIGRAM(S): 2.5 TABLET, FILM COATED ORAL at 05:05

## 2025-01-01 RX ADMIN — METOPROLOL SUCCINATE 25 MILLIGRAM(S): 50 TABLET, EXTENDED RELEASE ORAL at 05:06

## 2025-01-01 RX ADMIN — METOPROLOL SUCCINATE 50 MILLIGRAM(S): 50 TABLET, EXTENDED RELEASE ORAL at 06:43

## 2025-01-01 RX ADMIN — Medication 1000 MILLILITER(S): at 20:58

## 2025-01-01 RX ADMIN — LEVALBUTEROL HYDROCHLORIDE 0.63 MILLIGRAM(S): 1.25 SOLUTION RESPIRATORY (INHALATION) at 05:14

## 2025-01-01 RX ADMIN — Medication 1 APPLICATION(S): at 05:19

## 2025-01-01 RX ADMIN — ERYTHROMYCIN 1 APPLICATION(S): 5 OINTMENT OPHTHALMIC at 05:42

## 2025-01-01 RX ADMIN — METOPROLOL SUCCINATE 50 MILLIGRAM(S): 50 TABLET, EXTENDED RELEASE ORAL at 23:43

## 2025-01-01 RX ADMIN — Medication 650 MILLIGRAM(S): at 19:12

## 2025-01-01 RX ADMIN — Medication 650 MILLIGRAM(S): at 11:50

## 2025-01-01 RX ADMIN — LEVALBUTEROL HYDROCHLORIDE 0.63 MILLIGRAM(S): 1.25 SOLUTION RESPIRATORY (INHALATION) at 23:05

## 2025-01-01 RX ADMIN — LEVALBUTEROL HYDROCHLORIDE 0.63 MILLIGRAM(S): 1.25 SOLUTION RESPIRATORY (INHALATION) at 00:01

## 2025-01-01 RX ADMIN — Medication 0.5 MILLIGRAM(S): at 23:24

## 2025-01-01 RX ADMIN — ERYTHROMYCIN 1 APPLICATION(S): 5 OINTMENT OPHTHALMIC at 16:32

## 2025-01-01 RX ADMIN — Medication 1 APPLICATION(S): at 14:36

## 2025-01-01 RX ADMIN — LEVALBUTEROL HYDROCHLORIDE 0.63 MILLIGRAM(S): 1.25 SOLUTION RESPIRATORY (INHALATION) at 02:38

## 2025-01-01 RX ADMIN — METOPROLOL SUCCINATE 50 MILLIGRAM(S): 50 TABLET, EXTENDED RELEASE ORAL at 23:23

## 2025-01-01 RX ADMIN — WHITE PETROLATUM 1 APPLICATION(S): 1 OINTMENT TOPICAL at 05:48

## 2025-01-01 RX ADMIN — Medication 100 MILLIGRAM(S): at 13:04

## 2025-01-01 RX ADMIN — Medication 650 MILLIGRAM(S): at 23:50

## 2025-01-01 RX ADMIN — MIDODRINE HYDROCHLORIDE 5 MILLIGRAM(S): 5 TABLET ORAL at 21:37

## 2025-01-01 RX ADMIN — METOPROLOL SUCCINATE 50 MILLIGRAM(S): 50 TABLET, EXTENDED RELEASE ORAL at 11:54

## 2025-01-01 RX ADMIN — Medication 2 TABLET(S): at 21:59

## 2025-01-01 RX ADMIN — Medication 1 APPLICATION(S): at 12:42

## 2025-01-01 RX ADMIN — Medication 1 APPLICATION(S): at 13:22

## 2025-01-01 RX ADMIN — Medication 5 MILLIGRAM(S): at 22:06

## 2025-01-01 RX ADMIN — Medication 2.37 MICROGRAM(S)/KG/MIN: at 20:59

## 2025-01-01 RX ADMIN — LEVALBUTEROL HYDROCHLORIDE 0.63 MILLIGRAM(S): 1.25 SOLUTION RESPIRATORY (INHALATION) at 11:28

## 2025-01-01 RX ADMIN — LEVALBUTEROL HYDROCHLORIDE 0.63 MILLIGRAM(S): 1.25 SOLUTION RESPIRATORY (INHALATION) at 01:07

## 2025-01-01 RX ADMIN — Medication 40 MILLIGRAM(S): at 05:06

## 2025-01-01 RX ADMIN — Medication 400 MILLIGRAM(S): at 02:28

## 2025-01-01 RX ADMIN — Medication 2 MILLIGRAM(S): at 04:24

## 2025-01-01 RX ADMIN — Medication 100 MILLIGRAM(S): at 05:53

## 2025-01-01 RX ADMIN — Medication 1000 MILLIGRAM(S): at 10:41

## 2025-01-01 RX ADMIN — LEVALBUTEROL HYDROCHLORIDE 0.63 MILLIGRAM(S): 1.25 SOLUTION RESPIRATORY (INHALATION) at 17:52

## 2025-01-01 RX ADMIN — Medication 100 MILLIGRAM(S): at 15:39

## 2025-01-01 RX ADMIN — METOPROLOL SUCCINATE 37.5 MILLIGRAM(S): 50 TABLET, EXTENDED RELEASE ORAL at 17:36

## 2025-01-01 RX ADMIN — LEVALBUTEROL HYDROCHLORIDE 0.63 MILLIGRAM(S): 1.25 SOLUTION RESPIRATORY (INHALATION) at 05:28

## 2025-01-01 RX ADMIN — Medication 5 MILLIGRAM(S): at 21:03

## 2025-01-01 RX ADMIN — Medication 6 MILLIGRAM(S): at 01:49

## 2025-01-01 RX ADMIN — Medication 100 MILLIGRAM(S): at 05:48

## 2025-01-01 RX ADMIN — Medication 5 MILLIGRAM(S): at 22:11

## 2025-01-01 RX ADMIN — METOPROLOL SUCCINATE 50 MILLIGRAM(S): 50 TABLET, EXTENDED RELEASE ORAL at 18:24

## 2025-01-01 RX ADMIN — Medication 5 MILLIGRAM(S): at 21:25

## 2025-01-01 RX ADMIN — Medication 650 MILLIGRAM(S): at 16:52

## 2025-01-01 RX ADMIN — METOPROLOL SUCCINATE 50 MILLIGRAM(S): 50 TABLET, EXTENDED RELEASE ORAL at 05:42

## 2025-01-01 RX ADMIN — Medication 250 MILLIGRAM(S): at 21:38

## 2025-01-01 RX ADMIN — APIXABAN 2.5 MILLIGRAM(S): 2.5 TABLET, FILM COATED ORAL at 18:07

## 2025-01-01 RX ADMIN — CEFTRIAXONE 100 MILLIGRAM(S): 500 INJECTION, POWDER, FOR SOLUTION INTRAMUSCULAR; INTRAVENOUS at 21:28

## 2025-01-01 RX ADMIN — Medication 40 MILLIGRAM(S): at 05:27

## 2025-01-01 RX ADMIN — LEVALBUTEROL HYDROCHLORIDE 0.63 MILLIGRAM(S): 1.25 SOLUTION RESPIRATORY (INHALATION) at 11:12

## 2025-01-01 RX ADMIN — METOPROLOL SUCCINATE 50 MILLIGRAM(S): 50 TABLET, EXTENDED RELEASE ORAL at 02:18

## 2025-01-01 RX ADMIN — Medication 3 MILLIGRAM(S): at 21:37

## 2025-01-01 RX ADMIN — Medication 6 MILLIGRAM(S): at 00:49

## 2025-01-01 RX ADMIN — LEVALBUTEROL HYDROCHLORIDE 0.63 MILLIGRAM(S): 1.25 SOLUTION RESPIRATORY (INHALATION) at 23:39

## 2025-01-01 RX ADMIN — METOPROLOL SUCCINATE 50 MILLIGRAM(S): 50 TABLET, EXTENDED RELEASE ORAL at 18:04

## 2025-01-01 RX ADMIN — Medication 0.5 MILLIGRAM(S): at 10:09

## 2025-01-01 RX ADMIN — Medication 2 TABLET(S): at 21:24

## 2025-01-01 RX ADMIN — Medication 40 MILLIGRAM(S): at 05:48

## 2025-01-01 RX ADMIN — METOPROLOL SUCCINATE 25 MILLIGRAM(S): 50 TABLET, EXTENDED RELEASE ORAL at 17:14

## 2025-01-01 RX ADMIN — LEVALBUTEROL HYDROCHLORIDE 0.63 MILLIGRAM(S): 1.25 SOLUTION RESPIRATORY (INHALATION) at 06:06

## 2025-01-01 RX ADMIN — Medication 5 MILLIGRAM(S): at 21:30

## 2025-01-01 RX ADMIN — Medication 1000 MILLIGRAM(S): at 02:45

## 2025-01-01 RX ADMIN — Medication 400 MILLIGRAM(S): at 14:34

## 2025-01-01 RX ADMIN — WHITE PETROLATUM 1 APPLICATION(S): 1 OINTMENT TOPICAL at 16:32

## 2025-01-01 RX ADMIN — Medication 100 MILLIGRAM(S): at 22:06

## 2025-01-01 RX ADMIN — METOPROLOL SUCCINATE 50 MILLIGRAM(S): 50 TABLET, EXTENDED RELEASE ORAL at 05:27

## 2025-01-01 RX ADMIN — METOPROLOL SUCCINATE 50 MILLIGRAM(S): 50 TABLET, EXTENDED RELEASE ORAL at 12:57

## 2025-01-01 RX ADMIN — Medication 5 MILLIGRAM(S): at 22:15

## 2025-01-01 RX ADMIN — Medication 1000 MILLIGRAM(S): at 11:51

## 2025-01-01 RX ADMIN — Medication 2 TABLET(S): at 21:19

## 2025-01-01 RX ADMIN — HEPARIN SODIUM 1100 UNIT(S)/HR: 1000 INJECTION INTRAVENOUS; SUBCUTANEOUS at 22:04

## 2025-01-01 RX ADMIN — Medication 5 MILLIGRAM(S): at 22:04

## 2025-01-01 RX ADMIN — METOPROLOL SUCCINATE 50 MILLIGRAM(S): 50 TABLET, EXTENDED RELEASE ORAL at 23:08

## 2025-01-01 RX ADMIN — LEVALBUTEROL HYDROCHLORIDE 0.63 MILLIGRAM(S): 1.25 SOLUTION RESPIRATORY (INHALATION) at 06:02

## 2025-01-01 RX ADMIN — POLYETHYLENE GLYCOL 3350 17 GRAM(S): 17 POWDER, FOR SOLUTION ORAL at 12:41

## 2025-01-01 RX ADMIN — METOPROLOL SUCCINATE 50 MILLIGRAM(S): 50 TABLET, EXTENDED RELEASE ORAL at 00:01

## 2025-01-01 RX ADMIN — LEVALBUTEROL HYDROCHLORIDE 0.63 MILLIGRAM(S): 1.25 SOLUTION RESPIRATORY (INHALATION) at 05:27

## 2025-01-01 RX ADMIN — HEPARIN SODIUM 2500 UNIT(S): 1000 INJECTION INTRAVENOUS; SUBCUTANEOUS at 04:46

## 2025-01-01 RX ADMIN — Medication 6 MILLIGRAM(S): at 14:30

## 2025-01-01 RX ADMIN — MIDODRINE HYDROCHLORIDE 30 MILLIGRAM(S): 5 TABLET ORAL at 06:23

## 2025-01-01 RX ADMIN — CEFEPIME 100 MILLIGRAM(S): 2 INJECTION, POWDER, FOR SOLUTION INTRAVENOUS at 06:02

## 2025-01-01 RX ADMIN — MIDODRINE HYDROCHLORIDE 20 MILLIGRAM(S): 5 TABLET ORAL at 14:56

## 2025-01-01 RX ADMIN — APIXABAN 2.5 MILLIGRAM(S): 2.5 TABLET, FILM COATED ORAL at 06:06

## 2025-01-01 RX ADMIN — Medication 4 MILLILITER(S): at 05:27

## 2025-01-01 RX ADMIN — Medication 100 MILLIGRAM(S): at 21:19

## 2025-01-01 RX ADMIN — METOPROLOL SUCCINATE 50 MILLIGRAM(S): 50 TABLET, EXTENDED RELEASE ORAL at 12:03

## 2025-01-01 RX ADMIN — IPRATROPIUM BROMIDE AND ALBUTEROL SULFATE 3 MILLILITER(S): .5; 2.5 SOLUTION RESPIRATORY (INHALATION) at 05:16

## 2025-01-01 RX ADMIN — IPRATROPIUM BROMIDE AND ALBUTEROL SULFATE 3 MILLILITER(S): .5; 2.5 SOLUTION RESPIRATORY (INHALATION) at 12:55

## 2025-01-01 RX ADMIN — Medication 2 MILLIGRAM(S): at 22:45

## 2025-01-01 RX ADMIN — Medication 40 MILLIGRAM(S): at 05:04

## 2025-01-01 RX ADMIN — LEVALBUTEROL HYDROCHLORIDE 0.63 MILLIGRAM(S): 1.25 SOLUTION RESPIRATORY (INHALATION) at 23:08

## 2025-01-01 RX ADMIN — Medication 100 MILLIGRAM(S): at 21:17

## 2025-01-01 RX ADMIN — Medication 5 UNIT(S): at 02:17

## 2025-01-01 RX ADMIN — WHITE PETROLATUM 1 APPLICATION(S): 1 OINTMENT TOPICAL at 05:42

## 2025-01-01 RX ADMIN — Medication 400 MILLIGRAM(S): at 02:30

## 2025-01-01 RX ADMIN — Medication 40 MILLIGRAM(S): at 06:23

## 2025-01-01 RX ADMIN — METOPROLOL SUCCINATE 50 MILLIGRAM(S): 50 TABLET, EXTENDED RELEASE ORAL at 05:48

## 2025-01-01 RX ADMIN — LEVALBUTEROL HYDROCHLORIDE 0.63 MILLIGRAM(S): 1.25 SOLUTION RESPIRATORY (INHALATION) at 00:05

## 2025-01-01 RX ADMIN — METOPROLOL SUCCINATE 25 MILLIGRAM(S): 50 TABLET, EXTENDED RELEASE ORAL at 12:12

## 2025-01-01 RX ADMIN — CEFEPIME 100 MILLIGRAM(S): 2 INJECTION, POWDER, FOR SOLUTION INTRAVENOUS at 05:19

## 2025-01-01 RX ADMIN — LEVALBUTEROL HYDROCHLORIDE 0.63 MILLIGRAM(S): 1.25 SOLUTION RESPIRATORY (INHALATION) at 12:32

## 2025-01-01 RX ADMIN — Medication 650 MILLIGRAM(S): at 11:20

## 2025-01-01 RX ADMIN — APIXABAN 2.5 MILLIGRAM(S): 2.5 TABLET, FILM COATED ORAL at 18:04

## 2025-01-01 RX ADMIN — Medication 40 MILLIGRAM(S): at 06:43

## 2025-01-01 RX ADMIN — Medication 5 MILLIGRAM(S): at 22:00

## 2025-01-01 RX ADMIN — CEFTRIAXONE 100 MILLIGRAM(S): 500 INJECTION, POWDER, FOR SOLUTION INTRAMUSCULAR; INTRAVENOUS at 23:08

## 2025-01-01 RX ADMIN — MIDODRINE HYDROCHLORIDE 20 MILLIGRAM(S): 5 TABLET ORAL at 21:59

## 2025-01-01 RX ADMIN — Medication 2 TABLET(S): at 23:43

## 2025-01-01 RX ADMIN — Medication 50 MILLILITER(S): at 02:17

## 2025-01-01 RX ADMIN — MIDODRINE HYDROCHLORIDE 5 MILLIGRAM(S): 5 TABLET ORAL at 05:20

## 2025-01-01 RX ADMIN — METOPROLOL SUCCINATE 25 MILLIGRAM(S): 50 TABLET, EXTENDED RELEASE ORAL at 18:13

## 2025-01-01 RX ADMIN — METOPROLOL SUCCINATE 50 MILLIGRAM(S): 50 TABLET, EXTENDED RELEASE ORAL at 05:53

## 2025-01-01 RX ADMIN — Medication 6 MILLIGRAM(S): at 23:10

## 2025-01-01 RX ADMIN — METOPROLOL SUCCINATE 25 MILLIGRAM(S): 50 TABLET, EXTENDED RELEASE ORAL at 12:04

## 2025-01-01 RX ADMIN — MIDODRINE HYDROCHLORIDE 30 MILLIGRAM(S): 5 TABLET ORAL at 21:28

## 2025-01-01 RX ADMIN — METOPROLOL SUCCINATE 12.5 MILLIGRAM(S): 50 TABLET, EXTENDED RELEASE ORAL at 14:01

## 2025-01-01 RX ADMIN — Medication 650 MILLIGRAM(S): at 17:34

## 2025-01-01 RX ADMIN — POLYETHYLENE GLYCOL 3350 17 GRAM(S): 17 POWDER, FOR SOLUTION ORAL at 12:56

## 2025-01-01 RX ADMIN — HEPARIN SODIUM 1200 UNIT(S)/HR: 1000 INJECTION INTRAVENOUS; SUBCUTANEOUS at 04:44

## 2025-01-01 RX ADMIN — METOPROLOL SUCCINATE 50 MILLIGRAM(S): 50 TABLET, EXTENDED RELEASE ORAL at 18:06

## 2025-01-01 RX ADMIN — Medication 100 MILLIGRAM(S): at 21:24

## 2025-01-01 RX ADMIN — APIXABAN 2.5 MILLIGRAM(S): 2.5 TABLET, FILM COATED ORAL at 05:20

## 2025-01-01 RX ADMIN — QUETIAPINE FUMARATE 25 MILLIGRAM(S): 25 TABLET ORAL at 20:17

## 2025-01-01 RX ADMIN — METOPROLOL SUCCINATE 50 MILLIGRAM(S): 50 TABLET, EXTENDED RELEASE ORAL at 05:44

## 2025-01-01 RX ADMIN — Medication 2 MILLIGRAM(S): at 09:58

## 2025-01-01 RX ADMIN — Medication 3 MILLIGRAM(S): at 09:25

## 2025-01-01 RX ADMIN — ALBUMIN (HUMAN) 166.67 MILLILITER(S): 12.5 INJECTION, SOLUTION INTRAVENOUS at 10:43

## 2025-01-01 RX ADMIN — APIXABAN 2.5 MILLIGRAM(S): 2.5 TABLET, FILM COATED ORAL at 06:23

## 2025-01-01 RX ADMIN — LEVALBUTEROL HYDROCHLORIDE 0.63 MILLIGRAM(S): 1.25 SOLUTION RESPIRATORY (INHALATION) at 13:04

## 2025-01-01 RX ADMIN — IPRATROPIUM BROMIDE AND ALBUTEROL SULFATE 3 MILLILITER(S): .5; 2.5 SOLUTION RESPIRATORY (INHALATION) at 18:42

## 2025-01-01 RX ADMIN — Medication 650 MILLIGRAM(S): at 16:30

## 2025-01-01 RX ADMIN — MIDODRINE HYDROCHLORIDE 5 MILLIGRAM(S): 5 TABLET ORAL at 13:04

## 2025-01-01 RX ADMIN — Medication 650 MILLIGRAM(S): at 18:42

## 2025-01-01 RX ADMIN — Medication 4 MILLILITER(S): at 17:33

## 2025-01-01 RX ADMIN — METOPROLOL SUCCINATE 50 MILLIGRAM(S): 50 TABLET, EXTENDED RELEASE ORAL at 00:20

## 2025-01-01 RX ADMIN — CEFTRIAXONE 100 MILLIGRAM(S): 500 INJECTION, POWDER, FOR SOLUTION INTRAMUSCULAR; INTRAVENOUS at 21:43

## 2025-01-01 RX ADMIN — APIXABAN 2.5 MILLIGRAM(S): 2.5 TABLET, FILM COATED ORAL at 05:17

## 2025-01-01 RX ADMIN — LEVALBUTEROL HYDROCHLORIDE 0.63 MILLIGRAM(S): 1.25 SOLUTION RESPIRATORY (INHALATION) at 05:16

## 2025-01-01 RX ADMIN — Medication 0.5 MILLIGRAM(S): at 00:20

## 2025-01-01 RX ADMIN — Medication 1 APPLICATION(S): at 07:03

## 2025-01-01 RX ADMIN — METOPROLOL SUCCINATE 25 MILLIGRAM(S): 50 TABLET, EXTENDED RELEASE ORAL at 21:28

## 2025-01-01 RX ADMIN — Medication 100 MILLIGRAM(S): at 21:34

## 2025-01-01 RX ADMIN — CEFEPIME 100 MILLIGRAM(S): 2 INJECTION, POWDER, FOR SOLUTION INTRAVENOUS at 13:05

## 2025-01-01 RX ADMIN — Medication 400 MILLIGRAM(S): at 11:11

## 2025-01-01 RX ADMIN — Medication 40 MILLIGRAM(S): at 05:19

## 2025-01-01 RX ADMIN — LEVALBUTEROL HYDROCHLORIDE 0.63 MILLIGRAM(S): 1.25 SOLUTION RESPIRATORY (INHALATION) at 13:52

## 2025-01-01 RX ADMIN — METOPROLOL SUCCINATE 12.5 MILLIGRAM(S): 50 TABLET, EXTENDED RELEASE ORAL at 21:02

## 2025-01-01 RX ADMIN — POLYETHYLENE GLYCOL 3350 17 GRAM(S): 17 POWDER, FOR SOLUTION ORAL at 11:59

## 2025-01-01 RX ADMIN — POLYETHYLENE GLYCOL 3350 17 GRAM(S): 17 POWDER, FOR SOLUTION ORAL at 18:51

## 2025-01-01 RX ADMIN — Medication 81 MILLIGRAM(S): at 12:55

## 2025-01-01 RX ADMIN — Medication 5 MILLIGRAM(S): at 21:19

## 2025-01-01 RX ADMIN — Medication 50 MILLILITER(S): at 18:37

## 2025-01-01 RX ADMIN — METOPROLOL SUCCINATE 50 MILLIGRAM(S): 50 TABLET, EXTENDED RELEASE ORAL at 23:31

## 2025-01-01 RX ADMIN — MIDODRINE HYDROCHLORIDE 20 MILLIGRAM(S): 5 TABLET ORAL at 06:00

## 2025-01-01 RX ADMIN — ERYTHROMYCIN 1 APPLICATION(S): 5 OINTMENT OPHTHALMIC at 05:48

## 2025-01-01 RX ADMIN — LEVALBUTEROL HYDROCHLORIDE 0.63 MILLIGRAM(S): 1.25 SOLUTION RESPIRATORY (INHALATION) at 11:44

## 2025-01-01 RX ADMIN — HEPARIN SODIUM 1100 UNIT(S)/HR: 1000 INJECTION INTRAVENOUS; SUBCUTANEOUS at 00:10

## 2025-01-01 RX ADMIN — METOPROLOL SUCCINATE 50 MILLIGRAM(S): 50 TABLET, EXTENDED RELEASE ORAL at 00:09

## 2025-01-01 RX ADMIN — IPRATROPIUM BROMIDE AND ALBUTEROL SULFATE 3 MILLILITER(S): .5; 2.5 SOLUTION RESPIRATORY (INHALATION) at 22:46

## 2025-01-01 RX ADMIN — HEPARIN SODIUM 1100 UNIT(S)/HR: 1000 INJECTION INTRAVENOUS; SUBCUTANEOUS at 07:45

## 2025-01-01 RX ADMIN — Medication 2 TABLET(S): at 22:06

## 2025-01-01 RX ADMIN — METOPROLOL SUCCINATE 50 MILLIGRAM(S): 50 TABLET, EXTENDED RELEASE ORAL at 11:47

## 2025-01-01 RX ADMIN — HEPARIN SODIUM 5000 UNIT(S): 1000 INJECTION INTRAVENOUS; SUBCUTANEOUS at 18:13

## 2025-01-01 RX ADMIN — METOPROLOL SUCCINATE 50 MILLIGRAM(S): 50 TABLET, EXTENDED RELEASE ORAL at 06:23

## 2025-01-01 RX ADMIN — LEVALBUTEROL HYDROCHLORIDE 0.63 MILLIGRAM(S): 1.25 SOLUTION RESPIRATORY (INHALATION) at 18:09

## 2025-01-01 RX ADMIN — Medication 3 MILLIGRAM(S): at 21:39

## 2025-01-01 RX ADMIN — METOPROLOL SUCCINATE 50 MILLIGRAM(S): 50 TABLET, EXTENDED RELEASE ORAL at 12:51

## 2025-01-01 RX ADMIN — Medication 650 MILLIGRAM(S): at 14:09

## 2025-01-01 RX ADMIN — CEFEPIME 100 MILLIGRAM(S): 2 INJECTION, POWDER, FOR SOLUTION INTRAVENOUS at 14:18

## 2025-01-01 RX ADMIN — Medication 2 MILLIGRAM(S): at 10:20

## 2025-01-01 RX ADMIN — LEVALBUTEROL HYDROCHLORIDE 0.63 MILLIGRAM(S): 1.25 SOLUTION RESPIRATORY (INHALATION) at 14:34

## 2025-01-01 RX ADMIN — CEFTRIAXONE 100 MILLIGRAM(S): 500 INJECTION, POWDER, FOR SOLUTION INTRAMUSCULAR; INTRAVENOUS at 21:59

## 2025-01-01 RX ADMIN — CEFTRIAXONE 100 MILLIGRAM(S): 500 INJECTION, POWDER, FOR SOLUTION INTRAMUSCULAR; INTRAVENOUS at 21:10

## 2025-01-01 RX ADMIN — LEVALBUTEROL HYDROCHLORIDE 0.63 MILLIGRAM(S): 1.25 SOLUTION RESPIRATORY (INHALATION) at 09:04

## 2025-01-01 RX ADMIN — Medication 1000 MILLIGRAM(S): at 14:55

## 2025-01-01 RX ADMIN — CEFEPIME 100 MILLIGRAM(S): 2 INJECTION, POWDER, FOR SOLUTION INTRAVENOUS at 21:13

## 2025-01-01 RX ADMIN — POLYETHYLENE GLYCOL 3350 17 GRAM(S): 17 POWDER, FOR SOLUTION ORAL at 12:14

## 2025-01-01 RX ADMIN — IPRATROPIUM BROMIDE AND ALBUTEROL SULFATE 3 MILLILITER(S): .5; 2.5 SOLUTION RESPIRATORY (INHALATION) at 12:42

## 2025-01-01 RX ADMIN — METOPROLOL SUCCINATE 5 MILLIGRAM(S): 50 TABLET, EXTENDED RELEASE ORAL at 00:37

## 2025-01-01 RX ADMIN — LEVALBUTEROL HYDROCHLORIDE 0.63 MILLIGRAM(S): 1.25 SOLUTION RESPIRATORY (INHALATION) at 06:47

## 2025-01-01 RX ADMIN — LEVALBUTEROL HYDROCHLORIDE 0.63 MILLIGRAM(S): 1.25 SOLUTION RESPIRATORY (INHALATION) at 18:18

## 2025-01-01 RX ADMIN — LEVALBUTEROL HYDROCHLORIDE 0.63 MILLIGRAM(S): 1.25 SOLUTION RESPIRATORY (INHALATION) at 06:39

## 2025-01-01 RX ADMIN — METOPROLOL SUCCINATE 50 MILLIGRAM(S): 50 TABLET, EXTENDED RELEASE ORAL at 23:48

## 2025-01-01 RX ADMIN — APIXABAN 2.5 MILLIGRAM(S): 2.5 TABLET, FILM COATED ORAL at 18:09

## 2025-01-01 RX ADMIN — Medication 40 MILLIGRAM(S): at 05:44

## 2025-01-01 RX ADMIN — LEVALBUTEROL HYDROCHLORIDE 0.63 MILLIGRAM(S): 1.25 SOLUTION RESPIRATORY (INHALATION) at 23:49

## 2025-01-01 RX ADMIN — LEVALBUTEROL HYDROCHLORIDE 0.63 MILLIGRAM(S): 1.25 SOLUTION RESPIRATORY (INHALATION) at 18:06

## 2025-01-01 RX ADMIN — Medication 1000 MILLILITER(S): at 19:11

## 2025-01-01 RX ADMIN — LEVALBUTEROL HYDROCHLORIDE 0.63 MILLIGRAM(S): 1.25 SOLUTION RESPIRATORY (INHALATION) at 00:08

## 2025-01-01 RX ADMIN — CEFEPIME 100 MILLIGRAM(S): 2 INJECTION, POWDER, FOR SOLUTION INTRAVENOUS at 05:04

## 2025-01-01 RX ADMIN — CEFEPIME 100 MILLIGRAM(S): 2 INJECTION, POWDER, FOR SOLUTION INTRAVENOUS at 23:20

## 2025-01-01 RX ADMIN — Medication 1 APPLICATION(S): at 06:24

## 2025-01-01 RX ADMIN — DEXTROMETHORPHAN HBR, GUAIFENESIN 5 MILLILITER(S): 20; 200 SOLUTION ORAL at 18:10

## 2025-01-01 RX ADMIN — LEVALBUTEROL HYDROCHLORIDE 0.63 MILLIGRAM(S): 1.25 SOLUTION RESPIRATORY (INHALATION) at 23:23

## 2025-01-01 RX ADMIN — Medication 5 MILLIGRAM(S): at 21:28

## 2025-01-01 RX ADMIN — Medication 1 APPLICATION(S): at 05:20

## 2025-01-01 RX ADMIN — METOPROLOL SUCCINATE 50 MILLIGRAM(S): 50 TABLET, EXTENDED RELEASE ORAL at 18:43

## 2025-01-01 RX ADMIN — LEVALBUTEROL HYDROCHLORIDE 0.63 MILLIGRAM(S): 1.25 SOLUTION RESPIRATORY (INHALATION) at 23:45

## 2025-01-01 RX ADMIN — APIXABAN 2.5 MILLIGRAM(S): 2.5 TABLET, FILM COATED ORAL at 05:53

## 2025-01-01 RX ADMIN — ERYTHROMYCIN 1 APPLICATION(S): 5 OINTMENT OPHTHALMIC at 18:22

## 2025-01-01 RX ADMIN — IPRATROPIUM BROMIDE AND ALBUTEROL SULFATE 3 MILLILITER(S): .5; 2.5 SOLUTION RESPIRATORY (INHALATION) at 18:00

## 2025-01-01 RX ADMIN — LEVALBUTEROL HYDROCHLORIDE 0.63 MILLIGRAM(S): 1.25 SOLUTION RESPIRATORY (INHALATION) at 05:53

## 2025-01-01 RX ADMIN — LEVALBUTEROL HYDROCHLORIDE 0.63 MILLIGRAM(S): 1.25 SOLUTION RESPIRATORY (INHALATION) at 05:17

## 2025-01-01 RX ADMIN — LEVALBUTEROL HYDROCHLORIDE 0.63 MILLIGRAM(S): 1.25 SOLUTION RESPIRATORY (INHALATION) at 17:15

## 2025-01-01 RX ADMIN — METHYLPREDNISOLONE ACETATE 40 MILLIGRAM(S): 80 INJECTION, SUSPENSION INTRA-ARTICULAR; INTRALESIONAL; INTRAMUSCULAR; SOFT TISSUE at 13:33

## 2025-01-01 RX ADMIN — POLYETHYLENE GLYCOL 3350 17 GRAM(S): 17 POWDER, FOR SOLUTION ORAL at 13:52

## 2025-01-12 NOTE — PATIENT PROFILE ADULT - NSTRANSFERBELONGINGSDISPO_GEN_A_NUR
-- DO NOT REPLY / DO NOT REPLY ALL --  -- This inbox is not monitored. If this was sent to the wrong provider or department, reroute message to P [facility] Reroute pool. --  -- Message is from Engagement Center Operations ([facility]) --      Message Type:  Refill Medication   Refill request for Pended medication named:   Preferred pharmacy verified, and selected.   [address]    Is the patient OUT of Medication?  Yes and After Hours- route as high priority to [facility] CLINICAL MSG pool. Patient has been advised it will be addressed within 1 business day.    Message: PATIENT IS CALLING IN BECAUSE HER  IS OUT OF MEDICATION .                  
with patient

## 2025-01-29 NOTE — ED CDU PROVIDER SUBSEQUENT DAY NOTE - NS_EDPROVIDERDISPOUSERTYPE_ED_A_ED
Chief Complaint: dizziness    History of Present Illness:   Dejan Mooney is a 74 y.o. male who presents with dizziness.  Patient states that has been having balance problems for the past 4 months that have slowly been worsening.  He has yet to follow-up with PT and has his first appointment in the first week of February.  He states that sometimes when he will walk he feels like he is tilting left and right and will have to hold onto his wife to balance.  He has not fallen or lost consciousness.  Denies any vision or hearing changes.  He does report associated severe and frequent headaches that he describes feel different than his previous headaches.  It is noticeable behind his eyes and diffusely around his head.      Past Medical History:   Diagnosis Date    BBB (bundle branch block)     Diabetes     insulin and oral medication    Heart burn     Hyperlipidemia     Hypertension     Indigestion     Pain     knee, shoulder    Pneumonia 1974    Psychiatric problem     depression    Renal calculus 7/2013    kidney stones    Skin rash 3/16/2022    Snoring     sleep apnea questionairre completed       Past Surgical History:   Procedure Laterality Date    RECOVERY  5/13/2014    Performed by [facility] at [facility]    OTHER  2/2014    right knee    KNEE ARTHROSCOPY  8/22/2013    Performed by Maurisio Alfaro M.D. at SURGERY [facility]    MENISCECTOMY, KNEE, MEDIAL  8/22/2013    Performed by Maurisio Alfaro M.D. at SURGERY [facility]    SHOULDER ARTHROTOMY  2003    right    KNEE ARTHROSCOPY  1990    left    KNEE ARTHROSCOPY  1985    right    ORIF, ANKLE  1984    left    ORIF, KNEE  1970    left    TONSILLECTOMY  as child       Family History   Problem Relation Age of Onset    Heart Disease Mother     Hypertension Mother     Heart Disease Father     Hypertension Father     Atrial fibrillation Brother        Social History     Socioeconomic History     Call placed to pharmacy. Spoke with [facility]. Rx on file for Adderall XR 30mg.  Pharmacy will contact pt when ready for .    Marital status:      Spouse name: Not on file    Number of children: Not on file    Years of education: Not on file    Highest education level: Not on file   Occupational History    Not on file   Tobacco Use    Smoking status: Never    Smokeless tobacco: Never   Vaping Use    Vaping status: Never Used   Substance and Sexual Activity    Alcohol use: Yes     Alcohol/week: 1.2 oz     Types: 2 Standard drinks or equivalent per week     Comment: 1x/week    Drug use: No    Sexual activity: Not on file   Other Topics Concern    Not on file   Social History Narrative    Not on file     Social Drivers of Health     Financial Resource Strain: Not on file   Food Insecurity: Not on file   Transportation Needs: Not on file   Physical Activity: Not on file   Stress: Not on file   Social Connections: Not on file   Intimate Partner Violence: Not on file   Housing Stability: Not on file       Current Outpatient Medications   Medication Sig Dispense Refill    OZEMPIC, 2 MG/DOSE, 8 MG/3ML Solution Pen-injector INJECT 2MG UNDER THE SKIN ONCE A WEEK      fluconazole (DIFLUCAN) 150 MG tablet Take 1 Tablet by mouth every 7 days. 4 Tablet 0    Insulin Pen Needle 32 G x 4 mm (BD PEN NEEDLE ANNETTE U/F) 200 Units every day at 6 PM. 100 Each 2    Insulin Degludec (TRESIBA FLEXTOUCH) 100 UNIT/ML Solution Pen-injector       ezetimibe (ZETIA) 10 MG Tab Take 1 Tablet by mouth every day. 100 Tablet 3    hydroCHLOROthiazide 25 MG Tab Take 1 Tablet by mouth every day. 100 Tablet 3    irbesartan (AVAPRO) 300 MG Tab Take 1 Tablet by mouth every day. 100 Tablet 3    carvedilol (COREG) 25 MG Tab Take 1 Tablet by mouth 2 times a day with meals. 60 Tablet 2    metFORMIN (GLUCOPHAGE) 500 MG Tab TAKE 1 TABLET BY MOUTH IN THE MORNING AND 2 TABLETS IN THE EVENING 270 Tablet 0    SSD 1 % Cream APPLY THIN LAYER TO WOUND EVERY DAY      zolpidem (AMBIEN) 10 MG Tab Take 10 mg by mouth at bedtime as needed for Sleep.      gabapentin (NEURONTIN) 800 MG tablet  "Take 1 Tablet by mouth 3 times a day. 90 Tablet 2    Continuous Blood Gluc Sensor (FREESTYLE HANH 14 DAY SENSOR) Misc FREESTYLE HANH 14 DAY SENSOR      BD PEN NEEDLE MICRO U/F 32G X 6 MM Misc       ASPIRIN 81 MG PO TABS Take 81 mg by mouth every day.      atorvastatin (LIPITOR) 40 MG Tab Take 1 Tablet by mouth every day.       No current facility-administered medications for this visit.       Allergies   Allergen Reactions    Penicillins Anaphylaxis, Hives, Swelling and Shortness of Breath    Norvasc [Amlodipine]      Leg swelling       Review of Systems:  Review of Systems   Constitutional:  Negative for fever.   Respiratory:  Negative for shortness of breath.    Cardiovascular:  Negative for chest pain and palpitations.   Gastrointestinal:  Negative for nausea.   Neurological:  Positive for dizziness and headaches. Negative for weakness.        Medications:     Current Outpatient Medications:     Ozempic (2 MG/DOSE), INJECT 2MG UNDER THE SKIN ONCE A WEEK, Taking    fluconazole, 150 mg, Oral, Q7 DAYS, Taking    BD Pen Needle Grace U/F, 200 Units, Does not apply, DAILY AT 1800, Taking    Tresiba FlexTouch, , Taking    ezetimibe, 10 mg, Oral, DAILY, Taking    hydroCHLOROthiazide, 25 mg, Oral, DAILY, Taking    irbesartan, 300 mg, Oral, DAILY, Taking    carvedilol, 25 mg, Oral, BID WITH MEALS, Taking    metFORMIN, TAKE 1 TABLET BY MOUTH IN THE MORNING AND 2 TABLETS IN THE EVENING, Taking    SSD, APPLY THIN LAYER TO WOUND EVERY DAY, Taking    zolpidem, 10 mg, Oral, HS PRN, Taking    gabapentin, 800 mg, Oral, TID, Taking    FreeStyle Hanh 14 Day Sensor, FREESTYLE HANH 14 DAY SENSOR, Taking    BD Pen Needle Micro U/F, , Taking    aspirin, 81 mg, Oral, DAILY, Taking    atorvastatin, 1 Tablet, Oral, QDAY     Objective:  Vitals:   /53 (BP Location: Left arm, Patient Position: Sitting, BP Cuff Size: Adult)   Pulse 96   Temp 36.4 °C (97.6 °F) (Temporal)   Ht 1.88 m (6' 2\")   Wt 102 kg (224 lb)   SpO2 98%  Body " mass index is 28.76 kg/m².    Physical Exam  Constitutional:       General: He is not in acute distress.     Appearance: Normal appearance.   HENT:      Head: Normocephalic and atraumatic.   Eyes:      Conjunctiva/sclera: Conjunctivae normal.   Cardiovascular:      Heart sounds: Normal heart sounds.   Pulmonary:      Effort: Pulmonary effort is normal.      Breath sounds: Normal breath sounds.   Skin:     Comments: Dry scaly skin around the ankles    Neurological:      General: No focal deficit present.      Mental Status: He is oriented to person, place, and time.      Cranial Nerves: No cranial nerve deficit.      Sensory: No sensory deficit.      Motor: No weakness.      Coordination: Coordination normal.      Gait: Gait normal.      Deep Tendon Reflexes: Reflexes normal.          Results:    Lab Results   Component Value Date/Time    CHOLSTRLTOT 139 01/08/2025 06:07 AM    CHOLSTRLTOT 144 06/17/2013 07:12 AM    LDL 71 06/17/2013 07:12 AM    HDL 36 (L) 01/08/2025 06:07 AM    HDL 31 (A) 06/17/2013 07:12 AM    TRIGLYCERIDE 132 01/08/2025 06:07 AM    TRIGLYCERIDE 211 (H) 06/17/2013 07:12 AM       Lab Results   Component Value Date/Time    SODIUM 141 01/08/2025 06:07 AM    SODIUM 135 07/07/2020 05:40 PM    POTASSIUM 3.8 01/08/2025 06:07 AM    POTASSIUM 4.0 07/07/2020 05:40 PM    CHLORIDE 101 01/08/2025 06:07 AM    CHLORIDE 97 07/07/2020 05:40 PM    CO2 26 01/08/2025 06:07 AM    CO2 21 07/07/2020 05:40 PM    GLUCOSE 109 (H) 01/08/2025 06:07 AM    GLUCOSE 181 (H) 07/07/2020 05:40 PM    BUN 29 (H) 01/08/2025 06:07 AM    BUN 29 (H) 07/07/2020 05:40 PM    CREATININE 1.00 01/08/2025 06:07 AM    CREATININE 1.21 07/07/2020 05:40 PM    BUNCREATRAT 29 (H) 01/08/2025 06:07 AM     Lab Results   Component Value Date/Time    ALKPHOSPHAT 105 01/08/2025 06:07 AM    ALKPHOSPHAT 76 07/07/2020 05:40 PM    ASTSGOT 21 01/08/2025 06:07 AM    ASTSGOT 15 07/07/2020 05:40 PM    ALTSGPT 18 01/08/2025 06:07 AM    ALTSGPT 17 07/07/2020 05:40 PM     TBILIRUBIN 0.5 01/08/2025 06:07 AM    TBILIRUBIN 1.0 07/07/2020 05:40 PM        Lab Results   Component Value Date/Time    WBC 6.0 06/17/2024 08:05 AM    WBC 13.1 (H) 07/07/2020 05:40 PM    RBC 4.16 06/17/2024 08:05 AM    RBC 4.89 07/07/2020 05:40 PM    HEMOGLOBIN 12.6 (L) 06/17/2024 08:05 AM    HEMOGLOBIN 14.6 07/07/2020 05:40 PM    HEMATOCRIT 38.2 06/17/2024 08:05 AM    HEMATOCRIT 43.8 07/07/2020 05:40 PM    MCV 92 06/17/2024 08:05 AM    MCV 89.6 07/07/2020 05:40 PM    MCH 30.3 06/17/2024 08:05 AM    MCH 29.9 07/07/2020 05:40 PM    MCHC 33.0 06/17/2024 08:05 AM    MCHC 33.3 (L) 07/07/2020 05:40 PM    MPV 9.2 07/07/2020 05:40 PM    NEUTSPOLYS 88.40 (H) 07/07/2020 05:40 PM    LYMPHOCYTES 4.10 (L) 07/07/2020 05:40 PM    MONOCYTES 6.40 07/07/2020 05:40 PM    EOSINOPHILS 0.00 07/07/2020 05:40 PM    BASOPHILS 0.30 07/07/2020 05:40 PM    HYPOCHROMIA 1+ 06/17/2013 07:12 AM        Assessment and Plan:    #Dizziness  Patient with dizziness described as balance problems for the past 4 months that been worsening. When he will walk from the store to his car he feels like he will be tilting left and right will have to hold onto his wife for balance.  He has associated lightheadedness while getting up as well as severe headaches that are occurring more frequently. No syncopal episodes, falls, vision changes, or hearing changes   He does have history of BPPV but notes that this feels different.  It is not a vertigo like sensation, thus less likely inner ear problem, like Ménière's.  He also does not have any hearing change and saw an audiologist who told him that his hearing was perfect  Possibly worsening of his peripheral neuropathy  Vitamin B12 within normal limits  No gait abnormalities noted when testing in office  Orthostatics negative  - Will obtain MRI brain with and without contrast for further evaluation  -Referral to neurology for further evaluation as well  - Strict return precautions given    #Lower extremity  pruritus  Patient states that he normally gets a fungal infection of his lower extremities after being on antibiotics.  He was on doxycycline for some foot ulcers and he has noticed that his lower legs have been dry, scaly, and itchy.  He has tried antifungal creams without any relief.  He normally takes fluconazole for in the past which will relieve it  - Start fluconazole 150 mg weekly for 4 weeks    **Chronic conditions:     #CAD  #Dyslipidemia  Following with cardiology.  Goal LDL less than 55   LDL in May: 26  On aspirin, atorvastatin, Zetia     #Hypertension  Following with cardiology   Blood pressures well controlled on carvedilol 25 mg bid, irbesartan 300 mg daily, and HCTZ 25 mg daily  BP initially low in clinic.  However, when performing orthostatics his BP was ranging from 120-130     #T2DM  #Neuropathy  #Microalbuminemia  Following with endocrinology  Last A1c (6/2024): 6.7%  Recent A1c (11/2024): 6.3%  Monofilament test (11/2024): No sensation felt in all points  No open wounds seen at previous visit  Following with [facility] for retinal screening  Microalbumin/creatinine ratio (1/2025): 67  Vitamin B12 (1/2025): Within normal limits    #Neuropathy  Following with pain management on gabapentin     #Insomnia  Following pain management on Ambien             #Diarrhea resolved  Likely secondary to malabsorption  Has family history of celiac to his brother being diagnosed in his 70s  Last colonoscopy in 2021 showed 1 diminutive sessile polyp in the rectosigmoid colon with recommendation for repeat in 5 to 10 years  Gastric emptying study normal  Stool tests negative    Follow Up:  Return in about 6 weeks (around 3/12/2025).      Attending Attestation (For Attendings USE Only)...

## 2025-03-27 ENCOUNTER — APPOINTMENT (OUTPATIENT)
Dept: HEMATOLOGY ONCOLOGY | Facility: CLINIC | Age: 89
End: 2025-03-27

## 2025-03-27 ENCOUNTER — RESULT REVIEW (OUTPATIENT)
Age: 89
End: 2025-03-27

## 2025-03-27 ENCOUNTER — OUTPATIENT (OUTPATIENT)
Dept: OUTPATIENT SERVICES | Facility: HOSPITAL | Age: 89
LOS: 1 days | Discharge: HOME | End: 2025-03-27

## 2025-03-27 DIAGNOSIS — Z90.13 ACQUIRED ABSENCE OF BILATERAL BREASTS AND NIPPLES: Chronic | ICD-10-CM

## 2025-03-27 DIAGNOSIS — Z98.49 CATARACT EXTRACTION STATUS, UNSPECIFIED EYE: Chronic | ICD-10-CM

## 2025-03-27 DIAGNOSIS — D47.2 MONOCLONAL GAMMOPATHY: ICD-10-CM

## 2025-03-27 DIAGNOSIS — D69.6 THROMBOCYTOPENIA, UNSPECIFIED: ICD-10-CM

## 2025-03-27 LAB
ALBUMIN SERPL ELPH-MCNC: 3.6 G/DL
ALP BLD-CCNC: 87 U/L
ALT SERPL-CCNC: 12 U/L
ANION GAP SERPL CALC-SCNC: 7 MMOL/L
AST SERPL-CCNC: 24 U/L
B2 MICROGLOB SERPL-MCNC: 2.9 MG/L
BASOPHILS # BLD AUTO: 0.02 K/UL — SIGNIFICANT CHANGE UP (ref 0–0.2)
BASOPHILS NFR BLD AUTO: 1 % — SIGNIFICANT CHANGE UP (ref 0–2)
BILIRUB SERPL-MCNC: 0.4 MG/DL
BUN SERPL-MCNC: 19 MG/DL
CALCIUM SERPL-MCNC: 9.1 MG/DL
CHLORIDE SERPL-SCNC: 105 MMOL/L
CO2 SERPL-SCNC: 27 MMOL/L
CREAT SERPL-MCNC: 0.82 MG/DL
EGFRCR SERPLBLD CKD-EPI 2021: 69 ML/MIN/1.73M2
EOSINOPHIL # BLD AUTO: 0.04 K/UL — SIGNIFICANT CHANGE UP (ref 0–0.5)
EOSINOPHIL NFR BLD AUTO: 2 % — SIGNIFICANT CHANGE UP (ref 0–6)
ERYTHROCYTE [SEDIMENTATION RATE] IN BLOOD: 45 MM/HR — HIGH (ref 0–20)
GLUCOSE SERPL-MCNC: 80 MG/DL
HCT VFR BLD CALC: 37.1 % — SIGNIFICANT CHANGE UP (ref 34.5–45)
HGB BLD-MCNC: 12 G/DL — SIGNIFICANT CHANGE UP (ref 11.5–15.5)
LDH SERPL-CCNC: 194 U/L
LYMPHOCYTES # BLD AUTO: 0.68 K/UL — LOW (ref 1–3.3)
LYMPHOCYTES # BLD AUTO: 36 % — SIGNIFICANT CHANGE UP (ref 13–44)
MCHC RBC-ENTMCNC: 28.7 PG — SIGNIFICANT CHANGE UP (ref 27–34)
MCHC RBC-ENTMCNC: 32.3 G/DL — SIGNIFICANT CHANGE UP (ref 32–36)
MCV RBC AUTO: 88.8 FL — SIGNIFICANT CHANGE UP (ref 80–100)
MONOCYTES # BLD AUTO: 0.53 K/UL — SIGNIFICANT CHANGE UP (ref 0–0.9)
MONOCYTES NFR BLD AUTO: 28 % — HIGH (ref 2–14)
NEUTROPHILS # BLD AUTO: 0.62 K/UL — LOW (ref 1.8–7.4)
NEUTROPHILS NFR BLD AUTO: 33 % — LOW (ref 43–77)
NRBC # BLD: 0 /100 WBCS — SIGNIFICANT CHANGE UP (ref 0–0)
NRBC BLD AUTO-RTO: SIGNIFICANT CHANGE UP /100 WBCS (ref 0–0)
NRBC BLD-RTO: 0 /100 WBCS — SIGNIFICANT CHANGE UP (ref 0–0)
PLAT MORPH BLD: NORMAL — SIGNIFICANT CHANGE UP
PLATELET # BLD AUTO: 170 K/UL — SIGNIFICANT CHANGE UP (ref 150–400)
POTASSIUM SERPL-SCNC: 4.7 MMOL/L
PROT SERPL-MCNC: 7.7 G/DL
RBC # BLD: 4.18 M/UL — SIGNIFICANT CHANGE UP (ref 3.8–5.2)
RBC # FLD: 14.9 % — HIGH (ref 10.3–14.5)
RBC BLD AUTO: SIGNIFICANT CHANGE UP
SODIUM SERPL-SCNC: 139 MMOL/L
WBC # BLD: 1.89 K/UL — LOW (ref 3.8–10.5)
WBC # FLD AUTO: 1.89 K/UL — LOW (ref 3.8–10.5)

## 2025-03-28 LAB
DEPRECATED KAPPA LC FREE/LAMBDA SER: 1.19 RATIO
IGA SER QL IEP: 785 MG/DL
IGG SER QL IEP: 2134 MG/DL
IGM SER QL IEP: 154 MG/DL
KAPPA LC CSF-MCNC: 4.96 MG/DL
KAPPA LC SERPL-MCNC: 5.92 MG/DL

## 2025-03-31 ENCOUNTER — APPOINTMENT (OUTPATIENT)
Dept: HEMATOLOGY ONCOLOGY | Facility: CLINIC | Age: 89
End: 2025-03-31
Payer: MEDICARE

## 2025-03-31 DIAGNOSIS — D75.9 DISEASE OF BLOOD AND BLOOD-FORMING ORGANS, UNSPECIFIED: ICD-10-CM

## 2025-03-31 DIAGNOSIS — D47.2 MONOCLONAL GAMMOPATHY: ICD-10-CM

## 2025-03-31 PROCEDURE — 99214 OFFICE O/P EST MOD 30 MIN: CPT

## 2025-04-01 LAB
ALBUMIN MFR SERPL ELPH: 46.3 %
ALBUMIN SERPL-MCNC: 3.6 G/DL
ALBUMIN/GLOB SERPL: 0.9 RATIO
ALPHA1 GLOB MFR SERPL ELPH: 4.1 %
ALPHA1 GLOB SERPL ELPH-MCNC: 0.3 G/DL
ALPHA2 GLOB MFR SERPL ELPH: 8.1 %
ALPHA2 GLOB SERPL ELPH-MCNC: 0.6 G/DL
B-GLOBULIN MFR SERPL ELPH: 15.7 %
B-GLOBULIN SERPL ELPH-MCNC: 1.2 G/DL
GAMMA GLOB FLD ELPH-MCNC: 2 G/DL
GAMMA GLOB MFR SERPL ELPH: 25.8 %
INTERPRETATION SERPL IEP-IMP: NORMAL
M PROTEIN MFR SERPL ELPH: 3.2 %
M PROTEIN SPEC IFE-MCNC: NORMAL
MONOCLON BAND OBS SERPL: 0.2 G/DL
PROT SERPL-MCNC: 7.7 G/DL
PROT SERPL-MCNC: 7.7 G/DL

## 2025-04-14 ENCOUNTER — RESULT REVIEW (OUTPATIENT)
Age: 89
End: 2025-04-14

## 2025-04-14 ENCOUNTER — APPOINTMENT (OUTPATIENT)
Dept: HEMATOLOGY ONCOLOGY | Facility: CLINIC | Age: 89
End: 2025-04-14

## 2025-04-14 DIAGNOSIS — D70.4 CYCLIC NEUTROPENIA: ICD-10-CM

## 2025-04-14 LAB
BASOPHILS # BLD AUTO: 0.04 K/UL — SIGNIFICANT CHANGE UP (ref 0–0.2)
BASOPHILS NFR BLD AUTO: 2.1 % — HIGH (ref 0–2)
EOSINOPHIL # BLD AUTO: 0.07 K/UL — SIGNIFICANT CHANGE UP (ref 0–0.5)
EOSINOPHIL NFR BLD AUTO: 3.7 % — SIGNIFICANT CHANGE UP (ref 0–6)
ERYTHROCYTE [SEDIMENTATION RATE] IN BLOOD: 54 MM/HR — HIGH (ref 0–20)
HCT VFR BLD CALC: 37.2 % — SIGNIFICANT CHANGE UP (ref 34.5–45)
HGB BLD-MCNC: 12 G/DL — SIGNIFICANT CHANGE UP (ref 11.5–15.5)
IMM GRANULOCYTES NFR BLD AUTO: 0 % — SIGNIFICANT CHANGE UP (ref 0–0.9)
LYMPHOCYTES # BLD AUTO: 0.67 K/UL — LOW (ref 1–3.3)
LYMPHOCYTES # BLD AUTO: 35.8 % — SIGNIFICANT CHANGE UP (ref 13–44)
MCHC RBC-ENTMCNC: 29 PG — SIGNIFICANT CHANGE UP (ref 27–34)
MCHC RBC-ENTMCNC: 32.3 G/DL — SIGNIFICANT CHANGE UP (ref 32–36)
MCV RBC AUTO: 89.9 FL — SIGNIFICANT CHANGE UP (ref 80–100)
MONOCYTES # BLD AUTO: 0.6 K/UL — SIGNIFICANT CHANGE UP (ref 0–0.9)
MONOCYTES NFR BLD AUTO: 32.1 % — HIGH (ref 2–14)
NEUTROPHILS # BLD AUTO: 0.49 K/UL — LOW (ref 1.8–7.4)
NEUTROPHILS NFR BLD AUTO: 26.3 % — LOW (ref 43–77)
NRBC BLD AUTO-RTO: 0 /100 WBCS — SIGNIFICANT CHANGE UP (ref 0–0)
PLATELET # BLD AUTO: 197 K/UL — SIGNIFICANT CHANGE UP (ref 150–400)
RBC # BLD: 4.14 M/UL — SIGNIFICANT CHANGE UP (ref 3.8–5.2)
RBC # FLD: 14.8 % — HIGH (ref 10.3–14.5)
WBC # BLD: 1.87 K/UL — LOW (ref 3.8–10.5)
WBC # FLD AUTO: 1.87 K/UL — LOW (ref 3.8–10.5)

## 2025-05-29 ENCOUNTER — INPATIENT (INPATIENT)
Facility: HOSPITAL | Age: 89
LOS: 6 days | Discharge: NURSING, REHAB OR LONG-TERM CARE | DRG: 948 | End: 2025-06-05
Attending: INTERNAL MEDICINE | Admitting: INTERNAL MEDICINE
Payer: MEDICARE

## 2025-05-29 VITALS
RESPIRATION RATE: 18 BRPM | HEIGHT: 62 IN | OXYGEN SATURATION: 92 % | WEIGHT: 119.93 LBS | HEART RATE: 98 BPM | TEMPERATURE: 100 F | DIASTOLIC BLOOD PRESSURE: 71 MMHG | SYSTOLIC BLOOD PRESSURE: 117 MMHG

## 2025-05-29 DIAGNOSIS — R41.82 ALTERED MENTAL STATUS, UNSPECIFIED: ICD-10-CM

## 2025-05-29 DIAGNOSIS — Z98.49 CATARACT EXTRACTION STATUS, UNSPECIFIED EYE: Chronic | ICD-10-CM

## 2025-05-29 DIAGNOSIS — Z90.13 ACQUIRED ABSENCE OF BILATERAL BREASTS AND NIPPLES: Chronic | ICD-10-CM

## 2025-05-29 LAB
ALBUMIN SERPL ELPH-MCNC: 3.2 G/DL — LOW (ref 3.3–5)
ALP SERPL-CCNC: 105 U/L — SIGNIFICANT CHANGE UP (ref 40–120)
ALT FLD-CCNC: 17 U/L — SIGNIFICANT CHANGE UP (ref 10–45)
ANION GAP SERPL CALC-SCNC: 13 MMOL/L — SIGNIFICANT CHANGE UP (ref 5–17)
APPEARANCE UR: CLEAR — SIGNIFICANT CHANGE UP
APTT BLD: 33.7 SEC — SIGNIFICANT CHANGE UP (ref 26.1–36.8)
AST SERPL-CCNC: 31 U/L — SIGNIFICANT CHANGE UP (ref 10–40)
BACTERIA # UR AUTO: NEGATIVE /HPF — SIGNIFICANT CHANGE UP
BASOPHILS # BLD AUTO: 0 K/UL — SIGNIFICANT CHANGE UP (ref 0–0.2)
BASOPHILS NFR BLD AUTO: 0 % — SIGNIFICANT CHANGE UP (ref 0–2)
BILIRUB SERPL-MCNC: 1.3 MG/DL — HIGH (ref 0.2–1.2)
BILIRUB UR-MCNC: ABNORMAL
BUN SERPL-MCNC: 14 MG/DL — SIGNIFICANT CHANGE UP (ref 7–23)
CALCIUM SERPL-MCNC: 8.9 MG/DL — SIGNIFICANT CHANGE UP (ref 8.4–10.5)
CAST: 2 /LPF — SIGNIFICANT CHANGE UP (ref 0–4)
CHLORIDE SERPL-SCNC: 98 MMOL/L — SIGNIFICANT CHANGE UP (ref 96–108)
CO2 SERPL-SCNC: 20 MMOL/L — LOW (ref 22–31)
COLOR SPEC: SIGNIFICANT CHANGE UP
CREAT SERPL-MCNC: 0.75 MG/DL — SIGNIFICANT CHANGE UP (ref 0.5–1.3)
DIFF PNL FLD: ABNORMAL
EGFR: 77 ML/MIN/1.73M2 — SIGNIFICANT CHANGE UP
EGFR: 77 ML/MIN/1.73M2 — SIGNIFICANT CHANGE UP
EOSINOPHIL # BLD AUTO: 0 K/UL — SIGNIFICANT CHANGE UP (ref 0–0.5)
EOSINOPHIL NFR BLD AUTO: 0 % — SIGNIFICANT CHANGE UP (ref 0–6)
FLUAV AG NPH QL: SIGNIFICANT CHANGE UP
FLUBV AG NPH QL: SIGNIFICANT CHANGE UP
GIANT PLATELETS BLD QL SMEAR: PRESENT — SIGNIFICANT CHANGE UP
GLUCOSE SERPL-MCNC: 122 MG/DL — HIGH (ref 70–99)
GLUCOSE UR QL: NEGATIVE MG/DL — SIGNIFICANT CHANGE UP
HCT VFR BLD CALC: 34.1 % — LOW (ref 34.5–45)
HGB BLD-MCNC: 11.2 G/DL — LOW (ref 11.5–15.5)
INR BLD: 2.08 RATIO — HIGH (ref 0.85–1.16)
KETONES UR QL: 40 MG/DL
LEUKOCYTE ESTERASE UR-ACNC: ABNORMAL
LYMPHOCYTES # BLD AUTO: 0.12 K/UL — LOW (ref 1–3.3)
LYMPHOCYTES # BLD AUTO: 3.5 % — LOW (ref 13–44)
MANUAL SMEAR VERIFICATION: SIGNIFICANT CHANGE UP
MCHC RBC-ENTMCNC: 27.8 PG — SIGNIFICANT CHANGE UP (ref 27–34)
MCHC RBC-ENTMCNC: 32.8 G/DL — SIGNIFICANT CHANGE UP (ref 32–36)
MCV RBC AUTO: 84.6 FL — SIGNIFICANT CHANGE UP (ref 80–100)
METAMYELOCYTES # FLD: 1.8 % — HIGH (ref 0–0)
METAMYELOCYTES NFR BLD: 1.8 % — HIGH (ref 0–0)
MONOCYTES # BLD AUTO: 0.99 K/UL — HIGH (ref 0–0.9)
MONOCYTES NFR BLD AUTO: 28.1 % — HIGH (ref 2–14)
NEUTROPHILS # BLD AUTO: 2.36 K/UL — SIGNIFICANT CHANGE UP (ref 1.8–7.4)
NEUTROPHILS NFR BLD AUTO: 45.6 % — SIGNIFICANT CHANGE UP (ref 43–77)
NEUTS BAND # BLD: 21 % — HIGH (ref 0–8)
NEUTS BAND NFR BLD: 21 % — HIGH (ref 0–8)
NITRITE UR-MCNC: NEGATIVE — SIGNIFICANT CHANGE UP
PH UR: 5.5 — SIGNIFICANT CHANGE UP (ref 5–8)
PLAT MORPH BLD: ABNORMAL
PLATELET # BLD AUTO: 159 K/UL — SIGNIFICANT CHANGE UP (ref 150–400)
POTASSIUM SERPL-MCNC: 4 MMOL/L — SIGNIFICANT CHANGE UP (ref 3.5–5.3)
POTASSIUM SERPL-SCNC: 4 MMOL/L — SIGNIFICANT CHANGE UP (ref 3.5–5.3)
PROT SERPL-MCNC: 8.2 G/DL — SIGNIFICANT CHANGE UP (ref 6–8.3)
PROT UR-MCNC: 100 MG/DL
PROTHROM AB SERPL-ACNC: 23.8 SEC — HIGH (ref 9.9–13.4)
RBC # BLD: 4.03 M/UL — SIGNIFICANT CHANGE UP (ref 3.8–5.2)
RBC # FLD: 13.6 % — SIGNIFICANT CHANGE UP (ref 10.3–14.5)
RBC BLD AUTO: SIGNIFICANT CHANGE UP
RBC CASTS # UR COMP ASSIST: 39 /HPF — HIGH (ref 0–4)
RSV RNA NPH QL NAA+NON-PROBE: SIGNIFICANT CHANGE UP
SARS-COV-2 RNA SPEC QL NAA+PROBE: SIGNIFICANT CHANGE UP
SODIUM SERPL-SCNC: 131 MMOL/L — LOW (ref 135–145)
SOURCE RESPIRATORY: SIGNIFICANT CHANGE UP
SP GR SPEC: 1.03 — SIGNIFICANT CHANGE UP (ref 1–1.03)
SQUAMOUS # UR AUTO: 4 /HPF — SIGNIFICANT CHANGE UP (ref 0–5)
UROBILINOGEN FLD QL: 1 MG/DL — SIGNIFICANT CHANGE UP (ref 0.2–1)
WBC # BLD: 3.54 K/UL — LOW (ref 3.8–10.5)
WBC # FLD AUTO: 3.54 K/UL — LOW (ref 3.8–10.5)
WBC UR QL: 2 /HPF — SIGNIFICANT CHANGE UP (ref 0–5)

## 2025-05-29 PROCEDURE — 71260 CT THORAX DX C+: CPT | Mod: 26

## 2025-05-29 PROCEDURE — 99285 EMERGENCY DEPT VISIT HI MDM: CPT | Mod: GC

## 2025-05-29 PROCEDURE — 70450 CT HEAD/BRAIN W/O DYE: CPT | Mod: 26

## 2025-05-29 PROCEDURE — 72125 CT NECK SPINE W/O DYE: CPT | Mod: 26

## 2025-05-29 PROCEDURE — 74177 CT ABD & PELVIS W/CONTRAST: CPT | Mod: 26

## 2025-05-29 PROCEDURE — 93010 ELECTROCARDIOGRAM REPORT: CPT

## 2025-05-29 PROCEDURE — 71045 X-RAY EXAM CHEST 1 VIEW: CPT | Mod: 26

## 2025-05-29 RX ORDER — PIPERACILLIN-TAZO-DEXTROSE,ISO 3.375G/5
3.38 IV SOLUTION, PIGGYBACK PREMIX FROZEN(ML) INTRAVENOUS ONCE
Refills: 0 | Status: COMPLETED | OUTPATIENT
Start: 2025-05-29 | End: 2025-05-29

## 2025-05-29 RX ORDER — DIPHENHYDRAMINE HCL 12.5MG/5ML
50 ELIXIR ORAL ONCE
Refills: 0 | Status: COMPLETED | OUTPATIENT
Start: 2025-05-29 | End: 2025-05-29

## 2025-05-29 RX ORDER — ONDANSETRON HCL/PF 4 MG/2 ML
4 VIAL (ML) INJECTION ONCE
Refills: 0 | Status: COMPLETED | OUTPATIENT
Start: 2025-05-29 | End: 2025-05-29

## 2025-05-29 RX ORDER — ACETAMINOPHEN 500 MG/5ML
650 LIQUID (ML) ORAL EVERY 6 HOURS
Refills: 0 | Status: DISCONTINUED | OUTPATIENT
Start: 2025-05-29 | End: 2025-06-05

## 2025-05-29 RX ORDER — DIPHENHYDRAMINE HCL 12.5MG/5ML
50 ELIXIR ORAL ONCE
Refills: 0 | Status: DISCONTINUED | OUTPATIENT
Start: 2025-05-29 | End: 2025-05-29

## 2025-05-29 RX ORDER — AZITHROMYCIN 250 MG
500 CAPSULE ORAL ONCE
Refills: 0 | Status: COMPLETED | OUTPATIENT
Start: 2025-05-29 | End: 2025-05-29

## 2025-05-29 RX ORDER — ACETAMINOPHEN 500 MG/5ML
1000 LIQUID (ML) ORAL ONCE
Refills: 0 | Status: COMPLETED | OUTPATIENT
Start: 2025-05-29 | End: 2025-05-29

## 2025-05-29 RX ORDER — FUROSEMIDE 10 MG/ML
40 INJECTION INTRAMUSCULAR; INTRAVENOUS ONCE
Refills: 0 | Status: COMPLETED | OUTPATIENT
Start: 2025-05-29 | End: 2025-05-29

## 2025-05-29 RX ORDER — APIXABAN 2.5 MG/1
2.5 TABLET, FILM COATED ORAL EVERY 12 HOURS
Refills: 0 | Status: DISCONTINUED | OUTPATIENT
Start: 2025-05-29 | End: 2025-06-05

## 2025-05-29 RX ORDER — METOPROLOL SUCCINATE 50 MG/1
100 TABLET, EXTENDED RELEASE ORAL DAILY
Refills: 0 | Status: DISCONTINUED | OUTPATIENT
Start: 2025-05-29 | End: 2025-06-05

## 2025-05-29 RX ORDER — METHYLPREDNISOLONE ACETATE 80 MG/ML
125 INJECTION, SUSPENSION INTRA-ARTICULAR; INTRALESIONAL; INTRAMUSCULAR; SOFT TISSUE ONCE
Refills: 0 | Status: COMPLETED | OUTPATIENT
Start: 2025-05-29 | End: 2025-05-29

## 2025-05-29 RX ADMIN — Medication 250 MILLIGRAM(S): at 16:14

## 2025-05-29 RX ADMIN — Medication 4 MILLIGRAM(S): at 14:08

## 2025-05-29 RX ADMIN — Medication 50 MILLIGRAM(S): at 15:56

## 2025-05-29 RX ADMIN — METHYLPREDNISOLONE ACETATE 125 MILLIGRAM(S): 80 INJECTION, SUSPENSION INTRA-ARTICULAR; INTRALESIONAL; INTRAMUSCULAR; SOFT TISSUE at 15:56

## 2025-05-29 RX ADMIN — Medication 20 MILLIGRAM(S): at 16:14

## 2025-05-29 RX ADMIN — Medication 400 MILLIGRAM(S): at 15:56

## 2025-05-29 RX ADMIN — Medication 200 GRAM(S): at 15:24

## 2025-05-29 RX ADMIN — Medication 1000 MILLILITER(S): at 16:19

## 2025-05-29 RX ADMIN — APIXABAN 2.5 MILLIGRAM(S): 2.5 TABLET, FILM COATED ORAL at 19:11

## 2025-05-29 NOTE — ED PROVIDER NOTE - CLINICAL SUMMARY MEDICAL DECISION MAKING FREE TEXT BOX
88-year-old female presented from [facility] with altered mental status and weakness fatigue for the last 24 hours has a low-grade fever  and several episodes of diarrhea yesterday   will obtain infectious metabolic workup cultures IV hydration and reassess ZR

## 2025-05-29 NOTE — PATIENT PROFILE ADULT - FALL HARM RISK - HARM RISK INTERVENTIONS

## 2025-05-29 NOTE — H&P ADULT - NSHPPHYSICALEXAM_GEN_ALL_CORE
General: WN/WD NAD  PERRLA  Respiratory: CTA B/L  CV: S1S2+  Abdominal: Soft, NT, ND +BS, Last BM  Extremities: No edema, + peripheral pulses  Skin Normal

## 2025-05-29 NOTE — ED ADULT NURSE NOTE - OBJECTIVE STATEMENT
89F BIBEMS from home with c/o slight fever, generalized weakness for few days and also c/o diarrhea that started Monday but denies any more diarrhea, also c/o mild abdominal and pelvic pain. Low grade fever in the ED, patient usually walks by herself as per patient but now c/o easy fatigability and nausea.   Patient with h/o HTN and Breast cancer with bilateral mastectomy and breast implants.   Left AC 20g IV access inserted, sepsis labs sent pending results.

## 2025-05-29 NOTE — ED PROVIDER NOTE - PHYSICAL EXAMINATION
GENERAL: NAD, lying in bed comfortably  HEAD:  Atraumatic, normocephalic  EYES: EOMI, PERRLA, conjunctiva and sclera clear  HEART: Regular rate and rhythm, no murmurs, rubs, or gallops  LUNGS: Unlabored respirations.  RUL RLL crackles greater RLL  ABDOMEN: Soft, Suprapubic tenderness, nondistended, +BS  EXTREMITIES: 2+ peripheral pulses bilaterally. No clubbing, cyanosis, or edema  NERVOUS SYSTEM:  A&Ox3, moving all extremities, no focal deficits   SKIN: No rashes or lesions

## 2025-05-29 NOTE — ED PROVIDER NOTE - OBJECTIVE STATEMENT
89-year-old female  brought by EMS from [facility] assisted living for altered mental status  and acting not like herself for the last 24 hours   according to the daughter who is near bedside  she states that mom has breast cancer status postmastectomy  history of A-fib in December on Eliquis    yesterday she did not feel well had diarrhea and today was called by the nursing home stating that she is a little bit more lethargic and feels very weak    patient denies any symptoms at present time

## 2025-05-29 NOTE — H&P ADULT - NSHPLABSRESULTS_GEN_ALL_CORE
Lab Results:  CBC  CBC Full  -  ( 29 May 2025 13:48 )  WBC Count : 3.54 K/uL  RBC Count : 4.03 M/uL  Hemoglobin : 11.2 g/dL  Hematocrit : 34.1 %  Platelet Count - Automated : 159 K/uL  Mean Cell Volume : 84.6 fl  Mean Cell Hemoglobin : 27.8 pg  Mean Cell Hemoglobin Concentration : 32.8 g/dL  Auto Neutrophil # : x  Auto Lymphocyte # : x  Auto Monocyte # : x  Auto Eosinophil # : x  Auto Basophil # : x  Auto Neutrophil % : x  Auto Lymphocyte % : x  Auto Monocyte % : x  Auto Eosinophil % : x  Auto Basophil % : x    .		Differential:	[] Automated		[] Manual  Chemistry                        11.2   3.54  )-----------( 159      ( 29 May 2025 13:48 )             34.1     05-29    131[L]  |  98  |  14  ----------------------------<  122[H]  4.0   |  20[L]  |  0.75    Ca    8.9      29 May 2025 13:48    TPro  8.2  /  Alb  3.2[L]  /  TBili  1.3[H]  /  DBili  x   /  AST  31  /  ALT  17  /  AlkPhos  105  05-29    LIVER FUNCTIONS - ( 29 May 2025 13:48 )  Alb: 3.2 g/dL / Pro: 8.2 g/dL / ALK PHOS: 105 U/L / ALT: 17 U/L / AST: 31 U/L / GGT: x           PT/INR - ( 29 May 2025 13:48 )   PT: 23.8 sec;   INR: 2.08 ratio         PTT - ( 29 May 2025 13:48 )  PTT:33.7 sec  Urinalysis Basic - ( 29 May 2025 16:21 )    Color: Dark Yellow / Appearance: Clear / S.027 / pH: x  Gluc: x / Ketone: x  / Bili: Small / Urobili: 1.0 mg/dL   Blood: x / Protein: 100 mg/dL / Nitrite: Negative   Leuk Esterase: Trace / RBC: 39 /HPF / WBC 2 /HPF   Sq Epi: x / Non Sq Epi: 4 /HPF / Bacteria: Negative /HPF            MICROBIOLOGY/CULTURES:      RADIOLOGY RESULTS: reviewed

## 2025-05-29 NOTE — ED PROVIDER NOTE - CARE PLAN
Principal Discharge DX:	AMS (altered mental status)   1 Principal Discharge DX:	AMS (altered mental status)  Secondary Diagnosis:	Pneumonia

## 2025-05-29 NOTE — ED PROVIDER NOTE - PROGRESS NOTE DETAILS
Patient got 86 on room air and placed on 3 L now satting well patient with no history of trauma not sedentary no lower extremity edema or tenderness Pt got pruritis and erythema after zosyn treated with benadry solumedrol, and famotidine

## 2025-05-29 NOTE — PATIENT PROFILE ADULT - PUBLIC BENEFITS
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 She was recently admitted to [facility] with severe pneumonia and in the setting of the pneumonia, she experienced paroxysms of AFib with RVR and associated chest discomfort. She was started on apixaban for anticoagulation and diltiazem for rate and rhythm control. Neither beta-blockers nor amiodarone would have been ideal choices given her restrictive airway disease. She was discharged home on those medications with new home oxygen and CPAP at night, and she completed three weeks of CardioNet monitor that was put on prior to discharge.\par There was no evidence of atrial fibrillation on the monitor, and she has remained symptom free.\par \par Hospitalized in [place], [place] recently with small bowel obstruction that corrected with NG tube decompression. She never went back into AFib. No surgical intervention was necessary. She has since been seen by her GI (Shar Givens MD) and colorectal surgeon (Hari Gonzalez MD) who agree that nothing further needs to be done at this time.\par \par EKG today shows normal sinus rhythm.\par \par 12/10/2018 - Patient had an upper airway infection over the Thanksgiving Holiday. She was prescribed doxycycline, which expedited her improvement in symptoms. No further cough.\par \par Pulmonologist: Sujit Hu MD [phone]\par Hepatologist: Zachary Hoffmann MD [phone]
no

## 2025-05-29 NOTE — ED ADULT NURSE NOTE - NSFALLHARMRISKINTERV_ED_ALL_ED

## 2025-05-29 NOTE — ED ADULT NURSE NOTE - NSICDXFAMILYHX_GEN_ALL_CORE_FT
Subjective   Patient ID: Manuela Lopez is a 8 y.o. female who presents for Fever (Up to 101.6), Sore Throat, and Headache.  Manuela is here with her Grandmother. She has had a fever and sore throat for one day.         Review of Systems   Constitutional:  Positive for activity change and fever.   HENT:  Positive for sore throat.    Respiratory: Negative.     Cardiovascular: Negative.    Gastrointestinal: Negative.    Skin:  Negative for rash.   Neurological: Negative.    Psychiatric/Behavioral: Negative.         Objective   Physical Exam  HENT:      Head: Normocephalic.      Right Ear: Tympanic membrane normal.      Left Ear: Tympanic membrane normal.      Mouth/Throat:      Mouth: Mucous membranes are moist.      Pharynx: Posterior oropharyngeal erythema present.      Comments: Tonsils 2+ and very red   Eyes:      Conjunctiva/sclera: Conjunctivae normal.   Cardiovascular:      Rate and Rhythm: Normal rate.      Heart sounds: Normal heart sounds.   Pulmonary:      Effort: Pulmonary effort is normal.      Breath sounds: Normal breath sounds.   Abdominal:      Palpations: Abdomen is soft.   Musculoskeletal:      Cervical back: Normal range of motion.   Lymphadenopathy:      Cervical: Cervical adenopathy present.   Skin:     Findings: No rash.   Neurological:      General: No focal deficit present.      Mental Status: She is alert.   Psychiatric:         Mood and Affect: Mood normal.         Assessment/Plan   Diagnoses and all orders for this visit:  Tonsillitis  -     POCT rapid strep A  Strep throat  -     amoxicillin (Amoxil) 400 mg/5 mL suspension; Take 10 mL (800 mg) by mouth 2 times a day for 10 days.    You can gargle with Salt water as needed    You can use ibuprofen or Tylenol every 6-8 hours for fever and discomfort.  Increase Fluids and Rest  Recheck as needed       FAMILY HISTORY:  FH: Alzheimers disease, father  FH: cerebral aneurysm, mother  of this    Father  Still living? Unknown  Family history of MI (myocardial infarction), Age at diagnosis: Age Unknown

## 2025-05-29 NOTE — ED ADULT TRIAGE NOTE - INTERNATIONAL TRAVEL
You can access the FollowMyHealth Patient Portal offered by [facility] by registering at the following website: [email or web address]. By joining [employer]’s FollowMyHealth portal, you will also be able to view your health information using other applications (apps) compatible with our system.
No

## 2025-05-29 NOTE — H&P ADULT - HISTORY OF PRESENT ILLNESS
89-year-old female  brought by EMS from [place] assisted living for altered mental status  and acting not like herself for the last 24 hours. According to the daughter who is near bedside  she states that mom has breast cancer status postmastectomy  history of A-fib in December on Eliquis.  Yesterday she did not feel well had diarrhea and today was called by the nursing home stating that she is a little bit more lethargic and feels very weak.

## 2025-05-29 NOTE — H&P ADULT - ASSESSMENT
89-year-old female  brought by EMS from [place] [facility] assisted living for altered mental status  and acting not like herself for the last 24 hours. According to the daughter who is near bedside  she states that mom has breast cancer status postmastectomy  history of A-fib in December on Eliquis.  Yesterday she did not feel well had diarrhea and today was called by the nursing home stating that she is a little bit more lethargic and feels very weak.  89-year-old female  brought by EMS from [place] assisted living for altered mental status  and acting not like herself for the last 24 hours. According to the daughter who is near bedside  she states that mom has breast cancer status postmastectomy  history of A-fib in December on Eliquis.  Yesterday she did not feel well had diarrhea and today was called by the nursing home stating that she is a little bit more lethargic and feels very weak.     AMS  - unclear etiology  - check CT head  - neuro called     Pneumonia  - started Levaquin  - fu cultures  - had allergic reaction to zosyn in the er, sp solumedrol and benadryl     Afib  - cw eliquis  - cw metoprolol      Hyponatremia  - monitor BMP  - fluid restriction

## 2025-05-29 NOTE — CONSULT NOTE ADULT - ASSESSMENT
[facility]  Chart Reviewed-Full Consult to follow for any immediate concerns please feel free to contact us on TEAMS (preferred) or at [phone] and have us paged  Wes Grullon MD

## 2025-05-30 ENCOUNTER — RESULT REVIEW (OUTPATIENT)
Age: 89
End: 2025-05-30

## 2025-05-30 LAB
ANION GAP SERPL CALC-SCNC: 12 MMOL/L — SIGNIFICANT CHANGE UP (ref 5–17)
BUN SERPL-MCNC: 17 MG/DL — SIGNIFICANT CHANGE UP (ref 7–23)
CALCIUM SERPL-MCNC: 8.4 MG/DL — SIGNIFICANT CHANGE UP (ref 8.4–10.5)
CHLORIDE SERPL-SCNC: 104 MMOL/L — SIGNIFICANT CHANGE UP (ref 96–108)
CO2 SERPL-SCNC: 21 MMOL/L — LOW (ref 22–31)
CREAT SERPL-MCNC: 0.73 MG/DL — SIGNIFICANT CHANGE UP (ref 0.5–1.3)
EGFR: 79 ML/MIN/1.73M2 — SIGNIFICANT CHANGE UP
EGFR: 79 ML/MIN/1.73M2 — SIGNIFICANT CHANGE UP
GLUCOSE SERPL-MCNC: 162 MG/DL — HIGH (ref 70–99)
GP B STREP DNA BLD POS QL NAA+NON-PROBE: SIGNIFICANT CHANGE UP
GRAM STN FLD: ABNORMAL
HCT VFR BLD CALC: 33.8 % — LOW (ref 34.5–45)
HGB BLD-MCNC: 10.8 G/DL — LOW (ref 11.5–15.5)
MCHC RBC-ENTMCNC: 27.8 PG — SIGNIFICANT CHANGE UP (ref 27–34)
MCHC RBC-ENTMCNC: 32 G/DL — SIGNIFICANT CHANGE UP (ref 32–36)
MCV RBC AUTO: 86.9 FL — SIGNIFICANT CHANGE UP (ref 80–100)
METHOD TYPE: SIGNIFICANT CHANGE UP
NRBC BLD AUTO-RTO: 0 /100 WBCS — SIGNIFICANT CHANGE UP (ref 0–0)
NT-PROBNP SERPL-SCNC: HIGH PG/ML (ref 0–300)
PLATELET # BLD AUTO: 154 K/UL — SIGNIFICANT CHANGE UP (ref 150–400)
POTASSIUM SERPL-MCNC: 4.2 MMOL/L — SIGNIFICANT CHANGE UP (ref 3.5–5.3)
POTASSIUM SERPL-SCNC: 4.2 MMOL/L — SIGNIFICANT CHANGE UP (ref 3.5–5.3)
RBC # BLD: 3.89 M/UL — SIGNIFICANT CHANGE UP (ref 3.8–5.2)
RBC # FLD: 13.9 % — SIGNIFICANT CHANGE UP (ref 10.3–14.5)
SODIUM SERPL-SCNC: 137 MMOL/L — SIGNIFICANT CHANGE UP (ref 135–145)
SPECIMEN SOURCE: SIGNIFICANT CHANGE UP
WBC # BLD: 2.7 K/UL — LOW (ref 3.8–10.5)
WBC # FLD AUTO: 2.7 K/UL — LOW (ref 3.8–10.5)

## 2025-05-30 PROCEDURE — 93306 TTE W/DOPPLER COMPLETE: CPT | Mod: 26

## 2025-05-30 RX ORDER — VANCOMYCIN HCL IN 5 % DEXTROSE 1.5G/250ML
1000 PLASTIC BAG, INJECTION (ML) INTRAVENOUS ONCE
Refills: 0 | Status: COMPLETED | OUTPATIENT
Start: 2025-05-30 | End: 2025-05-30

## 2025-05-30 RX ORDER — CEFTRIAXONE 500 MG/1
2000 INJECTION, POWDER, FOR SOLUTION INTRAMUSCULAR; INTRAVENOUS EVERY 24 HOURS
Refills: 0 | Status: DISCONTINUED | OUTPATIENT
Start: 2025-05-30 | End: 2025-06-05

## 2025-05-30 RX ADMIN — METOPROLOL SUCCINATE 100 MILLIGRAM(S): 50 TABLET, EXTENDED RELEASE ORAL at 05:33

## 2025-05-30 RX ADMIN — APIXABAN 2.5 MILLIGRAM(S): 2.5 TABLET, FILM COATED ORAL at 17:28

## 2025-05-30 RX ADMIN — Medication 250 MILLIGRAM(S): at 12:20

## 2025-05-30 RX ADMIN — Medication 20 MILLIGRAM(S): at 12:20

## 2025-05-30 RX ADMIN — Medication 1 APPLICATION(S): at 12:31

## 2025-05-30 RX ADMIN — APIXABAN 2.5 MILLIGRAM(S): 2.5 TABLET, FILM COATED ORAL at 05:33

## 2025-05-30 RX ADMIN — Medication 40 MILLIGRAM(S): at 05:33

## 2025-05-30 RX ADMIN — CEFTRIAXONE 100 MILLIGRAM(S): 500 INJECTION, POWDER, FOR SOLUTION INTRAMUSCULAR; INTRAVENOUS at 17:49

## 2025-05-30 RX ADMIN — Medication 5 MILLIGRAM(S): at 21:47

## 2025-05-30 NOTE — CONSULT NOTE ADULT - SUBJECTIVE AND OBJECTIVE BOX
CHIEF COMPLAINT:Patient is a 89y old  Female who presents with a chief complaint of AMS (29 May 2025 20:17)      HISTORY OF PRESENT ILLNESS:    89 female with history of PAF on a/c, HCM with LVOT obstruction   presents with AMS  found to have PNA on CT and Bacteremia   ROS limited but pt denies any chest pain, sob, palpitation, dizziness or syncope.     PAST MEDICAL & SURGICAL HISTORY:  Breast cancer  s/p b/l mastectomy      H/O bilateral mastectomy  + breast implants      History of cataract surgery, unspecified laterality              MEDICATIONS:  apixaban 2.5 milliGRAM(s) Oral every 12 hours  metoprolol succinate  milliGRAM(s) Oral daily    levoFLOXacin IVPB 500 milliGRAM(s) IV Intermittent every 24 hours  vancomycin  IVPB 1000 milliGRAM(s) IV Intermittent once      acetaminophen     Tablet .. 650 milliGRAM(s) Oral every 6 hours PRN  zolpidem 5 milliGRAM(s) Oral at bedtime PRN    famotidine Injectable 20 milliGRAM(s) IV Push daily  pantoprazole    Tablet 40 milliGRAM(s) Oral before breakfast          FAMILY HISTORY:  Family history of MI (myocardial infarction) (Father)    FH: Alzheimers disease  father    FH: cerebral aneurysm  mother  of this        Non-contributory    SOCIAL HISTORY:    No tobacco, drugs or etoh    Allergies    Zosyn (Rash; Urticaria; Hives)    Intolerances    	    REVIEW OF SYSTEMS:  as above  The rest of the 14 points ROS reviewed and except above they are unremarkable.        PHYSICAL EXAM:  T(C): 36.6 (25 @ 05:30), Max: 39.8 (25 @ 15:12)  HR: 88 (25 @ 05:30) (68 - 101)  BP: 135/64 (-25 @ 05:30) (93/58 - 135/64)  RR: 18 (- @ 05:30) (18 - 28)  SpO2: 95% (25 @ 05:30) (87% - 97%)  Wt(kg): --  I&O's Summary    JVP: Normal  Neck: supple  Lung: clear   CV: S1 S2 , Murmur: pos jelani at apex  Abd: soft  Ext: No edema  neuro: Awake / alert  Psych: flat affect  Skin: normal      LABS/DATA:    TELEMETRY: 	    ECG:  	   	  CARDIAC MARKERS:    < from: CT Head No Cont (05.29.25 @ 22:10) >  IMPRESSION:    CT head: Moderate chronic microvascular changes without evidence of an   acute transcortical infarction or hemorrhage.    CT C-spine: Mild to moderate spondylosis. No acute osseous abnormality.   Consider MRI as clinically warranted.    --- End of Report ---    < end of copied text >          < from: CT Chest w/ IV Cont (25 @ 17:25) >    IMPRESSION:    1. Mild to moderate CHF and/or volume overload.    2. Additional findings suspect for mild right middle and lower lobe   pneumonia on a background of mild bronchiectasis.    3. No colitis or any acute abnormality demonstrated in the abdomen or   pelvis.    --- End of Report ---    < end of copied text >                            10.8   2.70  )-----------( 154      ( 30 May 2025 07:20 )             33.8         131[L]  |  98  |  14  ----------------------------<  122[H]  4.0   |  20[L]  |  0.75    Ca    8.9      29 May 2025 13:48    TPro  8.2  /  Alb  3.2[L]  /  TBili  1.3[H]  /  DBili  x   /  AST  31  /  ALT  17  /  AlkPhos  105      proBNP:   Lipid Profile:   HgA1c:   TSH:

## 2025-05-30 NOTE — CONSULT NOTE ADULT - ASSESSMENT
89-year-old female  brought by EMS from [facility] assisted living for altered mental status  and acting not like herself for the last 24 hours. Admitted for Toxic Metabolic Encephalopathy in the setting of PNA. 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

## 2025-05-30 NOTE — CONSULT NOTE ADULT - ASSESSMENT
89-year-old female  brought by EMS from [facility] assisted living for altered mental status  and acting not like herself for the last 24 hours. According to the daughter who is near bedside  she states that mom has breast cancer status postmastectomy  history of A-fib in December on Eliquis.  Yesterday she did not feel well had diarrhea and today was called by the nursing home stating that she is a little bit more lethargic and feels very weak.  (29 May 2025 17:12):  daughter is at bedside she is on room air at this time  she looks pretty good:   no sob:   no phlegm  :   she has no underlying lung disease       Streptococcus agalactiae bactermia/  pneumonia  CHF  A fibrillation       Streptococcus agalactiae bactermia/  pneumonia  ct chest shoiwed;1. Mild to moderate CHF and/or volume overload. 2. Additional findings suspect for mild right middle and lower lobe pneumonia on a background of mild bronchiectasis. 3. No colitis or any acute abnormality demonstrated in the abdomen or  pelvis.  vancomycin  IVPB 1000 milliGRAM(s) IV Intermittent , levoFLOXacin IVPB 500 milliGRAM(s) IV Intermittent every 24 hours  ID follow up  : bandemia     CHF  she has small effusion : on right side   echo : < from: TTE W or WO Ultrasound Enhancing Agent (08.02.24 @ 11:52) >   1. Mavacamten, ten days into 10 mg/d dose.   2. Left ventricular cavity is normal in size. Left ventricular systolic function is normal with an ejection fraction visually estimated at 65 %. There are no regional wall motion abnormalities seen.   3. The left ventricular outflow tract resting gradient is 6 mmHg and 9 mmHg using the Valsalva maneuver.   4. Mild to moderate pulmonary hypertension.   5. Mitral stenosis is present due to severe annular calcification.   6. Compared to the transthoracic echocardiogram performed on 7/16/2024, the LVOTgradient is lower.  ? repeat    start low dose lasix      A fibrillation   apixaban 2.5 milliGRAM(s) Oral every 12 hours    dw acp

## 2025-05-30 NOTE — PROGRESS NOTE ADULT - ASSESSMENT
89-year-old female  brought by EMS from [facility] assisted living for altered mental status  and acting not like herself for the last 24 hours. According to the daughter who is near bedside  she states that mom has breast cancer status postmastectomy  history of A-fib in December on Eliquis.  Yesterday she did not feel well had diarrhea and today was called by the nursing home stating that she is a little bit more lethargic and feels very weak.     AMS  - unclear etiology  - check CT head  - neuro fu   - resolved     Pneumonia, bacteremia   - started Levaquin  - fu repeat cultures  - had allergic reaction to zosyn in the er, sp solumedrol and benadryl     Afib  - cw eliquis  - cw metoprolol      Hyponatremia  - monitor BMP  - fluid restriction     case dw daughter at bedside

## 2025-05-30 NOTE — CONSULT NOTE ADULT - ASSESSMENT
89 y/o F brought by EMS from [facility] living for altered mental status    PNA, GBS bacteremia  sepsis- leukopenia, fever, tachycardia  bandemia- resolved  AMS- resolved  - SARS-CoV-2/ RSV/ flu PCR negative  - blood culture PCR from 5/29 w/ strep agalactiae (GBS)  - no SSTI on exam  - CT C/A/P- findings concerning for mild right middle and lower lobe pneumonia on a background of mild bronchiectasis. no colitis or any acute abnormality demonstrated in the abdomen or pelvis.  - noted patient w/ allergic reaction to zosyn in ED (per notes pruritis and erythema) s/p solumedrol and benadryl   - s/p speech and swallow eval    Recommendations  switch levofloxacin to ceftriaxone 2g daily  f/u repeat blood cultures from 5/30  trend temps, wbc    Dr. Reggie Mercedes will be covering 5/31 & 6/1. Dr. Grullon will assume care 6/2.   Manny Ashford M.D.  [facility]  [phone]  After 5pm on weekdays and all day on weekends - please call [phone]  Available on [employer] teams    Thank you for consulting us and involving us in the management of this patients case. In addition to reviewing history, imaging, documents, labs, microbiology, took into account antibiotic stewardship, local antibiogram and infection control strategies and potential transmission issues at time of treatment decision making process.  89 y/o F brought by EMS from [facility] living for altered mental status    PNA, GBS bacteremia  sepsis- leukopenia, fever, tachycardia  bandemia- resolved  AMS- resolved  - SARS-CoV-2/ RSV/ flu PCR negative  - blood culture PCR from 5/29 w/ strep agalactiae (GBS)  - no SSTI on exam  - CT C/A/P- findings concerning for mild right middle and lower lobe pneumonia on a background of mild bronchiectasis. no colitis or any acute abnormality demonstrated in the abdomen or pelvis.  - noted patient w/ allergic reaction to zosyn in ED (per notes pruritis and erythema) s/p solumedrol and benadryl   - s/p speech and swallow eval    Recommendations  switch levofloxacin to ceftriaxone 2g daily  - low risk of cross reactivity with cephalosporins and penicillins- discussed w/ nurse and medicine ACP to monitor  hold off further doses of vancomycin  f/u repeat blood cultures from 5/30  trend temps, wbc    Dr. Reggie Mercedes will be covering 5/31 & 6/1. Dr. Grullon will assume care 6/2.   discussed w/ nurse, patient, family and medicine ACP  Manny Ashford M.D.  [facility]  [phone]  After 5pm on weekdays and all day on weekends - please call [phone]  Available on [employer] teams    Thank you for consulting us and involving us in the management of this patients case. In addition to reviewing history, imaging, documents, labs, microbiology, took into account antibiotic stewardship, local antibiogram and infection control strategies and potential transmission issues at time of treatment decision making process.

## 2025-05-30 NOTE — CONSULT NOTE ADULT - SUBJECTIVE AND OBJECTIVE BOX
Neurology Consult    Reason for Consult: Patient is a 89y old  Female who presents with a chief complaint of AMS (30 May 2025 07:54)      HPI:  89-year-old female  brought by EMS from [place] assisted living for altered mental status  and acting not like herself for the last 24 hours. According to the daughter who is near bedside  she states that mom has breast cancer status postmastectomy  history of A-fib in December on Eliquis.  Yesterday she did not feel well had diarrhea and today was called by the nursing home stating that she is a little bit more lethargic and feels very weak.  (29 May 2025 17:12)       PAST MEDICAL & SURGICAL HISTORY:  Breast cancer  s/p b/l mastectomy      H/O bilateral mastectomy  + breast implants      History of cataract surgery, unspecified laterality          Allergies: Allergies    Zosyn (Rash; Urticaria; Hives)    Intolerances        Social History: Denies toxic habits including tobacco, ETOH or illicit drugs.    Family History: FAMILY HISTORY:  Family history of MI (myocardial infarction) (Father)    FH: Alzheimers disease  father    FH: cerebral aneurysm  mother  of this    . No family history of strokes    Medications: MEDICATIONS  (STANDING):  apixaban 2.5 milliGRAM(s) Oral every 12 hours  famotidine Injectable 20 milliGRAM(s) IV Push daily  levoFLOXacin IVPB 500 milliGRAM(s) IV Intermittent every 24 hours  metoprolol succinate  milliGRAM(s) Oral daily  pantoprazole    Tablet 40 milliGRAM(s) Oral before breakfast  vancomycin  IVPB 1000 milliGRAM(s) IV Intermittent once    MEDICATIONS  (PRN):  acetaminophen     Tablet .. 650 milliGRAM(s) Oral every 6 hours PRN Temp greater or equal to 38C (100.4F), Mild Pain (1 - 3)  zolpidem 5 milliGRAM(s) Oral at bedtime PRN Insomnia      Review of Systems:  CONSTITUTIONAL:  No weight loss, fever, chills, weakness or fatigue.  HEENT:  Eyes:  No visual loss, blurred vision, double vision or yellow sclera. Ears, Nose, Throat:  No hearing loss, sneezing, congestion, runny nose or sore throat.  SKIN:  No rash or itching.  CARDIOVASCULAR:  No chest pain, chest pressure or chest discomfort. No palpitations or edema.  RESPIRATORY:  No shortness of breath, cough or sputum.  GASTROINTESTINAL:  No anorexia, nausea, vomiting or diarrhea. No abdominal pain or blood.  GENITOURINARY:  No burning on urination or incontinence   NEUROLOGICAL:  No headache, dizziness, syncope, paralysis, ataxia, numbness or tingling in the extremities. No change in bowel or bladder control. no limb weakness. no vision changes.   MUSCULOSKELETAL:  No muscle, back pain, joint pain or stiffness.  HEMATOLOGIC:  No anemia, bleeding or bruising.  LYMPHATICS:  No enlarged nodes. No history of splenectomy.  PSYCHIATRIC:  No history of depression or anxiety.  ENDOCRINOLOGIC:  No reports of sweating, cold or heat intolerance. No polyuria or polydipsia.      Vitals:  Vital Signs Last 24 Hrs  T(C): 36.6 (30 May 2025 05:30), Max: 39.8 (29 May 2025 15:12)  T(F): 97.8 (30 May 2025 05:30), Max: 103.6 (29 May 2025 15:12)  HR: 88 (30 May 2025 05:30) (68 - 101)  BP: 135/64 (30 May 2025 05:30) (93/58 - 135/64)  BP(mean): 92 (29 May 2025 15:12) (87 - 92)  RR: 18 (30 May 2025 05:30) (18 - 28)  SpO2: 95% (30 May 2025 05:30) (87% - 97%)    Parameters below as of 30 May 2025 05:30  Patient On (Oxygen Delivery Method): nasal cannula  O2 Flow (L/min): 2      General Exam:   General Appearance: Appropriately dressed and in no acute distress       Head: Normocephalic, atraumatic and no dysmorphic features  Ear, Nose, and Throat: Moist mucous membranes  CVS: S1S2+  Resp: No SOB, no wheeze or rhonchi  GI: soft NT/ND  Extremities: No edema or cyanosis  Skin: No bruises or rashes     Neurological Exam:  Mental Status: Awake, alert and oriented x 2.  Able to follow simple and complex verbal commands. Able to name and repeat. fluent speech. No obvious aphasia or dysarthria noted.   Cranial Nerves: PERRL, EOMI, VFFC, sensation V1-V3 intact,  no obvious facial asymmetry, equal elevation of palate, scm/trap 5/5, tongue is midline on protrusion. no obvious papilledema on fundoscopic exam. hearing is grossly intact.   Motor: Normal bulk, tone and strength throughout. Fine finger movements were intact and symmetric. no tremors or drift noted.    Sensation: Intact to light touch and pinprick throughout. no right/left confusion. no extinction to tactile on DSS.   Reflexes: 1+ throughout at biceps, brachioradialis, triceps, patellars and ankles bilaterally and equal. No clonus. R toe and L toe were both downgoing.  Coordination: No dysmetria on FNF or HKS  Gait: deferred     Data/Labs/Imaging which I personally reviewed.     Labs:     CBC Full  -  ( 30 May 2025 07:20 )  WBC Count : 2.70 K/uL  RBC Count : 3.89 M/uL  Hemoglobin : 10.8 g/dL  Hematocrit : 33.8 %  Platelet Count - Automated : 154 K/uL  Mean Cell Volume : 86.9 fl  Mean Cell Hemoglobin : 27.8 pg  Mean Cell Hemoglobin Concentration : 32.0 g/dL  Auto Neutrophil # : x  Auto Lymphocyte # : x  Auto Monocyte # : x  Auto Eosinophil # : x  Auto Basophil # : x  Auto Neutrophil % : x  Auto Lymphocyte % : x  Auto Monocyte % : x  Auto Eosinophil % : x  Auto Basophil % : x    05-    137  |  104  |  17  ----------------------------<  162[H]  4.2   |  21[L]  |  0.73    Ca    8.4      30 May 2025 07:19    TPro  8.2  /  Alb  3.2[L]  /  TBili  1.3[H]  /  DBili  x   /  AST  31  /  ALT  17  /  AlkPhos  105  05-29    LIVER FUNCTIONS - ( 29 May 2025 13:48 )  Alb: 3.2 g/dL / Pro: 8.2 g/dL / ALK PHOS: 105 U/L / ALT: 17 U/L / AST: 31 U/L / GGT: x           PT/INR - ( 29 May 2025 13:48 )   PT: 23.8 sec;   INR: 2.08 ratio         PTT - ( 29 May 2025 13:48 )  PTT:33.7 sec  Urinalysis Basic - ( 30 May 2025 07:19 )    Color: x / Appearance: x / SG: x / pH: x  Gluc: 162 mg/dL / Ketone: x  / Bili: x / Urobili: x   Blood: x / Protein: x / Nitrite: x   Leuk Esterase: x / RBC: x / WBC x   Sq Epi: x / Non Sq Epi: x / Bacteria: x        Urinalysis with Rflx Culture (collected 25 @ 16:21)    Culture - Blood (collected 25 @ 13:45)  Source: Blood Blood-Peripheral  Gram Stain (25 @ 01:47):    Growth in anaerobic bottle: Gram Positive Cocci in Pairs and Chains    Growth in aerobic bottle: Gram Positive Cocci in Pairs and Chains  Preliminary Report (25 @ 01:47):    Growth in anaerobic bottle: Gram Positive Cocci in Pairs and Chains    Growth in aerobic bottle: Gram Positive Cocci in Pairs and Chains    Culture - Blood (collected 25 @ 13:30)  Source: Blood Blood-Peripheral  Gram Stain (25 @ 01:45):    Growth in aerobic bottle: Gram Positive Cocci in Pairs and Chains    Growth in anaerobic bottle: Gram Positive Cocci in Pairs and Chains  Preliminary Report (25 @ 01:46):    Growth in aerobic bottle: Gram Positive Cocci in Pairs and Chains    Growth in anaerobic bottle: Gram Positive Cocci in Pairs and Chains    Direct identification is available within approximately 3-5    hours either by Blood Panel Multiplexed PCR or Direct    MALDI-TOF. Details: [email or web address]  Organism: Blood Culture PCR (25 @ 02:36)  Organism: Blood Culture PCR (25 @ 02:36)      Method Type: PCR      -  Streptococcus agalactiae (Group B): Detec        < from: CT Head No Cont (25 @ 22:10) >    ACC: [id number] EXAM:  CT CERVICAL SPINE   ORDERED BY: ERIKA ANNE     ACC: [id number] EXAM:  CT BRAIN   ORDERED BY: ERIKA ANNE     PROCEDURE DATE:  2025          INTERPRETATION:  CLINICAL Indications:  AMS    COMPARISON: Head CT dated 2022. MRI brain 2022    TECHNIQUE: Noncontrast CT of the head. Multiplanar reformations are   submitted.    FINDINGS:  There is periventricular and subcortical white matter hypodensity without   mass effect, nonspecific, likely representing moderate chronic   microvascular ischemic changes. There is no compelling evidence for an   acute transcortical infarction. There is no evidence of mass, mass   effect, midline shift or extra-axial fluid collection. The lateral   ventricles and cortical sulci are age-appropriate in size and   configuration. The patient is status post bilateral ocular lens   replacement surgery. The orbits, mastoid air cells and visualized   paranasal sinuses are unremarkable. The calvarium is intact. Consider   follow-up CT or MRI as clinically warranted.          ============================    CLINICAL INDICATIONS: AMS    COMPARISON: None.    TECHNIQUE: Noncontrast CT of the cervical spine. Multiple contiguous   axial images through the cervical spine as wellas multiplanar   reformatted images are submitted for interpretation without the   administration of intravenous contrast.    FINDINGS:  Motion artifact limits interpretation. Mild chronic height loss involves   the C3, C4, C5, C6 vertebral bodies with small multilevel anterior   bridging osteophytes. Mild disc calcification at C6/C7. Small posterior   ligament calcification. Mild multilevel uncovertebral hypertrophy.  There is no evidence for acute fracture. A normal lordosis is noted.   Craniocervical junction is normal. The remaining cervicovertebral body   heights and remaining intervertebral disc spaces are preserved. There is   no prevertebral soft tissue abnormality. The odontoid process is intact.    Thyroid gland is unremarkable. Theairway is patent. Moderate right   apical pleural thickening/effusion. Please refer to the preceding chest   CT for further details.    Evaluation of the individual levels:  C2/C3 level: No spinal canal stenosis or foraminal narrowing.  C3/C4 level: No spinal canal stenosis or foraminal narrowing.  C4/C5 level: No spinal canal stenosis or foraminal narrowing.  C5/C6 level: No spinal canal stenosis or foraminal narrowing.  C6/C7 level: Mild right-sided foraminal narrowing. No spinal canal   stenosis or left-sided foraminal narrowing.  C7/T1 level: No spinal canal stenosis or foraminal narrowing.    IMPRESSION:    CT head: Moderate chronic microvascular changes without evidence of an   acute transcortical infarction or hemorrhage.    CT C-spine: Mild to moderate spondylosis. No acute osseous abnormality.   Consider MRI as clinically warranted.    --- End of Report ---            TONY SOTELO MD; Attending Radiologist  This document has been electronically signed. May 29 2025 10:47PM    < end of copied text >

## 2025-05-30 NOTE — CONSULT NOTE ADULT - SUBJECTIVE AND OBJECTIVE BOX
[facility]  BASIL Hooks S. Shah, Y. Patel, G. Casimir  [phone]  ([phone] - weekdays after 5pm and weekends)    GINO GRAHAM  89y, Female  [id number]    HPI--  HPI:  89-year-old female  brought by EMS from [facility] assisted living for altered mental status  and acting not like herself for the last 24 hours. According to the daughter who is near bedside  she states that mom has breast cancer status postmastectomy  history of A-fib in December on Eliquis.  Yesterday she did not feel well had diarrhea and today was called by the nursing home stating that she is a little bit more lethargic and feels very weak.  (29 May 2025 17:12)    ROS: 14 point review of systems completed, pertinent positives and negatives as per HPI.    Allergies: Zosyn (Rash; Urticaria; Hives)    PMH -- Breast cancer      PSH -- H/O bilateral mastectomy    History of cataract surgery, unspecified laterality      FH -- Family history of MI (myocardial infarction) (Father)    FH: Alzheimers disease    FH: cerebral aneurysm      Social History -- denies tobacco, alcohol or illicit drug use    Physical Exam--  Vital Signs Last 24 Hrs  T(F): 97.8 (30 May 2025 07:26), Max: 101 (29 May 2025 16:42)  HR: 71 (30 May 2025 07:26) (68 - 88)  BP: 107/64 (30 May 2025 07:26) (93/58 - 135/64)  RR: 18 (30 May 2025 07:26) (18 - 28)  SpO2: 94% (30 May 2025 07:26) (94% - 97%)  General: nontoxic-appearing, no acute distress  HEENT: anicteric  Lungs: Clear bilaterally without rales, wheezing or rhonchi  Heart: S1, S2, normal rate. No murmur, rub or gallop.  Abdomen: Soft. Nondistended. Nontender.   Neuro: no obvious focal deficits   Back: No costovertebral angle tenderness.  Extremities: No edema.   Skin: Warm. Dry. No rash.  Psychiatric: Appropriate affect and mood for situation.     Laboratory & Imaging Data--  CBC:                       10.8   2.70  )-----------( 154      ( 30 May 2025 07:20 )             33.8     WBC Count: 2.70 K/uL (05-30-25 @ 07:20)  WBC Count: 3.54 K/uL (05-29-25 @ 13:48)    CMP: 05-30    137  |  104  |  17  ----------------------------<  162[H]  4.2   |  21[L]  |  0.73    Ca    8.4      30 May 2025 07:19    TPro  8.2  /  Alb  3.2[L]  /  TBili  1.3[H]  /  DBili  x   /  AST  31  /  ALT  17  /  AlkPhos  105  05-29    LIVER FUNCTIONS - ( 29 May 2025 13:48 )  Alb: 3.2 g/dL / Pro: 8.2 g/dL / ALK PHOS: 105 U/L / ALT: 17 U/L / AST: 31 U/L / GGT: x           Urinalysis Basic - ( 30 May 2025 07:19 )    Color: x / Appearance: x / SG: x / pH: x  Gluc: 162 mg/dL / Ketone: x  / Bili: x / Urobili: x   Blood: x / Protein: x / Nitrite: x   Leuk Esterase: x / RBC: x / WBC x   Sq Epi: x / Non Sq Epi: x / Bacteria: x      Microbiology:     Urinalysis with Rflx Culture (collected 05-29-25 @ 16:21)    Culture - Blood (collected 05-29-25 @ 13:45)  Source: Blood Blood-Peripheral  Gram Stain (05-30-25 @ 01:47):    Growth in anaerobic bottle: Gram Positive Cocci in Pairs and Chains    Growth in aerobic bottle: Gram Positive Cocci in Pairs and Chains  Preliminary Report (05-30-25 @ 01:47):    Growth in anaerobic bottle: Gram Positive Cocci in Pairs and Chains    Growth in aerobic bottle: Gram Positive Cocci in Pairs and Chains    Culture - Blood (collected 05-29-25 @ 13:30)  Source: Blood Blood-Peripheral  Gram Stain (05-30-25 @ 01:45):    Growth in aerobic bottle: Gram Positive Cocci in Pairs and Chains    Growth in anaerobic bottle: Gram Positive Cocci in Pairs and Chains  Preliminary Report (05-30-25 @ 01:46):    Growth in aerobic bottle: Gram Positive Cocci in Pairs and Chains    Growth in anaerobic bottle: Gram Positive Cocci in Pairs and Chains    Direct identification is available within approximately 3-5    hours either by Blood Panel Multiplexed PCR or Direct    MALDI-TOF. Details: [email or web address]  Organism: Blood Culture PCR (05-30-25 @ 02:36)  Organism: Blood Culture PCR (05-30-25 @ 02:36)      Method Type: PCR      -  Streptococcus agalactiae (Group B): Detec        Radiology--  ***  Active Medications--  acetaminophen     Tablet .. 650 milliGRAM(s) Oral every 6 hours PRN  apixaban 2.5 milliGRAM(s) Oral every 12 hours  chlorhexidine 2% Cloths 1 Application(s) Topical daily  famotidine Injectable 20 milliGRAM(s) IV Push daily  levoFLOXacin IVPB 500 milliGRAM(s) IV Intermittent every 24 hours  metoprolol succinate  milliGRAM(s) Oral daily  pantoprazole    Tablet 40 milliGRAM(s) Oral before breakfast  zolpidem 5 milliGRAM(s) Oral at bedtime PRN    Antimicrobials:   levoFLOXacin IVPB 500 milliGRAM(s) IV Intermittent every 24 hours    Immunologic:    [facility]  BASIL Hooks S. Shah, Y. Patel, G. Casimir  [phone]  ([phone] - weekdays after 5pm and weekends)    GINO GRAHAM  89y, Female  [id number]    HPI--  HPI:  89 y/o F PMhx Afib on eliquis, breast ca s/p mastectomy brought by EMS from [facility] living for altered mental status and acting not like herself for the last 24 hours. Yesterday patient was not feeling well and had 1 episode of diarrhea. Daughter states patient was not acting like herself and did not like the way she was breathing so called EMS.   In ED patient febrile to 103.6 and tachycardic. Labs significant for leukopenia. Imaging concerning for PNA.  Denies chills, chest pain, pleurisy SOB, abd pain, n/v, dysuria.     ROS: 14 point review of systems completed, pertinent positives and negatives as per HPI.    Allergies: Zosyn (Rash; Urticaria; Hives)    PMH -- Breast cancer      PSH -- H/O bilateral mastectomy    History of cataract surgery, unspecified laterality      FH -- Family history of MI (myocardial infarction) (Father)    FH: Alzheimers disease    FH: cerebral aneurysm      Social History -- former smoker, denies illicit drug use    Physical Exam--  Vital Signs Last 24 Hrs  T(F): 97.8 (30 May 2025 07:26), Max: 101 (29 May 2025 16:42)  HR: 71 (30 May 2025 07:26) (68 - 88)  BP: 107/64 (30 May 2025 07:26) (93/58 - 135/64)  RR: 18 (30 May 2025 07:26) (18 - 28)  SpO2: 94% (30 May 2025 07:26) (94% - 97%)  General: nontoxic-appearing, no acute distress  HEENT: anicteric  Lungs: decreased breath sounds  Heart: S1, S2, normal rate.   Abdomen: Soft. Nondistended. Nontender.   Neuro: no obvious focal deficits   Back: No costovertebral angle tenderness.  Extremities: No LE edema.   Skin: Warm. Dry. No rash.  Psychiatric: Appropriate affect and mood for situation.     Laboratory & Imaging Data--  CBC:                       10.8   2.70  )-----------( 154      ( 30 May 2025 07:20 )             33.8     WBC Count: 2.70 K/uL (05-30-25 @ 07:20)  WBC Count: 3.54 K/uL (05-29-25 @ 13:48)    CMP: 05-30    137  |  104  |  17  ----------------------------<  162[H]  4.2   |  21[L]  |  0.73    Ca    8.4      30 May 2025 07:19    TPro  8.2  /  Alb  3.2[L]  /  TBili  1.3[H]  /  DBili  x   /  AST  31  /  ALT  17  /  AlkPhos  105  05-29    LIVER FUNCTIONS - ( 29 May 2025 13:48 )  Alb: 3.2 g/dL / Pro: 8.2 g/dL / ALK PHOS: 105 U/L / ALT: 17 U/L / AST: 31 U/L / GGT: x           Urinalysis Basic - ( 30 May 2025 07:19 )    Color: x / Appearance: x / SG: x / pH: x  Gluc: 162 mg/dL / Ketone: x  / Bili: x / Urobili: x   Blood: x / Protein: x / Nitrite: x   Leuk Esterase: x / RBC: x / WBC x   Sq Epi: x / Non Sq Epi: x / Bacteria: x      Microbiology:     Urinalysis with Rflx Culture (collected 05-29-25 @ 16:21)    Culture - Blood (collected 05-29-25 @ 13:45)  Source: Blood Blood-Peripheral  Gram Stain (05-30-25 @ 01:47):    Growth in anaerobic bottle: Gram Positive Cocci in Pairs and Chains    Growth in aerobic bottle: Gram Positive Cocci in Pairs and Chains  Preliminary Report (05-30-25 @ 01:47):    Growth in anaerobic bottle: Gram Positive Cocci in Pairs and Chains    Growth in aerobic bottle: Gram Positive Cocci in Pairs and Chains    Culture - Blood (collected 05-29-25 @ 13:30)  Source: Blood Blood-Peripheral  Gram Stain (05-30-25 @ 01:45):    Growth in aerobic bottle: Gram Positive Cocci in Pairs and Chains    Growth in anaerobic bottle: Gram Positive Cocci in Pairs and Chains  Preliminary Report (05-30-25 @ 01:46):    Growth in aerobic bottle: Gram Positive Cocci in Pairs and Chains    Growth in anaerobic bottle: Gram Positive Cocci in Pairs and Chains    Direct identification is available within approximately 3-5    hours either by Blood Panel Multiplexed PCR or Direct    MALDI-TOF. Details: [email or web address]  Organism: Blood Culture PCR (05-30-25 @ 02:36)  Organism: Blood Culture PCR (05-30-25 @ 02:36)      Method Type: PCR      -  Streptococcus agalactiae (Group B): Detec        Radiology--  ***  Active Medications--  acetaminophen     Tablet .. 650 milliGRAM(s) Oral every 6 hours PRN  apixaban 2.5 milliGRAM(s) Oral every 12 hours  chlorhexidine 2% Cloths 1 Application(s) Topical daily  famotidine Injectable 20 milliGRAM(s) IV Push daily  levoFLOXacin IVPB 500 milliGRAM(s) IV Intermittent every 24 hours  metoprolol succinate  milliGRAM(s) Oral daily  pantoprazole    Tablet 40 milliGRAM(s) Oral before breakfast  zolpidem 5 milliGRAM(s) Oral at bedtime PRN    Antimicrobials:   levoFLOXacin IVPB 500 milliGRAM(s) IV Intermittent every 24 hours    Immunologic:

## 2025-05-30 NOTE — SWALLOW BEDSIDE ASSESSMENT ADULT - SWALLOW EVAL: DIAGNOSIS
Pt presents with an oropharyngeal swallow sequence that appears WFL to tolerate a regular texture diet. Baseline cough appears unchanged in quality and frequency following PO intake.

## 2025-05-30 NOTE — SWALLOW BEDSIDE ASSESSMENT ADULT - COMMENTS
Neurology impression; 1) AMS likely toxic metabolic encephalopathy in setting of infection/PNA/baceteremia, improving 2) possible MCI/dementia.  ID consulted for PNA. Nephrology Consulted for Hyponatremia which appears to be mild >130.    CT head: Moderate chronic microvascular changes without evidence of an acute transcortical infarction or hemorrhage.  CT C-spine: Mild to moderate spondylosis. No acute osseous abnormality. Consider MRI as clinically warranted.  CT CHEST IMPRESSION: 1. Mild to moderate CHF and/or volume overload. 2. Additional findings suspect for mild right middle and lower lobe   pneumonia on a background of mild bronchiectasis. 3. No colitis or any acute abnormality demonstrated in the abdomen or pelvis.    SWALLOW HISTORY: No reports in SCM or in PACS prior to this admission.

## 2025-05-30 NOTE — CONSULT NOTE ADULT - ASSESSMENT
89-year-old female  brought by EMS from [facility] for altered mental status  and acting not like herself for the last 24 hours PTA. According to the daughter who is near bedside  she states that mom has breast cancer status postmastectomy  history of A-fib in December on Eliquis.  day PTA she did not feel well had diarrhea and was called by the nursing home stating that she is a little bit more lethargic and feels very weak.   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   o/e AAOx2, LUGO non focal     Imprssion:   1) AMS likely toxic metabolic encephalopathy in setting of infection/PNA/baceteremia, improving   2) possible MCI/dementia     - r/o reversible causes of dementia, check B12, RPR, TSH if not already checked   - on DOAC, eliquis 2.5mg BID for AF  - on levofloxacin and vanco for PNA; f/u ID + bacetermia strep   - cardio recs appreicated ; TTE pending.  now given + blood cx likely needs MARIA G  - check ESR/CRP   - MRI brain w/o if no imrpovement in mental status, seems better near baseline   - seroquel PRN for agitaiton if QTC < 500   - f/u pulmo, pleural effusion    - telemetry  - PT/OT/SS/SLP, OOBC  - check FS, glucose control <180  - further recommendations pending further workup   - GI/DVT ppx  - Counseling on diet, exercise, and medication adherence was done  - Counseling on smoking cessation and alcohol consumption offered when appropriate.  - Pain assessed and judicious use of narcotics when appropriate was discussed.    - Stroke education given when appropriate.  - Importance of fall prevention discussed.   - Differential diagnosis and plan of care discussed with patient and/or family and primary team  - Thank you for allowing me to participate in the care of this patient. Call with questions.   Vasile Mcclure MD  Vascular Neurology  Office: [phone]

## 2025-05-30 NOTE — SWALLOW BEDSIDE ASSESSMENT ADULT - SLP PERTINENT HISTORY OF CURRENT PROBLEM
89-year-old female  brought by EMS from [facility] assisted [facility] for altered mental status  and acting not like herself for the last 24 hours. According to the daughter who is near bedside  she states that mom has breast cancer status postmastectomy  history of A-fib in December on Eliquis.  Yesterday she did not feel well had diarrhea and today was called by the nursing home stating that she is a little bit more lethargic and feels very weak. 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

## 2025-05-30 NOTE — SWALLOW BEDSIDE ASSESSMENT ADULT - ASR SWALLOW ASPIRATION MONITOR
Monitor for s/s aspiration/laryngeal penetration. If noted:  D/C p.o. intake, provide non-oral nutrition/hydration/meds, and contact this service @ [phone]/change of breathing pattern/cough/gurgly voice/fever/pneumonia/throat clearing/upper respiratory infection

## 2025-05-30 NOTE — CONSULT NOTE ADULT - ASSESSMENT
Bacteremia  broad spectrum abx  ID consult  obtain Echo, likely needs MARIA G     history of HCM  on BB  obtain echo     PAF  obtain EKG  cont a/c    PNA  right pl effusion   would be cautious with diuresis given LVOT obstruction   pulm eval

## 2025-05-30 NOTE — CONSULT NOTE ADULT - SUBJECTIVE AND OBJECTIVE BOX
05-30-25 @ 11:56    Patient is a 89y old  Female who presents with a chief complaint of AMS (30 May 2025 08:37)      HPI:  89-year-old female  brought by EMS from [facility] assisted [facility] for altered mental status  and acting not like herself for the last 24 hours. According to the daughter who is near bedside  she states that mom has breast cancer status postmastectomy  history of A-fib in December on Eliquis.  Yesterday she did not feel well had diarrhea and today was called by the nursing home stating that she is a little bit more lethargic and feels very weak.  (29 May 2025 17:12):  daughter is at bedside she is on room air at this time  she looks pretty good:   no sob:   no phlegm  :   she has no underlying lung disease       ?FOLLOWING PRESENT  [ x] Hx of PE/DVT, [ x] Hx COPD, [ x] Hx of Asthma, [y ] Hx of Hospitalization, [ x]  Hx of BiPAP/CPAP use, [ x] Hx of IBAN    Allergies    Zosyn (Rash; Urticaria; Hives)    Intolerances        PAST MEDICAL & SURGICAL HISTORY:  Breast cancer  s/p b/l mastectomy      H/O bilateral mastectomy  + breast implants      History of cataract surgery, unspecified laterality          FAMILY HISTORY:  Family history of MI (myocardial infarction) (Father)    FH: Alzheimers disease  father    FH: cerebral aneurysm  mother  of this        Social History: [ x ] TOBACCO                  [x  ] ETOH                                 [  x] IVDA/DRUGS    REVIEW OF SYSTEMS      General:	AMs    Skin/Breast:Z  	  Ophthalmologic:X  	  ENMT:	X    Respiratory and Thorax:  NO SOB, N O COUGH   	  Cardiovascular:	x    Gastrointestinal:	x    Genitourinary:	x    Musculoskeletal:	x    Neurological:	x    Psychiatric:	x    Hematology/Lymphatics:	x    Endocrine:	x    Allergic/Immunologic:	x    MEDICATIONS  (STANDING):  apixaban 2.5 milliGRAM(s) Oral every 12 hours  chlorhexidine 2% Cloths 1 Application(s) Topical daily  famotidine Injectable 20 milliGRAM(s) IV Push daily  levoFLOXacin IVPB 500 milliGRAM(s) IV Intermittent every 24 hours  metoprolol succinate  milliGRAM(s) Oral daily  pantoprazole    Tablet 40 milliGRAM(s) Oral before breakfast  vancomycin  IVPB 1000 milliGRAM(s) IV Intermittent once    MEDICATIONS  (PRN):  acetaminophen     Tablet .. 650 milliGRAM(s) Oral every 6 hours PRN Temp greater or equal to 38C (100.4F), Mild Pain (1 - 3)  zolpidem 5 milliGRAM(s) Oral at bedtime PRN Insomnia       Vital Signs Last 24 Hrs  T(C): 36.6 (30 May 2025 05:30), Max: 39.8 (29 May 2025 15:12)  T(F): 97.8 (30 May 2025 05:30), Max: 103.6 (29 May 2025 15:12)  HR: 88 (30 May 2025 05:30) (68 - 101)  BP: 135/64 (30 May 2025 05:30) (93/58 - 135/64)  BP(mean): 92 (29 May 2025 15:12) (87 - 92)  RR: 18 (30 May 2025 05:30) (18 - 28)  SpO2: 95% (30 May 2025 05:30) (87% - 97%)    Parameters below as of 30 May 2025 05:30  Patient On (Oxygen Delivery Method): nasal cannula  O2 Flow (L/min): 2  Orthostatic VS          I&O's Summary      Physical Exam:   GENERAL: NAD, well-groomed, well-developed  HEENT: BRYSON/   Atraumatic, Normocephalic  ENMT: No tonsillar erythema, exudates, or enlargement; Moist mucous membranes, Good dentition, No lesions  NECK: Supple, No JVD, Normal thyroid  CHEST/LUNG: Clear to auscultation bilaterally  CVS: Regular rate and rhythm; No murmurs, rubs, or gallops  GI: : Soft, Nontender, Nondistended; Bowel sounds present  NERVOUS SYSTEM:  Alert & awake and responsive to questions  EXTREMITIES:  - edema  LYMPH: No lymphadenopathy noted  SKIN: No rashes or lesions  ENDOCRINOLOGY: No Thyromegaly  PSYCH: Appropriate    Labs:  Venous<32<4>>48<<7.475>>Venous<<3><<4><<5<<489>>                            10.8   2.70  )-----------( 154      ( 30 May 2025 07:20 )             33.8                         11.2   3.54  )-----------( 159      ( 29 May 2025 13:48 )             34.1     05    137  |  104  |  17  ----------------------------<  162[H]  4.2   |  21[L]  |  0.73      131[L]  |  98  |  14  ----------------------------<  122[H]  4.0   |  20[L]  |  0.75    Ca    8.4      30 May 2025 07:19  Ca    8.9      29 May 2025 13:48    TPro  8.2  /  Alb  3.2[L]  /  TBili  1.3[H]  /  DBili  x   /  AST  31  /  ALT  17  /  AlkPhos  105      CAPILLARY BLOOD GLUCOSE        LIVER FUNCTIONS - ( 29 May 2025 13:48 )  Alb: 3.2 g/dL / Pro: 8.2 g/dL / ALK PHOS: 105 U/L / ALT: 17 U/L / AST: 31 U/L / GGT: x           PT/INR - ( 29 May 2025 13:48 )   PT: 23.8 sec;   INR: 2.08 ratio         PTT - ( 29 May 2025 13:48 )  PTT:33.7 sec  Urinalysis Basic - ( 30 May 2025 07:19 )    Color: x / Appearance: x / SG: x / pH: x  Gluc: 162 mg/dL / Ketone: x  / Bili: x / Urobili: x   Blood: x / Protein: x / Nitrite: x   Leuk Esterase: x / RBC: x / WBC x   Sq Epi: x / Non Sq Epi: x / Bacteria: x      Urinalysis with Rflx Culture (collected 29 May 2025 16:21)    Culture - Blood (collected 29 May 2025 13:45)  Source: Blood Blood-Peripheral  Gram Stain (30 May 2025 01:47):    Growth in anaerobic bottle: Gram Positive Cocci in Pairs and Chains    Growth in aerobic bottle: Gram Positive Cocci in Pairs and Chains  Preliminary Report (30 May 2025 01:47):    Growth in anaerobic bottle: Gram Positive Cocci in Pairs and Chains    Growth in aerobic bottle: Gram Positive Cocci in Pairs and Chains    Culture - Blood (collected 29 May 2025 13:30)  Source: Blood Blood-Peripheral  Gram Stain (30 May 2025 01:45):    Growth in aerobic bottle: Gram Positive Cocci in Pairs and Chains    Growth in anaerobic bottle: Gram Positive Cocci in Pairs and Chains  Preliminary Report (30 May 2025 01:46):    Growth in aerobic bottle: Gram Positive Cocci in Pairs and Chains    Growth in anaerobic bottle: Gram Positive Cocci in Pairs and Chains    Direct identification is available within approximately 3-5    hours either by Blood Panel Multiplexed PCR or Direct    MALDI-TOF. Details: [email or web address]  Organism: Blood Culture PCR (30 May 2025 02:36)  Organism: Blood Culture PCR (30 May 2025 02:36)      D DImer      Studies  Chest X-RAY  CT SCAN Chest   CT Abdomen  Venous Dopplers: LE:   Others    raqd< from: CT Head No Cont (25 @ 22:10) >  C6/C7 level: Mild right-sided foraminal narrowing. No spinal canal   stenosis or left-sided foraminal narrowing.  C7/T1 level: No spinal canal stenosis or foraminal narrowing.    IMPRESSION:    CT head: Moderate chronic microvascular changes without evidence of an   acute transcortical infarction or hemorrhage.    CT C-spine: Mild to moderate spondylosis. No acute osseous abnormality.   Consider MRI as clinically warranted.    --- End of Report ---    < end of copied text >  < from: CT Chest w/ IV Cont (25 @ 17:25) >  LYMPH NODES: No lymphadenopathy.  ABDOMINAL WALL: Within normal limits.  BONES: Degenerative changes.    IMPRESSION:    1. Mild to moderate CHF and/or volume overload.    2. Additional findings suspect for mild right middle and lower lobe   pneumonia on a background of mild bronchiectasis.    3. No colitis or any acute abnormality demonstrated in the abdomen or   pelvis.    --- End of Report ---            CLARA MEJIA MD; Attending Radiologist  This document has been electronically signed. May 29 2025  6:24PM    < end of copied text >        rad  rad< from: TTE W or WO Ultrasound Enhancing Agent (24 @ 11:52) >   1. Mavacamten, ten days into 10 mg/d dose.   2. Left ventricular cavity is normal in size. Left ventricular systolic function is normal with an ejection fraction visually estimated at 65 %. There are no regional wall motion abnormalities seen.   3. The left ventricular outflow tract resting gradient is 6 mmHg and 9 mmHg using the Valsalva maneuver.   4. Mild to moderate pulmonary hypertension.   5. Mitral stenosis is present due to severe annular calcification.   6. Compared to the transthoracic echocardiogram performed on 2024, the LVOTgradient is lower.    < end of copied text >

## 2025-05-30 NOTE — CONSULT NOTE ADULT - SUBJECTIVE AND OBJECTIVE BOX
[facility]    Dr. Linda Galicia     Office [phone] (9 am to 5 pm)  Service : [phone] ( 5pm to 9 am)  Also Available on Teams        RENAL INITIAL CONSULT NOTE: DATE OF SERVICE 25 @ 12:17    HPI:  89-year-old female  brought by EMS from [facility] assisted living for altered mental status  and acting not like herself for the last 24 hours. According to the daughter who is near bedside  she states that mom has breast cancer status postmastectomy  history of A-fib in December on Eliquis.  Yesterday she did not feel well had diarrhea and today was called by the nursing home stating that she is a little bit more lethargic and feels very weak.  (29 May 2025 17:12)    Nephro on board for Hyponatremia    Allergies:  Zosyn (Rash; Urticaria; Hives)      PAST MEDICAL & SURGICAL HISTORY:  Breast cancer  s/p b/l mastectomy      H/O bilateral mastectomy  + breast implants      History of cataract surgery, unspecified laterality          Home Medications Reviewed    Hospital Medications:   MEDICATIONS  (STANDING):  apixaban 2.5 milliGRAM(s) Oral every 12 hours  chlorhexidine 2% Cloths 1 Application(s) Topical daily  famotidine Injectable 20 milliGRAM(s) IV Push daily  levoFLOXacin IVPB 500 milliGRAM(s) IV Intermittent every 24 hours  metoprolol succinate  milliGRAM(s) Oral daily  pantoprazole    Tablet 40 milliGRAM(s) Oral before breakfast  vancomycin  IVPB 1000 milliGRAM(s) IV Intermittent once      SOCIAL HISTORY:  Denies ETOh, Smoking,     FAMILY HISTORY:  Family history of MI (myocardial infarction) (Father)    FH: Alzheimers disease  father    FH: cerebral aneurysm  mother  of this        REVIEW OF SYSTEMS:  CONSTITUTIONAL: No weakness, fevers or chills  EYES/ENT: No visual changes;  No vertigo or throat pain   NECK: No pain or stiffness  RESPIRATORY: No cough, wheezing, hemoptysis; No shortness of breath  CARDIOVASCULAR: No chest pain or palpitations.  GASTROINTESTINAL: No abdominal or epigastric pain. No nausea, vomiting, or hematemesis; No diarrhea or constipation. No melena or hematochezia.  GENITOURINARY: No dysuria, frequency, foamy urine, urinary urgency, incontinence or hematuria  NEUROLOGICAL: No numbness or weakness  SKIN: No itching, burning, rashes, or lesions   VASCULAR: No bilateral lower extremity edema.   All other review of systems is negative unless indicated above.    VITALS:  T(F): 97.8 (25 @ 05:30), Max: 103.6 (25 @ 15:12)  HR: 88 (25 @ 05:30)  BP: 135/64 (25 @ 05:30)  RR: 18 (25 @ 05:30)  SpO2: 95% (25 @ 05:30)  Wt(kg): --    Height (cm): 157.5 ( @ 13:16)  Weight (kg): 54.4 ( @ 13:16)  BMI (kg/m2): 21.9 ( @ :16)  BSA (m2): 1.54 ( @ 13:16)    PHYSICAL EXAM:  Constitutional: NAD  HEENT: anicteric sclera, oropharynx clear, MMM  Neck: No JVD  Respiratory: CTAB, no wheezes, rales or rhonchi  Cardiovascular: S1, S2, RRR  Gastrointestinal: BS+, soft, NT/ND  Extremities: No cyanosis or clubbing. No peripheral edema  Neurological: A/O x 3, no focal deficits  Psychiatric: Normal mood, normal affect  : No CVA tenderness. No ervin.   Skin: No rashes  Vascular Access:    LABS:      137  |  104  |  17  ----------------------------<  162[H]  4.2   |  21[L]  |  0.73    Ca    8.4      30 May 2025 07:19    TPro  8.2  /  Alb  3.2[L]  /  TBili  1.3[H]  /  DBili      /  AST  31  /  ALT  17  /  AlkPhos  105      Creatinine Trend: 0.73 <--, 0.75 <--                        10.8   2.70  )-----------( 154      ( 30 May 2025 07:20 )             33.8     Urine Studies:  Urinalysis Basic - ( 30 May 2025 07:19 )    Color:  / Appearance:  / SG:  / pH:   Gluc: 162 mg/dL / Ketone:   / Bili:  / Urobili:    Blood:  / Protein:  / Nitrite:    Leuk Esterase:  / RBC:  / WBC    Sq Epi:  / Non Sq Epi:  / Bacteria:           RADIOLOGY & ADDITIONAL STUDIES:

## 2025-05-31 LAB
-  CEFTRIAXONE: SIGNIFICANT CHANGE UP
-  CLINDAMYCIN: SIGNIFICANT CHANGE UP
-  LEVOFLOXACIN: SIGNIFICANT CHANGE UP
-  PENICILLIN: SIGNIFICANT CHANGE UP
-  TETRACYCLINE: SIGNIFICANT CHANGE UP
-  VANCOMYCIN: SIGNIFICANT CHANGE UP
ANION GAP SERPL CALC-SCNC: 10 MMOL/L — SIGNIFICANT CHANGE UP (ref 5–17)
BUN SERPL-MCNC: 32 MG/DL — HIGH (ref 7–23)
CALCIUM SERPL-MCNC: 8.8 MG/DL — SIGNIFICANT CHANGE UP (ref 8.4–10.5)
CHLORIDE SERPL-SCNC: 101 MMOL/L — SIGNIFICANT CHANGE UP (ref 96–108)
CO2 SERPL-SCNC: 22 MMOL/L — SIGNIFICANT CHANGE UP (ref 22–31)
CORTIS AM PEAK SERPL-MCNC: 5.3 UG/DL — LOW (ref 6–18.4)
CREAT SERPL-MCNC: 0.89 MG/DL — SIGNIFICANT CHANGE UP (ref 0.5–1.3)
CRP SERPL-MCNC: 183 MG/L — HIGH (ref 0–4)
CULTURE RESULTS: ABNORMAL
EGFR: 62 ML/MIN/1.73M2 — SIGNIFICANT CHANGE UP
EGFR: 62 ML/MIN/1.73M2 — SIGNIFICANT CHANGE UP
ERYTHROCYTE [SEDIMENTATION RATE] IN BLOOD: >120 MM/HR (ref 0–20)
GLUCOSE SERPL-MCNC: 122 MG/DL — HIGH (ref 70–99)
HCT VFR BLD CALC: 31.3 % — LOW (ref 34.5–45)
HGB BLD-MCNC: 10.4 G/DL — LOW (ref 11.5–15.5)
MCHC RBC-ENTMCNC: 28 PG — SIGNIFICANT CHANGE UP (ref 27–34)
MCHC RBC-ENTMCNC: 33.2 G/DL — SIGNIFICANT CHANGE UP (ref 32–36)
MCV RBC AUTO: 84.1 FL — SIGNIFICANT CHANGE UP (ref 80–100)
METHOD TYPE: SIGNIFICANT CHANGE UP
NRBC BLD AUTO-RTO: 0 /100 WBCS — SIGNIFICANT CHANGE UP (ref 0–0)
PLATELET # BLD AUTO: 172 K/UL — SIGNIFICANT CHANGE UP (ref 150–400)
POTASSIUM SERPL-MCNC: 4.5 MMOL/L — SIGNIFICANT CHANGE UP (ref 3.5–5.3)
POTASSIUM SERPL-SCNC: 4.5 MMOL/L — SIGNIFICANT CHANGE UP (ref 3.5–5.3)
RBC # BLD: 3.72 M/UL — LOW (ref 3.8–5.2)
RBC # FLD: 13.6 % — SIGNIFICANT CHANGE UP (ref 10.3–14.5)
SODIUM SERPL-SCNC: 133 MMOL/L — LOW (ref 135–145)
SPECIMEN SOURCE: SIGNIFICANT CHANGE UP
T PALLIDUM AB TITR SER: NEGATIVE — SIGNIFICANT CHANGE UP
TSH SERPL-MCNC: 0.87 UIU/ML — SIGNIFICANT CHANGE UP (ref 0.27–4.2)
WBC # BLD: 7.37 K/UL — SIGNIFICANT CHANGE UP (ref 3.8–10.5)
WBC # FLD AUTO: 7.37 K/UL — SIGNIFICANT CHANGE UP (ref 3.8–10.5)

## 2025-05-31 RX ORDER — BISACODYL 5 MG
5 TABLET, DELAYED RELEASE (ENTERIC COATED) ORAL EVERY 12 HOURS
Refills: 0 | Status: DISCONTINUED | OUTPATIENT
Start: 2025-05-31 | End: 2025-06-05

## 2025-05-31 RX ORDER — POLYETHYLENE GLYCOL 3350 17 G/17G
17 POWDER, FOR SOLUTION ORAL DAILY
Refills: 0 | Status: DISCONTINUED | OUTPATIENT
Start: 2025-05-31 | End: 2025-06-05

## 2025-05-31 RX ORDER — SENNA 187 MG
2 TABLET ORAL AT BEDTIME
Refills: 0 | Status: DISCONTINUED | OUTPATIENT
Start: 2025-05-31 | End: 2025-06-05

## 2025-05-31 RX ORDER — MELATONIN 5 MG
5 TABLET ORAL ONCE
Refills: 0 | Status: COMPLETED | OUTPATIENT
Start: 2025-05-31 | End: 2025-05-31

## 2025-05-31 RX ADMIN — Medication 5 MILLIGRAM(S): at 20:54

## 2025-05-31 RX ADMIN — POLYETHYLENE GLYCOL 3350 17 GRAM(S): 17 POWDER, FOR SOLUTION ORAL at 17:05

## 2025-05-31 RX ADMIN — Medication 650 MILLIGRAM(S): at 03:05

## 2025-05-31 RX ADMIN — APIXABAN 2.5 MILLIGRAM(S): 2.5 TABLET, FILM COATED ORAL at 17:05

## 2025-05-31 RX ADMIN — Medication 650 MILLIGRAM(S): at 21:05

## 2025-05-31 RX ADMIN — CEFTRIAXONE 100 MILLIGRAM(S): 500 INJECTION, POWDER, FOR SOLUTION INTRAMUSCULAR; INTRAVENOUS at 17:04

## 2025-05-31 RX ADMIN — Medication 40 MILLIGRAM(S): at 06:13

## 2025-05-31 RX ADMIN — APIXABAN 2.5 MILLIGRAM(S): 2.5 TABLET, FILM COATED ORAL at 06:13

## 2025-05-31 RX ADMIN — Medication 650 MILLIGRAM(S): at 04:05

## 2025-05-31 RX ADMIN — Medication 1 APPLICATION(S): at 12:09

## 2025-05-31 RX ADMIN — METOPROLOL SUCCINATE 100 MILLIGRAM(S): 50 TABLET, EXTENDED RELEASE ORAL at 06:13

## 2025-05-31 RX ADMIN — Medication 650 MILLIGRAM(S): at 20:03

## 2025-05-31 RX ADMIN — Medication 20 MILLIGRAM(S): at 12:09

## 2025-05-31 NOTE — PROGRESS NOTE ADULT - ASSESSMENT
89-year-old female  brought by EMS from [facility] assisted [facility] for altered mental status  and acting not like herself for the last 24 hours. According to the daughter who is near bedside  she states that mom has breast cancer status postmastectomy  history of A-fib in December on Eliquis.  Yesterday she did not feel well had diarrhea and today was called by the nursing home stating that she is a little bit more lethargic and feels very weak.     AMS  - unclear etiology  - check CT head  - neuro fu   - resolved     Pneumonia, bacteremia   - started rocephin by ID   - fu repeat cultures  - had allergic reaction to zosyn in the er, sp solumedrol and benadryl     Afib  - cw eliquis  - cw metoprolol    Hyponatremia  - monitor BMP  - fluid restriction

## 2025-05-31 NOTE — PROGRESS NOTE ADULT - ASSESSMENT
89-year-old female  brought by EMS from [facility] assisted [facility] for altered mental status  and acting not like herself for the last 24 hours. Admitted for Toxic Metabolic Encephalopathy in the setting of PNA. Nephro on board for Hyponatremia    Hyponatremia  Likely prerenal although SIADH cannot be totally ruled out due to lung disease  Sodium fluctuates  Cw Fluid restriction 1.5 L per day  Continue to monitor sodium  AM cortisol borderline low however unclear significance with Na overall better  Pending urine studies     Acidosis  Mild  Monitor    Likely CKD2  Possibly due to age no prior hx of T2DM and HTN  Avoid nephrotoxins  Avoid contrast  Keep MAP>65    PNA  Monitor

## 2025-05-31 NOTE — PROGRESS NOTE ADULT - ASSESSMENT
Bacteremia  abx  Echo shows no Veg  further plan as per ID     history of HCM  on BB  pt was on Mevacamten before, fu with Dr Young as outpt   pt also follows with Dr Leung     PAF  in sinus   cont a/c    PNA  right pl effusion   would be cautious with diuresis given severe LVOT obstruction   plan as per pulm / ID      Statement Selected

## 2025-06-01 LAB
ANION GAP SERPL CALC-SCNC: 13 MMOL/L — SIGNIFICANT CHANGE UP (ref 5–17)
BASE EXCESS BLDA CALC-SCNC: 6.7 MMOL/L — HIGH (ref -2–3)
BUN SERPL-MCNC: 20 MG/DL — SIGNIFICANT CHANGE UP (ref 7–23)
CALCIUM SERPL-MCNC: 8.7 MG/DL — SIGNIFICANT CHANGE UP (ref 8.4–10.5)
CHLORIDE SERPL-SCNC: 97 MMOL/L — SIGNIFICANT CHANGE UP (ref 96–108)
CO2 BLDA-SCNC: 30 MMOL/L — HIGH (ref 19–24)
CO2 SERPL-SCNC: 24 MMOL/L — SIGNIFICANT CHANGE UP (ref 22–31)
CREAT SERPL-MCNC: 0.72 MG/DL — SIGNIFICANT CHANGE UP (ref 0.5–1.3)
EGFR: 80 ML/MIN/1.73M2 — SIGNIFICANT CHANGE UP
EGFR: 80 ML/MIN/1.73M2 — SIGNIFICANT CHANGE UP
GLUCOSE SERPL-MCNC: 146 MG/DL — HIGH (ref 70–99)
HCO3 BLDA-SCNC: 29 MMOL/L — HIGH (ref 21–28)
HOROWITZ INDEX BLDA+IHG-RTO: 21 — SIGNIFICANT CHANGE UP
PCO2 BLDA: 32 MMHG — SIGNIFICANT CHANGE UP (ref 32–45)
PH BLDA: 7.56 — HIGH (ref 7.35–7.45)
PO2 BLDA: 84 MMHG — SIGNIFICANT CHANGE UP (ref 83–108)
POTASSIUM SERPL-MCNC: 3.6 MMOL/L — SIGNIFICANT CHANGE UP (ref 3.5–5.3)
POTASSIUM SERPL-SCNC: 3.6 MMOL/L — SIGNIFICANT CHANGE UP (ref 3.5–5.3)
SAO2 % BLDA: 99 % — HIGH (ref 94–98)
SODIUM SERPL-SCNC: 134 MMOL/L — LOW (ref 135–145)

## 2025-06-01 PROCEDURE — 70450 CT HEAD/BRAIN W/O DYE: CPT | Mod: 26

## 2025-06-01 RX ORDER — FUROSEMIDE 10 MG/ML
20 INJECTION INTRAMUSCULAR; INTRAVENOUS ONCE
Refills: 0 | Status: COMPLETED | OUTPATIENT
Start: 2025-06-01 | End: 2025-06-01

## 2025-06-01 RX ORDER — MELATONIN 5 MG
5 TABLET ORAL ONCE
Refills: 0 | Status: DISCONTINUED | OUTPATIENT
Start: 2025-05-31 | End: 2025-06-05

## 2025-06-01 RX ADMIN — APIXABAN 2.5 MILLIGRAM(S): 2.5 TABLET, FILM COATED ORAL at 17:17

## 2025-06-01 RX ADMIN — FUROSEMIDE 20 MILLIGRAM(S): 10 INJECTION INTRAMUSCULAR; INTRAVENOUS at 08:06

## 2025-06-01 RX ADMIN — METOPROLOL SUCCINATE 100 MILLIGRAM(S): 50 TABLET, EXTENDED RELEASE ORAL at 05:59

## 2025-06-01 RX ADMIN — Medication 5 MILLIGRAM(S): at 22:09

## 2025-06-01 RX ADMIN — CEFTRIAXONE 100 MILLIGRAM(S): 500 INJECTION, POWDER, FOR SOLUTION INTRAMUSCULAR; INTRAVENOUS at 16:19

## 2025-06-01 RX ADMIN — Medication 40 MILLIGRAM(S): at 06:00

## 2025-06-01 RX ADMIN — Medication 1 APPLICATION(S): at 11:03

## 2025-06-01 RX ADMIN — Medication 650 MILLIGRAM(S): at 07:44

## 2025-06-01 RX ADMIN — Medication 650 MILLIGRAM(S): at 08:44

## 2025-06-01 RX ADMIN — APIXABAN 2.5 MILLIGRAM(S): 2.5 TABLET, FILM COATED ORAL at 06:00

## 2025-06-01 RX ADMIN — Medication 20 MILLIGRAM(S): at 11:00

## 2025-06-01 NOTE — CHART NOTE - NSCHARTNOTEFT_GEN_A_CORE
Informed by RN that pt's daughter concern of MS. Pt's daughter endorsed that pt has been in and out of sleep throughout the day, not really taking po. Pt found to be asleep at the time, but is arousable with  verbal stimulation and conversing . VSS.  Discussed with Dr. Wong daughter's concern of AMS, will order repeat CT head and ABG.  Will have night primary team follow up with results.   Alexandre KWON
HPI:  89-year-old female  brought by EMS from [facility] assisted [facility] for altered mental status  and acting not like herself for the last 24 hours. According to the daughter who is near bedside  she states that mom has breast cancer status postmastectomy  history of A-fib in December on Eliquis.  Yesterday she did not feel well had diarrhea and today was called by the nursing home stating that she is a little bit more lethargic and feels very weak.  (29 May 2025 17:12)    BC from 5/29 w/ gram positive cocci in pairs and chains.  Repeat BC sent, case discussed with Dr. Grullon, will start vancomycin.  Per Dr Grullon, Dr. Freitas will see pt today.     Vital Signs Last 24 Hrs  T(C): 36.6 (30 May 2025 05:30), Max: 39.8 (29 May 2025 15:12)  T(F): 97.8 (30 May 2025 05:30), Max: 103.6 (29 May 2025 15:12)  HR: 88 (30 May 2025 05:30) (68 - 101)  BP: 135/64 (30 May 2025 05:30) (93/58 - 135/64)  BP(mean): 92 (29 May 2025 15:12) (87 - 92)  RR: 18 (30 May 2025 05:30) (18 - 28)  SpO2: 95% (30 May 2025 05:30) (87% - 97%)    Parameters below as of 30 May 2025 05:30  Patient On (Oxygen Delivery Method): nasal cannula  O2 Flow (L/min): 2      Emelin Reyes Monegro, NP   Medicine Department   [facility] [id number]
Nephrology Consulted for Hyponatremia which appears to be mild >130  Etiology can be multifactorial prerenal vs SIADH given hx of malignancy  Will order Urine Na and OSM  Will send TSH and AM cortisol  Full note to follow  Free water restriction to 1.5 l per day

## 2025-06-01 NOTE — PROGRESS NOTE ADULT - ASSESSMENT
89-year-old female  brought by EMS from [facility] assisted [facility] for altered mental status  and acting not like herself for the last 24 hours. According to the daughter who is near bedside  she states that mom has breast cancer status postmastectomy  history of A-fib in December on Eliquis.  Yesterday she did not feel well had diarrhea and today was called by the nursing home stating that she is a little bit more lethargic and feels very weak.     AMS  - unclear etiology, likely metabolic encephalopathy   - neuro fu   - resolved     Pneumonia, bacteremia   - started rocephin by ID   - fu repeat cultures    Afib  - cw eliquis  - cw metoprolol    Hyponatremia  - monitor BMP  - fluid restriction

## 2025-06-01 NOTE — PROGRESS NOTE ADULT - ASSESSMENT
Bacteremia  abx  Echo shows no Veg  further plan as per ID     history of HCM  on BB  pt was on Mevacamten before, fu with Dr Young as outpt   pt also follows with Dr Leung     PAF  in sinus   cont a/c    PNA  right pl effusion   would be cautious with diuresis given severe LVOT obstruction   plan as per pulm / ID      Bacteremia  abx  Echo shows no Veg  further plan as per ID     history of HCM  on BB  pt was on Mevacamten before, fu with Dr Young as outpt   pt also follows with Dr Leung     PAF  in sinus   cont a/c    PNA  right pl effusion   would be cautious with diuresis given severe LVOT obstruction   has evidence of volume overload on CT and high proBNP  will give once time dose of lasix today   plan as per pulm / ID

## 2025-06-01 NOTE — PROGRESS NOTE ADULT - ASSESSMENT
89-year-old female  brought by EMS from [facility] assisted [facility] for altered mental status  and acting not like herself for the last 24 hours PTA. According to the daughter who is near bedside  she states that mom has breast cancer status postmastectomy  history of A-fib in December on Eliquis.  day PTA she did not feel well had diarrhea and was called by the nursing home stating that she is a little bit more lethargic and feels very weak.   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   o/e AAOx2, LUGO non focal   TTE 5/30: HOCM, EF > 75%   TSH WNL; RPR neg   ESR > 120      CT C/A/P- findings concerning for mild right middle and lower lobe pneumonia on a background of mild bronchiectasis. no colitis or any acute abnormality demonstrated in the abdomen or pelvis.   GBS bacteremia  Imprssion:   1) AMS likely toxic metabolic encephalopathy in setting of infection/PNA/baceteremia, improving   2) possible MCI/dementia     - r/o reversible causes of dementia, check B12, RPR (NR), TSH (WNL)  if not already checked   - on DOAC, eliquis  5mg BID for AF  - was on levofloxacin and vanco for PNA; f/u ID + bacetermia strep ; now on CTX   - cardio recs appreicated ;   - consider MARIA G   - MRI brain w/o if no imrpovement in mental status, seems better near baseline   - seroquel PRN for agitaiton if QTC < 500   - f/u pulmo, pleural effusion    - telemetry  - PT/OT/SS/SLP, OOBC  - check FS, glucose control <180  - further recommendations pending further workup   - GI/DVT ppx   Vasile Mcclure MD  Vascular Neurology  Office: [phone]

## 2025-06-01 NOTE — PROGRESS NOTE ADULT - ASSESSMENT
89-year-old female  brought by EMS from [facility] assisted [facility] for altered mental status  and acting not like herself for the last 24 hours. According to the daughter who is near bedside  she states that mom has breast cancer status postmastectomy  history of A-fib in December on Eliquis.  Yesterday she did not feel well had diarrhea and today was called by the nursing home stating that she is a little bit more lethargic and feels very weak.  (29 May 2025 17:12):  daughter is at bedside she is on room air at this time  she looks pretty good:   no sob:   no phlegm  :   she has no underlying lung disease       Streptococcus agalactiae bactermia/  pneumonia  CHF  A fibrillation       Streptococcus agalactiae bactermia/  pneumonia  ct chest shoiwed;1. Mild to moderate CHF and/or volume overload. 2. Additional findings suspect for mild right middle and lower lobe pneumonia on a background of mild bronchiectasis. 3. No colitis or any acute abnormality demonstrated in the abdomen or  pelvis.  vancomycin  IVPB 1000 milliGRAM(s) IV Intermittent , levoFLOXacin IVPB 500 milliGRAM(s) IV Intermittent every 24 hours  ID follow up  : bandemia     6/1:  pt clinically looks better:   she is on room air:   her oxygenation needs to be checked on ambulation:  prior to dc to see if she needs home oxygen    cont antibiotics   id following     CHF  she has small effusion : on right side   echo : < from: TTE W or WO Ultrasound Enhancing Agent (08.02.24 @ 11:52) >   1. Mavacamten, ten days into 10 mg/d dose.   2. Left ventricular cavity is normal in size. Left ventricular systolic function is normal with an ejection fraction visually estimated at 65 %. There are no regional wall motion abnormalities seen.   3. The left ventricular outflow tract resting gradient is 6 mmHg and 9 mmHg using the Valsalva maneuver.   4. Mild to moderate pulmonary hypertension.   5. Mitral stenosis is present due to severe annular calcification.   6. Compared to the transthoracic echocardiogram performed on 7/16/2024, the LVOTgradient is lower.  ? repeat    start low dose lasix    she is afebrile and WBC is normal : doubt she is redeveloping empyema:  she has no chest pain either :     6/1:  rpt echo here showed Hypertrophic obstructive cardiomyopathy (HOCM).Severe discrete basal septal hypertrophy (2.0 cm).Systolic anterior motion of the mitral valve with an increased left ventricular outflow tract gradient of 85 mmHg indicative of severe left ventricular outflow tract obstruction .Moderate mitral regurgitation.  6. Mild pulmonary hypertension. rt pleural effusion :  agree with cardiology to be very cautious about lasix given HOCM    A fibrillation   apixaban 2.5 milliGRAM(s) Oral every 12 hours    dw acp

## 2025-06-01 NOTE — PROGRESS NOTE ADULT - ASSESSMENT
89 y/o F brought by EMS from [facility] for altered mental status    PNA, GBS bacteremia  sepsis- leukopenia, fever, tachycardia, bandemia  AMS- resolved  Culture - Blood (05.29.25 @ 13:30) Streptococcus agalactiae (Group B)  -  Ceftriaxone: S <=0.25, reported Zosyn (Rash; Urticaria; Hives)  - no SSTI on exam  - CT C/A/P- findings concerning for mild right middle and lower lobe pneumonia on a background of mild bronchiectasis. no colitis or any acute abnormality demonstrated in the abdomen or pelvis.  - noted patient w/ allergic reaction to zosyn in ED (per notes pruritis and erythema) s/p solumedrol and benadryl   - s/p speech and swallow eval    Recommendations  switched levofloxacin to ceftriaxone 2g daily-continue for now  Culture - Blood (05.30.25 @ 07:16) Streptococcus agalactiae (Group B)  REPEAT Blood cultures collected 5/31-[facility]    Thank you for consulting us and involving us in the management of this most interesting and challenging case.  In addition to reviewing history, imaging, documents, labs, microbiology, took into account antibiotic stewardship, local antibiogram and infection control strategies and potential transmission issues.    We will follow along in the care of this patient. Please contact me by texting me directly on my cell# at [phone] using TEAMS or call our answering service at [phone] with any concerns.    Starting tomorrow Dr. Wes Grullon will be covering for our group. If you have any questions, concerns or new micro data please reach out to them using TEAMS *PREFERRED* or by calling our service at [phone]

## 2025-06-01 NOTE — PROGRESS NOTE ADULT - ASSESSMENT
89-year-old female  brought by EMS from [facility] assisted living for altered mental status  and acting not like herself for the last 24 hours. Admitted for Toxic Metabolic Encephalopathy in the setting of PNA. Nephro on board for Hyponatremia    Hyponatremia  Likely prerenal although SIADH cannot be totally ruled out due to lung disease  Sodium fluctuates  Cw Fluid restriction 1.5 L per day  Continue to monitor sodium better  AM cortisol borderline low however unclear significance with Na overall better  Pending urine studies Urine Na and Urine OSM    Acidosis  Mild  Monitor    Likely CKD2  Possibly due to age no prior hx of T2DM and HTN  Avoid nephrotoxins  Avoid contrast  Keep MAP>65    PNA  Monitor

## 2025-06-02 LAB
-  CLINDAMYCIN: SIGNIFICANT CHANGE UP
ANION GAP SERPL CALC-SCNC: 13 MMOL/L — SIGNIFICANT CHANGE UP (ref 5–17)
BUN SERPL-MCNC: 15 MG/DL — SIGNIFICANT CHANGE UP (ref 7–23)
CALCIUM SERPL-MCNC: 8.8 MG/DL — SIGNIFICANT CHANGE UP (ref 8.4–10.5)
CHLORIDE SERPL-SCNC: 96 MMOL/L — SIGNIFICANT CHANGE UP (ref 96–108)
CO2 SERPL-SCNC: 25 MMOL/L — SIGNIFICANT CHANGE UP (ref 22–31)
CREAT SERPL-MCNC: 0.63 MG/DL — SIGNIFICANT CHANGE UP (ref 0.5–1.3)
CULTURE RESULTS: ABNORMAL
EGFR: 85 ML/MIN/1.73M2 — SIGNIFICANT CHANGE UP
EGFR: 85 ML/MIN/1.73M2 — SIGNIFICANT CHANGE UP
GLUCOSE SERPL-MCNC: 107 MG/DL — HIGH (ref 70–99)
HCT VFR BLD CALC: 35.4 % — SIGNIFICANT CHANGE UP (ref 34.5–45)
HGB BLD-MCNC: 11.8 G/DL — SIGNIFICANT CHANGE UP (ref 11.5–15.5)
MCHC RBC-ENTMCNC: 27.6 PG — SIGNIFICANT CHANGE UP (ref 27–34)
MCHC RBC-ENTMCNC: 33.3 G/DL — SIGNIFICANT CHANGE UP (ref 32–36)
MCV RBC AUTO: 82.9 FL — SIGNIFICANT CHANGE UP (ref 80–100)
NRBC BLD AUTO-RTO: 0 /100 WBCS — SIGNIFICANT CHANGE UP (ref 0–0)
ORGANISM # SPEC MICROSCOPIC CNT: ABNORMAL
PLATELET # BLD AUTO: 220 K/UL — SIGNIFICANT CHANGE UP (ref 150–400)
POTASSIUM SERPL-MCNC: 3.8 MMOL/L — SIGNIFICANT CHANGE UP (ref 3.5–5.3)
POTASSIUM SERPL-SCNC: 3.8 MMOL/L — SIGNIFICANT CHANGE UP (ref 3.5–5.3)
RBC # BLD: 4.27 M/UL — SIGNIFICANT CHANGE UP (ref 3.8–5.2)
RBC # FLD: 13.4 % — SIGNIFICANT CHANGE UP (ref 10.3–14.5)
SODIUM SERPL-SCNC: 134 MMOL/L — LOW (ref 135–145)
SPECIMEN SOURCE: SIGNIFICANT CHANGE UP
WBC # BLD: 4.87 K/UL — SIGNIFICANT CHANGE UP (ref 3.8–10.5)
WBC # FLD AUTO: 4.87 K/UL — SIGNIFICANT CHANGE UP (ref 3.8–10.5)

## 2025-06-02 RX ADMIN — APIXABAN 2.5 MILLIGRAM(S): 2.5 TABLET, FILM COATED ORAL at 17:14

## 2025-06-02 RX ADMIN — CEFTRIAXONE 100 MILLIGRAM(S): 500 INJECTION, POWDER, FOR SOLUTION INTRAMUSCULAR; INTRAVENOUS at 17:14

## 2025-06-02 RX ADMIN — Medication 1 APPLICATION(S): at 11:31

## 2025-06-02 RX ADMIN — Medication 20 MILLIGRAM(S): at 11:26

## 2025-06-02 RX ADMIN — APIXABAN 2.5 MILLIGRAM(S): 2.5 TABLET, FILM COATED ORAL at 05:02

## 2025-06-02 RX ADMIN — Medication 5 MILLIGRAM(S): at 21:28

## 2025-06-02 RX ADMIN — METOPROLOL SUCCINATE 100 MILLIGRAM(S): 50 TABLET, EXTENDED RELEASE ORAL at 05:02

## 2025-06-02 RX ADMIN — Medication 40 MILLIGRAM(S): at 05:01

## 2025-06-02 NOTE — PROGRESS NOTE ADULT - ASSESSMENT
Bacteremia  abx  Echo shows no Veg  further plan as per ID   repeat blood cx negative for now     history of HCM  on BB  pt was on Mevacamten before, fu with Dr Young as outpt   pt also follows with Dr Leung     PAF  in sinus   cont a/c    PNA  right pl effusion   would be cautious with diuresis given severe LVOT obstruction   has evidence of volume overload on CT and high proBNP  s/p lasix   plan as per pulm / ID

## 2025-06-02 NOTE — PROGRESS NOTE ADULT - ASSESSMENT
89-year-old female  brought by EMS from [facility] assisted [facility] for altered mental status  and acting not like herself for the last 24 hours. According to the daughter who is near bedside  she states that mom has breast cancer status postmastectomy  history of A-fib in December on Eliquis.  Yesterday she did not feel well had diarrhea and today was called by the nursing home stating that she is a little bit more lethargic and feels very weak.  (29 May 2025 17:12):  daughter is at bedside she is on room air at this time  she looks pretty good:   no sob:   no phlegm  :   she has no underlying lung disease       Streptococcus agalactiae bactermia/  pneumonia  CHF  A fibrillation       Streptococcus agalactiae bactermia/  pneumonia  ct chest shoiwed;1. Mild to moderate CHF and/or volume overload. 2. Additional findings suspect for mild right middle and lower lobe pneumonia on a background of mild bronchiectasis. 3. No colitis or any acute abnormality demonstrated in the abdomen or  pelvis.  vancomycin  IVPB 1000 milliGRAM(s) IV Intermittent , levoFLOXacin IVPB 500 milliGRAM(s) IV Intermittent every 24 hours  ID follow up  : bandemia     6/1:  pt clinically looks better:   she is on room air:   her oxygenation needs to be checked on ambulation:  prior to dc to see if she needs home oxygen    cont antibiotics   id following   6/2:  she looks pretty good:  on room air:   no sob;   rpt blood cultures arep ending:   no sob:   no wheezing:       CHF  she has small effusion : on right side   echo : < from: TTE W or WO Ultrasound Enhancing Agent (08.02.24 @ 11:52) >   1. Mavacamten, ten days into 10 mg/d dose.   2. Left ventricular cavity is normal in size. Left ventricular systolic function is normal with an ejection fraction visually estimated at 65 %. There are no regional wall motion abnormalities seen.   3. The left ventricular outflow tract resting gradient is 6 mmHg and 9 mmHg using the Valsalva maneuver.   4. Mild to moderate pulmonary hypertension.   5. Mitral stenosis is present due to severe annular calcification.   6. Compared to the transthoracic echocardiogram performed on 7/16/2024, the LVOTgradient is lower.  ? repeat    start low dose lasix    she is afebrile and WBC is normal : doubt she is redeveloping empyema:  she has no chest pain either :     6/1:  rpt echo here showed Hypertrophic obstructive cardiomyopathy (HOCM).Severe discrete basal septal hypertrophy (2.0 cm).Systolic anterior motion of the mitral valve with an increased left ventricular outflow tract gradient of 85 mmHg indicative of severe left ventricular outflow tract obstruction .Moderate mitral regurgitation.  6. Mild pulmonary hypertension. rt pleural effusion :  agree with cardiology to be very cautious about lasix given HOCM    6/2: p er crds     A fibrillation   apixaban 2.5 milliGRAM(s) Oral every 12 hours    dw acp

## 2025-06-02 NOTE — PROGRESS NOTE ADULT - ASSESSMENT
89-year-old female  brought by EMS from [facility] assisted [facility] for altered mental status  and acting not like herself for the last 24 hours. According to the daughter who is near bedside  she states that mom has breast cancer status postmastectomy  history of A-fib in December on Eliquis.  Yesterday she did not feel well had diarrhea and today was called by the nursing home stating that she is a little bit more lethargic and feels very weak.     AMS  - sec to infectious  metabolic encephalopathy   - neuro fu   - resolved     Pneumonia, strep bacteremia   - cw ocephin by ID   -  repeat cultures reviewed    Afib  - cw eliquis  - cw metoprolol    Hyponatremia  - monitor BMP  - fluid restriction     dw daughter  dw ACP

## 2025-06-02 NOTE — PROGRESS NOTE ADULT - ASSESSMENT
89 y/o F brought by EMS from [facility] for altered mental status    PNA, GBS bacteremia  sepsis- leukopenia, fever, tachycardia, bandemia  AMS- resolved  5/29 and 5/30 Bcx with Streptococcus agalactiae (Group B) - Ceftriaxone: S <=0.25  reported Zosyn allergy (Rash; Urticaria; Hives)  - no SSTI on exam  - CT C/A/P- findings concerning for mild RML and RLL pneumonia on a background of mild bronchiectasis. no colitis or any acute abnormality demonstrated in the abdomen or pelvis.  - noted patient w/ allergic reaction to zosyn in ED (per notes pruritis and erythema) s/p solumedrol and benadryl   - s/p speech and swallow eval    Recommendations  Follow 5/31 repeat Bcx - NGTD (24h)  Continue ceftriaxone 2g IV daily  Aspiration precautions   Continue rest of care per primary team     Wes Grullon M.D.  [facility]  Available on [employer] TEAMS - *PREFERRED*  [phone]  After 5pm on weekdays and all day on weekends - please call [phone]     Thank you for consulting us and involving us in the management of this patients case. In addition to reviewing history, imaging, documents, labs, microbiology, took into account antibiotic stewardship, local antibiogram and infection control strategies and potential transmission issues at time of treatment decision making process.

## 2025-06-02 NOTE — PROGRESS NOTE ADULT - ASSESSMENT
89-year-old female  brought by EMS from [facility] assisted [facility] for altered mental status  and acting not like herself for the last 24 hours. Admitted for Toxic Metabolic Encephalopathy in the setting of PNA. Nephro on board for Hyponatremia    Hyponatremia  Likely prerenal although SIADH cannot be totally ruled out due to lung disease  Sodium fluctuates  Cw Fluid restriction 1.5 L per day  Na stable  Continue to monitor sodium better  AM cortisol borderline low however unclear significance with Na overall better  Pending urine studies Urine Na and Urine OSM    Acidosis  Mild  Monitor    Likely CKD2  Possibly due to age no prior hx of T2DM and HTN  Avoid nephrotoxins  Avoid contrast  Keep MAP>65    PNA  Monitor

## 2025-06-03 LAB
ALBUMIN SERPL ELPH-MCNC: 2.6 G/DL — LOW (ref 3.3–5)
ALP SERPL-CCNC: 140 U/L — HIGH (ref 40–120)
ALT FLD-CCNC: 49 U/L — HIGH (ref 10–45)
AMMONIA BLD-MCNC: 24 UMOL/L — SIGNIFICANT CHANGE UP (ref 11–55)
ANION GAP SERPL CALC-SCNC: 16 MMOL/L — SIGNIFICANT CHANGE UP (ref 5–17)
ANISOCYTOSIS BLD QL: SIGNIFICANT CHANGE UP
APTT BLD: 29.9 SEC — SIGNIFICANT CHANGE UP (ref 26.1–36.8)
AST SERPL-CCNC: 37 U/L — SIGNIFICANT CHANGE UP (ref 10–40)
BASOPHILS # BLD AUTO: 0 K/UL — SIGNIFICANT CHANGE UP (ref 0–0.2)
BASOPHILS NFR BLD AUTO: 0 % — SIGNIFICANT CHANGE UP (ref 0–2)
BILIRUB SERPL-MCNC: 1.1 MG/DL — SIGNIFICANT CHANGE UP (ref 0.2–1.2)
BUN SERPL-MCNC: 14 MG/DL — SIGNIFICANT CHANGE UP (ref 7–23)
CALCIUM SERPL-MCNC: 8.6 MG/DL — SIGNIFICANT CHANGE UP (ref 8.4–10.5)
CHLORIDE SERPL-SCNC: 95 MMOL/L — LOW (ref 96–108)
CO2 SERPL-SCNC: 21 MMOL/L — LOW (ref 22–31)
CREAT SERPL-MCNC: 0.77 MG/DL — SIGNIFICANT CHANGE UP (ref 0.5–1.3)
D DIMER BLD IA.RAPID-MCNC: 599 NG/ML DDU — HIGH
EGFR: 74 ML/MIN/1.73M2 — SIGNIFICANT CHANGE UP
EGFR: 74 ML/MIN/1.73M2 — SIGNIFICANT CHANGE UP
ELLIPTOCYTES BLD QL SMEAR: SLIGHT — SIGNIFICANT CHANGE UP
EOSINOPHIL # BLD AUTO: 0.03 K/UL — SIGNIFICANT CHANGE UP (ref 0–0.5)
EOSINOPHIL NFR BLD AUTO: 0.9 % — SIGNIFICANT CHANGE UP (ref 0–6)
GAS PNL BLDV: SIGNIFICANT CHANGE UP
GIANT PLATELETS BLD QL SMEAR: PRESENT — SIGNIFICANT CHANGE UP
GLUCOSE BLDC GLUCOMTR-MCNC: 128 MG/DL — HIGH (ref 70–99)
GLUCOSE BLDC GLUCOMTR-MCNC: 146 MG/DL — HIGH (ref 70–99)
GLUCOSE SERPL-MCNC: 148 MG/DL — HIGH (ref 70–99)
HCT VFR BLD CALC: 38.4 % — SIGNIFICANT CHANGE UP (ref 34.5–45)
HGB BLD-MCNC: 12.5 G/DL — SIGNIFICANT CHANGE UP (ref 11.5–15.5)
INR BLD: 1.76 RATIO — HIGH (ref 0.85–1.16)
LYMPHOCYTES # BLD AUTO: 0.87 K/UL — LOW (ref 1–3.3)
LYMPHOCYTES # BLD AUTO: 24.1 % — SIGNIFICANT CHANGE UP (ref 13–44)
MACROCYTES BLD QL: SIGNIFICANT CHANGE UP
MAGNESIUM SERPL-MCNC: 2 MG/DL — SIGNIFICANT CHANGE UP (ref 1.6–2.6)
MANUAL SMEAR VERIFICATION: SIGNIFICANT CHANGE UP
MCHC RBC-ENTMCNC: 27.7 PG — SIGNIFICANT CHANGE UP (ref 27–34)
MCHC RBC-ENTMCNC: 32.6 G/DL — SIGNIFICANT CHANGE UP (ref 32–36)
MCV RBC AUTO: 85.1 FL — SIGNIFICANT CHANGE UP (ref 80–100)
MONOCYTES # BLD AUTO: 1.33 K/UL — HIGH (ref 0–0.9)
MONOCYTES NFR BLD AUTO: 36.6 % — HIGH (ref 2–14)
NEUTROPHILS # BLD AUTO: 1.39 K/UL — LOW (ref 1.8–7.4)
NEUTROPHILS NFR BLD AUTO: 37.5 % — LOW (ref 43–77)
NEUTS BAND # BLD: 0.9 % — SIGNIFICANT CHANGE UP (ref 0–8)
NEUTS BAND NFR BLD: 0.9 % — SIGNIFICANT CHANGE UP (ref 0–8)
OSMOLALITY UR: 508 MOS/KG — SIGNIFICANT CHANGE UP (ref 300–900)
OSMOLALITY UR: 648 MOS/KG — SIGNIFICANT CHANGE UP (ref 300–900)
PHOSPHATE SERPL-MCNC: 3 MG/DL — SIGNIFICANT CHANGE UP (ref 2.5–4.5)
PLAT MORPH BLD: NORMAL — SIGNIFICANT CHANGE UP
PLATELET # BLD AUTO: 242 K/UL — SIGNIFICANT CHANGE UP (ref 150–400)
POIKILOCYTOSIS BLD QL AUTO: SLIGHT — SIGNIFICANT CHANGE UP
POLYCHROMASIA BLD QL SMEAR: SLIGHT — SIGNIFICANT CHANGE UP
POTASSIUM SERPL-MCNC: 3.8 MMOL/L — SIGNIFICANT CHANGE UP (ref 3.5–5.3)
POTASSIUM SERPL-SCNC: 3.8 MMOL/L — SIGNIFICANT CHANGE UP (ref 3.5–5.3)
PROCALCITONIN SERPL-MCNC: 0.15 NG/ML — HIGH (ref 0.02–0.1)
PROT SERPL-MCNC: 7.5 G/DL — SIGNIFICANT CHANGE UP (ref 6–8.3)
PROTHROM AB SERPL-ACNC: 20.1 SEC — HIGH (ref 9.9–13.4)
RBC # BLD: 4.51 M/UL — SIGNIFICANT CHANGE UP (ref 3.8–5.2)
RBC # FLD: 13.7 % — SIGNIFICANT CHANGE UP (ref 10.3–14.5)
RBC BLD AUTO: ABNORMAL
SODIUM SERPL-SCNC: 132 MMOL/L — LOW (ref 135–145)
SODIUM UR-SCNC: 11 MMOL/L — SIGNIFICANT CHANGE UP
SODIUM UR-SCNC: 34 MMOL/L — SIGNIFICANT CHANGE UP
TARGETS BLD QL SMEAR: SLIGHT — SIGNIFICANT CHANGE UP
WBC # BLD: 3.63 K/UL — LOW (ref 3.8–10.5)
WBC # FLD AUTO: 3.63 K/UL — LOW (ref 3.8–10.5)

## 2025-06-03 PROCEDURE — 93010 ELECTROCARDIOGRAM REPORT: CPT

## 2025-06-03 RX ORDER — ACETAMINOPHEN 500 MG/5ML
1000 LIQUID (ML) ORAL ONCE
Refills: 0 | Status: COMPLETED | OUTPATIENT
Start: 2025-06-03 | End: 2025-06-03

## 2025-06-03 RX ORDER — SODIUM CHLORIDE 9 G/1000ML
1000 INJECTION, SOLUTION INTRAVENOUS ONCE
Refills: 0 | Status: COMPLETED | OUTPATIENT
Start: 2025-06-03 | End: 2025-06-03

## 2025-06-03 RX ADMIN — Medication 5 MILLIGRAM(S): at 21:47

## 2025-06-03 RX ADMIN — APIXABAN 2.5 MILLIGRAM(S): 2.5 TABLET, FILM COATED ORAL at 06:07

## 2025-06-03 RX ADMIN — Medication 1000 MILLIGRAM(S): at 11:00

## 2025-06-03 RX ADMIN — Medication 1 APPLICATION(S): at 12:22

## 2025-06-03 RX ADMIN — METOPROLOL SUCCINATE 100 MILLIGRAM(S): 50 TABLET, EXTENDED RELEASE ORAL at 06:07

## 2025-06-03 RX ADMIN — APIXABAN 2.5 MILLIGRAM(S): 2.5 TABLET, FILM COATED ORAL at 17:27

## 2025-06-03 RX ADMIN — Medication 400 MILLIGRAM(S): at 10:00

## 2025-06-03 RX ADMIN — CEFTRIAXONE 100 MILLIGRAM(S): 500 INJECTION, POWDER, FOR SOLUTION INTRAMUSCULAR; INTRAVENOUS at 17:28

## 2025-06-03 RX ADMIN — Medication 20 MILLIGRAM(S): at 12:25

## 2025-06-03 RX ADMIN — Medication 40 MILLIGRAM(S): at 06:07

## 2025-06-03 RX ADMIN — Medication 2 TABLET(S): at 21:47

## 2025-06-03 NOTE — PROGRESS NOTE ADULT - ASSESSMENT
89-year-old female  brought by EMS from [facility] assisted [facility] for altered mental status  and acting not like herself for the last 24 hours. According to the daughter who is near bedside  she states that mom has breast cancer status postmastectomy  history of A-fib in December on Eliquis.  Yesterday she did not feel well had diarrhea and today was called by the nursing home stating that she is a little bit more lethargic and feels very weak.     AMS  - sec to infectious  metabolic encephalopathy   - neuro fu   - resolved   - sp RRT for vasovagal     Pneumonia, strep bacteremia   - cw rocephin by ID  - repeat cultures reviewed    Afib  - cw eliquis  - cw metoprolol    Hyponatremia  - monitor BMP  - fluid restriction     dw daughter  dw ACP

## 2025-06-03 NOTE — PROGRESS NOTE ADULT - ASSESSMENT
89-year-old female  brought by EMS from [facility] for altered mental status  and acting not like herself for the last 24 hours PTA. According to the daughter who is near bedside  she states that mom has breast cancer status postmastectomy  history of A-fib in December on Eliquis.  day PTA she did not feel well had diarrhea and was called by the nursing home stating that she is a little bit more lethargic and feels very weak.   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   o/e AAOx2, LUGO non focal   TTE 5/30: HOCM, EF > 75%   TSH WNL; RPR neg   ESR > 120      CT C/A/P- findings concerning for mild right middle and lower lobe pneumonia on a background of mild bronchiectasis. no colitis or any acute abnormality demonstrated in the abdomen or pelvis.   GBS bacteremia  Imprssion:   1) AMS likely toxic metabolic encephalopathy in setting of infection/PNA/baceteremia, improving   2) possible MCI/dementia     - r/o reversible causes of dementia, check B12, RPR (NR), TSH (WNL)  if not already checked   - on DOAC, eliquis  5mg BID for AF  - was on levofloxacin and vanco for PNA; f/u ID + bacetermia strep ; now on CTX until; 6/9   - cardio recs appreicated ;   - consider MARIA G   - MRI brain w/o if no imrpovement in mental status, seems better near baseline   - seroquel PRN for agitaiton if QTC < 500   - f/u pulmo, pleural effusion    - telemetry  - PT/OT/SS/SLP, OOBC  - check FS, glucose control <180  - further recommendations pending further workup   - GI/DVT ppx   Vasile Mcclure MD  Vascular Neurology  Office: [phone]

## 2025-06-03 NOTE — PROGRESS NOTE ADULT - ASSESSMENT
89 y/o F brought by EMS from [facility] for altered mental status    PNA, GBS bacteremia  sepsis- leukopenia, fever, tachycardia, bandemia  AMS- resolved  5/29 and 5/30 Bcx with Streptococcus agalactiae (Group B) - Ceftriaxone: S <=0.25  - repeat Bcx 5/31 and 6/1 NGTD   CT C/A/P- findings concerning for mild RML and RLL pneumonia on a background of mild bronchiectasis. no colitis or any acute abnormality demonstrated in the abdomen or pelvis.  no SSTI on exam  noted patient w/ allergic reaction to zosyn in ED (per notes pruritis and erythema) s/p solumedrol and benadryl   EKG reviewed, Qtc noted   s/p speech and swallow eval    Recommendations  Follow 5/31 and 6/1 repeat Bcx - NGTD   Continue ceftriaxone 2g IV daily to complete total 10d course on 6/9/25  -on discharge, can plan to transition to levofloxacin 750mg PO Q48h (renally dosed) until 6/9  Aspiration precautions   Continue rest of care per primary team       Wes Grullon M.D.  [facility]  Available on [employer] TEAMS - *PREFERRED*  [phone]  After 5pm on weekdays and all day on weekends - please call [phone]     Thank you for consulting us and involving us in the management of this patients case. In addition to reviewing history, imaging, documents, labs, microbiology, took into account antibiotic stewardship, local antibiogram and infection control strategies and potential transmission issues at time of treatment decision making process.

## 2025-06-03 NOTE — RAPID RESPONSE TEAM SUMMARY - NSSITUATIONBACKGROUNDRRT_GEN_ALL_CORE
89-year-old female Hx Afib on eliquis, HoCM on BB, breast cancer s/p mastectomy brought by EMS from [facility] for altered mental status, found to have GBS bacteremia.     RRT called for hypotension and altered mental status with pre-syncope. Per bedside RN, patient was AOx4 10 minutes prior to going to the bathroom. There, she had a bowel movement, then felt lightheaded while washing her hands. Transferred to bed, BP 86/56, O2 sat 95% on 2L NC. Patient started on LR 500cc, decreased rate as patient had right pleural effusion. Found to have rectal temp of 101, given ofirmev; cultures recently drawn on 6/1. Patient returned to baseline mental status and BP improved to 98/58 by the end of RRT.     Likely vasovagal episodes after bearing down to stool. However, patient continues to be febrile and appears to have cleared GBS bacteremia, is on CTX according to susceptibilities. Presumed to have volume overload with right pleural effusion; however, TTE hyperdynamic without dysfunction. Would re-evaluate effusion as possible source of sepsis especially as it appears to be complex with possible loculations on CT imaging.     Follow up:   [ ] follow up labwork drawn during RRT for syncope, CBC, BMP, VBG  [ ] follow up final culture 6/1   [ ] would start midodrine if hypotensive   [ ] re-evaluate effusion, consider diagnostic thoracentesis   [ ] discuss/confirm GOC    89-year-old female Hx Afib on eliquis, HoCM on BB, breast cancer s/p mastectomy brought by EMS from [facility] for altered mental status, found to have GBS bacteremia.     RRT called for hypotension and altered mental status with pre-syncope. Per bedside RN, patient was AOx4 10 minutes prior to going to the bathroom. There, she had a bowel movement, then felt lightheaded while washing her hands. Transferred to bed, BP 86/56, HR 90 and regular on cardiac monitor, O2 sat 95% on 2L NC. Patient started on LR 500cc, decreased rate as patient had right pleural effusion. Found to have rectal temp of 101, given ofirmev; cultures recently drawn on 6/1. Patient returned to baseline mental status and BP improved to 98/58 by the end of RRT.     Likely vasovagal episodes after bearing down to stool. However, patient continues to be febrile and appears to have cleared GBS bacteremia, is on CTX according to susceptibilities. Presumed to have volume overload with right pleural effusion; however, TTE hyperdynamic without dysfunction. Would re-evaluate effusion as possible source of sepsis especially as it appears to be complex with possible loculations on CT imaging.     Follow up:   [ ] follow up labwork drawn during RRT for syncope, CBC, BMP, VBG  [ ] follow up final culture 6/1   [ ] would start midodrine if hypotensive   [ ] re-evaluate effusion, consider diagnostic thoracentesis   [ ] discuss/confirm GOC

## 2025-06-03 NOTE — PROGRESS NOTE ADULT - ASSESSMENT
89-year-old female  brought by EMS from [facility] for altered mental status  and acting not like herself for the last 24 hours. Admitted for Toxic Metabolic Encephalopathy in the setting of PNA. Nephro on board for Hyponatremia    Hyponatremia  Likely prerenal although SIADH cannot be totally ruled out due to lung disease  Sodium fluctuates  Cw Fluid restriction 1.5 L per day  Na slightly lower today  Please collect urine na and urine osm  Continue to monitor sodium   AM cortisol borderline low however unclear significance with Na overall better    Acidosis  Mild  Monitor    Likely CKD2  Possibly due to age no prior hx of T2DM and HTN  Scr stable  Avoid nephrotoxins  Avoid contrast  Keep MAP>65    PNA  Monitor

## 2025-06-03 NOTE — PROGRESS NOTE ADULT - ASSESSMENT
89-year-old female  brought by EMS from [facility] for altered mental status  and acting not like herself for the last 24 hours. According to the daughter who is near bedside  she states that mom has breast cancer status postmastectomy  history of A-fib in December on Eliquis.  Yesterday she did not feel well had diarrhea and today was called by the nursing home stating that she is a little bit more lethargic and feels very weak.  (29 May 2025 17:12):  daughter is at bedside she is on room air at this time  she looks pretty good:   no sob:   no phlegm  :   she has no underlying lung disease       Streptococcus agalactiae bactermia/  pneumonia  CHF  A fibrillation       Streptococcus agalactiae bactermia/  pneumonia  ct chest shoiwed;1. Mild to moderate CHF and/or volume overload. 2. Additional findings suspect for mild right middle and lower lobe pneumonia on a background of mild bronchiectasis. 3. No colitis or any acute abnormality demonstrated in the abdomen or  pelvis.  vancomycin  IVPB 1000 milliGRAM(s) IV Intermittent , levoFLOXacin IVPB 500 milliGRAM(s) IV Intermittent every 24 hours  ID follow up  : bandemia     :  pt clinically looks better:   she is on room air:   her oxygenation needs to be checked on ambulation:  prior to dc to see if she needs home oxygen    cont antibiotics   id following   :  she looks pretty good:  on room air:   no sob;   rpt blood cultures arep ending:   no sob:   no wheezin/3;  she seems to be doing poorly 6today  : her mental status waxes and wanes;   when i saw her shge w as not very responsive:  when acp went later on she woke up :  check procal,  d dimer and cxr: and ammonia levels        CHF  she has small effusion : on right side   echo : < from: TTE W or WO Ultrasound Enhancing Agent (24 @ 11:52) >   1. Mavacamten, ten days into 10 mg/d dose.   2. Left ventricular cavity is normal in size. Left ventricular systolic function is normal with an ejection fraction visually estimated at 65 %. There are no regional wall motion abnormalities seen.   3. The left ventricular outflow tract resting gradient is 6 mmHg and 9 mmHg using the Valsalva maneuver.   4. Mild to moderate pulmonary hypertension.   5. Mitral stenosis is present due to severe annular calcification.   6. Compared to the transthoracic echocardiogram performed on 2024, the LVOTgradient is lower.  ? 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  6. Mild pulmonary hypertension. rt pleural effusion :  agree with cardiology to be very cautious about lasix given HOCM    : p er crds   6/*3: defer to cards     A fibrillation   apixaban 2.5 milliGRAM(s) Oral every 12 hours    dw acp and daughter in detail  : call MICU if her condition deteriorates

## 2025-06-04 LAB
ANION GAP SERPL CALC-SCNC: 14 MMOL/L — SIGNIFICANT CHANGE UP (ref 5–17)
ANISOCYTOSIS BLD QL: SLIGHT — SIGNIFICANT CHANGE UP
BASOPHILS # BLD AUTO: 0 K/UL — SIGNIFICANT CHANGE UP (ref 0–0.2)
BASOPHILS NFR BLD AUTO: 0 % — SIGNIFICANT CHANGE UP (ref 0–2)
BUN SERPL-MCNC: 19 MG/DL — SIGNIFICANT CHANGE UP (ref 7–23)
CALCIUM SERPL-MCNC: 8.3 MG/DL — LOW (ref 8.4–10.5)
CHLORIDE SERPL-SCNC: 93 MMOL/L — LOW (ref 96–108)
CO2 SERPL-SCNC: 24 MMOL/L — SIGNIFICANT CHANGE UP (ref 22–31)
CREAT SERPL-MCNC: 0.7 MG/DL — SIGNIFICANT CHANGE UP (ref 0.5–1.3)
DACRYOCYTES BLD QL SMEAR: SLIGHT — SIGNIFICANT CHANGE UP
EGFR: 83 ML/MIN/1.73M2 — SIGNIFICANT CHANGE UP
EGFR: 83 ML/MIN/1.73M2 — SIGNIFICANT CHANGE UP
EOSINOPHIL # BLD AUTO: 0 K/UL — SIGNIFICANT CHANGE UP (ref 0–0.5)
EOSINOPHIL NFR BLD AUTO: 0 % — SIGNIFICANT CHANGE UP (ref 0–6)
GLUCOSE SERPL-MCNC: 109 MG/DL — HIGH (ref 70–99)
HCT VFR BLD CALC: 34.2 % — LOW (ref 34.5–45)
HGB BLD-MCNC: 11.3 G/DL — LOW (ref 11.5–15.5)
LYMPHOCYTES # BLD AUTO: 0.92 K/UL — LOW (ref 1–3.3)
LYMPHOCYTES # BLD AUTO: 17.9 % — SIGNIFICANT CHANGE UP (ref 13–44)
MANUAL SMEAR VERIFICATION: SIGNIFICANT CHANGE UP
MCHC RBC-ENTMCNC: 27.6 PG — SIGNIFICANT CHANGE UP (ref 27–34)
MCHC RBC-ENTMCNC: 33 G/DL — SIGNIFICANT CHANGE UP (ref 32–36)
MCV RBC AUTO: 83.6 FL — SIGNIFICANT CHANGE UP (ref 80–100)
MONOCYTES # BLD AUTO: 1.31 K/UL — HIGH (ref 0–0.9)
MONOCYTES NFR BLD AUTO: 25.6 % — HIGH (ref 2–14)
MYELOCYTES NFR BLD: 0.9 % — HIGH (ref 0–0)
NEUTROPHILS # BLD AUTO: 2.85 K/UL — SIGNIFICANT CHANGE UP (ref 1.8–7.4)
NEUTROPHILS NFR BLD AUTO: 49.6 % — SIGNIFICANT CHANGE UP (ref 43–77)
NEUTS BAND # BLD: 6 % — SIGNIFICANT CHANGE UP (ref 0–8)
NEUTS BAND NFR BLD: 6 % — SIGNIFICANT CHANGE UP (ref 0–8)
OVALOCYTES BLD QL SMEAR: SLIGHT — SIGNIFICANT CHANGE UP
PLAT MORPH BLD: NORMAL — SIGNIFICANT CHANGE UP
PLATELET # BLD AUTO: 268 K/UL — SIGNIFICANT CHANGE UP (ref 150–400)
POIKILOCYTOSIS BLD QL AUTO: SLIGHT — SIGNIFICANT CHANGE UP
POLYCHROMASIA BLD QL SMEAR: SLIGHT — SIGNIFICANT CHANGE UP
POTASSIUM SERPL-MCNC: 3.7 MMOL/L — SIGNIFICANT CHANGE UP (ref 3.5–5.3)
POTASSIUM SERPL-SCNC: 3.7 MMOL/L — SIGNIFICANT CHANGE UP (ref 3.5–5.3)
RBC # BLD: 4.09 M/UL — SIGNIFICANT CHANGE UP (ref 3.8–5.2)
RBC # FLD: 13.6 % — SIGNIFICANT CHANGE UP (ref 10.3–14.5)
RBC BLD AUTO: ABNORMAL
SODIUM SERPL-SCNC: 131 MMOL/L — LOW (ref 135–145)
WBC # BLD: 5.13 K/UL — SIGNIFICANT CHANGE UP (ref 3.8–10.5)
WBC # FLD AUTO: 5.13 K/UL — SIGNIFICANT CHANGE UP (ref 3.8–10.5)

## 2025-06-04 PROCEDURE — 93970 EXTREMITY STUDY: CPT | Mod: 26

## 2025-06-04 PROCEDURE — 99223 1ST HOSP IP/OBS HIGH 75: CPT

## 2025-06-04 PROCEDURE — 71275 CT ANGIOGRAPHY CHEST: CPT | Mod: 26

## 2025-06-04 RX ADMIN — APIXABAN 2.5 MILLIGRAM(S): 2.5 TABLET, FILM COATED ORAL at 06:09

## 2025-06-04 RX ADMIN — METOPROLOL SUCCINATE 100 MILLIGRAM(S): 50 TABLET, EXTENDED RELEASE ORAL at 06:09

## 2025-06-04 RX ADMIN — POLYETHYLENE GLYCOL 3350 17 GRAM(S): 17 POWDER, FOR SOLUTION ORAL at 11:19

## 2025-06-04 RX ADMIN — Medication 2 TABLET(S): at 21:37

## 2025-06-04 RX ADMIN — Medication 60 MILLILITER(S): at 12:43

## 2025-06-04 RX ADMIN — Medication 650 MILLIGRAM(S): at 11:19

## 2025-06-04 RX ADMIN — Medication 1 APPLICATION(S): at 11:26

## 2025-06-04 RX ADMIN — Medication 650 MILLIGRAM(S): at 12:19

## 2025-06-04 RX ADMIN — CEFTRIAXONE 100 MILLIGRAM(S): 500 INJECTION, POWDER, FOR SOLUTION INTRAMUSCULAR; INTRAVENOUS at 16:12

## 2025-06-04 RX ADMIN — APIXABAN 2.5 MILLIGRAM(S): 2.5 TABLET, FILM COATED ORAL at 17:15

## 2025-06-04 RX ADMIN — Medication 5 MILLIGRAM(S): at 21:37

## 2025-06-04 RX ADMIN — Medication 20 MILLIGRAM(S): at 11:19

## 2025-06-04 RX ADMIN — Medication 40 MILLIGRAM(S): at 06:09

## 2025-06-04 NOTE — CONSULT NOTE ADULT - CONSULT REASON
bacteremia, PNA
Cardiac evaluation
ams
AMS
Obstructive Hypertrophic Cardiomyopathy
pneumonia
Hyponatremia

## 2025-06-04 NOTE — PROGRESS NOTE ADULT - ASSESSMENT
89-year-old female  brought by EMS from [facility] for altered mental status  and acting not like herself for the last 24 hours PTA. According to the daughter who is near bedside  she states that mom has breast cancer status postmastectomy  history of A-fib in December on Eliquis.  day PTA she did not feel well had diarrhea and was called by the nursing home stating that she is a little bit more lethargic and feels very weak.   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   o/e AAOx2, LUGO non focal   TTE 5/30: HOCM, EF > 75%   TSH WNL; RPR neg   ESR > 120      CT C/A/P- findings concening for mild right middle and lower lobe pneumonia on a background of mild bronchiectasis. no colitis or any acute abnormality demonstrated in the abdomen or pelvis.   GBS bacteremia  ddimer 599    Imprssion:   1) AMS likely toxic metabolic encephalopathy in setting of infection/PNA/baceteremia, improving   2) possible MCI/dementia     - elevated d dimer r/o PE/DVT. no SOB   - r/o reversible causes of dementia, check B12, RPR (NR), TSH (WNL)  if not already checked   - on DOAC, eliquis  5mg BID for AF  - was on levofloxacin and vanco for PNA; f/u ID + bacetermia strep ; now on CTX until; 6/9   - cardio recs appreicated ;   - consider MARIA G   - MRI brain w/o if no imrpovement in mental status, seems better near baseline   - seroquel PRN for agitaiton if QTC < 500   - f/u pulmo, pleural effusion    - telemetry  - PT/OT/SS/SLP, OOBC  - check FS, glucose control <180  - further recommendations pending further workup   - GI/DVT ppx   Vasile Mcclure MD  Vascular Neurology  Office: [phone]

## 2025-06-04 NOTE — PROGRESS NOTE ADULT - ASSESSMENT
89-year-old female  brought by EMS from [facility] for altered mental status  and acting not like herself for the last 24 hours. According to the daughter who is near bedside  she states that mom has breast cancer status postmastectomy  history of A-fib in December on Eliquis.  Yesterday she did not feel well had diarrhea and today was called by the nursing home stating that she is a little bit more lethargic and feels very weak.     AMS  - sec to infectious  metabolic encephalopathy   - neuro fu   - resolved   - sp RRT for vasovagal   - fu orthostatics  - doppler and CT PE neg  - check repeat ECHO for pericardial effusion     Pneumonia, strep bacteremia   - cw rocephin by ID  - repeat cultures reviewed    Afib  - cw eliquis  - cw metoprolol    Hyponatremia  - monitor BMP  - fluid restriction     dw daughter  dw ACP

## 2025-06-04 NOTE — PROGRESS NOTE ADULT - ASSESSMENT
89-year-old female  brought by EMS from [facility] for altered mental status  and acting not like herself for the last 24 hours. Admitted for Toxic Metabolic Encephalopathy in the setting of PNA. Nephro on board for Hyponatremia    Hyponatremia  Likely prerenal although SIADH cannot be totally ruled out due to lung disease  Sodium fluctuates  Cw Fluid restriction 1.5 L per day  Na slightly lower today  Urina na <20 and  suggestive of prerenal  Will advise NS 60 cc/hour for 5 hours  Continue to monitor sodium   AM cortisol borderline low however unclear significance with Na overall better    Acidosis  Mild  Monitor    Likely CKD2  Possibly due to age no prior hx of T2DM and HTN  Scr stable  Avoid nephrotoxins  Avoid contrast  Keep MAP>65    PNA  Monitor

## 2025-06-04 NOTE — PROGRESS NOTE ADULT - ASSESSMENT
Bacteremia  abx  Echo shows no Veg  further plan as per ID   repeat blood cx negative for now     history of HCM  on BB  pt was on Mevacamten before, fu with Dr Young , can obtain inpt consult   pt also follows with Dr Leung     PAF  in sinus   cont a/c    PNA  right pl effusion   would be cautious with diuresis given severe LVOT obstruction   has evidence of volume overload on CT and high proBNP  s/p lasix   plan as per pulm / ID

## 2025-06-04 NOTE — CONSULT NOTE ADULT - SUBJECTIVE AND OBJECTIVE BOX
25 @ 11"30 AM  MRN-[id number]    Patient seen with her daughter in attendance.     Ms. Silvia Cloud is a very pleasant 89-year-old woman whom I have seen in my outpatient practice. In 2024 she was referred by her regular cardiologist D(Dr. SAMIR Leung) for evaluation of obstructive hypertrophic cardiomyopathy and NYHA Class 2-3 symptoms. Despite treatment with metoprolol succinate (which she had been taking for between three and four years), she had a provoked LVOT gradient of 107 mmHg. She was treated with mavacamten which resulted in a substantial improvement in the echocardiographic parameters of obstruction; however, for whatever reason she decided not to continue with this agent.     Ms. Cloud (who is usually fully oriented and lucid with no memory deficits according to her daughter) was admitted with a diagnosis of pneumonia. An echocardiogram performed on admission (2025) demonstrated a hyperdynamic LV with an unprovoked LVOT gradient of 85 mmHg, moderate mitral regurgitation, mild pulmonary hypertension, a small pericardial effusion, and a right pleural effusion.     Ms. Cloud does not complain of any limiting symptoms when home. Confirmed by her daughter, she is not bothered by lightheadedness or dyspnea.   There is a history of paroxysmal atrial fibrillation for which she is treated with apixaban 2.5 mg bid.      Allergies" PCN (Rash; Urticaria; Hives)      PAST MEDICAL & SURGICAL HISTORY:  Breast cancer  s/p b/l mastectomy  Paroxysmal AF and Obstructive HCM, as above      H/O bilateral mastectomy  + breast implants  Cataract surgery    FAMILY HISTORY:  Noncontributory    MEDICATIONS  Home Medications:  acetaminophen 325 mg oral tablet: 2 tab(s) orally as needed for pain (29 May 2025 16:47)  Ambien 5 mg oral tablet: 1 tab(s) orally once a day (at bedtime) (29 May 2025 16:57)  Eliquis 2.5 mg oral tablet: 1 tab(s) orally 2 times a day (29 May 2025 16:57)  metoprolol succinate 50 mg oral tablet, extended release: 2 tab(s) orally 2 times a day (29 May 2025 16:57)  Multiple Vitamins oral tablet: 1 tab(s) orally once a day (29 May 2025 16:57)  pantoprazole 40 mg oral delayed release tablet: 1 tab(s) orally once a day (29 May 2025 16:57)      MEDICATIONS  (STANDING):  apixaban 2.5 milliGRAM(s) Oral every 12 hours  cefTRIAXone   IVPB 2000 milliGRAM(s) IV Intermittent every 24 hours  chlorhexidine 2% Cloths 1 Application(s) Topical daily  famotidine Injectable 20 milliGRAM(s) IV Push daily  melatonin 5 milliGRAM(s) Oral once  metoprolol succinate  milliGRAM(s) Oral daily  pantoprazole    Tablet 40 milliGRAM(s) Oral before breakfast  polyethylene glycol 3350 17 Gram(s) Oral daily  senna 2 Tablet(s) Oral at bedtime    MEDICATIONS  (PRN):  acetaminophen     Tablet .. 650 milliGRAM(s) Oral every 6 hours PRN Temp greater or equal to 38C (100.4F), Mild Pain (1 - 3)  bisacodyl 5 milliGRAM(s) Oral every 12 hours PRN Constipation  zolpidem 5 milliGRAM(s) Oral at bedtime PRN Insomnia      PHYSICAL EXAM:  Vital Signs Last 24 Hrs  T(C): 36.8 (2025 08:20), Max: 37 (2025 05:07)  T(F): 98.3 (2025 08:20), Max: 98.6 (2025 05:07)  HR: 92 (2025 08:20) (78 - 97)  BP: 116/71 (2025 08:20) (95/53 - 120/79)  BP(mean): --  RR: 18 (2025 08:20) (18 - 20)  SpO2: 91% (2025 08:20) (90% - 94%)  Patient On (Oxygen Delivery Method): room air    Daily     Daily Weight in k.1 (2025 08:20)    Appearance: Frail appearing	  Cardiovascular: Normal S1 S2. Grade 3/6 systolic murmur audible throughout the entire precordium.  Respiratory: Breath sounds are decreased at both bases.  Psychiatry: Lucid, appropriate	  Neurologic: Non-focal  Extremities: No edema      LABS:	 	                        11.3   5.13  )-----------( 268      ( 2025 07:19 )             34.2       06-04    131[L]  |  93[L]  |  19  ----------------------------<  109[H]  3.7   |  24  |  0.70  03    132[L]  |  95[L]  |  14  ----------------------------<  148[H]  3.8   |  21[L]  |  0.77    Ca    8.3[L]      2025 07:19  Ca    8.6      2025 09:46  Phos  3.0       Mg     2.0         TPro  7.5  /  Alb  2.6[L]  /  TBili  1.1  /  DBili  x   /  AST  37  /  ALT  49[H]  /  AlkPhos  140[H]      BMI: BMI (kg/m2): 21.9 (25 @ 13:16)  Glucose: POCT Blood Glucose.: 128 mg/dL (25 @ 13:12)    Impression:  Obstructive Hypertrophic Cardiomyopathy with severe unprovoked LVOT gradient  History of paroxysmal atrial fibrillation.           25 @ 11"30 AM  MRN-[id number]    Patient seen with her daughter in attendance.     Ms. Silvia Cloud is a very pleasant 89-year-old woman whom I have seen in my outpatient practice. In 2024 she was referred by her regular cardiologist D(Dr. SAMIR Leung) for evaluation of obstructive hypertrophic cardiomyopathy and NYHA Class 2-3 symptoms. Despite treatment with metoprolol succinate (which she had been taking for between three and four years), she had a provoked LVOT gradient of 107 mmHg. She was treated with mavacamten which resulted in a substantial improvement in the echocardiographic parameters of obstruction; however, for whatever reason she decided not to continue with this agent.     Ms. Cloud (who is usually fully oriented and lucid with no memory deficits according to her daughter) was admitted with a diagnosis of pneumonia. An echocardiogram performed on admission (2025) demonstrated a hyperdynamic LV with an unprovoked LVOT gradient of 85 mmHg, moderate mitral regurgitation, mild pulmonary hypertension, a small pericardial effusion, and a right pleural effusion.     Ms. Cloud does not complain of any limiting symptoms when home. Confirmed by her daughter, she is not bothered by lightheadedness or dyspnea.   There is a history of paroxysmal atrial fibrillation for which she is treated with apixaban 2.5 mg bid.      Allergies" PCN (Rash; Urticaria; Hives)      PAST MEDICAL & SURGICAL HISTORY:  Breast cancer  s/p b/l mastectomy  Paroxysmal AF and Obstructive HCM, as above      H/O bilateral mastectomy  + breast implants  Cataract surgery    FAMILY HISTORY:  Noncontributory    MEDICATIONS  Home Medications:  acetaminophen 325 mg oral tablet: 2 tab(s) orally as needed for pain (29 May 2025 16:47)  Ambien 5 mg oral tablet: 1 tab(s) orally once a day (at bedtime) (29 May 2025 16:57)  Eliquis 2.5 mg oral tablet: 1 tab(s) orally 2 times a day (29 May 2025 16:57)  metoprolol succinate 50 mg oral tablet, extended release: 2 tab(s) orally 2 times a day (29 May 2025 16:57)  Multiple Vitamins oral tablet: 1 tab(s) orally once a day (29 May 2025 16:57)  pantoprazole 40 mg oral delayed release tablet: 1 tab(s) orally once a day (29 May 2025 16:57)      MEDICATIONS  (STANDING):  apixaban 2.5 milliGRAM(s) Oral every 12 hours  cefTRIAXone   IVPB 2000 milliGRAM(s) IV Intermittent every 24 hours  chlorhexidine 2% Cloths 1 Application(s) Topical daily  famotidine Injectable 20 milliGRAM(s) IV Push daily  melatonin 5 milliGRAM(s) Oral once  metoprolol succinate  milliGRAM(s) Oral daily  pantoprazole    Tablet 40 milliGRAM(s) Oral before breakfast  polyethylene glycol 3350 17 Gram(s) Oral daily  senna 2 Tablet(s) Oral at bedtime    MEDICATIONS  (PRN):  acetaminophen     Tablet .. 650 milliGRAM(s) Oral every 6 hours PRN Temp greater or equal to 38C (100.4F), Mild Pain (1 - 3)  bisacodyl 5 milliGRAM(s) Oral every 12 hours PRN Constipation  zolpidem 5 milliGRAM(s) Oral at bedtime PRN Insomnia      PHYSICAL EXAM:  Vital Signs Last 24 Hrs  T(C): 36.8 (2025 08:20), Max: 37 (2025 05:07)  T(F): 98.3 (2025 08:20), Max: 98.6 (2025 05:07)  HR: 92 (2025 08:20) (78 - 97)  BP: 116/71 (2025 08:20) (95/53 - 120/79)  BP(mean): --  RR: 18 (2025 08:20) (18 - 20)  SpO2: 91% (2025 08:20) (90% - 94%)  Patient On (Oxygen Delivery Method): room air    Daily     Daily Weight in k.1 (2025 08:20)    Appearance: Frail appearing	  Cardiovascular: Normal S1 S2. Grade 3/6 systolic murmur audible throughout the entire precordium.  Respiratory: Breath sounds are decreased at both bases.  Psychiatry: Lucid, appropriate	  Neurologic: Non-focal  Extremities: No edema      LABS:	 	                        11.3   5.13  )-----------( 268      ( 2025 07:19 )             34.2       06-04    131[L]  |  93[L]  |  19  ----------------------------<  109[H]  3.7   |  24  |  0.70      132[L]  |  95[L]  |  14  ----------------------------<  148[H]  3.8   |  21[L]  |  0.77    Ca    8.3[L]      2025 07:19  Ca    8.6      2025 09:46  Phos  3.0       Mg     2.0         TPro  7.5  /  Alb  2.6[L]  /  TBili  1.1  /  DBili  x   /  AST  37  /  ALT  49[H]  /  AlkPhos  140[H]      BMI: BMI (kg/m2): 21.9 (25 @ 13:16)  Glucose: POCT Blood Glucose.: 128 mg/dL (25 @ 13:12)    ECG (): Sinus tachycardia with one APC, rate 104/min. Left ventricular hypertrophy with repolarization abnormality.     Impression:  Obstructive Hypertrophic Cardiomyopathy with severe unprovoked LVOT gradient  History of paroxysmal atrial fibrillation.    Recommendations:  The decision to restart mavacamten is (as expected) entirely up to the patient. The agent works well and is well tolerated, and Ms. Cloud responded well to it in . However, if for whatever reason she does not wish to restart, that's fine. It is possible that for the level of activity to which she is accustomed that she is not encumbered by her HCM. Should she wish to restart at any time in the future, the patient and family knows how to contact me.     Maintain adequate hydration. DIURETICS WORSEN THIS CONDITION (they will make her MORE short of breath) AND SHOULD BE AVOIDED.  Continue maximally tolerated dose of beta blocker. Continue apixaban.   Note - PLEASE STOP DOING ORTHOSTATICS. In patients with obstructive HCM, orthostatics will ALWAYS be positive IRRESPECTIVE of volume status.     David Young MD, FACC, DOLLY  Director, HCM Program, [facility]

## 2025-06-04 NOTE — PROGRESS NOTE ADULT - ASSESSMENT
89-year-old female  brought by EMS from [facility] assisted [facility] for altered mental status  and acting not like herself for the last 24 hours. According to the daughter who is near bedside  she states that mom has breast cancer status postmastectomy  history of A-fib in December on Eliquis.  Yesterday she did not feel well had diarrhea and today was called by the nursing home stating that she is a little bit more lethargic and feels very weak.  (29 May 2025 17:12):  daughter is at bedside she is on room air at this time  she looks pretty good:   no sob:   no phlegm  :   she has no underlying lung disease       Streptococcus agalactiae bactermia/  pneumonia  CHF  A fibrillation       Streptococcus agalactiae bactermia/  pneumonia  ct chest shoiwed;1. Mild to moderate CHF and/or volume overload. 2. Additional findings suspect for mild right middle and lower lobe pneumonia on a background of mild bronchiectasis. 3. No colitis or any acute abnormality demonstrated in the abdomen or  pelvis.  vancomycin  IVPB 1000 milliGRAM(s) IV Intermittent , levoFLOXacin IVPB 500 milliGRAM(s) IV Intermittent every 24 hours  ID follow up  : bandemia     :  pt clinically looks better:   she is on room air:   her oxygenation needs to be checked on ambulation:  prior to dc to see if she needs home oxygen    cont antibiotics   id following   :  she looks pretty good:  on room air:   no sob;   rpt blood cultures arep ending:   no sob:   no wheezin/3;  she seems to be doing poorly 6today  : her mental status waxes and wanes;   when i saw her shge w as not very responsive:  when acp went later on she woke up :  check procal,  d dimer and cxr: and ammonia levels    : cont antibtiocs:   CTA angio showed: No pulmonary embolism.  Small to moderate right and small left pleural effusions with associated   atelectasis.  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  Cardiomegaly. Moderate pericardial effusion.  would defer to cards:   she is hemodynamically stable        CHF  she has small effusion : on right side   echo : < from: TTE W or WO Ultrasound Enhancing Agent (24 @ 11:52) >   1. Mavacamten, ten days into 10 mg/d dose.   2. Left ventricular cavity is normal in size. Left ventricular systolic function is normal with an ejection fraction visually estimated at 65 %. There are no regional wall motion abnormalities seen.   3. The left ventricular outflow tract resting gradient is 6 mmHg and 9 mmHg using the Valsalva maneuver.   4. Mild to moderate pulmonary hypertension.   5. Mitral stenosis is present due to severe annular calcification.   6. Compared to the transthoracic echocardiogram performed on 2024, the LVOTgradient is lower.  ? 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  6. Mild pulmonary hypertension. rt pleural effusion :  agree with cardiology to be very cautious about lasix given HOCM    : p er crds   /*3: defer to cards   : she has jesse effusions and pericardial effusion:  likely cardiac etiology      A fibrillation   apixaban 2.5 milliGRAM(s) Oral every 12 hours    called acp in [place]

## 2025-06-04 NOTE — PROGRESS NOTE ADULT - ASSESSMENT
89 y/o F brought by EMS from [facility] for altered mental status    PNA, GBS bacteremia  sepsis- leukopenia, fever, tachycardia, bandemia  AMS- resolved  5/29 and 5/30 Bcx with Streptococcus agalactiae (Group B) - Ceftriaxone: S <=0.25  - repeat Bcx 5/31 and 6/1 NGTD   CT C/A/P- findings concerning for mild RML and RLL pneumonia on a background of mild bronchiectasis. no colitis or any acute abnormality demonstrated in the abdomen or pelvis.  no SSTI on exam  noted patient w/ allergic reaction to zosyn in ED (per notes pruritis and erythema) s/p solumedrol and benadryl   EKG reviewed, Qtc noted   s/p speech and swallow eval  fever noted 6/3 late morning, no further fevers, no leukocytosis, PCT noted, answers and follows, no new findings on exam, nontoxic     Recommendations:  Follow CTA chest and LE duplex, pending   Continue ceftriaxone 2g IV daily to complete total 10d course on 6/9/25  -on discharge, can plan to transition to levofloxacin 750mg PO Q48h (renally dosed) until 6/9  Monitor temps/WBC  Aspiration precautions   Continue rest of care per primary team       Wes Grullon M.D.  [facility]  Available on [employer] TEAMS - *PREFERRED*  [phone]  After 5pm on weekdays and all day on weekends - please call [phone]     Thank you for consulting us and involving us in the management of this patients case. In addition to reviewing history, imaging, documents, labs, microbiology, took into account antibiotic stewardship, local antibiogram and infection control strategies and potential transmission issues at time of treatment decision making process.

## 2025-06-04 NOTE — CONSULT NOTE ADULT - CONSULT REQUESTED DATE/TIME
30-May-2025 08:37
30-May-2025 11:56
04-Jun-2025 11:15
30-May-2025 07:55
30-May-2025 15:41
29-May-2025 20:17
30-May-2025 12:16

## 2025-06-05 ENCOUNTER — TRANSCRIPTION ENCOUNTER (OUTPATIENT)
Age: 89
End: 2025-06-05

## 2025-06-05 ENCOUNTER — RESULT REVIEW (OUTPATIENT)
Age: 89
End: 2025-06-05

## 2025-06-05 VITALS
TEMPERATURE: 98 F | OXYGEN SATURATION: 92 % | RESPIRATION RATE: 18 BRPM | HEART RATE: 91 BPM | DIASTOLIC BLOOD PRESSURE: 70 MMHG | SYSTOLIC BLOOD PRESSURE: 119 MMHG

## 2025-06-05 LAB
ANION GAP SERPL CALC-SCNC: 12 MMOL/L — SIGNIFICANT CHANGE UP (ref 5–17)
BUN SERPL-MCNC: 15 MG/DL — SIGNIFICANT CHANGE UP (ref 7–23)
CALCIUM SERPL-MCNC: 8.2 MG/DL — LOW (ref 8.4–10.5)
CHLORIDE SERPL-SCNC: 96 MMOL/L — SIGNIFICANT CHANGE UP (ref 96–108)
CO2 SERPL-SCNC: 23 MMOL/L — SIGNIFICANT CHANGE UP (ref 22–31)
CREAT SERPL-MCNC: 0.59 MG/DL — SIGNIFICANT CHANGE UP (ref 0.5–1.3)
EGFR: 87 ML/MIN/1.73M2 — SIGNIFICANT CHANGE UP
EGFR: 87 ML/MIN/1.73M2 — SIGNIFICANT CHANGE UP
GLUCOSE SERPL-MCNC: 110 MG/DL — HIGH (ref 70–99)
POTASSIUM SERPL-MCNC: 4 MMOL/L — SIGNIFICANT CHANGE UP (ref 3.5–5.3)
POTASSIUM SERPL-SCNC: 4 MMOL/L — SIGNIFICANT CHANGE UP (ref 3.5–5.3)
SODIUM SERPL-SCNC: 131 MMOL/L — LOW (ref 135–145)

## 2025-06-05 PROCEDURE — 85014 HEMATOCRIT: CPT

## 2025-06-05 PROCEDURE — 36600 WITHDRAWAL OF ARTERIAL BLOOD: CPT

## 2025-06-05 PROCEDURE — 96374 THER/PROPH/DIAG INJ IV PUSH: CPT

## 2025-06-05 PROCEDURE — 93306 TTE W/DOPPLER COMPLETE: CPT

## 2025-06-05 PROCEDURE — 86140 C-REACTIVE PROTEIN: CPT

## 2025-06-05 PROCEDURE — 85379 FIBRIN DEGRADATION QUANT: CPT

## 2025-06-05 PROCEDURE — 83735 ASSAY OF MAGNESIUM: CPT

## 2025-06-05 PROCEDURE — 83935 ASSAY OF URINE OSMOLALITY: CPT

## 2025-06-05 PROCEDURE — 74177 CT ABD & PELVIS W/CONTRAST: CPT

## 2025-06-05 PROCEDURE — 93321 DOPPLER ECHO F-UP/LMTD STD: CPT | Mod: 26

## 2025-06-05 PROCEDURE — 87077 CULTURE AEROBIC IDENTIFY: CPT

## 2025-06-05 PROCEDURE — 71275 CT ANGIOGRAPHY CHEST: CPT

## 2025-06-05 PROCEDURE — 80048 BASIC METABOLIC PNL TOTAL CA: CPT

## 2025-06-05 PROCEDURE — 84100 ASSAY OF PHOSPHORUS: CPT

## 2025-06-05 PROCEDURE — 87150 DNA/RNA AMPLIFIED PROBE: CPT

## 2025-06-05 PROCEDURE — 84295 ASSAY OF SERUM SODIUM: CPT

## 2025-06-05 PROCEDURE — 82330 ASSAY OF CALCIUM: CPT

## 2025-06-05 PROCEDURE — 85652 RBC SED RATE AUTOMATED: CPT

## 2025-06-05 PROCEDURE — 83880 ASSAY OF NATRIURETIC PEPTIDE: CPT

## 2025-06-05 PROCEDURE — 82947 ASSAY GLUCOSE BLOOD QUANT: CPT

## 2025-06-05 PROCEDURE — 0241U: CPT

## 2025-06-05 PROCEDURE — 82140 ASSAY OF AMMONIA: CPT

## 2025-06-05 PROCEDURE — 93308 TTE F-UP OR LMTD: CPT | Mod: 26

## 2025-06-05 PROCEDURE — 81001 URINALYSIS AUTO W/SCOPE: CPT

## 2025-06-05 PROCEDURE — 96375 TX/PRO/DX INJ NEW DRUG ADDON: CPT

## 2025-06-05 PROCEDURE — 93325 DOPPLER ECHO COLOR FLOW MAPG: CPT

## 2025-06-05 PROCEDURE — 92610 EVALUATE SWALLOWING FUNCTION: CPT

## 2025-06-05 PROCEDURE — 71260 CT THORAX DX C+: CPT

## 2025-06-05 PROCEDURE — 36415 COLL VENOUS BLD VENIPUNCTURE: CPT

## 2025-06-05 PROCEDURE — 82962 GLUCOSE BLOOD TEST: CPT

## 2025-06-05 PROCEDURE — 82533 TOTAL CORTISOL: CPT

## 2025-06-05 PROCEDURE — 85730 THROMBOPLASTIN TIME PARTIAL: CPT

## 2025-06-05 PROCEDURE — 99285 EMERGENCY DEPT VISIT HI MDM: CPT

## 2025-06-05 PROCEDURE — 72125 CT NECK SPINE W/O DYE: CPT

## 2025-06-05 PROCEDURE — 83605 ASSAY OF LACTIC ACID: CPT

## 2025-06-05 PROCEDURE — 87186 SC STD MICRODIL/AGAR DIL: CPT

## 2025-06-05 PROCEDURE — 70450 CT HEAD/BRAIN W/O DYE: CPT

## 2025-06-05 PROCEDURE — 85018 HEMOGLOBIN: CPT

## 2025-06-05 PROCEDURE — 93325 DOPPLER ECHO COLOR FLOW MAPG: CPT | Mod: 26

## 2025-06-05 PROCEDURE — 84145 PROCALCITONIN (PCT): CPT

## 2025-06-05 PROCEDURE — 93005 ELECTROCARDIOGRAM TRACING: CPT

## 2025-06-05 PROCEDURE — 84443 ASSAY THYROID STIM HORMONE: CPT

## 2025-06-05 PROCEDURE — 85025 COMPLETE CBC W/AUTO DIFF WBC: CPT

## 2025-06-05 PROCEDURE — 93308 TTE F-UP OR LMTD: CPT

## 2025-06-05 PROCEDURE — 82803 BLOOD GASES ANY COMBINATION: CPT

## 2025-06-05 PROCEDURE — 93321 DOPPLER ECHO F-UP/LMTD STD: CPT

## 2025-06-05 PROCEDURE — 84132 ASSAY OF SERUM POTASSIUM: CPT

## 2025-06-05 PROCEDURE — 84300 ASSAY OF URINE SODIUM: CPT

## 2025-06-05 PROCEDURE — 97162 PT EVAL MOD COMPLEX 30 MIN: CPT

## 2025-06-05 PROCEDURE — 85027 COMPLETE CBC AUTOMATED: CPT

## 2025-06-05 PROCEDURE — 93970 EXTREMITY STUDY: CPT

## 2025-06-05 PROCEDURE — 85610 PROTHROMBIN TIME: CPT

## 2025-06-05 PROCEDURE — 80053 COMPREHEN METABOLIC PANEL: CPT

## 2025-06-05 PROCEDURE — 71045 X-RAY EXAM CHEST 1 VIEW: CPT

## 2025-06-05 PROCEDURE — 87040 BLOOD CULTURE FOR BACTERIA: CPT

## 2025-06-05 PROCEDURE — 82435 ASSAY OF BLOOD CHLORIDE: CPT

## 2025-06-05 PROCEDURE — 86780 TREPONEMA PALLIDUM: CPT

## 2025-06-05 RX ORDER — METOPROLOL SUCCINATE 50 MG/1
2 TABLET, EXTENDED RELEASE ORAL
Refills: 0 | DISCHARGE

## 2025-06-05 RX ORDER — METOPROLOL SUCCINATE 50 MG/1
1 TABLET, EXTENDED RELEASE ORAL
Qty: 0 | Refills: 0 | DISCHARGE
Start: 2025-06-05

## 2025-06-05 RX ORDER — POLYETHYLENE GLYCOL 3350 17 G/17G
17 POWDER, FOR SOLUTION ORAL
Qty: 0 | Refills: 0 | DISCHARGE
Start: 2025-06-05

## 2025-06-05 RX ORDER — SENNA 187 MG
2 TABLET ORAL
Qty: 0 | Refills: 0 | DISCHARGE
Start: 2025-06-05

## 2025-06-05 RX ORDER — BISACODYL 5 MG
1 TABLET, DELAYED RELEASE (ENTERIC COATED) ORAL
Qty: 0 | Refills: 0 | DISCHARGE
Start: 2025-06-05

## 2025-06-05 RX ORDER — MELATONIN 5 MG
3 TABLET ORAL ONCE
Refills: 0 | Status: COMPLETED | OUTPATIENT
Start: 2025-06-05 | End: 2025-06-05

## 2025-06-05 RX ORDER — LEVOFLOXACIN 25 MG/ML
1 SOLUTION ORAL
Qty: 2 | Refills: 0
Start: 2025-06-05 | End: 2025-06-08

## 2025-06-05 RX ADMIN — Medication 3 MILLIGRAM(S): at 03:06

## 2025-06-05 RX ADMIN — CEFTRIAXONE 100 MILLIGRAM(S): 500 INJECTION, POWDER, FOR SOLUTION INTRAMUSCULAR; INTRAVENOUS at 16:01

## 2025-06-05 RX ADMIN — APIXABAN 2.5 MILLIGRAM(S): 2.5 TABLET, FILM COATED ORAL at 17:32

## 2025-06-05 RX ADMIN — APIXABAN 2.5 MILLIGRAM(S): 2.5 TABLET, FILM COATED ORAL at 05:19

## 2025-06-05 RX ADMIN — Medication 20 MILLIGRAM(S): at 11:25

## 2025-06-05 RX ADMIN — Medication 40 MILLIGRAM(S): at 05:19

## 2025-06-05 RX ADMIN — POLYETHYLENE GLYCOL 3350 17 GRAM(S): 17 POWDER, FOR SOLUTION ORAL at 11:25

## 2025-06-05 RX ADMIN — Medication 1 APPLICATION(S): at 11:39

## 2025-06-05 RX ADMIN — METOPROLOL SUCCINATE 100 MILLIGRAM(S): 50 TABLET, EXTENDED RELEASE ORAL at 05:20

## 2025-06-05 NOTE — PROVIDER CONTACT NOTE (OTHER) - ACTION/TREATMENT ORDERED:
Pt educated on need for two sets of blood cultures. Attempt again in the morning
Provider notified and aware. Wean O2 as tolerated. Continue to monitor.

## 2025-06-05 NOTE — PROGRESS NOTE ADULT - ASSESSMENT
Bacteremia  abx  Echo shows no Veg  further plan as per ID   repeat blood cx negative for now     history of HCM  on BB  pt was on Mevacamten before, appreciate Dr Young input  pt also follows with Dr Leung     PAF  in sinus   cont a/c    PNA  right pl effusion   would be cautious with diuresis given severe LVOT obstruction   has evidence of volume overload on CT and high proBNP  s/p lasix   plan as per pulm / ID

## 2025-06-05 NOTE — PHYSICAL THERAPY INITIAL EVALUATION ADULT - ADDITIONAL COMMENTS
Pt lives in a private home with spouse, +chairlift, no steps. Pt performed ADL/IADLs independently. Ambulates with no assistive device. Daughter lives 5 minutes away.

## 2025-06-05 NOTE — PHYSICAL THERAPY INITIAL EVALUATION ADULT - IMPAIRED TRANSFERS: SIT/STAND, REHAB EVAL
Nursing notes reviewed, issues discussed with RN, patient examined.    Interval History    Doing well, no major concerns  Tolerating feeds  Good output, urine and stool    Daily Weight =            g, overall change of        %    Physical Examination  Vital signs stable  General Appearance: comfortable, no distress, no dysmorphic features  Head: Normocephalic, anterior fontanelle open and flat  Chest: no grunting, flaring or retractions, clear to auscultation b/l, equal breath sounds  Abdomen: soft, non distended, no masses, umbilicus clean  CV: RRR, nl S1 S2, no murmurs, well perfused  Neuro: nl tone, moves all extremities  Skin: jaundice    Studies         Assessment  Well     Plan  Continue routine  care and teaching  Infant's care discussed with family Nursing notes reviewed, issues discussed with RN, patient examined.    Interval History    Doing well, no major concerns  Tolerating feeds  Good output, urine and stool    Daily Weight =   3605         g, overall change of  2.3      %    Physical Examination  Vital signs stable  General Appearance: comfortable, no distress, no dysmorphic features  Head: Normocephalic, anterior fontanelle open and flat  Chest: no grunting, flaring or retractions, clear to auscultation b/l, equal breath sounds  Abdomen: soft, non distended, no masses, umbilicus clean  CV: RRR, nl S1 S2, no murmurs, well perfused  Neuro: nl tone, moves all extremities  Skin: jaundice    Studies         Assessment  Well     Plan  Continue routine  care and teaching  Infant's care discussed with family impaired balance/impaired coordination/decreased strength

## 2025-06-05 NOTE — DISCHARGE NOTE PROVIDER - NSDCMRMEDTOKEN_GEN_ALL_CORE_FT
acetaminophen 325 mg oral tablet: 2 tab(s) orally as needed for pain  Ambien 5 mg oral tablet: 1 tab(s) orally once a day (at bedtime)  bisacodyl 5 mg oral delayed release tablet: 1 tab(s) orally every 12 hours As needed Constipation  Eliquis 2.5 mg oral tablet: 1 tab(s) orally 2 times a day  levoFLOXacin 750 mg oral tablet: 1 tab(s) orally every 48 hours  metoprolol succinate 100 mg oral tablet, extended release: 1 tab(s) orally once a day  Multiple Vitamins oral tablet: 1 tab(s) orally once a day  pantoprazole 40 mg oral delayed release tablet: 1 tab(s) orally once a day  polyethylene glycol 3350 oral powder for reconstitution: 17 gram(s) orally once a day  senna leaf extract oral tablet: 2 tab(s) orally once a day (at bedtime)

## 2025-06-05 NOTE — PROGRESS NOTE ADULT - SUBJECTIVE AND OBJECTIVE BOX
Subjective: Patient seen and examined. No new events except as noted.     SUBJECTIVE/ROS:  nad      MEDICATIONS:  MEDICATIONS  (STANDING):  apixaban 2.5 milliGRAM(s) Oral every 12 hours  cefTRIAXone   IVPB 2000 milliGRAM(s) IV Intermittent every 24 hours  chlorhexidine 2% Cloths 1 Application(s) Topical daily  famotidine Injectable 20 milliGRAM(s) IV Push daily  melatonin 5 milliGRAM(s) Oral once  metoprolol succinate  milliGRAM(s) Oral daily  pantoprazole    Tablet 40 milliGRAM(s) Oral before breakfast  polyethylene glycol 3350 17 Gram(s) Oral daily  senna 2 Tablet(s) Oral at bedtime  sodium chloride 0.9%. 1000 milliLiter(s) (60 mL/Hr) IV Continuous <Continuous>      PHYSICAL EXAM:  T(C): 36.8 (06-05-25 @ 05:01), Max: 36.9 (06-05-25 @ 00:11)  HR: 92 (06-05-25 @ 05:01) (78 - 98)  BP: 115/68 (06-05-25 @ 05:01) (108/69 - 148/87)  RR: 18 (06-05-25 @ 06:25) (18 - 20)  SpO2: 91% (06-05-25 @ 06:25) (84% - 96%)  Wt(kg): --  I&O's Summary          JVP: Normal  Neck: supple  Lung: clear   CV: S1 S2 ,  Abd: soft  Ext: No edema  neuro: Awake / alert  Psych: flat affect  Skin: normal``    LABS/DATA:    CARDIAC MARKERS:                                11.3   5.13  )-----------( 268      ( 04 Jun 2025 07:19 )             34.2     06-05    131[L]  |  96  |  15  ----------------------------<  110[H]  4.0   |  23  |  0.59    Ca    8.2[L]      05 Jun 2025 07:15  Phos  3.0     06-03  Mg     2.0     06-03    TPro  7.5  /  Alb  2.6[L]  /  TBili  1.1  /  DBili  x   /  AST  37  /  ALT  49[H]  /  AlkPhos  140[H]  06-03    proBNP:   Lipid Profile:   HgA1c:   TSH:             
Date of Service: 06-01-25 @ 12:37    Patient is a 89y old  Female who presents with a chief complaint of AMS (01 Jun 2025 07:43)      Any change in ROS: family at bedside:  pt is on room air:  alert and awake and responding to questions:       MEDICATIONS  (STANDING):  apixaban 2.5 milliGRAM(s) Oral every 12 hours  cefTRIAXone   IVPB 2000 milliGRAM(s) IV Intermittent every 24 hours  chlorhexidine 2% Cloths 1 Application(s) Topical daily  famotidine Injectable 20 milliGRAM(s) IV Push daily  melatonin 5 milliGRAM(s) Oral once  metoprolol succinate  milliGRAM(s) Oral daily  pantoprazole    Tablet 40 milliGRAM(s) Oral before breakfast  polyethylene glycol 3350 17 Gram(s) Oral daily  senna 2 Tablet(s) Oral at bedtime    MEDICATIONS  (PRN):  acetaminophen     Tablet .. 650 milliGRAM(s) Oral every 6 hours PRN Temp greater or equal to 38C (100.4F), Mild Pain (1 - 3)  bisacodyl 5 milliGRAM(s) Oral every 12 hours PRN Constipation  zolpidem 5 milliGRAM(s) Oral at bedtime PRN Insomnia    Vital Signs Last 24 Hrs  T(C): 36.6 (01 Jun 2025 10:42), Max: 36.6 (01 Jun 2025 10:42)  T(F): 97.8 (01 Jun 2025 10:42), Max: 97.8 (01 Jun 2025 10:42)  HR: 94 (01 Jun 2025 10:42) (78 - 94)  BP: 113/63 (01 Jun 2025 10:42) (113/63 - 159/80)  BP(mean): --  RR: 18 (01 Jun 2025 10:42) (18 - 18)  SpO2: 94% (01 Jun 2025 10:42) (93% - 94%)    Parameters below as of 01 Jun 2025 10:42  Patient On (Oxygen Delivery Method): room air        I&O's Summary    31 May 2025 07:01  -  01 Jun 2025 07:00  --------------------------------------------------------  IN: 240 mL / OUT: 0 mL / NET: 240 mL    01 Jun 2025 07:01  -  01 Jun 2025 12:38  --------------------------------------------------------  IN: 240 mL / OUT: 0 mL / NET: 240 mL          Physical Exam:   GENERAL: NAD, well-groomed, well-developed  HEENT: BRYSON/   Atraumatic, Normocephalic  ENMT: No tonsillar erythema, exudates, or enlargement; Moist mucous membranes, Good dentition, No lesions  NECK: Supple, No JVD, Normal thyroid  CHEST/LUNG: Clear to auscultaion- no wheezing  CVS: Regular rate and rhythm; No murmurs, rubs, or gallops  GI: : Soft, Nontender, Nondistended; Bowel sounds present  NERVOUS SYSTEM:  Alert & Oriented X3  EXTREMITIES: -edema  LYMPH: No lymphadenopathy noted  SKIN: No rashes or lesions  ENDOCRINOLOGY: No Thyromegaly  PSYCH: Appropriate    Labs:  23                            10.4   7.37  )-----------( 172      ( 31 May 2025 06:52 )             31.3                         10.8   2.70  )-----------( 154      ( 30 May 2025 07:20 )             33.8                         11.2   3.54  )-----------( 159      ( 29 May 2025 13:48 )             34.1     06-01    134[L]  |  97  |  20  ----------------------------<  146[H]  3.6   |  24  |  0.72  05-31    133[L]  |  101  |  32[H]  ----------------------------<  122[H]  4.5   |  22  |  0.89  05-30    137  |  104  |  17  ----------------------------<  162[H]  4.2   |  21[L]  |  0.73  05-29    131[L]  |  98  |  14  ----------------------------<  122[H]  4.0   |  20[L]  |  0.75    Ca    8.7      01 Jun 2025 10:28  Ca    8.8      31 May 2025 06:52    TPro  8.2  /  Alb  3.2[L]  /  TBili  1.3[H]  /  DBili  x   /  AST  31  /  ALT  17  /  AlkPhos  105  05-29    CAPILLARY BLOOD GLUCOSE              Urinalysis Basic - ( 01 Jun 2025 10:28 )    Color: x / Appearance: x / SG: x / pH: x  Gluc: 146 mg/dL / Ketone: x  / Bili: x / Urobili: x   Blood: x / Protein: x / Nitrite: x   Leuk Esterase: x / RBC: x / WBC x   Sq Epi: x / Non Sq Epi: x / Bacteria: x            RECENT CULTURES:  05-30 @ 07:16 Blood Blood       Growth in anaerobic bottle: Gram Positive Cocci in Pairs and Chains  Growth in aerobic bottle: Gram Positive Cocci in Pairs and Chains           Growth in aerobic and anaerobic bottles: Streptococcus agalactiae (Group  B)  See previous culture [id number]    05-29 @ 13:45 Blood Blood-Peripheral       Growth in anaerobic bottle: Gram Positive Cocci in Pairs and Chains  Growth in aerobic bottle: Gram Positive Cocci in Pairs and Chains           Growth in aerobic and anaerobic bottles: Streptococcus agalactiae (Group  B)  See previous culture [id number]    05-29 @ 13:30 Blood Blood-Peripheral   PCR    Growth in aerobic bottle: Gram Positive Cocci in Pairs and Chains  Growth in anaerobic bottle: Gram Positive Cocci in Pairs and Chains    Blood Culture PCR  Streptococcus agalactiae (Group B)  Blood Culture PCR     Growth in aerobic and anaerobic bottles: Streptococcus agalactiae (Group  B)  Direct identification is available within approximately 3-5  hours either by Blood Panel Multiplexed PCR or Direct  MALDI-TOF. Details: [email or web address]    rad< from: CT Head No Cont (05.29.25 @ 22:10) >  IMPRESSION:    CT head: Moderate chronic microvascular changes without evidence of an   acute transcortical infarction or hemorrhage.    CT C-spine: Mild to moderate spondylosis. No acute osseous abnormality.   Consider MRI as clinically warranted.    < end of copied text >  < from: CT Chest w/ IV Cont (05.29.25 @ 17:25) >    1. Mild to moderate CHF and/or volume overload.    2. Additional findings suspect for mild right middle and lower lobe   pneumonia on a background of mild bronchiectasis.    3. No colitis or any acute abnormality demonstrated in the abdomen or   pelvis.    --- End of Report ---            CLARA MEJIA MD; Attending Radiologist  This document has been electronically signed. May 29 2025  6:24PM    < end of copied text >  < from: TTE W or WO Ultrasound Enhancing Agent (05.30.25 @ 13:26) >      1. Hypertrophic obstructive cardiomyopathy (HOCM).   2. Left ventricular cavity is small. Left ventricular systolic function is hyperdynamic. There are no regional wall motion abnormalities seen.   3. Severe discrete basal septal hypertrophy (2.0 cm).   4. 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   5. Moderate mitral regurgitation.   6. Mild pulmonary hypertension.   7. Small pericardial effusion noted adjacent to the right ventricle, trace pericardial effusion noted adjacent to the posterior left ventricle and small pericardial effusion noted adjacent to the right atrium: diastolic collapse of the right atrium that exceeds 1/3 of the cardiac cycle.   8. Right pleural effusion noted.    < end of copied text >        RESPIRATORY CULTURES:          Studies  Chest X-RAY  CT SCAN Chest   Venous Dopplers: LE:   CT Abdomen  Others              
Date of Service: 06-05-25 @ 15:21    Patient is a 89y old  Female who presents with a chief complaint of AMS (05 Jun 2025 14:23)      Any change in ROS: seems OK:  n o sob:  sarabjit is at bedside:  for dc today  : on room air     MEDICATIONS  (STANDING):  apixaban 2.5 milliGRAM(s) Oral every 12 hours  cefTRIAXone   IVPB 2000 milliGRAM(s) IV Intermittent every 24 hours  chlorhexidine 2% Cloths 1 Application(s) Topical daily  famotidine Injectable 20 milliGRAM(s) IV Push daily  melatonin 5 milliGRAM(s) Oral once  metoprolol succinate  milliGRAM(s) Oral daily  pantoprazole    Tablet 40 milliGRAM(s) Oral before breakfast  polyethylene glycol 3350 17 Gram(s) Oral daily  senna 2 Tablet(s) Oral at bedtime  sodium chloride 0.9%. 1000 milliLiter(s) (60 mL/Hr) IV Continuous <Continuous>    MEDICATIONS  (PRN):  acetaminophen     Tablet .. 650 milliGRAM(s) Oral every 6 hours PRN Temp greater or equal to 38C (100.4F), Mild Pain (1 - 3)  bisacodyl 5 milliGRAM(s) Oral every 12 hours PRN Constipation  zolpidem 5 milliGRAM(s) Oral at bedtime PRN Insomnia    Vital Signs Last 24 Hrs  T(C): 36.4 (05 Jun 2025 09:01), Max: 36.9 (05 Jun 2025 00:11)  T(F): 97.5 (05 Jun 2025 09:01), Max: 98.4 (05 Jun 2025 00:11)  HR: 85 (05 Jun 2025 11:44) (78 - 98)  BP: 109/63 (05 Jun 2025 09:01) (108/69 - 148/87)  BP(mean): --  RR: 18 (05 Jun 2025 11:44) (18 - 20)  SpO2: 94% (05 Jun 2025 11:44) (84% - 96%)    Parameters below as of 05 Jun 2025 11:44  Patient On (Oxygen Delivery Method): room air        I&O's Summary        Physical Exam:   GENERAL: NAD, well-groomed, well-developed  HEENT: BRYSON/   Atraumatic, Normocephalic  ENMT: No tonsillar erythema, exudates, or enlargement; Moist mucous membranes, Good dentition, No lesions  NECK: Supple, No JVD, Normal thyroid  CHEST/LUNG: Clear to auscultaion  CVS: Regular rate and rhythm; No murmurs, rubs, or gallops  GI: : Soft, Nontender, Nondistended; Bowel sounds present  NERVOUS SYSTEM:  Alert & Oriented X3  EXTREMITIES:  - edema  LYMPH: No lymphadenopathy noted  SKIN: No rashes or lesions  ENDOCRINOLOGY: No Thyromegaly  PSYCH: Appropriate    Labs:  26                            11.3   5.13  )-----------( 268      ( 04 Jun 2025 07:19 )             34.2                         12.5   3.63  )-----------( 242      ( 03 Jun 2025 09:46 )             38.4                         11.8   4.87  )-----------( 220      ( 02 Jun 2025 06:40 )             35.4     06-05    131[L]  |  96  |  15  ----------------------------<  110[H]  4.0   |  23  |  0.59  06-04    131[L]  |  93[L]  |  19  ----------------------------<  109[H]  3.7   |  24  |  0.70  06-03    132[L]  |  95[L]  |  14  ----------------------------<  148[H]  3.8   |  21[L]  |  0.77  06-02    134[L]  |  96  |  15  ----------------------------<  107[H]  3.8   |  25  |  0.63    Ca    8.2[L]      05 Jun 2025 07:15  Ca    8.3[L]      04 Jun 2025 07:19    TPro  7.5  /  Alb  2.6[L]  /  TBili  1.1  /  DBili  x   /  AST  37  /  ALT  49[H]  /  AlkPhos  140[H]  06-03    CAPILLARY BLOOD GLUCOSE              Urinalysis Basic - ( 05 Jun 2025 07:15 )    Color: x / Appearance: x / SG: x / pH: x  Gluc: 110 mg/dL / Ketone: x  / Bili: x / Urobili: x   Blood: x / Protein: x / Nitrite: x   Leuk Esterase: x / RBC: x / WBC x   Sq Epi: x / Non Sq Epi: x / Bacteria: x      D-Dimer Assay, Quantitative: 599 ng/mL DDU (06-03 @ 16:12)  Procalcitonin: 0.15 ng/mL (06-03 @ 16:12)        RECENT CULTURES:  06-01 @ 10:51 Blood Blood-Venous                No growth at 4 days    05-31 @ 20:48 Blood Blood-Peripheral                No growth at 4 days    05-30 @ 07:16 Blood Blood       Growth in anaerobic bottle: Gram Positive Cocci in Pairs and Chains  Growth in aerobic bottle: Gram Positive Cocci in Pairs and Chains           Growth in aerobic and anaerobic bottles: Streptococcus agalactiae (Group  B)  See previous culture [id number]          RESPIRATORY CULTURES:      rad< from: VA Duplex Lower Ext Vein Scan, Bilat (06.04.25 @ 14:23) >    FINDINGS:    RIGHT:  Normal compressibility of the RIGHT common femoral, femoral and popliteal   veins.  Doppler examination shows normal spontaneous and phasic flow.  No RIGHT calf vein thrombosis is detected.    LEFT:  Normal compressibility of the LEFT common femoral, femoral and popliteal   veins.  Doppler examination shows normal spontaneous and phasic flow.  No LEFT calf vein thrombosis is detected.    IMPRESSION:  No evidence of deep venous thrombosis in either lower extremity.            --- End of Report ---            YOBANI WILKINS MD; Attending Interventional Radiologist  This document has been electronically signed. Jun 4 2025  3:05PM    < end of copied text >  < from: CT Angio Chest PE Protocol w/ IV Cont (06.04.25 @ 13:52) >  IMPRESSION:  No pulmonary embolism.  Small to moderate right and small left pleural effusions with associated   atelectasis.    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  Cardiomegaly. Moderate pericardial effusion.    < end of copied text >      Studies  Chest X-RAY  CT SCAN Chest   Venous Dopplers: LE:   CT Abdomen  Others              
[facility]  Reggie Mercedes MD PhD, Deepthi Freitas MD, Kinsey Rubio MD, Wes Grullon MD, Manny Ashford MD  and providing coverage with Katya Baumann MD  Providing Infectious Disease Consultations at [facility], [facility], [facility]'s    Office# [phone] to schedule follow up appointments  Answering Service for urgent calls or New Consults [phone]  Cell# to text for urgent issues Reggie Mercedes [phone]     infectious diseases progress note:    GINO GRAHAM is a 89y y. o. Female patient    Overnight and events of the last 24hrs reviewed    Allergies    Zosyn (Rash; Urticaria; Hives)    Intolerances        ANTIBIOTICS/RELEVANT:  antimicrobials  cefTRIAXone   IVPB 2000 milliGRAM(s) IV Intermittent every 24 hours    immunologic:    OTHER:  acetaminophen     Tablet .. 650 milliGRAM(s) Oral every 6 hours PRN  apixaban 2.5 milliGRAM(s) Oral every 12 hours  bisacodyl 5 milliGRAM(s) Oral every 12 hours PRN  chlorhexidine 2% Cloths 1 Application(s) Topical daily  famotidine Injectable 20 milliGRAM(s) IV Push daily  melatonin 5 milliGRAM(s) Oral once  metoprolol succinate  milliGRAM(s) Oral daily  pantoprazole    Tablet 40 milliGRAM(s) Oral before breakfast  polyethylene glycol 3350 17 Gram(s) Oral daily  senna 2 Tablet(s) Oral at bedtime  zolpidem 5 milliGRAM(s) Oral at bedtime PRN      Objective:  Vital Signs Last 24 Hrs  T(C): 36.6 (01 Jun 2025 10:42), Max: 36.6 (01 Jun 2025 10:42)  T(F): 97.8 (01 Jun 2025 10:42), Max: 97.8 (01 Jun 2025 10:42)  HR: 94 (01 Jun 2025 10:42) (78 - 94)  BP: 113/63 (01 Jun 2025 10:42) (113/63 - 159/80)  BP(mean): --  RR: 18 (01 Jun 2025 10:42) (18 - 18)  SpO2: 94% (01 Jun 2025 10:42) (93% - 94%)    Parameters below as of 01 Jun 2025 10:42  Patient On (Oxygen Delivery Method): room air        T(C): 36.6 (06-01-25 @ 10:42), Max: 36.7 (05-31-25 @ 00:07)  T(C): 36.6 (06-01-25 @ 10:42), Max: 38.3 (05-29-25 @ 16:42)  T(C): 36.6 (06-01-25 @ 10:42), Max: 39.8 (05-29-25 @ 15:12)    PHYSICAL EXAM:  HEENT: NC atraumatic  Neck: supple  Respiratory: no accessory muscle use, breathing comfortably  Cardiovascular: distant  Gastrointestinal: normal appearing, nondistended  Extremities: no clubbing, no cyanosis,        LABS:                          10.4   7.37  )-----------( 172      ( 31 May 2025 06:52 )             31.3       WBC  7.37 05-31 @ 06:52  2.70 05-30 @ 07:20  3.54 05-29 @ 13:48      06-01    134[L]  |  97  |  20  ----------------------------<  146[H]  3.6   |  24  |  0.72    Ca    8.7      01 Jun 2025 10:28        Creatinine: 0.72 mg/dL (06-01-25 @ 10:28)  Creatinine: 0.89 mg/dL (05-31-25 @ 06:52)  Creatinine: 0.73 mg/dL (05-30-25 @ 07:19)  Creatinine: 0.75 mg/dL (05-29-25 @ 13:48)        Urinalysis Basic - ( 01 Jun 2025 10:28 )    Color: x / Appearance: x / SG: x / pH: x  Gluc: 146 mg/dL / Ketone: x  / Bili: x / Urobili: x   Blood: x / Protein: x / Nitrite: x   Leuk Esterase: x / RBC: x / WBC x   Sq Epi: x / Non Sq Epi: x / Bacteria: x            INFLAMMATORY MARKERS      MICROBIOLOGY:              RADIOLOGY & ADDITIONAL STUDIES:  
Neurology      S; patient seen. doing okay  ; was SOB on RA improved on NC       Medications: MEDICATIONS  (STANDING):  apixaban 2.5 milliGRAM(s) Oral every 12 hours  cefTRIAXone   IVPB 2000 milliGRAM(s) IV Intermittent every 24 hours  chlorhexidine 2% Cloths 1 Application(s) Topical daily  famotidine Injectable 20 milliGRAM(s) IV Push daily  melatonin 5 milliGRAM(s) Oral once  metoprolol succinate  milliGRAM(s) Oral daily  pantoprazole    Tablet 40 milliGRAM(s) Oral before breakfast  polyethylene glycol 3350 17 Gram(s) Oral daily  senna 2 Tablet(s) Oral at bedtime  sodium chloride 0.9%. 1000 milliLiter(s) (60 mL/Hr) IV Continuous <Continuous>    MEDICATIONS  (PRN):  acetaminophen     Tablet .. 650 milliGRAM(s) Oral every 6 hours PRN Temp greater or equal to 38C (100.4F), Mild Pain (1 - 3)  bisacodyl 5 milliGRAM(s) Oral every 12 hours PRN Constipation  zolpidem 5 milliGRAM(s) Oral at bedtime PRN Insomnia       Vitals:  Vital Signs Last 24 Hrs  T(C): 36.8 (05 Jun 2025 05:01), Max: 36.9 (05 Jun 2025 00:11)  T(F): 98.3 (05 Jun 2025 05:01), Max: 98.4 (05 Jun 2025 00:11)  HR: 92 (05 Jun 2025 05:01) (78 - 98)  BP: 115/68 (05 Jun 2025 05:01) (108/69 - 148/87)  BP(mean): --  RR: 18 (05 Jun 2025 06:25) (18 - 20)  SpO2: 91% (05 Jun 2025 06:25) (84% - 96%)    Parameters below as of 05 Jun 2025 06:25  Patient On (Oxygen Delivery Method): room air                General Exam:   General Appearance: Appropriately dressed and in no acute distress       Head: Normocephalic, atraumatic and no dysmorphic features  Ear, Nose, and Throat: Moist mucous membranes  CVS: S1S2+  Resp: No SOB, no wheeze or rhonchi  GI: soft NT/ND  Extremities: No edema or cyanosis  Skin: No bruises or rashes     Neurological Exam:  Mental Status: Awake, alert and oriented x 2.  Able to follow simple and complex verbal commands. Able to name and repeat. fluent speech. No obvious aphasia or dysarthria noted.   Cranial Nerves: PERRL, EOMI, VFFC, sensation V1-V3 intact,  no obvious facial asymmetry, equal elevation of palate, scm/trap 5/5, tongue is midline on protrusion.   hearing is grossly intact.   Motor: Normal bulk, tone and strength throughout. Fine finger movements were intact and symmetric. no tremors or drift noted.    Sensation: Intact to light touch and pinprick throughout. no right/left confusion. no extinction to tactile on DSS.   Reflexes: 1+ throughout at biceps, brachioradialis, triceps, patellars and ankles bilaterally and equal. No clonus. R toe and L toe were both downgoing.  Coordination: No dysmetria on FNF or HKS  Gait: deferred     Data/Labs/Imaging which I personally reviewed.     Labs:       LABS:   LABS:                          11.3   5.13  )-----------( 268      ( 04 Jun 2025 07:19 )             34.2     06-05    131[L]  |  96  |  15  ----------------------------<  110[H]  4.0   |  23  |  0.59    Ca    8.2[L]      05 Jun 2025 07:15  Phos  3.0     06-03  Mg     2.0     06-03    TPro  7.5  /  Alb  2.6[L]  /  TBili  1.1  /  DBili  x   /  AST  37  /  ALT  49[H]  /  AlkPhos  140[H]  06-03    LIVER FUNCTIONS - ( 03 Jun 2025 09:46 )  Alb: 2.6 g/dL / Pro: 7.5 g/dL / ALK PHOS: 140 U/L / ALT: 49 U/L / AST: 37 U/L / GGT: x           PT/INR - ( 03 Jun 2025 09:46 )   PT: 20.1 sec;   INR: 1.76 ratio         PTT - ( 03 Jun 2025 09:46 )  PTT:29.9 sec  Urinalysis Basic - ( 05 Jun 2025 07:15 )    Color: x / Appearance: x / SG: x / pH: x  Gluc: 110 mg/dL / Ketone: x  / Bili: x / Urobili: x   Blood: x / Protein: x / Nitrite: x   Leuk Esterase: x / RBC: x / WBC x   Sq Epi: x / Non Sq Epi: x / Bacteria: x          Urinalysis with Rflx Culture (collected 05-29-25 @ 16:21)    Culture - Blood (collected 05-29-25 @ 13:45)  Source: Blood Blood-Peripheral  Gram Stain (05-30-25 @ 01:47):    Growth in anaerobic bottle: Gram Positive Cocci in Pairs and Chains    Growth in aerobic bottle: Gram Positive Cocci in Pairs and Chains  Preliminary Report (05-30-25 @ 01:47):    Growth in anaerobic bottle: Gram Positive Cocci in Pairs and Chains    Growth in aerobic bottle: Gram Positive Cocci in Pairs and Chains    Culture - Blood (collected 05-29-25 @ 13:30)  Source: Blood Blood-Peripheral  Gram Stain (05-30-25 @ 01:45):    Growth in aerobic bottle: Gram Positive Cocci in Pairs and Chains    Growth in anaerobic bottle: Gram Positive Cocci in Pairs and Chains  Preliminary Report (05-30-25 @ 01:46):    Growth in aerobic bottle: Gram Positive Cocci in Pairs and Chains    Growth in anaerobic bottle: Gram Positive Cocci in Pairs and Chains    Direct identification is available within approximately 3-5    hours either by Blood Panel Multiplexed PCR or Direct    MALDI-TOF. Details: [email or web address]  Organism: Blood Culture PCR (05-30-25 @ 02:36)  Organism: Blood Culture PCR (05-30-25 @ 02:36)      Method Type: PCR      -  Streptococcus agalactiae (Group B): Detec        < from: CT Head No Cont (05.29.25 @ 22:10) >    ACC: [id number] EXAM:  CT CERVICAL SPINE   ORDERED BY: ERIKA ANNE     ACC: [id number] EXAM:  CT BRAIN   ORDERED BY: ERIKA ANNE     PROCEDURE DATE:  05/29/2025          INTERPRETATION:  CLINICAL Indications:  AMS    COMPARISON: Head CT dated 11/20/2022. MRI brain 11/21/2022    TECHNIQUE: Noncontrast CT of the head. Multiplanar reformations are   submitted.    FINDINGS:  There is periventricular and subcortical white matter hypodensity without   mass effect, nonspecific, likely representing moderate chronic   microvascular ischemic changes. There is no compelling evidence for an   acute transcortical infarction. There is no evidence of mass, mass   effect, midline shift or extra-axial fluid collection. The lateral   ventricles and cortical sulci are age-appropriate in size and   configuration. The patient is status post bilateral ocular lens   replacement surgery. The orbits, mastoid air cells and visualized   paranasal sinuses are unremarkable. The calvarium is intact. Consider   follow-up CT or MRI as clinically warranted.          ============================    CLINICAL INDICATIONS: AMS    COMPARISON: None.    TECHNIQUE: Noncontrast CT of the cervical spine. Multiple contiguous   axial images through the cervical spine as wellas multiplanar   reformatted images are submitted for interpretation without the   administration of intravenous contrast.    FINDINGS:  Motion artifact limits interpretation. Mild chronic height loss involves   the C3, C4, C5, C6 vertebral bodies with small multilevel anterior   bridging osteophytes. Mild disc calcification at C6/C7. Small posterior   ligament calcification. Mild multilevel uncovertebral hypertrophy.  There is no evidence for acute fracture. A normal lordosis is noted.   Craniocervical junction is normal. The remaining cervicovertebral body   heights and remaining intervertebral disc spaces are preserved. There is   no prevertebral soft tissue abnormality. The odontoid process is intact.    Thyroid gland is unremarkable. Theairway is patent. Moderate right   apical pleural thickening/effusion. Please refer to the preceding chest   CT for further details.    Evaluation of the individual levels:  C2/C3 level: No spinal canal stenosis or foraminal narrowing.  C3/C4 level: No spinal canal stenosis or foraminal narrowing.  C4/C5 level: No spinal canal stenosis or foraminal narrowing.  C5/C6 level: No spinal canal stenosis or foraminal narrowing.  C6/C7 level: Mild right-sided foraminal narrowing. No spinal canal   stenosis or left-sided foraminal narrowing.  C7/T1 level: No spinal canal stenosis or foraminal narrowing.    IMPRESSION:    CT head: Moderate chronic microvascular changes without evidence of an   acute transcortical infarction or hemorrhage.    CT C-spine: Mild to moderate spondylosis. No acute osseous abnormality.   Consider MRI as clinically warranted.    --- End of Report ---            TONY SOTELO MD; Attending Radiologist  This document has been electronically signed. May 29 2025 10:47PM    < end of copied text >  
Patient is a 89y old  Female who presents with a chief complaint of AMS (01 Jun 2025 12:37)    Date of servie : 06-01-25 @ 13:31  INTERVAL HPI/OVERNIGHT EVENTS:  T(C): 36.6 (06-01-25 @ 10:42), Max: 36.6 (06-01-25 @ 10:42)  HR: 94 (06-01-25 @ 10:42) (78 - 94)  BP: 113/63 (06-01-25 @ 10:42) (113/63 - 159/80)  RR: 18 (06-01-25 @ 10:42) (18 - 18)  SpO2: 94% (06-01-25 @ 10:42) (93% - 94%)  Wt(kg): --  I&O's Summary    31 May 2025 07:01  -  01 Jun 2025 07:00  --------------------------------------------------------  IN: 240 mL / OUT: 0 mL / NET: 240 mL    01 Jun 2025 07:01  -  01 Jun 2025 13:31  --------------------------------------------------------  IN: 240 mL / OUT: 0 mL / NET: 240 mL        LABS:                        10.4   7.37  )-----------( 172      ( 31 May 2025 06:52 )             31.3     06-01    134[L]  |  97  |  20  ----------------------------<  146[H]  3.6   |  24  |  0.72    Ca    8.7      01 Jun 2025 10:28        Urinalysis Basic - ( 01 Jun 2025 10:28 )    Color: x / Appearance: x / SG: x / pH: x  Gluc: 146 mg/dL / Ketone: x  / Bili: x / Urobili: x   Blood: x / Protein: x / Nitrite: x   Leuk Esterase: x / RBC: x / WBC x   Sq Epi: x / Non Sq Epi: x / Bacteria: x      CAPILLARY BLOOD GLUCOSE            Urinalysis Basic - ( 01 Jun 2025 10:28 )    Color: x / Appearance: x / SG: x / pH: x  Gluc: 146 mg/dL / Ketone: x  / Bili: x / Urobili: x   Blood: x / Protein: x / Nitrite: x   Leuk Esterase: x / RBC: x / WBC x   Sq Epi: x / Non Sq Epi: x / Bacteria: x        MEDICATIONS  (STANDING):  apixaban 2.5 milliGRAM(s) Oral every 12 hours  cefTRIAXone   IVPB 2000 milliGRAM(s) IV Intermittent every 24 hours  chlorhexidine 2% Cloths 1 Application(s) Topical daily  famotidine Injectable 20 milliGRAM(s) IV Push daily  melatonin 5 milliGRAM(s) Oral once  metoprolol succinate  milliGRAM(s) Oral daily  pantoprazole    Tablet 40 milliGRAM(s) Oral before breakfast  polyethylene glycol 3350 17 Gram(s) Oral daily  senna 2 Tablet(s) Oral at bedtime    MEDICATIONS  (PRN):  acetaminophen     Tablet .. 650 milliGRAM(s) Oral every 6 hours PRN Temp greater or equal to 38C (100.4F), Mild Pain (1 - 3)  bisacodyl 5 milliGRAM(s) Oral every 12 hours PRN Constipation  zolpidem 5 milliGRAM(s) Oral at bedtime PRN Insomnia          PHYSICAL EXAM:  GENERAL: NAD, well-groomed, well-developed  HEAD:  Atraumatic, Normocephalic  CHEST/LUNG: Clear to percussion bilaterally; No rales, rhonchi, wheezing, or rubs  HEART: Regular rate and rhythm; No murmurs, rubs, or gallops  ABDOMEN: Soft, Nontender, Nondistended; Bowel sounds present  EXTREMITIES:  2+ Peripheral Pulses, No clubbing, cyanosis, or edema  LYMPH: No lymphadenopathy noted  SKIN: No rashes or lesions    Care Discussed with Consultants/Other Providers [ ] YES  [ ] NO
[facility]    Dr. Linda Galicia     Office [phone] (9 am to 5 pm)  Service: [phone] (5pm to 9am)  Also Available on TEAMS      RENAL PROGRESS NOTE: DATE OF SERVICE 06-02-25 @ 12:33    Patient is a 89y old  Female who presents with a chief complaint of AMS (02 Jun 2025 07:24)      Patient seen and examined at bedside. No chest pain/sob    VITALS:  T(F): 97.4 (06-02-25 @ 08:41), Max: 98.2 (06-01-25 @ 16:28)  HR: 78 (06-02-25 @ 08:41)  BP: 110/68 (06-02-25 @ 08:41)  RR: 18 (06-02-25 @ 08:41)  SpO2: 92% (06-02-25 @ 08:41)  Wt(kg): --    06-01 @ 07:01  -  06-02 @ 07:00  --------------------------------------------------------  IN: 240 mL / OUT: 0 mL / NET: 240 mL    06-02 @ 07:01  -  06-02 @ 12:33  --------------------------------------------------------  IN: 240 mL / OUT: 0 mL / NET: 240 mL          PHYSICAL EXAM:  Constitutional: NAD  Neck: No JVD  Respiratory: CTAB, no wheezes, rales or rhonchi  Cardiovascular: S1, S2, RRR  Gastrointestinal: BS+, soft, NT/ND  Extremities: No peripheral edema    Hospital Medications:   MEDICATIONS  (STANDING):  apixaban 2.5 milliGRAM(s) Oral every 12 hours  cefTRIAXone   IVPB 2000 milliGRAM(s) IV Intermittent every 24 hours  chlorhexidine 2% Cloths 1 Application(s) Topical daily  famotidine Injectable 20 milliGRAM(s) IV Push daily  melatonin 5 milliGRAM(s) Oral once  metoprolol succinate  milliGRAM(s) Oral daily  pantoprazole    Tablet 40 milliGRAM(s) Oral before breakfast  polyethylene glycol 3350 17 Gram(s) Oral daily  senna 2 Tablet(s) Oral at bedtime      LABS:  06-02    134[L]  |  96  |  15  ----------------------------<  107[H]  3.8   |  25  |  0.63    Ca    8.8      02 Jun 2025 06:39      Creatinine Trend: 0.63 <--, 0.72 <--, 0.89 <--, 0.73 <--, 0.75 <--                                11.8   4.87  )-----------( 220      ( 02 Jun 2025 06:40 )             35.4     Urine Studies:  Urinalysis - [06-02-25 @ 06:39]      Color  / Appearance  / SG  / pH       Gluc 107 / Ketone   / Bili  / Urobili        Blood  / Protein  / Leuk Est  / Nitrite       RBC  / WBC  / Hyaline  / Gran  / Sq Epi  / Non Sq Epi  / Bacteria       TSH 0.87      [05-31-25 @ 06:52]      Syphilis Screen (Treponema Pallidum Ab) Negative      [05-31-25 @ 06:52]    RADIOLOGY & ADDITIONAL STUDIES:  
    Subjective: Patient seen and examined. No new events except as noted.     SUBJECTIVE/ROS:  nad      MEDICATIONS:  MEDICATIONS  (STANDING):  apixaban 2.5 milliGRAM(s) Oral every 12 hours  cefTRIAXone   IVPB 2000 milliGRAM(s) IV Intermittent every 24 hours  chlorhexidine 2% Cloths 1 Application(s) Topical daily  famotidine Injectable 20 milliGRAM(s) IV Push daily  melatonin 5 milliGRAM(s) Oral once  metoprolol succinate  milliGRAM(s) Oral daily  pantoprazole    Tablet 40 milliGRAM(s) Oral before breakfast  polyethylene glycol 3350 17 Gram(s) Oral daily  senna 2 Tablet(s) Oral at bedtime      PHYSICAL EXAM:  T(C): 36.3 (06-02-25 @ 08:41), Max: 36.8 (06-01-25 @ 16:28)  HR: 78 (06-02-25 @ 08:41) (78 - 94)  BP: 110/68 (06-02-25 @ 08:41) (110/68 - 156/94)  RR: 18 (06-02-25 @ 08:41) (18 - 18)  SpO2: 92% (06-02-25 @ 08:41) (92% - 99%)  Wt(kg): --  I&O's Summary    01 Jun 2025 07:01  -  02 Jun 2025 07:00  --------------------------------------------------------  IN: 240 mL / OUT: 0 mL / NET: 240 mL    02 Jun 2025 07:01  -  02 Jun 2025 09:25  --------------------------------------------------------  IN: 240 mL / OUT: 0 mL / NET: 240 mL            JVP: Normal  Neck: supple  Lung: clear   CV: S1 S2 ,  Abd: soft  Ext: No edema  neuro: Awake / alert  Psych: flat affect  Skin: normal``    LABS/DATA:    CARDIAC MARKERS:                                11.8   4.87  )-----------( 220      ( 02 Jun 2025 06:40 )             35.4     06-02    134[L]  |  96  |  15  ----------------------------<  107[H]  3.8   |  25  |  0.63    Ca    8.8      02 Jun 2025 06:39      proBNP:   Lipid Profile:   HgA1c:   TSH:             
    Subjective: Patient seen and examined. No new events except as noted.     SUBJECTIVE/ROS:  nad      MEDICATIONS:  MEDICATIONS  (STANDING):  apixaban 2.5 milliGRAM(s) Oral every 12 hours  cefTRIAXone   IVPB 2000 milliGRAM(s) IV Intermittent every 24 hours  chlorhexidine 2% Cloths 1 Application(s) Topical daily  famotidine Injectable 20 milliGRAM(s) IV Push daily  melatonin 5 milliGRAM(s) Oral once  metoprolol succinate  milliGRAM(s) Oral daily  pantoprazole    Tablet 40 milliGRAM(s) Oral before breakfast  polyethylene glycol 3350 17 Gram(s) Oral daily  senna 2 Tablet(s) Oral at bedtime      PHYSICAL EXAM:  T(C): 36.4 (05-31-25 @ 23:56), Max: 36.4 (05-31-25 @ 15:24)  HR: 78 (06-01-25 @ 06:04) (65 - 94)  BP: 120/70 (06-01-25 @ 06:04) (120/70 - 159/80)  RR: 18 (05-31-25 @ 23:56) (18 - 18)  SpO2: 93% (05-31-25 @ 23:56) (93% - 94%)  Wt(kg): --  I&O's Summary    31 May 2025 07:01  -  01 Jun 2025 07:00  --------------------------------------------------------  IN: 240 mL / OUT: 0 mL / NET: 240 mL            JVP: Normal  Neck: supple  Lung: clear   CV: S1 S2 ,  Abd: soft  Ext: No edema  neuro: Awake / alert  Psych: flat affect  Skin: normal``    LABS/DATA:    CARDIAC MARKERS:                                10.4   7.37  )-----------( 172      ( 31 May 2025 06:52 )             31.3     05-31    133[L]  |  101  |  32[H]  ----------------------------<  122[H]  4.5   |  22  |  0.89    Ca    8.8      31 May 2025 06:52      proBNP:   Lipid Profile:   HgA1c:   TSH:             
    Subjective: Patient seen and examined. No new events except as noted.     SUBJECTIVE/ROS:  nad      MEDICATIONS:  MEDICATIONS  (STANDING):  apixaban 2.5 milliGRAM(s) Oral every 12 hours  cefTRIAXone   IVPB 2000 milliGRAM(s) IV Intermittent every 24 hours  chlorhexidine 2% Cloths 1 Application(s) Topical daily  famotidine Injectable 20 milliGRAM(s) IV Push daily  metoprolol succinate  milliGRAM(s) Oral daily  pantoprazole    Tablet 40 milliGRAM(s) Oral before breakfast      PHYSICAL EXAM:  T(C): 36.7 (05-31-25 @ 00:07), Max: 36.7 (05-31-25 @ 00:07)  HR: 70 (05-31-25 @ 06:12) (70 - 80)  BP: 145/68 (05-31-25 @ 06:12) (104/62 - 145/68)  RR: 18 (05-31-25 @ 00:07) (18 - 18)  SpO2: 93% (05-31-25 @ 00:07) (93% - 94%)  Wt(kg): --  I&O's Summary          JVP: Normal  Neck: supple  Lung: clear   CV: S1 S2 ,  Abd: soft  Ext: No edema  neuro: Awake / alert  Psych: flat affect  Skin: normal``    LABS/DATA:    CARDIAC MARKERS:      < from: TTE W or WO Ultrasound Enhancing Agent (05.30.25 @ 13:26) >     CONCLUSIONS:      1. Hypertrophic obstructive cardiomyopathy (HOCM).   2. Left ventricular cavity is small. Left ventricular systolic function is hyperdynamic. There are no regional wall motion abnormalities seen.   3. Severe discrete basal septal hypertrophy (2.0 cm).   4. 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   5. Moderate mitral regurgitation.   6. Mild pulmonary hypertension.   7. Small pericardial effusion noted adjacent to the right ventricle, trace pericardial effusion noted adjacent to the posterior left ventricle and small pericardial effusion noted adjacent to the right atrium: diastolic collapse of the right atrium that exceeds 1/3 of the cardiac cycle.   8. Right pleural effusion noted.    < end of copied text >                            10.4   7.37  )-----------( 172      ( 31 May 2025 06:52 )             31.3     05-31    133[L]  |  101  |  32[H]  ----------------------------<  122[H]  4.5   |  22  |  0.89    Ca    8.8      31 May 2025 06:52    TPro  8.2  /  Alb  3.2[L]  /  TBili  1.3[H]  /  DBili  x   /  AST  31  /  ALT  17  /  AlkPhos  105  05-29    proBNP:   Lipid Profile:   HgA1c:   TSH:             
[facility]    Dr. Linda Galicia     Office [phone] (9 am to 5 pm)  Service: [phone] (5pm to 9am)  Also Available on TEAMS      RENAL PROGRESS NOTE: DATE OF SERVICE 06-05-25 @ 14:23    Patient is a 89y old  Female who presents with a chief complaint of AMS (05 Jun 2025 10:58)      Patient seen and examined at bedside. No chest pain/sob    VITALS:  T(F): 97.5 (06-05-25 @ 09:01), Max: 98.4 (06-05-25 @ 00:11)  HR: 85 (06-05-25 @ 11:44)  BP: 109/63 (06-05-25 @ 09:01)  RR: 18 (06-05-25 @ 11:44)  SpO2: 94% (06-05-25 @ 11:44)  Wt(kg): --        PHYSICAL EXAM:  Constitutional: NAD  Neck: No JVD  Respiratory: CTAB, no wheezes, rales or rhonchi  Cardiovascular: S1, S2, RRR  Gastrointestinal: BS+, soft, NT/ND  Extremities: No peripheral edema    Hospital Medications:   MEDICATIONS  (STANDING):  apixaban 2.5 milliGRAM(s) Oral every 12 hours  cefTRIAXone   IVPB 2000 milliGRAM(s) IV Intermittent every 24 hours  chlorhexidine 2% Cloths 1 Application(s) Topical daily  famotidine Injectable 20 milliGRAM(s) IV Push daily  melatonin 5 milliGRAM(s) Oral once  metoprolol succinate  milliGRAM(s) Oral daily  pantoprazole    Tablet 40 milliGRAM(s) Oral before breakfast  polyethylene glycol 3350 17 Gram(s) Oral daily  senna 2 Tablet(s) Oral at bedtime  sodium chloride 0.9%. 1000 milliLiter(s) (60 mL/Hr) IV Continuous <Continuous>      LABS:  06-05    131[L]  |  96  |  15  ----------------------------<  110[H]  4.0   |  23  |  0.59    Ca    8.2[L]      05 Jun 2025 07:15      Creatinine Trend: 0.59 <--, 0.70 <--, 0.77 <--, 0.63 <--, 0.72 <--, 0.89 <--, 0.73 <--                                11.3   5.13  )-----------( 268 ( 04 Jun 2025 07:19 )             34.2     Urine Studies:  Urinalysis - [06-05-25 @ 07:15]      Color  / Appearance  / SG  / pH       Gluc 110 / Ketone   / Bili  / Urobili        Blood  / Protein  / Leuk Est  / Nitrite       RBC  / WBC  / Hyaline  / Gran  / Sq Epi  / Non Sq Epi  / Bacteria     Urine Sodium 11      [06-03-25 @ 20:33]  Urine Osmolality 648      [06-03-25 @ 20:33]    TSH 0.87      [05-31-25 @ 06:52]      Syphilis Screen (Treponema Pallidum Ab) Negative      [05-31-25 @ 06:52]    RADIOLOGY & ADDITIONAL STUDIES:  
Date of Service: 06-03-25 @ 14:42    Patient is a 89y old  Female who presents with a chief complaint of AMS (03 Jun 2025 13:52)      Any change in ROS: when i saw the pt she was not responsive:  but after I left the room,  per ACP she woke up:   VBG is with no hypercarbia:   CT head was fine:       MEDICATIONS  (STANDING):  apixaban 2.5 milliGRAM(s) Oral every 12 hours  cefTRIAXone   IVPB 2000 milliGRAM(s) IV Intermittent every 24 hours  chlorhexidine 2% Cloths 1 Application(s) Topical daily  famotidine Injectable 20 milliGRAM(s) IV Push daily  melatonin 5 milliGRAM(s) Oral once  metoprolol succinate  milliGRAM(s) Oral daily  pantoprazole    Tablet 40 milliGRAM(s) Oral before breakfast  polyethylene glycol 3350 17 Gram(s) Oral daily  senna 2 Tablet(s) Oral at bedtime    MEDICATIONS  (PRN):  acetaminophen     Tablet .. 650 milliGRAM(s) Oral every 6 hours PRN Temp greater or equal to 38C (100.4F), Mild Pain (1 - 3)  bisacodyl 5 milliGRAM(s) Oral every 12 hours PRN Constipation  zolpidem 5 milliGRAM(s) Oral at bedtime PRN Insomnia    Vital Signs Last 24 Hrs  T(C): 36.6 (03 Jun 2025 12:19), Max: 38.3 (03 Jun 2025 11:00)  T(F): 97.9 (03 Jun 2025 12:19), Max: 101 (03 Jun 2025 11:00)  HR: 87 (03 Jun 2025 08:58) (85 - 91)  BP: 120/72 (03 Jun 2025 08:58) (107/71 - 132/73)  BP(mean): --  RR: 18 (03 Jun 2025 08:58) (18 - 18)  SpO2: 94% (03 Jun 2025 08:58) (92% - 94%)    Parameters below as of 03 Jun 2025 08:58  Patient On (Oxygen Delivery Method): room air        I&O's Summary    02 Jun 2025 07:01  -  03 Jun 2025 07:00  --------------------------------------------------------  IN: 240 mL / OUT: 0 mL / NET: 240 mL          Physical Exam:   GENERAL: NAD, well-groomed, well-developed  HEENT: BRYSON/   Atraumatic, Normocephalic  ENMT: No tonsillar erythema, exudates, or enlargement; Moist mucous membranes, Good dentition, No lesions  NECK: Supple, No JVD, Normal thyroid  CHEST/LUNG: Clear to auscultaion- no wheezing  CVS: Regular rate and rhythm; No murmurs, rubs, or gallops  GI: : Soft, Nontender, Nondistended; Bowel sounds present  NERVOUS SYSTEM:  sleepy :   EXTREMITIES:- edema  LYMPH: No lymphadenopathy noted  SKIN: No rashes or lesions  ENDOCRINOLOGY: No Thyromegaly  PSYCH: not opening her eyes     Labs:  ABG - ( 01 Jun 2025 18:58 )  pH, Arterial: 7.56  pH, Blood: x     /  pCO2: 32    /  pO2: 84    / HCO3: 29    / Base Excess: 6.7   /  SaO2: 99.0            26, 23                            12.5   3.63  )-----------( 242      ( 03 Jun 2025 09:46 )             38.4                         11.8   4.87  )-----------( 220      ( 02 Jun 2025 06:40 )             35.4                         10.4   7.37  )-----------( 172      ( 31 May 2025 06:52 )             31.3     06-03    132[L]  |  95[L]  |  14  ----------------------------<  148[H]  3.8   |  21[L]  |  0.77  06-02    134[L]  |  96  |  15  ----------------------------<  107[H]  3.8   |  25  |  0.63  06-01    134[L]  |  97  |  20  ----------------------------<  146[H]  3.6   |  24  |  0.72  05-31    133[L]  |  101  |  32[H]  ----------------------------<  122[H]  4.5   |  22  |  0.89    Ca    8.6      03 Jun 2025 09:46  Ca    8.8      02 Jun 2025 06:39  Phos  3.0     06-03  Mg     2.0     06-03    TPro  7.5  /  Alb  2.6[L]  /  TBili  1.1  /  DBili  x   /  AST  37  /  ALT  49[H]  /  AlkPhos  140[H]  06-03    CAPILLARY BLOOD GLUCOSE      POCT Blood Glucose.: 128 mg/dL (03 Jun 2025 13:12)  POCT Blood Glucose.: 146 mg/dL (03 Jun 2025 09:10)      LIVER FUNCTIONS - ( 03 Jun 2025 09:46 )  Alb: 2.6 g/dL / Pro: 7.5 g/dL / ALK PHOS: 140 U/L / ALT: 49 U/L / AST: 37 U/L / GGT: x           PT/INR - ( 03 Jun 2025 09:46 )   PT: 20.1 sec;   INR: 1.76 ratio         PTT - ( 03 Jun 2025 09:46 )  PTT:29.9 sec  Urinalysis Basic - ( 03 Jun 2025 09:46 )    Color: x / Appearance: x / SG: x / pH: x  Gluc: 148 mg/dL / Ketone: x  / Bili: x / Urobili: x   Blood: x / Protein: x / Nitrite: x   Leuk Esterase: x / RBC: x / WBC x   Sq Epi: x / Non Sq Epi: x / Bacteria: x            RECENT CULTURES:  06-01 @ 10:51 Blood Blood-Venous                No growth at 48 Hours    05-31 @ 20:48 Blood Blood-Peripheral                No growth at 48 Hours    05-30 @ 07:16 Blood Blood       Growth in anaerobic bottle: Gram Positive Cocci in Pairs and Chains  Growth in aerobic bottle: Gram Positive Cocci in Pairs and Chains           Growth in aerobic and anaerobic bottles: Streptococcus agalactiae (Group  B)  See previous culture [id number]    05-29 @ 13:45 Blood Blood-Peripheral       Growth in anaerobic bottle: Gram Positive Cocci in Pairs and Chains  Growth in aerobic bottle: Gram Positive Cocci in Pairs and Chains           Growth in aerobic and anaerobic bottles: Streptococcus agalactiae (Group  B)  See previous culture [id number]    05-29 @ 13:30 Blood Blood-Peripheral   PCR    Growth in aerobic bottle: Gram Positive Cocci in Pairs and Chains  Growth in anaerobic bottle: Gram Positive Cocci in Pairs and Chains    Blood Culture PCR  Streptococcus agalactiae (Group B)  Blood Culture PCR     Growth in aerobic and anaerobic bottles: Streptococcus agalactiae (Group  B) ***********Note************  This isolate demonstrates inducible  clindamycin resistance.  Clindamycin may still be effective in some patients.  Direct identification is available within approximately 3-5  hours either by Blood Panel Multiplexed PCR or Direct  MALDI-TOF. Details: [email or web address]      rad< from: CT Chest w/ IV Cont (05.29.25 @ 17:25) >  IMPRESSION:    1. Mild to moderate CHF and/or volume overload.    2. Additional findings suspect for mild right middle and lower lobe   pneumonia on a background of mild bronchiectasis.    3. No colitis or any acute abnormality demonstrated in the abdomen or   pelvis.    < end of copied text >      RESPIRATORY CULTURES:          Studies  Chest X-RAY  CT SCAN Chest   Venous Dopplers: LE:   CT Abdomen  Others              
[facility]  BASIL Hooks Y. Patel, S. Shah, G. Casimir  [phone]  ([phone] - weekdays after 5pm and weekends)    Name: GINO GRAHAM  Age/Gender: 89y Female  MRN: [id number]    Interval History:  Patient seen and examined this morning.   No new complaints noted.  Notes reviewed  No concerning overnight events  Afebrile   Allergies: Zosyn (Rash; Urticaria; Hives)      Objective:  Vitals:   T(F): 97.5 (06-05-25 @ 09:01), Max: 98.4 (06-05-25 @ 00:11)  HR: 90 (06-05-25 @ 09:01) (78 - 98)  BP: 109/63 (06-05-25 @ 09:01) (108/69 - 148/87)  RR: 18 (06-05-25 @ 09:01) (18 - 20)  SpO2: 94% (06-05-25 @ 09:01) (84% - 96%)  Physical Examination:  General: no acute distress  HEENT: normocephalic, atraumatic, anicteric  Respiratory: no acc muscle use, breathing comfortably  Cardiovascular: S1 and S2 present  Gastrointestinal: normal appearing, nondistended  Extremities: no edema, no cyanosis  Skin: no visible rash    Laboratory Studies:  CBC:                       11.3   5.13  )-----------( 268      ( 04 Jun 2025 07:19 )             34.2     WBC Trend:  5.13 06-04-25 @ 07:19  3.63 06-03-25 @ 09:46  4.87 06-02-25 @ 06:40  7.37 05-31-25 @ 06:52  2.70 05-30-25 @ 07:20  3.54 05-29-25 @ 13:48    CMP: 06-05    131[L]  |  96  |  15  ----------------------------<  110[H]  4.0   |  23  |  0.59    Ca    8.2[L]      05 Jun 2025 07:15      Creatinine: 0.59 mg/dL (06-05-25 @ 07:15)  Creatinine: 0.70 mg/dL (06-04-25 @ 07:19)  Creatinine: 0.77 mg/dL (06-03-25 @ 09:46)  Creatinine: 0.63 mg/dL (06-02-25 @ 06:39)  Creatinine: 0.72 mg/dL (06-01-25 @ 10:28)  Creatinine: 0.89 mg/dL (05-31-25 @ 06:52)  Creatinine: 0.73 mg/dL (05-30-25 @ 07:19)  Creatinine: 0.75 mg/dL (05-29-25 @ 13:48)    Microbiology: reviewed   Culture - Blood (collected 06-01-25 @ 10:51)  Source: Blood Blood-Venous  Preliminary Report (06-04-25 @ 14:01):    No growth at 72 Hours    Culture - Blood (collected 05-31-25 @ 20:48)  Source: Blood Blood-Peripheral  Preliminary Report (06-05-25 @ 02:01):    No growth at 4 days    Culture - Blood (collected 05-30-25 @ 07:16)  Source: Blood Blood  Gram Stain (05-30-25 @ 20:47):    Growth in anaerobic bottle: Gram Positive Cocci in Pairs and Chains    Growth in aerobic bottle: Gram Positive Cocci in Pairs and Chains  Final Report (05-31-25 @ 15:48):    Growth in aerobic and anaerobic bottles: Streptococcus agalactiae (Group    B)    See previous culture [id number]    Culture - Blood (collected 05-30-25 @ 07:16)  Source: Blood Blood  Gram Stain (05-30-25 @ 19:54):    Growth in aerobic and anaerobic bottles: Gram Positive Cocci in Pairs and    Chains  Final Report (05-31-25 @ 15:47):    Growth in aerobic and anaerobic bottles: Streptococcus agalactiae (Group    B)    See previous culture [id number]    Urinalysis with Rflx Culture (collected 05-29-25 @ 16:21)    Culture - Blood (collected 05-29-25 @ 13:45)  Source: Blood Blood-Peripheral  Gram Stain (05-30-25 @ 01:47):    Growth in anaerobic bottle: Gram Positive Cocci in Pairs and Chains    Growth in aerobic bottle: Gram Positive Cocci in Pairs and Chains  Final Report (05-31-25 @ 18:43):    Growth in aerobic and anaerobic bottles: Streptococcus agalactiae (Group    B)    See previous culture [id number]    Culture - Blood (collected 05-29-25 @ 13:30)  Source: Blood Blood-Peripheral  Gram Stain (05-30-25 @ 01:45):    Growth in aerobic bottle: Gram Positive Cocci in Pairs and Chains    Growth in anaerobic bottle: Gram Positive Cocci in Pairs and Chains  Final Report (06-02-25 @ 15:48):    Growth in aerobic and anaerobic bottles: Streptococcus agalactiae (Group    B) ***********Note************    This isolate demonstrates inducible    clindamycin resistance.    Clindamycin may still be effective in some patients.    Direct identification is available within approximately 3-5    hours either by Blood Panel Multiplexed PCR or Direct    MALDI-TOF. Details: [email or web address]  Organism: Blood Culture PCR  Streptococcus agalactiae (Group B) (06-02-25 @ 15:48)  Organism: Streptococcus agalactiae (Group B) (06-02-25 @ 15:48)      -  Levofloxacin: S 0.5      -  Clindamycin: R 0.25      -  Vancomycin: S 0.5      -  Ceftriaxone: S <=0.25      -  Tetracycline: R >4      Method Type: TATIANA      -  Penicillin: S <=0.03 Predicts results for ampicillin, amoxicillin, amoxicillin/clavulanate, ampicillin/sulbactam, 1st, 2nd and 3rd generation cephalosporins and carbapenems.  Organism: Blood Culture PCR (06-02-25 @ 15:48)      -  Streptococcus agalactiae (Group B): Detec      Method Type: PCR    Radiology: reviewed     Medications:  acetaminophen     Tablet .. 650 milliGRAM(s) Oral every 6 hours PRN  apixaban 2.5 milliGRAM(s) Oral every 12 hours  bisacodyl 5 milliGRAM(s) Oral every 12 hours PRN  cefTRIAXone   IVPB 2000 milliGRAM(s) IV Intermittent every 24 hours  chlorhexidine 2% Cloths 1 Application(s) Topical daily  famotidine Injectable 20 milliGRAM(s) IV Push daily  melatonin 5 milliGRAM(s) Oral once  metoprolol succinate  milliGRAM(s) Oral daily  pantoprazole    Tablet 40 milliGRAM(s) Oral before breakfast  polyethylene glycol 3350 17 Gram(s) Oral daily  senna 2 Tablet(s) Oral at bedtime  sodium chloride 0.9%. 1000 milliLiter(s) IV Continuous <Continuous>  zolpidem 5 milliGRAM(s) Oral at bedtime PRN    Current Antimicrobials:  cefTRIAXone   IVPB 2000 milliGRAM(s) IV Intermittent every 24 hours    Prior/Completed Antimicrobials:  azithromycin  IVPB  piperacillin/tazobactam IVPB...  vancomycin  IVPB  
[facility]  BASIL Hooks Y. Patel, S. Shah, G. Casimir  [phone]  ([phone] - weekdays after 5pm and weekends)    Name: GINO GRAHAM  Age/Gender: 89y Female  MRN: [id number]    Interval History:  Patient seen and examined this morning.   No new complaints noted.  Notes reviewed  No concerning overnight events  Afebrile   Allergies: Zosyn (Rash; Urticaria; Hives)      Objective:  Vitals:   T(F): 97.4 (06-02-25 @ 08:41), Max: 98.2 (06-01-25 @ 16:28)  HR: 78 (06-02-25 @ 08:41) (78 - 92)  BP: 110/68 (06-02-25 @ 08:41) (110/68 - 156/94)  RR: 18 (06-02-25 @ 08:41) (18 - 18)  SpO2: 92% (06-02-25 @ 08:41) (92% - 99%)  Physical Examination:  General: no acute distress  HEENT: normocephalic, atraumatic, anicteric  Respiratory: no acc muscle use, breathing comfortably  Cardiovascular: S1 and S2 present  Gastrointestinal: soft, nontender, nondistended  Extremities: no edema, no cyanosis  Skin: no visible rash    Laboratory Studies:  CBC:                       11.8   4.87  )-----------( 220      ( 02 Jun 2025 06:40 )             35.4     WBC Trend:  4.87 06-02-25 @ 06:40  7.37 05-31-25 @ 06:52  2.70 05-30-25 @ 07:20  3.54 05-29-25 @ 13:48    CMP: 06-02    134[L]  |  96  |  15  ----------------------------<  107[H]  3.8   |  25  |  0.63    Ca    8.8      02 Jun 2025 06:39      Creatinine: 0.63 mg/dL (06-02-25 @ 06:39)  Creatinine: 0.72 mg/dL (06-01-25 @ 10:28)  Creatinine: 0.89 mg/dL (05-31-25 @ 06:52)  Creatinine: 0.73 mg/dL (05-30-25 @ 07:19)  Creatinine: 0.75 mg/dL (05-29-25 @ 13:48)    Microbiology: reviewed   Culture - Blood (collected 05-31-25 @ 20:48)  Source: Blood Blood-Peripheral  Preliminary Report (06-02-25 @ 02:02):    No growth at 24 hours    Culture - Blood (collected 05-30-25 @ 07:16)  Source: Blood Blood  Gram Stain (05-30-25 @ 20:47):    Growth in anaerobic bottle: Gram Positive Cocci in Pairs and Chains    Growth in aerobic bottle: Gram Positive Cocci in Pairs and Chains  Final Report (05-31-25 @ 15:48):    Growth in aerobic and anaerobic bottles: Streptococcus agalactiae (Group    B)    See previous culture [id number]    Culture - Blood (collected 05-30-25 @ 07:16)  Source: Blood Blood  Gram Stain (05-30-25 @ 19:54):    Growth in aerobic and anaerobic bottles: Gram Positive Cocci in Pairs and    Chains  Final Report (05-31-25 @ 15:47):    Growth in aerobic and anaerobic bottles: Streptococcus agalactiae (Group    B)    See previous culture [id number]    Urinalysis with Rflx Culture (collected 05-29-25 @ 16:21)    Culture - Blood (collected 05-29-25 @ 13:45)  Source: Blood Blood-Peripheral  Gram Stain (05-30-25 @ 01:47):    Growth in anaerobic bottle: Gram Positive Cocci in Pairs and Chains    Growth in aerobic bottle: Gram Positive Cocci in Pairs and Chains  Final Report (05-31-25 @ 18:43):    Growth in aerobic and anaerobic bottles: Streptococcus agalactiae (Group    B)    See previous culture [id number]    Culture - Blood (collected 05-29-25 @ 13:30)  Source: Blood Blood-Peripheral  Gram Stain (05-30-25 @ 01:45):    Growth in aerobic bottle: Gram Positive Cocci in Pairs and Chains    Growth in anaerobic bottle: Gram Positive Cocci in Pairs and Chains  Preliminary Report (05-30-25 @ 20:36):    Growth in aerobic and anaerobic bottles: Streptococcus agalactiae (Group    B)    Direct identification is available within approximately 3-5    hours either by Blood Panel Multiplexed PCR or Direct    MALDI-TOF. Details: [email or web address]  Organism: Blood Culture PCR  Streptococcus agalactiae (Group B) (05-31-25 @ 16:22)  Organism: Streptococcus agalactiae (Group B) (05-31-25 @ 16:22)      -  Levofloxacin: S 0.5      -  Clindamycin: S 0.25      -  Vancomycin: S 0.5      -  Ceftriaxone: S <=0.25      -  Tetracycline: R >4      Method Type: TATIANA      -  Penicillin: S <=0.03 Predicts results for ampicillin, amoxicillin, amoxicillin/clavulanate, ampicillin/sulbactam, 1st, 2nd and 3rd generation cephalosporins and carbapenems.  Organism: Blood Culture PCR (05-30-25 @ 02:36)      -  Streptococcus agalactiae (Group B): Detec      Method Type: PCR    05-29-25 @ 14:43 SARS-CoV-2 NotDetec/Influenza A NotDetec/Influenza B NotDetec/RSV NotDetec    Radiology: reviewed     Medications:  acetaminophen     Tablet .. 650 milliGRAM(s) Oral every 6 hours PRN  apixaban 2.5 milliGRAM(s) Oral every 12 hours  bisacodyl 5 milliGRAM(s) Oral every 12 hours PRN  cefTRIAXone   IVPB 2000 milliGRAM(s) IV Intermittent every 24 hours  chlorhexidine 2% Cloths 1 Application(s) Topical daily  famotidine Injectable 20 milliGRAM(s) IV Push daily  melatonin 5 milliGRAM(s) Oral once  metoprolol succinate  milliGRAM(s) Oral daily  pantoprazole    Tablet 40 milliGRAM(s) Oral before breakfast  polyethylene glycol 3350 17 Gram(s) Oral daily  senna 2 Tablet(s) Oral at bedtime  zolpidem 5 milliGRAM(s) Oral at bedtime PRN    Current Antimicrobials:  cefTRIAXone   IVPB 2000 milliGRAM(s) IV Intermittent every 24 hours    Prior/Completed Antimicrobials:  azithromycin  IVPB  piperacillin/tazobactam IVPB...  vancomycin  IVPB  
    Subjective: Patient seen and examined. No new events except as noted.     SUBJECTIVE/ROS:  nad      MEDICATIONS:  MEDICATIONS  (STANDING):  apixaban 2.5 milliGRAM(s) Oral every 12 hours  cefTRIAXone   IVPB 2000 milliGRAM(s) IV Intermittent every 24 hours  chlorhexidine 2% Cloths 1 Application(s) Topical daily  famotidine Injectable 20 milliGRAM(s) IV Push daily  melatonin 5 milliGRAM(s) Oral once  metoprolol succinate  milliGRAM(s) Oral daily  pantoprazole    Tablet 40 milliGRAM(s) Oral before breakfast  polyethylene glycol 3350 17 Gram(s) Oral daily  senna 2 Tablet(s) Oral at bedtime      PHYSICAL EXAM:  T(C): 36.5 (06-02-25 @ 23:42), Max: 36.8 (06-02-25 @ 15:35)  HR: 91 (06-02-25 @ 23:42) (85 - 91)  BP: 132/73 (06-02-25 @ 23:42) (107/71 - 132/73)  RR: 18 (06-02-25 @ 23:42) (18 - 18)  SpO2: 93% (06-02-25 @ 23:42) (92% - 93%)  Wt(kg): --  I&O's Summary    02 Jun 2025 07:01  -  03 Jun 2025 07:00  --------------------------------------------------------  IN: 240 mL / OUT: 0 mL / NET: 240 mL            JVP: Normal  Neck: supple  Lung: clear   CV: S1 S2 ,  Abd: soft  Ext: No edema  neuro: Awake / alert  Psych: flat affect  Skin: normal``    LABS/DATA:    CARDIAC MARKERS:                                11.8   4.87  )-----------( 220      ( 02 Jun 2025 06:40 )             35.4     06-02    134[L]  |  96  |  15  ----------------------------<  107[H]  3.8   |  25  |  0.63    Ca    8.8      02 Jun 2025 06:39      proBNP:   Lipid Profile:   HgA1c:   TSH:             
Date of Service: 06-02-25 @ 16:03    Patient is a 89y old  Female who presents with a chief complaint of AMS (02 Jun 2025 12:42)      Any change in ROS: she seems to be doing  ok    no sob:  on rooma  r:       MEDICATIONS  (STANDING):  apixaban 2.5 milliGRAM(s) Oral every 12 hours  cefTRIAXone   IVPB 2000 milliGRAM(s) IV Intermittent every 24 hours  chlorhexidine 2% Cloths 1 Application(s) Topical daily  famotidine Injectable 20 milliGRAM(s) IV Push daily  melatonin 5 milliGRAM(s) Oral once  metoprolol succinate  milliGRAM(s) Oral daily  pantoprazole    Tablet 40 milliGRAM(s) Oral before breakfast  polyethylene glycol 3350 17 Gram(s) Oral daily  senna 2 Tablet(s) Oral at bedtime    MEDICATIONS  (PRN):  acetaminophen     Tablet .. 650 milliGRAM(s) Oral every 6 hours PRN Temp greater or equal to 38C (100.4F), Mild Pain (1 - 3)  bisacodyl 5 milliGRAM(s) Oral every 12 hours PRN Constipation  zolpidem 5 milliGRAM(s) Oral at bedtime PRN Insomnia    Vital Signs Last 24 Hrs  T(C): 36.3 (02 Jun 2025 08:41), Max: 36.8 (01 Jun 2025 16:28)  T(F): 97.4 (02 Jun 2025 08:41), Max: 98.2 (01 Jun 2025 16:28)  HR: 78 (02 Jun 2025 08:41) (78 - 92)  BP: 110/68 (02 Jun 2025 08:41) (110/68 - 156/94)  BP(mean): --  RR: 18 (02 Jun 2025 08:41) (18 - 18)  SpO2: 92% (02 Jun 2025 08:41) (92% - 99%)    Parameters below as of 02 Jun 2025 08:41  Patient On (Oxygen Delivery Method): room air        I&O's Summary    01 Jun 2025 07:01  -  02 Jun 2025 07:00  --------------------------------------------------------  IN: 240 mL / OUT: 0 mL / NET: 240 mL    02 Jun 2025 07:01  -  02 Jun 2025 16:03  --------------------------------------------------------  IN: 240 mL / OUT: 0 mL / NET: 240 mL          Physical Exam:   GENERAL: NAD, well-groomed, well-developed  HEENT: BRYSON/   Atraumatic, Normocephalic  ENMT: No tonsillar erythema, exudates, or enlargement; Moist mucous membranes, Good dentition, No lesions  NECK: Supple, No JVD, Normal thyroid  CHEST/LUNG: Clear to auscultaion- not much of wheezing   CVS: Regular rate and rhythm; No murmurs, rubs, or gallops  GI: : Soft, Nontender, Nondistended; Bowel sounds present  NERVOUS SYSTEM:  Alert & Oriented X3ric  EXTREMITIES:  - edema  LYMPH: No lymphadenopathy noted  SKIN: No rashes or lesions  ENDOCRINOLOGY: No Thyromegaly  PSYCH: calm     Labs:  ABG - ( 01 Jun 2025 18:58 )  pH, Arterial: 7.56  pH, Blood: x     /  pCO2: 32    /  pO2: 84    / HCO3: 29    / Base Excess: 6.7   /  SaO2: 99.0            23                            11.8   4.87  )-----------( 220      ( 02 Jun 2025 06:40 )             35.4                         10.4   7.37  )-----------( 172      ( 31 May 2025 06:52 )             31.3                         10.8   2.70  )-----------( 154      ( 30 May 2025 07:20 )             33.8     06-02    134[L]  |  96  |  15  ----------------------------<  107[H]  3.8   |  25  |  0.63  06-01    134[L]  |  97  |  20  ----------------------------<  146[H]  3.6   |  24  |  0.72  05-31    133[L]  |  101  |  32[H]  ----------------------------<  122[H]  4.5   |  22  |  0.89  05-30    137  |  104  |  17  ----------------------------<  162[H]  4.2   |  21[L]  |  0.73    Ca    8.8      02 Jun 2025 06:39  Ca    8.7      01 Jun 2025 10:28      CAPILLARY BLOOD GLUCOSE              Urinalysis Basic - ( 02 Jun 2025 06:39 )    Color: x / Appearance: x / SG: x / pH: x  Gluc: 107 mg/dL / Ketone: x  / Bili: x / Urobili: x   Blood: x / Protein: x / Nitrite: x   Leuk Esterase: x / RBC: x / WBC x   Sq Epi: x / Non Sq Epi: x / Bacteria: x        rad< from: CT Chest w/ IV Cont (05.29.25 @ 17:25) >    1. Mild to moderate CHF and/or volume overload.    2. Additional findings suspect for mild right middle and lower lobe   pneumonia on a background of mild bronchiectasis.    3. No colitis or any acute abnormality demonstrated in the abdomen or   pelvis.    --- End of Report ---    < end of copied text >      RECENT CULTURES:  06-01 @ 10:51 Blood Blood-Venous                No growth at 24 hours    05-31 @ 20:48 Blood Blood-Peripheral                No growth at 24 hours    05-30 @ 07:16 Blood Blood       Growth in anaerobic bottle: Gram Positive Cocci in Pairs and Chains  Growth in aerobic bottle: Gram Positive Cocci in Pairs and Chains           Growth in aerobic and anaerobic bottles: Streptococcus agalactiae (Group  B)  See previous culture [id number]    05-29 @ 13:45 Blood Blood-Peripheral       Growth in anaerobic bottle: Gram Positive Cocci in Pairs and Chains  Growth in aerobic bottle: Gram Positive Cocci in Pairs and Chains           Growth in aerobic and anaerobic bottles: Streptococcus agalactiae (Group  B)  See previous culture [id number]    05-29 @ 13:30 Blood Blood-Peripheral   PCR    Growth in aerobic bottle: Gram Positive Cocci in Pairs and Chains  Growth in anaerobic bottle: Gram Positive Cocci in Pairs and Chains    Blood Culture PCR  Streptococcus agalactiae (Group B)  Blood Culture PCR     Growth in aerobic and anaerobic bottles: Streptococcus agalactiae (Group  B) ***********Note************  This isolate demonstrates inducible  clindamycin resistance.  Clindamycin may still be effective in some patients.  Direct identification is available within approximately 3-5  hours either by Blood Panel Multiplexed PCR or Direct  MALDI-TOF. Details: [email or web address]          RESPIRATORY CULTURES:          Studies  Chest X-RAY  CT SCAN Chest   Venous Dopplers: LE:   CT Abdomen  Others              
Date of Service: 06-04-25 @ 16:29    Patient is a 89y old  Female who presents with a chief complaint of AMS (04 Jun 2025 15:13)      Any change in ROS: the daughter is at bedside;  seems to be doing  ok   no sob:  alert and awke and is on room air:  no wheezing:       MEDICATIONS  (STANDING):  apixaban 2.5 milliGRAM(s) Oral every 12 hours  cefTRIAXone   IVPB 2000 milliGRAM(s) IV Intermittent every 24 hours  chlorhexidine 2% Cloths 1 Application(s) Topical daily  famotidine Injectable 20 milliGRAM(s) IV Push daily  melatonin 5 milliGRAM(s) Oral once  metoprolol succinate  milliGRAM(s) Oral daily  pantoprazole    Tablet 40 milliGRAM(s) Oral before breakfast  polyethylene glycol 3350 17 Gram(s) Oral daily  senna 2 Tablet(s) Oral at bedtime  sodium chloride 0.9%. 1000 milliLiter(s) (60 mL/Hr) IV Continuous <Continuous>    MEDICATIONS  (PRN):  acetaminophen     Tablet .. 650 milliGRAM(s) Oral every 6 hours PRN Temp greater or equal to 38C (100.4F), Mild Pain (1 - 3)  bisacodyl 5 milliGRAM(s) Oral every 12 hours PRN Constipation  zolpidem 5 milliGRAM(s) Oral at bedtime PRN Insomnia    Vital Signs Last 24 Hrs  T(C): 36.6 (04 Jun 2025 16:05), Max: 37 (04 Jun 2025 05:07)  T(F): 97.9 (04 Jun 2025 16:05), Max: 98.6 (04 Jun 2025 05:07)  HR: 78 (04 Jun 2025 16:05) (78 - 97)  BP: 108/69 (04 Jun 2025 16:05) (108/69 - 120/79)  BP(mean): --  RR: 18 (04 Jun 2025 16:05) (18 - 20)  SpO2: 93% (04 Jun 2025 16:05) (90% - 94%)    Parameters below as of 04 Jun 2025 08:20  Patient On (Oxygen Delivery Method): room air        I&O's Summary        Physical Exam:   GENERAL: NAD, well-groomed, well-developed  HEENT: BRYSON/   Atraumatic, Normocephalic  ENMT: No tonsillar erythema, exudates, or enlargement; Moist mucous membranes, Good dentition, No lesions  NECK: Supple, No JVD, Normal thyroid  CHEST/LUNG: Clear to auscultaion  CVS: Regular rate and rhythm; No murmurs, rubs, or gallops  GI: : Soft, Nontender, Nondistended; Bowel sounds present  NERVOUS SYSTEM:  Alert & Oriented X3  EXTREMITIES: - edema  LYMPH: No lymphadenopathy noted  SKIN: No rashes or lesions  ENDOCRINOLOGY: No Thyromegaly  PSYCH: calm     Labs:  26, 23                            11.3   5.13  )-----------( 268      ( 04 Jun 2025 07:19 )             34.2                         12.5   3.63  )-----------( 242      ( 03 Jun 2025 09:46 )             38.4                         11.8   4.87  )-----------( 220      ( 02 Jun 2025 06:40 )             35.4     06-04    131[L]  |  93[L]  |  19  ----------------------------<  109[H]  3.7   |  24  |  0.70  06-03    132[L]  |  95[L]  |  14  ----------------------------<  148[H]  3.8   |  21[L]  |  0.77  06-02    134[L]  |  96  |  15  ----------------------------<  107[H]  3.8   |  25  |  0.63  06-01    134[L]  |  97  |  20  ----------------------------<  146[H]  3.6   |  24  |  0.72    Ca    8.3[L]      04 Jun 2025 07:19  Ca    8.6      03 Jun 2025 09:46  Phos  3.0     06-03  Mg     2.0     06-03    TPro  7.5  /  Alb  2.6[L]  /  TBili  1.1  /  DBili  x   /  AST  37  /  ALT  49[H]  /  AlkPhos  140[H]  06-03    CAPILLARY BLOOD GLUCOSE          LIVER FUNCTIONS - ( 03 Jun 2025 09:46 )  Alb: 2.6 g/dL / Pro: 7.5 g/dL / ALK PHOS: 140 U/L / ALT: 49 U/L / AST: 37 U/L / GGT: x           PT/INR - ( 03 Jun 2025 09:46 )   PT: 20.1 sec;   INR: 1.76 ratio         PTT - ( 03 Jun 2025 09:46 )  PTT:29.9 sec  Urinalysis Basic - ( 04 Jun 2025 07:19 )    Color: x / Appearance: x / SG: x / pH: x  Gluc: 109 mg/dL / Ketone: x  / Bili: x / Urobili: x   Blood: x / Protein: x / Nitrite: x   Leuk Esterase: x / RBC: x / WBC x   Sq Epi: x / Non Sq Epi: x / Bacteria: x      D-Dimer Assay, Quantitative: 599 ng/mL DDU (06-03 @ 16:12)  Procalcitonin: 0.15 ng/mL (06-03 @ 16:12)        RECENT CULTURES:  06-01 @ 10:51 Blood Blood-Venous         rad< from: [facility] Duplex Lower Ext Vein Scan, Bilat (06.04.25 @ 14:23) >  RIGHT:  Normal compressibility of the RIGHT common femoral, femoral and popliteal   veins.  Doppler examination shows normal spontaneous and phasic flow.  No RIGHT calf vein thrombosis is detected.    LEFT:  Normal compressibility of the LEFT common femoral, femoral and popliteal   veins.  Doppler examination shows normal spontaneous and phasic flow.  No LEFT calf vein thrombosis is detected.    IMPRESSION:  No evidence of deep venous thrombosis in either lower extremity.            --- End of Report ---            YOBANI WILKINS MD; Attending Interventional Radiologist  This document has been electronically signed. Jun 4 2025  3:05PM    < end of copied text >  < from: CT Angio Chest PE Protocol w/ IV Cont (06.04.25 @ 13:52) >  No pulmonary embolism.  Small to moderate right and small left pleural effusions with associated   atelectasis.  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  Cardiomegaly. Moderate pericardial effusion.    < end of copied text >         No growth at 72 Hours    05-31 @ 20:48 Blood Blood-Peripheral                No growth at 72 Hours    05-30 @ 07:16 Blood Blood       Growth in anaerobic bottle: Gram Positive Cocci in Pairs and Chains  Growth in aerobic bottle: Gram Positive Cocci in Pairs and Chains           Growth in aerobic and anaerobic bottles: Streptococcus agalactiae (Group  B)  See previous culture [id number]    05-29 @ 13:45 Blood Blood-Peripheral       Growth in anaerobic bottle: Gram Positive Cocci in Pairs and Chains  Growth in aerobic bottle: Gram Positive Cocci in Pairs and Chains           Growth in aerobic and anaerobic bottles: Streptococcus agalactiae (Group  B)  See previous culture [id number]    05-29 @ 13:30 Blood Blood-Peripheral   PCR    Growth in aerobic bottle: Gram Positive Cocci in Pairs and Chains  Growth in anaerobic bottle: Gram Positive Cocci in Pairs and Chains    Blood Culture PCR  Streptococcus agalactiae (Group B)  Blood Culture PCR     Growth in aerobic and anaerobic bottles: Streptococcus agalactiae (Group  B) ***********Note************  This isolate demonstrates inducible  clindamycin resistance.  Clindamycin may still be effective in some patients.  Direct identification is available within approximately 3-5  hours either by Blood Panel Multiplexed PCR or Direct  MALDI-TOF. Details: [email or web address]          RESPIRATORY CULTURES:          Studies  Chest X-RAY  CT SCAN Chest   Venous Dopplers: LE:   CT Abdomen  Others              
[facility]  BASIL Hooks Y. Patel, S. Shah, G. Casimir  [phone]  ([phone] - weekdays after 5pm and weekends)    Name: GINO GRAHAM  Age/Gender: 89y Female  MRN: [id number]    Interval History:  Patient seen and examined this morning.   No new complaints noted.  Notes reviewed  No concerning overnight events  Afebrile   Allergies: Zosyn (Rash; Urticaria; Hives)      Objective:  Vitals:   T(F): 97.7 (06-03-25 @ 08:58), Max: 98.2 (06-02-25 @ 15:35)  HR: 87 (06-03-25 @ 08:58) (85 - 91)  BP: 120/72 (06-03-25 @ 08:58) (107/71 - 132/73)  RR: 18 (06-03-25 @ 08:58) (18 - 18)  SpO2: 94% (06-03-25 @ 08:58) (92% - 94%)  Physical Examination:  General: no acute distress, nontoxic appearing   HEENT: normocephalic, atraumatic, anicteric  Respiratory: no acc muscle use, breathing comfortably  Cardiovascular: S1 and S2 present  Gastrointestinal: soft, nontender, nondistended  Extremities: no edema, no cyanosis  Skin: no visible rash    Laboratory Studies:  CBC:                       12.5   3.63  )-----------( 242      ( 03 Jun 2025 09:46 )             38.4     WBC Trend:  3.63 06-03-25 @ 09:46  4.87 06-02-25 @ 06:40  7.37 05-31-25 @ 06:52  2.70 05-30-25 @ 07:20  3.54 05-29-25 @ 13:48    CMP: 06-03    132[L]  |  95[L]  |  14  ----------------------------<  148[H]  3.8   |  21[L]  |  0.77    Ca    8.6      03 Jun 2025 09:46  Phos  3.0     06-03  Mg     2.0     06-03    TPro  7.5  /  Alb  2.6[L]  /  TBili  1.1  /  DBili  x   /  AST  37  /  ALT  49[H]  /  AlkPhos  140[H]  06-03    Creatinine: 0.77 mg/dL (06-03-25 @ 09:46)  Creatinine: 0.63 mg/dL (06-02-25 @ 06:39)  Creatinine: 0.72 mg/dL (06-01-25 @ 10:28)  Creatinine: 0.89 mg/dL (05-31-25 @ 06:52)  Creatinine: 0.73 mg/dL (05-30-25 @ 07:19)  Creatinine: 0.75 mg/dL (05-29-25 @ 13:48)    LIVER FUNCTIONS - ( 03 Jun 2025 09:46 )  Alb: 2.6 g/dL / Pro: 7.5 g/dL / ALK PHOS: 140 U/L / ALT: 49 U/L / AST: 37 U/L / GGT: x           Microbiology: reviewed   Culture - Blood (collected 06-01-25 @ 10:51)  Source: Blood Blood-Venous  Preliminary Report (06-02-25 @ 14:02):    No growth at 24 hours    Culture - Blood (collected 05-31-25 @ 20:48)  Source: Blood Blood-Peripheral  Preliminary Report (06-03-25 @ 02:01):    No growth at 48 Hours    Culture - Blood (collected 05-30-25 @ 07:16)  Source: Blood Blood  Gram Stain (05-30-25 @ 20:47):    Growth in anaerobic bottle: Gram Positive Cocci in Pairs and Chains    Growth in aerobic bottle: Gram Positive Cocci in Pairs and Chains  Final Report (05-31-25 @ 15:48):    Growth in aerobic and anaerobic bottles: Streptococcus agalactiae (Group    B)    See previous culture [id number]    Culture - Blood (collected 05-30-25 @ 07:16)  Source: Blood Blood  Gram Stain (05-30-25 @ 19:54):    Growth in aerobic and anaerobic bottles: Gram Positive Cocci in Pairs and    Chains  Final Report (05-31-25 @ 15:47):    Growth in aerobic and anaerobic bottles: Streptococcus agalactiae (Group    B)    See previous culture [id number]    Urinalysis with Rflx Culture (collected 05-29-25 @ 16:21)    Culture - Blood (collected 05-29-25 @ 13:45)  Source: Blood Blood-Peripheral  Gram Stain (05-30-25 @ 01:47):    Growth in anaerobic bottle: Gram Positive Cocci in Pairs and Chains    Growth in aerobic bottle: Gram Positive Cocci in Pairs and Chains  Final Report (05-31-25 @ 18:43):    Growth in aerobic and anaerobic bottles: Streptococcus agalactiae (Group    B)    See previous culture [id number]    Culture - Blood (collected 05-29-25 @ 13:30)  Source: Blood Blood-Peripheral  Gram Stain (05-30-25 @ 01:45):    Growth in aerobic bottle: Gram Positive Cocci in Pairs and Chains    Growth in anaerobic bottle: Gram Positive Cocci in Pairs and Chains  Final Report (06-02-25 @ 15:48):    Growth in aerobic and anaerobic bottles: Streptococcus agalactiae (Group    B) ***********Note************    This isolate demonstrates inducible    clindamycin resistance.    Clindamycin may still be effective in some patients.    Direct identification is available within approximately 3-5    hours either by Blood Panel Multiplexed PCR or Direct    MALDI-TOF. Details: [email or web address]  Organism: Blood Culture PCR  Streptococcus agalactiae (Group B) (06-02-25 @ 15:48)  Organism: Streptococcus agalactiae (Group B) (06-02-25 @ 15:48)      -  Levofloxacin: S 0.5      -  Clindamycin: R 0.25      -  Vancomycin: S 0.5      -  Ceftriaxone: S <=0.25      -  Tetracycline: R >4      Method Type: TATIANA      -  Penicillin: S <=0.03 Predicts results for ampicillin, amoxicillin, amoxicillin/clavulanate, ampicillin/sulbactam, 1st, 2nd and 3rd generation cephalosporins and carbapenems.  Organism: Blood Culture PCR (06-02-25 @ 15:48)      -  Streptococcus agalactiae (Group B): Detec      Method Type: PCR    05-29-25 @ 14:43 SARS-CoV-2 NotDetec/Influenza A NotDetec/Influenza B NotDetec/RSV NotDetec    Radiology: reviewed     Medications:  acetaminophen     Tablet .. 650 milliGRAM(s) Oral every 6 hours PRN  apixaban 2.5 milliGRAM(s) Oral every 12 hours  bisacodyl 5 milliGRAM(s) Oral every 12 hours PRN  cefTRIAXone   IVPB 2000 milliGRAM(s) IV Intermittent every 24 hours  chlorhexidine 2% Cloths 1 Application(s) Topical daily  famotidine Injectable 20 milliGRAM(s) IV Push daily  melatonin 5 milliGRAM(s) Oral once  metoprolol succinate  milliGRAM(s) Oral daily  pantoprazole    Tablet 40 milliGRAM(s) Oral before breakfast  polyethylene glycol 3350 17 Gram(s) Oral daily  senna 2 Tablet(s) Oral at bedtime  zolpidem 5 milliGRAM(s) Oral at bedtime PRN    Current Antimicrobials:  cefTRIAXone   IVPB 2000 milliGRAM(s) IV Intermittent every 24 hours    Prior/Completed Antimicrobials:  azithromycin  IVPB  piperacillin/tazobactam IVPB...  vancomycin  IVPB  
[facility]  Reggie Mercedes MD PhD, Deepthi Freitas MD, Kinsey Rubio MD, Wes Grullon MD, Manny Ashford MD  and providing coverage with Katya Baumann MD  Providing Infectious Disease Consultations at [facility], [facility], [facility]'s    Office# [phone] to schedule follow up appointments  Answering Service for urgent calls or New Consults [phone]  Cell# to text for urgent issues Reggie Mercedes [phone]     infectious diseases progress note:    GINO GRAHAM is a 89y y. o. Female patient    Overnight and events of the last 24hrs reviewed    Allergies    Zosyn (Rash; Urticaria; Hives)    Intolerances        ANTIBIOTICS/RELEVANT:  antimicrobials  cefTRIAXone   IVPB 2000 milliGRAM(s) IV Intermittent every 24 hours    immunologic:    OTHER:  acetaminophen     Tablet .. 650 milliGRAM(s) Oral every 6 hours PRN  apixaban 2.5 milliGRAM(s) Oral every 12 hours  bisacodyl 5 milliGRAM(s) Oral every 12 hours PRN  chlorhexidine 2% Cloths 1 Application(s) Topical daily  famotidine Injectable 20 milliGRAM(s) IV Push daily  metoprolol succinate  milliGRAM(s) Oral daily  pantoprazole    Tablet 40 milliGRAM(s) Oral before breakfast  polyethylene glycol 3350 17 Gram(s) Oral daily  senna 2 Tablet(s) Oral at bedtime  zolpidem 5 milliGRAM(s) Oral at bedtime PRN      Objective:  Vital Signs Last 24 Hrs  T(C): 36.4 (31 May 2025 15:24), Max: 36.7 (31 May 2025 00:07)  T(F): 97.5 (31 May 2025 15:24), Max: 98 (31 May 2025 00:07)  HR: 85 (31 May 2025 15:24) (65 - 85)  BP: 153/75 (31 May 2025 15:24) (137/73 - 153/75)  BP(mean): --  RR: 18 (31 May 2025 15:24) (18 - 18)  SpO2: 93% (31 May 2025 15:24) (93% - 94%)    Parameters below as of 31 May 2025 15:24  Patient On (Oxygen Delivery Method): room air        T(C): 36.4 (05-31-25 @ 15:24), Max: 38.3 (05-29-25 @ 16:42)  T(C): 36.4 (05-31-25 @ 15:24), Max: 39.8 (05-29-25 @ 15:12)  T(C): 36.4 (05-31-25 @ 15:24), Max: 39.8 (05-29-25 @ 15:12)    PHYSICAL EXAM:  HEENT: NC atraumatic  Neck: supple  Respiratory: no accessory muscle use, breathing comfortably  Cardiovascular: distant  Gastrointestinal: normal appearing, nondistended  Extremities: no clubbing, no cyanosis,        LABS:                          10.4   7.37  )-----------( 172      ( 31 May 2025 06:52 )             31.3       WBC  7.37 05-31 @ 06:52  2.70 05-30 @ 07:20  3.54 05-29 @ 13:48      05-31    133[L]  |  101  |  32[H]  ----------------------------<  122[H]  4.5   |  22  |  0.89    Ca    8.8      31 May 2025 06:52        Creatinine: 0.89 mg/dL (05-31-25 @ 06:52)  Creatinine: 0.73 mg/dL (05-30-25 @ 07:19)  Creatinine: 0.75 mg/dL (05-29-25 @ 13:48)        Urinalysis Basic - ( 31 May 2025 06:52 )    Color: x / Appearance: x / SG: x / pH: x  Gluc: 122 mg/dL / Ketone: x  / Bili: x / Urobili: x   Blood: x / Protein: x / Nitrite: x   Leuk Esterase: x / RBC: x / WBC x   Sq Epi: x / Non Sq Epi: x / Bacteria: x            INFLAMMATORY MARKERS      MICROBIOLOGY:    Culture - Blood (05.30.25 @ 07:16)    Gram Stain:   Growth in aerobic and anaerobic bottles: Gram Positive Cocci in Pairs and  Chains   Specimen Source: Blood Blood   Culture Results:   Growth in aerobic and anaerobic bottles: Streptococcus agalactiae (Group  B)  See previous culture [id number]    Culture - Blood (05.29.25 @ 13:30)    -  Streptococcus agalactiae (Group B): Detec   -  Ceftriaxone: S <=0.25   -  Clindamycin: S 0.25   -  Levofloxacin: S 0.5   -  Penicillin: S <=0.03 Predicts results for ampicillin, amoxicillin, amoxicillin/clavulanate, ampicillin/sulbactam, 1st, 2nd and 3rd generation cephalosporins and carbapenems.   -  Tetracycline: R >4   -  Vancomycin: S 0.5   Gram Stain:   Growth in aerobic bottle: Gram Positive Cocci in Pairs and Chains  Growth in anaerobic bottle: Gram Positive Cocci in Pairs and Chains   Specimen Source: Blood Blood-Peripheral   Organism: Blood Culture PCR   Organism: Streptococcus agalactiae (Group B)   Culture Results:   Growth in aerobic and anaerobic bottles: Streptococcus agalactiae (Group  B)  Direct identification is available within approximately 3-5  hours either by Blood Panel Multiplexed PCR or Direct  MALDI-TOF. Details: [email or web address]   Organism Identification: Blood Culture PCR  Streptococcus agalactiae (Group B)   Method Type: TATIANA   Method Type: PCR        RADIOLOGY & ADDITIONAL STUDIES:  
[facility]   MD LEVAR AGUILAR MD SAKIL BHUIYAN, MD MARIA SANTIAGO, DOYLE    TEL:  OFFICE: [phone]  From 5pm-7am Answering Service [phone]    -- RENAL FOLLOW UP NOTE ---Date of Service 06-03-25 @ 13:22    Patient is a 89y old  Female who presents with a chief complaint of AMS       Patient seen and examined at bedside. No chest pain/sob    VITALS:  T(F): 97.9 (06-03-25 @ 12:19), Max: 101 (06-03-25 @ 11:00)  HR: 87 (06-03-25 @ 08:58)  BP: 120/72 (06-03-25 @ 08:58)  RR: 18 (06-03-25 @ 08:58)  SpO2: 94% (06-03-25 @ 08:58)  Wt(kg): --    06-02 @ 07:01  -  06-03 @ 07:00  --------------------------------------------------------  IN: 240 mL / OUT: 0 mL / NET: 240 mL          PHYSICAL EXAM:  General: NAD  Neck: No JVD  Respiratory: CTAB, no wheezes, rales or rhonchi  Cardiovascular: S1, S2, RRR  Gastrointestinal: BS+, soft, NT/ND  Extremities: No peripheral edema    Hospital Medications:   MEDICATIONS  (STANDING):  apixaban 2.5 milliGRAM(s) Oral every 12 hours  cefTRIAXone   IVPB 2000 milliGRAM(s) IV Intermittent every 24 hours  chlorhexidine 2% Cloths 1 Application(s) Topical daily  famotidine Injectable 20 milliGRAM(s) IV Push daily  melatonin 5 milliGRAM(s) Oral once  metoprolol succinate  milliGRAM(s) Oral daily  pantoprazole    Tablet 40 milliGRAM(s) Oral before breakfast  polyethylene glycol 3350 17 Gram(s) Oral daily  senna 2 Tablet(s) Oral at bedtime      LABS:  06-03    132[L]  |  95[L]  |  14  ----------------------------<  148[H]  3.8   |  21[L]  |  0.77    Ca    8.6      03 Jun 2025 09:46  Phos  3.0     06-03  Mg     2.0     06-03    TPro  7.5  /  Alb  2.6[L]  /  TBili  1.1  /  DBili      /  AST  37  /  ALT  49[H]  /  AlkPhos  140[H]  06-03    Creatinine Trend: 0.77 <--, 0.63 <--, 0.72 <--, 0.89 <--, 0.73 <--, 0.75 <--    Albumin: 2.6 g/dL (06-03 @ 09:46)  Phosphorus: 3.0 mg/dL (06-03 @ 09:46)                              12.5   3.63  )-----------( 242      ( 03 Jun 2025 09:46 )             38.4     Urine Studies:  Urinalysis - [06-03-25 @ 09:46]      Color  / Appearance  / SG  / pH       Gluc 148 / Ketone   / Bili  / Urobili        Blood  / Protein  / Leuk Est  / Nitrite       RBC  / WBC  / Hyaline  / Gran  / Sq Epi  / Non Sq Epi  / Bacteria       TSH 0.87      [05-31-25 @ 06:52]      Syphilis Screen (Treponema Pallidum Ab) Negative      [05-31-25 @ 06:52]    RADIOLOGY & ADDITIONAL STUDIES:  
Neurology      S; patient seen. doing okay        Medications: MEDICATIONS  (STANDING):  apixaban 2.5 milliGRAM(s) Oral every 12 hours  cefTRIAXone   IVPB 2000 milliGRAM(s) IV Intermittent every 24 hours  chlorhexidine 2% Cloths 1 Application(s) Topical daily  famotidine Injectable 20 milliGRAM(s) IV Push daily  melatonin 5 milliGRAM(s) Oral once  metoprolol succinate  milliGRAM(s) Oral daily  pantoprazole    Tablet 40 milliGRAM(s) Oral before breakfast  polyethylene glycol 3350 17 Gram(s) Oral daily  senna 2 Tablet(s) Oral at bedtime    MEDICATIONS  (PRN):  acetaminophen     Tablet .. 650 milliGRAM(s) Oral every 6 hours PRN Temp greater or equal to 38C (100.4F), Mild Pain (1 - 3)  bisacodyl 5 milliGRAM(s) Oral every 12 hours PRN Constipation  zolpidem 5 milliGRAM(s) Oral at bedtime PRN Insomnia       Vitals:  Vital Signs Last 24 Hrs  T(C): 37 (04 Jun 2025 05:07), Max: 38.3 (03 Jun 2025 11:00)  T(F): 98.6 (04 Jun 2025 05:07), Max: 101 (03 Jun 2025 11:00)  HR: 94 (04 Jun 2025 06:10) (78 - 97)  BP: 116/75 (04 Jun 2025 06:10) (95/53 - 120/79)  BP(mean): --  RR: 20 (04 Jun 2025 05:07) (18 - 20)  SpO2: 92% (04 Jun 2025 05:07) (90% - 94%)    Parameters below as of 04 Jun 2025 05:07  Patient On (Oxygen Delivery Method): room air                  General Exam:   General Appearance: Appropriately dressed and in no acute distress       Head: Normocephalic, atraumatic and no dysmorphic features  Ear, Nose, and Throat: Moist mucous membranes  CVS: S1S2+  Resp: No SOB, no wheeze or rhonchi  GI: soft NT/ND  Extremities: No edema or cyanosis  Skin: No bruises or rashes     Neurological Exam:  Mental Status: Awake, alert and oriented x 2.  Able to follow simple and complex verbal commands. Able to name and repeat. fluent speech. No obvious aphasia or dysarthria noted.   Cranial Nerves: PERRL, EOMI, VFFC, sensation V1-V3 intact,  no obvious facial asymmetry, equal elevation of palate, scm/trap 5/5, tongue is midline on protrusion.   hearing is grossly intact.   Motor: Normal bulk, tone and strength throughout. Fine finger movements were intact and symmetric. no tremors or drift noted.    Sensation: Intact to light touch and pinprick throughout. no right/left confusion. no extinction to tactile on DSS.   Reflexes: 1+ throughout at biceps, brachioradialis, triceps, patellars and ankles bilaterally and equal. No clonus. R toe and L toe were both downgoing.  Coordination: No dysmetria on FNF or HKS  Gait: deferred     Data/Labs/Imaging which I personally reviewed.     Labs:       LABS:                          11.3   5.13  )-----------( 268      ( 04 Jun 2025 07:19 )             34.2     06-04    131[L]  |  93[L]  |  19  ----------------------------<  109[H]  3.7   |  24  |  0.70    Ca    8.3[L]      04 Jun 2025 07:19  Phos  3.0     06-03  Mg     2.0     06-03    TPro  7.5  /  Alb  2.6[L]  /  TBili  1.1  /  DBili  x   /  AST  37  /  ALT  49[H]  /  AlkPhos  140[H]  06-03    LIVER FUNCTIONS - ( 03 Jun 2025 09:46 )  Alb: 2.6 g/dL / Pro: 7.5 g/dL / ALK PHOS: 140 U/L / ALT: 49 U/L / AST: 37 U/L / GGT: x           PT/INR - ( 03 Jun 2025 09:46 )   PT: 20.1 sec;   INR: 1.76 ratio         PTT - ( 03 Jun 2025 09:46 )  PTT:29.9 sec  Urinalysis Basic - ( 04 Jun 2025 07:19 )    Color: x / Appearance: x / SG: x / pH: x  Gluc: 109 mg/dL / Ketone: x  / Bili: x / Urobili: x   Blood: x / Protein: x / Nitrite: x   Leuk Esterase: x / RBC: x / WBC x   Sq Epi: x / Non Sq Epi: x / Bacteria: x          Urinalysis with Rflx Culture (collected 05-29-25 @ 16:21)    Culture - Blood (collected 05-29-25 @ 13:45)  Source: Blood Blood-Peripheral  Gram Stain (05-30-25 @ 01:47):    Growth in anaerobic bottle: Gram Positive Cocci in Pairs and Chains    Growth in aerobic bottle: Gram Positive Cocci in Pairs and Chains  Preliminary Report (05-30-25 @ 01:47):    Growth in anaerobic bottle: Gram Positive Cocci in Pairs and Chains    Growth in aerobic bottle: Gram Positive Cocci in Pairs and Chains    Culture - Blood (collected 05-29-25 @ 13:30)  Source: Blood Blood-Peripheral  Gram Stain (05-30-25 @ 01:45):    Growth in aerobic bottle: Gram Positive Cocci in Pairs and Chains    Growth in anaerobic bottle: Gram Positive Cocci in Pairs and Chains  Preliminary Report (05-30-25 @ 01:46):    Growth in aerobic bottle: Gram Positive Cocci in Pairs and Chains    Growth in anaerobic bottle: Gram Positive Cocci in Pairs and Chains    Direct identification is available within approximately 3-5    hours either by Blood Panel Multiplexed PCR or Direct    MALDI-TOF. Details: [email or web address]  Organism: Blood Culture PCR (05-30-25 @ 02:36)  Organism: Blood Culture PCR (05-30-25 @ 02:36)      Method Type: PCR      -  Streptococcus agalactiae (Group B): Detec        < from: CT Head No Cont (05.29.25 @ 22:10) >    ACC: [id number] EXAM:  CT CERVICAL SPINE   ORDERED BY: ERIKA ANNE     ACC: [id number] EXAM:  CT BRAIN   ORDERED BY: ERIKA ANNE     PROCEDURE DATE:  05/29/2025          INTERPRETATION:  CLINICAL Indications:  AMS    COMPARISON: Head CT dated 11/20/2022. MRI brain 11/21/2022    TECHNIQUE: Noncontrast CT of the head. Multiplanar reformations are   submitted.    FINDINGS:  There is periventricular and subcortical white matter hypodensity without   mass effect, nonspecific, likely representing moderate chronic   microvascular ischemic changes. There is no compelling evidence for an   acute transcortical infarction. There is no evidence of mass, mass   effect, midline shift or extra-axial fluid collection. The lateral   ventricles and cortical sulci are age-appropriate in size and   configuration. The patient is status post bilateral ocular lens   replacement surgery. The orbits, mastoid air cells and visualized   paranasal sinuses are unremarkable. The calvarium is intact. Consider   follow-up CT or MRI as clinically warranted.          ============================    CLINICAL INDICATIONS: AMS    COMPARISON: None.    TECHNIQUE: Noncontrast CT of the cervical spine. Multiple contiguous   axial images through the cervical spine as wellas multiplanar   reformatted images are submitted for interpretation without the   administration of intravenous contrast.    FINDINGS:  Motion artifact limits interpretation. Mild chronic height loss involves   the C3, C4, C5, C6 vertebral bodies with small multilevel anterior   bridging osteophytes. Mild disc calcification at C6/C7. Small posterior   ligament calcification. Mild multilevel uncovertebral hypertrophy.  There is no evidence for acute fracture. A normal lordosis is noted.   Craniocervical junction is normal. The remaining cervicovertebral body   heights and remaining intervertebral disc spaces are preserved. There is   no prevertebral soft tissue abnormality. The odontoid process is intact.    Thyroid gland is unremarkable. Theairway is patent. Moderate right   apical pleural thickening/effusion. Please refer to the preceding chest   CT for further details.    Evaluation of the individual levels:  C2/C3 level: No spinal canal stenosis or foraminal narrowing.  C3/C4 level: No spinal canal stenosis or foraminal narrowing.  C4/C5 level: No spinal canal stenosis or foraminal narrowing.  C5/C6 level: No spinal canal stenosis or foraminal narrowing.  C6/C7 level: Mild right-sided foraminal narrowing. No spinal canal   stenosis or left-sided foraminal narrowing.  C7/T1 level: No spinal canal stenosis or foraminal narrowing.    IMPRESSION:    CT head: Moderate chronic microvascular changes without evidence of an   acute transcortical infarction or hemorrhage.    CT C-spine: Mild to moderate spondylosis. No acute osseous abnormality.   Consider MRI as clinically warranted.    --- End of Report ---            TONY SOTELO MD; Attending Radiologist  This document has been electronically signed. May 29 2025 10:47PM    < end of copied text >  
Neurology      S; patient seen. doing okay       Medications: MEDICATIONS  (STANDING):  apixaban 2.5 milliGRAM(s) Oral every 12 hours  cefTRIAXone   IVPB 2000 milliGRAM(s) IV Intermittent every 24 hours  chlorhexidine 2% Cloths 1 Application(s) Topical daily  famotidine Injectable 20 milliGRAM(s) IV Push daily  melatonin 5 milliGRAM(s) Oral once  metoprolol succinate  milliGRAM(s) Oral daily  pantoprazole    Tablet 40 milliGRAM(s) Oral before breakfast  polyethylene glycol 3350 17 Gram(s) Oral daily  senna 2 Tablet(s) Oral at bedtime    MEDICATIONS  (PRN):  acetaminophen     Tablet .. 650 milliGRAM(s) Oral every 6 hours PRN Temp greater or equal to 38C (100.4F), Mild Pain (1 - 3)  bisacodyl 5 milliGRAM(s) Oral every 12 hours PRN Constipation  zolpidem 5 milliGRAM(s) Oral at bedtime PRN Insomnia       Vitals:  Vital Signs Last 24 Hrs  T(C): 36.6 (01 Jun 2025 10:42), Max: 36.6 (01 Jun 2025 10:42)  T(F): 97.8 (01 Jun 2025 10:42), Max: 97.8 (01 Jun 2025 10:42)  HR: 94 (01 Jun 2025 10:42) (65 - 94)  BP: 113/63 (01 Jun 2025 10:42) (113/63 - 159/80)  BP(mean): --  RR: 18 (01 Jun 2025 10:42) (18 - 18)  SpO2: 94% (01 Jun 2025 10:42) (93% - 94%)    Parameters below as of 01 Jun 2025 10:42  Patient On (Oxygen Delivery Method): room air              General Exam:   General Appearance: Appropriately dressed and in no acute distress       Head: Normocephalic, atraumatic and no dysmorphic features  Ear, Nose, and Throat: Moist mucous membranes  CVS: S1S2+  Resp: No SOB, no wheeze or rhonchi  GI: soft NT/ND  Extremities: No edema or cyanosis  Skin: No bruises or rashes     Neurological Exam:  Mental Status: Awake, alert and oriented x 2.  Able to follow simple and complex verbal commands. Able to name and repeat. fluent speech. No obvious aphasia or dysarthria noted.   Cranial Nerves: PERRL, EOMI, VFFC, sensation V1-V3 intact,  no obvious facial asymmetry, equal elevation of palate, scm/trap 5/5, tongue is midline on protrusion. no obvious papilledema on fundoscopic exam. hearing is grossly intact.   Motor: Normal bulk, tone and strength throughout. Fine finger movements were intact and symmetric. no tremors or drift noted.    Sensation: Intact to light touch and pinprick throughout. no right/left confusion. no extinction to tactile on DSS.   Reflexes: 1+ throughout at biceps, brachioradialis, triceps, patellars and ankles bilaterally and equal. No clonus. R toe and L toe were both downgoing.  Coordination: No dysmetria on FNF or HKS  Gait: deferred     Data/Labs/Imaging which I personally reviewed.     Labs:                              10.4   7.37  )-----------( 172      ( 31 May 2025 06:52 )             31.3     05-31    133[L]  |  101  |  32[H]  ----------------------------<  122[H]  4.5   |  22  |  0.89    Ca    8.8      31 May 2025 06:52          Urinalysis Basic - ( 31 May 2025 06:52 )    Color: x / Appearance: x / SG: x / pH: x  Gluc: 122 mg/dL / Ketone: x  / Bili: x / Urobili: x   Blood: x / Protein: x / Nitrite: x   Leuk Esterase: x / RBC: x / WBC x   Sq Epi: x / Non Sq Epi: x / Bacteria: x        Urinalysis with Rflx Culture (collected 05-29-25 @ 16:21)    Culture - Blood (collected 05-29-25 @ 13:45)  Source: Blood Blood-Peripheral  Gram Stain (05-30-25 @ 01:47):    Growth in anaerobic bottle: Gram Positive Cocci in Pairs and Chains    Growth in aerobic bottle: Gram Positive Cocci in Pairs and Chains  Preliminary Report (05-30-25 @ 01:47):    Growth in anaerobic bottle: Gram Positive Cocci in Pairs and Chains    Growth in aerobic bottle: Gram Positive Cocci in Pairs and Chains    Culture - Blood (collected 05-29-25 @ 13:30)  Source: Blood Blood-Peripheral  Gram Stain (05-30-25 @ 01:45):    Growth in aerobic bottle: Gram Positive Cocci in Pairs and Chains    Growth in anaerobic bottle: Gram Positive Cocci in Pairs and Chains  Preliminary Report (05-30-25 @ 01:46):    Growth in aerobic bottle: Gram Positive Cocci in Pairs and Chains    Growth in anaerobic bottle: Gram Positive Cocci in Pairs and Chains    Direct identification is available within approximately 3-5    hours either by Blood Panel Multiplexed PCR or Direct    MALDI-TOF. Details: [email or web address]  Organism: Blood Culture PCR (05-30-25 @ 02:36)  Organism: Blood Culture PCR (05-30-25 @ 02:36)      Method Type: PCR      -  Streptococcus agalactiae (Group B): Detec        < from: CT Head No Cont (05.29.25 @ 22:10) >    ACC: [id number] EXAM:  CT CERVICAL SPINE   ORDERED BY: ERIKA ANNE     ACC: [id number] EXAM:  CT BRAIN   ORDERED BY: ERIKA ANNE     PROCEDURE DATE:  05/29/2025          INTERPRETATION:  CLINICAL Indications:  AMS    COMPARISON: Head CT dated 11/20/2022. MRI brain 11/21/2022    TECHNIQUE: Noncontrast CT of the head. Multiplanar reformations are   submitted.    FINDINGS:  There is periventricular and subcortical white matter hypodensity without   mass effect, nonspecific, likely representing moderate chronic   microvascular ischemic changes. There is no compelling evidence for an   acute transcortical infarction. There is no evidence of mass, mass   effect, midline shift or extra-axial fluid collection. The lateral   ventricles and cortical sulci are age-appropriate in size and   configuration. The patient is status post bilateral ocular lens   replacement surgery. The orbits, mastoid air cells and visualized   paranasal sinuses are unremarkable. The calvarium is intact. Consider   follow-up CT or MRI as clinically warranted.          ============================    CLINICAL INDICATIONS: AMS    COMPARISON: None.    TECHNIQUE: Noncontrast CT of the cervical spine. Multiple contiguous   axial images through the cervical spine as wellas multiplanar   reformatted images are submitted for interpretation without the   administration of intravenous contrast.    FINDINGS:  Motion artifact limits interpretation. Mild chronic height loss involves   the C3, C4, C5, C6 vertebral bodies with small multilevel anterior   bridging osteophytes. Mild disc calcification at C6/C7. Small posterior   ligament calcification. Mild multilevel uncovertebral hypertrophy.  There is no evidence for acute fracture. A normal lordosis is noted.   Craniocervical junction is normal. The remaining cervicovertebral body   heights and remaining intervertebral disc spaces are preserved. There is   no prevertebral soft tissue abnormality. The odontoid process is intact.    Thyroid gland is unremarkable. Theairway is patent. Moderate right   apical pleural thickening/effusion. Please refer to the preceding chest   CT for further details.    Evaluation of the individual levels:  C2/C3 level: No spinal canal stenosis or foraminal narrowing.  C3/C4 level: No spinal canal stenosis or foraminal narrowing.  C4/C5 level: No spinal canal stenosis or foraminal narrowing.  C5/C6 level: No spinal canal stenosis or foraminal narrowing.  C6/C7 level: Mild right-sided foraminal narrowing. No spinal canal   stenosis or left-sided foraminal narrowing.  C7/T1 level: No spinal canal stenosis or foraminal narrowing.    IMPRESSION:    CT head: Moderate chronic microvascular changes without evidence of an   acute transcortical infarction or hemorrhage.    CT C-spine: Mild to moderate spondylosis. No acute osseous abnormality.   Consider MRI as clinically warranted.    --- End of Report ---            TONY SOTELO MD; Attending Radiologist  This document has been electronically signed. May 29 2025 10:47PM    < end of copied text >  
Neurology      S; patient seen. doing okay    Medications: MEDICATIONS  (STANDING):  apixaban 2.5 milliGRAM(s) Oral every 12 hours  cefTRIAXone   IVPB 2000 milliGRAM(s) IV Intermittent every 24 hours  chlorhexidine 2% Cloths 1 Application(s) Topical daily  famotidine Injectable 20 milliGRAM(s) IV Push daily  melatonin 5 milliGRAM(s) Oral once  metoprolol succinate  milliGRAM(s) Oral daily  pantoprazole    Tablet 40 milliGRAM(s) Oral before breakfast  polyethylene glycol 3350 17 Gram(s) Oral daily  senna 2 Tablet(s) Oral at bedtime    MEDICATIONS  (PRN):  acetaminophen     Tablet .. 650 milliGRAM(s) Oral every 6 hours PRN Temp greater or equal to 38C (100.4F), Mild Pain (1 - 3)  bisacodyl 5 milliGRAM(s) Oral every 12 hours PRN Constipation  zolpidem 5 milliGRAM(s) Oral at bedtime PRN Insomnia       Vitals:  Vital Signs Last 24 Hrs  T(C): 36.7 (03 Jun 2025 15:51), Max: 38.3 (03 Jun 2025 11:00)  T(F): 98 (03 Jun 2025 15:51), Max: 101 (03 Jun 2025 11:00)  HR: 78 (03 Jun 2025 15:51) (78 - 91)  BP: 95/53 (03 Jun 2025 15:51) (95/53 - 132/73)  BP(mean): --  RR: 18 (03 Jun 2025 15:51) (18 - 18)  SpO2: 91% (03 Jun 2025 15:51) (91% - 94%)    Parameters below as of 03 Jun 2025 15:51  Patient On (Oxygen Delivery Method): room air                General Exam:   General Appearance: Appropriately dressed and in no acute distress       Head: Normocephalic, atraumatic and no dysmorphic features  Ear, Nose, and Throat: Moist mucous membranes  CVS: S1S2+  Resp: No SOB, no wheeze or rhonchi  GI: soft NT/ND  Extremities: No edema or cyanosis  Skin: No bruises or rashes     Neurological Exam:  Mental Status: Awake, alert and oriented x 2.  Able to follow simple and complex verbal commands. Able to name and repeat. fluent speech. No obvious aphasia or dysarthria noted.   Cranial Nerves: PERRL, EOMI, VFFC, sensation V1-V3 intact,  no obvious facial asymmetry, equal elevation of palate, scm/trap 5/5, tongue is midline on protrusion. no obvious papilledema on fundoscopic exam. hearing is grossly intact.   Motor: Normal bulk, tone and strength throughout. Fine finger movements were intact and symmetric. no tremors or drift noted.    Sensation: Intact to light touch and pinprick throughout. no right/left confusion. no extinction to tactile on DSS.   Reflexes: 1+ throughout at biceps, brachioradialis, triceps, patellars and ankles bilaterally and equal. No clonus. R toe and L toe were both downgoing.  Coordination: No dysmetria on FNF or HKS  Gait: deferred     Data/Labs/Imaging which I personally reviewed.     Labs:         LABS:                          12.5   3.63  )-----------( 242      ( 03 Jun 2025 09:46 )             38.4     06-03    132[L]  |  95[L]  |  14  ----------------------------<  148[H]  3.8   |  21[L]  |  0.77    Ca    8.6      03 Jun 2025 09:46  Phos  3.0     06-03  Mg     2.0     06-03    TPro  7.5  /  Alb  2.6[L]  /  TBili  1.1  /  DBili  x   /  AST  37  /  ALT  49[H]  /  AlkPhos  140[H]  06-03    LIVER FUNCTIONS - ( 03 Jun 2025 09:46 )  Alb: 2.6 g/dL / Pro: 7.5 g/dL / ALK PHOS: 140 U/L / ALT: 49 U/L / AST: 37 U/L / GGT: x           PT/INR - ( 03 Jun 2025 09:46 )   PT: 20.1 sec;   INR: 1.76 ratio         PTT - ( 03 Jun 2025 09:46 )  PTT:29.9 sec  Urinalysis Basic - ( 03 Jun 2025 09:46 )    Color: x / Appearance: x / SG: x / pH: x  Gluc: 148 mg/dL / Ketone: x  / Bili: x / Urobili: x   Blood: x / Protein: x / Nitrite: x   Leuk Esterase: x / RBC: x / WBC x   Sq Epi: x / Non Sq Epi: x / Bacteria: x          Urinalysis with Rflx Culture (collected 05-29-25 @ 16:21)    Culture - Blood (collected 05-29-25 @ 13:45)  Source: Blood Blood-Peripheral  Gram Stain (05-30-25 @ 01:47):    Growth in anaerobic bottle: Gram Positive Cocci in Pairs and Chains    Growth in aerobic bottle: Gram Positive Cocci in Pairs and Chains  Preliminary Report (05-30-25 @ 01:47):    Growth in anaerobic bottle: Gram Positive Cocci in Pairs and Chains    Growth in aerobic bottle: Gram Positive Cocci in Pairs and Chains    Culture - Blood (collected 05-29-25 @ 13:30)  Source: Blood Blood-Peripheral  Gram Stain (05-30-25 @ 01:45):    Growth in aerobic bottle: Gram Positive Cocci in Pairs and Chains    Growth in anaerobic bottle: Gram Positive Cocci in Pairs and Chains  Preliminary Report (05-30-25 @ 01:46):    Growth in aerobic bottle: Gram Positive Cocci in Pairs and Chains    Growth in anaerobic bottle: Gram Positive Cocci in Pairs and Chains    Direct identification is available within approximately 3-5    hours either by Blood Panel Multiplexed PCR or Direct    MALDI-TOF. Details: [email or web address]  Organism: Blood Culture PCR (05-30-25 @ 02:36)  Organism: Blood Culture PCR (05-30-25 @ 02:36)      Method Type: PCR      -  Streptococcus agalactiae (Group B): Detec        < from: CT Head No Cont (05.29.25 @ 22:10) >    ACC: [id number] EXAM:  CT CERVICAL SPINE   ORDERED BY: ERIKA ANNE     ACC: [id number] EXAM:  CT BRAIN   ORDERED BY: ERIKA ANNE     PROCEDURE DATE:  05/29/2025          INTERPRETATION:  CLINICAL Indications:  AMS    COMPARISON: Head CT dated 11/20/2022. MRI brain 11/21/2022    TECHNIQUE: Noncontrast CT of the head. Multiplanar reformations are   submitted.    FINDINGS:  There is periventricular and subcortical white matter hypodensity without   mass effect, nonspecific, likely representing moderate chronic   microvascular ischemic changes. There is no compelling evidence for an   acute transcortical infarction. There is no evidence of mass, mass   effect, midline shift or extra-axial fluid collection. The lateral   ventricles and cortical sulci are age-appropriate in size and   configuration. The patient is status post bilateral ocular lens   replacement surgery. The orbits, mastoid air cells and visualized   paranasal sinuses are unremarkable. The calvarium is intact. Consider   follow-up CT or MRI as clinically warranted.          ============================    CLINICAL INDICATIONS: AMS    COMPARISON: None.    TECHNIQUE: Noncontrast CT of the cervical spine. Multiple contiguous   axial images through the cervical spine as wellas multiplanar   reformatted images are submitted for interpretation without the   administration of intravenous contrast.    FINDINGS:  Motion artifact limits interpretation. Mild chronic height loss involves   the C3, C4, C5, C6 vertebral bodies with small multilevel anterior   bridging osteophytes. Mild disc calcification at C6/C7. Small posterior   ligament calcification. Mild multilevel uncovertebral hypertrophy.  There is no evidence for acute fracture. A normal lordosis is noted.   Craniocervical junction is normal. The remaining cervicovertebral body   heights and remaining intervertebral disc spaces are preserved. There is   no prevertebral soft tissue abnormality. The odontoid process is intact.    Thyroid gland is unremarkable. Theairway is patent. Moderate right   apical pleural thickening/effusion. Please refer to the preceding chest   CT for further details.    Evaluation of the individual levels:  C2/C3 level: No spinal canal stenosis or foraminal narrowing.  C3/C4 level: No spinal canal stenosis or foraminal narrowing.  C4/C5 level: No spinal canal stenosis or foraminal narrowing.  C5/C6 level: No spinal canal stenosis or foraminal narrowing.  C6/C7 level: Mild right-sided foraminal narrowing. No spinal canal   stenosis or left-sided foraminal narrowing.  C7/T1 level: No spinal canal stenosis or foraminal narrowing.    IMPRESSION:    CT head: Moderate chronic microvascular changes without evidence of an   acute transcortical infarction or hemorrhage.    CT C-spine: Mild to moderate spondylosis. No acute osseous abnormality.   Consider MRI as clinically warranted.    --- End of Report ---            TONY SOTELO MD; Attending Radiologist  This document has been electronically signed. May 29 2025 10:47PM    < end of copied text >  
Patient is a 89y old  Female who presents with a chief complaint of AMS (30 May 2025 12:16)    Date of servie : 05-30-25 @ 14:23  INTERVAL HPI/OVERNIGHT EVENTS:  T(C): 36.6 (05-30-25 @ 07:26), Max: 39.8 (05-29-25 @ 15:12)  HR: 71 (05-30-25 @ 07:26) (68 - 101)  BP: 107/64 (05-30-25 @ 07:26) (93/58 - 135/64)  RR: 18 (05-30-25 @ 07:26) (18 - 28)  SpO2: 94% (05-30-25 @ 07:26) (94% - 97%)  Wt(kg): --  I&O's Summary      LABS:                        10.8   2.70  )-----------( 154      ( 30 May 2025 07:20 )             33.8     05-30    137  |  104  |  17  ----------------------------<  162[H]  4.2   |  21[L]  |  0.73    Ca    8.4      30 May 2025 07:19    TPro  8.2  /  Alb  3.2[L]  /  TBili  1.3[H]  /  DBili  x   /  AST  31  /  ALT  17  /  AlkPhos  105  05-29    PT/INR - ( 29 May 2025 13:48 )   PT: 23.8 sec;   INR: 2.08 ratio         PTT - ( 29 May 2025 13:48 )  PTT:33.7 sec  Urinalysis Basic - ( 30 May 2025 07:19 )    Color: x / Appearance: x / SG: x / pH: x  Gluc: 162 mg/dL / Ketone: x  / Bili: x / Urobili: x   Blood: x / Protein: x / Nitrite: x   Leuk Esterase: x / RBC: x / WBC x   Sq Epi: x / Non Sq Epi: x / Bacteria: x      CAPILLARY BLOOD GLUCOSE            Urinalysis Basic - ( 30 May 2025 07:19 )    Color: x / Appearance: x / SG: x / pH: x  Gluc: 162 mg/dL / Ketone: x  / Bili: x / Urobili: x   Blood: x / Protein: x / Nitrite: x   Leuk Esterase: x / RBC: x / WBC x   Sq Epi: x / Non Sq Epi: x / Bacteria: x        MEDICATIONS  (STANDING):  apixaban 2.5 milliGRAM(s) Oral every 12 hours  chlorhexidine 2% Cloths 1 Application(s) Topical daily  famotidine Injectable 20 milliGRAM(s) IV Push daily  levoFLOXacin IVPB 500 milliGRAM(s) IV Intermittent every 24 hours  metoprolol succinate  milliGRAM(s) Oral daily  pantoprazole    Tablet 40 milliGRAM(s) Oral before breakfast    MEDICATIONS  (PRN):  acetaminophen     Tablet .. 650 milliGRAM(s) Oral every 6 hours PRN Temp greater or equal to 38C (100.4F), Mild Pain (1 - 3)  zolpidem 5 milliGRAM(s) Oral at bedtime PRN Insomnia          PHYSICAL EXAM:  GENERAL: NAD, well-groomed, well-developed  HEAD:  Atraumatic, Normocephalic  CHEST/LUNG: Clear to percussion bilaterally; No rales, rhonchi, wheezing, or rubs  HEART: Regular rate and rhythm; No murmurs, rubs, or gallops  ABDOMEN: Soft, Nontender, Nondistended; Bowel sounds present  EXTREMITIES:  2+ Peripheral Pulses, No clubbing, cyanosis, or edema  LYMPH: No lymphadenopathy noted  SKIN: No rashes or lesions    Care Discussed with Consultants/Other Providers [ ] YES  [ ] NO
[facility]    Dr. Linda Galicia     Office [phone] (9 am to 5 pm)  Service: [phone] (5pm to 9am)  Also Available on TEAMS      RENAL PROGRESS NOTE: DATE OF SERVICE 05-31-25 @ 16:04    Patient is a 89y old  Female who presents with a chief complaint of AMS (31 May 2025 14:48)      Patient seen and examined at bedside. No chest pain/sob    VITALS:  T(F): 97.4 (05-31-25 @ 10:59), Max: 98 (05-31-25 @ 00:07)  HR: 65 (05-31-25 @ 10:59)  BP: 150/75 (05-31-25 @ 10:59)  RR: 18 (05-31-25 @ 10:59)  SpO2: 94% (05-31-25 @ 10:59)  Wt(kg): --    05-31 @ 07:01  -  05-31 @ 16:04  --------------------------------------------------------  IN: 240 mL / OUT: 0 mL / NET: 240 mL          PHYSICAL EXAM:  Constitutional: NAD  Neck: No JVD  Respiratory: CTAB, no wheezes, rales or rhonchi  Cardiovascular: S1, S2, RRR  Gastrointestinal: BS+, soft, NT/ND  Extremities: No peripheral edema    Hospital Medications:   MEDICATIONS  (STANDING):  apixaban 2.5 milliGRAM(s) Oral every 12 hours  cefTRIAXone   IVPB 2000 milliGRAM(s) IV Intermittent every 24 hours  chlorhexidine 2% Cloths 1 Application(s) Topical daily  famotidine Injectable 20 milliGRAM(s) IV Push daily  metoprolol succinate  milliGRAM(s) Oral daily  pantoprazole    Tablet 40 milliGRAM(s) Oral before breakfast  polyethylene glycol 3350 17 Gram(s) Oral daily  senna 2 Tablet(s) Oral at bedtime      LABS:  05-31    133[L]  |  101  |  32[H]  ----------------------------<  122[H]  4.5   |  22  |  0.89    Ca    8.8      31 May 2025 06:52      Creatinine Trend: 0.89 <--, 0.73 <--, 0.75 <--                                10.4   7.37  )-----------( 172      ( 31 May 2025 06:52 )             31.3     Urine Studies:  Urinalysis - [05-31-25 @ 06:52]      Color  / Appearance  / SG  / pH       Gluc 122 / Ketone   / Bili  / Urobili        Blood  / Protein  / Leuk Est  / Nitrite       RBC  / WBC  / Hyaline  / Gran  / Sq Epi  / Non Sq Epi  / Bacteria       TSH 0.87      [05-31-25 @ 06:52]        RADIOLOGY & ADDITIONAL STUDIES:  
    Subjective: Patient seen and examined. No new events except as noted.     SUBJECTIVE/ROS:  pt had pre syncope after BM yesterday       MEDICATIONS:  MEDICATIONS  (STANDING):  apixaban 2.5 milliGRAM(s) Oral every 12 hours  cefTRIAXone   IVPB 2000 milliGRAM(s) IV Intermittent every 24 hours  chlorhexidine 2% Cloths 1 Application(s) Topical daily  famotidine Injectable 20 milliGRAM(s) IV Push daily  melatonin 5 milliGRAM(s) Oral once  metoprolol succinate  milliGRAM(s) Oral daily  pantoprazole    Tablet 40 milliGRAM(s) Oral before breakfast  polyethylene glycol 3350 17 Gram(s) Oral daily  senna 2 Tablet(s) Oral at bedtime      PHYSICAL EXAM:  T(C): 36.8 (06-04-25 @ 08:20), Max: 38.3 (06-03-25 @ 11:00)  HR: 92 (06-04-25 @ 08:20) (78 - 97)  BP: 116/71 (06-04-25 @ 08:20) (95/53 - 120/79)  RR: 18 (06-04-25 @ 08:20) (18 - 20)  SpO2: 91% (06-04-25 @ 08:20) (90% - 94%)  Wt(kg): --  I&O's Summary          JVP: Normal  Neck: supple  Lung: clear   CV: S1 S2 ,  Abd: soft  Ext: No edema  neuro: Awake / alert  Psych: flat affect  Skin: normal``    LABS/DATA:    CARDIAC MARKERS:                                11.3   5.13  )-----------( 268      ( 04 Jun 2025 07:19 )             34.2     06-04    131[L]  |  93[L]  |  19  ----------------------------<  109[H]  3.7   |  24  |  0.70    Ca    8.3[L]      04 Jun 2025 07:19  Phos  3.0     06-03  Mg     2.0     06-03    TPro  7.5  /  Alb  2.6[L]  /  TBili  1.1  /  DBili  x   /  AST  37  /  ALT  49[H]  /  AlkPhos  140[H]  06-03    proBNP:   Lipid Profile:   HgA1c:   TSH:             
[facility]    Dr. Linda Galicia     Office [phone] (9 am to 5 pm)  Service: [phone] (5pm to 9am)  Also Available on TEAMS      RENAL PROGRESS NOTE: DATE OF SERVICE 06-01-25 @ 16:05    Patient is a 89y old  Female who presents with a chief complaint of AMS (01 Jun 2025 15:16)      Patient seen and examined at bedside. No chest pain/sob    VITALS:  T(F): 97.8 (06-01-25 @ 10:42), Max: 97.8 (06-01-25 @ 10:42)  HR: 94 (06-01-25 @ 10:42)  BP: 113/63 (06-01-25 @ 10:42)  RR: 18 (06-01-25 @ 10:42)  SpO2: 94% (06-01-25 @ 10:42)  Wt(kg): --    05-31 @ 07:01  -  06-01 @ 07:00  --------------------------------------------------------  IN: 240 mL / OUT: 0 mL / NET: 240 mL    06-01 @ 07:01  -  06-01 @ 16:05  --------------------------------------------------------  IN: 240 mL / OUT: 0 mL / NET: 240 mL          PHYSICAL EXAM:  Constitutional: NAD  Neck: No JVD  Respiratory: CTAB, no wheezes, rales or rhonchi  Cardiovascular: S1, S2, RRR  Gastrointestinal: BS+, soft, NT/ND  Extremities: No peripheral edema    Hospital Medications:   MEDICATIONS  (STANDING):  apixaban 2.5 milliGRAM(s) Oral every 12 hours  cefTRIAXone   IVPB 2000 milliGRAM(s) IV Intermittent every 24 hours  chlorhexidine 2% Cloths 1 Application(s) Topical daily  famotidine Injectable 20 milliGRAM(s) IV Push daily  melatonin 5 milliGRAM(s) Oral once  metoprolol succinate  milliGRAM(s) Oral daily  pantoprazole    Tablet 40 milliGRAM(s) Oral before breakfast  polyethylene glycol 3350 17 Gram(s) Oral daily  senna 2 Tablet(s) Oral at bedtime      LABS:  06-01    134[L]  |  97  |  20  ----------------------------<  146[H]  3.6   |  24  |  0.72    Ca    8.7      01 Jun 2025 10:28      Creatinine Trend: 0.72 <--, 0.89 <--, 0.73 <--, 0.75 <--                                10.4   7.37  )-----------( 172      ( 31 May 2025 06:52 )             31.3     Urine Studies:  Urinalysis - [06-01-25 @ 10:28]      Color  / Appearance  / SG  / pH       Gluc 146 / Ketone   / Bili  / Urobili        Blood  / Protein  / Leuk Est  / Nitrite       RBC  / WBC  / Hyaline  / Gran  / Sq Epi  / Non Sq Epi  / Bacteria       TSH 0.87      [05-31-25 @ 06:52]      Syphilis Screen (Treponema Pallidum Ab) Negative      [05-31-25 @ 06:52]    RADIOLOGY & ADDITIONAL STUDIES:  
[facility]  BAISL Hooks Y. Patel, S. Shah, G. Casimir  [phone]  ([phone] - weekdays after 5pm and weekends)    Name: GINO GRAHAM  Age/Gender: 89y Female  MRN: [id number]    Interval History:  Patient seen and examined earlier this morning.   Resting comfortably, denies fever, pain, n/v/d, dysuria.   Denies any new complaints.   Notes reviewed  No concerning overnight events  Afebrile   Allergies: Zosyn (Rash; Urticaria; Hives)      Objective:  Vitals:   T(F): 98.3 (06-04-25 @ 08:20), Max: 98.6 (06-04-25 @ 05:07)  HR: 92 (06-04-25 @ 08:20) (78 - 97)  BP: 116/71 (06-04-25 @ 08:20) (95/53 - 120/79)  RR: 18 (06-04-25 @ 08:20) (18 - 20)  SpO2: 91% (06-04-25 @ 08:20) (90% - 94%)  Physical Examination:  General: no acute distress, nontoxic appearing   HEENT: normocephalic, atraumatic, anicteric  Respiratory: decreased breath sounds b/l   Cardiovascular: S1 and S2 present, normal rate   Gastrointestinal: soft, nontender, nondistended  Extremities: no edema, no cyanosis  Skin: no visible rash    Laboratory Studies:  CBC:                       11.3   5.13  )-----------( 268      ( 04 Jun 2025 07:19 )             34.2     WBC Trend:  5.13 06-04-25 @ 07:19  3.63 06-03-25 @ 09:46  4.87 06-02-25 @ 06:40  7.37 05-31-25 @ 06:52  2.70 05-30-25 @ 07:20  3.54 05-29-25 @ 13:48    CMP: 06-04    131[L]  |  93[L]  |  19  ----------------------------<  109[H]  3.7   |  24  |  0.70    Ca    8.3[L]      04 Jun 2025 07:19  Phos  3.0     06-03  Mg     2.0     06-03    TPro  7.5  /  Alb  2.6[L]  /  TBili  1.1  /  DBili  x   /  AST  37  /  ALT  49[H]  /  AlkPhos  140[H]  06-03    Creatinine: 0.70 mg/dL (06-04-25 @ 07:19)  Creatinine: 0.77 mg/dL (06-03-25 @ 09:46)  Creatinine: 0.63 mg/dL (06-02-25 @ 06:39)  Creatinine: 0.72 mg/dL (06-01-25 @ 10:28)  Creatinine: 0.89 mg/dL (05-31-25 @ 06:52)  Creatinine: 0.73 mg/dL (05-30-25 @ 07:19)  Creatinine: 0.75 mg/dL (05-29-25 @ 13:48)    LIVER FUNCTIONS - ( 03 Jun 2025 09:46 )  Alb: 2.6 g/dL / Pro: 7.5 g/dL / ALK PHOS: 140 U/L / ALT: 49 U/L / AST: 37 U/L / GGT: x           Microbiology: reviewed   Culture - Blood (collected 06-01-25 @ 10:51)  Source: Blood Blood-Venous  Preliminary Report (06-03-25 @ 14:01):    No growth at 48 Hours    Culture - Blood (collected 05-31-25 @ 20:48)  Source: Blood Blood-Peripheral  Preliminary Report (06-04-25 @ 02:02):    No growth at 72 Hours    Culture - Blood (collected 05-30-25 @ 07:16)  Source: Blood Blood  Gram Stain (05-30-25 @ 20:47):    Growth in anaerobic bottle: Gram Positive Cocci in Pairs and Chains    Growth in aerobic bottle: Gram Positive Cocci in Pairs and Chains  Final Report (05-31-25 @ 15:48):    Growth in aerobic and anaerobic bottles: Streptococcus agalactiae (Group    B)    See previous culture [id number]    Culture - Blood (collected 05-30-25 @ 07:16)  Source: Blood Blood  Gram Stain (05-30-25 @ 19:54):    Growth in aerobic and anaerobic bottles: Gram Positive Cocci in Pairs and    Chains  Final Report (05-31-25 @ 15:47):    Growth in aerobic and anaerobic bottles: Streptococcus agalactiae (Group    B)    See previous culture [id number]    Urinalysis with Rflx Culture (collected 05-29-25 @ 16:21)    Culture - Blood (collected 05-29-25 @ 13:45)  Source: Blood Blood-Peripheral  Gram Stain (05-30-25 @ 01:47):    Growth in anaerobic bottle: Gram Positive Cocci in Pairs and Chains    Growth in aerobic bottle: Gram Positive Cocci in Pairs and Chains  Final Report (05-31-25 @ 18:43):    Growth in aerobic and anaerobic bottles: Streptococcus agalactiae (Group    B)    See previous culture [id number]    Culture - Blood (collected 05-29-25 @ 13:30)  Source: Blood Blood-Peripheral  Gram Stain (05-30-25 @ 01:45):    Growth in aerobic bottle: Gram Positive Cocci in Pairs and Chains    Growth in anaerobic bottle: Gram Positive Cocci in Pairs and Chains  Final Report (06-02-25 @ 15:48):    Growth in aerobic and anaerobic bottles: Streptococcus agalactiae (Group    B) ***********Note************    This isolate demonstrates inducible    clindamycin resistance.    Clindamycin may still be effective in some patients.    Direct identification is available within approximately 3-5    hours either by Blood Panel Multiplexed PCR or Direct    MALDI-TOF. Details: [email or web address]  Organism: Blood Culture PCR  Streptococcus agalactiae (Group B) (06-02-25 @ 15:48)  Organism: Streptococcus agalactiae (Group B) (06-02-25 @ 15:48)      -  Levofloxacin: S 0.5      -  Clindamycin: R 0.25      -  Vancomycin: S 0.5      -  Ceftriaxone: S <=0.25      -  Tetracycline: R >4      Method Type: TATIANA      -  Penicillin: S <=0.03 Predicts results for ampicillin, amoxicillin, amoxicillin/clavulanate, ampicillin/sulbactam, 1st, 2nd and 3rd generation cephalosporins and carbapenems.  Organism: Blood Culture PCR (06-02-25 @ 15:48)      -  Streptococcus agalactiae (Group B): Detec      Method Type: PCR    Radiology: reviewed     Medications:  acetaminophen     Tablet .. 650 milliGRAM(s) Oral every 6 hours PRN  apixaban 2.5 milliGRAM(s) Oral every 12 hours  bisacodyl 5 milliGRAM(s) Oral every 12 hours PRN  cefTRIAXone   IVPB 2000 milliGRAM(s) IV Intermittent every 24 hours  chlorhexidine 2% Cloths 1 Application(s) Topical daily  famotidine Injectable 20 milliGRAM(s) IV Push daily  melatonin 5 milliGRAM(s) Oral once  metoprolol succinate  milliGRAM(s) Oral daily  pantoprazole    Tablet 40 milliGRAM(s) Oral before breakfast  polyethylene glycol 3350 17 Gram(s) Oral daily  senna 2 Tablet(s) Oral at bedtime  zolpidem 5 milliGRAM(s) Oral at bedtime PRN    Current Antimicrobials:  cefTRIAXone   IVPB 2000 milliGRAM(s) IV Intermittent every 24 hours    Prior/Completed Antimicrobials:  azithromycin  IVPB  piperacillin/tazobactam IVPB...  vancomycin  IVPB  
Patient is a 89y old  Female who presents with a chief complaint of AMS (02 Jun 2025 12:33)    Date of servie : 06-02-25 @ 12:42  INTERVAL HPI/OVERNIGHT EVENTS:  T(C): 36.3 (06-02-25 @ 08:41), Max: 36.8 (06-01-25 @ 16:28)  HR: 78 (06-02-25 @ 08:41) (78 - 92)  BP: 110/68 (06-02-25 @ 08:41) (110/68 - 156/94)  RR: 18 (06-02-25 @ 08:41) (18 - 18)  SpO2: 92% (06-02-25 @ 08:41) (92% - 99%)  Wt(kg): --  I&O's Summary    01 Jun 2025 07:01  -  02 Jun 2025 07:00  --------------------------------------------------------  IN: 240 mL / OUT: 0 mL / NET: 240 mL    02 Jun 2025 07:01  -  02 Jun 2025 12:42  --------------------------------------------------------  IN: 240 mL / OUT: 0 mL / NET: 240 mL        LABS:                        11.8   4.87  )-----------( 220      ( 02 Jun 2025 06:40 )             35.4     06-02    134[L]  |  96  |  15  ----------------------------<  107[H]  3.8   |  25  |  0.63    Ca    8.8      02 Jun 2025 06:39        Urinalysis Basic - ( 02 Jun 2025 06:39 )    Color: x / Appearance: x / SG: x / pH: x  Gluc: 107 mg/dL / Ketone: x  / Bili: x / Urobili: x   Blood: x / Protein: x / Nitrite: x   Leuk Esterase: x / RBC: x / WBC x   Sq Epi: x / Non Sq Epi: x / Bacteria: x      CAPILLARY BLOOD GLUCOSE        ABG - ( 01 Jun 2025 18:58 )  pH, Arterial: 7.56  pH, Blood: x     /  pCO2: 32    /  pO2: 84    / HCO3: 29    / Base Excess: 6.7   /  SaO2: 99.0                Urinalysis Basic - ( 02 Jun 2025 06:39 )    Color: x / Appearance: x / SG: x / pH: x  Gluc: 107 mg/dL / Ketone: x  / Bili: x / Urobili: x   Blood: x / Protein: x / Nitrite: x   Leuk Esterase: x / RBC: x / WBC x   Sq Epi: x / Non Sq Epi: x / Bacteria: x        MEDICATIONS  (STANDING):  apixaban 2.5 milliGRAM(s) Oral every 12 hours  cefTRIAXone   IVPB 2000 milliGRAM(s) IV Intermittent every 24 hours  chlorhexidine 2% Cloths 1 Application(s) Topical daily  famotidine Injectable 20 milliGRAM(s) IV Push daily  melatonin 5 milliGRAM(s) Oral once  metoprolol succinate  milliGRAM(s) Oral daily  pantoprazole    Tablet 40 milliGRAM(s) Oral before breakfast  polyethylene glycol 3350 17 Gram(s) Oral daily  senna 2 Tablet(s) Oral at bedtime    MEDICATIONS  (PRN):  acetaminophen     Tablet .. 650 milliGRAM(s) Oral every 6 hours PRN Temp greater or equal to 38C (100.4F), Mild Pain (1 - 3)  bisacodyl 5 milliGRAM(s) Oral every 12 hours PRN Constipation  zolpidem 5 milliGRAM(s) Oral at bedtime PRN Insomnia          PHYSICAL EXAM:  GENERAL: NAD, well-groomed, well-developed  HEAD:  Atraumatic, Normocephalic  CHEST/LUNG: Clear to percussion bilaterally; No rales, rhonchi, wheezing, or rubs  HEART: Regular rate and rhythm; No murmurs, rubs, or gallops  ABDOMEN: Soft, Nontender, Nondistended; Bowel sounds present  EXTREMITIES:  2+ Peripheral Pulses, No clubbing, cyanosis, or edema  LYMPH: No lymphadenopathy noted  SKIN: No rashes or lesions    Care Discussed with Consultants/Other Providers [ ] YES  [ ] NO
Patient is a 89y old  Female who presents with a chief complaint of AMS (03 Jun 2025 13:22)    Date of servie : 06-03-25 @ 13:53  INTERVAL HPI/OVERNIGHT EVENTS:  T(C): 36.6 (06-03-25 @ 12:19), Max: 38.3 (06-03-25 @ 11:00)  HR: 87 (06-03-25 @ 08:58) (85 - 91)  BP: 120/72 (06-03-25 @ 08:58) (107/71 - 132/73)  RR: 18 (06-03-25 @ 08:58) (18 - 18)  SpO2: 94% (06-03-25 @ 08:58) (92% - 94%)  Wt(kg): --  I&O's Summary    02 Jun 2025 07:01  -  03 Jun 2025 07:00  --------------------------------------------------------  IN: 240 mL / OUT: 0 mL / NET: 240 mL        LABS:                        12.5   3.63  )-----------( 242      ( 03 Jun 2025 09:46 )             38.4     06-03    132[L]  |  95[L]  |  14  ----------------------------<  148[H]  3.8   |  21[L]  |  0.77    Ca    8.6      03 Jun 2025 09:46  Phos  3.0     06-03  Mg     2.0     06-03    TPro  7.5  /  Alb  2.6[L]  /  TBili  1.1  /  DBili  x   /  AST  37  /  ALT  49[H]  /  AlkPhos  140[H]  06-03    PT/INR - ( 03 Jun 2025 09:46 )   PT: 20.1 sec;   INR: 1.76 ratio         PTT - ( 03 Jun 2025 09:46 )  PTT:29.9 sec  Urinalysis Basic - ( 03 Jun 2025 09:46 )    Color: x / Appearance: x / SG: x / pH: x  Gluc: 148 mg/dL / Ketone: x  / Bili: x / Urobili: x   Blood: x / Protein: x / Nitrite: x   Leuk Esterase: x / RBC: x / WBC x   Sq Epi: x / Non Sq Epi: x / Bacteria: x      CAPILLARY BLOOD GLUCOSE      POCT Blood Glucose.: 128 mg/dL (03 Jun 2025 13:12)  POCT Blood Glucose.: 146 mg/dL (03 Jun 2025 09:10)    ABG - ( 01 Jun 2025 18:58 )  pH, Arterial: 7.56  pH, Blood: x     /  pCO2: 32    /  pO2: 84    / HCO3: 29    / Base Excess: 6.7   /  SaO2: 99.0                Urinalysis Basic - ( 03 Jun 2025 09:46 )    Color: x / Appearance: x / SG: x / pH: x  Gluc: 148 mg/dL / Ketone: x  / Bili: x / Urobili: x   Blood: x / Protein: x / Nitrite: x   Leuk Esterase: x / RBC: x / WBC x   Sq Epi: x / Non Sq Epi: x / Bacteria: x        MEDICATIONS  (STANDING):  apixaban 2.5 milliGRAM(s) Oral every 12 hours  cefTRIAXone   IVPB 2000 milliGRAM(s) IV Intermittent every 24 hours  chlorhexidine 2% Cloths 1 Application(s) Topical daily  famotidine Injectable 20 milliGRAM(s) IV Push daily  melatonin 5 milliGRAM(s) Oral once  metoprolol succinate  milliGRAM(s) Oral daily  pantoprazole    Tablet 40 milliGRAM(s) Oral before breakfast  polyethylene glycol 3350 17 Gram(s) Oral daily  senna 2 Tablet(s) Oral at bedtime    MEDICATIONS  (PRN):  acetaminophen     Tablet .. 650 milliGRAM(s) Oral every 6 hours PRN Temp greater or equal to 38C (100.4F), Mild Pain (1 - 3)  bisacodyl 5 milliGRAM(s) Oral every 12 hours PRN Constipation  zolpidem 5 milliGRAM(s) Oral at bedtime PRN Insomnia          PHYSICAL EXAM:  GENERAL: frail  CHEST/LUNG: Clear to percussion bilaterally; No rales, rhonchi, wheezing, or rubs  HEART: Regular rate and rhythm; No murmurs, rubs, or gallops  ABDOMEN: Soft, Nontender, Nondistended; Bowel sounds present  EXTREMITIES:  2+ Peripheral Pulses, No clubbing, cyanosis, or edema  LYMPH: No lymphadenopathy noted  SKIN: No rashes or lesions    Care Discussed with Consultants/Other Providers [ ] YES  [ ] NO
Patient is a 89y old  Female who presents with a chief complaint of AMS (04 Jun 2025 16:28)    Date of servie : 06-04-25 @ 17:23  INTERVAL HPI/OVERNIGHT EVENTS:  T(C): 36.6 (06-04-25 @ 16:05), Max: 37 (06-04-25 @ 05:07)  HR: 78 (06-04-25 @ 16:05) (78 - 97)  BP: 108/69 (06-04-25 @ 16:05) (108/69 - 120/79)  RR: 18 (06-04-25 @ 16:05) (18 - 20)  SpO2: 93% (06-04-25 @ 16:05) (90% - 94%)  Wt(kg): --  I&O's Summary      LABS:                        11.3   5.13  )-----------( 268      ( 04 Jun 2025 07:19 )             34.2     06-04    131[L]  |  93[L]  |  19  ----------------------------<  109[H]  3.7   |  24  |  0.70    Ca    8.3[L]      04 Jun 2025 07:19  Phos  3.0     06-03  Mg     2.0     06-03    TPro  7.5  /  Alb  2.6[L]  /  TBili  1.1  /  DBili  x   /  AST  37  /  ALT  49[H]  /  AlkPhos  140[H]  06-03    PT/INR - ( 03 Jun 2025 09:46 )   PT: 20.1 sec;   INR: 1.76 ratio         PTT - ( 03 Jun 2025 09:46 )  PTT:29.9 sec  Urinalysis Basic - ( 04 Jun 2025 07:19 )    Color: x / Appearance: x / SG: x / pH: x  Gluc: 109 mg/dL / Ketone: x  / Bili: x / Urobili: x   Blood: x / Protein: x / Nitrite: x   Leuk Esterase: x / RBC: x / WBC x   Sq Epi: x / Non Sq Epi: x / Bacteria: x      CAPILLARY BLOOD GLUCOSE            Urinalysis Basic - ( 04 Jun 2025 07:19 )    Color: x / Appearance: x / SG: x / pH: x  Gluc: 109 mg/dL / Ketone: x  / Bili: x / Urobili: x   Blood: x / Protein: x / Nitrite: x   Leuk Esterase: x / RBC: x / WBC x   Sq Epi: x / Non Sq Epi: x / Bacteria: x        MEDICATIONS  (STANDING):  apixaban 2.5 milliGRAM(s) Oral every 12 hours  cefTRIAXone   IVPB 2000 milliGRAM(s) IV Intermittent every 24 hours  chlorhexidine 2% Cloths 1 Application(s) Topical daily  famotidine Injectable 20 milliGRAM(s) IV Push daily  melatonin 5 milliGRAM(s) Oral once  metoprolol succinate  milliGRAM(s) Oral daily  pantoprazole    Tablet 40 milliGRAM(s) Oral before breakfast  polyethylene glycol 3350 17 Gram(s) Oral daily  senna 2 Tablet(s) Oral at bedtime  sodium chloride 0.9%. 1000 milliLiter(s) (60 mL/Hr) IV Continuous <Continuous>    MEDICATIONS  (PRN):  acetaminophen     Tablet .. 650 milliGRAM(s) Oral every 6 hours PRN Temp greater or equal to 38C (100.4F), Mild Pain (1 - 3)  bisacodyl 5 milliGRAM(s) Oral every 12 hours PRN Constipation  zolpidem 5 milliGRAM(s) Oral at bedtime PRN Insomnia          PHYSICAL EXAM:  GENERAL: NAD, well-groomed, well-developed  HEAD:  Atraumatic, Normocephalic  CHEST/LUNG: Clear to percussion bilaterally; No rales, rhonchi, wheezing, or rubs  HEART: Regular rate and rhythm; No murmurs, rubs, or gallops  ABDOMEN: Soft, Nontender, Nondistended; Bowel sounds present  EXTREMITIES:  2+ Peripheral Pulses, No clubbing, cyanosis, or edema  LYMPH: No lymphadenopathy noted  SKIN: No rashes or lesions    Care Discussed with Consultants/Other Providers [ ] YES  [ ] NO
Patient is a 89y old  Female who presents with a chief complaint of AMS (31 May 2025 08:25)    Date of servie : 05-31-25 @ 14:49  INTERVAL HPI/OVERNIGHT EVENTS:  T(C): 36.3 (05-31-25 @ 10:59), Max: 36.7 (05-31-25 @ 00:07)  HR: 65 (05-31-25 @ 10:59) (65 - 80)  BP: 150/75 (05-31-25 @ 10:59) (104/62 - 150/75)  RR: 18 (05-31-25 @ 10:59) (18 - 18)  SpO2: 94% (05-31-25 @ 10:59) (93% - 94%)  Wt(kg): --  I&O's Summary    31 May 2025 07:01  -  31 May 2025 14:49  --------------------------------------------------------  IN: 240 mL / OUT: 0 mL / NET: 240 mL        LABS:                        10.4   7.37  )-----------( 172      ( 31 May 2025 06:52 )             31.3     05-31    133[L]  |  101  |  32[H]  ----------------------------<  122[H]  4.5   |  22  |  0.89    Ca    8.8      31 May 2025 06:52        Urinalysis Basic - ( 31 May 2025 06:52 )    Color: x / Appearance: x / SG: x / pH: x  Gluc: 122 mg/dL / Ketone: x  / Bili: x / Urobili: x   Blood: x / Protein: x / Nitrite: x   Leuk Esterase: x / RBC: x / WBC x   Sq Epi: x / Non Sq Epi: x / Bacteria: x      CAPILLARY BLOOD GLUCOSE            Urinalysis Basic - ( 31 May 2025 06:52 )    Color: x / Appearance: x / SG: x / pH: x  Gluc: 122 mg/dL / Ketone: x  / Bili: x / Urobili: x   Blood: x / Protein: x / Nitrite: x   Leuk Esterase: x / RBC: x / WBC x   Sq Epi: x / Non Sq Epi: x / Bacteria: x        MEDICATIONS  (STANDING):  apixaban 2.5 milliGRAM(s) Oral every 12 hours  cefTRIAXone   IVPB 2000 milliGRAM(s) IV Intermittent every 24 hours  chlorhexidine 2% Cloths 1 Application(s) Topical daily  famotidine Injectable 20 milliGRAM(s) IV Push daily  metoprolol succinate  milliGRAM(s) Oral daily  pantoprazole    Tablet 40 milliGRAM(s) Oral before breakfast  polyethylene glycol 3350 17 Gram(s) Oral daily  senna 2 Tablet(s) Oral at bedtime    MEDICATIONS  (PRN):  acetaminophen     Tablet .. 650 milliGRAM(s) Oral every 6 hours PRN Temp greater or equal to 38C (100.4F), Mild Pain (1 - 3)  bisacodyl 5 milliGRAM(s) Oral every 12 hours PRN Constipation  zolpidem 5 milliGRAM(s) Oral at bedtime PRN Insomnia          PHYSICAL EXAM:  GENERAL: NAD, well-groomed, well-developed  HEAD:  Atraumatic, Normocephalic  CHEST/LUNG: Clear to percussion bilaterally; No rales, rhonchi, wheezing, or rubs  HEART: Regular rate and rhythm; No murmurs, rubs, or gallops  ABDOMEN: Soft, Nontender, Nondistended; Bowel sounds present  EXTREMITIES:  2+ Peripheral Pulses, No clubbing, cyanosis, or edema  LYMPH: No lymphadenopathy noted  SKIN: No rashes or lesions    Care Discussed with Consultants/Other Providers [ ] YES  [ ] NO
[facility]    Dr. Linda Galicia     Office [phone] (9 am to 5 pm)  Service: [phone] (5pm to 9am)  Also Available on TEAMS      RENAL PROGRESS NOTE: DATE OF SERVICE 06-04-25 @ 15:13    Patient is a 89y old  Female who presents with a chief complaint of AMS (04 Jun 2025 12:27)      Patient seen and examined at bedside. No chest pain/sob    VITALS:  T(F): 98.3 (06-04-25 @ 08:20), Max: 98.6 (06-04-25 @ 05:07)  HR: 92 (06-04-25 @ 08:20)  BP: 116/71 (06-04-25 @ 08:20)  RR: 18 (06-04-25 @ 08:20)  SpO2: 91% (06-04-25 @ 08:20)  Wt(kg): --        PHYSICAL EXAM:  Constitutional: NAD  Neck: No JVD  Respiratory: CTAB, no wheezes, rales or rhonchi  Cardiovascular: S1, S2, RRR  Gastrointestinal: BS+, soft, NT/ND  Extremities: No peripheral edema    Hospital Medications:   MEDICATIONS  (STANDING):  apixaban 2.5 milliGRAM(s) Oral every 12 hours  cefTRIAXone   IVPB 2000 milliGRAM(s) IV Intermittent every 24 hours  chlorhexidine 2% Cloths 1 Application(s) Topical daily  famotidine Injectable 20 milliGRAM(s) IV Push daily  melatonin 5 milliGRAM(s) Oral once  metoprolol succinate  milliGRAM(s) Oral daily  pantoprazole    Tablet 40 milliGRAM(s) Oral before breakfast  polyethylene glycol 3350 17 Gram(s) Oral daily  senna 2 Tablet(s) Oral at bedtime  sodium chloride 0.9%. 1000 milliLiter(s) (60 mL/Hr) IV Continuous <Continuous>      LABS:  06-04    131[L]  |  93[L]  |  19  ----------------------------<  109[H]  3.7   |  24  |  0.70    Ca    8.3[L]      04 Jun 2025 07:19  Phos  3.0     06-03  Mg     2.0     06-03    TPro  7.5  /  Alb  2.6[L]  /  TBili  1.1  /  DBili      /  AST  37  /  ALT  49[H]  /  AlkPhos  140[H]  06-03    Creatinine Trend: 0.70 <--, 0.77 <--, 0.63 <--, 0.72 <--, 0.89 <--, 0.73 <--, 0.75 <--                                11.3   5.13  )-----------( 268      ( 04 Jun 2025 07:19 )             34.2     Urine Studies:  Urinalysis - [06-04-25 @ 07:19]      Color  / Appearance  / SG  / pH       Gluc 109 / Ketone   / Bili  / Urobili        Blood  / Protein  / Leuk Est  / Nitrite       RBC  / WBC  / Hyaline  / Gran  / Sq Epi  / Non Sq Epi  / Bacteria     Urine Sodium 11      [06-03-25 @ 20:33]  Urine Osmolality 648      [06-03-25 @ 20:33]    TSH 0.87      [05-31-25 @ 06:52]      Syphilis Screen (Treponema Pallidum Ab) Negative      [05-31-25 @ 06:52]    RADIOLOGY & ADDITIONAL STUDIES:

## 2025-06-05 NOTE — PROGRESS NOTE ADULT - ASSESSMENT
89-year-old female  brought by EMS from [facility] assisted [facility] for altered mental status  and acting not like herself for the last 24 hours. Admitted for Toxic Metabolic Encephalopathy in the setting of PNA. Nephro on board for Hyponatremia    Hyponatremia  Likely prerenal although SIADH cannot be totally ruled out due to lung disease  Urina na <20 and  suggestive of prerenal/ SIADH   Sodium fluctuates  Cw Fluid restriction 1.5 L per day  Na stagnant despite fluid, advise start salt tabs 1 Gram BID , can give dose now  Continue to monitor sodium   AM cortisol borderline low however unclear significance, BP WNL    Acidosis  Mild  Monitor    Likely CKD2  Possibly due to age no prior hx of T2DM and HTN  Scr stable  Avoid nephrotoxins  Avoid contrast  Keep MAP>65    PNA  Monitor

## 2025-06-05 NOTE — PHYSICAL THERAPY INITIAL EVALUATION ADULT - PERTINENT HX OF CURRENT PROBLEM, REHAB EVAL
89-year-old female  brought by EMS from [facility] assisted living for altered mental status  and acting not like herself for the last 24 hours. According to the daughter who is near bedside  she states that mom has breast cancer status postmastectomy  history of A-fib in December on Eliquis.  Yesterday she did not feel well had diarrhea and today was called by the nursing home stating that she is a little bit more lethargic and feels very weak. CXR Bilateral lower lobe predominant airspace reticular and patchy opacities.  Findings may be due to atelectasis but cannot exclude pneumonia in the right clinical setting. Small right-sided pleural effusion. CT Chest/A/P  Mild to moderate CHF and/or volume overload.   Additional findings suspect for mild right middle and lower lobe pneumonia on a background of mild bronchiectasis. No colitis or any acute abnormality demonstrated in the abdomen or pelvis. Doppler negative for DVT. CT head: Moderate chronic microvascular changes without evidence of an  acute transcortical infarction or hemorrhage.CT C-spine: Mild to moderate spondylosis. No acute osseous abnormality. Consider MRI as clinically warranted.

## 2025-06-05 NOTE — DISCHARGE NOTE PROVIDER - NSDCFUSCHEDAPPT_GEN_ALL_CORE_FT
Mary Terry  [facility]  Warren Diaz  Scheduled Appointment: 07/21/2025

## 2025-06-05 NOTE — DISCHARGE NOTE PROVIDER - HOSPITAL COURSE
HPI:  89-year-old female  brought by EMS from [place] assisted living for altered mental status  and acting not like herself for the last 24 hours. According to the daughter who is near bedside  she states that mom has breast cancer status postmastectomy  history of A-fib in December on Eliquis.  Yesterday she did not feel well had diarrhea and today was called by the nursing home stating that she is a little bit more lethargic and feels very weak.    Hospital Course:  CT C/A/P- findings c/f mild RML and RLL pneumonia on a background of mild bronchiectasis, no acute abd/pelvic pathology   no SSTI on exam  noted patient w/ allergic reaction to zosyn in ED (per notes pruritis and erythema) s/p solumedrol and benadryl   EKG reviewed, Qtc noted         Important Medication Changes and Reason:    Active or Pending Issues Requiring Follow-up:    Advanced Directives:   [ x] Full code  [ ] DNR  [ ] Hospice    Discharge Diagnoses:

## 2025-06-05 NOTE — PROGRESS NOTE ADULT - ASSESSMENT
89-year-old female  brought by EMS from [facility] assisted [facility] for altered mental status  and acting not like herself for the last 24 hours PTA. According to the daughter who is near bedside  she states that mom has breast cancer status postmastectomy  history of A-fib in December on Eliquis.  day PTA she did not feel well had diarrhea and was called by the nursing home stating that she is a little bit more lethargic and feels very weak.   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   o/e AAOx2, LUGO non focal   TTE 5/30: HOCM, EF > 75%   TSH WNL; RPR neg   ESR > 120      CT C/A/P- findings concening for mild right middle and lower lobe pneumonia on a background of mild bronchiectasis. no colitis or any acute abnormality demonstrated in the abdomen or pelvis.   GBS bacteremia  ddimer 599  CT with pleural effusion   6/5 was SOB this AM improved on NC     Imprssion:   1) AMS likely toxic metabolic encephalopathy in setting of infection/PNA/baceteremia, improving   2) possible MCI/dementia     - monitor Resp status   - elevated d dimer r/o PE/DVT. no SOB   - r/o reversible causes of dementia, check B12, RPR (NR), TSH (WNL)  if not already checked   - on DOAC, eliquis 2. 5mg BID for AF given age and weight   - was on levofloxacin and vanco for PNA; f/u ID + bacetermia strep ; now on CTX until; 6/9   - cardio recs appreicated ;   - consider MARIA G   - MRI brain w/o if no imrpovement in mental status, seems better near baseline   - seroquel PRN for agitaiton if QTC < 500   - f/u pulmo, pleural effusion    - telemetry  - PT/OT/SS/SLP, OOBC  - check FS, glucose control <180  - further recommendations pending further workup   - GI/DVT ppx   Vasile Mcclure MD  Vascular Neurology  Office: [phone]

## 2025-06-05 NOTE — PROVIDER CONTACT NOTE (OTHER) - SITUATION
Pt SOB, O2 sat at 84% on RA. Pt sat up and put on 2L NC and O2 sat 96%. Pt dyspneic with labored breathing
Pt has an order for two sets of blood cultures. One set of blood cultures was drawn, but pt refusing second set of blood cultures

## 2025-06-05 NOTE — DISCHARGE NOTE PROVIDER - CARE PROVIDER_API CALL
Adolfo Leung.  Cardiovascular Disease  [address] [address]  Phone: [phone]  Fax: [phone]  Follow Up Time:

## 2025-06-05 NOTE — PHYSICAL THERAPY INITIAL EVALUATION ADULT - PHYSICAL ASSIST/NONPHYSICAL ASSIST: SUPINE/SIT, REHAB EVAL
5:22 PM  04/06/22      Attempted to contact patient regarding positive blood culture. Patient had gram-positive cocci in clusters in aerobic bottle. Seen on 4/6/2022 diagnosed with duodenitis no antibiotics at time of discharge. No answer.   Left message to have patient return call        Key Vieira PA-C
7:25 PM  04/06/22      Received call from lab. Patient grew gram-positive cocci in clusters in anaerobic bottle as well.     Spoke with patient's wife who will bring him back for repeat labs      Walter Molina PA-C
I have set patient up with sathish lowery on may [email or web address] patient was given a appointment reminder letter
verbal cues/nonverbal cues (demo/gestures)/1 person assist

## 2025-06-05 NOTE — PROGRESS NOTE ADULT - PROVIDER SPECIALTY LIST ADULT
Cardiology
Hospitalist
Infectious Disease
Nephrology
Neurology
Neurology
Cardiology
Cardiology
Hospitalist
Hospitalist
Infectious Disease
Nephrology
Nephrology
Pulmonology
Cardiology
Hospitalist
Infectious Disease
Infectious Disease
Nephrology
Nephrology
Neurology
Pulmonology
Cardiology
Cardiology
Hospitalist
Hospitalist
Infectious Disease
Infectious Disease
Nephrology
Neurology
Pulmonology

## 2025-06-05 NOTE — PROGRESS NOTE ADULT - ASSESSMENT
89 y/o F brought by EMS from [facility] living for altered mental status    PNA, GBS bacteremia  sepsis- leukopenia, fever, tachycardia, bandemia  AMS- resolved  5/29 and 5/30 Bcx with Streptococcus agalactiae (Group B) - Ceftriaxone: S <=0.25  - repeat Bcx 5/31 and 6/1 NGTD   CT C/A/P- findings c/f mild RML and RLL pneumonia on a background of mild bronchiectasis, no acute abd/pelvic pathology   no SSTI on exam  noted patient w/ allergic reaction to zosyn in ED (per notes pruritis and erythema) s/p solumedrol and benadryl   EKG reviewed, Qtc noted   6/3 febrile in late morning -- no further fevers, no leukocytosis, PCT noted, answers and follows, no new findings on exam, nontoxic   6/4 CTA chest with no PE; small-mod R and small L pleural effusions with atelectasis, 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       Dr. Deepthi Freitas will be covering me 6/6.   Wes Grullon M.D.  [facility]  Available on [employer] TEAMS - *PREFERRED*  [phone]  After 5pm on weekdays and all day on weekends - please call [phone]     Thank you for consulting us and involving us in the management of this patients case. In addition to reviewing history, imaging, documents, labs, microbiology, took into account antibiotic stewardship, local antibiogram and infection control strategies and potential transmission issues at time of treatment decision making process.

## 2025-06-05 NOTE — DISCHARGE NOTE NURSING/CASE MANAGEMENT/SOCIAL WORK - FINANCIAL ASSISTANCE
[facility] provides services at a reduced cost to those who are determined to be eligible through [facility]’s financial assistance program. Information regarding [facility]’s financial assistance program can be found by going to [email or web address] or by calling [phone].

## 2025-06-05 NOTE — DISCHARGE NOTE NURSING/CASE MANAGEMENT/SOCIAL WORK - PATIENT PORTAL LINK FT
You can access the FollowMyHealth Patient Portal offered by [facility] by registering at the following website: [email or web address]. By joining [employer]’s FollowMyHealth portal, you will also be able to view your health information using other applications (apps) compatible with our system.

## 2025-06-05 NOTE — DISCHARGE NOTE NURSING/CASE MANAGEMENT/SOCIAL WORK - NSDCPEFALRISK_GEN_ALL_CORE
For information on Fall & Injury Prevention, visit: [email or web address] OR  [email or web address] OR  https://www.cdc.gov/steadi/patient.html

## 2025-06-05 NOTE — PROVIDER CONTACT NOTE (OTHER) - ASSESSMENT
Pt A&Ox4, VS as charted. Pt satting well on 2L NC.
Pt is A&Ox4. Pt stated that she was tired of being stuck with needles

## 2025-06-05 NOTE — PROGRESS NOTE ADULT - ASSESSMENT
89-year-old female  brought by EMS from [facility] for altered mental status  and acting not like herself for the last 24 hours. According to the daughter who is near bedside  she states that mom has breast cancer status postmastectomy  history of A-fib in December on Eliquis.  Yesterday she did not feel well had diarrhea and today was called by the nursing home stating that she is a little bit more lethargic and feels very weak.  (29 May 2025 17:12):  daughter is at bedside she is on room air at this time  she looks pretty good:   no sob:   no phlegm  :   she has no underlying lung disease       Streptococcus agalactiae bactermia/  pneumonia  CHF  A fibrillation       Streptococcus agalactiae bactermia/  pneumonia  ct chest shoiwed;1. Mild to moderate CHF and/or volume overload. 2. Additional findings suspect for mild right middle and lower lobe pneumonia on a background of mild bronchiectasis. 3. No colitis or any acute abnormality demonstrated in the abdomen or  pelvis.  vancomycin  IVPB 1000 milliGRAM(s) IV Intermittent , levoFLOXacin IVPB 500 milliGRAM(s) IV Intermittent every 24 hours  ID follow up  : bandemia     :  pt clinically looks better:   she is on room air:   her oxygenation needs to be checked on ambulation:  prior to dc to see if she needs home oxygen    cont antibiotics   id following   :  she looks pretty good:  on room air:   no sob;   rpt blood cultures arep ending:   no sob:   no wheezin/3;  she seems to be doing poorly 6today  : her mental status waxes and wanes;   when i saw her shge w as not very responsive:  when acp went later on she woke up :  check procal,  d dimer and cxr: and ammonia levels    : cont antibtiocs:   CTA angio showed: No pulmonary embolism.  Small to moderate right and small left pleural effusions with associated   atelectasis.  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  Cardiomegaly. Moderate pericardial effusion.  would defer to cards:   she is hemodynamically stable    : seems to be doing  ok : she is being dced:   antibiotics per ID       CHF  she has small effusion : on right side   echo : < from: TTE W or WO Ultrasound Enhancing Agent (24 @ 11:52) >   1. Mavacamten, ten days into 10 mg/d dose.   2. Left ventricular cavity is normal in size. Left ventricular systolic function is normal with an ejection fraction visually estimated at 65 %. There are no regional wall motion abnormalities seen.   3. The left ventricular outflow tract resting gradient is 6 mmHg and 9 mmHg using the Valsalva maneuver.   4. Mild to moderate pulmonary hypertension.   5. Mitral stenosis is present due to severe annular calcification.   6. Compared to the transthoracic echocardiogram performed on 2024, the LVOTgradient is lower.  ? 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  6. Mild pulmonary hypertension. rt pleural effusion :  agree with cardiology to be very cautious about lasix given HOCM    2: p er crds   6/*3: defer to cards   : she has jesse effusions and pericardial effusion:  likely cardiac etiology    : per cards     A fibrillation   apixaban 2.5 milliGRAM(s) Oral every 12 hours    DW DAUGHTER:  MCKAY CASTILLO

## 2025-06-06 LAB
CULTURE RESULTS: SIGNIFICANT CHANGE UP
CULTURE RESULTS: SIGNIFICANT CHANGE UP
SPECIMEN SOURCE: SIGNIFICANT CHANGE UP
SPECIMEN SOURCE: SIGNIFICANT CHANGE UP

## 2025-06-07 NOTE — ED PROVIDER NOTE - PHYSICAL EXAMINATION
[facility] Emergency Department    [address]    [facility] [address]    Phone:  [phone]    Fax:  [phone]                                       Vera Sanchez   MRN: [id number]    Department:  [facility] Emergency Department   Date of Visit:  8/9/2018           After Visit Summary Signature Page     I have received my discharge instructions, and my questions have been answered. I have discussed any challenges I see with this plan with the nurse or doctor.    ..........................................................................................................................................  Patient/Patient Representative Signature      ..........................................................................................................................................  Patient Representative Print Name and Relationship to Patient    ..................................................               ................................................  Date                                            Time    ..........................................................................................................................................  Reviewed by Signature/Title    ...................................................              ..............................................  Date                                                            Time           ~ See ED Attending Attestation

## 2025-06-07 NOTE — ED PROVIDER NOTE - ATTENDING CONTRIBUTION TO CARE
I, Oswaldo Palencia MD, Emergency Medicine Attending Physician, personally saw and examined the patient and I personally made/approve the management plan and take responsibility for the patient management.    MDM: 89-year-old female with history of atrial fibrillation on Eliquis, breast cancer, who was brought in by EMS for palpitations, tachycardia, chills, hypoxia.  Patient was recently admitted and discharged on June 5, 2025 for AMS and found to have a right-sided pneumonia.  Patient's daughter states symptoms are similar to that admission.  Majority of history as per daughter at bedside.    On examination, patient with fever, tachycardia but otherwise stable vitals, awake and alert. Cardiac examination RRR, lungs with rhonchi worse on the right.  Abdomen is soft and nontender.  Neurovascularly intact in all 4 extremities.     Concern for sepsis as patient is positive for SIRS criteria with suspected source. Will evaluate for possible source including pneumonia and urinary tract infection. Will obtain labs including lactate and blood cultures. Will give IV fluids and empiric broad-spectrum IV antibiotics.    Labs with hyponatremia and elevated troponin.  Patient has an elevated troponin level. However, they do not have any active chest pain. The EKG does not show any ST elevations or depressions. Will trend troponin and keep patient on cardiac monitor.    Cardiology consulted at 9:45 PM, pending official recommendations, but no plan for acute cardiac intervention.  The patient will need to be admitted to the hospital for continued evaluation and management.  Discussed with the accepting physician regarding the initial presentation, diagnostic studies, treatments given in the ED, and current plan of care.  The patient was accepted by and endorsed to the medicine team with cardiology consulted.    Critical Care Billing: Sepsis, elevated troponin  Upon my evaluation, this patient had a high probability of imminent or life-threatening deterioration, which required my direct attention, intervention, and personal management.  The patient has a  medical condition that impairs one or more vital organ systems.  Frequent personal assessment and adjustment of medical interventions was performed.      I have personally provided 35 minutes of critical care time exclusive of time spent on separately billable procedures. Time includes review of laboratory data, radiology results, discussion with consultants, patient and family; monitoring for potential decompensation, as well as time spent retrieving data and reviewing the chart and documenting the visit. Interventions were performed as documented above. I, Oswaldo Palencia MD, Emergency Medicine Attending Physician, personally saw and examined the patient and I personally made/approve the management plan and take responsibility for the patient management.    HPI: 89-year-old female with history of atrial fibrillation on Eliquis, breast cancer, who was brought in by EMS for palpitations, tachycardia, chills, hypoxia.  Patient was recently admitted and discharged on June 5, 2025 for AMS and found to have a right-sided pneumonia.  Patient's daughter states symptoms are similar to that admission.  Majority of history as per daughter at bedside.    On examination, patient with fever, tachycardia but otherwise stable vitals, awake and alert. Cardiac examination RRR, lungs with rhonchi worse on the right.  Abdomen is soft and nontender.  Neurovascularly intact in all 4 extremities.     MDM: Concern for sepsis as patient is positive for SIRS criteria with suspected source. Will evaluate for possible source including pneumonia and urinary tract infection. Will obtain labs including lactate and blood cultures. Will give IV fluids and empiric broad-spectrum IV antibiotics.    Labs with hyponatremia and elevated troponin.  Patient has an elevated troponin level. However, they do not have any active chest pain. The EKG does not show any ST elevations or depressions. Will trend troponin and keep patient on cardiac monitor.    Cardiology consulted at 9:45 PM, pending official recommendations, but no plan for acute cardiac intervention.  The patient will need to be admitted to the hospital for continued evaluation and management.  Discussed with the accepting physician regarding the initial presentation, diagnostic studies, treatments given in the ED, and current plan of care.  The patient was accepted by and endorsed to the medicine team with cardiology consulted.    Critical Care Billing: Sepsis, elevated troponin  Upon my evaluation, this patient had a high probability of imminent or life-threatening deterioration, which required my direct attention, intervention, and personal management.  The patient has a  medical condition that impairs one or more vital organ systems.  Frequent personal assessment and adjustment of medical interventions was performed.      I have personally provided 35 minutes of critical care time exclusive of time spent on separately billable procedures. Time includes review of laboratory data, radiology results, discussion with consultants, patient and family; monitoring for potential decompensation, as well as time spent retrieving data and reviewing the chart and documenting the visit. Interventions were performed as documented above.

## 2025-06-07 NOTE — ED PROVIDER NOTE - CLINICAL SUMMARY MEDICAL DECISION MAKING FREE TEXT BOX
03/29/23 0902   Final Note   Assessment Type Final Discharge Note   Anticipated Discharge Disposition Home   Post-Acute Status   Discharge Delays None known at this time        ~ See ED Attending Attestation

## 2025-06-07 NOTE — ED PROVIDER NOTE - OTHER FINDINGS
Oswaldo Palencia MD - My independent interpretation of the EKG shows: Atrial fibrillation with RVR with rate of 133 bpm, no ST elevations .

## 2025-06-07 NOTE — ED ADULT NURSE NOTE - OBJECTIVE STATEMENT
Pt is an 89 yo F BIB EMS from [facility] w/ PMHx of afib, lymphedema (do not use r/ arm) and breast cancer (s/p mastectomy) complaining of malaise. Per EMS pt was found appearing increasingly lethargic and found to have an elevated heart rate. EMS reports administering labetolol on scene as they found pt in rapid afib on their cardiac assessment. Pt endorses having fever/chills as of recently. Pt also endorses having palpitations. Pt is aox4, airway clear and patent, equal chest rise and fall, pulses intact, skin dry and hot to touch, sensory skills intact, abd non tender and non distended x4 quadrants, sinus tach on cardiac monitor w/ afib (below 150's). Pt placed on cardiac monitor. Pt placed in locked lowered stretcher w/ rails raised.

## 2025-06-07 NOTE — ED ADULT NURSE NOTE - NSFALLHARMRISKINTERV_ED_ALL_ED

## 2025-06-07 NOTE — ED PROCEDURE NOTE - ATTENDING CONTRIBUTION TO CARE
I, EM Attending, Oswaldo Palencia was present for and supervised the critical portions of the procedure performed by the Resident/Fellow Physician or CECY.

## 2025-06-07 NOTE — ED PROCEDURE NOTE - PROCEDURE NAME, MLM
Patient stating he needs his right ear flushed. C/o dizziness and feeling his ear is closing.   Point of Care Ultrasound Vascular Access

## 2025-06-08 RX ORDER — ACETAMINOPHEN 500 MG/5ML
2 LIQUID (ML) ORAL
Refills: 0 | DISCHARGE

## 2025-06-08 NOTE — PHYSICAL THERAPY INITIAL EVALUATION ADULT - PERTINENT HX OF CURRENT PROBLEM, REHAB EVAL
Pt is an 89F adm to [facility] ED on 6/7/25 with hx GERD, R breast CA (40-50y ago) s/p B/L mastectomy and radiation, RUE lymphedema, osteoarthritis/osteoporosis (on monthly ibandronate), HLD, Afib on eliquis, HOCM/severe LVOT/ELEAZAR, small-moderate pericardial effusion/RA diastolic collapse, recent hospitalization 5/29-6/5 for RML PNA, GBS bacteremia (pos cultures 5/29, 5/30) of unclear source on total 10 days abx (IV ceftriaxone inpt, then levaquin 750 q48h until 6/9), presents from [facility] rehab with shaking chills, hypotension, poor appetite. History from pt's daughter/primary decision maker Teresita Shay at bedside. While at [facility], pt found to have hypotension to SBP 80s. Pt was given IVF and reduced dose of metoprolol. Pt also found to have tachycardia, shaking chills. Pt sent back to the hospital. Reportedly pt has not been eating well, not urinating much. Denies CP, SOB, abd pain, diarrhea, cough, other respiratory symptoms. At baseline pt ambulates without assistive device, drives. RRT called in the ed for hypotension to SBP 79. MICU Consult-not approp for MICU; ID following for severe sepsis; IR Consult for possible pericardial drain due to small pericardial effusion. CT Head-No acute intracranial hemorrhage, mass effect, or shift of the midline structures. Similar-appearing mild chronic white matter microvascular type changes. CXR-Worsening aeration at the bases.

## 2025-06-08 NOTE — PROVIDER CONTACT NOTE (OTHER) - ASSESSMENT
Pt is AXOx4; on 2LNC, other vital signs charted. Pt on tele. Denies chest pain, SOB, or report of discomfort. No s.s of acute distress noted.

## 2025-06-08 NOTE — CHART NOTE - NSCHARTNOTEFT_GEN_A_CORE
HPI:  89F c hx GERD, R breast CA (40-50y ago) s/p B/L mastectomy and radiation, RUE lymphedema, osteoarthritis/osteoporosis (on monthly ibandronate), HLD, Afib on eliquis, HOCM/severe LVOT/ELEAZAR, small-moderate pericardial effusion/RA diastolic collapse, recent hospitalization 5/29-6/5 for RML PNA, GBS bacteremia (pos cultures 5/29, 5/30) of unclear source on total 10 days abx (IV ceftriaxone inpt, then levaquin 750 q48h until 6/9), presents from [facility] with shaking chills, hypotension, poor appetite    AMS  Notified by RN, pt w/ AMS this am.  Of note, pt received Ambien at 4am.    Pt seen and examined at bedside, able to HPI:  89F c hx GERD, R breast CA (40-50y ago) s/p B/L mastectomy and radiation, RUE lymphedema, osteoarthritis/osteoporosis (on monthly ibandronate), HLD, Afib on eliquis, HOCM/severe LVOT/ELEAZAR, small-moderate pericardial effusion/RA diastolic collapse, recent hospitalization 5/29-6/5 for RML PNA, GBS bacteremia (pos cultures 5/29, 5/30) of unclear source on total 10 days abx (IV ceftriaxone inpt, then levaquin 750 q48h until 6/9), presents from [facility] with shaking chills, hypotension, poor appetite    AMS  Notified by RN, pt w/ AMS this am.  Of note, pt received Ambien at 4am.    Pt seen and examined at bedside, lethargic, SBP soft 90's.    VBG ordered, will obtain urgent CTH.    Per discussion with Dr. Coelho will obtain ICU consult  Follow ICU recs  Pt endorse to day ACP     Vital Signs Last 24 Hrs  T(C): 36.7 (08 Jun 2025 05:00), Max: 38.7 (07 Jun 2025 20:58)  T(F): 98.1 (08 Jun 2025 05:00), Max: 101.6 (07 Jun 2025 20:58)  HR: 80 (08 Jun 2025 09:19) (80 - 128)  BP: 86/61 (08 Jun 2025 09:19) (79/51 - 131/83)  BP(mean): 61 (08 Jun 2025 00:34) (61 - 61)  RR: 18 (08 Jun 2025 09:19) (18 - 26)  SpO2: 97% (08 Jun 2025 09:19) (95% - 98%)    Parameters below as of 08 Jun 2025 09:19  Patient On (Oxygen Delivery Method): nasal cannula, 3      Emelin Reyes Mone[place], NP   Medicine Department   [facility] [id number]

## 2025-06-08 NOTE — PATIENT PROFILE ADULT - FALL HARM RISK - HARM RISK INTERVENTIONS
Assistance with ambulation/Assistance OOB with selected safe patient handling equipment/Communicate Risk of Fall with Harm to all staff/Discuss with provider need for PT consult/Monitor gait and stability/Reinforce activity limits and safety measures with patient and family/Tailored Fall Risk Interventions/Visual Cue: Yellow wristband and red socks/Bed in lowest position, wheels locked, appropriate side rails in place/Call bell, personal items and telephone in reach/Instruct patient to call for assistance before getting out of bed or chair/Non-slip footwear when patient is out of bed/[place] to call system/Physically safe environment - no spills, clutter or unnecessary equipment/Purposeful Proactive Rounding/Room/bathroom lighting operational, light cord in reach

## 2025-06-08 NOTE — H&P ADULT - ASSESSMENT
89F c hx GERD, R breast CA (40-50y ago) s/p B/L mastectomy and radiation, RUE lymphedema, osteoarthritis/osteoporosis (on monthly ibandronate), HLD, Afib on eliquis, HOCM/severe LVOT/ELEAZAR, small-moderate pericardial effusion/RA diastolic collapse, recent hospitalization 5/29-6/5 for RML PNA, GBS bacteremia (pos cultures 5/29, 5/30) of unclear source on total 10 days abx (IV ceftriaxone inpt, then levaquin 750 q48h until 6/9), pw hypotension, severe sepsis of unclear source, demand ischemia, afib c rvr

## 2025-06-08 NOTE — H&P ADULT - PROBLEM SELECTOR PLAN 1
- unclear source of fevers  - cont empiric vanc, cefepime  - needs ID consult in AM  - f/u blood cx. if positive will likely need MARIA G  - monitor for any new symptoms  - standing tylenol x4 doses for now  - IVF  - VSq4h - unclear source of fevers  - cont empiric vanc, cefepime  - needs ID consult in AM  - f/u blood cx. if positive will likely need MARIA G  - monitor for any localizing symptoms  - pt had recent pan ct scan that did not elucidate cause of bacteremia  - standing tylenol x4 doses for now  - IVF  - VSq4h

## 2025-06-08 NOTE — H&P ADULT - NSHPLABSRESULTS_GEN_ALL_CORE
Personally reviewed old records.  Personally reviewed labs.  Personally reviewed imaging.  Personally reviewed EKG. Afib rate 133                          10.2   7.11  )-----------( 349      ( 07 Jun 2025 20:56 )             30.1       06-07    129[L]  |  96  |  34[H]  ----------------------------<  168[H]  4.4   |  22  |  0.89    Ca    8.8      07 Jun 2025 20:56    TPro  6.7  /  Alb  2.5[L]  /  TBili  0.7  /  DBili  x   /  AST  47[H]  /  ALT  35  /  AlkPhos  192[H]  06-07            LIVER FUNCTIONS - ( 07 Jun 2025 20:56 )  Alb: 2.5 g/dL / Pro: 6.7 g/dL / ALK PHOS: 192 U/L / ALT: 35 U/L / AST: 47 U/L / GGT: x                 Urinalysis Basic - ( 07 Jun 2025 20:56 )    Color: x / Appearance: x / SG: x / pH: x  Gluc: 168 mg/dL / Ketone: x  / Bili: x / Urobili: x   Blood: x / Protein: x / Nitrite: x   Leuk Esterase: x / RBC: x / WBC x   Sq Epi: x / Non Sq Epi: x / Bacteria: x

## 2025-06-08 NOTE — H&P ADULT - NSHPSOCIALHISTORY_GEN_ALL_CORE
Social History:    Marital Status: ( x ) , (  ) Single, (  ) , (  ) , (  )   # of Children: 3  Lives with: (  ) alone, (  ) children, ( x ) spouse, (  ) parents, (  ) siblings, (  ) friends, (  ) other:   Tobacco Usage: ( x ) never smoked, (  ) former smoker, (  ) current smoker and Total Pack-Years:   Last Alcohol Usage/Frequency/Amount/Withdrawal/Hx of Abuse:  none

## 2025-06-08 NOTE — CONSULT NOTE ADULT - ASSESSMENT
Critically ill pt with Afib, severe HCM LVOT obstruction, Pericardial effusion, recent admission for PNA / Bacteremia   now admitted with sepsis, tachycardia ( afib with RVR ) and shock . Cardiology is called for elevated troponin     Elevated troponin   Demand ischemia   in setting of afib with RVR, shock, severe HCM and sepsis   its downtrending   eventual ischemic eval can be considered    Shock   likely multifactorial   in setting of sepsis, pericardial effusion and HCM with tachycardia  HR is better now  start midodrine   ICU eval is recommended  IR evaluation to drain Pericardial effusion   broad spectrum abx  ID eval   ryan culture     Afib  HR is better  cont metoprolol   EP consult is recommended for rhythm control given HCM   cont a/c    Pl effusion / pericardial effusion   plan as above  has poor nutrition due to albumin hence decreased oncotic pressure  nutritional optimization   would be very cautious with diuresis given severe LVOT obstruction and pericardial effusion , she can potentially have worsening shock     AMS  suspect multifactorial due to sepsis , avoid ambien   consider neurology evaluation      Advanced care planning was discussed with patient and family.  Risks, benefits and alternatives of the cardiac treatments and medical therapy including procedures were discussed in detail and all questions were answered. Importance of compliance with medical therapy and lifestyle modification to improve cardiovascular health were addressed. Appropriate forms and patient educational materials were reviewed. 30 minutes face to face spent.    Discussed with covering NP and primary team

## 2025-06-08 NOTE — CONSULT NOTE ADULT - SUBJECTIVE AND OBJECTIVE BOX
[facility]  BASIL Hooks S. Shah, Y. Patel, G. [facility]  [phone]    GINO GRAHAM  89y, Female  [id number]    HPI--  HPI:  89F c hx GERD, R breast CA (40-50y ago) s/p B/L mastectomy and radiation, RUE lymphedema, osteoarthritis/osteoporosis (on monthly ibandronate), HLD, Afib on eliquis, HOCM/severe LVOT/ELEAZAR, small-moderate pericardial effusion/RA diastolic collapse, recent hospitalization 5/29-6/5 for RML PNA, GBS bacteremia (pos cultures 5/29, 5/30) of unclear source on total 10 days abx (IV ceftriaxone inpt, then levaquin 750 q48h until 6/9), presents from [facility] rehab with shaking chills, hypotension, poor appetite    History from pt's daughter/primary decision maker Teresita Shay at bedside.  While at [facility], pt found to have hypotension to SBP 80s. Pt was given IVF and reduced dose of metoprolol. Pt also found to have tachycardia, shaking chills. Pt sent back to the hospital. Reportedly pt has not been eating well, not urinating much. Denies CP, SOB, abd pain, diarrhea, cough, other respiratory symptoms. At baseline pt ambulates without assistive device, drives.    RRT called in the ed for hypotension to SBP 79. (08 Jun 2025 02:41)        Active Medications--  acetaminophen     Tablet .. 650 milliGRAM(s) Oral every 6 hours  apixaban 2.5 milliGRAM(s) Oral two times a day  aspirin enteric coated 81 milliGRAM(s) Oral daily  cefepime   IVPB 1000 milliGRAM(s) IV Intermittent every 8 hours  melatonin 3 milliGRAM(s) Oral at bedtime PRN  metoprolol tartrate 25 milliGRAM(s) Oral two times a day  midodrine 5 milliGRAM(s) Oral every 8 hours  pantoprazole    Tablet 40 milliGRAM(s) Oral before breakfast  polyethylene glycol 3350 17 Gram(s) Oral daily  senna 2 Tablet(s) Oral at bedtime  sodium chloride 0.9%. 500 milliLiter(s) IV Continuous <Continuous>    Antimicrobials:   cefepime   IVPB 1000 milliGRAM(s) IV Intermittent every 8 hours    Immunologic:     ROS:  CONSTITUTIONAL: No fevers or chills. No weakness or headache. No weight changes.  EYES/ENT: No visual or hearing changes. No sore throat or throat pain .  NECK: No pain or stiffness  RESPIRATORY: No cough, wheezing, or hemoptysis. No shortness of breath  CARDIOVASCULAR: No chest pain or palpitations  GASTROINTESTINAL: No abdominal pain. No nausea or vomiting. No diarrhea or constipation.  GENITOURINARY: No dysuria, frequency or hematuria  NEUROLOGICAL: No numbness or weakness  SKIN: No itching or rashes  PSYCHIATRIC: Pleasant. Appropriate affect    Allergies: Zosyn (Rash; Urticaria; Hives)    PMH -- Breast cancer      PSH -- H/O bilateral mastectomy    History of cataract surgery, unspecified laterality      FH -- Family history of MI (myocardial infarction) (Father)    FH: Alzheimers disease    FH: cerebral aneurysm      Social History --  EtOH: denies   Tobacco: denies   Drug Use: denies     Travel/Environmental/Occupational History:    Physical Exam--  Vital Signs Last 24 Hrs  T(F): 98.1 (08 Jun 2025 05:00), Max: 101.6 (07 Jun 2025 20:58)  HR: 80 (08 Jun 2025 09:19) (80 - 128)  BP: 86/61 (08 Jun 2025 09:19) (79/51 - 131/83)  RR: 18 (08 Jun 2025 09:19) (18 - 26)  SpO2: 97% (08 Jun 2025 09:19) (95% - 98%)  General: nontoxic-appearing, no acute distress  HEENT: NC/AT, EOMI  Lungs:   Heart: Regular rate and rhythm.   Abdomen: Soft. Nondistended. Nontender.   Extremities: No cyanosis or clubbing. No edema.   Skin: Warm. Dry. Good turgor. No rash.     Laboratory & Imaging Data:  CBC:                       10.2   7.11  )-----------( 349      ( 07 Jun 2025 20:56 )             30.1     CMP: 06-07    129[L]  |  96  |  34[H]  ----------------------------<  168[H]  4.4   |  22  |  0.89    Ca    8.8      07 Jun 2025 20:56    TPro  6.7  /  Alb  2.5[L]  /  TBili  0.7  /  DBili  x   /  AST  47[H]  /  ALT  35  /  AlkPhos  192[H]  06-07    LIVER FUNCTIONS - ( 07 Jun 2025 20:56 )  Alb: 2.5 g/dL / Pro: 6.7 g/dL / ALK PHOS: 192 U/L / ALT: 35 U/L / AST: 47 U/L / GGT: x           Urinalysis Basic - ( 07 Jun 2025 20:56 )    Color: x / Appearance: x / SG: x / pH: x  Gluc: 168 mg/dL / Ketone: x  / Bili: x / Urobili: x   Blood: x / Protein: x / Nitrite: x   Leuk Esterase: x / RBC: x / WBC x   Sq Epi: x / Non Sq Epi: x / Bacteria: x        Microbiology: reviewed    Culture - Blood (collected 06-01-25 @ 10:51)  Source: Blood Blood-Venous  Final Report (06-06-25 @ 14:00):    No growth at 5 days    Culture - Blood (collected 05-31-25 @ 20:48)  Source: Blood Blood-Peripheral  Final Report (06-06-25 @ 02:01):    No growth at 5 days    Culture - Blood (collected 05-30-25 @ 07:16)  Source: Blood Blood  Gram Stain (05-30-25 @ 20:47):    Growth in anaerobic bottle: Gram Positive Cocci in Pairs and Chains    Growth in aerobic bottle: Gram Positive Cocci in Pairs and Chains  Final Report (05-31-25 @ 15:48):    Growth in aerobic and anaerobic bottles: Streptococcus agalactiae (Group    B)    See previous culture [id number]    Culture - Blood (collected 05-30-25 @ 07:16)  Source: Blood Blood  Gram Stain (05-30-25 @ 19:54):    Growth in aerobic and anaerobic bottles: Gram Positive Cocci in Pairs and    Chains  Final Report (05-31-25 @ 15:47):    Growth in aerobic and anaerobic bottles: Streptococcus agalactiae (Group    B)    See previous culture [id number]    Urinalysis with Rflx Culture (collected 05-29-25 @ 16:21)    Culture - Blood (collected 05-29-25 @ 13:45)  Source: Blood Blood-Peripheral  Gram Stain (05-30-25 @ 01:47):    Growth in anaerobic bottle: Gram Positive Cocci in Pairs and Chains    Growth in aerobic bottle: Gram Positive Cocci in Pairs and Chains  Final Report (05-31-25 @ 18:43):    Growth in aerobic and anaerobic bottles: Streptococcus agalactiae (Group    B)    See previous culture [id number]    Culture - Blood (collected 05-29-25 @ 13:30)  Source: Blood Blood-Peripheral  Gram Stain (05-30-25 @ 01:45):    Growth in aerobic bottle: Gram Positive Cocci in Pairs and Chains    Growth in anaerobic bottle: Gram Positive Cocci in Pairs and Chains  Final Report (06-02-25 @ 15:48):    Growth in aerobic and anaerobic bottles: Streptococcus agalactiae (Group    B) ***********Note************    This isolate demonstrates inducible    clindamycin resistance.    Clindamycin may still be effective in some patients.    Direct identification is available within approximately 3-5    hours either by Blood Panel Multiplexed PCR or Direct    MALDI-TOF. Details: [email or web address]  Organism: Blood Culture PCR  Streptococcus agalactiae (Group B) (06-02-25 @ 15:48)  Organism: Streptococcus agalactiae (Group B) (06-02-25 @ 15:48)      -  Levofloxacin: S 0.5      -  Clindamycin: R 0.25      -  Vancomycin: S 0.5      -  Ceftriaxone: S <=0.25      -  Tetracycline: R >4      Method Type: TATIANA      -  Penicillin: S <=0.03 Predicts results for ampicillin, amoxicillin, amoxicillin/clavulanate, ampicillin/sulbactam, 1st, 2nd and 3rd generation cephalosporins and carbapenems.  Organism: Blood Culture PCR (06-02-25 @ 15:48)      -  Streptococcus agalactiae (Group B): Detec      Method Type: PCR          Radiology: reviewed    < from: CT Head No Cont (06.08.25 @ 09:41) >    ACC: [id number] EXAM:  CT BRAIN   ORDERED BY:  EMELIN REYES MONEGRO     PROCEDURE DATE:  06/08/2025          INTERPRETATION:  .    CLINICAL INFORMATION: Altered mental status.    TECHNIQUE: Multiple axial CT images of the head were obtained without   contrast. Sagittal and coronal reconstructed images were acquired from   the source data.    COMPARISON: No prior CT studies of the brain are available for comparison   at this institution.    FINDINGS: There is no acute intracranial hemorrhage, masseffect, shift   of the midline structures, herniation, extra-axial fluid collection, or   hydrocephalus.    A chronic lacunar infarct is again seen within the left basal ganglia.    There is mild diffuse cerebral volume loss with prominence of the sulci,   fissures, and cisternal spaces which is normal for the patient's age.   There is mild deep and periventricular white matter hypoattenuation   statistically compatible with microvascular changes given calcific   atherosclerotic disease of the intracranial arteries.    The paranasal sinuses and tympanomastoid cavities are clear. The   calvarium is intact. There is evidence of bilateral cataract removal.    IMPRESSION: No acute intracranial hemorrhage, mass effect, or shift of   the midline structures.    Similar-appearing mild chronic white matter microvascular type changes.    --- End of Report ---            CLARA HOLT MD; Attending Radiologist  This document has been electronically signed. Jun 8 2025 10:20AM    < end of copied text >  < from: Xray Chest 1 View- PORTABLE-Urgent (06.07.25 @ 22:11) >    ACC: [id number] EXAM:  XR CHEST PORTABLE URGENT 1V   ORDERED BY:  APARNA BOWLING     PROCEDURE DATE:  06/07/2025          INTERPRETATION:  CLINICAL INDICATION: Sepsis    EXAM: Frontal radiograph of the chest.    COMPARISON: Chest radiograph from5/29/2025    FINDINGS:  Bilateral breast implants.  Bilateral predominantly basilar infiltrates with effusions.  Chronic left humerus deformity.  There is no pneumothorax.  The heart is enlarged    IMPRESSION:  Worsening aeration at the bases.    ---End of Report ---          MANISHA CORTES DO; Resident Radiologist  This document has been electronically signed.  SABI SANTILLAN MD; Attending Interventional Radiologist  This document has been electronically signed. Jun 8 2025  8:11AM    < end of copied text >     [facility]  BASIL Hooks S. Shah, Y. Patel, G. [facility]  [phone]    GINO GRAHAM  89y, Female  [id number]    HPI--  HPI:  89F c hx GERD, R breast CA (40-50y ago) s/p B/L mastectomy and radiation, RUE lymphedema, osteoarthritis/osteoporosis (on monthly ibandronate), HLD, Afib on eliquis, HOCM/severe LVOT/ELEAZAR, small-moderate pericardial effusion/RA diastolic collapse, recent hospitalization 5/29-6/5 for RML PNA, GBS bacteremia (pos cultures 5/29, 5/30) of unclear source on total 10 days abx (IV ceftriaxone inpt, then levaquin 750 q48h until 6/9), presents from [facility] rehab with shaking chills, hypotension, poor appetite    History from pt's daughter/primary decision maker Teresita Shay at bedside.  While at [facility], pt found to have hypotension to SBP 80s. Pt was given IVF and reduced dose of metoprolol. Pt also found to have tachycardia, shaking chills. Pt sent back to the hospital. Reportedly pt has not been eating well, not urinating much. Denies CP, SOB, abd pain, diarrhea, cough, other respiratory symptoms. At baseline pt ambulates without assistive device, drives.    RRT called in the ed for hypotension to SBP 79. (08 Jun 2025 02:41)  MICU consulted for RRT        Active Medications--  acetaminophen     Tablet .. 650 milliGRAM(s) Oral every 6 hours  apixaban 2.5 milliGRAM(s) Oral two times a day  aspirin enteric coated 81 milliGRAM(s) Oral daily  cefepime   IVPB 1000 milliGRAM(s) IV Intermittent every 8 hours  melatonin 3 milliGRAM(s) Oral at bedtime PRN  metoprolol tartrate 25 milliGRAM(s) Oral two times a day  midodrine 5 milliGRAM(s) Oral every 8 hours  pantoprazole    Tablet 40 milliGRAM(s) Oral before breakfast  polyethylene glycol 3350 17 Gram(s) Oral daily  senna 2 Tablet(s) Oral at bedtime  sodium chloride 0.9%. 500 milliLiter(s) IV Continuous <Continuous>    Antimicrobials:   cefepime   IVPB 1000 milliGRAM(s) IV Intermittent every 8 hours    Immunologic:     ROS:  CONSTITUTIONAL: No fevers or chills. No weakness or headache. No weight changes.  EYES/ENT: No visual or hearing changes. No sore throat or throat pain .  NECK: No pain or stiffness  RESPIRATORY: No cough, wheezing, or hemoptysis. No shortness of breath  CARDIOVASCULAR: No chest pain or palpitations  GASTROINTESTINAL: No abdominal pain. No nausea or vomiting. No diarrhea or constipation.  GENITOURINARY: No dysuria, frequency or hematuria  NEUROLOGICAL: No numbness or weakness  SKIN: No itching or rashes  PSYCHIATRIC: Pleasant. Appropriate affect    Allergies: Zosyn (Rash; Urticaria; Hives)    PMH -- Breast cancer      PSH -- H/O bilateral mastectomy    History of cataract surgery, unspecified laterality      FH -- Family history of MI (myocardial infarction) (Father)    FH: Alzheimers disease    FH: cerebral aneurysm      Social History --  EtOH: denies   Tobacco: denies   Drug Use: denies     Travel/Environmental/Occupational History:    Physical Exam--  Vital Signs Last 24 Hrs  T(F): 98.1 (08 Jun 2025 05:00), Max: 101.6 (07 Jun 2025 20:58)  HR: 80 (08 Jun 2025 09:19) (80 - 128)  BP: 86/61 (08 Jun 2025 09:19) (79/51 - 131/83)  RR: 18 (08 Jun 2025 09:19) (18 - 26)  SpO2: 97% (08 Jun 2025 09:19) (95% - 98%)  General: nontoxic-appearing, no acute distress  HEENT: NC/AT, EOMI  Lungs:   Heart: Regular rate and rhythm.   Abdomen: Soft. Nondistended. Nontender.   Extremities: No cyanosis or clubbing. No edema.   Skin: Warm. Dry. Good turgor. No rash.     Laboratory & Imaging Data:  CBC:                       10.2   7.11  )-----------( 349      ( 07 Jun 2025 20:56 )             30.1     CMP: 06-07    129[L]  |  96  |  34[H]  ----------------------------<  168[H]  4.4   |  22  |  0.89    Ca    8.8      07 Jun 2025 20:56    TPro  6.7  /  Alb  2.5[L]  /  TBili  0.7  /  DBili  x   /  AST  47[H]  /  ALT  35  /  AlkPhos  192[H]  06-07    LIVER FUNCTIONS - ( 07 Jun 2025 20:56 )  Alb: 2.5 g/dL / Pro: 6.7 g/dL / ALK PHOS: 192 U/L / ALT: 35 U/L / AST: 47 U/L / GGT: x           Urinalysis Basic - ( 07 Jun 2025 20:56 )    Color: x / Appearance: x / SG: x / pH: x  Gluc: 168 mg/dL / Ketone: x  / Bili: x / Urobili: x   Blood: x / Protein: x / Nitrite: x   Leuk Esterase: x / RBC: x / WBC x   Sq Epi: x / Non Sq Epi: x / Bacteria: x        Microbiology: reviewed    Culture - Blood (collected 06-01-25 @ 10:51)  Source: Blood Blood-Venous  Final Report (06-06-25 @ 14:00):    No growth at 5 days    Culture - Blood (collected 05-31-25 @ 20:48)  Source: Blood Blood-Peripheral  Final Report (06-06-25 @ 02:01):    No growth at 5 days    Culture - Blood (collected 05-30-25 @ 07:16)  Source: Blood Blood  Gram Stain (05-30-25 @ 20:47):    Growth in anaerobic bottle: Gram Positive Cocci in Pairs and Chains    Growth in aerobic bottle: Gram Positive Cocci in Pairs and Chains  Final Report (05-31-25 @ 15:48):    Growth in aerobic and anaerobic bottles: Streptococcus agalactiae (Group    B)    See previous culture [id number]    Culture - Blood (collected 05-30-25 @ 07:16)  Source: Blood Blood  Gram Stain (05-30-25 @ 19:54):    Growth in aerobic and anaerobic bottles: Gram Positive Cocci in Pairs and    Chains  Final Report (05-31-25 @ 15:47):    Growth in aerobic and anaerobic bottles: Streptococcus agalactiae (Group    B)    See previous culture [id number]    Urinalysis with Rflx Culture (collected 05-29-25 @ 16:21)    Culture - Blood (collected 05-29-25 @ 13:45)  Source: Blood Blood-Peripheral  Gram Stain (05-30-25 @ 01:47):    Growth in anaerobic bottle: Gram Positive Cocci in Pairs and Chains    Growth in aerobic bottle: Gram Positive Cocci in Pairs and Chains  Final Report (05-31-25 @ 18:43):    Growth in aerobic and anaerobic bottles: Streptococcus agalactiae (Group    B)    See previous culture [id number]    Culture - Blood (collected 05-29-25 @ 13:30)  Source: Blood Blood-Peripheral  Gram Stain (05-30-25 @ 01:45):    Growth in aerobic bottle: Gram Positive Cocci in Pairs and Chains    Growth in anaerobic bottle: Gram Positive Cocci in Pairs and Chains  Final Report (06-02-25 @ 15:48):    Growth in aerobic and anaerobic bottles: Streptococcus agalactiae (Group    B) ***********Note************    This isolate demonstrates inducible    clindamycin resistance.    Clindamycin may still be effective in some patients.    Direct identification is available within approximately 3-5    hours either by Blood Panel Multiplexed PCR or Direct    MALDI-TOF. Details: [email or web address]  Organism: Blood Culture PCR  Streptococcus agalactiae (Group B) (06-02-25 @ 15:48)  Organism: Streptococcus agalactiae (Group B) (06-02-25 @ 15:48)      -  Levofloxacin: S 0.5      -  Clindamycin: R 0.25      -  Vancomycin: S 0.5      -  Ceftriaxone: S <=0.25      -  Tetracycline: R >4      Method Type: TATIANA      -  Penicillin: S <=0.03 Predicts results for ampicillin, amoxicillin, amoxicillin/clavulanate, ampicillin/sulbactam, 1st, 2nd and 3rd generation cephalosporins and carbapenems.  Organism: Blood Culture PCR (06-02-25 @ 15:48)      -  Streptococcus agalactiae (Group B): Detec      Method Type: PCR          Radiology: reviewed    < from: CT Head No Cont (06.08.25 @ 09:41) >    ACC: [id number] EXAM:  CT BRAIN   ORDERED BY:  EMELIN REYES MONEGRO     PROCEDURE DATE:  06/08/2025          INTERPRETATION:  .    CLINICAL INFORMATION: Altered mental status.    TECHNIQUE: Multiple axial CT images of the head were obtained without   contrast. Sagittal and coronal reconstructed images were acquired from   the source data.    COMPARISON: No prior CT studies of the brain are available for comparison   at this institution.    FINDINGS: There is no acute intracranial hemorrhage, masseffect, shift   of the midline structures, herniation, extra-axial fluid collection, or   hydrocephalus.    A chronic lacunar infarct is again seen within the left basal ganglia.    There is mild diffuse cerebral volume loss with prominence of the sulci,   fissures, and cisternal spaces which is normal for the patient's age.   There is mild deep and periventricular white matter hypoattenuation   statistically compatible with microvascular changes given calcific   atherosclerotic disease of the intracranial arteries.    The paranasal sinuses and tympanomastoid cavities are clear. The   calvarium is intact. There is evidence of bilateral cataract removal.    IMPRESSION: No acute intracranial hemorrhage, mass effect, or shift of   the midline structures.    Similar-appearing mild chronic white matter microvascular type changes.    --- End of Report ---            CLARA HOLT MD; Attending Radiologist  This document has been electronically signed. Jun 8 2025 10:20AM    < end of copied text >  < from: Xray Chest 1 View- PORTABLE-Urgent (06.07.25 @ 22:11) >    ACC: [id number] EXAM:  XR CHEST PORTABLE URGENT 1V   ORDERED BY:  APARNA BOWLING     PROCEDURE DATE:  06/07/2025          INTERPRETATION:  CLINICAL INDICATION: Sepsis    EXAM: Frontal radiograph of the chest.    COMPARISON: Chest radiograph from5/29/2025    FINDINGS:  Bilateral breast implants.  Bilateral predominantly basilar infiltrates with effusions.  Chronic left humerus deformity.  There is no pneumothorax.  The heart is enlarged    IMPRESSION:  Worsening aeration at the bases.    ---End of Report ---          MANISHA CORTES DO; Resident Radiologist  This document has been electronically signed.  SABI SANTILLAN MD; Attending Interventional Radiologist  This document has been electronically signed. Jun 8 2025  8:11AM    < end of copied text >

## 2025-06-08 NOTE — H&P ADULT - PROBLEM SELECTOR PLAN 5
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 uptitrate as BP tolerates  - IVF

## 2025-06-08 NOTE — CONSULT NOTE ADULT - SUBJECTIVE AND OBJECTIVE BOX
CHIEF COMPLAINT: Hypotension and fevers    HPI:    Ms Silvia Cloud is an 89 year old female with GERD, osteoarthritis/osteoporosis (on monthly ibandronate), HLD, Afib on eliquis, HOCM/severe LVOT/ELEAZAR, small-moderate pericardial effusion/RA diastolic collapse and a PMHx of R breast CA (40-50y ago) s/p B/L mastectomy and radiation and RUE lymphedema who presents from [facility] rehav with shaking, chills, hypotension, and poor appetite.  While at [facility], pt found to have hypotension to SBP 80s. Pt was given IVF and reduced dose of metoprolol. Pt also found to have tachycardia, shaking chills. Pt sent back to the hospital. Reportedly pt has not been eating well, not urinating much.     Of note the patient was recently hospitalized - for RML PNA, GBS bacteremia (pos cultures , ) for total 10 days abx (IV ceftriaxone inpt, then levaquin 750 q48h until ).    In the ED a RRT was called for hypotension.  At the time of the RRT the patien was reportedly mentating well with improvement in pressures.  Patient received an Ambien around 4AM and this morning MICU was consulted for AMS and hypotension.      PAST MEDICAL & SURGICAL HISTORY:  Breast cancer  s/p b/l mastectomy      H/O bilateral mastectomy  + breast implants      History of cataract surgery, unspecified laterality          FAMILY HISTORY:  Family history of MI (myocardial infarction) (Father)    FH: Alzheimers disease  father    FH: cerebral aneurysm  mother  of this        SOCIAL HISTORY:  Smoking: __ packs x ___ years  EtOH Use:  Marital Status:  Occupation:  Recent Travel:  Country of Birth:  Advance Directives:    Allergies    Zosyn (Rash; Urticaria; Hives)    Intolerances        HOME MEDICATIONS:    REVIEW OF SYSTEMS:  [X] Unable to assess ROS because lethargic    OBJECTIVE:  ICU Vital Signs Last 24 Hrs  T(C): 36.7 (2025 05:00), Max: 38.7 (2025 20:58)  T(F): 98.1 (2025 05:00), Max: 101.6 (2025 20:58)  HR: 80 (2025 09:19) (80 - 128)  BP: 86/61 (2025 09:19) (79/51 - 131/83)  BP(mean): 61 (2025 00:34) (61 - 61)  ABP: --  ABP(mean): --  RR: 18 (2025 09:19) (18 - 26)  SpO2: 97% (2025 09:19) (95% - 98%)    O2 Parameters below as of 2025 09:19  Patient On (Oxygen Delivery Method): nasal cannula, 3              CAPILLARY BLOOD GLUCOSE      POCT Blood Glucose.: 140 mg/dL (2025 00:55)      PHYSICAL EXAM:  General: Lethargic  HEENT: Dry MM  Neck: No JVD  Respiratory: Crackles, tachypnea, shallow breathing  Cardiovascular: 4/6 systolic murmur  Abdomen: Soft, NT, ND  Extremities: RUE edema  Skin: No rashes or lesions noted  Neurological: Arousable to loud verbal stimuly  Psychiatry: Difficult to ascertain    HOSPITAL MEDICATIONS:  MEDICATIONS  (STANDING):  acetaminophen     Tablet .. 650 milliGRAM(s) Oral every 6 hours  apixaban 2.5 milliGRAM(s) Oral two times a day  aspirin enteric coated 81 milliGRAM(s) Oral daily  cefepime   IVPB 1000 milliGRAM(s) IV Intermittent every 8 hours  metoprolol tartrate 25 milliGRAM(s) Oral two times a day  midodrine 5 milliGRAM(s) Oral every 8 hours  pantoprazole    Tablet 40 milliGRAM(s) Oral before breakfast  polyethylene glycol 3350 17 Gram(s) Oral daily  senna 2 Tablet(s) Oral at bedtime  sodium chloride 0.9%. 500 milliLiter(s) (100 mL/Hr) IV Continuous <Continuous>    MEDICATIONS  (PRN):  melatonin 3 milliGRAM(s) Oral at bedtime PRN Insomnia      LABS:                        10.2   7.11  )-----------( 349      ( 2025 20:56 )             30.1     06-07    129[L]  |  96  |  34[H]  ----------------------------<  168[H]  4.4   |  22  |  0.89    Ca    8.8      2025 20:56    TPro  6.7  /  Alb  2.5[L]  /  TBili  0.7  /  DBili  x   /  AST  47[H]  /  ALT  35  /  AlkPhos  192[H]  06-      Urinalysis Basic - ( 2025 20:56 )    Color: x / Appearance: x / SG: x / pH: x  Gluc: 168 mg/dL / Ketone: x  / Bili: x / Urobili: x   Blood: x / Protein: x / Nitrite: x   Leuk Esterase: x / RBC: x / WBC x   Sq Epi: x / Non Sq Epi: x / Bacteria: x        Venous Blood Gas:   @ 06:38  7.39/44/54/27/86.4  VBG Lactate: 1.0  Venous Blood Gas:   @ 00:26  7.45/39/90/27/99.0  VBG Lactate: 1.1  Venous Blood Gas:   @ 20:35  7.45/37/72/26/96.2  VBG Lactate: 1.9      MICROBIOLOGY:     RADIOLOGY:  [ ] Reviewed and interpreted by me    EKG:

## 2025-06-08 NOTE — CONSULT NOTE ADULT - SUBJECTIVE AND OBJECTIVE BOX
CHIEF COMPLAINT:Patient is a 89y old  Female who presents with a chief complaint of shaking chills, hypotension, poor appetite (2025 02:41)      HISTORY OF PRESENT ILLNESS:    89F c hx GERD, R breast CA (40-50y ago) s/p B/L mastectomy and radiation, RUE lymphedema, osteoarthritis/osteoporosis (on monthly ibandronate), HLD, Afib on eliquis, HOCM/severe LVOT/ELEAZAR, small-moderate pericardial effusion/RA diastolic collapse, recent hospitalization - for RML PNA, GBS bacteremia (pos cultures , ) of unclear source on total 10 days abx (IV ceftriaxone inpt, then levaquin 750 q48h until ), presents from [facility] rehab with shaking chills, hypotension, poor appetite    History from pt's daughter/primary decision maker Teresitagisel Day at bedside.  While at [facility], pt found to have hypotension to SBP 80s. Pt was given IVF and reduced dose of metoprolol. Pt also found to have afib with RVR , shaking chills. Pt sent back to the hospital. Reportedly pt has not been eating well, not urinating much. Denies CP, SOB, abd pain, diarrhea, cough, other respiratory symptoms. At baseline pt ambulates without assistive device, drives.    RRT called in the ed for hypotension to SBP 79.    Due to pts mental status history taken from chart   pt is a bit obtunded  received ambien earlier       PAST MEDICAL & SURGICAL HISTORY:  Breast cancer  s/p b/l mastectomy      H/O bilateral mastectomy  + breast implants      History of cataract surgery, unspecified laterality              MEDICATIONS:  apixaban 2.5 milliGRAM(s) Oral two times a day  aspirin enteric coated 81 milliGRAM(s) Oral daily  metoprolol tartrate 25 milliGRAM(s) Oral two times a day    cefepime   IVPB 1000 milliGRAM(s) IV Intermittent every 8 hours      acetaminophen     Tablet .. 650 milliGRAM(s) Oral every 6 hours  melatonin 3 milliGRAM(s) Oral at bedtime PRN    pantoprazole    Tablet 40 milliGRAM(s) Oral before breakfast  polyethylene glycol 3350 17 Gram(s) Oral daily  senna 2 Tablet(s) Oral at bedtime      sodium chloride 0.9%. 500 milliLiter(s) IV Continuous <Continuous>      FAMILY HISTORY:  Family history of MI (myocardial infarction) (Father)    FH: Alzheimers disease  father    FH: cerebral aneurysm  mother  of this        Non-contributory    SOCIAL HISTORY:    No tobacco, drugs or etoh    Allergies    Zosyn (Rash; Urticaria; Hives)    Intolerances    	    REVIEW OF SYSTEMS:  as above  The rest of the 14 points ROS reviewed and except above they are unremarkable.        PHYSICAL EXAM:  T(C): 36.7 (25 @ 05:00), Max: 38.7 (25 @ 20:58)  HR: 88 (25 @ 05:00) (88 - 128)  BP: 95/65 (25 @ 05:00) (79/51 - 117/66)  RR: 18 (25 @ 05:00) (18 - 26)  SpO2: 95% (25 @ 05:00) (95% - 98%)  Wt(kg): --  I&O's Summary      JVP: Normal  Neck: supple  Lung: crackles   CV: S1 S2 , Murmur:  Abd: soft  Ext: No edema  neuro: obtunded  Psych: flat affect  Skin: dry mucus membrane       LABS/DATA:    TELEMETRY: 	    ECG:  	   	  CARDIAC MARKERS:                        307 <<== 25 @ 22:31                338 <<== 25 @ 20:56                              10.2   7.11  )-----------( 349      ( 2025 20:56 )             30.1     06-    129[L]  |  96  |  34[H]  ----------------------------<  168[H]  4.4   |  22  |  0.89    Ca    8.8      2025 20:56    TPro  6.7  /  Alb  2.5[L]  /  TBili  0.7  /  DBili  x   /  AST  47[H]  /  ALT  35  /  AlkPhos  192[H]  06-07    proBNP:   Lipid Profile:   HgA1c:   TSH:   < from: TTE W or WO Ultrasound Enhancing Agent (25 @ 07:16) >  CONCLUSIONS:      1. Limited TTE to assess for pericardial effusion.   2. Trace pericardial effusion noted adjacent to the posterolateral left ventricle, small pericardial effusion noted adjacent to the anterior right ventricle and small pericardial effusion noted adjacent to the right atrium.   3. The inferior vena cava is normal in size measuring 1.70 cm in diameter, (normal <2.1cm) with normal inspiratory collapse (normal >50%) consistent with normal right atrial pressure (~3, range 0-5mmHg).   4. Compared to the transthoracic echocardiogram performed on 2025, the right atrium is not as well visualized on this study. There is right atrial diastolic collapse visualized in the 4 chamber view but unable to quantify the duration of the collapse. The effusion appears unchanged in size and distribution. No other signs of cardiac tamponade are present.    < end of copied text >          < from: Xray Chest 1 View- PORTABLE-Urgent (25 @ 22:11) >  IMPRESSION:  Increased conspicuity of bilateral patchy airspace opacities compared to   prior.        ******PRELIMINARY REPORT******      ******PRELIMINARY REPORT******     < end of copied text >

## 2025-06-08 NOTE — H&P ADULT - HISTORY OF PRESENT ILLNESS
89F c hx GERD, R breast CA (40-50y ago) s/p B/L mastectomy and radiation, RUE lymphedema, osteoarthritis/osteoporosis (on monthly ibandronate), HLD, Afib on eliquis, HOCM/severe LVOT/ELEAZAR, small-moderate pericardial effusion/RA diastolic collapse, recent hospitalization 5/29-6/5 for RML PNA, GBS bacteremia (pos cultures 5/29, 5/30) of unclear source on total 10 days abx (IV ceftriaxone inpt, then levaquin 750 q48h until 6/9), presents from [facility] rehab with shaking chills, hypotension, poor appetite    History from pt's daughter/primary decision maker Teresitagisel Day at bedside.  While at [facility], pt found to have hypotension to SBP 80s. Pt was given IVF and reduced dose of metoprolol. Pt also found to have tachycardia, shaking chills. Pt sent back to the hospital. Reportedly pt has not been eating well, not urinating much. Denies CP, SOB, abd pain, diarrhea, cough, other respiratory symptoms. At baseline pt ambulates without assistive device, drives.    RRT called in the ed for hypotension to SBP 79.

## 2025-06-08 NOTE — H&P ADULT - PROBLEM SELECTOR PLAN 3
- tele  - suspect demand ischemia from recent hypotension, infections  - f/u cards regarding future ischemic eval  - start asa   - cont home eliquis - reduce metoprolol dose to tartrate 25mg BID  - goal HR <100 in setting of HOCM with hypotension  - consider amiodarone or cardioversion  - needs card consult in AM  - tele

## 2025-06-08 NOTE — CONSULT NOTE ADULT - ATTENDING COMMENTS
Agree with above. Critically ill patient with persistent hypotension. Echo noted with echocardiographic signs of early tamponade. Increase oral vaspressors if tolerated. Discontinue sedatives. Monitor hemodynamics and pericardial effusion. CICU cx for further management. Cards/IR cx for drainage or can consider thoracic sx eval fo window.

## 2025-06-08 NOTE — PATIENT PROFILE ADULT - FALL HARM RISK - PATIENT NEEDS ASSISTANCE
----- Message from Ritu Vilchis CMA sent at 2/8/2023  9:58 AM CST -----  Regarding: Angio 2/23  Angiogram Orders    Location: [facility] [place] [address] - Cardiologist: Dr. Villaseñor    Performing Cardiologist: Dr. Jon    Walking/Toileting

## 2025-06-08 NOTE — CONSULT NOTE ADULT - ASSESSMENT
89F c hx GERD, R breast CA (40-50y ago) s/p B/L mastectomy and radiation, RUE lymphedema, osteoarthritis/osteoporosis (on monthly ibandronate), HLD, Afib on eliquis, HOCM/severe LVOT/LORA, small-moderate pericardial effusion/RA diastolic collapse, recent hospitalization 5/29-6/5 for RML PNA, GBS bacteremia (pos cultures 5/29, 5/30) of unclear source on total 10 days abx (IV ceftriaxone inpt, then levaquin 750 q48h until 6/9), presents from [facility] rehab with shaking chills, hypotension, poor appetite  History from pt's daughter/primary decision maker Teresitagisel Day at bedside.  While at [facility], pt found to have hypotension to SBP 80s. Pt was given IVF and reduced dose of metoprolol. Pt also found to have tachycardia, shaking chills. Pt sent back to the hospital. Reportedly pt has not been eating well, not urinating much. Denies CP, SOB, abd pain, diarrhea, cough, other respiratory symptoms. At baseline pt ambulates without assistive device, drives.  RRT called in the ed for hypotension to SBP 79. (08 Jun 2025 02:41)  to me pt daughter says she was very sleepy in the morning too  as she was given ambien at 4 AM in the morning:   now she is alert and awake and is on 2 L of oxygen ;  she is not sob:  and does not look to be in any resp distress:       Severe sepsis.   unclear source of fevers  cont empiric vanc, cefepime  f/u blood cx. if positive will likely need MARIA G  monitor for any localizing symptoms  pt had recent pan ct scan that did not elucidate cause of bacteremia  recent blood cultures have been negative   - IVF  on cefepime  she is febrile too   vbg on admission IS ok;   MONITOR BLOOD PRESSURE CLOSELY:   OIF SHE DROPS HER BLOOD PRESSURE MORE:  WOULD NEED MICU  CO NSULT:      Hypotension.  suspect combination of infection, hocm, lora, tachycardia, pericardial effusion, metoprolol  pt has somewhat tenuous hemodynamic status  consider cardioversion, consider repeat TTE  pt is full code.  pts blood pressure is slightly low   on midodrine    Chronic atrial fibrillation.  reduce metoprolol dose to tartrate 25mg BID  goal HR <100 in setting of HOCM with hypotension  consider amiodarone or cardioversion  PER CARDS      Type 2 myocardial infarction.  suspect demand ischemia from recent hypotension, infections  start asa   cont home eliquis.  CONT CURRENT MEDS     HOCM (hypertrophic obstructive cardiomyopathy).  cont metoprolol as above. uptitrate as BP tolerates  cards to follow     Acute respiratory failure with hypoxia.   recent CT scans showing right bronchiectasis, right fibrosis likely 2/2 radiation, poss RML PNA.  cont BD :   ct chest showed: No pulmonary embolism. Small to moderate right and small left pleural effusions with associated  atelectasis. 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  Cardiomegaly. Moderate pericardial effusion.  needs echo     dw acp

## 2025-06-08 NOTE — RAPID RESPONSE TEAM SUMMARY - NSSITUATIONBACKGROUNDRRT_GEN_ALL_CORE
89-year-old female with history of atrial fibrillation on Eliquis, breast cancer, who was brought in by EMS for palpitations, tachycardia, chills, hypoxia.  Patient was recently admitted and discharged on June 5, 2025 for AMS and found to have a right-sided pneumonia with GBS bacteremia. Now admitted for sepsis and NSTEMI.   RRT called for hypotension, BP 70s systolic on the monitor. Patient mentating well, with no complaints. On Zoll,  systolic, other VS WNL. Additional IV access established and RRT ended.  89-year-old female with history of atrial fibrillation on Eliquis, breast cancer, who was brought in by EMS for palpitations, tachycardia, chills, hypoxia.  Patient was recently admitted and discharged on June 5, 2025 for AMS and found to have a right-sided pneumonia with GBS bacteremia. Now admitted for sepsis and NSTEMI.   RRT called for hypotension, BP 70s systolic on the monitor. Patient mentating well, with no complaints. On Zoll,  systolic, other VS WNL. Additional IV access established and RRT ended. Recommend continuing IVF as tolerated and empiric antibiotics pending sepsis workup.

## 2025-06-08 NOTE — H&P ADULT - PROBLEM SELECTOR PLAN 2
- reduce metoprolol dose to tartrate 25mg BID  - goal HR <100 in setting of HOCM with hypotension  - consider amiodarone or cardioversion  - needs card consult in AM  - tele - suspect combination of infection, hocm, lora, tachycardia, pericardial effusion, metoprolol  - pt has somewhat tenuous hemodynamic status  - consider cardioversion, consider repeat TTE  - pt is full code

## 2025-06-08 NOTE — H&P ADULT - NSHPPHYSICALEXAM_GEN_ALL_CORE
PHYSICAL EXAM:   GENERAL: Alert. +mildly confused. No acute distress. Not thin. Not cachectic. Not obese.  HEAD:  Atraumatic. Normocephalic.  EYES: EOMI. PERRLA. Normal conjunctiva/sclera.  ENT: Neck supple. No JVD. Dry oral mucosa. Not edentulous. No thrush.  LYMPH: Normal supraclavicular/cervical lymph nodes.   CARDIAC: +tachy, Not vin. +irregularly irregular. S1. S2. +grade 4 systolic murmur. No rub. No distant heart sounds.  LUNG/CHEST: CTAB. BS equal bilaterally. No wheezes. No rales. No rhonchi. +b/l breast implants  ABDOMEN: Soft. No tenderness. No distension. No fluid wave. Normal bowel sounds.  BACK: No midline/vertebral tenderness. No flank tenderness.  VASCULAR: +2 b/l radial or ulnar pulses. Palpable DP pulses.  EXTREMITIES:  No clubbing. No cyanosis. No edema. Moving all 4.  NEUROLOGY: A&Ox2. Non-focal exam. Cranial nerves intact. Slowed speech. Sensation intact.  PSYCH: normal behavior. Flat affect.    Vital Signs Last 24 Hrs  T(C): 38.7 (07 Jun 2025 20:58), Max: 38.7 (07 Jun 2025 20:58)  T(F): 101.6 (07 Jun 2025 20:58), Max: 101.6 (07 Jun 2025 20:58)  HR: 101 (08 Jun 2025 00:34) (101 - 128)  BP: 79/51 (08 Jun 2025 00:34) (79/51 - 102/56)  BP(mean): 61 (08 Jun 2025 00:34) (61 - 61)  ABP: --  ABP(mean): --  RR: 20 (08 Jun 2025 00:34) (20 - 26)  SpO2: 97% (08 Jun 2025 00:34) (96% - 98%)    O2 Parameters below as of 08 Jun 2025 00:34  Patient On (Oxygen Delivery Method): nasal cannula  O2 Flow (L/min): 3        I&O's Summary

## 2025-06-08 NOTE — PHYSICAL THERAPY INITIAL EVALUATION ADULT - IMPAIRMENTS CONTRIBUTING TO GAIT DEVIATIONS, PT EVAL
Call placed to the patient per [facility] referral     Voice message left for patient to call back  Phone number and hours of business provided  This the 2nd attempt to reach the patient  Will defer per protocol 
impaired balance/decreased strength

## 2025-06-08 NOTE — CONSULT NOTE ADULT - ASSESSMENT
Ms Silvia Cloud is an 89 year old female with GERD, osteoarthritis/osteoporosis (on monthly ibandronate), HLD, Afib on eliquis, HOCM/severe LVOT/ELEAZAR, small-moderate pericardial effusion/RA diastolic collapse and a PMHx of R breast CA (40-50y ago) s/p B/L mastectomy and radiation and RUE lymphedema who presents from [facility] with shaking, chills, hypotension, and poor appetite.      Of note the patient was recently hospitalized 5/29-6/5 for RML PNA, GBS bacteremia (pos cultures 5/29, 5/30) for total 10 days abx (IV ceftriaxone inpt, then levaquin 750 q48h until 6/9).    In the ED a RRT was called for hypotension.  At the time of the RRT the patien was reportedly mentating well with improvement in pressures.  Patient received an Ambien around 4AM and this morning MICU was consulted for AMS and hypotension.    On our initial evaluation (around 8:15AM), the patient was somnolent and difficult to rouse, but was arousable with loud voice before falling asleep.  She was breathing shallowly with tachypnea to the 40s.  LUE BP was 86/61; however, was discordant with bilateral leg blood pressures around 125/75.  Patient was re-evaluated 45 minutes later and she was much more alert and interactive.  A&Ox2.  Respiratory rate at that time was in the low 30s, and SBP in the low 100s.    Recommendations:     Ms Silvia Cloud is an 89 year old female with GERD, osteoarthritis/osteoporosis (on monthly ibandronate), HLD, Afib on eliquis, HOCM/severe LVOT/ELEAZAR, small-moderate pericardial effusion/RA diastolic collapse and a PMHx of R breast CA (40-50y ago) s/p B/L mastectomy and radiation and RUE lymphedema who presents from [facility] with shaking, chills, hypotension, and poor appetite.      Of note the patient was recently hospitalized 5/29-6/5 for RML PNA, GBS bacteremia (pos cultures 5/29, 5/30) for total 10 days abx (IV ceftriaxone inpt, then levaquin 750 q48h until 6/9).    In the ED a RRT was called for hypotension.  At the time of the RRT the patien was reportedly mentating well with improvement in pressures.  Patient received an Ambien around 4AM and this morning MICU was consulted for AMS and hypotension.    On our initial evaluation (around 8:15AM), the patient was somnolent and difficult to rouse, but was arousable with loud voice before falling asleep.  She was breathing shallowly with tachypnea to the 40s.  LUE BP was 86/61; however, was discordant with bilateral leg blood pressures around 125/75.  Patient was re-evaluated 45 minutes later and she was much more alert and interactive.  A&Ox2.  Respiratory rate at that time was in the low 30s, and SBP in the low 100s.    Recommendations:  - Patient would benefit from increased midodrine dosing (currently on 5mg TID), as heart rate is between 80s-90s and blood pressures have been soft.  Uptitrate as tolerated  - Judicious fluids.  Patient would benefit from HOCM, but may also worsen pericardial fluid pathology in long run.  - Avoid droxydopa  - Discontinue ambien  - Dysphagia screening    Patient does not require MICU services at this time as her mental status and blood pressures have improved.  Thank you for this consult.  Please do not hesitate to call back if there are any additional questions or concerns.   Ms Silvia Cloud is an 89 year old female with GERD, osteoarthritis/osteoporosis (on monthly ibandronate), HLD, Afib on eliquis, HOCM/severe LVOT/ELEAZAR, small-moderate pericardial effusion/RA diastolic collapse and a PMHx of R breast CA (40-50y ago) s/p B/L mastectomy and radiation and RUE lymphedema who presents from [facility] with shaking, chills, hypotension, and poor appetite.      Of note the patient was recently hospitalized 5/29-6/5 for RML PNA, GBS bacteremia (pos cultures 5/29, 5/30) for total 10 days abx (IV ceftriaxone inpt, then levaquin 750 q48h until 6/9).    In the ED a RRT was called for hypotension.  At the time of the RRT the patien was reportedly mentating well with improvement in pressures.  Patient received an Ambien around 4AM and this morning MICU was consulted for AMS and hypotension.    On our initial evaluation (around 8:15AM), the patient was somnolent and difficult to rouse, but was arousable with loud voice before falling asleep.  She was breathing shallowly with tachypnea to the 40s.  LUE BP was 86/61; however, was discordant with bilateral leg blood pressures around 125/75.  Patient was re-evaluated 45 minutes later and she was much more alert and interactive.  A&Ox2.  Respiratory rate at that time was in the low 30s, and SBP in the low 100s.    Recommendations:  - Patient would benefit from increased midodrine dosing (currently on 5mg TID), as heart rate is between 80s-90s and blood pressures have been soft.  Uptitrate as tolerated  - Judicious fluids.  Patient would benefit from HOCM, but may also worsen pericardial fluid pathology in long run.  - Avoid droxydopa  - Discontinue ambien  - Dysphagia screening    Patient does not require MICU services at this time as her mental status and blood pressures have improved. If persistent hypotension noted, please consult cards/CICU as likely related to her pericardial effusion.  Thank you for this consult.  Please do not hesitate to call back if there are any additional questions or concerns.

## 2025-06-08 NOTE — CONSULT NOTE ADULT - SUBJECTIVE AND OBJECTIVE BOX
25 @ 13:22    Patient is a 89y old  Female who presents with a chief complaint of shaking chills, hypotension, poor appetite (2025 12:35)      HPI:  89F c hx GERD, R breast CA (40-50y ago) s/p B/L mastectomy and radiation, RUE lymphedema, osteoarthritis/osteoporosis (on monthly ibandronate), HLD, Afib on eliquis, HOCM/severe LVOT/ELEAZAR, small-moderate pericardial effusion/RA diastolic collapse, recent hospitalization - for RML PNA, GBS bacteremia (pos cultures , ) of unclear source on total 10 days abx (IV ceftriaxone inpt, then levaquin 750 q48h until ), presents from [facility] re[facility] with shaking chills, hypotension, poor appetite    History from pt's daughter/primary decision maker Teresita Day at bedside.  While at [facility], pt found to have hypotension to SBP 80s. Pt was given IVF and reduced dose of metoprolol. Pt also found to have tachycardia, shaking chills. Pt sent back to the hospital. Reportedly pt has not been eating well, not urinating much. Denies CP, SOB, abd pain, diarrhea, cough, other respiratory symptoms. At baseline pt ambulates without assistive device, drives.    RRT called in the ed for hypotension to SBP 79. (2025 02:41)    to me pt daughter says she was very sleepy in the morning too  as she was given ambien at 4 AM in the morning:   now she is alert and awake and is on 2 L of oxygen ;  she is not sob:  and does not look to be in any resp distress:         ?FOLLOWING PRESENT  [ x] Hx of PE/DVT, [x ] Hx COPD, [ x] Hx of Asthma, [ y] Hx of Hospitalization, [x ]  Hx of BiPAP/CPAP use, [x ] Hx of IBAN    Allergies    Zosyn (Rash; Urticaria; Hives)    Intolerances        PAST MEDICAL & SURGICAL HISTORY:  Breast cancer  s/p b/l mastectomy      H/O bilateral mastectomy  + breast implants      History of cataract surgery, unspecified laterality          FAMILY HISTORY:  Family history of MI (myocardial infarction) (Father)    FH: Alzheimers disease  father    FH: cerebral aneurysm  mother  of this        Social History: [x  ] TOBACCO                  [  x] ETOH                                 [ x ] IVDA/DRUGS    REVIEW OF SYSTEMS      General:	sleepiness    Skin/Breast:x  	  Ophthalmologic:x  	  ENMT:	x    Respiratory and Thorax:  no sob,  no cough , bozena chillls  	  Cardiovascular:	x    Gastrointestinal:	x    Genitourinary:	x    Musculoskeletal:	x    Neurological:	x    Psychiatric:	x    Hematology/Lymphatics:	x    Endocrine:	x    Allergic/Immunologic:	x    MEDICATIONS  (STANDING):  acetaminophen     Tablet .. 650 milliGRAM(s) Oral every 6 hours  apixaban 2.5 milliGRAM(s) Oral two times a day  aspirin enteric coated 81 milliGRAM(s) Oral daily  cefepime   IVPB 1000 milliGRAM(s) IV Intermittent every 8 hours  metoprolol tartrate 25 milliGRAM(s) Oral two times a day  midodrine 5 milliGRAM(s) Oral every 8 hours  pantoprazole    Tablet 40 milliGRAM(s) Oral before breakfast  polyethylene glycol 3350 17 Gram(s) Oral daily  senna 2 Tablet(s) Oral at bedtime  sodium chloride 0.9%. 500 milliLiter(s) (100 mL/Hr) IV Continuous <Continuous>    MEDICATIONS  (PRN):  melatonin 3 milliGRAM(s) Oral at bedtime PRN Insomnia       Vital Signs Last 24 Hrs  T(C): 36.7 (2025 05:00), Max: 38.7 (2025 20:58)  T(F): 98.1 (2025 05:00), Max: 101.6 (2025 20:58)  HR: 80 (2025 09:19) (80 - 128)  BP: 86/61 (2025 09:19) (79/51 - 131/83)  BP(mean): 61 (2025 00:34) (61 - 61)  RR: 18 (2025 09:19) (18 - 26)  SpO2: 97% (2025 09:19) (95% - 98%)    Parameters below as of 2025 09:19  Patient On (Oxygen Delivery Method): nasal cannula, 3    Orthostatic VS          I&O's Summary    2025 07:01  -  2025 13:22  --------------------------------------------------------  IN: 0 mL / OUT: 350 mL / NET: -350 mL        Physical Exam:   GENERAL: aehtic  HEENT: BRYSON/   Atraumatic, Normocephalic  ENMT: No tonsillar erythema, exudates, or enlargement; Moist mucous membranes, Good dentition, No lesions  NECK: Supple, No JVD, Normal thyroid  CHEST/LUNG:breast reconstruction " after mastectomy   CVS: Regular rate and rhythm; No murmurs, rubs, or gallops  GI: : Soft, Nontender, Nondistended; Bowel sounds present  NERVOUS SYSTEM:  Alert & Oriented X3  EXTREMITIES: -edema  LYMPH: No lymphadenopathy noted  SKIN: No rashes or lesions  ENDOCRINOLOGY: No Thyromegaly  PSYCH: calm     Labs:  Venous<44<4>>54<<7.395>>Venous<<3><<4><<5<<549>>, Venous<39<4>>90<<7.455>>Venous<<3><<4><<5<<909>>, 1.7<37<4>>72<<7.455>>1.7<<3><<4><<5<<729>>, Venous<34<4>>75<<7.495>>Venous<<3><<4><<5<<759>>                            10.2   7.11  )-----------( 349      ( 2025 20:56 )             30.1     06-07    129[L]  |  96  |  34[H]  ----------------------------<  168[H]  4.4   |  22  |  0.89  06-05    131[L]  |  96  |  15  ----------------------------<  110[H]  4.0   |  23  |  0.59    Ca    8.8      2025 20:56    TPro  6.7  /  Alb  2.5[L]  /  TBili  0.7  /  DBili  x   /  AST  47[H]  /  ALT  35  /  AlkPhos  192[H]  06-07    CAPILLARY BLOOD GLUCOSE      POCT Blood Glucose.: 140 mg/dL (2025 00:55)    LIVER FUNCTIONS - ( 2025 20:56 )  Alb: 2.5 g/dL / Pro: 6.7 g/dL / ALK PHOS: 192 U/L / ALT: 35 U/L / AST: 47 U/L / GGT: x             Urinalysis Basic - ( 2025 13:01 )    Color: Dark Yellow / Appearance: Clear / S.027 / pH: x  Gluc: x / Ketone: x  / Bili: Negative / Urobili: 1.0 mg/dL   Blood: x / Protein: Trace mg/dL / Nitrite: Negative   Leuk Esterase: Negative / RBC: x / WBC x   Sq Epi: x / Non Sq Epi: x / Bacteria: x      Urinalysis with Rflx Culture (collected 2025 13:01)      D DImer  D-Dimer Assay, Quantitative: 599 ng/mL DDU ( @ 16:12)      Studies  Chest X-RAY  CT SCAN Chest   CT Abdomen  Venous Dopplers: LE:   Others      rad< from: CT Head No Cont (25 @ 09:41) >  There is mild deep and periventricular white matter hypoattenuation   statistically compatible with microvascular changes given calcific   atherosclerotic disease of the intracranial arteries.    The paranasal sinuses and tympanomastoid cavities are clear. The   calvarium is intact. There is evidence of bilateral cataract removal.    IMPRESSION: No acute intracranial hemorrhage, mass effect, or shift of   the midline structures.    Similar-appearing mild chronic white matter microvascular type changes.    --- End of Report ---            CLARA HOLT MD; Attending Radiologist  This document has been electronically signed. 2025 10:20AM    < end of copied text >  < from: Xray Chest 1 View- PORTABLE-Urgent (25 @ 22:11) >  BR[facility]TE     PROCEDURE DATE:  2025          INTERPRETATION:  CLINICAL INDICATION: Sepsis    EXAM: Frontal radiograph of the chest.    COMPARISON: Chest radiograph from2025    FINDINGS:  Bilateral breast implants.  Bilateral predominantly basilar infiltrates with effusions.  Chronic left humerus deformity.  There is no pneumothorax.  The heart is enlarged    IMPRESSION:  Worsening aeration at the bases.    ---End of Report ---          MANISHA CORTES DO; Resident Radiologist  This document has been electronically signed.  SABI SANTILLAN MD; Attending Interventional Radiologist  This document has been electronically signed. 2025  8:11AM    < end of copied text >  < from: CT Angio Chest PE Protocol w/ IV Cont (25 @ 13:52) >  IMPRESSION:  No pulmonary embolism.  Small to moderate right and small left pleural effusions with associated   atelectasis.    < end of copied text >  < from: CT Angio Chest PE Protocol w/ IV Cont (25 @ 13:52) >  Bronchiectasis and subpleural consolidations/fibrotic changes in right   middle lobe, likely radiation-induced lung injury.  Cardiomegaly. Moderate pericardial effusion.    < end of copied text >

## 2025-06-08 NOTE — H&P ADULT - PROBLEM SELECTOR PLAN 4
- cont metoprolol as above. uptitrate as BP tolerates  - IVF - tele  - suspect demand ischemia from recent hypotension, infections  - f/u cards regarding future ischemic eval  - start asa   - cont home eliquis

## 2025-06-08 NOTE — CONSULT NOTE ADULT - ASSESSMENT
Full note to follow    Patient evaluated with face-to-face time in addition to reviewing history, labs, microbiology, and imaging.   Antibiotic stewardship, local antibiogram, infection control strategies and potential transmission issues taken into consideration at time of treatment decision making process.   Thank you for allowing us to participate in the care of your patient.  Infectious Diseases will follow. Please call with any questions.  Kinsey Rubio M.D.  Available on [employer] TEAMS -- *PREFERRED*  [facility] [phone]  For after 5 P.M. and weekends, please call [phone] 89F c hx GERD, R breast CA (40-50y ago) s/p B/L mastectomy and radiation, RUE lymphedema, osteoarthritis/osteoporosis (on monthly ibandronate), HLD, Afib on eliquis, HOCM/severe LVOT/ELEAZAR, small-moderate pericardial effusion/RA diastolic collapse, recent hospitalization 5/29-6/5 for RML PNA, GBS bacteremia (pos cultures 5/29, 5/30) of unclear source on total 10 days abx (IV ceftriaxone inpt, then levaquin 750 q48h until 6/9), pw hypotension, severe sepsis of unclear source, demand ischemia, afib c rvr    Severe Sepsis, Hypotension 2/2 unclear source  CXR w/ worsening aeration at the bases  CTH negative  UA negative  flu/COVID/RSV negative    Seen by MICU  Zosyn allergy noted  Currently on vancomycin, cefepime    Recommendations:   C/w vancomycin  C/w cefepime  F/u pending cx  CT CAP  Trend temps/WBC  supportive measures, IVFs as needed  additional care per primary team    Dr. Grullon to resume care 6/9  Patient evaluated with face-to-face time in addition to reviewing history, labs, microbiology, and imaging.   Antibiotic stewardship, local antibiogram, infection control strategies and potential transmission issues taken into consideration at time of treatment decision making process.   Thank you for allowing us to participate in the care of your patient.  Infectious Diseases will follow. Please call with any questions.  Kinsey Rubio M.D.  Available on [employer] TEAMS -- *PREFERRED*  [facility] [phone]  For after 5 P.M. and weekends, please call [phone]

## 2025-06-08 NOTE — CONSULT NOTE ADULT - ASSESSMENT
Interventional Radiology    Evaluate for Procedure: Pericardial drain    HPI:  89 year old female with GERD, osteoarthritis/osteoporosis (on monthly ibandronate), HLD, Afib on eliquis, HOCM/severe LVOT/ELEAZAR, small-moderate pericardial effusion/RA diastolic collapse and a PMHx of R breast CA (40-50y ago) s/p B/L mastectomy and radiation and RUE lymphedema who presents from [facility] with shaking, chills, hypotension, and poor appetite. Of note the patient was recently hospitalized 5/29-6/5 for RML PNA, GBS bacteremia (pos cultures 5/29, 5/30) for total 10 days abx (IV ceftriaxone inpt, then levaquin 750 q48h until 6/9).      Allergies: Zosyn (Rash; Urticaria; Hives)    Medications (Abx/Cardiac/Anticoagulation/Blood Products)  apixaban: 2.5 milliGRAM(s) Oral (06-08 @ 09:53)  aspirin  chewable: 324 milliGRAM(s) Oral (06-07 @ 21:58)  cefepime   IVPB: 100 mL/Hr IV Intermittent (06-07 @ 22:03)  cefepime   IVPB: 100 mL/Hr IV Intermittent (06-08 @ 14:18)  metoprolol tartrate: 25 milliGRAM(s) Oral (06-08 @ 03:50)  midodrine: 5 milliGRAM(s) Oral (06-08 @ 14:18)  vancomycin  IVPB.: 250 mL/Hr IV Intermittent (06-08 @ 00:30)    Data:  157.5  T(C): 36.7  HR: 80  BP: 86/61  RR: 18  SpO2: 97%    -WBC 7.11 / HgB 10.2 / Hct 30.1 / Plt 349  -Na 129 / Cl 96 / BUN 34 / Glucose 168  -K 4.4 / CO2 22 / Cr 0.89  -ALT 35 / Alk Phos 192 / T.Bili 0.7  -INR 1.76 / PTT 29.9      Radiology:     Assessment/Plan:   89 year old female with GERD, osteoarthritis/osteoporosis (on monthly ibandronate), HLD, Afib on eliquis, HOCM/severe LVOT/ELEAZAR, small-moderate pericardial effusion/RA diastolic collapse and a PMHx of R breast CA (40-50y ago) s/p B/L mastectomy and radiation and RUE lymphedema who presents from [facility] with shaking, chills, hypotension, and poor appetite. Of note the patient was recently hospitalized 5/29-6/5 for RML PNA, GBS bacteremia (pos cultures 5/29, 5/30) for total 10 days abx (IV ceftriaxone inpt, then levaquin 750 q48h until 6/9).    -- Small pericardial effusion with no window for pericardial drainage.    --  Micah Daniel, PGY-5  Vascular and Interventional Radiology   Available on [employer] Teams    - Non-emergent consults: Place IR consult order in [place]  - Emergent issues (pager): [facility] [phone]; [facility] [phone]; [phone]  - Scheduling questions: [facility] [phone]; [facility] [phone]  - Clinic/outpatient booking: [facility] [phone]; [facility] [phone]

## 2025-06-09 ENCOUNTER — RESULT REVIEW (OUTPATIENT)
Age: 89
End: 2025-06-09

## 2025-06-09 NOTE — PROGRESS NOTE ADULT - SUBJECTIVE AND OBJECTIVE BOX
Patient is a 89y old  Female who presents with a chief complaint of shaking chills, hypotension, poor appetite (2025 08:06)    Date of servie : 25 @ 13:09  INTERVAL HPI/OVERNIGHT EVENTS:  T(C): 36.1 (25 @ 12:30), Max: 36.6 (25 @ 23:45)  HR: 110 (25 @ 12:30) (98 - 118)  BP: 104/71 (25 @ 12:30) (98/65 - 122/80)  RR: 20 (25 @ 12:30) (18 - 20)  SpO2: 98% (25 @ 12:30) (96% - 98%)  Wt(kg): --  I&O's Summary    2025 07:01  -  2025 07:00  --------------------------------------------------------  IN: 400 mL / OUT: 800 mL / NET: -400 mL        LABS:                        10.2   7.11  )-----------( 349      ( 2025 20:56 )             30.1     06-    129[L]  |  96  |  34[H]  ----------------------------<  168[H]  4.4   |  22  |  0.89    Ca    8.8      2025 20:56    TPro  6.7  /  Alb  2.5[L]  /  TBili  0.7  /  DBili  x   /  AST  47[H]  /  ALT  35  /  AlkPhos  192[H]  06-07      Urinalysis Basic - ( 2025 13:01 )    Color: Dark Yellow / Appearance: Clear / S.027 / pH: x  Gluc: x / Ketone: x  / Bili: Negative / Urobili: 1.0 mg/dL   Blood: x / Protein: Trace mg/dL / Nitrite: Negative   Leuk Esterase: Negative / RBC: x / WBC x   Sq Epi: x / Non Sq Epi: x / Bacteria: x      CAPILLARY BLOOD GLUCOSE            Urinalysis Basic - ( 2025 13:01 )    Color: Dark Yellow / Appearance: Clear / S.027 / pH: x  Gluc: x / Ketone: x  / Bili: Negative / Urobili: 1.0 mg/dL   Blood: x / Protein: Trace mg/dL / Nitrite: Negative   Leuk Esterase: Negative / RBC: x / WBC x   Sq Epi: x / Non Sq Epi: x / Bacteria: x        MEDICATIONS  (STANDING):  albuterol/ipratropium for Nebulization 3 milliLiter(s) Nebulizer every 6 hours  apixaban 2.5 milliGRAM(s) Oral two times a day  aspirin enteric coated 81 milliGRAM(s) Oral daily  cefepime   IVPB 1000 milliGRAM(s) IV Intermittent every 8 hours  metoprolol tartrate 25 milliGRAM(s) Oral two times a day  midodrine 5 milliGRAM(s) Oral every 8 hours  pantoprazole    Tablet 40 milliGRAM(s) Oral before breakfast  polyethylene glycol 3350 17 Gram(s) Oral daily  senna 2 Tablet(s) Oral at bedtime  sodium chloride 0.9%. 500 milliLiter(s) (100 mL/Hr) IV Continuous <Continuous>  vancomycin  IVPB 750 milliGRAM(s) IV Intermittent every 24 hours    MEDICATIONS  (PRN):  melatonin 3 milliGRAM(s) Oral at bedtime PRN Insomnia          PHYSICAL EXAM:  GENERAL: NAD, well-groomed, well-developed  HEAD:  Atraumatic, Normocephalic  CHEST/LUNG: Clear to percussion bilaterally; No rales, rhonchi, wheezing, or rubs  HEART: Regular rate and rhythm; No murmurs, rubs, or gallops  ABDOMEN: Soft, Nontender, Nondistended; Bowel sounds present  EXTREMITIES:  2+ Peripheral Pulses, No clubbing, cyanosis, or edema  LYMPH: No lymphadenopathy noted  SKIN: No rashes or lesions    Care Discussed with Consultants/Other Providers [ ] YES  [ ] NO

## 2025-06-09 NOTE — CONSULT NOTE ADULT - ASSESSMENT
Mr. Silvia Cloud is a 89F hx GERD, R breast CA (40-50y ago) s/p B/L mastectomy and radiation, RUE lymphedema, osteoarthritis/osteoporosis (on monthly ibandronate), HLD, Afib on eliquis, HOCM/severe LVOT/ELEAZAR, small-moderate pericardial effusion/RA diastolic collapse, recent hospitalization 5/29-6/5 for RML PNA, GBS bacteremia (pos cultures 5/29, 5/30) of unclear source on total 10 days abx (IV ceftriaxone inpt, then levaquin 750 q48h until 6/9), presents from [facility] with shaking chills, hypotension, poor appetite. found to have sepsis (unclear source, on vanc and cefepime), is in a fib rvr. per IR no window for pericardial effusion on 6/8 when pericardial effusion was small, cards consulted for possible pericardial fluid drainage    #pericardial effusion  #a fib  pt sob, but could be 2/2 pleural effusions vs pericardial effusion, ivc is collapsing  -fu stat tte  -bp stable at sbp 100s, can give addition bolus of fluids if sbp <90s  -monitor on telemetry  -daily bmp for Mg>2, K>4  -strict i/os  -daily weights  -daily labs with cbc and bmp, mg and tsh/ft4  -order ekg  -c/w metop tar 50 mg qid  -order pt/inr coags, type and screen      Ashely Hodge MD  Cardiology Fellow PGY4     Mr. Silvia Cloud is a 89F hx GERD, R breast CA (40-50y ago) s/p B/L mastectomy and radiation, RUE lymphedema, osteoarthritis/osteoporosis (on monthly ibandronate), HLD, Afib on eliquis, HOCM/severe LVOT/ELEAZAR, small-moderate pericardial effusion/RA diastolic collapse, recent hospitalization 5/29-6/5 for RML PNA, GBS bacteremia (pos cultures 5/29, 5/30) of unclear source on total 10 days abx (IV ceftriaxone inpt, then levaquin 750 q48h until 6/9), presents from [facility] with shaking chills, hypotension, poor appetite. found to have sepsis (unclear source, on vanc and cefepime), is in a fib rvr. per IR no window for pericardial effusion on 6/8 when pericardial effusion was small, cards consulted for possible pericardial fluid drainage    #pericardial effusion  #a fib  pt sob, but could be 2/2 pleural effusions vs pericardial effusion, ivc is collapsing  -6/9 stat tte no signs of tamponade, around 1 cm of fluid anterior to the RV (too small for subcostal approach), spoke with echo attending Dr. Simon Rubio and interventional Dr. Babin. no plans for urgent pericardial effusion drainage at this time  -bp stable at sbp 100s, can give addition bolus of fluids if sbp <90s  -monitor on telemetry  -daily bmp for Mg>2, K>4  -strict i/os  -daily weights  -daily labs with cbc and bmp, mg and tsh/ft4  -order ekg  -c/w metop tar 50 mg qid  -order pt/inr coags, type and screen      Ashely Hodge MD  Cardiology Fellow PGY4

## 2025-06-09 NOTE — CHART NOTE - NSCHARTNOTEFT_GEN_A_CORE
Noted CT abdomen with increase in pericardial effusion; mod- large. pt d with SOB    Vital Signs Last 24 Hrs  T(C): 36.7 (09 Jun 2025 16:08), Max: 36.7 (09 Jun 2025 16:08)  T(F): 98 (09 Jun 2025 16:08), Max: 98 (09 Jun 2025 16:08)  HR: 134 (09 Jun 2025 16:08) (98 - 134)  BP: 119/80 (09 Jun 2025 16:08) (98/65 - 122/80)  BP(mean): 68 (08 Jun 2025 23:45) (68 - 68)  RR: 18 (09 Jun 2025 16:08) (18 - 20)  SpO2: 96% (09 Jun 2025 16:08) (96% - 98%)    Parameters below as of 09 Jun 2025 16:08  Patient On (Oxygen Delivery Method): nasal cannula        Physical Exam:  General: WN/WD NAD  Neurology: A&Ox3, nonfocal, LUGO x 4  Head:  Normocephalic, atraumatic  Respiratory: diminished bl  CV: RRR, S1S2, no murmur  Abdominal: Soft, NT, ND no palpable mass  MSK: No edema, + peripheral pulses, FROM all 4 extremity  Labs:                          10.2   7.11  )-----------( 349      ( 07 Jun 2025 20:56 )             30.1     06-07    129[L]  |  96  |  34[H]  ----------------------------<  168[H]  4.4   |  22  |  0.89    Ca    8.8      07 Jun 2025 20:56    TPro  6.7  /  Alb  2.5[L]  /  TBili  0.7  /  DBili  x   /  AST  47[H]  /  ALT  35  /  AlkPhos  192[H]  06-07        Assessment & Plan:  89F c hx GERD, R breast CA (40-50y ago) s/p B/L mastectomy and radiation, RUE lymphedema, osteoarthritis/osteoporosis (on monthly ibandronate), HLD, Afib on eliquis, HOCM/severe LVOT/ELEAZAR, small-moderate pericardial effusion/RA diastolic collapse, recent hospitalization 5/29-6/5 for RML PNA, GBS bacteremia (pos cultures 5/29, 5/30) of unclear source on total 10 days abx (IV ceftriaxone inpt, then levaquin 750 q48h until 6/9), presents from [facility] with shaking chills, hypotension, poor appetite. found to have sepsis (unclear source, on vanc and cefepime), also in afib rvr. per IR no window for pericardial effusion on 6/8 when pericardial effusion was small. Now on rpt CT mod- large pericardial effusion.  cards consulted for possible pericardial fluid drainage    >stat ECHO ; Moderate pericardial effusion noted adjacent to the right ventricle, small pericardial effusion noted adjacent to the right atrium and large pericardial effusion noted adjacent to the posterior left ventricle with no echocardiographic evidence of tamponade physiology.  . Compared to the transthoracic echocardiogram performed on 6/5/2025, pericardial effusion has increased in size however no echocardiographic evidence of cardiac tamponade.  >stat labs  > Discussed with cardiology  >ATtending notified     Rody Mirza ANP-BC  #[id number]

## 2025-06-09 NOTE — PROGRESS NOTE ADULT - SUBJECTIVE AND OBJECTIVE BOX
Date of Service: 25 @ 16:49    Patient is a 89y old  Female who presents with a chief complaint of shaking chills, hypotension, poor appetite (2025 15:22)      Any change in ROS: seems slightly tired today  : daughter at bedside:     MEDICATIONS  (STANDING):  albuterol/ipratropium for Nebulization 3 milliLiter(s) Nebulizer every 6 hours  apixaban 2.5 milliGRAM(s) Oral two times a day  aspirin enteric coated 81 milliGRAM(s) Oral daily  cefepime   IVPB 1000 milliGRAM(s) IV Intermittent every 8 hours  metoprolol tartrate 50 milliGRAM(s) Oral four times a day  midodrine 5 milliGRAM(s) Oral every 8 hours  pantoprazole    Tablet 40 milliGRAM(s) Oral before breakfast  sodium chloride 0.9%. 500 milliLiter(s) (100 mL/Hr) IV Continuous <Continuous>  vancomycin  IVPB 750 milliGRAM(s) IV Intermittent every 24 hours    MEDICATIONS  (PRN):  acetaminophen     Tablet .. 650 milliGRAM(s) Oral every 6 hours PRN Moderate Pain (4 - 6)  melatonin 3 milliGRAM(s) Oral at bedtime PRN Insomnia    Vital Signs Last 24 Hrs  T(C): 36.7 (2025 16:08), Max: 36.7 (2025 16:08)  T(F): 98 (2025 16:08), Max: 98 (2025 16:08)  HR: 134 (2025 16:08) (98 - 134)  BP: 119/80 (2025 16:08) (98/65 - 122/80)  BP(mean): 68 (2025 23:45) (68 - 68)  RR: 18 (2025 16:08) (18 - 20)  SpO2: 96% (2025 16:08) (96% - 98%)    Parameters below as of 2025 16:08  Patient On (Oxygen Delivery Method): nasal cannula        I&O's Summary    2025 07:01  -  2025 07:00  --------------------------------------------------------  IN: 400 mL / OUT: 800 mL / NET: -400 mL    2025 07:01  -  2025 16:49  --------------------------------------------------------  IN: 0 mL / OUT: 400 mL / NET: -400 mL          Physical Exam:   GENERAL: NAD, well-groomed, well-developed  HEENT: BRYSON/   Atraumatic, Normocephalic  ENMT: No tonsillar erythema, exudates, or enlargement; Moist mucous membranes, Good dentition, No lesions  NECK: Supple, No JVD, Normal thyroid  CHEST/LUNG: Clear to auscultaion  CVS: Regular rate and rhythm; No murmurs, rubs, or gallops  GI: : Soft, Nontender, Nondistended; Bowel sounds present  NERVOUS SYSTEM:  Alert & Oriented X3- on 2 L   EXTREMITIES:-edema  LYMPH: No lymphadenopathy noted  SKIN: No rashes or lesions  ENDOCRINOLOGY: No Thyromegaly  PSYCH: calm     Labs:  27, 27, 26, 26                            10.2   7.11  )-----------( 349      ( 2025 20:56 )             30.1     06-07    129[L]  |  96  |  34[H]  ----------------------------<  168[H]  4.4   |  22  |  0.89    Ca    8.8      2025 20:56    TPro  6.7  /  Alb  2.5[L]  /  TBili  0.7  /  DBili  x   /  AST  47[H]  /  ALT  35  /  AlkPhos  192[H]  06-07    CAPILLARY BLOOD GLUCOSE          LIVER FUNCTIONS - ( 2025 20:56 )  Alb: 2.5 g/dL / Pro: 6.7 g/dL / ALK PHOS: 192 U/L / ALT: 35 U/L / AST: 47 U/L / GGT: x             Urinalysis Basic - ( 2025 13:01 )    Color: Dark Yellow / Appearance: Clear / S.027 / pH: x  Gluc: x / Ketone: x  / Bili: Negative / Urobili: 1.0 mg/dL   Blood: x / Protein: Trace mg/dL / Nitrite: Negative   Leuk Esterase: Negative / RBC: x / WBC x   Sq Epi: x / Non Sq Epi: x / Bacteria: x      D-Dimer Assay, Quantitative: 599 ng/mL DDU ( @ 16:12)        RECENT CULTURES:   @ 20:45 Blood Blood-Peripheral     rad< from: CT Abdomen and Pelvis w/ IV Cont (25 @ 17:16) >    IMPRESSION:  1.  No CT evidence of acute abdominopelvic pathology.  2.  Moderate to large pericardial effusion is increased in size from   2025.  3.  Stable moderate right and small left pleural effusion and atelectasis.  4.  Additional chronic findings as per the body of the report.    < end of copied text >             No growth at 24 hours     @ 20:30 Blood Blood-Peripheral                No growth at 24 hours          RESPIRATORY CULTURES:          Studies  Chest X-RAY  CT SCAN Chest   Venous Dopplers: LE:   CT Abdomen  Others

## 2025-06-09 NOTE — PROGRESS NOTE ADULT - SUBJECTIVE AND OBJECTIVE BOX
[facility]  BASIL [place] Y. Patel, S. Shah, G. Casimir  [phone]  ([phone] - weekdays after 5pm and weekends)    Name: GINO GRAHAM  Age/Gender: 89y Female  MRN: [id number]    Interval History:  Patient seen and examined this morning.   No new complaints noted.  Notes reviewed  No concerning overnight events  Afebrile   Allergies: Zosyn (Rash; Urticaria; Hives)      Objective:  Vitals:   T(F): 97.3 (25 @ 04:57), Max: 97.8 (25 @ 23:45)  HR: 114 (25 @ 04:57) (98 - 118)  BP: 122/80 (25 @ 04:57) (98/65 - 122/80)  RR: 20 (25 @ 04:57) (18 - 20)  SpO2: 96% (25 @ 04:57) (96% - 98%)  Physical Examination:  General: no acute distress  HEENT: normocephalic, atraumatic, anicteric  Respiratory: decreased breath sounds b/l   Cardiovascular: S1 and S2 present, tachycardia   Gastrointestinal: soft, nontender, nondistended  Extremities: no edema, no cyanosis  Skin: no visible rash    Laboratory Studies:  CBC:                       10.2   7.11  )-----------( 349      ( 2025 20:56 )             30.1     WBC Trend:  7.11 25 @ 20:56  5.13 25 @ 07:19  3.63 25 @ 09:46    CMP:     129[L]  |  96  |  34[H]  ----------------------------<  168[H]  4.4   |  22  |  0.89    Ca    8.8      2025 20:56    TPro  6.7  /  Alb  2.5[L]  /  TBili  0.7  /  DBili  x   /  AST  47[H]  /  ALT  35  /  AlkPhos  192[H]      Creatinine: 0.89 mg/dL (25 @ 20:56)  Creatinine: 0.59 mg/dL (25 @ 07:15)  Creatinine: 0.70 mg/dL (25 @ 07:19)  Creatinine: 0.77 mg/dL (25 @ 09:46)    LIVER FUNCTIONS - ( 2025 20:56 )  Alb: 2.5 g/dL / Pro: 6.7 g/dL / ALK PHOS: 192 U/L / ALT: 35 U/L / AST: 47 U/L / GGT: x           Urinalysis Basic - ( 2025 13:01 )  Color: Dark Yellow / Appearance: Clear / S.027 / pH: x  Gluc: x / Ketone: x  / Bili: Negative / Urobili: 1.0 mg/dL   Blood: x / Protein: Trace mg/dL / Nitrite: Negative   Leuk Esterase: Negative / RBC: x / WBC x   Sq Epi: x / Non Sq Epi: x / Bacteria: x    Microbiology: reviewed   Urinalysis with Rflx Culture (collected 25 @ 13:01)    Culture - Blood (collected 25 @ 20:45)  Source: Blood Blood-Peripheral  Preliminary Report (25 @ 02:02):    No growth at 24 hours    Culture - Blood (collected 25 @ 20:30)  Source: Blood Blood-Peripheral  Preliminary Report (25 @ 02:02):    No growth at 24 hours    Culture - Blood (collected 25 @ 10:51)  Source: Blood Blood-Venous  Final Report (25 @ 14:00):    No growth at 5 days    Culture - Blood (collected 25 @ 20:48)  Source: Blood Blood-Peripheral  Final Report (25 @ 02:01):    No growth at 5 days    Culture - Blood (collected 25 @ 07:16)  Source: Blood Blood  Gram Stain (25 @ 20:47):    Growth in anaerobic bottle: Gram Positive Cocci in Pairs and Chains    Growth in aerobic bottle: Gram Positive Cocci in Pairs and Chains  Final Report (25 @ 15:48):    Growth in aerobic and anaerobic bottles: Streptococcus agalactiae (Group    B)    See previous culture [id number]    Culture - Blood (collected 25 @ 07:16)  Source: Blood Blood  Gram Stain (25 @ 19:54):    Growth in aerobic and anaerobic bottles: Gram Positive Cocci in Pairs and    Chains  Final Report (25 @ 15:47):    Growth in aerobic and anaerobic bottles: Streptococcus agalactiae (Group    B)    See previous culture [id number]    Urinalysis with Rflx Culture (collected 25 @ 16:21)    Culture - Blood (collected 25 @ 13:45)  Source: Blood Blood-Peripheral  Gram Stain (25 @ 01:47):    Growth in anaerobic bottle: Gram Positive Cocci in Pairs and Chains    Growth in aerobic bottle: Gram Positive Cocci in Pairs and Chains  Final Report (25 @ 18:43):    Growth in aerobic and anaerobic bottles: Streptococcus agalactiae (Group    B)    See previous culture [id number]    Culture - Blood (collected 25 @ 13:30)  Source: Blood Blood-Peripheral  Gram Stain (25 @ 01:45):    Growth in aerobic bottle: Gram Positive Cocci in Pairs and Chains    Growth in anaerobic bottle: Gram Positive Cocci in Pairs and Chains  Final Report (25 @ 15:48):    Growth in aerobic and anaerobic bottles: Streptococcus agalactiae (Group    B) ***********Note************    This isolate demonstrates inducible    clindamycin resistance.    Clindamycin may still be effective in some patients.    Direct identification is available within approximately 3-5    hours either by Blood Panel Multiplexed PCR or Direct    MALDI-TOF. Details: [email or web address]  Organism: Blood Culture PCR  Streptococcus agalactiae (Group B) (25 @ 15:48)  Organism: Streptococcus agalactiae (Group B) (25 @ 15:48)      -  Levofloxacin: S 0.5      -  Clindamycin: R 0.25      -  Vancomycin: S 0.5      -  Ceftriaxone: S <=0.25      -  Tetracycline: R >4      Method Type: TATIANA      -  Penicillin: S <=0.03 Predicts results for ampicillin, amoxicillin, amoxicillin/clavulanate, ampicillin/sulbactam, 1st, 2nd and 3rd generation cephalosporins and carbapenems.  Organism: Blood Culture PCR (25 @ 15:48)      -  Streptococcus agalactiae (Group B): Detec      Method Type: PCR    25 @ 22:31 SARS-CoV-2 NotDetec/Influenza A NotDetec/Influenza B NotDetec/RSV NotDetec    Radiology: reviewed   < from: CT Abdomen and Pelvis w/ IV Cont (25 @ 17:16) >  IMPRESSION:  1.  No CT evidence of acute abdominopelvic pathology.  2.  Moderate to large pericardial effusion is increased in size from   2025.  3.  Stable moderate right and small left pleural effusion and atelectasis.  4.  Additional chronic findings as per the body of the report.        --- End of Report ---    < end of copied text >  < from: CT Head No Cont (25 @ 09:41) >  IMPRESSION: No acute intracranial hemorrhage, mass effect, or shift of   the midline structures.    Similar-appearing mild chronic white matter microvascular type changes.    --- End of Report ---    < end of copied text >  < from: Xray Chest 1 View- PORTABLE-Urgent (25 @ 22:11) >  IMPRESSION:  Worsening aeration at the bases.    ---End of Report ---    < end of copied text >    Medications:  albuterol/ipratropium for Nebulization 3 milliLiter(s) Nebulizer every 6 hours  apixaban 2.5 milliGRAM(s) Oral two times a day  aspirin enteric coated 81 milliGRAM(s) Oral daily  cefepime   IVPB 1000 milliGRAM(s) IV Intermittent every 8 hours  melatonin 3 milliGRAM(s) Oral at bedtime PRN  metoprolol tartrate 25 milliGRAM(s) Oral two times a day  midodrine 5 milliGRAM(s) Oral every 8 hours  pantoprazole    Tablet 40 milliGRAM(s) Oral before breakfast  polyethylene glycol 3350 17 Gram(s) Oral daily  senna 2 Tablet(s) Oral at bedtime  sodium chloride 0.9%. 500 milliLiter(s) IV Continuous <Continuous>  vancomycin  IVPB 750 milliGRAM(s) IV Intermittent every 24 hours    Current Antimicrobials:  cefepime   IVPB 1000 milliGRAM(s) IV Intermittent every 8 hours  vancomycin  IVPB 750 milliGRAM(s) IV Intermittent every 24 hours    Prior/Completed Antimicrobials:  cefepime   IVPB  vancomycin  IVPB.

## 2025-06-09 NOTE — PROGRESS NOTE ADULT - ASSESSMENT
89F c hx GERD, R breast CA (40-50y ago) s/p B/L mastectomy and radiation, RUE lymphedema, osteoarthritis/osteoporosis (on monthly ibandronate), HLD, Afib on eliquis, HOCM/severe LVOT/LORA, small-moderate pericardial effusion/RA diastolic collapse, recent hospitalization 5/29-6/5 for RML PNA, GBS bacteremia (pos cultures 5/29, 5/30) of unclear source on total 10 days abx (IV ceftriaxone inpt, then levaquin 750 q48h until 6/9), presents from [facility] rehab with shaking chills, hypotension, poor appetite  History from pt's daughter/primary decision maker Teresita Day at bedside.  While at [facility], pt found to have hypotension to SBP 80s. Pt was given IVF and reduced dose of metoprolol. Pt also found to have tachycardia, shaking chills. Pt sent back to the hospital. Reportedly pt has not been eating well, not urinating much. Denies CP, SOB, abd pain, diarrhea, cough, other respiratory symptoms. At baseline pt ambulates without assistive device, drives.  RRT called in the ed for hypotension to SBP 79. (08 Jun 2025 02:41)  to me pt daughter says she was very sleepy in the morning too  as she was given ambien at 4 AM in the morning:   now she is alert and awake and is on 2 L of oxygen ;  she is not sob:  and does not look to be in any resp distress:       Severe sepsis.   unclear source of fevers  cont empiric vanc, cefepime  f/u blood cx. if positive will likely need MARIA G  monitor for any localizing symptoms  pt had recent pan ct scan that did not elucidate cause of bacteremia  recent blood cultures have been negative   - IVF  on cefepime  she is febrile too   vbg on admission IS ok;   MONITOR BLOOD PRESSURE CLOSELY:   OIF SHE DROPS HER BLOOD PRESSURE MORE:  WOULD NEED MICU  CO NSULT:    6/9: seems slightly more tored today  ; cont antibtiocs:  per ID:  she remains on 2 L of oxygen : she is alert and awake:  daughter at bedside:  reviewed the labs and echo and ct scan chest with the daughter in detail :   she has large pericardial effusions:  per ir no good window and effusion seemd small to drain : ct scan chest read as mod to large pericardial effusion : defer to cards :           Hypotension.  suspect combination of infection, hocm, lora, tachycardia, pericardial effusion, metoprolol  pt has somewhat tenuous hemodynamic status  consider cardioversion, consider repeat TTE  pt is full code.  pts blood pressure is slightly low   on midodrine    6/9: cont on midodrine    Chronic atrial fibrillation.  reduce metoprolol dose to tartrate 25mg BID  goal HR <100 in setting of HOCM with hypotension  consider amiodarone or cardioversion  PER CARDS    6/9: defer to cards       Type 2 myocardial infarction.  suspect demand ischemia from recent hypotension, infections  start asa   cont home eliquis.  CONT CURRENT MEDS     6/9; no chest pain : cont current rx:       HOCM (hypertrophic obstructive cardiomyopathy).  cont metoprolol as above. uptitrate as BP tolerates  per cards    Acute respiratory failure with hypoxia.   recent CT scans showing right bronchiectasis, right fibrosis likely 2/2 radiation, poss RML PNA.  cont BD :   ct chest showed: No pulmonary embolism. Small to moderate right and small left pleural effusions with associated  atelectasis. 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  Cardiomegaly. Moderate pericardial effusion.  needs echo     9/6: new echo reviewed:  seen by cardiology :  monitor her blood pressure closely:  if she drops her blood pressure call CCU     uzma acp

## 2025-06-09 NOTE — PROGRESS NOTE ADULT - ASSESSMENT
89F c hx GERD, R breast CA (40-50y ago) s/p B/L mastectomy and radiation, RUE lymphedema, osteoarthritis/osteoporosis (on monthly ibandronate), HLD, Afib on eliquis, HOCM/severe LVOT/ELEAZAR, small-moderate pericardial effusion/RA diastolic collapse, recent hospitalization 5/29-6/5 for RML PNA, GBS bacteremia (pos cultures 5/29, 5/30) of unclear source on total 10 days abx (IV ceftriaxone inpt, then levaquin 750 q48h until 6/9), pw hypotension, severe sepsis of unclear source, demand ischemia, afib c rvr    Severe Sepsis/sirs, Hypotension 2/2 unclear source  Severe LVOT obstruction/HCM, Pericardial effusion  CXR w/ worsening aeration at the bases  CTH negative  UA negative  Flu/COVID/RSV negative  S/p MICU evaluation   Zosyn allergy noted, tolerating on vancomycin, cefepime  CTAP with no acute abdominopelvic pathology, noted with mod-large pericardial effusion inc since 5/29/25, stable mod R and small L pleural effusion and atelectasis   6/9 afebrile over past 24h, no leukocytosis, on 2L NC, nontoxic appearing     Recommendations:   Follow 6/7 Bcx - NGTD x2 (24h)  C/w vancomycin and cefepime for now   Trend temps/WBC  Cardiology following   supportive measures, IVFs as needed  Continue rest of care per primary team       Wes Grullon M.D.  [facility]  Available on [employer] TEAMS - *PREFERRED*  [phone]  After 5pm on weekdays and all day on weekends - please call [phone]     Thank you for consulting us and involving us in the management of this patients case. In addition to reviewing history, imaging, documents, labs, microbiology, took into account antibiotic stewardship, local antibiogram and infection control strategies and potential transmission issues at time of treatment decision making process.

## 2025-06-09 NOTE — PROGRESS NOTE ADULT - ASSESSMENT
89F c hx GERD, R breast CA (40-50y ago) s/p B/L mastectomy and radiation, RUE lymphedema, osteoarthritis/osteoporosis (on monthly ibandronate), HLD, Afib on eliquis, HOCM/severe LVOT/LORA, small-moderate pericardial effusion/RA diastolic collapse, recent hospitalization 5/29-6/5 for RML PNA, GBS bacteremia (pos cultures 5/29, 5/30) of unclear source on total 10 days abx (IV ceftriaxone inpt, then levaquin 750 q48h until 6/9), pw hypotension, severe sepsis of unclear source, demand ischemia, afib c rvr      Problem/Plan - 1:  ·  Problem: Severe sepsis.   ·  Plan: - unclear source of fevers  - cont empiric vanc, cefepime  - needs ID consult in AM  - f/u blood cx. if positive will likely need MARIA G  - monitor for any localizing symptoms  - pt had recent pan ct scan that did not elucidate cause of bacteremia  - standing tylenol x4 doses for now  - IVF  - VSq4h.    Problem/Plan - 2:  ·  Problem: Hypotension.  ·  Plan: - suspect combination of infection, hocm, lora, tachycardia, pericardial effusion, metoprolol  - pt has somewhat tenuous hemodynamic status  - consider cardioversion, consider repeat TTE  - pt is full code.    Problem/Plan - 3:  ·  Problem: Chronic atrial fibrillation.  ·  Plan: - reduce metoprolol dose to tartrate 25mg BID  - goal HR <100 in setting of HOCM with hypotension  - consider amiodarone or cardioversion  - needs card consult in AM- tele.    Problem/Plan - 4:  ·  Problem: Type 2 myocardial infarction.  ·  Plan: - tele  - suspect demand ischemia from recent hypotension, infections  - f/u cards regarding future ischemic eval  - start asa   - cont home eliquis.    Problem/Plan - 5:  ·  Problem: HOCM (hypertrophic obstructive cardiomyopathy).  ·  Plan: - cont metoprolol as above. uptitrate as BP tolerates  - IVF.    Problem/Plan - 6:  ·  Problem: Acute respiratory failure with hypoxia.   ·  Plan: - recent CT scans showing right bronchiectasis, right fibrosis likely 2/2 radiation, poss RML PNA.

## 2025-06-09 NOTE — PROGRESS NOTE ADULT - ASSESSMENT
Afib, severe HCM LVOT obstruction, Pericardial effusion, recent admission for PNA / Bacteremia   now admitted with sepsis, tachycardia ( afib with RVR ) and shock . Cardiology is called for elevated troponin     Elevated troponin   Demand ischemia   in setting of afib with RVR, shock, severe HCM and sepsis   its downtrending   eventual ischemic eval can be considered  recheck trop this am     Shock   likely multifactorial   in setting of sepsis, pericardial effusion and HCM with tachycardia  HR is better now  cont midodrine   BP is much better  as per IR no window to drain Pericardial effusion , small   sepsis work up and management as per ID   blood x neg    Afib  HR is better  cont metoprolol   EP consult is recommended for rhythm control given HCM   cont a/c    Pl effusion / pericardial effusion   plan as above  has poor nutrition due to albumin hence decreased oncotic pressure  nutritional optimization   would be very cautious with diuresis given severe LVOT obstruction and pericardial effusion     AMS  suspect multifactorial due to sepsis , avoid ambien   much better this am      Advanced care planning was discussed with patient and family.  Risks, benefits and alternatives of the cardiac treatments and medical therapy including procedures were discussed in detail and all questions were answered. Importance of compliance with medical therapy and lifestyle modification to improve cardiovascular health were addressed. Appropriate forms and patient educational materials were reviewed. 30 minutes face to face spent.

## 2025-06-09 NOTE — CONSULT NOTE ADULT - SUBJECTIVE AND OBJECTIVE BOX
EP Attending  HISTORY OF PRESENT ILLNESS: HPI:  89F c hx GERD, R breast CA (40-50y ago) s/p B/L mastectomy and radiation, RUE lymphedema, osteoarthritis/osteoporosis (on monthly ibandronate), HLD, Afib on eliquis, HOCM/severe LVOT/ELEAZAR, small-moderate pericardial effusion/RA diastolic collapse, recent hospitalization - for RML PNA, GBS bacteremia (pos cultures , ) of unclear source on total 10 days abx (IV ceftriaxone inpt, then levaquin 750 q48h until ), presents from [facility] rehab with shaking chills, hypotension, poor appetite    History from pt's daughter/primary decision maker Teresitagisel Day at bedside.  While at [facility], pt found to have hypotension to SBP 80s. Pt was given IVF and reduced dose of metoprolol. Pt also found to have tachycardia, shaking chills. Pt sent back to the hospital. Reportedly pt has not been eating well, not urinating much. Denies CP, SOB, abd pain, diarrhea, cough, other respiratory symptoms. At baseline pt ambulates without assistive device, drives.  RRT called in the ed for hypotension to SBP 79. (2025 02:41)    Ms Cloud is a pleasant 89yo woman here with shortness of breath.  Sees Dr Adolfo Leung for Cardiology.  Being managed on this inpatient stay by Dr Coelho.  Known to Dr Young after outpatient referral for HOCM management (lVOT gradient 80s-100mmHg+), and had a brief trial of Mavacamten, stopped for feelings of "leg heaviness".    EP called re: rapid AFib, refractory to low-dose metoprolol thus far, and complicated by management of HCM and new/worsening pericardial effusion.  Resting comfortably in bed on supplemental O2.  SOB and fatigued but no palpitations or fainting.  A 10 pt ROS is otherwise negative.      PAST MEDICAL & SURGICAL HISTORY:  Breast cancer  s/p b/l mastectomy  H/O bilateral mastectomy  + breast implants  History of cataract surgery, unspecified laterality    MEDICATIONS  (STANDING):  albuterol/ipratropium for Nebulization 3 milliLiter(s) Nebulizer every 6 hours  apixaban 2.5 milliGRAM(s) Oral two times a day  aspirin enteric coated 81 milliGRAM(s) Oral daily  cefepime   IVPB 1000 milliGRAM(s) IV Intermittent every 8 hours  metoprolol tartrate 50 milliGRAM(s) Oral four times a day  midodrine 5 milliGRAM(s) Oral every 8 hours  pantoprazole    Tablet 40 milliGRAM(s) Oral before breakfast  polyethylene glycol 3350 17 Gram(s) Oral daily  senna 2 Tablet(s) Oral at bedtime  sodium chloride 0.9%. 500 milliLiter(s) (100 mL/Hr) IV Continuous <Continuous>  vancomycin  IVPB 750 milliGRAM(s) IV Intermittent every 24 hours    Allergies    Zosyn (Rash; Urticaria; Hives)    Intolerances    FAMILY HISTORY:  Family history of MI (myocardial infarction) (Father)    FH: Alzheimers disease  father    FH: cerebral aneurysm  mother  of this      Non-contributary for premature coronary disease or sudden cardiac death    SOCIAL HISTORY:    [x ] Non-smoker  [ ] Smoker  [ ] Alcohol      PHYSICAL EXAM:  T(C): 36.1 (25 @ 12:30), Max: 36.6 (25 @ 23:45)  HR: 110 (25 @ 12:30) (98 - 118)  BP: 104/71 (25 @ 12:30) (98/65 - 122/80)  RR: 20 (25 @ 12:30) (18 - 20)  SpO2: 98% (25 @ 12:30) (96% - 98%)  Wt(kg): --    Appearance: frail elderly woman in no acute distress  HEENT:   Normal oral mucosa, PERRL, EOMI	  Lymphatic: No lymphadenopathy , no edema  Cardiovascular: rapid irregular S1 S2, No JVD, No murmurs , Peripheral pulses palpable 2+ bilaterally  Respiratory: poor air entry BL  normal effort 	  Gastrointestinal:  Soft, Non-tender, + BS	  Skin: No rashes, No ecchymoses, No cyanosis, warm to touch  Musculoskeletal: Normal range of motion, normal strength  Psychiatry:  Mood & affect appropriate    TELEMETRY: AFib, narrow QRS  ECG:  	AFib, atypical LVHypertrphy  Echo:  < from: TTE W or WO Ultrasound Enhancing Agent (25 @ 07:16) >  CONCLUSIONS:      1. Limited TTE to assess for pericardial effusion.   2. Trace pericardial effusion noted adjacent to the posterolateral left ventricle, small pericardial effusion noted adjacent to the anterior right ventricle and small pericardial effusion noted adjacent to the right atrium.   3. The inferior vena cava is normal in size measuring 1.70 cm in diameter, (normal <2.1cm) with normal inspiratory collapse (normal >50%) consistent with normal right atrial pressure (~3, range 0-5mmHg).   4. Compared to the transthoracic echocardiogram performed on 2025, the right atrium is not as well visualized on this study. There is right atrial diastolic collapse visualized in the 4 chamber view but unable to quantify the duration of the collapse. The effusion appears unchanged in size and distribution. No other signs of cardiac tamponade are present.    < end of copied text >    CT Chest - personally reviewed the images.  right breat prosthesis with signs of rupture.  left breast prosthesis OK.  prominent LV hypertrophy visible.  pericardial effusion enlarged.  pleural effusion present on the right.  	  LABS:	 	                          10.2   7.11  )-----------( 349      ( 2025 20:56 )             30.1     -    129[L]  |  96  |  34[H]  ----------------------------<  168[H]  4.4   |  22  |  0.89    Ca    8.8      2025 20:56    TPro  6.7  /  Alb  2.5[L]  /  TBili  0.7  /  DBili  x   /  AST  47[H]  /  ALT  35  /  AlkPhos  192[H]  -    ASSESSMENT/PLAN: Ms Cloud is a pleasant 89y Female here with shortness of breath.  Multifactorial in the setting of pericardial effusion, Hypertrophic Cardiomyopathy with severe LVOT obstruction, and rapid AFib.    Continue apixaban for now.  [id number] is at least 3.  Serial imaging of the pericardial effusion. It is enlarging but may not yet be amenable to subxiphoid needle-based tap.  This could be an issue related to pulmonary hypertension and LV diastolic failure, but in the remote setting of breast cancer, this could be late metastatic spread.  Will increase beta blockers to 50mg q6hrs, with holding parameters for low heartrate.    Discussed w/ Dr Young, and agree w/ recs to avoid diltiazem and other vasodilators, and to avoid diuretics.  She is a candidate to re-try Mavacamten (Camzyos)... not sure if her side effects were truly drug related. She did well on this with serial echos the first time. This can only be re-initiated as outpatient!  If she tries/fails Camzyos a 2nd time, can refer for alcohol septal ablation via interventional cardiology.  I let the family know that this is a possibility, but that it is not the next step on this hospitalization.  Once heartrate is controlled / Beta blockers maximized, we can consider CTA+Cardioversion for AFib rhythm control before discharge.  I would need to be positive that no intervention on the pericardial effusion is pending.  Plan of care discussed w/ Dr Young, and discussed w/ family at bedside.  Will follow with you.        Barney Lau M.D.  Cardiac Electrophysiology    office [phone]  pager [phone]

## 2025-06-09 NOTE — PROGRESS NOTE ADULT - SUBJECTIVE AND OBJECTIVE BOX
Subjective: Patient seen and examined. No new events except as noted.     SUBJECTIVE/ROS:  much more awake and alert  feels better       MEDICATIONS:  MEDICATIONS  (STANDING):  albuterol/ipratropium for Nebulization 3 milliLiter(s) Nebulizer every 6 hours  apixaban 2.5 milliGRAM(s) Oral two times a day  aspirin enteric coated 81 milliGRAM(s) Oral daily  cefepime   IVPB 1000 milliGRAM(s) IV Intermittent every 8 hours  metoprolol tartrate 25 milliGRAM(s) Oral two times a day  midodrine 5 milliGRAM(s) Oral every 8 hours  pantoprazole    Tablet 40 milliGRAM(s) Oral before breakfast  polyethylene glycol 3350 17 Gram(s) Oral daily  senna 2 Tablet(s) Oral at bedtime  sodium chloride 0.9%. 500 milliLiter(s) (100 mL/Hr) IV Continuous <Continuous>  vancomycin  IVPB 750 milliGRAM(s) IV Intermittent every 24 hours      PHYSICAL EXAM:  T(C): 36.3 (06-09-25 @ 04:57), Max: 36.6 (06-08-25 @ 23:45)  HR: 114 (06-09-25 @ 04:57) (80 - 118)  BP: 122/80 (06-09-25 @ 04:57) (86/61 - 131/83)  RR: 20 (06-09-25 @ 04:57) (18 - 20)  SpO2: 96% (06-09-25 @ 04:57) (96% - 98%)  Wt(kg): --  I&O's Summary    08 Jun 2025 07:01  -  09 Jun 2025 07:00  --------------------------------------------------------  IN: 400 mL / OUT: 800 mL / NET: -400 mL            JVP: Normal  Neck: supple  Lung: clear   CV: S1 S2 , 3/6 jelani   Abd: soft  Ext: No edema  neuro: Awake / alert  Psych: flat affect  Skin: normal``    LABS/DATA:    CARDIAC MARKERS:                                10.2   7.11  )-----------( 349      ( 07 Jun 2025 20:56 )             30.1     06-07    129[L]  |  96  |  34[H]  ----------------------------<  168[H]  4.4   |  22  |  0.89    Ca    8.8      07 Jun 2025 20:56    TPro  6.7  /  Alb  2.5[L]  /  TBili  0.7  /  DBili  x   /  AST  47[H]  /  ALT  35  /  AlkPhos  192[H]  06-07    proBNP:   Lipid Profile:   HgA1c:   TSH:

## 2025-06-09 NOTE — CONSULT NOTE ADULT - SUBJECTIVE AND OBJECTIVE BOX
Cardiology Consult Note   [Please check [email or web address] password: [email or web address]" for cardiology service schedule and contact information]    HPI:  Mr. Silvia Cloud is a 89F hx GERD, R breast CA (40-50y ago) s/p B/L mastectomy and radiation, RUE lymphedema, osteoarthritis/osteoporosis (on monthly ibandronate), HLD, Afib on eliquis, HOCM/severe LVOT/ELEAZAR, small-moderate pericardial effusion/RA diastolic collapse, recent hospitalization - for RML PNA, GBS bacteremia (pos cultures , ) of unclear source on total 10 days abx (IV ceftriaxone inpt, then levaquin 750 q48h until ), presents from [facility] with shaking chills, hypotension, poor appetite. found to have sepsis (unclear source, on vanc and cefepime), is in a fib rvr. per IR no window for pericardial effusion on  when pericardial effusion was small    ep consulted, planning on increase bblocker to 50 mg q6h, then plan for CTA+ dccv for a fib. plans to avoid dilt, vasodilators, diuretics dt HCM, mavacamten outpatient.     afebrile, p 130, bp 100-120/70-80, rr 18, 96% RA  trop 338 to 307 to 274   Na 129, Cr 0.89  CT abd/pelvis  showing mod to large pericardial effusion inc in size from 25, mod R and small L pleural effusion  CTH  no acute path  vbg  7.39/44/lactate 1    rapid called yesterday dt sbp 80, micu consulted and had discordant leg and arm bp, was increased on midodrine.     ip meds: apixaban 2.5 mg bid, asa 81 mg qd, vanc, cefpeime, metop tar 50 mg qid, midodrine 5 mg q8h    PAST MEDICAL & SURGICAL HISTORY:  Breast cancer  s/p b/l mastectomy      H/O bilateral mastectomy  + breast implants      History of cataract surgery, unspecified laterality        FAMILY HISTORY:  Family history of MI (myocardial infarction) (Father)    FH: Alzheimers disease  father    FH: cerebral aneurysm  mother  of this      SOCIAL HISTORY:  unchanged    MEDICATIONS:  apixaban 2.5 milliGRAM(s) Oral two times a day  aspirin enteric coated 81 milliGRAM(s) Oral daily  metoprolol tartrate 50 milliGRAM(s) Oral four times a day  midodrine 5 milliGRAM(s) Oral every 8 hours    cefepime   IVPB 1000 milliGRAM(s) IV Intermittent every 8 hours  vancomycin  IVPB 750 milliGRAM(s) IV Intermittent every 24 hours      acetaminophen     Tablet .. 650 milliGRAM(s) Oral every 6 hours PRN  melatonin 3 milliGRAM(s) Oral at bedtime PRN    pantoprazole    Tablet 40 milliGRAM(s) Oral before breakfast      sodium chloride 0.9%. 500 milliLiter(s) IV Continuous <Continuous>        -------------------------------------------------------------------------------------------  PHYSICAL EXAM:  T(C): 36.7 (25 @ 16:08), Max: 36.7 (25 @ 16:08)  HR: 134 (25 @ 16:08) (98 - 134)  BP: 119/80 (25 @ 16:08) (98/65 - 122/80)  RR: 18 (25 @ 16:08) (18 - 20)  SpO2: 96% (25 @ 16:08) (96% - 98%)  Wt(kg): --  I&O's Summary    2025 07:  -  2025 07:00  --------------------------------------------------------  IN: 400 mL / OUT: 800 mL / NET: -400 mL    2025 07:  -  2025 17:55  --------------------------------------------------------  IN: 0 mL / OUT: 400 mL / NET: -400 mL        GENERAL: NAD  HEAD: Atraumatic, Normocephalic.  ENT: Moist mucous membranes.  NECK: Supple, No JVD.  CHEST/LUNG: Clear to auscultation bilaterally; No rales, rhonchi, wheezing, or rubs. Unlabored respirations.  HEART: Regular rate and rhythm; No murmurs, rubs, or gallops.  ABDOMEN: Bowel sounds present; Soft, Nontender, Nondistended.   EXTREMITIES:  2+ Peripheral Pulses, brisk capillary refill. No clubbing, cyanosis, or edema.    -------------------------------------------------------------------------------------------  LABS:                          10.2   7.11  )-----------( 349      ( 2025 20:56 )             30.1     06-07    129[L]  |  96  |  34[H]  ----------------------------<  168[H]  4.4   |  22  |  0.89    Ca    8.8      2025 20:56    TPro  6.7  /  Alb  2.5[L]  /  TBili  0.7  /  DBili  x   /  AST  47[H]  /  ALT  35  /  AlkPhos  192[H]  06-07      CARDIAC MARKERS ( 2025 12:47 )  274 ng/L / x     / x     / x     / x     / x      CARDIAC MARKERS ( 2025 22:31 )  307 ng/L / x     / x     / x     / x     / x      CARDIAC MARKERS ( 2025 20:56 )  338 ng/L / x     / x     / x     / x     / x          cefepime   IVPB 1000 milliGRAM(s) IV Intermittent every 8 hours  vancomycin  IVPB 750 milliGRAM(s) IV Intermittent every 24 hours      acetaminophen     Tablet .. 650 milliGRAM(s) Oral every 6 hours PRN  melatonin 3 milliGRAM(s) Oral at bedtime PRN                       Cardiology Consult Note   [Please check [email or web address] password: [email or web address]" for cardiology service schedule and contact information]    HPI:  Mr. Silvia Cloud is a 89F hx GERD, R breast CA (40-50y ago) s/p B/L mastectomy and radiation, RUE lymphedema, osteoarthritis/osteoporosis (on monthly ibandronate), HLD, Afib on eliquis, HOCM/severe LVOT/ELEAZAR, small-moderate pericardial effusion/RA diastolic collapse, recent hospitalization - for RML PNA, GBS bacteremia (pos cultures , ) of unclear source on total 10 days abx (IV ceftriaxone inpt, then levaquin 750 q48h until ), presents from [facility] with shaking chills, hypotension, poor appetite. found to have sepsis (unclear source, on vanc and cefepime), is in a fib rvr. per IR no window for pericardial effusion on  when pericardial effusion was small    ep consulted, planning on increase bblocker to 50 mg q6h, then plan for CTA+ dccv for a fib. plans to avoid dilt, vasodilators, diuretics dt HCM, mavacamten outpatient.     afebrile, p 130, bp 100-120/70-80, rr 18, 96% 2 L NC  trop 338 to 307 to 274   Na 129, Cr 0.89  CT abd/pelvis  showing mod to large pericardial effusion inc in size from 25, mod R and small L pleural effusion  CTH  no acute path  vbg  7.39/44/lactate 1    rapid called yesterday dt sbp 80, micu consulted and had discordant leg and arm bp, was told to inc midodrine if needed      ip meds: apixaban 2.5 mg bid, asa 81 mg qd, vanc, cefpeime, metop tar 50 mg qid, midodrine 5 mg q8h, given 1 L IVF    PAST MEDICAL & SURGICAL HISTORY:  Breast cancer  s/p b/l mastectomy      H/O bilateral mastectomy  + breast implants      History of cataract surgery, unspecified laterality        FAMILY HISTORY:  Family history of MI (myocardial infarction) (Father)    FH: Alzheimers disease  father    FH: cerebral aneurysm  mother  of this      SOCIAL HISTORY:  unchanged    MEDICATIONS:  apixaban 2.5 milliGRAM(s) Oral two times a day  aspirin enteric coated 81 milliGRAM(s) Oral daily  metoprolol tartrate 50 milliGRAM(s) Oral four times a day  midodrine 5 milliGRAM(s) Oral every 8 hours    cefepime   IVPB 1000 milliGRAM(s) IV Intermittent every 8 hours  vancomycin  IVPB 750 milliGRAM(s) IV Intermittent every 24 hours      acetaminophen     Tablet .. 650 milliGRAM(s) Oral every 6 hours PRN  melatonin 3 milliGRAM(s) Oral at bedtime PRN    pantoprazole    Tablet 40 milliGRAM(s) Oral before breakfast      sodium chloride 0.9%. 500 milliLiter(s) IV Continuous <Continuous>        -------------------------------------------------------------------------------------------  PHYSICAL EXAM:  T(C): 36.7 (25 @ 16:08), Max: 36.7 (25 @ 16:08)  HR: 134 (25 @ 16:08) (98 - 134)  BP: 119/80 (25 @ 16:08) (98/65 - 122/80)  RR: 18 (25 @ 16:08) (18 - 20)  SpO2: 96% (25 @ 16:08) (96% - 98%)  Wt(kg): --  I&O's Summary    2025 07:  -  2025 07:00  --------------------------------------------------------  IN: 400 mL / OUT: 800 mL / NET: -400 mL    2025 07:  -  2025 17:55  --------------------------------------------------------  IN: 0 mL / OUT: 400 mL / NET: -400 mL        GENERAL: NAD  HEAD: Atraumatic, Normocephalic.  ENT: Moist mucous membranes.  NECK: Supple, No JVD.  CHEST/LUNG: Clear to auscultation bilaterally; No rales, rhonchi, wheezing, or rubs. Unlabored respirations.  HEART: Regular rate and rhythm; No murmurs, rubs, or gallops.  ABDOMEN: Bowel sounds present; Soft, Nontender, Nondistended.   EXTREMITIES:  2+ Peripheral Pulses, brisk capillary refill. No clubbing, cyanosis, or edema.    -------------------------------------------------------------------------------------------  LABS:                          10.2   7.11  )-----------( 349      ( 2025 20:56 )             30.1     06-07    129[L]  |  96  |  34[H]  ----------------------------<  168[H]  4.4   |  22  |  0.89    Ca    8.8      2025 20:56    TPro  6.7  /  Alb  2.5[L]  /  TBili  0.7  /  DBili  x   /  AST  47[H]  /  ALT  35  /  AlkPhos  192[H]  06-07      CARDIAC MARKERS ( 2025 12:47 )  274 ng/L / x     / x     / x     / x     / x      CARDIAC MARKERS ( 2025 22:31 )  307 ng/L / x     / x     / x     / x     / x      CARDIAC MARKERS ( 2025 20:56 )  338 ng/L / x     / x     / x     / x     / x          cefepime   IVPB 1000 milliGRAM(s) IV Intermittent every 8 hours  vancomycin  IVPB 750 milliGRAM(s) IV Intermittent every 24 hours      acetaminophen     Tablet .. 650 milliGRAM(s) Oral every 6 hours PRN  melatonin 3 milliGRAM(s) Oral at bedtime PRN

## 2025-06-10 ENCOUNTER — RESULT REVIEW (OUTPATIENT)
Age: 89
End: 2025-06-10

## 2025-06-10 NOTE — CHART NOTE - NSCHARTNOTEFT_GEN_A_CORE
[] Lasix 20mg IV x1 no further diuresis for crackles on auscultation of the lungs   [] Please give Levalbuterol/Ipratrop  [] Pulm consult for pleural effusion drainage   [] CTS for pericardial window   [] Repeat Limited TTE to assess pericardial effusion   [] Hold Eliquis 2.5mg BID for now for possible drainage- can switch to heparin gtt   [] If worsening hypotension or tachycardia, tachypnea, please recall CICU      Discussed with CICU fellow and to be discussed with CICU attending, Dr. Cazares. [] Lasix 20mg IV x1 no further diuresis for crackles on auscultation of the lungs   [] Please give Levalbuterol/Ipratrop  [] Pulm consult for pleural effusion drainage   [] CTS for pericardial window   [] Hold Eliquis 2.5mg BID for now for possible drainage- can switch to heparin gtt   [] If worsening hypotension or tachycardia, tachypnea, please call CICU for consult      Discussed with CICU fellow and to be discussed with CICU attending, Dr. Cazares.

## 2025-06-10 NOTE — CHART NOTE - NSCHARTNOTEFT_GEN_A_CORE
HPI:   Patient is an 89-year-old female with a medical history of HOCM with severe LVOT obstruction (gradient 80-100mmHg), atrial fibrillation on eliquis, small-moderate pericardial effusion with RA diastolic collapse, recent hospitalization (-) for RML pneumonia and GBS bacteremia, and remote history of right breast cancer status post bilateral mastectomy with radiation who presented with septic shock and hypotension, initially managed on the floor before requiring CCU admission for hemodynamic instability secondary to enlarging pericardial effusion, rapid atrial fibrillation, and decompensated heart failure.    The patient was recently discharged from a - hospitalization for right middle lobe pneumonia and GBS bacteremia of unclear source, completing 10 days of antibiotics (IV ceftriaxone inpatient, then levofloxacin 750mg q48h until ). She presented from [facility] on  with shaking chills, hypotension to SBP 80s, and tachycardia. Per her daughter (primary decision maker), the patient had poor appetite and decreased urine output. She denied chest pain, shortness of breath, abdominal pain, diarrhea, cough, or other respiratory symptoms. At baseline, the patient ambulates without assistive devices and drives independently.    In the ED, RRT was activated for hypotension with SBP 79. Initial management included IV fluids, reduction of metoprolol dose, and empiric antibiotics with vancomycin and cefepime for severe sepsis of unclear source. She developed demand ischemia with troponin elevation in the setting of AFib with RVR and shock. MICU was initially consulted on  for altered mental status and hypotension following 4AM Ambien administration. On MICU evaluation, the patient was somnolent but arousable, with shallow breathing and tachypnea to 40s. Blood pressures were discordant (LUE 86/61 vs bilateral legs ~125/75). After discontinuing Ambien and increasing midodrine to 5mg TID, her mental status and hemodynamics improved, and she did not require MICU transfer at that time.    Over the subsequent days, she developed worsening tachypnea and hemodynamic instability. Serial imaging revealed enlarging pericardial effusion (progressing from small-moderate to moderate-severe). Cardiology followed for management of her complex physiology, with EP consulted on  for AFib with RVR refractory to low-dose metoprolol, complicated by HOCM and worsening pericardial effusion. Metoprolol was increased to 50mg QID with improvement in heart rate control. On 6/10, the patient developed tachypnea with expiratory wheeze. CCU was consulted for decompensated heart failure in the setting of moderate-severe pericardial effusion with concerns for impending hemodynamic collapse given her complex cardiac physiology with severe LVOT obstruction. She was transferred to CCU for closer hemodynamic monitoring and management of her enlarging pericardial effusion requiring intervention.    REVIEW OF SYSTEMS as noted in HPI; all else negative.     Past Medical and Surgical History  Breast cancer  H/O bilateral mastectomy  History of cataract surgery, unspecified laterality      FAMILY HISTORY:  Family history of MI (myocardial infarction) (Father)    FH: Alzheimers disease  father    FH: cerebral aneurysm  mother  of this      SOCIAL HISTORY:  Smoking:   Substance Use:   EtOH Use:   Advance Directives:     Meds:   acetaminophen     Tablet .. PRN; aspirin enteric coated; cefepime   IVPB; chlorhexidine 2% Cloths; levalbuterol Inhalation; melatonin PRN; metoprolol tartrate; midodrine; pantoprazole    Tablet; vancomycin  IVPB    Allergies  Zosyn (Rash; Urticaria; Hives)    Intolerances      --------------------------------    OBJECTIVE:  ICU Vital Signs Last 24 Hrs  T(C): 37.3 (10 Ernesto 2025 10:09), Max: 37.3 (10 Ernesto 2025 10:09)  T(F): 99.2 (10 Ernesto 2025 10:09), Max: 99.2 (10 Ernesto 2025 10:09)  HR: 110 (10 Ernesto 2025 10:09) (100 - 134)  BP: 112/71 (10 Ernesto 2025 10:09) (102/68 - 137/69)  BP(mean): 85 (10 Ernesto 2025 10:09) (85 - 85)  RR: 32 (10 Ernesto 2025 10:09) (18 - 32)  SpO2: 96% (10 Ernesto 2025 10:09) (94% - 98%)    O2 Parameters below as of 10 Ernesto 2025 10:09  Patient On (Oxygen Delivery Method): nasal cannula  O2 Flow (L/min): 3            - @ 07:01  -  06-10 @ 07:00  --------------------------------------------------------  IN: 0 mL / OUT: 400 mL / NET: -400 mL      CAPILLARY BLOOD GLUCOSE      POCT Blood Glucose.: 125 mg/dL (10 Ernesto 2025 06:27)    LABS:                        10.3   3.60  )-----------( 467      ( 10 Ernesto 2025 09:31 )             32.1     Hgb Trend: 10.3<--, 10.1<--, 10.2<--, 11.3<--  06-10    127[L]  |  96  |  19  ----------------------------<  118[H]  5.2   |  21[L]  |  0.61    Ca    8.4      10 Ernesto 2025 09:31  Mg     2.4     06-09    TPro  7.0  /  Alb  2.5[L]  /  TBili  0.9  /  DBili  x   /  AST  51[H]  /  ALT  46[H]  /  AlkPhos  218[H]  06-10    Creatinine Trend: 0.61<--, 0.62<--, 0.89<--, 0.59<--, 0.70<--, 0.77<--  PT/INR - ( 10 Ernesto 2025 09:31 )   PT: 25.1 sec;   INR: 2.22 ratio           Urinalysis Basic - ( 10 Ernesto 2025 09:31 )    Color: x / Appearance: x / SG: x / pH: x  Gluc: 118 mg/dL / Ketone: x  / Bili: x / Urobili: x   Blood: x / Protein: x / Nitrite: x   Leuk Esterase: x / RBC: x / WBC x   Sq Epi: x / Non Sq Epi: x / Bacteria: x            MICROBIOLOGY:     RADIOLOGY & ADDITIONAL TESTS:  ----------------------------------------------------------------------------------------------------------------------------------------------------------------------------------------    Pioer, 89F with PMH HOCM/severe LVOT obstruction, AFib on eliquis, pericardial effusion, breast CA s/p XRT with CC septic shock after hospital course c/b demand ischemia, AFib RVR, enlarging pericardial effusion. CCU admission for hemodynamic monitoring and management of moderate-severe pericardial effusion.    NEURO:  #Mental status: Improved after discontinuing Ambien, currently A&Ox2    CV:  #Shock: Multifactorial - sepsis, pericardial effusion, HOCM with tachycardia  - Continue midodrine 5mg q8h  - Judicious IVF given HOCM physiology but risk of worsening pericardial effusion    #Pericardial effusion: Moderate-severe, enlarging, with prior RA diastolic collapse  - Interventional cardiology consulted for drainage  - CTS consulted for pericardial window  - Hold anticoagulation (eliquis) for procedure, consider heparin bridge  - Serial limited echo monitoring    #AFib with RVR: Rate 110-130s, on metoprolol tartrate 50mg QID  - Continue rate control with metoprolol, holding parameters for HR<60 or SBP<90  - EP consulted for rhythm control considerations given HOCM  - Continue anticoagulation when safe (currently holding for procedures)  - Avoid diltiazem and vasodilators given HOCM    #HOCM with severe LVOT obstruction: LVOT gradient 80-100mmHg  - Avoid vasodilators and diuretics  - Maintain adequate preload  - Beta blocker for rate control and LVOT gradient reduction  - Consider restarting Mavacamten as outpatient (previously discontinued for leg heaviness)  - If Mavacamten fails, consider alcohol septal ablation    #Demand ischemia/Type 2 MI: Troponin elevation downtrending  - Continue ASA 81mg daily  - Medical management given acute issues  - Consider ischemic evaluation once stabilized    PULM:  #Acute hypoxemic respiratory failure: Multifactorial - pleural effusions, pulmonary congestion  - Continue levalbuterol/ipratropium nebulizers q6h  - Pulmonary consulted for pleural effusion drainage  - Gentle diuresis with Lasix 20mg IV x1, avoid aggressive diuresis given HOCM  - Supplemental O2 to maintain SpO2>92%    #Pleural effusions: Bilateral, R>L  - Pulmonary consultation for thoracentesis    RENAL:  #CKD: Baseline Cr 0.6-0.9, currently 0.62  - Monitor Cr with diuresis  - Renally dose medications    GI:  #Hypoalbuminemia: Albumin 2.4, contributing to third spacing  - Nutrition optimization  - Regular diet    #Bowel regimen: Continue senna and miralax    HEMATOLOGIC:  #Anemia: Hgb 10.1, chronic  - Monitor, transfuse for Hgb<7 or symptomatic    #DVT prophylaxis: Currently on therapeutic anticoagulation when not held for procedures    ID:  #Sepsis/bacteremia: Recent GBS bacteremia, current source unclear  - Continue vancomycin 750mg q24h and cefepime 1g q8h  - Blood cultures pending  - ID consulted  - If positive blood cultures, will need MARIA G    ENDO:  #Hyperglycemia: Glucose 168-183  - SSI    SKIN/LINES:  #Access: PIV x2  #Skin: No decubitus ulcers

## 2025-06-10 NOTE — PROGRESS NOTE ADULT - ASSESSMENT
Afib, severe HCM LVOT obstruction, Pericardial effusion, recent admission for PNA / Bacteremia   now admitted with sepsis, tachycardia ( afib with RVR ) and shock . Cardiology is called for elevated troponin     Elevated troponin   Demand ischemia   in setting of afib with RVR, shock, severe HCM and sepsis   its downtrending   eventual ischemic eval can be considered  however has increasing Pericardial effusion     Shock   likely multifactorial   in setting of sepsis, pericardial effusion and HCM with tachycardia  cont midodrine   Pericardial effusion is much larger now , FU with interventional card to drain Effusion     Tachypnea   has pl effusion , venous congestion , would benefit from diuresis but in setting of shock and large effusion , it can potentially deteriorate her hemodynamics   can use gentle as needed diuresis once effusion is drained and her HR under control   given her complex cardiac physiology and high risk for hemodynamic collapse , I recommend CCU eval for monitoring in the unit    Afib  HR is better  cont metoprolol   EP consult is recommended for rhythm control given HCM   cont a/c    Pl effusion / pericardial effusion   plan as above  has poor nutrition due to albumin hence decreased oncotic pressure  nutritional optimization   would be very cautious with diuresis given severe LVOT obstruction and pericardial effusion     AMS  suspect multifactorial due to sepsis , avoid ambien   much better this am     Discussed with covering NP Kaylie

## 2025-06-10 NOTE — PROGRESS NOTE ADULT - ASSESSMENT
89F c hx GERD, R breast CA (40-50y ago) s/p B/L mastectomy and radiation, RUE lymphedema, osteoarthritis/osteoporosis (on monthly ibandronate), HLD, Afib on eliquis, HOCM/severe LVOT/ELEAZAR, small-moderate pericardial effusion/RA diastolic collapse, recent hospitalization 5/29-6/5 for RML PNA, GBS bacteremia (pos cultures 5/29, 5/30) of unclear source on total 10 days abx (IV ceftriaxone inpt, then levaquin 750 q48h until 6/9), pw hypotension, severe sepsis of unclear source, demand ischemia, afib c rvr    Severe Sepsis/sirs, Hypotension 2/2 unclear source  Severe LVOT obstruction/HCM, worsening pericardial effusion  CXR w/ worsening aeration at the bases  Flu/COVID/RSV negative  UA negative  Zosyn allergy noted, tolerating on vancomycin, cefepime  CTAP with mod-large pericardial effusion inc since 5/29/25, stable mod R and small L pleural effusion and atelectasis   TTE with increased pericardial effusion since 6/5 -- moderate pericardial effusion adjacent to RV, small pericardial effusion adjacent to RA and large pericardia effusion noted adjacent to posterior LV with no evidence of tamponade physiology  6/10 note with tachypnea and expiratory wheeze, CCU consulted for decompensated heart failure iso mod-severe pericardial effusion with concerns for impending hemodynamic collapse given complex cardiac physiology with severe LVOT obstruction, now transferred to CICU for closer monitor and management of enlarging pericardial effusion    Recommendations:   Follow 6/7 Bcx - NGTD x2 (48h)  Continue cefepime for now   Can discontinue vancomycin - no MRSA isolated to date   Interventional cardiology and CTS consulted for drainage/pericardial window  -please send fluid for gram stain and culture   Pulmonary following, possible thoracocentesis   Trend temps/WBC  Continue rest of care per primary team       Wes Grullon M.D.  [facility]  Available on [employer] TEAMS - *PREFERRED*  [phone]  After 5pm on weekdays and all day on weekends - please call [phone]     Thank you for consulting us and involving us in the management of this patients case. In addition to reviewing history, imaging, documents, labs, microbiology, took into account antibiotic stewardship, local antibiogram and infection control strategies and potential transmission issues at time of treatment decision making process.

## 2025-06-10 NOTE — PROGRESS NOTE ADULT - SUBJECTIVE AND OBJECTIVE BOX
Date of Service: 06-10-25 @ 15:44    Patient is a 89y old  Female who presents with a chief complaint of shaking chills, hypotension, poor appetite (10 Ernesto 2025 15:15)      Any change in ROS: pt seems OK:   nosob   she is in ccu ; family at bedside:   [id number] to ccy for  possibel drainage:       MEDICATIONS  (STANDING):  aspirin enteric coated 81 milliGRAM(s) Oral daily  cefepime   IVPB 1000 milliGRAM(s) IV Intermittent every 8 hours  chlorhexidine 2% Cloths 1 Application(s) Topical <User Schedule>  levalbuterol Inhalation 0.63 milliGRAM(s) Inhalation every 6 hours  metoprolol tartrate 50 milliGRAM(s) Oral four times a day  midodrine 5 milliGRAM(s) Oral every 8 hours  pantoprazole    Tablet 40 milliGRAM(s) Oral before breakfast    MEDICATIONS  (PRN):  acetaminophen     Tablet .. 650 milliGRAM(s) Oral every 6 hours PRN Moderate Pain (4 - 6)  melatonin 3 milliGRAM(s) Oral at bedtime PRN Insomnia    Vital Signs Last 24 Hrs  T(C): 37.3 (10 Ernesto 2025 10:09), Max: 37.3 (10 Ernesto 2025 10:09)  T(F): 99.2 (10 Ernesto 2025 10:09), Max: 99.2 (10 Ernesto 2025 10:09)  HR: 107 (10 Ernesto 2025 14:00) (85 - 134)  BP: 93/52 (10 Ernesto 2025 14:00) (75/46 - 137/69)  BP(mean): 68 (10 Ernesto 2025 14:00) (56 - 93)  RR: 22 (10 Ernesto 2025 14:00) (12 - 32)  SpO2: 95% (10 Ernesto 2025 14:00) (93% - 97%)    Parameters below as of 10 Ernesto 2025 14:00  Patient On (Oxygen Delivery Method): nasal cannula  O2 Flow (L/min): 3      I&O's Summary    09 Jun 2025 07:01  -  10 Ernesto 2025 07:00  --------------------------------------------------------  IN: 0 mL / OUT: 400 mL / NET: -400 mL    10 Ernesto 2025 07:01  -  10 Ernesto 2025 15:44  --------------------------------------------------------  IN: 420 mL / OUT: 450 mL / NET: -30 mL          Physical Exam:   GENERAL: NAD, well-groomed, well-developed  HEENT: BRYSON/   Atraumatic, Normocephalic  ENMT: No tonsillar erythema, exudates, or enlargement; Moist mucous membranes, Good dentition, No lesions  NECK: Supple, No JVD, Normal thyroid  CHEST/LUNG: decreased a ir [id number] bilaterally   CVS:muffled heart sound s  GI: : Soft, Nontender, Nondistended; Bowel sounds present  NERVOUS SYSTEM:  Alert & Oriented X3  EXTREMITIES: - edema  LYMPH: No lymphadenopathy noted  SKIN: No rashes or lesions  ENDOCRINOLOGY: No Thyromegaly  PSYCH: Appropriate    Labs:  27, 27, 26                            10.3   3.60  )-----------( 467      ( 10 Ernesto 2025 09:31 )             32.1                         10.1   3.45  )-----------( 417      ( 09 Jun 2025 18:46 )             31.5                         10.2   7.11  )-----------( 349      ( 07 Jun 2025 20:56 )             30.1     06-10    127[L]  |  96  |  19  ----------------------------<  118[H]  5.2   |  21[L]  |  0.61  06-09    128[L]  |  97  |  22  ----------------------------<  183[H]  4.8   |  21[L]  |  0.62  06-07    129[L]  |  96  |  34[H]  ----------------------------<  168[H]  4.4   |  22  |  0.89    Ca    8.4      10 Ernesto 2025 09:31  Ca    8.5      09 Jun 2025 18:46  Mg     2.4     06-09    TPro  7.0  /  Alb  2.5[L]  /  TBili  0.9  /  DBili  x   /  AST  51[H]  /  ALT  46[H]  /  AlkPhos  218[H]  06-10  TPro  6.9  /  Alb  2.4[L]  /  TBili  0.6  /  DBili  x   /  AST  61[H]  /  ALT  43  /  AlkPhos  233[H]  06-09  TPro  6.7  /  Alb  2.5[L]  /  TBili  0.7  /  DBili  x   /  AST  47[H]  /  ALT  35  /  AlkPhos  192[H]  06-07    CAPILLARY BLOOD GLUCOSE      POCT Blood Glucose.: 125 mg/dL (10 Ernesto 2025 06:27)      LIVER FUNCTIONS - ( 10 Ernesto 2025 09:31 )  Alb: 2.5 g/dL / Pro: 7.0 g/dL / ALK PHOS: 218 U/L / ALT: 46 U/L / AST: 51 U/L / GGT: x           PT/INR - ( 10 Ernesto 2025 09:31 )   PT: 25.1 sec;   INR: 2.22 ratio           Urinalysis Basic - ( 10 Ernesto 2025 09:31 )    Color: x / Appearance: x / SG: x / pH: x  Gluc: 118 mg/dL / Ketone: x  / Bili: x / Urobili: x   Blood: x / Protein: x / Nitrite: x   Leuk Esterase: x / RBC: x / WBC x   Sq Epi: x / Non Sq Epi: x / Bacteria: x      D-Dimer Assay, Quantitative: 599 ng/mL DDU (06-03 @ 16:12)        RECENT CULTURES:  06-07 @ 20:45 Blood Blood-Peripheral                No growth at 48 Hours    06-07 @ 20:30 Blood Blood-Peripheral            rd< from: Xray Chest 1 View- PORTABLE-Urgent (06.07.25 @ 22:11) >  ACC: [id number] EXAM:  XR CHEST PORTABLE URGENT 1V   ORDERED BY:  APARNA BOWLING     PROCEDURE DATE:  06/07/2025          INTERPRETATION:  CLINICAL INDICATION: Sepsis    EXAM: Frontal radiograph of the chest.    COMPARISON: Chest radiograph from5/29/2025    FINDINGS:  Bilateral breast implants.  Bilateral predominantly basilar infiltrates with effusions.  Chronic left humerus deformity.  There is no pneumothorax.  The heart is enlarged    IMPRESSION:  Worsening aeration at the bases.    ---End of Report ---          MANISHA CORTES DO; Resident Radiologist  This document has been electronically signed.  SABI SANTILLAN MD; Attending Interventional Radiologist  This document has been electronically signed. Jun 8 2025  8:11AM    < end of copied text >      No growth at 48 Hours          RESPIRATORY CULTURES:          Studies  Chest X-RAY  CT SCAN Chest   Venous Dopplers: LE:   CT Abdomen  Others

## 2025-06-10 NOTE — PROGRESS NOTE ADULT - SUBJECTIVE AND OBJECTIVE BOX
[facility]  BASIL Hooks Y. Patel, S. Shah, G. Casimir  [phone]  ([phone] - weekdays after 5pm and weekends)    Name: GINO GRAHAM  Age/Gender: 89y Female  MRN: [id number]    Interval History:  Patient seen and examined this morning.   Denies fever or any pain.   Notes reviewed. Afebrile   Allergies: Zosyn (Rash; Urticaria; Hives)      Objective:  Vitals:   T(F): 99.2 (06-10-25 @ 10:09), Max: 99.2 (06-10-25 @ 10:09)  HR: 110 (06-10-25 @ 10:09) (100 - 134)  BP: 112/71 (06-10-25 @ 10:09) (102/68 - 137/69)  RR: 32 (06-10-25 @ 10:09) (18 - 32)  SpO2: 96% (06-10-25 @ 10:09) (94% - 98%)  Physical Examination:  General: no acute distress, NC   HEENT: normocephalic, atraumatic, anicteric  Respiratory: decreased breath sounds b/l   Cardiovascular: S1 and S2 present, tachycardia   Gastrointestinal: soft, nontender, nondistended  Extremities: no edema, no cyanosis  Skin: no visible rash    Laboratory Studies:  CBC:                       10.3   3.60  )-----------( 467      ( 10 Ernesto 2025 09:31 )             32.1     WBC Trend:  3.60 06-10-25 @ 09:31  3.45 06-09-25 @ 18:46  7.11 06-07-25 @ 20:56  5.13 06-04-25 @ 07:19    CMP: 06-10    127[L]  |  96  |  19  ----------------------------<  118[H]  5.2   |  21[L]  |  0.61    Ca    8.4      10 Ernesto 2025 09:31  Mg     2.4     06-09    TPro  7.0  /  Alb  2.5[L]  /  TBili  0.9  /  DBili  x   /  AST  51[H]  /  ALT  46[H]  /  AlkPhos  218[H]  06-10    Creatinine: 0.61 mg/dL (06-10-25 @ 09:31)  Creatinine: 0.62 mg/dL (06-09-25 @ 18:46)  Creatinine: 0.89 mg/dL (06-07-25 @ 20:56)  Creatinine: 0.59 mg/dL (06-05-25 @ 07:15)  Creatinine: 0.70 mg/dL (06-04-25 @ 07:19)    LIVER FUNCTIONS - ( 10 Ernesto 2025 09:31 )  Alb: 2.5 g/dL / Pro: 7.0 g/dL / ALK PHOS: 218 U/L / ALT: 46 U/L / AST: 51 U/L / GGT: x           Microbiology: reviewed   Urinalysis with Rflx Culture (collected 06-08-25 @ 13:01)    Culture - Blood (collected 06-07-25 @ 20:45)  Source: Blood Blood-Peripheral  Preliminary Report (06-10-25 @ 02:02):    No growth at 48 Hours    Culture - Blood (collected 06-07-25 @ 20:30)  Source: Blood Blood-Peripheral  Preliminary Report (06-10-25 @ 02:02):    No growth at 48 Hours    Culture - Blood (collected 06-01-25 @ 10:51)  Source: Blood Blood-Venous  Final Report (06-06-25 @ 14:00):    No growth at 5 days    Culture - Blood (collected 05-31-25 @ 20:48)  Source: Blood Blood-Peripheral  Final Report (06-06-25 @ 02:01):    No growth at 5 days    Culture - Blood (collected 05-30-25 @ 07:16)  Source: Blood Blood  Gram Stain (05-30-25 @ 20:47):    Growth in anaerobic bottle: Gram Positive Cocci in Pairs and Chains    Growth in aerobic bottle: Gram Positive Cocci in Pairs and Chains  Final Report (05-31-25 @ 15:48):    Growth in aerobic and anaerobic bottles: Streptococcus agalactiae (Group    B)    See previous culture [id number]    Culture - Blood (collected 05-30-25 @ 07:16)  Source: Blood Blood  Gram Stain (05-30-25 @ 19:54):    Growth in aerobic and anaerobic bottles: Gram Positive Cocci in Pairs and    Chains  Final Report (05-31-25 @ 15:47):    Growth in aerobic and anaerobic bottles: Streptococcus agalactiae (Group    B)    See previous culture [id number]    Urinalysis with Rflx Culture (collected 05-29-25 @ 16:21)    Culture - Blood (collected 05-29-25 @ 13:45)  Source: Blood Blood-Peripheral  Gram Stain (05-30-25 @ 01:47):    Growth in anaerobic bottle: Gram Positive Cocci in Pairs and Chains    Growth in aerobic bottle: Gram Positive Cocci in Pairs and Chains  Final Report (05-31-25 @ 18:43):    Growth in aerobic and anaerobic bottles: Streptococcus agalactiae (Group    B)    See previous culture [id number]    Culture - Blood (collected 05-29-25 @ 13:30)  Source: Blood Blood-Peripheral  Gram Stain (05-30-25 @ 01:45):    Growth in aerobic bottle: Gram Positive Cocci in Pairs and Chains    Growth in anaerobic bottle: Gram Positive Cocci in Pairs and Chains  Final Report (06-02-25 @ 15:48):    Growth in aerobic and anaerobic bottles: Streptococcus agalactiae (Group    B) ***********Note************    This isolate demonstrates inducible    clindamycin resistance.    Clindamycin may still be effective in some patients.    Direct identification is available within approximately 3-5    hours either by Blood Panel Multiplexed PCR or Direct    MALDI-TOF. Details: [email or web address]  Organism: Blood Culture PCR  Streptococcus agalactiae (Group B) (06-02-25 @ 15:48)  Organism: Streptococcus agalactiae (Group B) (06-02-25 @ 15:48)      -  Levofloxacin: S 0.5      -  Clindamycin: R 0.25      -  Vancomycin: S 0.5      -  Ceftriaxone: S <=0.25      -  Tetracycline: R >4      Method Type: TATIANA      -  Penicillin: S <=0.03 Predicts results for ampicillin, amoxicillin, amoxicillin/clavulanate, ampicillin/sulbactam, 1st, 2nd and 3rd generation cephalosporins and carbapenems.  Organism: Blood Culture PCR (06-02-25 @ 15:48)      -  Streptococcus agalactiae (Group B): Detec      Method Type: PCR    06-07-25 @ 22:31 SARS-CoV-2 NotDetec/Influenza A NotDetec/Influenza B NotDetec/RSV NotDetec    Radiology: reviewed   < from: TTE Limited W or WO Ultrasound Enhancing Agent (06.09.25 @ 18:05) >  CONCLUSIONS:      1. Limited TTE for pericardial effusion.   2. Moderate pericardial effusion noted adjacent to the right ventricle, small pericardial effusion noted adjacent to the right atrium and large pericardial effusion noted adjacent to the posterior left ventricle with no echocardiographic evidence of tamponade physiology.   3. Compared to the transthoracic echocardiogram performed on 6/5/2025, pericardial effusion has increased in size however no echocardiographic evidence of cardiac tamponade. . Findings were discussed with Ashely Hodge on 6/9/2025 at 1834.    < end of copied text >    Medications:  acetaminophen     Tablet .. 650 milliGRAM(s) Oral every 6 hours PRN  aspirin enteric coated 81 milliGRAM(s) Oral daily  cefepime   IVPB 1000 milliGRAM(s) IV Intermittent every 8 hours  chlorhexidine 2% Cloths 1 Application(s) Topical <User Schedule>  levalbuterol Inhalation 0.63 milliGRAM(s) Inhalation every 6 hours  melatonin 3 milliGRAM(s) Oral at bedtime PRN  metoprolol tartrate 50 milliGRAM(s) Oral four times a day  midodrine 5 milliGRAM(s) Oral every 8 hours  pantoprazole    Tablet 40 milliGRAM(s) Oral before breakfast  vancomycin  IVPB 750 milliGRAM(s) IV Intermittent every 24 hours    Current Antimicrobials:  cefepime   IVPB 1000 milliGRAM(s) IV Intermittent every 8 hours  vancomycin  IVPB 750 milliGRAM(s) IV Intermittent every 24 hours    Prior/Completed Antimicrobials:  cefepime   IVPB  vancomycin  IVPB.

## 2025-06-10 NOTE — CONSULT NOTE ADULT - SUBJECTIVE AND OBJECTIVE BOX
History of Present Illness:  89F c hx GERD, R breast CA (40-50y ago) s/p B/L mastectomy and radiation, RUE lymphedema, osteoarthritis/osteoporosis (on monthly ibandronate), HLD, Afib on eliquis, HOCM/severe LVOT/ELEAZAR, small-moderate pericardial effusion/RA diastolic collapse, recent hospitalization - for RML PNA, GBS bacteremia (pos cultures , ) of unclear source on total 10 days abx (IV ceftriaxone inpt, then levaquin 750 q48h until ), presents from [facility] rehab with shaking chills, hypotension, poor appetite    History from pt's daughter/primary decision maker Teresita Day at bedside.  While at [facility], pt found to have hypotension to SBP 80s. Pt was given IVF and reduced dose of metoprolol. Pt also found to have tachycardia, shaking chills. Pt sent back to the hospital. Reportedly pt has not been eating well, not urinating much. Denies CP, SOB, abd pain, diarrhea, cough, other respiratory symptoms. At baseline pt ambulates without assistive device, drives.    RRT called in the ed for hypotension to SBP 79. (2025 02:41)       Past Medical History  Breast cancer  s/p b/l mastectomy        Past Surgical History  H/O bilateral mastectomy  + breast implants    History of cataract surgery, unspecified laterality        MEDICATIONS  (STANDING):  aspirin enteric coated 81 milliGRAM(s) Oral daily  cefepime   IVPB 1000 milliGRAM(s) IV Intermittent every 8 hours  chlorhexidine 2% Cloths 1 Application(s) Topical <User Schedule>  levalbuterol Inhalation 0.63 milliGRAM(s) Inhalation every 6 hours  metoprolol tartrate 50 milliGRAM(s) Oral four times a day  midodrine 5 milliGRAM(s) Oral every 8 hours  pantoprazole    Tablet 40 milliGRAM(s) Oral before breakfast      Vital Signs Last 24 Hrs  T(C): 37.3 (06-10-25 @ 10:09), Max: 37.3 (06-10-25 @ 10:09)  T(F): 99.2 (06-10-25 @ 10:09), Max: 99.2 (06-10-25 @ 10:09)  HR: 107 (06-10-25 @ 14:00) (85 - 134)  BP: 93/52 (06-10-25 @ 14:00) (75/46 - 137/69)  RR: 22 (06-10-25 @ 14:00) (12 - 32)  SpO2: 95% (06-10-25 @ 14:00) (93% - 97%)             Height (cm): 157.5  Weight (kg): 54.4 BMI (kg/m2): 21.9    (2025 19:34)      Allergies: Zosyn (Rash; Urticaria; Hives)      SOCIAL HISTORY:  Smoker: [ ] Yes  [X] No                 Pt denies  ETOH use: [ ] Yes  [X] No             Pt denies  Ilicit Drug use:  [ ] Yes  [X] No       Pt denies      FAMILY HISTORY:  Family history of MI (myocardial infarction) (Father)    FH: Alzheimers disease  father    FH: cerebral aneurysm  mother  of this      Review of Systems  GENERAL:  no weakness, fatigue, fevers or chills  NEURO: no dizziness, numbness, tingling or weakness  SKIN: no itching, burning, rashes, or lesions   HEENT: no visual changes;  no headache, no vertigo, no recent colds  RESPIRATORY: no shortness of breath, no cough, sputum, wheezing  CARDIOVASCULAR:  no chest pain,  or palpitations  GI: no abd pain. no N/V/D.  PERIPHERAL VASCULAR: no swelling, no tenderness, no erythema      PHYSICAL EXAM  General: Well nourished, well developed, NAD.                                              Neuro: Normal exam oriented to person/place & time with no focal motor or sensory  deficits.                    Eyes: Normal exam of conjunctiva & lids, pupils equally reactive.   ENT: Normal exam of nasal/oral mucosa with absence of cyanosis.   Neck: Normal exam of jugular veins, trachea & thyroid.   Chest: Normal lung exam with good air movement absence of wheezes, rales, or rhonchi.                                                                         CV:  Auscultation: normal S1S2, RRR   Carotids: No Bruits[X]  Abdominal Aorta: normal [X] nonpalpable[X]                                                                         GI: Normal exam of abdomen with no noted masses or tenderness. +[id number]                                                                                            Extremities: Normal no evidence of cyanosis or deformity, Edema: none  Lower Extremity Pulses: Right[+2DP] Left[+2DP] Varicosities[none]  SKIN : Normal exam to inspection & palpation.                                                           LABS:                        10.3   3.60  )-----------( 467      ( 10 Ernesto 2025 09:31 )             32.1     127[L]  |  96  |  19  ----------------------------<  118[H]  5.2   |  21[L]  |  0.61    Ca    8.4      10 Ernesto 2025 09:31    AST  51[H]  /  ALT  46[H]  /  AlkPhos  218[H]  06-10    PT/INR - ( 10 Ernesto 2025 09:31 )   PT: 25.1 sec;   INR: 2.22 ratio         < from: TTE Limited W or WO Ultrasound Enhancing Agent (06.10.25 @ 12:05) >  CONCLUSIONS:      1. Hypertrophic obstructive cardiomyopathy (HOCM).   2. Left ventricular cavity is normal in size. Left ventricular systolic function is normal. There are no regional wall motion abnormalities seen.   3. Large pericardial effusion noted adjacent to the posterolateral left ventricle, small pericardial effusion noted adjacent to the right atrium and large pericardial effusion noted adjacent to the right ventricle.   4. Compared to the transthoracic echocardiogram performed on 2025, the pericardial effusion has increased in volume. The rhythm is now atrial fibrillation (was sinus on 2025).. Findings were discussed with Dr. Prieto on 6/10/2025 at 12:48.      < from: TTE Limited W or WO Ultrasound Enhancing Agent (25 @ 18:05) >  CONCLUSIONS:      1. Limited TTE for pericardial effusion.   2. Moderate pericardial effusion noted adjacent to the right ventricle, small pericardial effusion noted adjacent to the right atrium and large pericardial effusion noted adjacent to the posterior left ventricle with no echocardiographic evidence of tamponade physiology.   3. Compared to the transthoracic echocardiogram performed on 2025, pericardial effusion has increased in size however no echocardiographic evidence of cardiac tamponade. . Findings were discussed with Ashely Hodge on 2025 at 1834.    < from: CT Angio Chest PE Protocol w/ IV Cont (25 @ 13:52) >  IMPRESSION:  No pulmonary embolism.  Small to moderate right and small left pleural effusions with associated   atelectasis.  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  Cardiomegaly. Moderate pericardial effusion.

## 2025-06-10 NOTE — PROGRESS NOTE ADULT - ASSESSMENT
Geiringer, 89F with PMH HOCM/severe LVOT obstruction, AFib on eliquis, pericardial effusion, breast CA s/p XRT with CC septic shock after hospital course c/b demand ischemia, AFib RVR, enlarging pericardial effusion. CCU admission for hemodynamic monitoring and management of moderate-severe pericardial effusion.    NEURO:  #Mental status: Improved after discontinuing Ambien, currently A&Ox2    CV:  #Shock: Multifactorial - sepsis, pericardial effusion, HOCM with tachycardia  - Continue midodrine 5mg q8h  - Judicious IVF given HOCM physiology but risk of worsening pericardial effusion    #Pericardial effusion: Moderate-severe, enlarging, with prior RA diastolic collapse  - Interventional cardiology consulted for drainage  - CTS consulted for pericardial window  - Hold anticoagulation (eliquis) for procedure, consider heparin bridge  - Serial limited echo monitoring    #AFib with RVR: Rate 110-130s, on metoprolol tartrate 50mg QID  - Continue rate control with metoprolol, holding parameters for HR<60 or SBP<90  - EP consulted for rhythm control considerations given HOCM  - Continue anticoagulation when safe (currently holding for procedures)  - Avoid diltiazem and vasodilators given HOCM    #HOCM with severe LVOT obstruction: LVOT gradient 80-100mmHg  - Avoid vasodilators and diuretics  - Maintain adequate preload  - Beta blocker for rate control and LVOT gradient reduction  - Consider restarting Mavacamten as outpatient (previously discontinued for leg heaviness)  - If Mavacamten fails, consider alcohol septal ablation    #Demand ischemia/Type 2 MI: Troponin elevation downtrending  - Continue ASA 81mg daily  - Medical management given acute issues  - Consider ischemic evaluation once stabilized    PULM:  #Acute hypoxemic respiratory failure: Multifactorial - pleural effusions, pulmonary congestion  - Continue levalbuterol/ipratropium nebulizers q6h  - Pulmonary consulted for pleural effusion drainage  - Gentle diuresis with Lasix 20mg IV x1, avoid aggressive diuresis given HOCM  - Supplemental O2 to maintain SpO2>92%    #Pleural effusions: Bilateral, R>L  - Pulmonary consultation for thoracentesis    RENAL:  #CKD: Baseline Cr 0.6-0.9, currently 0.62  - Monitor Cr with diuresis  - Renally dose medications    GI:  #Hypoalbuminemia: Albumin 2.4, contributing to third spacing  - Nutrition optimization  - Regular diet    #Bowel regimen: Continue senna and miralax    HEMATOLOGIC:  #Anemia: Hgb 10.1, chronic  - Monitor, transfuse for Hgb<7 or symptomatic    #DVT prophylaxis: Currently on therapeutic anticoagulation when not held for procedures    ID:  #Sepsis/bacteremia: Recent GBS bacteremia, current source unclear  - Continue vancomycin 750mg q24h and cefepime 1g q8h  - Blood cultures pending  - ID consulted  - If positive blood cultures, will need MARIA G    ENDO:  #Hyperglycemia: Glucose 168-183  - SSI    SKIN/LINES:  #Access: PIV x2  #Skin: No decubitus ulcers.    Dispo: Maintain in ICU.    Patient requires continuous monitoring with bedside rhythm monitoring, arterial line, pulse oximetry, ventilator monitoring and intermittent blood gas analysis.  Care plan discussed with ICU care team.  I have spent 35 minutes providing critical care, in addition to initial critical time provided by CICU attending       Dr. smith           , re-evaluated multiple times during the day.    Kathie Matamoros PA-C

## 2025-06-10 NOTE — CHART NOTE - NSCHARTNOTEFT_GEN_A_CORE
CCU consulted for Decompensated HF in the setting of mod-severe pericardial effusion   Interventions taken   > lasix 20 mg IVP .  > Xopenex inh stat  > Pulmonary consulted for drainage of pleural effusion  >Thoracic surgery consulted for pericardial window  > Daughter updated  > pt transferring to CCU for hemodynamic instability  > Attending notified

## 2025-06-10 NOTE — CONSULT NOTE ADULT - ASSESSMENT
89-y/o female with PMHx of HOCM with severe LVOT obstruction, atrial fibrillation on eliquis, small-moderate pericardial effusion with RA diastolic collapse, recent hospitalization (5/29-6/5) for RML pneumonia and GBS bacteremia, and remote history of right breast cancer status post bilateral mastectomy with radiation who presented with septic shock and hypotension, initially managed on the floor before requiring CCU admission for hemodynamic instability secondary to enlarging pericardial effusion, rapid atrial fibrillation, and decompensated heart failure. Thoracic surgery consulted for pericardial window.

## 2025-06-10 NOTE — PROGRESS NOTE ADULT - SUBJECTIVE AND OBJECTIVE BOX
EP Attending  HISTORY OF PRESENT ILLNESS: HPI:  89F c hx GERD, R breast CA (40-50y ago) s/p B/L mastectomy and radiation, RUE lymphedema, osteoarthritis/osteoporosis (on monthly ibandronate), HLD, Afib on eliquis, HOCM/severe LVOT/ELEAZAR, small-moderate pericardial effusion/RA diastolic collapse, recent hospitalization 5/29-6/5 for RML PNA, GBS bacteremia (pos cultures 5/29, 5/30) of unclear source on total 10 days abx (IV ceftriaxone inpt, then levaquin 750 q48h until 6/9), presents from [facility] rehab with shaking chills, hypotension, poor appetite    History from pt's daughter/primary decision maker Teresitagisel Day at bedside.  While at [facility], pt found to have hypotension to SBP 80s. Pt was given IVF and reduced dose of metoprolol. Pt also found to have tachycardia, shaking chills. Pt sent back to the hospital. Reportedly pt has not been eating well, not urinating much. Denies CP, SOB, abd pain, diarrhea, cough, other respiratory symptoms. At baseline pt ambulates without assistive device, drives.  RRT called in the ed for hypotension to SBP 79. (08 Jun 2025 02:41)    Ms Cloud is a pleasant 89yo woman here with shortness of breath.  Sees Dr Adolfo Leung for Cardiology.  Being managed on this inpatient stay by Dr Coelho.  Known to Dr Young after outpatient referral for HOCM management (lVOT gradient 80s-100mmHg+), and had a brief trial of Mavacamten, stopped for feelings of "leg heaviness".    EP called re: rapid AFib, refractory to low-dose metoprolol thus far, and complicated by management of HCM and new/worsening pericardial effusion.  Resting comfortably in bed on supplemental O2.  SOB and fatigued but no palpitations or fainting.  A 10 pt ROS is otherwise negative.    Date of service 6/10- moved to CCU for closer monitoring.  no new complaints today.      PAST MEDICAL & SURGICAL HISTORY:  Breast cancer  s/p b/l mastectomy  H/O bilateral mastectomy  + breast implants  History of cataract surgery, unspecified laterality    acetaminophen     Tablet .. 650 milliGRAM(s) Oral every 6 hours PRN  aspirin enteric coated 81 milliGRAM(s) Oral daily  cefepime   IVPB 1000 milliGRAM(s) IV Intermittent every 8 hours  chlorhexidine 2% Cloths 1 Application(s) Topical <User Schedule>  levalbuterol Inhalation 0.63 milliGRAM(s) Inhalation every 6 hours  melatonin 3 milliGRAM(s) Oral at bedtime PRN  metoprolol tartrate 50 milliGRAM(s) Oral four times a day  midodrine 5 milliGRAM(s) Oral every 8 hours  pantoprazole    Tablet 40 milliGRAM(s) Oral before breakfast                            10.3   3.60  )-----------( 467      ( 10 Ernesto 2025 09:31 )             32.1       06-10    127[L]  |  96  |  19  ----------------------------<  118[H]  5.2   |  21[L]  |  0.61    Ca    8.4      10 Ernesto 2025 09:31  Mg     2.4     06-09    TPro  7.0  /  Alb  2.5[L]  /  TBili  0.9  /  DBili  x   /  AST  51[H]  /  ALT  46[H]  /  AlkPhos  218[H]  06-10    T(C): 37.3 (06-10-25 @ 10:09), Max: 37.3 (06-10-25 @ 10:09)  HR: 107 (06-10-25 @ 14:00) (85 - 134)  BP: 93/52 (06-10-25 @ 14:00) (75/46 - 137/69)  RR: 22 (06-10-25 @ 14:00) (12 - 32)  SpO2: 95% (06-10-25 @ 14:00) (93% - 97%)  Wt(kg): --    I&O's Summary    09 Jun 2025 07:01  -  10 Ernesto 2025 07:00  --------------------------------------------------------  IN: 0 mL / OUT: 400 mL / NET: -400 mL    10 Ernesto 2025 07:01  -  10 Ernesto 2025 15:17  --------------------------------------------------------  IN: 420 mL / OUT: 450 mL / NET: -30 mL    Appearance: frail elderly woman in no acute distress  HEENT:   Normal oral mucosa, PERRL, EOMI	  Lymphatic: No lymphadenopathy , no edema  Cardiovascular: rapid irregular S1 S2, No JVD, No murmurs , Peripheral pulses palpable 2+ bilaterally  Respiratory: poor air entry BL  normal effort 	  Gastrointestinal:  Soft, Non-tender, + BS	  Skin: No rashes, No ecchymoses, No cyanosis, warm to touch  Musculoskeletal: Normal range of motion, normal strength  Psychiatry:  Mood & affect appropriate    TELEMETRY: AFib, narrow QRS  ECG:  	AFib, atypical LVHypertrphy  Echo:  < from: TTE W or WO Ultrasound Enhancing Agent (06.05.25 @ 07:16) >  CONCLUSIONS:      1. Limited TTE to assess for pericardial effusion.   2. Trace pericardial effusion noted adjacent to the posterolateral left ventricle, small pericardial effusion noted adjacent to the anterior right ventricle and small pericardial effusion noted adjacent to the right atrium.   3. The inferior vena cava is normal in size measuring 1.70 cm in diameter, (normal <2.1cm) with normal inspiratory collapse (normal >50%) consistent with normal right atrial pressure (~3, range 0-5mmHg).   4. Compared to the transthoracic echocardiogram performed on 5/30/2025, the right atrium is not as well visualized on this study. There is right atrial diastolic collapse visualized in the 4 chamber view but unable to quantify the duration of the collapse. The effusion appears unchanged in size and distribution. No other signs of cardiac tamponade are present.    < end of copied text >    CT Chest - personally reviewed the images.  right breat prosthesis with signs of rupture.  left breast prosthesis OK.  prominent LV hypertrophy visible.  pericardial effusion enlarged.  pleural effusion present on the right.  	  ASSESSMENT/PLAN: Ms Cloud is a pleasant 89y Female here with shortness of breath.  Multifactorial in the setting of pericardial effusion, Hypertrophic Cardiomyopathy with severe LVOT obstruction, and rapid AFib.    [id number] is at least 3.  Holding apixaban pending ongoing surveillance of her pericardial effusion.  Deemed high-risk for pericardial window, but not a good enough approach for needle based drainage.  Serial imaging of the pericardial effusion. It is enlarging but may not yet be amenable to subxiphoid needle-based tap.  This could be an issue related to pulmonary hypertension and LV diastolic failure, but in the remote setting of breast cancer, this could be late metastatic spread.  Will increase beta blockers to 50mg q6hrs, with holding parameters for low heartrate.    Discussed w/ Dr Young, and agree w/ recs to avoid diltiazem and other vasodilators, and to avoid diuretics.  She is a candidate to re-try Mavacamten (Camzyos)... not sure if her side effects were truly drug related. She did well on this with serial echos the first time. This can only be re-initiated as outpatient!  If she tries/fails Camzyos a 2nd time, can refer for alcohol septal ablation via interventional cardiology.  I let the family know that this is a possibility, but that it is not the next step on this hospitalization.  Once heartrate is controlled / Beta blockers maximized, we can consider CTA+Cardioversion for AFib rhythm control before discharge.  I would need to be positive that no intervention on the pericardial effusion is pending.  Will follow with you.        Barney Lau M.D.  Cardiac Electrophysiology    office [phone]  pager [phone]

## 2025-06-10 NOTE — PROGRESS NOTE ADULT - SUBJECTIVE AND OBJECTIVE BOX
PATIENT:  GINO GRAHAM  [id number]    CHIEF COMPLAINT:  Patient is a 89y old  Female who presents with a chief complaint of shaking chills, hypotension, poor appetite (10 Ernesto 2025 15:44)      INTERVAL HISTORYOVERNIGHT EVENTS:  - pending CT chest in AM     REVIEW OF SYSTEMS:  [ ] All other systems negative        MEDICATIONS:  MEDICATIONS  (STANDING):  aspirin enteric coated 81 milliGRAM(s) Oral daily  cefepime   IVPB 1000 milliGRAM(s) IV Intermittent every 8 hours  chlorhexidine 2% Cloths 1 Application(s) Topical <User Schedule>  levalbuterol Inhalation 0.63 milliGRAM(s) Inhalation every 6 hours  midodrine 20 milliGRAM(s) Oral every 8 hours  pantoprazole    Tablet 40 milliGRAM(s) Oral before breakfast    MEDICATIONS  (PRN):  acetaminophen     Tablet .. 650 milliGRAM(s) Oral every 6 hours PRN Moderate Pain (4 - 6)  melatonin 3 milliGRAM(s) Oral at bedtime PRN Insomnia      ALLERGIES:  Allergies    Zosyn (Rash; Urticaria; Hives)    Intolerances        OBJECTIVE:  ICU Vital Signs Last 24 Hrs  T(C): 36.6 (10 Ernesto 2025 20:00), Max: 37.4 (10 Ernesto 2025 14:00)  T(F): 97.9 (10 Ernesto 2025 20:00), Max: 99.3 (10 Ernesto 2025 14:00)  HR: 114 (10 Ernesto 2025 21:00) (85 - 117)  BP: 110/67 (10 Ernesto 2025 21:00) (75/46 - 130/80)  BP(mean): 83 (10 Ernesto 2025 21:00) (56 - 93)  ABP: --  ABP(mean): --  RR: 20 (10 Ernesto 2025 21:00) (12 - 32)  SpO2: 99% (10 Ernesto 2025 21:00) (93% - 99%)    O2 Parameters below as of 10 Ernesto 2025 21:00  Patient On (Oxygen Delivery Method): nasal cannula  O2 Flow (L/min): 3          Adult Advanced Hemodynamics Last 24 Hrs  CVP(mm Hg): --  CVP(cm H2O): --  CO: --  CI: --  PA: --  PA(mean): --  PCWP: --  SVR: --  SVRI: --  PVR: --  PVRI: --  CAPILLARY BLOOD GLUCOSE      POCT Blood Glucose.: 125 mg/dL (10 Ernesto 2025 06:27)    CAPILLARY BLOOD GLUCOSE      POCT Blood Glucose.: 125 mg/dL (10 Ernesto 2025 06:27)    I&O's Summary    2025 07:01  -  10 Ernesto 2025 07:00  --------------------------------------------------------  IN: 0 mL / OUT: 400 mL / NET: -400 mL    10 Ernesto 2025 07:01  -  10 Ernesto 2025 21:49  --------------------------------------------------------  IN: 660 mL / OUT: 450 mL / NET: 210 mL      Daily     Daily Weight in k.9 (10 Ernesto 2025 04:35)    PHYSICAL EXAMINATION:  General: WN/WD NAD  HEENT: PERRLA, EOMI, moist mucous membranes  Neurology: A&Ox3, nonfocal, LUGO x 4  Respiratory: CTA B/L, normal respiratory effort, no wheezes, crackles, rales  CV: RRR, S1S2, no murmurs, rubs or gallops  Abdominal: Soft, NT, ND +BS, Last BM  Extremities: No edema, + peripheral pulses  Incisions:   Tubes:    LABS:                          10.3   3.60  )-----------( 467      ( 10 Ernesto 2025 09:31 )             32.1     06-10    127[L]  |  96  |  19  ----------------------------<  118[H]  5.2   |  21[L]  |  0.61    Ca    8.4      10 Ernesto 2025 09:31  Mg     2.4     06-09    TPro  7.0  /  Alb  2.5[L]  /  TBili  0.9  /  DBili  x   /  AST  51[H]  /  ALT  46[H]  /  AlkPhos  218[H]  06-10    LIVER FUNCTIONS - ( 10 Ernesto 2025 09:31 )  Alb: 2.5 g/dL / Pro: 7.0 g/dL / ALK PHOS: 218 U/L / ALT: 46 U/L / AST: 51 U/L / GGT: x           PT/INR - ( 10 Ernesto 2025 09:31 )   PT: 25.1 sec;   INR: 2.22 ratio                 Urinalysis Basic - ( 10 Ernesto 2025 09:31 )    Color: x / Appearance: x / SG: x / pH: x  Gluc: 118 mg/dL / Ketone: x  / Bili: x / Urobili: x   Blood: x / Protein: x / Nitrite: x   Leuk Esterase: x / RBC: x / WBC x   Sq Epi: x / Non Sq Epi: x / Bacteria: x        TELEMETRY:     EKG:     IMAGING:

## 2025-06-10 NOTE — PROGRESS NOTE ADULT - ASSESSMENT
89F c hx GERD, R breast CA (40-50y ago) s/p B/L mastectomy and radiation, RUE lymphedema, osteoarthritis/osteoporosis (on monthly ibandronate), HLD, Afib on eliquis, HOCM/severe LVOT/LORA, small-moderate pericardial effusion/RA diastolic collapse, recent hospitalization 5/29-6/5 for RML PNA, GBS bacteremia (pos cultures 5/29, 5/30) of unclear source on total 10 days abx (IV ceftriaxone inpt, then levaquin 750 q48h until 6/9), presents from [facility] rehab with shaking chills, hypotension, poor appetite  History from pt's daughter/primary decision maker Teresita Day at bedside.  While at [facility], pt found to have hypotension to SBP 80s. Pt was given IVF and reduced dose of metoprolol. Pt also found to have tachycardia, shaking chills. Pt sent back to the hospital. Reportedly pt has not been eating well, not urinating much. Denies CP, SOB, abd pain, diarrhea, cough, other respiratory symptoms. At baseline pt ambulates without assistive device, drives.  RRT called in the ed for hypotension to SBP 79. (08 Jun 2025 02:41)  to me pt daughter says she was very sleepy in the morning too  as she was given ambien at 4 AM in the morning:   now she is alert and awake and is on 2 L of oxygen ;  she is not sob:  and does not look to be in any resp distress:       Severe sepsis.   unclear source of fevers  cont empiric vanc, cefepime  f/u blood cx. if positive will likely need MARIA G  monitor for any localizing symptoms  pt had recent pan ct scan that did not elucidate cause of bacteremia  recent blood cultures have been negative   - IVF  on cefepime  she is febrile too   vbg on admission IS ok;   MONITOR BLOOD PRESSURE CLOSELY:   OIF SHE DROPS HER BLOOD PRESSURE MORE:  WOULD NEED MICU  CO NSULT:    6/9: seems slightly more tored today  ; cont antibtiocs:  per ID:  she remains on 2 L of oxygen : she is alert and awake:  daughter at bedside:  reviewed the labs and echo and ct scan chest with the daughter in detail :   she has large pericardial effusions:  per ir no good window and effusion seemd small to drain : ct scan chest read as mod to large pericardial effusion : defer to cards :     6/10: seems to be doing  ok :  no sob:   no  cough  ;   no  phlegm   on 2 L of oxygen :  thoracic to see:    no emergency in tapping the pleural effusion:   de cardiologist          Hypotension.  suspect combination of infection, hocm, lora, tachycardia, pericardial effusion, metoprolol  pt has somewhat tenuous hemodynamic status  consider cardioversion, consider repeat TTE  pt is full code.  pts blood pressure is slightly low   on midodrine    6/9: cont on midodrine  6/10; controlled:  on midodrine    Chronic atrial fibrillation.  reduce metoprolol dose to tartrate 25mg BID  goal HR <100 in setting of HOCM with hypotension  consider amiodarone or cardioversion  PER CARDS    6/9: defer to cards   6/10: off Eliquis for now       Type 2 myocardial infarction.  suspect demand ischemia from recent hypotension, infections  start asa   cont home eliquis.  CONT CURRENT MEDS     6/9; no chest pain : cont current rx:   6/10: seems to be stable:       HOCM (hypertrophic obstructive cardiomyopathy).  cont metoprolol as above. uptitrate as BP tolerates  per cards    Acute respiratory failure with hypoxia.   recent CT scans showing right bronchiectasis, right fibrosis likely 2/2 radiation, poss RML PNA.  cont BD :   ct chest showed: No pulmonary embolism. Small to moderate right and small left pleural effusions with associated  atelectasis. 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  Cardiomegaly. Moderate pericardial effusion.  needs echo     9/6: new echo reviewed:  seen by cardiology :  monitor her blood pressure closely:  if she drops her blood pressure call CCU     6/10: cont to manain o2 sao2 above 90% all the t hayden    dw acp

## 2025-06-10 NOTE — PROGRESS NOTE ADULT - SUBJECTIVE AND OBJECTIVE BOX
Subjective: Patient seen and examined. No new events except as noted.     SUBJECTIVE/ROS:  mildly tachypneic  mild exp wheeze       MEDICATIONS:  MEDICATIONS  (STANDING):  apixaban 2.5 milliGRAM(s) Oral two times a day  aspirin enteric coated 81 milliGRAM(s) Oral daily  cefepime   IVPB 1000 milliGRAM(s) IV Intermittent every 8 hours  metoprolol tartrate 50 milliGRAM(s) Oral four times a day  midodrine 5 milliGRAM(s) Oral every 8 hours  pantoprazole    Tablet 40 milliGRAM(s) Oral before breakfast  sodium chloride 0.9%. 500 milliLiter(s) (100 mL/Hr) IV Continuous <Continuous>  vancomycin  IVPB 750 milliGRAM(s) IV Intermittent every 24 hours      PHYSICAL EXAM:  T(C): 36.4 (06-10-25 @ 04:35), Max: 36.7 (06-09-25 @ 16:08)  HR: 117 (06-10-25 @ 04:35) (100 - 134)  BP: 102/68 (06-10-25 @ 04:35) (102/68 - 137/69)  RR: 18 (06-10-25 @ 04:35) (18 - 20)  SpO2: 94% (06-10-25 @ 04:35) (94% - 98%)  Wt(kg): --  I&O's Summary    09 Jun 2025 07:01  -  10 Ernesto 2025 07:00  --------------------------------------------------------  IN: 0 mL / OUT: 400 mL / NET: -400 mL            JVP: Normal  Neck: supple  Lung: ex wheeze   CV: S1 S2 ,  Abd: soft  Ext: No edema  neuro: Awake / alert  Psych: flat affect  Skin: normal``    LABS/DATA:    CARDIAC MARKERS:                                10.1   3.45  )-----------( 417      ( 09 Jun 2025 18:46 )             31.5     06-09    128[L]  |  97  |  22  ----------------------------<  183[H]  4.8   |  21[L]  |  0.62    Ca    8.5      09 Jun 2025 18:46  Mg     2.4     06-09    TPro  6.9  /  Alb  2.4[L]  /  TBili  0.6  /  DBili  x   /  AST  61[H]  /  ALT  43  /  AlkPhos  233[H]  06-09    proBNP:   Lipid Profile:   HgA1c:   TSH: Thyroid Stimulating Hormone, Serum: 3.45 uIU/mL (06-09 @ 18:46)

## 2025-06-11 NOTE — PROGRESS NOTE ADULT - SUBJECTIVE AND OBJECTIVE BOX
Patient is a 89y old  Female who presents with a chief complaint of shaking chills, hypotension, poor appetite (11 Jun 2025 16:40)      INTERVAL HISTORY:  -    SUBJECTIVE  - Patient seen and evaluated at bedside.     MEDICATIONS:  MEDICATIONS  (STANDING):  cefTRIAXone   IVPB 2000 milliGRAM(s) IV Intermittent every 24 hours  chlorhexidine 2% Cloths 1 Application(s) Topical <User Schedule>  heparin   Injectable 5000 Unit(s) SubCutaneous every 12 hours  insulin lispro (ADMELOG) corrective regimen sliding scale   SubCutaneous three times a day before meals  insulin lispro (ADMELOG) corrective regimen sliding scale   SubCutaneous at bedtime  levalbuterol Inhalation 0.63 milliGRAM(s) Inhalation every 6 hours  metoprolol tartrate 25 milliGRAM(s) Oral two times a day  midodrine 20 milliGRAM(s) Oral every 8 hours  pantoprazole    Tablet 40 milliGRAM(s) Oral before breakfast    MEDICATIONS  (PRN):  acetaminophen     Tablet .. 650 milliGRAM(s) Oral every 6 hours PRN Moderate Pain (4 - 6)  melatonin 3 milliGRAM(s) Oral at bedtime PRN Insomnia      OBJECTIVE:  ICU Vital Signs Last 24 Hrs  T(C): 37.1 (11 Jun 2025 19:00), Max: 37.2 (10 Ernesto 2025 23:00)  T(F): 98.7 (11 Jun 2025 19:00), Max: 99 (10 Ernesto 2025 23:00)  HR: 101 (11 Jun 2025 19:00) (97 - 153)  BP: 109/57 (11 Jun 2025 19:06) (77/44 - 184/74)  BP(mean): 78 (11 Jun 2025 19:06) (56 - 106)  ABP: --  ABP(mean): --  RR: 26 (11 Jun 2025 19:00) (20 - 48)  SpO2: 97% (11 Jun 2025 19:00) (94% - 100%)    O2 Parameters below as of 11 Jun 2025 19:00  Patient On (Oxygen Delivery Method): nasal cannula  O2 Flow (L/min): 4          Adult Advanced Hemodynamics Last 24 Hrs  CVP(mm Hg): --  CVP(cm H2O): --  CO: --  CI: --  PA: --  PA(mean): --  PCWP: --  SVR: --  SVRI: --  PVR: --  PVRI: --  CAPILLARY BLOOD GLUCOSE      POCT Blood Glucose.: 143 mg/dL (11 Jun 2025 16:10)  POCT Blood Glucose.: 114 mg/dL (11 Jun 2025 11:08)    CAPILLARY BLOOD GLUCOSE      POCT Blood Glucose.: 143 mg/dL (11 Jun 2025 16:10)    I&O's Summary    10 Ernesto 2025 07:01  -  11 Jun 2025 07:00  --------------------------------------------------------  IN: 870 mL / OUT: 500 mL / NET: 370 mL    11 Jun 2025 07:01  -  11 Jun 2025 19:47  --------------------------------------------------------  IN: 180 mL / OUT: 970 mL / NET: -790 mL      Daily     Daily     PHYSICAL EXAM:  General: NAD, well-groomed, well-developed  Chest: Clear to auscultation bilaterally; no rales, rhonchi, or wheezing  Heart: Regular rate and rhythm; normal S1 and S2  Abd: Soft, nontender, nondistended  Nervous System: AAOX3  Ext: no peripheral LE edema bilaterally    LABS:  ABG - ( 11 Jun 2025 05:53 )  pH, Arterial: 7.48  pH, Blood: x     /  pCO2: 33    /  pO2: 110   / HCO3: 25    / Base Excess: 1.3   /  SaO2: 99.8                                    11.6   4.03  )-----------( 466      ( 11 Jun 2025 00:57 )             37.4     06-11    129[L]  |  98  |  25[H]  ----------------------------<  118[H]  5.3   |  20[L]  |  0.84    Ca    8.5      11 Jun 2025 06:00  Phos  3.2     06-11  Mg     2.4     06-11    TPro  6.3  /  Alb  2.2[L]  /  TBili  0.7  /  DBili  x   /  AST  28  /  ALT  30  /  AlkPhos  158[H]  06-11    LIVER FUNCTIONS - ( 11 Jun 2025 06:00 )  Alb: 2.2 g/dL / Pro: 6.3 g/dL / ALK PHOS: 158 U/L / ALT: 30 U/L / AST: 28 U/L / GGT: x           PT/INR - ( 10 Ernesto 2025 09:31 )   PT: 25.1 sec;   INR: 2.22 ratio                     Plan:  NEURO  - continue to monitor mental status as per protocol     RESPIRATORY  - continue to monitor SpO2 with goal >94%    Mechanical Vent:     CARDIO      RENAL/  - Continue monitoring urine output, lytes, SCr/ BUN  - replete lytes prn with goal K >4 and Mg >2    GI      ENDO      HEME  - Monitor H/H and plts  - DVT PPX:     ID  - monitor and trend WBC and temperature curve     Dispo: Maintain in ICU.    I have personally provided 35 minutes of direct critical care time, excluding time spent on separate procedures, in addition to the CICU Attending Dr. Mercedes.    Swapna Castillo PA-C Patient is a 89y old  Female who presents with a chief complaint of shaking chills, hypotension, poor appetite (11 Jun 2025 16:40)    INTERVAL HISTORY:  - AF, s/p 5 lopressor IVP    SUBJECTIVE  - Patient seen and evaluated at bedside.     MEDICATIONS:  MEDICATIONS  (STANDING):  cefTRIAXone   IVPB 2000 milliGRAM(s) IV Intermittent every 24 hours  chlorhexidine 2% Cloths 1 Application(s) Topical <User Schedule>  heparin   Injectable 5000 Unit(s) SubCutaneous every 12 hours  insulin lispro (ADMELOG) corrective regimen sliding scale   SubCutaneous three times a day before meals  insulin lispro (ADMELOG) corrective regimen sliding scale   SubCutaneous at bedtime  levalbuterol Inhalation 0.63 milliGRAM(s) Inhalation every 6 hours  metoprolol tartrate 25 milliGRAM(s) Oral two times a day  midodrine 20 milliGRAM(s) Oral every 8 hours  pantoprazole    Tablet 40 milliGRAM(s) Oral before breakfast    MEDICATIONS  (PRN):  acetaminophen     Tablet .. 650 milliGRAM(s) Oral every 6 hours PRN Moderate Pain (4 - 6)  melatonin 3 milliGRAM(s) Oral at bedtime PRN Insomnia    OBJECTIVE:  ICU Vital Signs Last 24 Hrs  T(C): 37.1 (11 Jun 2025 19:00), Max: 37.2 (10 Ernesto 2025 23:00)  T(F): 98.7 (11 Jun 2025 19:00), Max: 99 (10 Ernesto 2025 23:00)  HR: 101 (11 Jun 2025 19:00) (97 - 153)  BP: 109/57 (11 Jun 2025 19:06) (77/44 - 184/74)  BP(mean): 78 (11 Jun 2025 19:06) (56 - 106)  RR: 26 (11 Jun 2025 19:00) (20 - 48)  SpO2: 97% (11 Jun 2025 19:00) (94% - 100%)    O2 Parameters below as of 11 Jun 2025 19:00  Patient On (Oxygen Delivery Method): nasal cannula  O2 Flow (L/min): 4    CAPILLARY BLOOD GLUCOSE  POCT Blood Glucose.: 143 mg/dL (11 Jun 2025 16:10)  POCT Blood Glucose.: 114 mg/dL (11 Jun 2025 11:08)    CAPILLARY BLOOD GLUCOSE  POCT Blood Glucose.: 143 mg/dL (11 Jun 2025 16:10)    I&O's Summary  10 Enresto 2025 07:01  -  11 Jun 2025 07:00  --------------------------------------------------------  IN: 870 mL / OUT: 500 mL / NET: 370 mL    11 Jun 2025 07:01  -  11 Jun 2025 19:47  --------------------------------------------------------  IN: 180 mL / OUT: 970 mL / NET: -790 mL    PHYSICAL EXAM:  General: NAD, well-groomed, well-developed  Chest: Clear to auscultation bilaterally; no rales, rhonchi, or wheezing  Heart: iregular rate and rhythm  Abd: Soft, nontender, nondistended  Nervous System: AAOX3  Ext: no peripheral LE edema bilaterally    LABS:  ABG - ( 11 Jun 2025 05:53 )  pH, Arterial: 7.48  pH, Blood: x     /  pCO2: 33    /  pO2: 110   / HCO3: 25    / Base Excess: 1.3   /  SaO2: 99.8                          11.6   4.03  )-----------( 466      ( 11 Jun 2025 00:57 )             37.4     06-11    129[L]  |  98  |  25[H]  ----------------------------<  118[H]  5.3   |  20[L]  |  0.84    Ca    8.5      11 Jun 2025 06:00  Phos  3.2     06-11  Mg     2.4     06-11    TPro  6.3  /  Alb  2.2[L]  /  TBili  0.7  /  DBili  x   /  AST  28  /  ALT  30  /  AlkPhos  158[H]  06-11    LIVER FUNCTIONS - ( 11 Jun 2025 06:00 )  Alb: 2.2 g/dL / Pro: 6.3 g/dL / ALK PHOS: 158 U/L / ALT: 30 U/L / AST: 28 U/L / GGT: x         PT/INR - ( 10 Ernesto 2025 09:31 )   PT: 25.1 sec;   INR: 2.22 ratio               Plan:  NEURO  - continue to monitor mental status as per protocol     RESPIRATORY  - continue to monitor SpO2 with goal >94%    Mechanical Vent:     CARDIO      RENAL/  - Continue monitoring urine output, lytes, SCr/ BUN  - replete lytes prn with goal K >4 and Mg >2    GI      ENDO      HEME  - Monitor H/H and plts  - DVT PPX:     ID  - monitor and trend WBC and temperature curve     Dispo: Maintain in ICU.    I have personally provided 35 minutes of direct critical care time, excluding time spent on separate procedures, in addition to the CICU Attending Dr. Mercedes.    Swapna Castillo PA-C Patient is a 89y old  Female who presents with a chief complaint of shaking chills, hypotension, poor appetite (11 Jun 2025 16:40)    INTERVAL HISTORY:  - AF, s/p 5 lopressor IVP    SUBJECTIVE  - Patient seen and evaluated at bedside.     MEDICATIONS:  MEDICATIONS  (STANDING):  cefTRIAXone   IVPB 2000 milliGRAM(s) IV Intermittent every 24 hours  chlorhexidine 2% Cloths 1 Application(s) Topical <User Schedule>  heparin   Injectable 5000 Unit(s) SubCutaneous every 12 hours  insulin lispro (ADMELOG) corrective regimen sliding scale   SubCutaneous three times a day before meals  insulin lispro (ADMELOG) corrective regimen sliding scale   SubCutaneous at bedtime  levalbuterol Inhalation 0.63 milliGRAM(s) Inhalation every 6 hours  metoprolol tartrate 25 milliGRAM(s) Oral two times a day  midodrine 20 milliGRAM(s) Oral every 8 hours  pantoprazole    Tablet 40 milliGRAM(s) Oral before breakfast    MEDICATIONS  (PRN):  acetaminophen     Tablet .. 650 milliGRAM(s) Oral every 6 hours PRN Moderate Pain (4 - 6)  melatonin 3 milliGRAM(s) Oral at bedtime PRN Insomnia    OBJECTIVE:  ICU Vital Signs Last 24 Hrs  T(C): 37.1 (11 Jun 2025 19:00), Max: 37.2 (10 Ernesto 2025 23:00)  T(F): 98.7 (11 Jun 2025 19:00), Max: 99 (10 Ernesto 2025 23:00)  HR: 101 (11 Jun 2025 19:00) (97 - 153)  BP: 109/57 (11 Jun 2025 19:06) (77/44 - 184/74)  BP(mean): 78 (11 Jun 2025 19:06) (56 - 106)  RR: 26 (11 Jun 2025 19:00) (20 - 48)  SpO2: 97% (11 Jun 2025 19:00) (94% - 100%)    O2 Parameters below as of 11 Jun 2025 19:00  Patient On (Oxygen Delivery Method): nasal cannula  O2 Flow (L/min): 4    CAPILLARY BLOOD GLUCOSE  POCT Blood Glucose.: 143 mg/dL (11 Jun 2025 16:10)  POCT Blood Glucose.: 114 mg/dL (11 Jun 2025 11:08)    CAPILLARY BLOOD GLUCOSE  POCT Blood Glucose.: 143 mg/dL (11 Jun 2025 16:10)    I&O's Summary  10 Ernesto 2025 07:01  -  11 Jun 2025 07:00  --------------------------------------------------------  IN: 870 mL / OUT: 500 mL / NET: 370 mL    11 Jun 2025 07:01  -  11 Jun 2025 19:47  --------------------------------------------------------  IN: 180 mL / OUT: 970 mL / NET: -790 mL    PHYSICAL EXAM:  General: NAD, well-groomed, well-developed  Chest: +audible wheezing  Heart: irregular rate and rhythm  Abd: Soft, nontender, nondistended  Nervous System: AAOX3  Ext: no peripheral LE edema bilaterally    LABS:  ABG - ( 11 Jun 2025 05:53 )  pH, Arterial: 7.48  pH, Blood: x     /  pCO2: 33    /  pO2: 110   / HCO3: 25    / Base Excess: 1.3   /  SaO2: 99.8                          11.6   4.03  )-----------( 466      ( 11 Jun 2025 00:57 )             37.4     06-11    129[L]  |  98  |  25[H]  ----------------------------<  118[H]  5.3   |  20[L]  |  0.84    Ca    8.5      11 Jun 2025 06:00  Phos  3.2     06-11  Mg     2.4     06-11    TPro  6.3  /  Alb  2.2[L]  /  TBili  0.7  /  DBili  x   /  AST  28  /  ALT  30  /  AlkPhos  158[H]  06-11    LIVER FUNCTIONS - ( 11 Jun 2025 06:00 )  Alb: 2.2 g/dL / Pro: 6.3 g/dL / ALK PHOS: 158 U/L / ALT: 30 U/L / AST: 28 U/L / GGT: x         PT/INR - ( 10 Ernesto 2025 09:31 )   PT: 25.1 sec;   INR: 2.22 ratio         ASSESSMENT: 89F with PMH HOCM/severe LVOT obstruction, AFib on eliquis, pericardial effusion, breast CA s/p XRT with CC septic shock after hospital course c/b demand ischemia, AFib RVR, enlarging pericardial effusion. CCU admission for hemodynamic monitoring and management of moderate-severe pericardial effusion.    Plan:  NEURO  A&Ox3  - sundowning and anxiety overnight, given seroquel 12.5 & trazodone 50mg  - continue to monitor mental status as per protocol     RESPIRATORY  #Acute hypoxic respiratory failure  - likely multifactorial in etiology  - nebs q6  - continue to monitor SpO2 with goal >94%    CARDIO  #Pericardial effusion  - plan for drainage with IR Friday 6/13  - holding all forms of AC    #Atrial fibrillation with RVR  - rate control: lopressor 25 BID  - AC: holding pending IR drainage of pericardial effusion  - EP following    RENAL/  #CKD  - baseline Cr 0.6-0.9  - hold diuresis with HOCM & pericardial effusion  - Continue monitoring urine output, lytes, SCr/ BUN  - replete lytes prn with goal K >4 and Mg >2    GI  Tolerating PO diet    ENDO  No active issues    HEME  - Monitor H/H and plts  - DVT PPX: SCDs    ID  #Sepsis/bacteremia  - Recent GBS bacteremia, current source unclear  - Continue cefepime 1g q8h  - Blood cultures pending  - ID consulted  - monitor and trend WBC and temperature curve     Dispo: Maintain in ICU.    I have personally provided 35 minutes of direct critical care time, excluding time spent on separate procedures, in addition to the CICU Attending Dr. Mercedes.    Swapna Castillo PA-C

## 2025-06-11 NOTE — CONSULT NOTE ADULT - PROBLEM SELECTOR RECOMMENDATION 4
- Geriatrics and Palliative Medicine Team will continue to follow for support and guidance on GOC, pending course    An MD Lucina  [facility] Team Consults  Please call if we can be of assistance, [phone]

## 2025-06-11 NOTE — PROGRESS NOTE ADULT - ASSESSMENT
89F c hx GERD, R breast CA (40-50y ago) s/p B/L mastectomy and radiation, RUE lymphedema, osteoarthritis/osteoporosis (on monthly ibandronate), HLD, Afib on eliquis, HOCM/severe LVOT/LORA, small-moderate pericardial effusion/RA diastolic collapse, recent hospitalization 5/29-6/5 for RML PNA, GBS bacteremia (pos cultures 5/29, 5/30) of unclear source on total 10 days abx (IV ceftriaxone inpt, then levaquin 750 q48h until 6/9), presents from [facility] rehab with shaking chills, hypotension, poor appetite  History from pt's daughter/primary decision maker Teresita Day at bedside.  While at [facility], pt found to have hypotension to SBP 80s. Pt was given IVF and reduced dose of metoprolol. Pt also found to have tachycardia, shaking chills. Pt sent back to the hospital. Reportedly pt has not been eating well, not urinating much. Denies CP, SOB, abd pain, diarrhea, cough, other respiratory symptoms. At baseline pt ambulates without assistive device, drives.  RRT called in the ed for hypotension to SBP 79. (08 Jun 2025 02:41)  to me pt daughter says she was very sleepy in the morning too  as she was given ambien at 4 AM in the morning:   now she is alert and awake and is on 2 L of oxygen ;  she is not sob:  and does not look to be in any resp distress:       Severe sepsis.   unclear source of fevers  cont empiric vanc, cefepime  f/u blood cx. if positive will likely need MARIA G  monitor for any localizing symptoms  pt had recent pan ct scan that did not elucidate cause of bacteremia  recent blood cultures have been negative   - IVF  on cefepime  she is febrile too   vbg on admission IS ok;   MONITOR BLOOD PRESSURE CLOSELY:   OIF SHE DROPS HER BLOOD PRESSURE MORE:  WOULD NEED MICU  CO NSULT:    6/9: seems slightly more tored today  ; cont antibtiocs:  per ID:  she remains on 2 L of oxygen : she is alert and awake:  daughter at bedside:  reviewed the labs and echo and ct scan chest with the daughter in detail :   she has large pericardial effusions:  per ir no good window and effusion seemd small to drain : ct scan chest read as mod to large pericardial effusion : defer to cards :     6/10: seems to be doing  ok :  no sob:   no  cough  ;   no  phlegm   on 2 L of oxygen :  thoracic to see:    no emergency in tapping the pleural effusion:   de cardiologist  6/11: on ceftriaone:   seems O K     Pericardial effusion ;   the ct chest shows increasing pericardial effusion  : which is of more pressing concern then pleural effusion :  needs a tap:  ir guided tap      Hypotension.  suspect combination of infection, hocm, lora, tachycardia, pericardial effusion, metoprolol  pt has somewhat tenuous hemodynamic status  consider cardioversion, consider repeat TTE  pt is full code.  pts blood pressure is slightly low   on midodrine    6/9: cont on midodrine  6/10; controlled:  on midodrine  6/11: on midodrine    Chronic atrial fibrillation.  reduce metoprolol dose to tartrate 25mg BID  goal HR <100 in setting of HOCM with hypotension  consider amiodarone or cardioversion  PER CARDS    6/9: defer to cards   6/10: off Eliquis for now   6/11: remains off eliquis       Type 2 myocardial infarction.  suspect demand ischemia from recent hypotension, infections  start asa   cont home eliquis.  CONT CURRENT MEDS     6/9; no chest pain : cont current rx:   6/10: seems to be stable:       HOCM (hypertrophic obstructive cardiomyopathy).  cont metoprolol as above. uptitrate as BP tolerates  per cards    Acute respiratory failure with hypoxia.   recent CT scans showing right bronchiectasis, right fibrosis likely 2/2 radiation, poss RML PNA.  cont BD :   ct chest showed: No pulmonary embolism. Small to moderate right and small left pleural effusions with associated  atelectasis. 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  Cardiomegaly. Moderate pericardial effusion.  needs echo     9/6: new echo reviewed:  seen by cardiology :  monitor her blood pressure closely:  if she drops her blood pressure call CCU     6/10: cont to manain o2 sao2 above 90% all the t hayden  6/11; n 2 L of oxygen       dw daughter

## 2025-06-11 NOTE — PROGRESS NOTE ADULT - SUBJECTIVE AND OBJECTIVE BOX
PATIENT:  GINO GRAHAM  [id number]    CHIEF COMPLAINT:  Patient is a 89y old  Female who presents with a chief complaint of shaking chills, hypotension, poor appetite (11 Jun 2025 08:00)      INTERVAL HISTORYOVERNIGHT EVENTS:      REVIEW OF SYSTEMS:    Constitutional:     [ ] negative [ ] fevers [ ] chills [ ] weight loss [ ] weight gain  HEENT:                  [ ] negative [ ] dry eyes [ ] eye irritation [ ] postnasal drip [ ] nasal congestion  CV:                         [ ] negative  [ ] chest pain [ ] orthopnea [ ] palpitations [ ] murmur  Resp:                     [ ] negative [ ] cough [ ] shortness of breath [ ] dyspnea [ ] wheezing [ ] sputum [ ] hemoptysis  GI:                          [ ] negative [ ] nausea [ ] vomiting [ ] diarrhea [ ] constipation [ ] abd pain [ ] dysphagia   :                        [ ] negative [ ] dysuria [ ] nocturia [ ] hematuria [ ] increased urinary frequency  Musculoskeletal: [ ] negative [ ] back pain [ ] myalgias [ ] arthralgias [ ] fracture  Skin:                       [ ] negative [ ] rash [ ] itch  Neurological:        [ ] negative [ ] headache [ ] dizziness [ ] syncope [ ] weakness [ ] numbness  Psychiatric:           [ ] negative [ ] anxiety [ ] depression  Endocrine:            [ ] negative [ ] diabetes [ ] thyroid problem  Heme/Lymph:      [ ] negative [ ] anemia [ ] bleeding problem  Allergic/Immune: [ ] negative [ ] itchy eyes [ ] nasal discharge [ ] hives [ ] angioedema    [ ] All other systems negative  [ ] Unable to assess ROS because ________.    MEDICATIONS:  MEDICATIONS  (STANDING):  aspirin enteric coated 81 milliGRAM(s) Oral daily  cefepime   IVPB 1000 milliGRAM(s) IV Intermittent every 8 hours  chlorhexidine 2% Cloths 1 Application(s) Topical <User Schedule>  levalbuterol Inhalation 0.63 milliGRAM(s) Inhalation every 6 hours  midodrine 20 milliGRAM(s) Oral every 8 hours  pantoprazole    Tablet 40 milliGRAM(s) Oral before breakfast    MEDICATIONS  (PRN):  acetaminophen     Tablet .. 650 milliGRAM(s) Oral every 6 hours PRN Moderate Pain (4 - 6)  melatonin 3 milliGRAM(s) Oral at bedtime PRN Insomnia      ALLERGIES:  Allergies    Zosyn (Rash; Urticaria; Hives)    Intolerances        OBJECTIVE:  ICU Vital Signs Last 24 Hrs  T(C): 36.6 (11 Jun 2025 07:00), Max: 37.4 (10 Ernesto 2025 14:00)  T(F): 97.9 (11 Jun 2025 07:00), Max: 99.3 (10 Ernesto 2025 14:00)  HR: 99 (11 Jun 2025 07:00) (85 - 153)  BP: 86/54 (11 Jun 2025 07:00) (75/46 - 166/65)  BP(mean): 64 (11 Jun 2025 07:00) (56 - 93)  ABP: --  ABP(mean): --  RR: 25 (11 Jun 2025 07:00) (12 - 32)  SpO2: 99% (11 Jun 2025 07:00) (93% - 99%)    O2 Parameters below as of 11 Jun 2025 07:00  Patient On (Oxygen Delivery Method): nasal cannula  O2 Flow (L/min): 4          Adult Advanced Hemodynamics Last 24 Hrs  CVP(mm Hg): --  CVP(cm H2O): --  CO: --  CI: --  PA: --  PA(mean): --  PCWP: --  SVR: --  SVRI: --  PVR: --  PVRI: --  CAPILLARY BLOOD GLUCOSE        CAPILLARY BLOOD GLUCOSE      POCT Blood Glucose.: 125 mg/dL (10 Ernesto 2025 06:27)    I&O's Summary    10 Ernesto 2025 07:01  -  11 Jun 2025 07:00  --------------------------------------------------------  IN: 870 mL / OUT: 500 mL / NET: 370 mL      Daily     Daily     PHYSICAL EXAMINATION:  General: WN/WD NAD  HEENT: PERRLA, EOMI, moist mucous membranes  Neurology: A&Ox3, nonfocal, LUGO x 4  Respiratory: CTA B/L, normal respiratory effort, no wheezes, crackles, rales  CV: RRR, S1S2, no murmurs, rubs or gallops  Abdominal: Soft, NT, ND +BS, Last BM  Extremities: No edema, + peripheral pulses  Incisions:   Tubes:    LABS:  ABG - ( 11 Jun 2025 05:53 )  pH, Arterial: 7.48  pH, Blood: x     /  pCO2: 33    /  pO2: 110   / HCO3: 25    / Base Excess: 1.3   /  SaO2: 99.8                                    11.6   4.03  )-----------( 466      ( 11 Jun 2025 00:57 )             37.4     06-11    129[L]  |  98  |  25[H]  ----------------------------<  118[H]  5.3   |  20[L]  |  0.84    Ca    8.5      11 Jun 2025 06:00  Phos  3.2     06-11  Mg     2.4     06-11    TPro  6.3  /  Alb  2.2[L]  /  TBili  0.7  /  DBili  x   /  AST  28  /  ALT  30  /  AlkPhos  158[H]  06-11    LIVER FUNCTIONS - ( 11 Jun 2025 06:00 )  Alb: 2.2 g/dL / Pro: 6.3 g/dL / ALK PHOS: 158 U/L / ALT: 30 U/L / AST: 28 U/L / GGT: x           PT/INR - ( 10 Ernesto 2025 09:31 )   PT: 25.1 sec;   INR: 2.22 ratio                 Urinalysis Basic - ( 11 Jun 2025 06:00 )    Color: x / Appearance: x / SG: x / pH: x  Gluc: 118 mg/dL / Ketone: x  / Bili: x / Urobili: x   Blood: x / Protein: x / Nitrite: x   Leuk Esterase: x / RBC: x / WBC x   Sq Epi: x / Non Sq Epi: x / Bacteria: x      Assessment and Plan:   · Assessment	    Geiringer, 89F with PMH HOCM/severe LVOT obstruction, AFib on eliquis, pericardial effusion, breast CA s/p XRT with CC septic shock after hospital course c/b demand ischemia, AFib RVR, enlarging pericardial effusion. CCU admission for hemodynamic monitoring and management of moderate-severe pericardial effusion.    NEURO:  #Mental status: Improved after discontinuing Ambien, currently A&Ox2    CV:  #Shock: Multifactorial - sepsis, pericardial effusion, HOCM with tachycardia  - Continue midodrine 20mg q8h  - Judicious IVF given HOCM physiology     #Pericardial effusion: Moderate-severe, enlarging, with prior RA diastolic collapse  - Interventional cardiology consulted for drainage  - CTS consulted for pericardial window  - Hold anticoagulation (eliquis) for procedure, consider heparin bridge  - Serial limited echo monitoring    #AFib with RVR: Rate 110-130s, on metoprolol tartrate 50mg QID  - Continue rate control with metoprolol, holding parameters for HR<60 or SBP<90  - EP consulted for rhythm control considerations given HOCM  - Continue anticoagulation when safe (currently holding for procedures)  - Avoid diltiazem and vasodilators given HOCM    #HOCM with severe LVOT obstruction: LVOT gradient 80-100mmHg  - Avoid vasodilators and diuretics  - Maintain adequate preload  - Beta blocker for rate control and LVOT gradient reduction  - Consider restarting Mavacamten as outpatient (previously discontinued for leg heaviness)  - If Mavacamten fails, consider alcohol septal ablation    #Demand ischemia/Type 2 MI: Troponin elevation downtrending  - Continue ASA 81mg daily  - Medical management given acute issues  - Consider ischemic evaluation once stabilized    PULM:  #Acute hypoxemic respiratory failure: Multifactorial - pleural effusions, pulmonary congestion  - Continue levalbuterol/ipratropium nebulizers q6h  - Pulmonary consulted for pleural effusion drainage  - avoid diuresis given HOCM with PC effusion  - Supplemental O2 to maintain SpO2>92%    #Pleural effusions: Bilateral, R>L  - Pulmonary consultation for thoracentesis    RENAL:  #CKD: Baseline Cr 0.6-0.9, currently 0.62  - Monitor Cr with diuresis  - Renally dose medications    GI:  #Hypoalbuminemia: Albumin 2.4, contributing to third spacing  - Nutrition optimization  - Regular diet    #Bowel regimen: Continue senna and miralax    HEMATOLOGIC:  #Anemia: Hgb 10.1, chronic  - Monitor, transfuse for Hgb<7 or symptomatic    #DVT prophylaxis: Currently on therapeutic anticoagulation when not held for procedures    ID:  #Sepsis/bacteremia: Recent GBS bacteremia, current source unclear  - Continue cefepime 1g q8h  - Blood cultures pending  - ID consulted  - If positive blood cultures, will need MARIA G    ENDO:  #no active issues

## 2025-06-11 NOTE — CHART NOTE - NSCHARTNOTEFT_GEN_A_CORE
Most recent CMP with K+ of 6.3, however, with moderate hemolysis. Patient also without any changes on EKG indicative of hyperkalemia. She is currently refusing repeat blood draw. Will reattempt to draw repeat CMP at a later time and continue to monitor.

## 2025-06-11 NOTE — PROGRESS NOTE ADULT - ASSESSMENT
Afib, severe HCM LVOT obstruction, Pericardial effusion, recent admission for PNA / Bacteremia   now admitted with sepsis, tachycardia ( afib with RVR ) and shock . Cardiology is called for elevated troponin     Elevated troponin   Demand ischemia   in setting of afib with RVR, shock, severe HCM and sepsis   its downtrending   eventual ischemic eval can be considered  however has increasing Pericardial effusion and now hypotensive     Shock   likely multifactorial   in setting of sepsis, pericardial effusion and HCM with tachycardia  cont midodrine   Pericardial effusion is much larger now   recommend urgent pericardial drain by interventional card or IR     Tachypnea   has pl effusion , venous congestion , would benefit from diuresis but in setting of shock and large effusion , it can potentially deteriorate her hemodynamics   can use gentle as needed diuresis once effusion is drained and her HR under control     Afib  FU with EP for eventual DCCV  cont a/c    Pl effusion / pericardial effusion   plan as above  has poor nutrition due to albumin hence decreased oncotic pressure  nutritional optimization   would be very cautious with diuresis given severe LVOT obstruction and pericardial effusion     AMS  as per neurology     CCU care appreciated

## 2025-06-11 NOTE — PROGRESS NOTE ADULT - SUBJECTIVE AND OBJECTIVE BOX
Patient seen and evaluated at CICU bedside by Thoracic Surgery team early this AM. Resting comfortably.    Vital Signs Last 24 Hrs  T(C): 36.6 (06-11-25 @ 07:00), Max: 37.4 (06-10-25 @ 14:00)  T(F): 97.9 (06-11-25 @ 07:00), Max: 99.3 (06-10-25 @ 14:00)  HR: 98 (06-11-25 @ 09:00) (85 - 153)  BP: 96/55 (06-11-25 @ 09:00) (75/46 - 166/65)  RR: 22 (06-11-25 @ 09:00) (12 - 29)  SpO2: 98% (06-11-25 @ 09:00) (93% - 99%)           INPUT/OUTPUT:  06-10 @ 07:01  -  06-11 @ 07:00  --------------------------------------------------------  IN: 870 mL / OUT: 500 mL / NET: 370 mL      LABS:  06-11  129[L]  |  98  |  25[H]  ----------------------------<  118[H]  5.3   |  20[L]  |  0.84  Ca    8.5      11 Jun 2025 06:00  Phos  3.2     06-11  Mg     2.4     06-11  TPro  6.3  /  Alb  2.2[L]  /  TBili  0.7  /  DBili  x   /  AST  28  /  ALT  30  /  AlkPhos  158[H]  06-11             11.6   4.03  )-----------( 466      ( 11 Jun 2025 00:57 )             37.4       PHYSICAL EXAM:  GEN: lethargic, frail elderly  CVS: s1, s2  RESP: non-labored respirations, no use of accessory muscles, decreased breath sounds  GI: abdomen soft, nontender, +bowel sounds  NEURO: limited 2/2 current status      ACTIVE MEDICATIONS:  acetaminophen     Tablet .. 650 milliGRAM(s) Oral every 6 hours PRN  albumin human  5% IVPB 250 milliLiter(s) IV Intermittent once  aspirin enteric coated 81 milliGRAM(s) Oral daily  cefepime   IVPB 1000 milliGRAM(s) IV Intermittent every 8 hours  chlorhexidine 2% Cloths 1 Application(s) Topical <User Schedule>  insulin lispro (ADMELOG) corrective regimen sliding scale   SubCutaneous three times a day before meals  insulin lispro (ADMELOG) corrective regimen sliding scale   SubCutaneous at bedtime  levalbuterol Inhalation 0.63 milliGRAM(s) Inhalation every 6 hours  melatonin 3 milliGRAM(s) Oral at bedtime PRN  midodrine 20 milliGRAM(s) Oral every 8 hours  pantoprazole    Tablet 40 milliGRAM(s) Oral before breakfast      Case discussed in detail with Thoracic Attending Dr. Canada. Plan below as per discussion.

## 2025-06-11 NOTE — CONSULT NOTE ADULT - SUBJECTIVE AND OBJECTIVE BOX
HPI:  89F with GERD, R breast CA (40-50y ago) s/p B/L mastectomy and radiation, RUE lymphedema, osteoarthritis/osteoporosis (on monthly ibandronate), HLD, Afib on eliquis, HOCM/severe LVOT/ELEAZAR, small-moderate pericardial effusion/RA diastolic collapse, recent hospitalization - for RML PNA, GBS bacteremia (pos cultures , ) of unclear source on total 10 days abx (IV ceftriaxone inpt, then levaquin 750 q48h until ), [facility] with shaking chills, hypotension, poor appetite, found to have worsening pericardial effusion. Pt is currently in the CCU for close monitoring and management Geriatrics and Palliative Medicine Team is consulted for GOC    Patient seen this morning, awake but fatigued appearing, with dtr Teresita visiting at bedside. Pt complains of L arm pain due to frequent blood draws (R arm with lymphedema 2/2 h/o breast cancer). Please see GOC section below for discussion details.     PERTINENT PM/SXH:   Breast cancer      H/O bilateral mastectomy    History of cataract surgery, unspecified laterality      FAMILY HISTORY:  Family history of MI (myocardial infarction) (Father)    FH: Alzheimers disease  father    FH: cerebral aneurysm  mother  of this      Family Hx substance abuse [ ]yes [ ]no  ITEMS NOT CHECKED ARE NOT PRESENT    SOCIAL HISTORY:   Significant other/partner[x ]  Children[ x]  Alevism/Spirituality:  Substance hx:  [ ]   Tobacco hx:  [ ]   Alcohol hx: [ ]   Home Opioid hx:  [ ] I-Stop Reference No:  Living Situation: [ ]Home  [ ]Long term care  [ x]Rehab [ ]Other    ADVANCE DIRECTIVES:    DNR/MOLST  [ ]  Living Will  [ ]   DECISION MAKER(s):  [ x] Health Care Proxy(s)  [ ] Surrogate(s)  [ ] Guardian           Name(s): Phone Number(s): dtr Teresita     BASELINE (I)ADL(s) (prior to admission):  [place]: [ ]Total  [ ] Moderate [ x]Dependent    Allergies    Zosyn (Rash; Urticaria; Hives)    Intolerances    MEDICATIONS  (STANDING):  cefTRIAXone   IVPB 2000 milliGRAM(s) IV Intermittent every 24 hours  chlorhexidine 2% Cloths 1 Application(s) Topical <User Schedule>  heparin   Injectable 5000 Unit(s) SubCutaneous every 8 hours  insulin lispro (ADMELOG) corrective regimen sliding scale   SubCutaneous three times a day before meals  insulin lispro (ADMELOG) corrective regimen sliding scale   SubCutaneous at bedtime  levalbuterol Inhalation 0.63 milliGRAM(s) Inhalation every 6 hours  midodrine 20 milliGRAM(s) Oral every 8 hours  pantoprazole    Tablet 40 milliGRAM(s) Oral before breakfast    MEDICATIONS  (PRN):  acetaminophen     Tablet .. 650 milliGRAM(s) Oral every 6 hours PRN Moderate Pain (4 - 6)  melatonin 3 milliGRAM(s) Oral at bedtime PRN Insomnia    PRESENT SYMPTOMS: [ ]Unable to self-report  [ ] CPOT [ ] PAINADs [ ] RDOS  Source if other than patient:  [ ]Family   [ ]Team     Pain: [ x]yes [ ]no  QOL impact - discomfort throughout the day  Location -  L arm              Aggravating factors -   Quality -  Radiation - none  Timing-  Severity (0-10 scale):  Minimal acceptable level (0-10 scale):     Dyspnea:                           [ ]Mild [ ]Moderate [ ]Severe  Anxiety:                             [ ]Mild [ ]Moderate [ ]Severe  Fatigue:                             [ ]Mild [ ]Moderate [ ]Severe  Nausea:                             [ ]Mild [ ]Moderate [ ]Severe  Loss of appetite:              [ ]Mild [ ]Moderate [ ]Severe  Constipation:                    [ ]Mild [ ]Moderate [ ]Severe    PCSSQ[Palliative Care Spiritual Screening Question]   Severity (0-10):  Score of 4 or > indicate consideration of Chaplaincy referral.  Chaplaincy Referral: [ ] yes [ ] refused [ ] following [x ] Deferred     Caregiver [place]? : [ ] yes [ ] no [ x] Deferred [ ] Declined             Social work referral [ ] Patient & Family Centered Care Referral [ ]     Anticipatory Grief present?:  [ ] yes [ ] no  [x ] Deferred                  Social work referral [ ] Chaplaincy Referral[ ]      Other Symptoms:  [ x]All other review of systems negative     Palliative Performance Status Version 2:     30    %    http://npcrc.org/files/news/palliative_performance_scale_ppsv2.pdf    PHYSICAL EXAM:  Vital Signs Last 24 Hrs  T(C): 36.3 (2025 11:00), Max: 37.3 (10 Ernesto 2025 18:00)  T(F): 97.3 (2025 11:00), Max: 99.1 (10 Ernesto 2025 18:00)  HR: 104 (2025 15:00) (97 - 153)  BP: 105/64 (2025 15:00) (77/44 - 166/65)  BP(mean): 78 (2025 15:) (56 - 93)  RR: 22 (2025 15:) (20 - 28)  SpO2: 99% (2025 15:) (94% - 100%)    Parameters below as of 2025 15:  Patient On (Oxygen Delivery Method): nasal cannula     I&O's Summary    10 Ernesto 2025 07:01  -  2025 07:00  --------------------------------------------------------  IN: 870 mL / OUT: 500 mL / NET: 370 mL    2025 07:01  -  2025 16:23  --------------------------------------------------------  IN: 0 mL / OUT: 870 mL / NET: -870 mL      GENERAL: [ ]Cachexia    [ ]Alert  [ ]Oriented x   [x ]Lethargic  [ ]Unarousable  [x ]Verbal  [ ]Non-Verbal  Behavioral:   [ ] Anxiety  [x ] Delirium [ ] Agitation [ ] Other  HEENT:  [ ]Normal   [ ]Dry mouth   [ ]ET Tube/Trach  [ ]Oral lesions  PULMONARY:   [ ]Clear [ ]Tachypnea  [ ]Audible excessive secretions [x] normal WOB  [ ]Rhonchi        [ ]Right [ ]Left [ ]Bilateral  [ ]Crackles        [ ]Right [ ]Left [ ]Bilateral  [ ]Wheezing     [ ]Right [ ]Left [ ]Bilateral  [ ]Diminished breath sounds [ ]right [ ]left [ ]bilateral  CARDIOVASCULAR:    [ x]Regular [ ]Irregular [ ]Tachy  [ ]Eliceo [ ]Murmur [ ]Other  GASTROINTESTINAL:  [ x]Soft  [ ]Distended   [ ]+BS  [x ]Non tender [ ]Tender  [ ]Other [ ]PEG [ ]OGT/ NGT  Last BM:  GENITOURINARY:  [ ]Normal [ ] Incontinent   [ ]Oliguria/Anuria   [ ]Landaverde  MUSCULOSKELETAL:   [ ]Normal   [x ]Weakness  [ ]Bed/Wheelchair bound [ ]Edema  NEUROLOGIC:   [ ]No focal deficits  [x ]Cognitive impairment  [ ]Dysphagia [ ]Dysarthria [ ]Paresis [ ]Other   SKIN:   [ ]Normal  [ ]Rash  [ ]Other  [ ]Pressure ulcer(s)       Present on admission [ ]y [ ]n    CRITICAL CARE:  [ ] Shock Present  [ ]Septic [ ]Cardiogenic [ ]Neurologic [ ]Hypovolemic  [ ]  Vasopressors [ ]  Inotropes   [ ]Respiratory failure present [ ]Mechanical ventilation [ ]Non-invasive ventilatory support [ ]High flow    [ ]Acute  [ ]Chronic [ ]Hypoxic  [ ]Hypercarbic [ ]Other  [ ]Other organ failure     LABS:                        11.6   4.03  )-----------( 466      ( 2025 00:57 )             37.4   06-11    129[L]  |  98  |  25[H]  ----------------------------<  118[H]  5.3   |  20[L]  |  0.84    Ca    8.5      2025 06:00  Phos  3.2     06-11  Mg     2.4     06-11    TPro  6.3  /  Alb  2.2[L]  /  TBili  0.7  /  DBili  x   /  AST  28  /  ALT  30  /  AlkPhos  158[H]  06-11  PT/INR - ( 10 Ernesto 2025 09:31 )   PT: 25.1 sec;   INR: 2.22 ratio             Urinalysis Basic - ( 2025 06:00 )    Color: x / Appearance: x / SG: x / pH: x  Gluc: 118 mg/dL / Ketone: x  / Bili: x / Urobili: x   Blood: x / Protein: x / Nitrite: x   Leuk Esterase: x / RBC: x / WBC x   Sq Epi: x / Non Sq Epi: x / Bacteria: x      RADIOLOGY & ADDITIONAL STUDIES:    < from: CT Chest No Cont (25 @ 10:36) >    IMPRESSION:  *  Large pericardial effusion, increased in size when compared to   2024.  *  Moderate right and small left pleural effusion, slightly increased in   size when compared to prior exam.    --- End of Report ---    < end of copied text >  < from: TTE Limited W or WO Ultrasound Enhancing Agent (06.10.25 @ 12:05) >  CONCLUSIONS:      1. Hypertrophic obstructive cardiomyopathy (HOCM).   2. Left ventricular cavity is normal in size. Left ventricular systolic function is normal. There are no regional wall motion abnormalities seen.   3. Large pericardial effusion noted adjacent to the posterolateral left ventricle, small pericardial effusion noted adjacent to the right atrium and large pericardial effusion noted adjacent to the right ventricle.   4. Compared to the transthoracic echocardiogram performed on 2025, the pericardial effusion has increased in volume. The rhythm is now atrial fibrillation (was sinus on 2025).. Findings were discussed with Dr. Prieto on 6/10/2025 at 12:48.    < end of copied text >      PROTEIN CALORIE MALNUTRITION PRESENT: [ ]mild [ ]moderate [ ]severe [ ]underweight [ ]morbid obesity  https://www.andeal.org/vault/2440/web/files/ONC/Table_Clinical%20Characteristics%20to%20Document%20Malnutrition-White%20JV%20et%20al%2020.pdf    Height (cm): 157.5 (25 @ 19:34), 157.5 (25 @ 13:16)  Weight (kg): 63.1 (25 @ 02:00), 54.4 (25 @ 13:16)  BMI (kg/m2): 25.4 (25 @ 02:00), 21.9 (25 @ 19:34), 21.9 (25 @ 13:16)    [ ]PPSV2 < or = to 30% [ ]significant weight loss  [ ]poor nutritional intake  [ ]anasarca[ ]Artificial Nutrition      Other REFERRALS:  [ ]Hospice  [ ]Child Life  [ ]Social Work  [ ]Case management [ ]Holistic Therapy

## 2025-06-11 NOTE — CHART NOTE - NSCHARTNOTEFT_GEN_A_CORE
Interventional Radiology    Evaluate for Procedure: Pericardial effusion drainage    HPI: 89 year old female with GERD, osteoarthritis/osteoporosis (on monthly ibandronate), HLD, Afib on eliquis, HOCM/severe LVOT/ELEAZAR, small-moderate pericardial effusion/RA diastolic collapse and a PMHx of R breast CA (40-50y ago) s/p B/L mastectomy and radiation and RUE lymphedema who presents from [facility] with shaking, chills, hypotension, and poor appetite. IR initially consulted 6/8 for pericardial drainage and deferred. IR now reconsulted with new interval imaging for evaluation for pericardial effusion drainage.    Allergies: Zosyn (Rash; Urticaria; Hives)    Medications (Abx/Cardiac/Anticoagulation/Blood Products)  apixaban: 2.5 milliGRAM(s) Oral (06-10 @ 05:20)  aspirin enteric coated: 81 milliGRAM(s) Oral (06-10 @ 13:04)  cefepime   IVPB: 100 mL/Hr IV Intermittent (06-11 @ 06:02)  furosemide   Injectable: 20 milliGRAM(s) IV Push (06-10 @ 09:35)  metoprolol tartrate: 50 milliGRAM(s) Oral (06-10 @ 05:20)  metoprolol tartrate Injectable: 5 milliGRAM(s) IV Push (06-11 @ 00:37)  midodrine: 20 milliGRAM(s) Oral (06-11 @ 06:02)  midodrine: 5 milliGRAM(s) Oral (06-10 @ 13:04)  vancomycin  IVPB: 250 mL/Hr IV Intermittent (06-09 @ 21:38)    Data:    63.1  T(C): 36.3  HR: 104  BP: 117/61  RR: 25  SpO2: 100%    -WBC 4.03 / HgB 11.6 / Hct 37.4 / Plt 466  -Na 129 / Cl 98 / BUN 25 / Glucose 118  -K 5.3 / CO2 20 / Cr 0.84  -ALT 30 / Alk Phos 158 / T.Bili 0.7  -INR 2.22 / PTT --    Radiology: Reviewed    Assessment/Plan:   89 year old female with GERD, osteoarthritis/osteoporosis (on monthly ibandronate), HLD, Afib on eliquis, HOCM/severe LVOT/ELEAZAR, small-moderate pericardial effusion/RA diastolic collapse and a PMHx of R breast CA (40-50y ago) s/p B/L mastectomy and radiation and RUE lymphedema who presents from [facility] with shaking, chills, hypotension, and poor appetite. IR initially consulted 6/8 for pericardial drainage and deferred. IR now reconsulted with new interval imaging for evaluation for pericardial effusion drainage.    - last dose of Eliquis 6/10 @ 5AM  - case reviewed and approved for Friday 6/13 for pericardial drain placement  - please place IR procedure order under Dr. Manuel for pericardial drain placement  - STAT labs in AM Friday(cbc,coags, bmp, T&S)  - continue holding Eliquis, please hold aspirin as well  - NPO on Thursday at 11pm  - d/w primary team  - If patient clinically decompensates please contact IR for more urgent intervention      --  Andry Srivastava NP  Interventional Radiology  Available on [employer] TEAMS / [email or web address]    For EMERGENT inquiries/questions:  IR Pager ([facility]): [phone]    For non-emergent consults/questions:   Please place a sunrise order "Consult- Interventional Radiology" with an appropriate callback number    For questions about scheduling during appropriate work hours, call IR :  [facility]: [phone]    For outpatient IR booking:  [facility]: [phone]

## 2025-06-11 NOTE — PROGRESS NOTE ADULT - ASSESSMENT
89F c hx GERD, R breast CA (40-50y ago) s/p B/L mastectomy and radiation, RUE lymphedema, osteoarthritis/osteoporosis (on monthly ibandronate), HLD, Afib on eliquis, HOCM/severe LVOT/ELEAZAR, small-moderate pericardial effusion/RA diastolic collapse, recent hospitalization 5/29-6/5 for RML PNA, GBS bacteremia (pos cultures 5/29, 5/30) of unclear source on total 10 days abx (IV ceftriaxone inpt, then levaquin 750 q48h until 6/9), pw hypotension, severe sepsis of unclear source, demand ischemia, afib c rvr    Severe Sepsis/sirs, Hypotension 2/2 unclear source  Severe LVOT obstruction/HCM, worsening pericardial effusion  CXR w/ worsening aeration at the bases  Flu/COVID/RSV negative  UA negative  6/7 Bcx NGTD x2   Zosyn allergy noted, tolerates cefepime  CTAP with mod-large pericardial effusion inc since 5/29/25, stable mod R and small L pleural effusion and atelectasis   TTE with increased pericardial effusion since 6/5 -- moderate pericardial effusion adjacent to RV, small pericardial effusion adjacent to RA and large pericardia effusion noted adjacent to posterior LV with no evidence of tamponade physiology  6/10 note with tachypnea and expiratory wheeze, CCU consulted for decompensated heart failure iso mod-severe pericardial effusion with concerns for impending hemodynamic collapse given complex cardiac physiology with severe LVOT obstruction, now transferred to CICU for closer monitor and management of enlarging pericardial effusion  CTS eval noted-patient high risk for pericardial window - rec continue to monitor pericardial effusion with repeat TTE    s/p vancomycin 6/7-6/10    Recommendations:   Follow CT chest without contrast, pending   Continue cefepime for now   IC and CTS following   Pulmonary following, possible thoracocentesis   Trend temps/WBC  Continue rest of care per primary team       Wes Grullon M.D.  [facility]  Available on [employer] TEAMS - *PREFERRED*  [phone]  After 5pm on weekdays and all day on weekends - please call [phone]     Thank you for consulting us and involving us in the management of this patients case. In addition to reviewing history, imaging, documents, labs, microbiology, took into account antibiotic stewardship, local antibiogram and infection control strategies and potential transmission issues at time of treatment decision making process.

## 2025-06-11 NOTE — PROGRESS NOTE ADULT - NSPROGADDITIONALINFOA_GEN_ALL_CORE
89yo woman with breast CA with pericardial effusion and tachycardia with intermittent hypotension, fluid responsive, no window for IR or IC, evaluated by thoracic. Will engage palliative, onc and determine if she is candidate for a window. Functional and taking care of her  until a week ago.     Neuro delirious requiring seroquel overnight for agitation. Stop cefepime.   Pulm remains on 4L NC, cont xopenex   CV remains in AFib with RVR, holding diuresis. Requiring midodrine. Discuss with IC and thoracic again for best way to drain fluid   Renal Cr wnl   GI NPO now   ID dc cefepime   Heme holding eliquis   Endo BG controlled   Lines no central access

## 2025-06-11 NOTE — PROGRESS NOTE ADULT - PROBLEM SELECTOR PLAN 1
- Previous TTE & CT Chest reviewed by Dr. Canada - TTE without evidence of tamponade physiology, hemodynamically stable  - Patient is high risk for pericardial window per Thoracic Attending, recommend continue to monitor pericardial effusion with repeat TTE and CT chest today.   - Per RN at bedside, primary team to discuss GOC with patient's family today  - Thoracic will continue to follow.   - Global care per primary / CICU team  - Above plan per d/w Thoracic Attending Dr. Canada  ----  For any thoracic surgery -related questions, please call @ [phone]

## 2025-06-11 NOTE — CONSULT NOTE ADULT - PROBLEM SELECTOR RECOMMENDATION 2
Continue to hold AC
Unclear etiology, noted on TTE and CT chest  - Note IR plans for pericardiocentesis this Friday  - pt is not a candidate for pericardial window per CT surgery

## 2025-06-11 NOTE — CONSULT NOTE ADULT - PROBLEM SELECTOR RECOMMENDATION 9
TTE & CT Chest reviewed by Dr. Canada - TTE without evidence of tamponade physiology, hemodynamically stable, patient is high risk for pericardial window - recommend continue to monitor pericardial effusion with repeat TTE  Continue to hold Eliquis - Last dose today 6/10 at 5am  Care as per ICU team
Known diagnosis, family understands the limitations in terms of medical management this condition brings forth   - continue excellent care as per CCU and cardiology

## 2025-06-11 NOTE — PROVIDER CONTACT NOTE (OTHER) - SITUATION
Patient complained of peripheral IV pain during Albumin administration. Daughter of patient/patient refusing new IV, they are requesting patient receive a new IV with ultrasound guided assistance.

## 2025-06-11 NOTE — PROGRESS NOTE ADULT - SUBJECTIVE AND OBJECTIVE BOX
Subjective: Patient seen and examined. No new events except as noted.     SUBJECTIVE/ROS:  ROS limited         MEDICATIONS:  MEDICATIONS  (STANDING):  aspirin enteric coated 81 milliGRAM(s) Oral daily  cefepime   IVPB 1000 milliGRAM(s) IV Intermittent every 8 hours  chlorhexidine 2% Cloths 1 Application(s) Topical <User Schedule>  levalbuterol Inhalation 0.63 milliGRAM(s) Inhalation every 6 hours  midodrine 20 milliGRAM(s) Oral every 8 hours  pantoprazole    Tablet 40 milliGRAM(s) Oral before breakfast      PHYSICAL EXAM:  T(C): 36.6 (06-11-25 @ 07:00), Max: 37.4 (06-10-25 @ 14:00)  HR: 99 (06-11-25 @ 07:00) (85 - 153)  BP: 86/54 (06-11-25 @ 07:00) (75/46 - 166/65)  RR: 25 (06-11-25 @ 07:00) (12 - 32)  SpO2: 99% (06-11-25 @ 07:00) (93% - 99%)  Wt(kg): --  I&O's Summary    10 Ernesto 2025 07:01  -  11 Jun 2025 07:00  --------------------------------------------------------  IN: 870 mL / OUT: 500 mL / NET: 370 mL        Weight (kg): 63.1 (06-11 @ 02:00)      JVP: Normal  Neck: supple  Lung: clear   CV: S1 S2 ,  Abd: soft  Ext: No edema  Psych: flat affect  Skin: normal``    LABS/DATA:    CARDIAC MARKERS:                                11.6   4.03  )-----------( 466      ( 11 Jun 2025 00:57 )             37.4     06-11    129[L]  |  98  |  25[H]  ----------------------------<  118[H]  5.3   |  20[L]  |  0.84    Ca    8.5      11 Jun 2025 06:00  Phos  3.2     06-11  Mg     2.4     06-11    TPro  6.3  /  Alb  2.2[L]  /  TBili  0.7  /  DBili  x   /  AST  28  /  ALT  30  /  AlkPhos  158[H]  06-11    proBNP:   Lipid Profile:   HgA1c:   TSH:

## 2025-06-11 NOTE — PROGRESS NOTE ADULT - SUBJECTIVE AND OBJECTIVE BOX
EP Attending  HISTORY OF PRESENT ILLNESS: HPI:  89F c hx GERD, R breast CA (40-50y ago) s/p B/L mastectomy and radiation, RUE lymphedema, osteoarthritis/osteoporosis (on monthly ibandronate), HLD, Afib on eliquis, HOCM/severe LVOT/ELEAZAR, small-moderate pericardial effusion/RA diastolic collapse, recent hospitalization 5/29-6/5 for RML PNA, GBS bacteremia (pos cultures 5/29, 5/30) of unclear source on total 10 days abx (IV ceftriaxone inpt, then levaquin 750 q48h until 6/9), presents from [facility] rehab with shaking chills, hypotension, poor appetite    History from pt's daughter/primary decision maker Teresitagisel Day at bedside.  While at [facility], pt found to have hypotension to SBP 80s. Pt was given IVF and reduced dose of metoprolol. Pt also found to have tachycardia, shaking chills. Pt sent back to the hospital. Reportedly pt has not been eating well, not urinating much. Denies CP, SOB, abd pain, diarrhea, cough, other respiratory symptoms. At baseline pt ambulates without assistive device, drives.  RRT called in the ed for hypotension to SBP 79. (08 Jun 2025 02:41)    Ms Cloud is a pleasant 89yo woman here with shortness of breath.  Sees Dr Adolfo Leung for Cardiology.  Being managed on this inpatient stay by Dr Coelho.  Known to Dr Young after outpatient referral for HOCM management (lVOT gradient 80s-100mmHg+), and had a brief trial of Mavacamten, stopped for feelings of "leg heaviness".    EP called re: rapid AFib, refractory to low-dose metoprolol thus far, and complicated by management of HCM and new/worsening pericardial effusion.  Resting comfortably in bed on supplemental O2.  SOB and fatigued but no palpitations or fainting.  A 10 pt ROS is otherwise negative.    Date of service 6/11- moved to CCU for closer monitoring.  lethargic / unable to obtain ROS today.  worsening pleural effusion, no plan for tap.  no plan for pericardiocentesis either.  hypotensive requiring midodrine.      PAST MEDICAL & SURGICAL HISTORY:  Breast cancer  s/p b/l mastectomy  H/O bilateral mastectomy  + breast implants  History of cataract surgery, unspecified laterality    acetaminophen     Tablet .. 650 milliGRAM(s) Oral every 6 hours PRN  aspirin enteric coated 81 milliGRAM(s) Oral daily  cefepime   IVPB 1000 milliGRAM(s) IV Intermittent every 8 hours  chlorhexidine 2% Cloths 1 Application(s) Topical <User Schedule>  levalbuterol Inhalation 0.63 milliGRAM(s) Inhalation every 6 hours  melatonin 3 milliGRAM(s) Oral at bedtime PRN  midodrine 20 milliGRAM(s) Oral every 8 hours  pantoprazole    Tablet 40 milliGRAM(s) Oral before breakfast                            11.6   4.03  )-----------( 466      ( 11 Jun 2025 00:57 )             37.4     06-11    129[L]  |  98  |  25[H]  ---------------------------<  118[H]  5.3   |  20[L]  |  0.84    Ca    8.5      11 Jun 2025 06:00  Phos  3.2     06-11  Mg     2.4     06-11    TPro  6.3  /  Alb  2.2[L]  /  TBili  0.7  /  DBili  x   /  AST  28  /  ALT  30  /  AlkPhos  158[H]  06-11    T(C): 36.6 (06-11-25 @ 07:00), Max: 37.4 (06-10-25 @ 14:00)  HR: 97 (06-11-25 @ 08:00) (85 - 153)  BP: 98/48 (06-11-25 @ 08:00) (75/46 - 166/65)  RR: 22 (06-11-25 @ 08:00) (12 - 32)  SpO2: 98% (06-11-25 @ 08:00) (93% - 99%)  Wt(kg): --    I&O's Summary    10 Ernesto 2025 07:01  -  11 Jun 2025 07:00  --------------------------------------------------------  IN: 870 mL / OUT: 500 mL / NET: 370 mL      Appearance: frail elderly woman in no acute distress  HEENT:   Normal oral mucosa, PERRL, EOMI	  Lymphatic: No lymphadenopathy , no edema  Cardiovascular: rapid irregular S1 S2, No JVD, No murmurs , Peripheral pulses palpable 2+ bilaterally  Respiratory: poor air entry BL  normal effort 	  Gastrointestinal:  Soft, Non-tender, + BS	  Skin: No rashes, No ecchymoses, No cyanosis, warm to touch  Musculoskeletal: Normal range of motion, normal strength  Psychiatry:  Mood & affect appropriate    TELEMETRY: AFib, narrow QRS  ECG:  	AFib, atypical LVHypertrphy  Echo:  < from: TTE W or WO Ultrasound Enhancing Agent (06.05.25 @ 07:16) >  CONCLUSIONS:      1. Limited TTE to assess for pericardial effusion.   2. Trace pericardial effusion noted adjacent to the posterolateral left ventricle, small pericardial effusion noted adjacent to the anterior right ventricle and small pericardial effusion noted adjacent to the right atrium.   3. The inferior vena cava is normal in size measuring 1.70 cm in diameter, (normal <2.1cm) with normal inspiratory collapse (normal >50%) consistent with normal right atrial pressure (~3, range 0-5mmHg).   4. Compared to the transthoracic echocardiogram performed on 5/30/2025, the right atrium is not as well visualized on this study. There is right atrial diastolic collapse visualized in the 4 chamber view but unable to quantify the duration of the collapse. The effusion appears unchanged in size and distribution. No other signs of cardiac tamponade are present.    < end of copied text >    CT Chest - personally reviewed the images.  right breat prosthesis with signs of rupture.  left breast prosthesis OK.  prominent LV hypertrophy visible.  pericardial effusion enlarged.  pleural effusion present on the right.  	  ASSESSMENT/PLAN: Ms Cloud is a pleasant 89y Female here with shortness of breath.  Multifactorial in the setting of pericardial effusion, Hypertrophic Cardiomyopathy with severe LVOT obstruction, and rapid AFib.    [id number] is at least 3.  Holding apixaban pending ongoing surveillance of her pericardial effusion.  Deemed high-risk for pericardial window, but not a good enough target for needle based drainage.  Serial imaging of the pericardial effusion. It is enlarging but may not yet be amenable to subxiphoid needle-based tap.  This could be an issue related to pulmonary hypertension and LV diastolic failure, but in the remote setting of breast cancer, this could be late metastatic spread.  Will increase beta blockers to 50mg q6hrs, with holding parameters for low heartrate.    May need midodrine +/- phenylephrine to support her afterload.  Discussed w/ Dr Young, and agree w/ recs to avoid diltiazem and other vasodilators, and to avoid diuretics.  Consider bedside thoracentesis for large effusion, even as a palliative measure.  She is a candidate to re-try Mavacamten (Camzyos)... not sure if her side effects were truly drug related. She did well on this with serial echos the first time. This can only be re-initiated as outpatient!  If she tries/fails Camzyos a 2nd time, can refer for alcohol septal ablation via interventional cardiology.  I let the family know that this is a possibility, but that it is not the next step on this hospitalization.  Once heartrate is controlled / Beta blockers maximized, we can consider CTA+Cardioversion for AFib rhythm control before discharge.  I would need to be positive that no intervention on the pericardial effusion is pending.  Will follow with you.        Barney Lau M.D.  Cardiac Electrophysiology    office [phone]  pager [phone]

## 2025-06-11 NOTE — PROGRESS NOTE ADULT - SUBJECTIVE AND OBJECTIVE BOX
Date of Service: 06-11-25 @ 16:40    Patient is a 89y old  Female who presents with a chief complaint of shaking chills, hypotension, poor appetite (11 Jun 2025 16:23)      Any change in ROS: norma is at bedside:   sheis very tired and sleepy at this ti me.  the pt       MEDICATIONS  (STANDING):  cefTRIAXone   IVPB 2000 milliGRAM(s) IV Intermittent every 24 hours  chlorhexidine 2% Cloths 1 Application(s) Topical <User Schedule>  heparin   Injectable 5000 Unit(s) SubCutaneous every 8 hours  insulin lispro (ADMELOG) corrective regimen sliding scale   SubCutaneous three times a day before meals  insulin lispro (ADMELOG) corrective regimen sliding scale   SubCutaneous at bedtime  levalbuterol Inhalation 0.63 milliGRAM(s) Inhalation every 6 hours  midodrine 20 milliGRAM(s) Oral every 8 hours  pantoprazole    Tablet 40 milliGRAM(s) Oral before breakfast    MEDICATIONS  (PRN):  acetaminophen     Tablet .. 650 milliGRAM(s) Oral every 6 hours PRN Moderate Pain (4 - 6)  melatonin 3 milliGRAM(s) Oral at bedtime PRN Insomnia    Vital Signs Last 24 Hrs  T(C): 36.3 (11 Jun 2025 11:00), Max: 37.3 (10 Ernesto 2025 18:00)  T(F): 97.3 (11 Jun 2025 11:00), Max: 99.1 (10 Ernesto 2025 18:00)  HR: 108 (11 Jun 2025 16:00) (97 - 153)  BP: 133/85 (11 Jun 2025 16:00) (77/44 - 166/65)  BP(mean): 105 (11 Jun 2025 16:00) (56 - 105)  RR: 22 (11 Jun 2025 16:00) (20 - 28)  SpO2: 99% (11 Jun 2025 16:00) (94% - 100%)    Parameters below as of 11 Jun 2025 16:00  Patient On (Oxygen Delivery Method): nasal cannula  O2 Flow (L/min): 4      I&O's Summary    10 Ernesto 2025 07:01  -  11 Jun 2025 07:00  --------------------------------------------------------  IN: 870 mL / OUT: 500 mL / NET: 370 mL    11 Jun 2025 07:01  -  11 Jun 2025 16:40  --------------------------------------------------------  IN: 0 mL / OUT: 870 mL / NET: -870 mL          Physical Exam:   GENERAL: NAD, well-groomed, well-developed  HEENT: BRYSON/   Atraumatic, Normocephalic  ENMT: No tonsillar erythema, exudates, or enlargement; Moist mucous membranes, Good dentition, No lesions  NECK: Supple, No JVD, Normal thyroid  CHEST/LUNG: decreased air entry bilaterally/   CVS: Regular rate and rhythm; No murmurs, rubs, or gallops  GI: : Soft, Nontender, Nondistended; Bowel sounds present  NERVOUS SYSTEM: sleept  EXTREMITIES:--r edema  LYMPH: No lymphadenopathy noted  SKIN: No rashes or lesions  ENDOCRINOLOGY: No Thyromegaly  PSYCH: calm     Labs:  ABG - ( 11 Jun 2025 05:53 )  pH, Arterial: 7.48  pH, Blood: x     /  pCO2: 33    /  pO2: 110   / HCO3: 25    / Base Excess: 1.3   /  SaO2: 99.8            27, 27, 26                            11.6   4.03  )-----------( 466      ( 11 Jun 2025 00:57 )             37.4                         10.3   3.60  )-----------( 467      ( 10 Ernesto 2025 09:31 )             32.1                         10.1   3.45  )-----------( 417      ( 09 Jun 2025 18:46 )             31.5                         10.2   7.11  )-----------( 349      ( 07 Jun 2025 20:56 )             30.1     06-11    129[L]  |  98  |  25[H]  ----------------------------<  118[H]  5.3   |  20[L]  |  0.84  06-11    130[L]  |  98  |  23  ----------------------------<  106[H]  6.3[HH]   |  19[L]  |  0.77  06-10    127[L]  |  96  |  19  ----------------------------<  118[H]  5.2   |  21[L]  |  0.61  06-09    128[L]  |  97  |  22  ----------------------------<  183[H]  4.8   |  21[L]  |  0.62  06-07    129[L]  |  96  |  34[H]  ----------------------------<  168[H]  4.4   |  22  |  0.89    Ca    8.5      11 Jun 2025 06:00  Ca    8.5      11 Jun 2025 01:33  Ca    8.4      10 Ernesto 2025 09:31  Ca    8.5      09 Jun 2025 18:46  Phos  3.2     06-11  Phos  2.8     06-11  Mg     2.4     06-11  Mg     2.4     06-11  Mg     2.4     06-09    TPro  6.3  /  Alb  2.2[L]  /  TBili  0.7  /  DBili  x   /  AST  28  /  ALT  30  /  AlkPhos  158[H]  06-11  TPro  7.0  /  Alb  2.4[L]  /  TBili  0.8  /  DBili  x   /  AST  53[H]  /  ALT  38  /  AlkPhos  198[H]  06-11  TPro  7.0  /  Alb  2.5[L]  /  TBili  0.9  /  DBili  x   /  AST  51[H]  /  ALT  46[H]  /  AlkPhos  218[H]  06-10  TPro  6.9  /  Alb  2.4[L]  /  TBili  0.6  /  DBili  x   /  AST  61[H]  /  ALT  43  /  AlkPhos  233[H]  06-09  TPro  6.7  /  Alb  2.5[L]  /  TBili  0.7  /  DBili  x   /  AST  47[H]  /  ALT  35  /  AlkPhos  192[H]  06-07    CAPILLARY BLOOD GLUCOSE      POCT Blood Glucose.: 143 mg/dL (11 Jun 2025 16:10)  POCT Blood Glucose.: 114 mg/dL (11 Jun 2025 11:08)      LIVER FUNCTIONS - ( 11 Jun 2025 06:00 )  Alb: 2.2 g/dL / Pro: 6.3 g/dL / ALK PHOS: 158 U/L / ALT: 30 U/L / AST: 28 U/L / GGT: x           PT/INR - ( 10 Ernesto 2025 09:31 )   PT: 25.1 sec;   INR: 2.22 ratio           Urinalysis Basic - ( 11 Jun 2025 06:00 )    Color: x / Appearance: x / SG: x / pH: x  Gluc: 118 mg/dL / Ketone: x  / Bili: x / Urobili: x   Blood: x / Protein: x / Nitrite: x   Leuk Esterase: x / RBC: x / WBC x   Sq Epi: x / Non Sq Epi: x / Bacteria: x            RECENT CULTURES:  06-07 @ 20:45 Blood Blood-Peripheral                No growth at 72 Hours    06-07 @ 20:30 Blood Blood-Peripheral            rad< from: CT Chest No Cont (06.11.25 @ 10:36) >  HEART: Cardiomegaly. Large pericardial effusion, increased in size from   prior exam. Mitral annular calcifications. Aortic valvular calcifications.  MEDIASTINUM AND AWA: Prominent mediastinal lymph nodes.  CHEST WALL AND LOWER NECK: Bilateral breast implants with intracapsular   rupture. Subcentimeter hypodense nodule in the right thyroid lobe.  VISUALIZED UPPER ABDOMEN: Partially visualized left renal cyst.  BONES: Degenerative changes. Old right-sided rib fractures.    IMPRESSION:  *  Large pericardial effusion, increased in size when compared to   6/4/2024.  *  Moderate right and small left pleural effusion, slightly increased in   size when compared to prior exam.    --- End of Report ---           MELIA KAUFFMAN MD; Resident Radiologist  This document has been electronically signed.  YARELY CORONEL DO; Attending Radiologist  This document has been electronically signed. Jun 11 2025 11:12AM    < end of copied text >      No growth at 72 Hours          RESPIRATORY CULTURES:          Studies  Chest X-RAY  CT SCAN Chest   Venous Dopplers: LE:   CT Abdomen  Others

## 2025-06-11 NOTE — CONSULT NOTE ADULT - PROBLEM SELECTOR RECOMMENDATION 3
- HCP reportedly pt's dtr Teresita  - Code status discussed today, FULL  - GOC: continue with all interventions without limitations

## 2025-06-11 NOTE — PROGRESS NOTE ADULT - ASSESSMENT
89-y/o female with PMHx of HOCM with severe LVOT obstruction, atrial fibrillation on eliquis, small-moderate pericardial effusion with RA diastolic collapse, recent hospitalization (5/29-6/5) for RML pneumonia and GBS bacteremia, and remote history of right breast cancer status post bilateral mastectomy with radiation who presented with septic shock and hypotension, initially managed on the floor before requiring CCU admission for hemodynamic instability secondary to enlarging pericardial effusion, rapid atrial fibrillation, and decompensated heart failure. Thoracic surgery consulted for pericardial window.     - Previous TTE & CT Chest reviewed by Dr. Canada - TTE without evidence of tamponade physiology, hemodynamically stable, patient is high risk for pericardial window per Thoracic Attending, recommend continue to monitor pericardial effusion with repeat TTE and CT chest today. Per RN at bedside, primary team to discuss GOC with patient's family today. Thoracic will continue to follow.

## 2025-06-11 NOTE — PROGRESS NOTE ADULT - SUBJECTIVE AND OBJECTIVE BOX
[facility]  BASIL Hooks Y. Patel, S. Shah, G. Casimir  [phone]  ([phone] - weekdays after 5pm and weekends)    Name: GINO GRAHAM  Age/Gender: 89y Female  MRN: [id number]    Interval History:  Patient seen and examined this morning.   Resting comfortably, limited ROS.   Notes reviewed  No concerning overnight events  Afebrile   Allergies: Zosyn (Rash; Urticaria; Hives)      Objective:  Vitals:   T(F): 97.9 (06-11-25 @ 07:00), Max: 99.3 (06-10-25 @ 14:00)  HR: 97 (06-11-25 @ 08:00) (85 - 153)  BP: 98/48 (06-11-25 @ 08:00) (75/46 - 166/65)  RR: 22 (06-11-25 @ 08:00) (12 - 32)  SpO2: 98% (06-11-25 @ 08:00) (93% - 99%)  Physical Examination:  General: no acute distress, NC   HEENT: normocephalic, atraumatic, anicteric  Respiratory: decreased breath sounds b/l   Cardiovascular: S1 and S2 present, tachycardia   Gastrointestinal: soft, nontender, nondistended  Extremities: no edema, no cyanosis  Skin: no visible rash    Laboratory Studies:  CBC:                       11.6   4.03  )-----------( 466      ( 11 Jun 2025 00:57 )             37.4     WBC Trend:  4.03 06-11-25 @ 00:57  3.60 06-10-25 @ 09:31  3.45 06-09-25 @ 18:46  7.11 06-07-25 @ 20:56    CMP: 06-11    129[L]  |  98  |  25[H]  ----------------------------<  118[H]  5.3   |  20[L]  |  0.84    Ca    8.5      11 Jun 2025 06:00  Phos  3.2     06-11  Mg     2.4     06-11    TPro  6.3  /  Alb  2.2[L]  /  TBili  0.7  /  DBili  x   /  AST  28  /  ALT  30  /  AlkPhos  158[H]  06-11    Creatinine: 0.84 mg/dL (06-11-25 @ 06:00)  Creatinine: 0.77 mg/dL (06-11-25 @ 01:33)  Creatinine: 0.61 mg/dL (06-10-25 @ 09:31)  Creatinine: 0.62 mg/dL (06-09-25 @ 18:46)  Creatinine: 0.89 mg/dL (06-07-25 @ 20:56)  Creatinine: 0.59 mg/dL (06-05-25 @ 07:15)    LIVER FUNCTIONS - ( 11 Jun 2025 06:00 )  Alb: 2.2 g/dL / Pro: 6.3 g/dL / ALK PHOS: 158 U/L / ALT: 30 U/L / AST: 28 U/L / GGT: x           Microbiology: reviewed   Urinalysis with Rflx Culture (collected 06-08-25 @ 13:01)    Culture - Blood (collected 06-07-25 @ 20:45)  Source: Blood Blood-Peripheral  Preliminary Report (06-11-25 @ 02:02):    No growth at 72 Hours    Culture - Blood (collected 06-07-25 @ 20:30)  Source: Blood Blood-Peripheral  Preliminary Report (06-11-25 @ 02:02):    No growth at 72 Hours    Culture - Blood (collected 06-01-25 @ 10:51)  Source: Blood Blood-Venous  Final Report (06-06-25 @ 14:00):    No growth at 5 days    Culture - Blood (collected 05-31-25 @ 20:48)  Source: Blood Blood-Peripheral  Final Report (06-06-25 @ 02:01):    No growth at 5 days    Culture - Blood (collected 05-30-25 @ 07:16)  Source: Blood Blood  Gram Stain (05-30-25 @ 20:47):    Growth in anaerobic bottle: Gram Positive Cocci in Pairs and Chains    Growth in aerobic bottle: Gram Positive Cocci in Pairs and Chains  Final Report (05-31-25 @ 15:48):    Growth in aerobic and anaerobic bottles: Streptococcus agalactiae (Group    B)    See previous culture [id number]    Culture - Blood (collected 05-30-25 @ 07:16)  Source: Blood Blood  Gram Stain (05-30-25 @ 19:54):    Growth in aerobic and anaerobic bottles: Gram Positive Cocci in Pairs and    Chains  Final Report (05-31-25 @ 15:47):    Growth in aerobic and anaerobic bottles: Streptococcus agalactiae (Group    B)    See previous culture [id number]    Urinalysis with Rflx Culture (collected 05-29-25 @ 16:21)    Culture - Blood (collected 05-29-25 @ 13:45)  Source: Blood Blood-Peripheral  Gram Stain (05-30-25 @ 01:47):    Growth in anaerobic bottle: Gram Positive Cocci in Pairs and Chains    Growth in aerobic bottle: Gram Positive Cocci in Pairs and Chains  Final Report (05-31-25 @ 18:43):    Growth in aerobic and anaerobic bottles: Streptococcus agalactiae (Group    B)    See previous culture [id number]    Culture - Blood (collected 05-29-25 @ 13:30)  Source: Blood Blood-Peripheral  Gram Stain (05-30-25 @ 01:45):    Growth in aerobic bottle: Gram Positive Cocci in Pairs and Chains    Growth in anaerobic bottle: Gram Positive Cocci in Pairs and Chains  Final Report (06-02-25 @ 15:48):    Growth in aerobic and anaerobic bottles: Streptococcus agalactiae (Group    B) ***********Note************    This isolate demonstrates inducible    clindamycin resistance.    Clindamycin may still be effective in some patients.    Direct identification is available within approximately 3-5    hours either by Blood Panel Multiplexed PCR or Direct    MALDI-TOF. Details: [email or web address]  Organism: Blood Culture PCR  Streptococcus agalactiae (Group B) (06-02-25 @ 15:48)  Organism: Streptococcus agalactiae (Group B) (06-02-25 @ 15:48)      -  Levofloxacin: S 0.5      -  Clindamycin: R 0.25      -  Vancomycin: S 0.5      -  Ceftriaxone: S <=0.25      -  Tetracycline: R >4      Method Type: TATIANA      -  Penicillin: S <=0.03 Predicts results for ampicillin, amoxicillin, amoxicillin/clavulanate, ampicillin/sulbactam, 1st, 2nd and 3rd generation cephalosporins and carbapenems.  Organism: Blood Culture PCR (06-02-25 @ 15:48)      -  Streptococcus agalactiae (Group B): Detec      Method Type: PCR    06-07-25 @ 22:31 SARS-CoV-2 NotDetec/Influenza A NotDetec/Influenza B NotDetec/RSV NotDetec    Radiology: reviewed     Medications:  acetaminophen     Tablet .. 650 milliGRAM(s) Oral every 6 hours PRN  aspirin enteric coated 81 milliGRAM(s) Oral daily  cefepime   IVPB 1000 milliGRAM(s) IV Intermittent every 8 hours  chlorhexidine 2% Cloths 1 Application(s) Topical <User Schedule>  levalbuterol Inhalation 0.63 milliGRAM(s) Inhalation every 6 hours  melatonin 3 milliGRAM(s) Oral at bedtime PRN  midodrine 20 milliGRAM(s) Oral every 8 hours  pantoprazole    Tablet 40 milliGRAM(s) Oral before breakfast    Current Antimicrobials:  cefepime   IVPB 1000 milliGRAM(s) IV Intermittent every 8 hours    Prior/Completed Antimicrobials:  cefepime   IVPB  vancomycin  IVPB.

## 2025-06-11 NOTE — CONSULT NOTE ADULT - ASSESSMENT
89F with GERD, R breast CA (40-50y ago) s/p B/L mastectomy and radiation, RUE lymphedema, osteoarthritis/osteoporosis (on monthly ibandronate), HLD, Afib on eliquis, HOCM/severe LVOT/ELEAZAR, small-moderate pericardial effusion/RA diastolic collapse, recent hospitalization 5/29-6/5 for RML PNA, GBS bacteremia (pos cultures 5/29, 5/30) of unclear source on total 10 days abx (IV ceftriaxone inpt, then levaquin 750 q48h until 6/9), presents from [facility] with shaking chills, hypotension, poor appetite, found to have worsening pericardial effusion. Pt is currently in the CCU for close monitoring and management Geriatrics and Palliative Medicine Team is consulted for GOC

## 2025-06-11 NOTE — CONSULT NOTE ADULT - CONVERSATION DETAILS
Discussion held with pt's dtr Teresita. Role of self introduced with acceptance. Teresita shared that 2 weeks prior to current admission, pt was independent, driving and caring for her  at home. She has a total of 3 children (Teresita and her 2 brothers) who are closely involved with both parents' care. Teresita declares to be pt's HCP.     Teresita has very good understanding of pt's current conditions, as she has spoken to the CCU attending this morning. Reflecting upon her very recent, good baseline functional status, Teresita is focused on doing everything possible to treat pt's pericardial effusion. She is waiting for the IR team to decide if a pericardiocentesis can be performed. In terms of code status, while she recognizes that pt would not want resuscitative measures if there is no chance of meaningful recovery, she feels that at THIS moment, as we are dealing with a potentially reversible cause, she would like for pt to remain FULL code. She understands that this conversation may continue depending on her clinical course and management options. She welcomes my continued following for support.

## 2025-06-12 ENCOUNTER — RESULT REVIEW (OUTPATIENT)
Age: 89
End: 2025-06-12

## 2025-06-12 NOTE — DIETITIAN INITIAL EVALUATION ADULT - PROBLEM SELECTOR PLAN 3
- reduce metoprolol dose to tartrate 25mg BID  - goal HR <100 in setting of HOCM with hypotension  - consider amiodarone or cardioversion  - needs card consult in AM  - tele

## 2025-06-12 NOTE — PROGRESS NOTE ADULT - SUBJECTIVE AND OBJECTIVE BOX
Date of Service: 06-12-25 @ 16:02    Patient is a 89y old  Female who presents with a chief complaint of shaking chills, hypotension, poor appetite    Per chart, pt is an 89 female with PMH HOCM/severe LVOT obstruction, AFib on eliquis, pericardial effusion, breast CA s/p XRT with CC septic shock after hospital course c/b demand ischemia, AFib RVR, enlarging pericardial effusion. CCU admission for hemodynamic monitoring and management of moderate-severe pericardial effusion. (12 Jun 2025 09:55)      Any change in ROS: she is sleepy:  as was sedated for the procedure:   now has pericardial drain :   on vasopressors     MEDICATIONS  (STANDING):  cefTRIAXone   IVPB 2000 milliGRAM(s) IV Intermittent every 24 hours  chlorhexidine 2% Cloths 1 Application(s) Topical <User Schedule>  insulin lispro (ADMELOG) corrective regimen sliding scale   SubCutaneous three times a day before meals  insulin lispro (ADMELOG) corrective regimen sliding scale   SubCutaneous at bedtime  levalbuterol Inhalation 0.63 milliGRAM(s) Inhalation every 6 hours  midodrine 20 milliGRAM(s) Oral every 8 hours  norepinephrine Infusion 0.02 MICROgram(s)/kG/Min (2.37 mL/Hr) IV Continuous <Continuous>  pantoprazole    Tablet 40 milliGRAM(s) Oral before breakfast  QUEtiapine 12.5 milliGRAM(s) Oral at bedtime  sodium chloride 3%  Inhalation 4 milliLiter(s) Inhalation every 12 hours    MEDICATIONS  (PRN):  acetaminophen     Tablet .. 650 milliGRAM(s) Oral every 6 hours PRN Moderate Pain (4 - 6)  ipratropium    for Nebulization 500 MICROGram(s) Nebulizer every 6 hours PRN Shortness of Breath and/or Wheezing  melatonin 3 milliGRAM(s) Oral at bedtime PRN Insomnia    Vital Signs Last 24 Hrs  T(C): 36.4 (12 Jun 2025 15:00), Max: 37.1 (11 Jun 2025 19:00)  T(F): 97.6 (12 Jun 2025 15:00), Max: 98.7 (11 Jun 2025 19:00)  HR: 118 (12 Jun 2025 15:00) (101 - 152)  BP: 90/53 (12 Jun 2025 08:27) (90/53 - 184/74)  BP(mean): 132 (12 Jun 2025 02:00) (72 - 134)  RR: 27 (12 Jun 2025 15:00) (15 - 48)  SpO2: 96% (12 Jun 2025 15:00) (95% - 100%)    Parameters below as of 12 Jun 2025 15:00  Patient On (Oxygen Delivery Method): nasal cannula  O2 Flow (L/min): 4      I&O's Summary    11 Jun 2025 07:01  -  12 Jun 2025 07:00  --------------------------------------------------------  IN: 230 mL / OUT: 1175 mL / NET: -945 mL    12 Jun 2025 07:01  -  12 Jun 2025 16:02  --------------------------------------------------------  IN: 2.4 mL / OUT: 320 mL / NET: -317.6 mL          Physical Exam:   GENERAL: NAD, well-groomed, well-developed  HEENT: BRYSON/   Atraumatic, Normocephalic  ENMT: No tonsillar erythema, exudates, or enlargement; Moist mucous membranes, Good dentition, No lesions  NECK: Supple, No JVD, Normal thyroid  CHEST/LUNG: Clear to auscultaion-- no wheezing  CVS: Regular rate and rhythm; No murmurs, rubs, or gallops  GI: : Soft, Nontender, Nondistended; Bowel sounds present  NERVOUS SYSTEM:  sleepy  EXTREMITIES:- edema  LYMPH: No lymphadenopathy noted  SKIN: No rashes or lesions  ENDOCRINOLOGY: No Thyromegaly  PSYCH: sleepy    Labs:  ABG - ( 12 Jun 2025 03:19 )  pH, Arterial: 7.38  pH, Blood: x     /  pCO2: 39    /  pO2: 101   / HCO3: 23    / Base Excess: -1.8  /  SaO2: 98.6            36.0, 27, 27, 26                            9.4    3.40  )-----------( 452      ( 12 Jun 2025 01:26 )             29.5                         11.6   4.03  )-----------( 466      ( 11 Jun 2025 00:57 )             37.4                         10.3   3.60  )-----------( 467      ( 10 Ernesto 2025 09:31 )             32.1                         10.1   3.45  )-----------( 417      ( 09 Jun 2025 18:46 )             31.5     06-12    131[L]  |  100  |  23  ----------------------------<  101[H]  5.2   |  23  |  0.65  06-12    129[L]  |  99  |  25[H]  ----------------------------<  138[H]  5.4[H]   |  19[L]  |  0.70  06-12    132[L]  |  98  |  28[H]  ----------------------------<  134[H]  5.7[H]   |  22  |  0.81  06-11    129[L]  |  98  |  25[H]  ----------------------------<  118[H]  5.3   |  20[L]  |  0.84  06-11    130[L]  |  98  |  23  ----------------------------<  106[H]  6.3[HH]   |  19[L]  |  0.77  06-10    127[L]  |  96  |  19  ----------------------------<  118[H]  5.2   |  21[L]  |  0.61  06-09    128[L]  |  97  |  22  ----------------------------<  183[H]  4.8   |  21[L]  |  0.62    Ca    8.4      12 Jun 2025 12:45  Ca    8.4      12 Jun 2025 05:33  Ca    8.6      12 Jun 2025 01:26  Ca    8.5      11 Jun 2025 06:00  Ca    8.5      11 Jun 2025 01:33  Phos  2.6     06-12  Phos  2.9     06-12  Phos  3.2     06-11  Phos  2.8     06-11  Mg     2.3     06-12  Mg     2.4     06-12  Mg     2.4     06-11  Mg     2.4     06-11    TPro  6.5  /  Alb  2.8[L]  /  TBili  0.5  /  DBili  x   /  AST  23  /  ALT  23  /  AlkPhos  147[H]  06-12  TPro  6.8  /  Alb  2.6[L]  /  TBili  0.6  /  DBili  x   /  AST  28  /  ALT  28  /  AlkPhos  168[H]  06-12  TPro  7.0  /  Alb  2.7[L]  /  TBili  0.6  /  DBili  x   /  AST  32  /  ALT  29  /  AlkPhos  173[H]  06-12  TPro  6.3  /  Alb  2.2[L]  /  TBili  0.7  /  DBili  x   /  AST  28  /  ALT  30  /  AlkPhos  158[H]  06-11  TPro  7.0  /  Alb  2.4[L]  /  TBili  0.8  /  DBili  x   /  AST  53[H]  /  ALT  38  /  AlkPhos  198[H]  06-11  TPro  7.0  /  Alb  2.5[L]  /  TBili  0.9  /  DBili  x   /  AST  51[H]  /  ALT  46[H]  /  AlkPhos  218[H]  06-10    CAPILLARY BLOOD GLUCOSE      POCT Blood Glucose.: 108 mg/dL (12 Jun 2025 11:27)  POCT Blood Glucose.: 131 mg/dL (12 Jun 2025 07:48)  POCT Blood Glucose.: 221 mg/dL (12 Jun 2025 02:54)  POCT Blood Glucose.: 137 mg/dL (12 Jun 2025 02:15)  POCT Blood Glucose.: 116 mg/dL (11 Jun 2025 22:22)  POCT Blood Glucose.: 143 mg/dL (11 Jun 2025 16:10)      LIVER FUNCTIONS - ( 12 Jun 2025 12:45 )  Alb: 2.8 g/dL / Pro: 6.5 g/dL / ALK PHOS: 147 U/L / ALT: 23 U/L / AST: 23 U/L / GGT: x             Urinalysis Basic - ( 12 Jun 2025 12:45 )    Color: x / Appearance: x / SG: x / pH: x  Gluc: 101 mg/dL / Ketone: x  / Bili: x / Urobili: x   Blood: x / Protein: x / Nitrite: x   Leuk Esterase: x / RBC: x / WBC x   Sq Epi: x / Non Sq Epi: x / Bacteria: x      Fluid Source --  Albumin, Fluid1.9 g/dL  Glucose, [id number] mg/dL  Protein total, Fluid4.3 g/dL  Lacatate Dehydrogenase, [id number] U/L  pH, Fluid--  Cytopathology-Non Gyn Report--        RECENT CULTURES:  06-07 @ 20:45 Blood Blood-Peripheral                No growth at 4 days    06-07 @ 20:30 Blood Blood-Peripheral                No growth at 4 days          RESPIRATORY CULTURES:      rad< from: CT Chest No Cont (06.11.25 @ 10:36) >  the bilateral lung apices as well as the right middle lobe, likely   representing postradiation changes, unchanged. Partial atelectasis of the   bilateral lower lobes.  PLEURA: Small left and moderate right pleural effusions with adjacent   passive atelectasis, increased in size when compared to prior exam.  VESSELS: Aortic calcifications. Coronary artery calcifications. Prominent   ascending aorta measuring 4.2 cm in diameter.  HEART: Cardiomegaly. Large pericardial effusion, increased in size from   prior exam. Mitral annular calcifications. Aortic valvular calcifications.  MEDIASTINUM AND AWA: Prominent mediastinal lymph nodes.  CHEST WALL AND LOWER NECK: Bilateral breast implants with intracapsular   rupture. Subcentimeter hypodense nodule in the right thyroid lobe.  VISUALIZED UPPER ABDOMEN: Partially visualized left renal cyst.  BONES: Degenerative changes. Old right-sided rib fractures.    IMPRESSION:  *  Large pericardial effusion, increased in size when compared to   6/4/2024.  *  Moderate right and small left pleural effusion, slightly increased in   size when compared to prior exam.    --- End of Report ---           MELIA KAUFFMAN MD; Resident Radiologist  This document has been electronically signed.  YARELY CORONEL DO; Attending Radiologist  This document has been electronically signed. Ernesto 11 2025 11:12AM    < end of copied text >  · Plan	to pleruevac  · Patient Condition/Disposition	Back to ICU  · Procedure Findings and Details	left intercostal pericardial drain placement with us and ct  · Local Anesthesia	1% Lidocaine  · Sedation	Provided by Anesthesia Department  · Contrast	None  · Complications	No complications  · Estimated Blood Loss	Minimal  · Specimen Obtained	Fluid sent for chemistry, Fluid sent for cytology, Fluid sent for gram stain and culture, 400cc serosanguinous pericardial fluid  · Access Site (if applicable)	Left, 8 Maori drain  · Procedure Performed By	Myself  · Informed Consent	Benefits, risks, and possible complications of procedure explained to patient/caregiver who verbalized understanding and gave written consent.      Studies  Chest X-RAY  CT SCAN Chest   Venous Dopplers: LE:   CT Abdomen  Others

## 2025-06-12 NOTE — PROGRESS NOTE ADULT - SUBJECTIVE AND OBJECTIVE BOX
Patient is a 89y old  Female who presents with a chief complaint of shaking chills, hypotension, poor appetite (12 Jun 2025 07:49)      Vital Signs Last 24 Hrs  T(C): 36.4 (06-12-25 @ 08:27), Max: 37.1 (06-11-25 @ 19:00)  T(F): 97.5 (06-12-25 @ 08:22), Max: 98.7 (06-11-25 @ 19:00)  HR: 107 (06-12-25 @ 08:27) (98 - 152)  BP: 90/53 (06-12-25 @ 08:27) (77/44 - 184/74)  RR: 15 (06-12-25 @ 08:27) (15 - 48)  SpO2: 96% (06-12-25 @ 08:27) (94% - 100%)            06-11-25 @ 07:01  -  06-12-25 @ 07:00  --------------------------------------------------------  IN: 230 mL / OUT: 1175 mL / NET: -945 mL                            9.4    3.40  )-----------( 452      ( 12 Jun 2025 01:26 )             29.5     129[L]  |  99  |  25[H]  ----------------------------<  138[H]  5.4[H]   |  19[L]  |  0.70    Phos  2.6     06-12  Mg     2.3     06-12  AST  28  /  ALT  28  /  AlkPhos  168[H]  06-12          PHYSICAL EXAM  Neurology: A&Ox3, NAD  CV : RRR+S1S2  Lungs: Respirations non-labored, B/L BS CTA  Abdomen: Soft, NT/ND, +[id number]  Extremities: B/L LE warm, no edema, +PP             MEDICATIONS  acetaminophen     Tablet .. 650 milliGRAM(s) Oral every 6 hours PRN  cefTRIAXone   IVPB 2000 milliGRAM(s) IV Intermittent every 24 hours  chlorhexidine 2% Cloths 1 Application(s) Topical <User Schedule>  insulin lispro (ADMELOG) corrective regimen sliding scale   SubCutaneous three times a day before meals  insulin lispro (ADMELOG) corrective regimen sliding scale   SubCutaneous at bedtime  ipratropium    for Nebulization 500 MICROGram(s) Nebulizer every 6 hours PRN  levalbuterol Inhalation 0.63 milliGRAM(s) Inhalation every 6 hours  melatonin 3 milliGRAM(s) Oral at bedtime PRN  midodrine 20 milliGRAM(s) Oral every 8 hours  pantoprazole    Tablet 40 milliGRAM(s) Oral before breakfast  QUEtiapine 12.5 milliGRAM(s) Oral at bedtime  sodium chloride 3%  Inhalation 4 milliLiter(s) Inhalation every 12 hours

## 2025-06-12 NOTE — PROCEDURE NOTE - NSPROCDETAILS_GEN_ALL_CORE
location identified, draped/prepped, sterile technique used, needle inserted/introduced/positive blood return obtained via catheter/connected to a pressurized flush line/sutured in place/hemostasis with direct pressure, dressing applied/Seldinger technique/all materials/supplies accounted for at end of procedure
location identified, draped/prepped, sterile technique used/sterile dressing applied/sterile technique, catheter placed/supine position/ultrasound guidance

## 2025-06-12 NOTE — PROGRESS NOTE ADULT - SUBJECTIVE AND OBJECTIVE BOX
Patient is a 89y old  Female who presents with a chief complaint of shaking chills, hypotension, poor appetite (12 Jun 2025 17:35)    INTERVAL HISTORY:  - s/p pericardial drainage with IR today    SUBJECTIVE  - Patient seen and evaluated at bedside.     MEDICATIONS:  MEDICATIONS  (STANDING):  acetaminophen   IVPB .. 1000 milliGRAM(s) IV Intermittent once  artificial  tears Solution 1 Drop(s) Both EYES two times a day  cefTRIAXone   IVPB 2000 milliGRAM(s) IV Intermittent every 24 hours  chlorhexidine 2% Cloths 1 Application(s) Topical <User Schedule>  insulin lispro (ADMELOG) corrective regimen sliding scale   SubCutaneous at bedtime  insulin lispro (ADMELOG) corrective regimen sliding scale   SubCutaneous three times a day before meals  levalbuterol Inhalation 0.63 milliGRAM(s) Inhalation every 6 hours  metoprolol tartrate 12.5 milliGRAM(s) Oral every 12 hours  midodrine 20 milliGRAM(s) Oral every 8 hours  pantoprazole    Tablet 40 milliGRAM(s) Oral before breakfast  QUEtiapine 12.5 milliGRAM(s) Oral at bedtime  sodium chloride 0.9% Bolus 500 milliLiter(s) IV Bolus once  sodium chloride 3%  Inhalation 4 milliLiter(s) Inhalation every 12 hours    MEDICATIONS  (PRN):  acetaminophen     Tablet .. 650 milliGRAM(s) Oral every 6 hours PRN Moderate Pain (4 - 6)  ipratropium    for Nebulization 500 MICROGram(s) Nebulizer every 6 hours PRN Shortness of Breath and/or Wheezing  melatonin 3 milliGRAM(s) Oral at bedtime PRN Insomnia    OBJECTIVE:  ICU Vital Signs Last 24 Hrs  T(C): 36.4 (12 Jun 2025 15:00), Max: 37.1 (12 Jun 2025 03:00)  T(F): 97.6 (12 Jun 2025 15:00), Max: 98.7 (12 Jun 2025 03:00)  HR: 111 (12 Jun 2025 18:00) (103 - 152)  BP: 90/53 (12 Jun 2025 08:27) (90/53 - 166/93)  BP(mean): 132 (12 Jun 2025 02:00) (84 - 134)  ABP: 79/47 (12 Jun 2025 18:00) (79/47 - 143/61)  ABP(mean): 61 (12 Jun 2025 18:00) (59 - 96)  RR: 32 (12 Jun 2025 18:00) (15 - 43)  SpO2: 98% (12 Jun 2025 18:00) (95% - 100%)    O2 Parameters below as of 12 Jun 2025 18:00  Patient On (Oxygen Delivery Method): nasal cannula  O2 Flow (L/min): 4    CAPILLARY BLOOD GLUCOSE  POCT Blood Glucose.: 90 mg/dL (12 Jun 2025 16:20)  POCT Blood Glucose.: 108 mg/dL (12 Jun 2025 11:27)  POCT Blood Glucose.: 131 mg/dL (12 Jun 2025 07:48)  POCT Blood Glucose.: 221 mg/dL (12 Jun 2025 02:54)  POCT Blood Glucose.: 137 mg/dL (12 Jun 2025 02:15)  POCT Blood Glucose.: 116 mg/dL (11 Jun 2025 22:22)    CAPILLARY BLOOD GLUCOSE  POCT Blood Glucose.: 90 mg/dL (12 Jun 2025 16:20)    I&O's Summary  11 Jun 2025 07:01  -  12 Jun 2025 07:00  --------------------------------------------------------  IN: 230 mL / OUT: 1175 mL / NET: -945 mL    12 Jun 2025 07:01  -  12 Jun 2025 19:51  --------------------------------------------------------  IN: 2.4 mL / OUT: 385 mL / NET: -382.6 mL    Daily Height in cm: 157.48 (12 Jun 2025 08:27)      PHYSICAL EXAM:  General: NAD, well-groomed, well-developed  Chest: Clear to auscultation bilaterally; no rales, rhonchi, or wheezing  Heart: Regular rate and rhythm; normal S1 and S2  Abd: Soft, nontender, nondistended  Nervous System: AAOX3  Ext: no peripheral LE edema bilaterally    LABS:  ABG - ( 12 Jun 2025 03:19 )  pH, Arterial: 7.38  pH, Blood: x     /  pCO2: 39    /  pO2: 101   / HCO3: 23    / Base Excess: -1.8  /  SaO2: 98.6                          9.4    3.40  )-----------( 452      ( 12 Jun 2025 01:26 )             29.5     06-12    131[L]  |  100  |  23  ----------------------------<  101[H]  5.2   |  23  |  0.65    Ca    8.4      12 Jun 2025 12:45  Phos  2.6     06-12  Mg     2.3     06-12    TPro  6.5  /  Alb  2.8[L]  /  TBili  0.5  /  DBili  x   /  AST  23  /  ALT  23  /  AlkPhos  147[H]  06-12    LIVER FUNCTIONS - ( 12 Jun 2025 12:45 )  Alb: 2.8 g/dL / Pro: 6.5 g/dL / ALK PHOS: 147 U/L / ALT: 23 U/L / AST: 23 U/L / GGT: x             ASSESSMENT: 89F with PMH HOCM/severe LVOT obstruction, AFib on eliquis, pericardial effusion, breast CA s/p XRT with CC septic shock after hospital course c/b demand ischemia, AFib RVR, enlarging pericardial effusion. CCU admission for hemodynamic monitoring and management of moderate-severe pericardial effusion.    Plan:  NEURO  A&Ox3  - sundowning and anxiety overnight, continue seroquel 12.5  - continue to monitor mental status as per protocol     RESPIRATORY  #Acute hypoxic respiratory failure  - likely multifactorial in etiology  - nebs q6  - continue to monitor SpO2 with goal >94%    CARDIO  #Pericardial effusion  - s/p pericardiocentesis 6/12 with IR -400cc sanguinous fluid removed   - holding AC  - monitor drain output  - fluid PRN for transient hypotension  - Arnaldo if pressors required given HOCM    #Atrial fibrillation with RVR  - rate control: lopressor 12.5 BID  - AC: holding post drainage  - EP following    RENAL/  #CKD  - baseline Cr 0.6-0.9  - hold diuresis with HOCM & pericardial effusion  - Continue monitoring urine output, lytes, SCr/ BUN  - replete lytes prn with goal K >4 and Mg >2    GI  Tolerating PO diet    ENDO  No active issues    HEME  - Monitor H/H and plts  - DVT PPX: SCDs    ID  #Sepsis/bacteremia  - Recent GBS bacteremia, current source unclear  - Continue cefepime 1g q8h  - Blood cultures pending  - ID consulted  - monitor and trend WBC and temperature curve     Dispo: Maintain in ICU.    I have personally provided 35 minutes of direct critical care time, excluding time spent on separate procedures, in addition to the CICU Attending Dr. Cazares.    Swapna Castillo PA-C

## 2025-06-12 NOTE — PROGRESS NOTE ADULT - ASSESSMENT
89F c hx GERD, R breast CA (40-50y ago) s/p B/L mastectomy and radiation, RUE lymphedema, osteoarthritis/osteoporosis (on monthly ibandronate), HLD, Afib on eliquis, HOCM/severe LVOT/ELEAZAR, small-moderate pericardial effusion/RA diastolic collapse, recent hospitalization 5/29-6/5 for RML PNA, GBS bacteremia (pos cultures 5/29, 5/30) of unclear source on total 10 days abx (IV ceftriaxone inpt, then levaquin 750 q48h until 6/9), pw hypotension, severe sepsis of unclear source, demand ischemia, afib c rvr    Severe Sepsis/sirs, Hypotension 2/2 unclear source  Severe LVOT obstruction/HCM, worsening pericardial effusion  CXR w/ worsening aeration at the bases  Flu/COVID/RSV negative  UA negative  6/7 Bcx NGTD x2   Zosyn allergy noted, tolerates cefepime  CTAP with mod-large pericardial effusion inc since 5/29/25, stable mod R and small L pleural effusion and atelectasis   TTE with increased pericardial effusion since 6/5 -- moderate pericardial effusion adjacent to RV, small pericardial effusion adjacent to RA and large pericardia effusion noted adjacent to posterior LV with no evidence of tamponade physiology  6/10 note with tachypnea and expiratory wheeze, CCU consulted for decompensated heart failure iso mod-severe pericardial effusion with concerns for impending hemodynamic collapse given complex cardiac physiology with severe LVOT obstruction, now transferred to CICU for closer monitor and management of enlarging pericardial effusion  CTS eval noted-patient high risk for pericardial window - rec continue to monitor pericardial effusion with repeat TTE  6/11 CT chest with large pericardial effusion increased since 6/4, mod R and small L pleural effusion, no pneumonia;  cefepime changed to ceftriaxone d/t concern for possible neurotoxicity   6/12 noted patient hypotensive and tachycardic despite volume resuscitation, sent for emergent pericardiocentesis     s/p vancomycin 6/7-6/10  s/p cefepime 6/7-6/11    Recommendations:   IR following-emergent pericardiocentesis today, send cultures  Continue ceftriaxone 2g IV Q24h   IC, CTS, Pulmonary following   Trend temps/WBC  Continue rest of care per primary team       Wes Grullon M.D.  [facility]  Available on [employer] TEAMS - *PREFERRED*  [phone]  After 5pm on weekdays and all day on weekends - please call [phone]     Thank you for consulting us and involving us in the management of this patients case. In addition to reviewing history, imaging, documents, labs, microbiology, took into account antibiotic stewardship, local antibiogram and infection control strategies and potential transmission issues at time of treatment decision making process.

## 2025-06-12 NOTE — DIETITIAN INITIAL EVALUATION ADULT - ADD RECOMMEND
1) When feasible, recommend Regular diet; monitor K+ levels and need for dietary restriction pending improvement in po intake.  2) When feasible, recommend add Ensure Plus High Protein 3x/day to help support optimal protein-energy intake.  3) Monitor po intake, GI tolerance, skin integrity, labs, weight, and BM regularity.   4) Honor food preferences as feasible. Assist with meals PRN and encourage po intake.  5) malnutrition alert placed in chart

## 2025-06-12 NOTE — DIETITIAN INITIAL EVALUATION ADULT - NSFNSGIIOFT_GEN_A_CORE
Denies nausea, vomiting, constipation, diarrhea. Reports last BM xx. Pt not currently ordered for bowel regimen.  Last BM 6/10 per chart. Pt not currently ordered for bowel regimen.

## 2025-06-12 NOTE — DIETITIAN INITIAL EVALUATION ADULT - OTHER INFO
Reports UBW xxx. Denies recent wt changes.   Dosing wt: 139.1 lbs (06-12)  Wt history per chart: 140.8 lbs (06-10), 132 lbs (06/06/24). RD to continue to monitor weight trends as able/available.     Additional nutrition-related concerns:  - Hx CKD. K+ trending elevated  - A1c 6.1%. Admelog sliding scale ordered for glycemic management. BG trending 114-221 mg/dl x last 24 hrs Reports UBW was 140-145 lbs >2-3 years ago. Had lost weight, been maintaining ~125 lbs, but likely has lost more weight in the past 2 weeks.  Dosing wt: 139.1 lbs (06-12)  Wt history per chart: 140.8 lbs (06-10), 132 lbs (06/06/24). Weights likely inflated due to bedscale discrepancies. RD to continue to monitor weight trends as able/available.     Additional nutrition-related concerns:  - Hx HOCM. Admitted with pericardial effusion, decompensated HF. S/p emergent pericardiocentesis today  - Hx CKD. K+ trending elevated  - A1c 6.1%. Admelog sliding scale ordered for glycemic management. BG trending 114-221 mg/dl x last 24 hrs

## 2025-06-12 NOTE — DIETITIAN INITIAL EVALUATION ADULT - PERSON TAUGHT/METHOD
Discussed importance of adequate protein-energy consumption to meet estimated nutrient needs. Discussed strategies to optimize nutritional intake, including catering to food preferences, choosing cold/cooler foods that have less aroma to prevent nausea, etc. Pt's family noted with good comprehension and made aware RD remains available for further questions/concerns. RD business card provided./verbal instruction/daughter instructed/son instructed

## 2025-06-12 NOTE — PRE PROCEDURE NOTE - PRE PROCEDURE EVALUATION
Interventional Radiology    HPI: 89 year old female with GERD, osteoarthritis/osteoporosis (on monthly ibandronate), HLD, Afib on eliquis, HOCM/severe LVOT/ELEAZAR, small-moderate pericardial effusion/RA diastolic collapse and a PMHx of R breast CA (40-50y ago) s/p B/L mastectomy and radiation and RUE lymphedema who presents from [facility] with shaking, chills, hypotension, and poor appetite. IR initially consulted 6/8 for pericardial drainage and deferred. IR now reconsulted with new interval imaging for evaluation for pericardial effusion drainage. Patient now with worsening tachycardia and hypotension, per discussion b/w ICU team and Dr. Manuel requesting pericardial drain placement urgently.       Allergies: Zosyn (Rash; Urticaria; Hives)    Medications (Abx/Cardiac/Anticoagulation/Blood Products)    aspirin enteric coated: 81 milliGRAM(s) Oral (06-10 @ 13:04)  cefepime   IVPB: 100 mL/Hr IV Intermittent (06-11 @ 06:02)  cefTRIAXone   IVPB: 100 mL/Hr IV Intermittent (06-11 @ 21:10)  furosemide   Injectable: 20 milliGRAM(s) IV Push (06-10 @ 09:35)  heparin   Injectable: 5000 Unit(s) SubCutaneous (06-11 @ 18:13)  metoprolol tartrate: 25 milliGRAM(s) Oral (06-11 @ 18:13)  metoprolol tartrate Injectable: 5 milliGRAM(s) IV Push (06-11 @ 18:26)  metoprolol tartrate Injectable: 5 milliGRAM(s) IV Push (06-11 @ 23:27)  metoprolol tartrate Injectable: 5 milliGRAM(s) IV Push (06-11 @ 00:37)  midodrine: 20 milliGRAM(s) Oral (06-11 @ 06:02)  midodrine: 5 milliGRAM(s) Oral (06-10 @ 13:04)  midodrine: 20 milliGRAM(s) Oral (06-12 @ 06:00)    Data:    T(C): 37.1  HR: 144  BP: 149/120  RR: 39  SpO2: 97%    -WBC 3.40 / HgB 9.4 / Hct 29.5 / Plt 452  -Na 129 / Cl 99 / BUN 25 / Glucose 138  -K 5.4 / CO2 19 / Cr 0.70  -ALT 28 / Alk Phos 168 / T.Bili 0.6  -INR2.22    Imaging:     Plan: 89y Female presents for Pericardial drain placement  -Risks/Benefits/alternatives explained with the patient and/or healthcare proxy and witnessed informed consent obtained from patient's HCP/ daughter Teresita Day @ [phone] .    Interventional Radiology    HPI: 89 year old female with GERD, osteoarthritis/osteoporosis (on monthly ibandronate), HLD, Afib on eliquis, HOCM/severe LVOT/ELEAZAR, small-moderate pericardial effusion/RA diastolic collapse and a PMHx of R breast CA (40-50y ago) s/p B/L mastectomy and radiation and RUE lymphedema who presents from [facility] with shaking, chills, hypotension, and poor appetite. IR initially consulted 6/8 for pericardial drainage and deferred. IR now reconsulted with new interval imaging for evaluation for pericardial effusion drainage. Patient now with worsening tachycardia and hypotension, per discussion b/w ICU team and Dr. Manuel requesting pericardial drain placement urgently.       Allergies: Zosyn (Rash; Urticaria; Hives)    Medications (Abx/Cardiac/Anticoagulation/Blood Products)    aspirin enteric coated: 81 milliGRAM(s) Oral (06-10 @ 13:04)  cefepime   IVPB: 100 mL/Hr IV Intermittent (06-11 @ 06:02)  cefTRIAXone   IVPB: 100 mL/Hr IV Intermittent (06-11 @ 21:10)  furosemide   Injectable: 20 milliGRAM(s) IV Push (06-10 @ 09:35)  heparin   Injectable: 5000 Unit(s) SubCutaneous (06-11 @ 18:13)  metoprolol tartrate: 25 milliGRAM(s) Oral (06-11 @ 18:13)  metoprolol tartrate Injectable: 5 milliGRAM(s) IV Push (06-11 @ 18:26)  metoprolol tartrate Injectable: 5 milliGRAM(s) IV Push (06-11 @ 23:27)  metoprolol tartrate Injectable: 5 milliGRAM(s) IV Push (06-11 @ 00:37)  midodrine: 20 milliGRAM(s) Oral (06-11 @ 06:02)  midodrine: 5 milliGRAM(s) Oral (06-10 @ 13:04)  midodrine: 20 milliGRAM(s) Oral (06-12 @ 06:00)    Data:    T(C): 37.1  HR: 144  BP: 149/120  RR: 39  SpO2: 97%    -WBC 3.40 / HgB 9.4 / Hct 29.5 / Plt 452  -Na 129 / Cl 99 / BUN 25 / Glucose 138  -K 5.4 / CO2 19 / Cr 0.70  -ALT 28 / Alk Phos 168 / T.Bili 0.6  -INR2.22    Imaging:     Plan: 89y Female presents for Pericardial drain placement  -Risks/Benefits/alternatives explained with the patient and/or healthcare proxy and witnessed informed consent obtained from patient's HCP/ daughter Teresita Day @ [phone] .         Attestation Statement:   I fully participated in the care of this patient.  I have made amendments to the documentation where necessary, and agree with the history and plan as documented by the Student/Resident/Fellow/ACP, with changes as below:     discussed at length with CICU Dr. [facility]:   patient at elevated risk of bleeding given INR 2.2 yesterday and <72h hold on eliquis. however, at this time tachycardic, hypotensive, and requiring oxygen despite resuscitation. formulated plan to give ffp prior to emergent procedure rather than waiting to optimize bleeding parameters    discussed risks/benefits with patients daughter, expalined elevated bleeding risk and emergent nature of procedure

## 2025-06-12 NOTE — PROGRESS NOTE ADULT - SUBJECTIVE AND OBJECTIVE BOX
[facility]  BASIL Hooks Y. Patel, S. Shah, G. Casimir  [phone]  ([phone] - weekdays after 5pm and weekends)    Name: GINO GRAHAM  Age/Gender: 89y Female  MRN: [id number]    Interval History:  Patient seen and examined earlier this morning.   Unable to obtain ROS.   Notes reviewed. Afebrile   Allergies: Zosyn (Rash; Urticaria; Hives)      Objective:  Vitals:   T(F): 97.5 (06-12-25 @ 08:22), Max: 98.7 (06-11-25 @ 19:00)  HR: 107 (06-12-25 @ 08:27) (98 - 152)  BP: 90/53 (06-12-25 @ 08:27) (77/44 - 184/74)  RR: 15 (06-12-25 @ 08:27) (15 - 48)  SpO2: 96% (06-12-25 @ 08:27) (94% - 100%)  Physical Examination:  General: no acute distress, NC   HEENT: normocephalic, atraumatic, anicteric  Respiratory: decreased breath sounds b/l   Cardiovascular: S1 and S2 present, tachycardia   Gastrointestinal: soft, nontender, nondistended  Extremities: no LE edema, no cyanosis  Skin: no visible rash    Laboratory Studies:  CBC:                       9.4    3.40  )-----------( 452      ( 12 Jun 2025 01:26 )             29.5     WBC Trend:  3.40 06-12-25 @ 01:26  4.03 06-11-25 @ 00:57  3.60 06-10-25 @ 09:31  3.45 06-09-25 @ 18:46  7.11 06-07-25 @ 20:56    CMP: 06-12    129[L]  |  99  |  25[H]  ----------------------------<  138[H]  5.4[H]   |  19[L]  |  0.70    Ca    8.4      12 Jun 2025 05:33  Phos  2.6     06-12  Mg     2.3     06-12    TPro  6.8  /  Alb  2.6[L]  /  TBili  0.6  /  DBili  x   /  AST  28  /  ALT  28  /  AlkPhos  168[H]  06-12    Creatinine: 0.70 mg/dL (06-12-25 @ 05:33)  Creatinine: 0.81 mg/dL (06-12-25 @ 01:26)  Creatinine: 0.84 mg/dL (06-11-25 @ 06:00)  Creatinine: 0.77 mg/dL (06-11-25 @ 01:33)  Creatinine: 0.61 mg/dL (06-10-25 @ 09:31)  Creatinine: 0.62 mg/dL (06-09-25 @ 18:46)  Creatinine: 0.89 mg/dL (06-07-25 @ 20:56)    LIVER FUNCTIONS - ( 12 Jun 2025 05:33 )  Alb: 2.6 g/dL / Pro: 6.8 g/dL / ALK PHOS: 168 U/L / ALT: 28 U/L / AST: 28 U/L / GGT: x           Microbiology: reviewed   Urinalysis with Rflx Culture (collected 06-08-25 @ 13:01)    Culture - Blood (collected 06-07-25 @ 20:45)  Source: Blood Blood-Peripheral  Preliminary Report (06-12-25 @ 02:01):    No growth at 4 days    Culture - Blood (collected 06-07-25 @ 20:30)  Source: Blood Blood-Peripheral  Preliminary Report (06-12-25 @ 02:01):    No growth at 4 days    Culture - Blood (collected 06-01-25 @ 10:51)  Source: Blood Blood-Venous  Final Report (06-06-25 @ 14:00):    No growth at 5 days    Culture - Blood (collected 05-31-25 @ 20:48)  Source: Blood Blood-Peripheral  Final Report (06-06-25 @ 02:01):    No growth at 5 days    Culture - Blood (collected 05-30-25 @ 07:16)  Source: Blood Blood  Gram Stain (05-30-25 @ 20:47):    Growth in anaerobic bottle: Gram Positive Cocci in Pairs and Chains    Growth in aerobic bottle: Gram Positive Cocci in Pairs and Chains  Final Report (05-31-25 @ 15:48):    Growth in aerobic and anaerobic bottles: Streptococcus agalactiae (Group    B)    See previous culture [id number]    Culture - Blood (collected 05-30-25 @ 07:16)  Source: Blood Blood  Gram Stain (05-30-25 @ 19:54):    Growth in aerobic and anaerobic bottles: Gram Positive Cocci in Pairs and    Chains  Final Report (05-31-25 @ 15:47):    Growth in aerobic and anaerobic bottles: Streptococcus agalactiae (Group    B)    See previous culture [id number]    Urinalysis with Rflx Culture (collected 05-29-25 @ 16:21)    Culture - Blood (collected 05-29-25 @ 13:45)  Source: Blood Blood-Peripheral  Gram Stain (05-30-25 @ 01:47):    Growth in anaerobic bottle: Gram Positive Cocci in Pairs and Chains    Growth in aerobic bottle: Gram Positive Cocci in Pairs and Chains  Final Report (05-31-25 @ 18:43):    Growth in aerobic and anaerobic bottles: Streptococcus agalactiae (Group    B)    See previous culture [id number]    Culture - Blood (collected 05-29-25 @ 13:30)  Source: Blood Blood-Peripheral  Gram Stain (05-30-25 @ 01:45):    Growth in aerobic bottle: Gram Positive Cocci in Pairs and Chains    Growth in anaerobic bottle: Gram Positive Cocci in Pairs and Chains  Final Report (06-02-25 @ 15:48):    Growth in aerobic and anaerobic bottles: Streptococcus agalactiae (Group    B) ***********Note************    This isolate demonstrates inducible    clindamycin resistance.    Clindamycin may still be effective in some patients.    Direct identification is available within approximately 3-5    hours either by Blood Panel Multiplexed PCR or Direct    MALDI-TOF. Details: [email or web address]  Organism: Blood Culture PCR  Streptococcus agalactiae (Group B) (06-02-25 @ 15:48)  Organism: Streptococcus agalactiae (Group B) (06-02-25 @ 15:48)      -  Levofloxacin: S 0.5      -  Clindamycin: R 0.25      -  Vancomycin: S 0.5      -  Ceftriaxone: S <=0.25      -  Tetracycline: R >4      Method Type: TATIANA      -  Penicillin: S <=0.03 Predicts results for ampicillin, amoxicillin, amoxicillin/clavulanate, ampicillin/sulbactam, 1st, 2nd and 3rd generation cephalosporins and carbapenems.  Organism: Blood Culture PCR (06-02-25 @ 15:48)      -  Streptococcus agalactiae (Group B): Detec      Method Type: PCR    06-07-25 @ 22:31 SARS-CoV-2 NotDetec/Influenza A NotDetec/Influenza B NotDetec/RSV NotDetec    Radiology: reviewed     Medications:  acetaminophen     Tablet .. 650 milliGRAM(s) Oral every 6 hours PRN  cefTRIAXone   IVPB 2000 milliGRAM(s) IV Intermittent every 24 hours  chlorhexidine 2% Cloths 1 Application(s) Topical <User Schedule>  insulin lispro (ADMELOG) corrective regimen sliding scale   SubCutaneous three times a day before meals  insulin lispro (ADMELOG) corrective regimen sliding scale   SubCutaneous at bedtime  ipratropium    for Nebulization 500 MICROGram(s) Nebulizer every 6 hours PRN  levalbuterol Inhalation 0.63 milliGRAM(s) Inhalation every 6 hours  melatonin 3 milliGRAM(s) Oral at bedtime PRN  midodrine 20 milliGRAM(s) Oral every 8 hours  pantoprazole    Tablet 40 milliGRAM(s) Oral before breakfast  QUEtiapine 12.5 milliGRAM(s) Oral at bedtime  sodium chloride 3%  Inhalation 4 milliLiter(s) Inhalation every 12 hours    Current Antimicrobials:  cefTRIAXone   IVPB 2000 milliGRAM(s) IV Intermittent every 24 hours    Prior/Completed Antimicrobials:  cefepime   IVPB  vancomycin  IVPB.

## 2025-06-12 NOTE — DIETITIAN INITIAL EVALUATION ADULT - NSFNSNUTRCHEWSWALLOWFT_GEN_A_CORE
Daughter denies issues with pt's chewing; endorses pt with some difficulty swallowing solids foods, grimacing when eating chicken.

## 2025-06-12 NOTE — PROCEDURE NOTE - SPECIMEN OBTAINED
400cc serosanguinous pericardial fluid/Fluid sent for chemistry/Fluid sent for cytology/Fluid sent for gram stain and culture

## 2025-06-12 NOTE — DIETITIAN INITIAL EVALUATION ADULT - PROBLEM SELECTOR PLAN 1
- unclear source of fevers  - cont empiric vanc, cefepime  - needs ID consult in AM  - f/u blood cx. if positive will likely need MARIA G  - monitor for any localizing symptoms  - pt had recent pan ct scan that did not elucidate cause of bacteremia  - standing tylenol x4 doses for now  - IVF  - VSq4h

## 2025-06-12 NOTE — PROGRESS NOTE ADULT - ASSESSMENT
89F c hx GERD, R breast CA (40-50y ago) s/p B/L mastectomy and radiation, RUE lymphedema, osteoarthritis/osteoporosis (on monthly ibandronate), HLD, Afib on eliquis, HOCM/severe LVOT/LORA, small-moderate pericardial effusion/RA diastolic collapse, recent hospitalization 5/29-6/5 for RML PNA, GBS bacteremia (pos cultures 5/29, 5/30) of unclear source on total 10 days abx (IV ceftriaxone inpt, then levaquin 750 q48h until 6/9), presents from [facility] rehab with shaking chills, hypotension, poor appetite  History from pt's daughter/primary decision maker Teresita Day at bedside.  While at [facility], pt found to have hypotension to SBP 80s. Pt was given IVF and reduced dose of metoprolol. Pt also found to have tachycardia, shaking chills. Pt sent back to the hospital. Reportedly pt has not been eating well, not urinating much. Denies CP, SOB, abd pain, diarrhea, cough, other respiratory symptoms. At baseline pt ambulates without assistive device, drives.  RRT called in the ed for hypotension to SBP 79. (08 Jun 2025 02:41)  to me pt daughter says she was very sleepy in the morning too  as she was given ambien at 4 AM in the morning:   now she is alert and awake and is on 2 L of oxygen ;  she is not sob:  and does not look to be in any resp distress:       Severe sepsis.   unclear source of fevers  cont empiric vanc, cefepime  f/u blood cx. if positive will likely need MARIA G  monitor for any localizing symptoms  pt had recent pan ct scan that did not elucidate cause of bacteremia  recent blood cultures have been negative   - IVF  on cefepime  she is febrile too   vbg on admission IS ok;   MONITOR BLOOD PRESSURE CLOSELY:   OIF SHE DROPS HER BLOOD PRESSURE MORE:  WOULD NEED MICU  CO NSULT:    6/9: seems slightly more tored today  ; cont antibtiocs:  per ID:  she remains on 2 L of oxygen : she is alert and awake:  daughter at bedside:  reviewed the labs and echo and ct scan chest with the daughter in detail :   she has large pericardial effusions:  per ir no good window and effusion seemd small to drain : ct scan chest read as mod to large pericardial effusion : defer to cards :     6/10: seems to be doing  ok :  no sob:   no  cough  ;   no  phlegm   on 2 L of oxygen :  thoracic to see:    no emergency in tapping the pleural effusion:   de cardiologist  6/11: on ceftriaxone   seems O K   6/12: seems OK:  s/p pericardial drain:  has 400 cc output   await cytology      Pericardial effusion ;   the ct chest shows increasing pericardial effusion  : which is of more pressing concern then pleural effusion :  needs a tap:  ir guided tap    6/12: as above       Hypotension.  suspect combination of infection, hocm, lora, tachycardia, pericardial effusion, metoprolol  pt has somewhat tenuous hemodynamic status  consider cardioversion, consider repeat TTE  pt is full code.  pts blood pressure is slightly low   on midodrine    6/9: cont on midodrine  6/10; controlled:  on midodrine  6/11: on midodrine  6/12: currently she is on vasopressors     Chronic atrial fibrillation.  reduce metoprolol dose to tartrate 25mg BID  goal HR <100 in setting of HOCM with hypotension  consider amiodarone or cardioversion  PER CARDS    6/9: defer to cards   6/10: off Eliquis for now   6/11: remains off eliquis   6/12: off ac      Type 2 myocardial infarction.  suspect demand ischemia from recent hypotension, infections  start asa   cont home eliquis.  CONT CURRENT MEDS     6/9; no chest pain : cont current rx:   6/10: seems to be stable:   6/12: per cardiology        HOCM (hypertrophic obstructive cardiomyopathy).  cont metoprolol as above. uptitrate as BP tolerates  per cards    Acute respiratory failure with hypoxia.   recent CT scans showing right bronchiectasis, right fibrosis likely 2/2 radiation, poss RML PNA.  cont BD :   ct chest showed: No pulmonary embolism. Small to moderate right and small left pleural effusions with associated  atelectasis. 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  Cardiomegaly. Moderate pericardial effusion.  needs echo     9/6: new echo reviewed:  seen by cardiology :  monitor her blood pressure closely:  if she drops her blood pressure call CCU     6/10: cont to manain o2 sao2 above 90% all the t hay[place]  6/11; n 2 L of oxygen    6/12: she is not in any resp distress      dw daughter

## 2025-06-12 NOTE — DIETITIAN INITIAL EVALUATION ADULT - ENERGY INTAKE
Pt NPO at time of visit for procedure this AM. Daughter states pt has not been eating much during admission, mostly just taking things like the Ensure, pudding, Mighty Shakes, and soft foods. Was on puree diet earlier this admission which daughter states probably made pt feel even more nauseated by food, would prefer to remain on regular textures at this time.

## 2025-06-12 NOTE — PROCEDURE NOTE - NSPERFORMEDBY_GEN_A_CORE
Contact Information: If you have any questions, concerns, pended studies, tests and/or procedures, or emergencies regarding your inpatient behavioral health visit  Please contact [facility] [phone] and ask to speak to a , nurse or physician  A contact is available 24 hours/ 7 days a week at this number  Summary of Procedures Performed During your Stay:  Below is a list of major procedures performed during your hospital stay and a summary of results:  - No major procedures performed  Pending Studies     None        If studies are pending at discharge, follow up with your PCP and/or referring provider 
Myself

## 2025-06-12 NOTE — DIETITIAN INITIAL EVALUATION ADULT - NS FNS DIET ORDER
Diet, NPO:   Except Medications (06-12-25 @ 02:04)  Diet, NPO after Midnight:      NPO Start Date: 12-Jun-2025,   NPO Start Time: 23:59 (06-12-25 @ 00:35)  Diet, NPO:   NPO for Procedure/Test     NPO Start Date: 12-Jun-2025,   NPO Start Time: 23:59 (06-11-25 @ 14:06)

## 2025-06-12 NOTE — DIETITIAN INITIAL EVALUATION ADULT - PROBLEM SELECTOR PLAN 4
- tele  - suspect demand ischemia from recent hypotension, infections  - f/u cards regarding future ischemic eval  - start asa   - cont home eliquis

## 2025-06-12 NOTE — PROGRESS NOTE ADULT - ASSESSMENT
Afib, severe HCM LVOT obstruction, Pericardial effusion, recent admission for PNA / Bacteremia   now admitted with sepsis, tachycardia ( afib with RVR ) and shock . Cardiology is called for elevated troponin     Elevated troponin   Demand ischemia   in setting of afib with RVR, shock, severe HCM and sepsis   its downtrending   however has increasing Pericardial effusion and now hypotensive     Shock   likely multifactorial   in setting of sepsis, pericardial effusion and HCM with tachycardia  cont midodrine   Pericardial effusion is much larger now   emergent pericardial drain by IR     Tachypnea   has pl effusion , venous congestion , would benefit from diuresis but in setting of shock and large effusion , it can potentially deteriorate her hemodynamics   can use gentle as needed diuresis once effusion is drained and her HR under control     Afib  FU with EP for eventual DCCV  cont a/c    Pl effusion / pericardial effusion   plan as above  has poor nutrition due to albumin hence decreased oncotic pressure  nutritional optimization   would be very cautious with diuresis given severe LVOT obstruction and pericardial effusion

## 2025-06-12 NOTE — DIETITIAN INITIAL EVALUATION ADULT - ORAL INTAKE PTA/DIET HISTORY
Pt's daughter endorses pt with decreased appetite/po intake for the past 2 weeks (since being admitted to the hospital). Does not follow any therapeutic restrictions at baseline. NKFA reported.

## 2025-06-12 NOTE — DIETITIAN INITIAL EVALUATION ADULT - PERTINENT MEDS FT
MEDICATIONS  (STANDING):  cefTRIAXone   IVPB 2000 milliGRAM(s) IV Intermittent every 24 hours  chlorhexidine 2% Cloths 1 Application(s) Topical <User Schedule>  insulin lispro (ADMELOG) corrective regimen sliding scale   SubCutaneous three times a day before meals  insulin lispro (ADMELOG) corrective regimen sliding scale   SubCutaneous at bedtime  levalbuterol Inhalation 0.63 milliGRAM(s) Inhalation every 6 hours  midodrine 20 milliGRAM(s) Oral every 8 hours  pantoprazole    Tablet 40 milliGRAM(s) Oral before breakfast  QUEtiapine 12.5 milliGRAM(s) Oral at bedtime  sodium chloride 3%  Inhalation 4 milliLiter(s) Inhalation every 12 hours    MEDICATIONS  (PRN):  acetaminophen     Tablet .. 650 milliGRAM(s) Oral every 6 hours PRN Moderate Pain (4 - 6)  ipratropium    for Nebulization 500 MICROGram(s) Nebulizer every 6 hours PRN Shortness of Breath and/or Wheezing  melatonin 3 milliGRAM(s) Oral at bedtime PRN Insomnia

## 2025-06-12 NOTE — DIETITIAN INITIAL EVALUATION ADULT - PERTINENT LABORATORY DATA
06-12    129[L]  |  99  |  25[H]  ----------------------------<  138[H]  5.4[H]   |  19[L]  |  0.70    Ca    8.4      12 Jun 2025 05:33  Phos  2.6     06-12  Mg     2.3     06-12    TPro  6.8  /  Alb  2.6[L]  /  TBili  0.6  /  DBili  x   /  AST  28  /  ALT  28  /  AlkPhos  168[H]  06-12  POCT Blood Glucose.: 131 mg/dL (06-12-25 @ 07:48)  A1C with Estimated Average Glucose Result: 6.1 % (06-11-25 @ 00:57)

## 2025-06-12 NOTE — DIETITIAN NUTRITION RISK NOTIFICATION - TREATMENT: THE FOLLOWING DIET HAS BEEN RECOMMENDED
Diet, NPO:   Except Medications (06-12-25 @ 02:04) [Active]  Diet, NPO after Midnight:      NPO Start Date: 12-Jun-2025,   NPO Start Time: 23:59 (06-12-25 @ 00:35) [Active]  Diet, NPO:   NPO for Procedure/Test     NPO Start Date: 12-Jun-2025,   NPO Start Time: 23:59 (06-11-25 @ 14:06) [Active]

## 2025-06-12 NOTE — PROGRESS NOTE ADULT - SUBJECTIVE AND OBJECTIVE BOX
Pt is off the floor for emergent pericardiocentesis       MEDICATIONS:  MEDICATIONS  (STANDING):  cefTRIAXone   IVPB 2000 milliGRAM(s) IV Intermittent every 24 hours  chlorhexidine 2% Cloths 1 Application(s) Topical <User Schedule>  insulin lispro (ADMELOG) corrective regimen sliding scale   SubCutaneous three times a day before meals  insulin lispro (ADMELOG) corrective regimen sliding scale   SubCutaneous at bedtime  levalbuterol Inhalation 0.63 milliGRAM(s) Inhalation every 6 hours  midodrine 20 milliGRAM(s) Oral every 8 hours  pantoprazole    Tablet 40 milliGRAM(s) Oral before breakfast  QUEtiapine 12.5 milliGRAM(s) Oral at bedtime  sodium chloride 3%  Inhalation 4 milliLiter(s) Inhalation every 12 hours      PHYSICAL EXAM:  T(C): 36.4 (06-12-25 @ 08:27), Max: 37.1 (06-11-25 @ 19:00)  HR: 107 (06-12-25 @ 08:27) (98 - 152)  BP: 90/53 (06-12-25 @ 08:27) (77/44 - 184/74)  RR: 15 (06-12-25 @ 08:27) (15 - 48)  SpO2: 96% (06-12-25 @ 08:27) (94% - 100%)  Wt(kg): --  I&O's Summary    11 Jun 2025 07:01  -  12 Jun 2025 07:00  --------------------------------------------------------  IN: 230 mL / OUT: 1175 mL / NET: -945 mL    12 Jun 2025 07:01  -  12 Jun 2025 08:42  --------------------------------------------------------  IN: 0 mL / OUT: 50 mL / NET: -50 mL      Height (cm): 157.5 (06-12 @ 08:27)  Weight (kg): 63.1 (06-12 @ 08:27)  BMI (kg/m2): 25.4 (06-12 @ 08:27)  BSA (m2): 1.64 (06-12 @ 08:27)          LABS/DATA:    CARDIAC MARKERS:                                9.4    3.40  )-----------( 452      ( 12 Jun 2025 01:26 )             29.5     06-12    129[L]  |  99  |  25[H]  ----------------------------<  138[H]  5.4[H]   |  19[L]  |  0.70    Ca    8.4      12 Jun 2025 05:33  Phos  2.6     06-12  Mg     2.3     06-12    TPro  6.8  /  Alb  2.6[L]  /  TBili  0.6  /  DBili  x   /  AST  28  /  ALT  28  /  AlkPhos  168[H]  06-12    proBNP:   Lipid Profile:   HgA1c:   TSH:

## 2025-06-12 NOTE — DIETITIAN INITIAL EVALUATION ADULT - PHYSCIAL ASSESSMENT
nutrition-focused physical examination conducted with pt's daughter and son's consent and presence. Limited as pt remains lethargic/sedated post procedure.

## 2025-06-12 NOTE — CHART NOTE - NSCHARTNOTEFT_GEN_A_CORE
Patient seen to be tachycardic and hypotensive despite volume resuscitation overnight and this AM.  She is being emergently sent to IR for pericardiocentesis.  Her INR of 2.2 was > 24 hours ago and she has not received anticoagulation since.    I agree with IR providing FFP to protect against bleeding for the purposes of safely performing emergent procedure.    I discussed this with Dr. Mcclelland who will be performing the procedure.

## 2025-06-12 NOTE — PROGRESS NOTE ADULT - ASSESSMENT
89-y/o female with PMHx of HOCM with severe LVOT obstruction, atrial fibrillation on eliquis, small-moderate pericardial effusion with RA diastolic collapse, recent hospitalization (5/29-6/5) for RML pneumonia and GBS bacteremia, and remote history of right breast cancer status post bilateral mastectomy with radiation who presented with septic shock and hypotension, initially managed on the floor before requiring CCU admission for hemodynamic instability secondary to enlarging pericardial effusion, rapid atrial fibrillation, and decompensated heart failure. Thoracic surgery consulted for pericardial window.     6/11 Previous TTE & CT Chest reviewed by Dr. Canada - TTE without evidence of tamponade physiology, hemodynamically stable, patient is high risk for pericardial window per Thoracic Attending, recommend continue to monitor pericardial effusion with repeat TTE and CT chest today. Per RN at bedside, primary team to discuss GOC with patient's family today. Thoracic will continue to follow.   6/12 Patient hypotensive this AM and sent to IR for emergent pericardiocentesis.

## 2025-06-12 NOTE — PRE-ANESTHESIA EVALUATION ADULT - NSRADCARDRESULTSFT_GEN_ALL_CORE
TTE 6/10/25   1. Hypertrophic obstructive cardiomyopathy (HOCM).   2. Left ventricular cavity is normal in size. Left ventricular systolic function is normal. There are no regional wall motion abnormalities seen.   3. Large pericardial effusion noted adjacent to the posterolateral left ventricle, small pericardial effusion noted adjacent to the right atrium and large pericardial effusion noted adjacent to the right ventricle.   4. Compared to the transthoracic echocardiogram performed on 5/30/2025, the pericardial effusion has increased in volume. The rhythm is now atrial fibrillation (was sinus on 5/30/2025).. Findings were discussed with Dr. Prieto on 6/10/2025 at 12:48.

## 2025-06-12 NOTE — PROGRESS NOTE ADULT - SUBJECTIVE AND OBJECTIVE BOX
EP Attending  HISTORY OF PRESENT ILLNESS: HPI:  89F c hx GERD, R breast CA (40-50y ago) s/p B/L mastectomy and radiation, RUE lymphedema, osteoarthritis/osteoporosis (on monthly ibandronate), HLD, Afib on eliquis, HOCM/severe LVOT/ELEAZAR, small-moderate pericardial effusion/RA diastolic collapse, recent hospitalization 5/29-6/5 for RML PNA, GBS bacteremia (pos cultures 5/29, 5/30) of unclear source on total 10 days abx (IV ceftriaxone inpt, then levaquin 750 q48h until 6/9), presents from [facility] rehab with shaking chills, hypotension, poor appetite    History from pt's daughter/primary decision maker Teresitagisel Day at bedside.  While at [facility], pt found to have hypotension to SBP 80s. Pt was given IVF and reduced dose of metoprolol. Pt also found to have tachycardia, shaking chills. Pt sent back to the hospital. Reportedly pt has not been eating well, not urinating much. Denies CP, SOB, abd pain, diarrhea, cough, other respiratory symptoms. At baseline pt ambulates without assistive device, drives.  RRT called in the ed for hypotension to SBP 79. (08 Jun 2025 02:41)    Ms Cloud is a pleasant 89yo woman here with shortness of breath.  Sees Dr Adolfo Leung for Cardiology.  Being managed on this inpatient stay by Dr Coelho.  Known to Dr Young after outpatient referral for HOCM management (lVOT gradient 80s-100mmHg+), and had a brief trial of Mavacamten, stopped for feelings of "leg heaviness".    EP called re: rapid AFib, refractory to low-dose metoprolol thus far, and complicated by management of HCM and new/worsening pericardial effusion.  Resting comfortably in bed on supplemental O2.  SOB and fatigued but no palpitations or fainting.  A 10 pt ROS is otherwise negative.    6/11- moved to CCU for closer monitoring.  lethargic / unable to obtain ROS today.  worsening pleural effusion, no plan for tap.  no plan for pericardiocentesis either.  hypotensive requiring midodrine.  Date of service 6/12- s/p urgent pericardiocentesis via Interventional Radiology/ lateral thorax approach.  Feels much better- more energetic, has an appetite, more talkative.      PAST MEDICAL & SURGICAL HISTORY:  Breast cancer  s/p b/l mastectomy  H/O bilateral mastectomy  + breast implants  History of cataract surgery, unspecified laterality    acetaminophen     Tablet .. 650 milliGRAM(s) Oral every 6 hours PRN  cefTRIAXone   IVPB 2000 milliGRAM(s) IV Intermittent every 24 hours  chlorhexidine 2% Cloths 1 Application(s) Topical <User Schedule>  insulin lispro (ADMELOG) corrective regimen sliding scale   SubCutaneous at bedtime  insulin lispro (ADMELOG) corrective regimen sliding scale   SubCutaneous three times a day before meals  ipratropium    for Nebulization 500 MICROGram(s) Nebulizer every 6 hours PRN  levalbuterol Inhalation 0.63 milliGRAM(s) Inhalation every 6 hours  melatonin 3 milliGRAM(s) Oral at bedtime PRN  metoprolol tartrate 12.5 milliGRAM(s) Oral every 12 hours  midodrine 20 milliGRAM(s) Oral every 8 hours  pantoprazole    Tablet 40 milliGRAM(s) Oral before breakfast  QUEtiapine 12.5 milliGRAM(s) Oral at bedtime  sodium chloride 3%  Inhalation 4 milliLiter(s) Inhalation every 12 hours                            9.4    3.40  )-----------( 452      ( 12 Jun 2025 01:26 )             29.5       06-12    131[L]  |  100  |  23  ----------------------------<  101[H]  5.2   |  23  |  0.65    Ca    8.4      12 Jun 2025 12:45  Phos  2.6     06-12  Mg     2.3     06-12    TPro  6.5  /  Alb  2.8[L]  /  TBili  0.5  /  DBili  x   /  AST  23  /  ALT  23  /  AlkPhos  147[H]  06-12      T(C): 36.4 (06-12-25 @ 15:00), Max: 37.1 (06-11-25 @ 19:00)  HR: 118 (06-12-25 @ 17:00) (101 - 152)  BP: 90/53 (06-12-25 @ 08:27) (90/53 - 184/74)  RR: 32 (06-12-25 @ 17:00) (15 - 44)  SpO2: 97% (06-12-25 @ 17:00) (95% - 100%)  Wt(kg): --    I&O's Summary    11 Jun 2025 07:01  -  12 Jun 2025 07:00  --------------------------------------------------------  IN: 230 mL / OUT: 1175 mL / NET: -945 mL    12 Jun 2025 07:01  -  12 Jun 2025 17:36  --------------------------------------------------------  IN: 2.4 mL / OUT: 365 mL / NET: -362.6 mL    Appearance: frail elderly woman in no acute distress  HEENT:   Normal oral mucosa, PERRL, EOMI	  Lymphatic: No lymphadenopathy , no edema  Cardiovascular: rapid irregular S1 S2, No JVD, No murmurs , Peripheral pulses palpable 2+ bilaterally.  s/p pericardial drain placement from left chest.  Respiratory: poor air entry BL  normal effort 	  Gastrointestinal:  Soft, Non-tender, + BS	  Skin: No rashes, No ecchymoses, No cyanosis, warm to touch  Musculoskeletal: Normal range of motion, normal strength  Psychiatry:  Mood & affect appropriate    TELEMETRY: AFib, narrow QRS  ECG:  	AFib, atypical LVHypertrphy  Echo:  < from: TTE W or WO Ultrasound Enhancing Agent (06.05.25 @ 07:16) >  CONCLUSIONS:      1. Limited TTE to assess for pericardial effusion.   2. Trace pericardial effusion noted adjacent to the posterolateral left ventricle, small pericardial effusion noted adjacent to the anterior right ventricle and small pericardial effusion noted adjacent to the right atrium.   3. The inferior vena cava is normal in size measuring 1.70 cm in diameter, (normal <2.1cm) with normal inspiratory collapse (normal >50%) consistent with normal right atrial pressure (~3, range 0-5mmHg).   4. Compared to the transthoracic echocardiogram performed on 5/30/2025, the right atrium is not as well visualized on this study. There is right atrial diastolic collapse visualized in the 4 chamber view but unable to quantify the duration of the collapse. The effusion appears unchanged in size and distribution. No other signs of cardiac tamponade are present.    < end of copied text >    CT Chest - personally reviewed the images.  right breat prosthesis with signs of rupture.  left breast prosthesis OK.  prominent LV hypertrophy visible.  pericardial effusion enlarged.  pleural effusion present on the right.  	  ASSESSMENT/PLAN: Ms Cloud is a pleasant 89y Female here with shortness of breath.    Multifactorial in the setting of pericardial effusion, Hypertrophic Cardiomyopathy with severe LVOT obstruction, and rapid AFib.  [id number] is at least 3.  Holding apixaban.  Surveillance of pericardial effusion which as bloody drain output.  Rule out malignancy on cytology.  Recommend add'l beta blockers for AFib rate control.  Goal HR <100bpm at rest, and negative inotropy helpful in the setting of HOCM.  May need midodrine +/- phenylephrine to support her afterload.  Discussed w/ Dr Young, and agree w/ recs to avoid diltiazem and other vasodilators, and to avoid diuretics.  Recommend evaluation for thoracentesis, to ease her respiratory effort.  Worthwhile even as a palliative measure.  Outpatient, needs to re-enroll with a HCM specialist.  Strongly recommend re-trial of Camzyos, even at the lowest dose.  This cannot be started in the hospital.  There is a possibility- albeit small - for Cardioversion before discharge.  Only if still symptomatic after everything else is optimized, and she is able to maintain uninterrupted anticoagulation.        Barney Lau M.D.  Cardiac Electrophysiology    office [phone]  pager [phone]

## 2025-06-12 NOTE — PRE-OP CHECKLIST - HAND OFF
Wound Progress Note    COVID-19 Screening:    Does the patient OR patient’s household members have any of the following symptoms?  • Temperature: Fever >100.4°F or >38.0°C?  No  • Respiratory symptoms: New or worsening cough, shortness of breath, or sore throat? No  • GI symptoms: New onset of nausea, vomiting or diarrhea?  No  • Miscellaneous: New onset of loss of taste or smell, chills, repeated shaking with chills, muscle pain or headache?  No  Has the patient or a household member tested positive for COVID-19 in the last 14 days?  No     Chief Complaint   Chief Complaint   Patient presents with   • Wound     No new c/o                                                          Wound Assessments      Wound Chest Left Lateral Surgical Wound (Active)   Date First Assessed/Time First Assessed: 10/14/20 1927   Present on Hospital Admission: No  Location: Chest  Laterality: Left  Modifier: Lateral  Primary Wound Type: Surgical Wound      Assessments 10/15/2020  8:00 AM   Dressing Assessment Clean;Dry;Intact   Wound Exudate None   Wound Dressing Hydrofiber (e.g. aquacel)       Wound Groin Right Anterior;Proximal Surgical Wound (Active)   Date First Assessed/Time First Assessed: 10/15/20 0918   Present on Hospital Admission: No  Location: Groin  Laterality: Right  Modifier: Anterior;Proximal  Primary Wound Type: (c) Surgical Wound      Assessments 10/15/2020  9:19 AM   Dressing Assessment Clean;Dry;Intact   Wound Exudate None   Wound Dressing Gauze;Transparent dressing       Wound Leg Right Anterior Traumatic (Active)   Date First Assessed: 10/28/21   Location: Leg  Laterality: Right  Modifier: Anterior  Level of Skin Injury: Full Thickness  Primary Wound Type: Traumatic  Wound Approximate Age at First Assessment (Weeks): 3 weeks      Assessments 11/18/2021  1:00 PM   Wound Image     Wound Exudate Minimal;Serosanguineous   Wound Bed/Tissue Type Granulated   Periwound Condition Normal   Wound Length (cm) 2 cm   Wound Width  (cm) 0.5 cm   Wound Depth (cm) 0.1 cm   Wound Surface Area (cm^2) 1 cm^2   Wound Volume (cm^3) 0.1 cm^3           11/18/21 1300   Treatment   Wound Location R LE   Treatment Type Compression;Debridement   Debridement   Photo Taken Yes   Wound Cleansing  Dermal wound cleanser   Topical Product to Periwound Area Avoiding Base Skin Prep   Primary Wound Dressing Xeroform gauze   Secondary Wound Dressing Gauze, sterile 2 X 2;Foam silicone dressing with border 4 X 4  (folded 2x2 beneath mepilex border)   Dressing Time Spent (min) 10   Goals   Goals-Short Term (4-6 weeks) Reduce wound size by 50%;Reduce necrosis by 100%;Decrease edema by 1 cm at greater than or equal to 1 area measured   Goals-Long term (8-12 weeks) Patient and/or family independent with dressing changes;Reduce wound size by 100%;Wound closure     ASSESSMENT  Pt's wound continues to progress well.  Caregiver states that the pt has not been wearing the tubigrip.  No significant edema is noted and the wound is progressing so will hold tubigrip at this time.  If wound progress stalls then will revisit application of tubigrip.    Plan  Cont POC 1-2x a week    Aby Aburto, PT  11/18/2021                 Holding RN to OR RN

## 2025-06-12 NOTE — PROGRESS NOTE ADULT - SUBJECTIVE AND OBJECTIVE BOX
PATIENT:  GINO GRAHAM  [id number]    CHIEF COMPLAINT:  Patient is a 89y old  Female who presents with a chief complaint of shaking chills, hypotension, poor appetite (11 Jun 2025 19:46)      INTERVAL HISTORYOVERNIGHT EVENTS:      REVIEW OF SYSTEMS:    Constitutional:     [ ] negative [ ] fevers [ ] chills [ ] weight loss [ ] weight gain  HEENT:                  [ ] negative [ ] dry eyes [ ] eye irritation [ ] postnasal drip [ ] nasal congestion  CV:                         [ ] negative  [ ] chest pain [ ] orthopnea [ ] palpitations [ ] murmur  Resp:                     [ ] negative [ ] cough [ ] shortness of breath [ ] dyspnea [ ] wheezing [ ] sputum [ ] hemoptysis  GI:                          [ ] negative [ ] nausea [ ] vomiting [ ] diarrhea [ ] constipation [ ] abd pain [ ] dysphagia   :                        [ ] negative [ ] dysuria [ ] nocturia [ ] hematuria [ ] increased urinary frequency  Musculoskeletal: [ ] negative [ ] back pain [ ] myalgias [ ] arthralgias [ ] fracture  Skin:                       [ ] negative [ ] rash [ ] itch  Neurological:        [ ] negative [ ] headache [ ] dizziness [ ] syncope [ ] weakness [ ] numbness  Psychiatric:           [ ] negative [ ] anxiety [ ] depression  Endocrine:            [ ] negative [ ] diabetes [ ] thyroid problem  Heme/Lymph:      [ ] negative [ ] anemia [ ] bleeding problem  Allergic/Immune: [ ] negative [ ] itchy eyes [ ] nasal discharge [ ] hives [ ] angioedema    [ ] All other systems negative  [ ] Unable to assess ROS because ________.    MEDICATIONS:  MEDICATIONS  (STANDING):  cefTRIAXone   IVPB 2000 milliGRAM(s) IV Intermittent every 24 hours  chlorhexidine 2% Cloths 1 Application(s) Topical <User Schedule>  insulin lispro (ADMELOG) corrective regimen sliding scale   SubCutaneous three times a day before meals  insulin lispro (ADMELOG) corrective regimen sliding scale   SubCutaneous at bedtime  levalbuterol Inhalation 0.63 milliGRAM(s) Inhalation every 6 hours  midodrine 20 milliGRAM(s) Oral every 8 hours  pantoprazole    Tablet 40 milliGRAM(s) Oral before breakfast  QUEtiapine 12.5 milliGRAM(s) Oral at bedtime  sodium chloride 3%  Inhalation 4 milliLiter(s) Inhalation every 12 hours    MEDICATIONS  (PRN):  acetaminophen     Tablet .. 650 milliGRAM(s) Oral every 6 hours PRN Moderate Pain (4 - 6)  ipratropium    for Nebulization 500 MICROGram(s) Nebulizer every 6 hours PRN Shortness of Breath and/or Wheezing  melatonin 3 milliGRAM(s) Oral at bedtime PRN Insomnia      ALLERGIES:  Allergies    Zosyn (Rash; Urticaria; Hives)    Intolerances        OBJECTIVE:  ICU Vital Signs Last 24 Hrs  T(C): 37.1 (12 Jun 2025 03:00), Max: 37.1 (11 Jun 2025 19:00)  T(F): 98.7 (12 Jun 2025 03:00), Max: 98.7 (11 Jun 2025 19:00)  HR: 144 (12 Jun 2025 06:00) (97 - 152)  BP: 149/120 (12 Jun 2025 02:00) (77/44 - 184/74)  BP(mean): 132 (12 Jun 2025 02:00) (56 - 134)  ABP: 90/52 (12 Jun 2025 06:00) (90/52 - 108/66)  ABP(mean): 66 (12 Jun 2025 06:00) (66 - 82)  RR: 39 (12 Jun 2025 06:00) (22 - 48)  SpO2: 97% (12 Jun 2025 06:00) (94% - 100%)    O2 Parameters below as of 12 Jun 2025 06:00  Patient On (Oxygen Delivery Method): nasal cannula  O2 Flow (L/min): 4          Adult Advanced Hemodynamics Last 24 Hrs  CVP(mm Hg): --  CVP(cm H2O): --  CO: --  CI: --  PA: --  PA(mean): --  PCWP: --  SVR: --  SVRI: --  PVR: --  PVRI: --  CAPILLARY BLOOD GLUCOSE      POCT Blood Glucose.: 131 mg/dL (12 Jun 2025 07:48)  POCT Blood Glucose.: 221 mg/dL (12 Jun 2025 02:54)  POCT Blood Glucose.: 137 mg/dL (12 Jun 2025 02:15)  POCT Blood Glucose.: 116 mg/dL (11 Jun 2025 22:22)  POCT Blood Glucose.: 143 mg/dL (11 Jun 2025 16:10)  POCT Blood Glucose.: 114 mg/dL (11 Jun 2025 11:08)    CAPILLARY BLOOD GLUCOSE      POCT Blood Glucose.: 131 mg/dL (12 Jun 2025 07:48)    I&O's Summary    11 Jun 2025 07:01  -  12 Jun 2025 07:00  --------------------------------------------------------  IN: 230 mL / OUT: 1175 mL / NET: -945 mL      Daily     Daily     PHYSICAL EXAMINATION:  General: WN/WD NAD  HEENT: PERRLA, EOMI, moist mucous membranes  Neurology: A&Ox3, nonfocal, LUGO x 4  Respiratory: CTA B/L, normal respiratory effort, no wheezes, crackles, rales  CV: RRR, S1S2, no murmurs, rubs or gallops  Abdominal: Soft, NT, ND +BS, Last BM  Extremities: No edema, + peripheral pulses  Incisions:   Tubes:    LABS:  ABG - ( 12 Jun 2025 03:19 )  pH, Arterial: 7.38  pH, Blood: x     /  pCO2: 39    /  pO2: 101   / HCO3: 23    / Base Excess: -1.8  /  SaO2: 98.6                                    9.4    3.40  )-----------( 452      ( 12 Jun 2025 01:26 )             29.5     06-12    129[L]  |  99  |  25[H]  ----------------------------<  138[H]  5.4[H]   |  19[L]  |  0.70    Ca    8.4      12 Jun 2025 05:33  Phos  2.6     06-12  Mg     2.3     06-12    TPro  6.8  /  Alb  2.6[L]  /  TBili  0.6  /  DBili  x   /  AST  28  /  ALT  28  /  AlkPhos  168[H]  06-12    LIVER FUNCTIONS - ( 12 Jun 2025 05:33 )  Alb: 2.6 g/dL / Pro: 6.8 g/dL / ALK PHOS: 168 U/L / ALT: 28 U/L / AST: 28 U/L / GGT: x           PT/INR - ( 10 Ernesto 2025 09:31 )   PT: 25.1 sec;   INR: 2.22 ratio                 Urinalysis Basic - ( 12 Jun 2025 05:33 )    Color: x / Appearance: x / SG: x / pH: x  Gluc: 138 mg/dL / Ketone: x  / Bili: x / Urobili: x   Blood: x / Protein: x / Nitrite: x   Leuk Esterase: x / RBC: x / WBC x   Sq Epi: x / Non Sq Epi: x / Bacteria: x      ASSESSMENT: 89F with PMH HOCM/severe LVOT obstruction, AFib on eliquis, pericardial effusion, breast CA s/p XRT with CC septic shock after hospital course c/b demand ischemia, AFib RVR, enlarging pericardial effusion. CCU admission for hemodynamic monitoring and management of moderate-severe pericardial effusion.    Plan:  NEURO  A&Ox3  - sundowning and anxiety overnight, given seroquel 12.5 & trazodone 50mg  - continue to monitor mental status as per protocol     RESPIRATORY  #Acute hypoxic respiratory failure  - likely multifactorial in etiology  - nebs q6  - continue to monitor SpO2 with goal >94%    CARDIO  #Pericardial effusion  - plan for drainage with IR Friday 6/13  - holding all forms of AC    #Atrial fibrillation with RVR  - rate control: lopressor 25 BID  - AC: holding pending IR drainage of pericardial effusion  - EP following    RENAL/  #CKD  - baseline Cr 0.6-0.9  - hold diuresis with HOCM & pericardial effusion  - Continue monitoring urine output, lytes, SCr/ BUN  - replete lytes prn with goal K >4 and Mg >2    GI  Tolerating PO diet    ENDO  No active issues    HEME  - Monitor H/H and plts  - DVT PPX: SCDs    ID  #Sepsis/bacteremia  - Recent GBS bacteremia, current source unclear  - Switch cefepime 1g q8h to CTX 2g q24h   - Blood cultures pending  - ID consulted  - monitor and trend WBC and temperature curve

## 2025-06-12 NOTE — PRE-ANESTHESIA EVALUATION ADULT - NSANTHADDINFOFT_GEN_ALL_CORE
Emergent procedure per CICU and IR. Pt last INR 2.2 on 6/10, plan to start FFP and proceed w/o recheck given emergent nature of unstable tamponade

## 2025-06-12 NOTE — DIETITIAN INITIAL EVALUATION ADULT - REASON FOR ADMISSION
shaking chills, hypotension, poor appetite    Per chart, pt is an 89 female with PMH HOCM/severe LVOT obstruction, AFib on eliquis, pericardial effusion, breast CA s/p XRT with CC septic shock after hospital course c/b demand ischemia, AFib RVR, enlarging pericardial effusion. CCU admission for hemodynamic monitoring and management of moderate-severe pericardial effusion.

## 2025-06-12 NOTE — DIETITIAN INITIAL EVALUATION ADULT - REASON INDICATOR FOR ASSESSMENT
RD consult for assessment, ICU length of stay  Source: pt, medical record. Chart reviewed, events noted.  RD consult for assessment, ICU length of stay  Source: pt's daughter and son at the bedside, medical record. Chart reviewed, events noted.

## 2025-06-12 NOTE — DIETITIAN INITIAL EVALUATION ADULT - OTHER CALCULATIONS
Fluid needs deferred to team. Energy and protein needs based on dosing wt: 63.1 kg (06-12) Fluid needs deferred to team. Energy and protein needs based on reported UBW: 125 lbs/56.6 kg

## 2025-06-12 NOTE — PRE-ANESTHESIA EVALUATION ADULT - NSANTHPMHFT_GEN_ALL_CORE
89F with PMH HOCM/severe LVOT obstruction, AFib on eliquis, pericardial effusion, breast CA s/p XRT with CC septic shock after hospital course c/b demand ischemia, AFib RVR, enlarging pericardial effusion. CCU admission for hemodynamic monitoring and management of moderate-severe pericardial effusion.

## 2025-06-12 NOTE — DIETITIAN INITIAL EVALUATION ADULT - PROBLEM SELECTOR PLAN 2
- suspect combination of infection, hocm, lora, tachycardia, pericardial effusion, metoprolol  - pt has somewhat tenuous hemodynamic status  - consider cardioversion, consider repeat TTE  - pt is full code

## 2025-06-12 NOTE — DIETITIAN INITIAL EVALUATION ADULT - ETIOLOGY-BASIS
Acute illness or injury Rinvoq Pregnancy And Lactation Text: Based on animal studies, Rinvoq may cause embryo-fetal harm when administered to pregnant women.  The medication should not be used in pregnancy.  Breastfeeding is not recommended during treatment and for 6 days after the last dose.

## 2025-06-12 NOTE — PROGRESS NOTE ADULT - PROBLEM SELECTOR PLAN 1
- Patient is high risk for pericardial window per Dr. Canada, recommend continue to monitor pericardial effusion  6/11 Repeat CT Chest: Large pericardial effusion, increased in size when compared to 6/4/2024. IR reconsulted for drainage.   - Plan for IR pericardiocentesis today 6/12  - Palliative care following for GOC  - Thoracic will continue to follow.   - Global care per primary / CICU team

## 2025-06-13 ENCOUNTER — RESULT REVIEW (OUTPATIENT)
Age: 89
End: 2025-06-13

## 2025-06-13 NOTE — CONSULT NOTE ADULT - SUBJECTIVE AND OBJECTIVE BOX
HPI: 89F c hx GERD, R breast CA (40-50y ago) s/p B/L mastectomy and radiation, RUE lymphedema, osteoarthritis/osteoporosis (on monthly ibandronate), HLD, Afib on eliquis, HOCM/severe LVOT/ELEAZAR, small-moderate pericardial effusion/RA diastolic collapse, recent hospitalization - for RML PNA, GBS bacteremia (pos cultures , ) of unclear source on total 10 days abx (IV ceftriaxone inpt, then levaquin 750 q48h until )    Here admitted to CICU for pericardial effusion s/p drain in place. IP consulted for right pleural effusion.    PAST MEDICAL & SURGICAL HISTORY:  Breast cancer  s/p b/l mastectomy      H/O bilateral mastectomy  + breast implants      History of cataract surgery, unspecified laterality          FAMILY HISTORY:  Family history of MI (myocardial infarction) (Father)    FH: Alzheimers disease  father    FH: cerebral aneurysm  mother  of this        SOCIAL HISTORY:  Smoking: __ packs x ___ years  EtOH Use:  Marital Status:  Occupation:  Exposures:  Recent Travel:    Allergies    Zosyn (Rash; Urticaria; Hives)    Intolerances        HOME MEDICATIONS:      OBJECTIVE:  ICU Vital Signs Last 24 Hrs  T(C): 37.5 (2025 19:00), Max: 37.9 (2025 11:45)  T(F): 99.5 (2025 19:00), Max: 100.2 (2025 11:45)  HR: 103 (2025 22:00) (103 - 127)  BP: 137/74 (2025 08:30) (137/74 - 137/74)  BP(mean): 99 (2025 08:30) (99 - 99)  ABP: 133/65 (2025 22:00) (79/38 - 148/69)  ABP(mean): 91 (2025 22:00) (52 - 96)  RR: 46 (2025 22:00) (20 - 46)  SpO2: 95% (2025 22:00) (91% - 100%)    O2 Parameters below as of 2025 22:00  Patient On (Oxygen Delivery Method): room air              -12 @ 07:01  -  06-13 @ 07:00  --------------------------------------------------------  IN: 896.6 mL / OUT: 1155 mL / NET: -258.4 mL     @ 07:01   @ 22:32  --------------------------------------------------------  IN: 418.5 mL / OUT: 355 mL / NET: 63.5 mL      CAPILLARY BLOOD GLUCOSE      POCT Blood Glucose.: 142 mg/dL (2025 11:24)      PHYSICAL EXAM:  General: Awake, alert, oriented X 3.   HEENT: Atraumatic, normocephalic.                 Mallampatti Grade 2  Lymph Nodes: No palpable lymphadenopathy  Neck: No JVD. No carotid bruit.   Respiratory: Normal chest expansion                         Reduce breath sounds on the right side  Cardiovascular: S1 S2 normal. No murmurs, rubs or gallops.   Abdomen: Soft, non-tender, non-distended. No organomegaly.  Extremities: Warm to touch.  Skin: No rashes or skin lesions  Neurological: Motor and sensory examination equal and normal in all four extremities.  Psychiatry: Appropriate mood and affect.    HOSPITAL MEDICATIONS:  MEDICATIONS  (STANDING):  artificial  tears Solution 1 Drop(s) Both EYES two times a day  cefTRIAXone   IVPB 2000 milliGRAM(s) IV Intermittent every 24 hours  chlorhexidine 2% Cloths 1 Application(s) Topical <User Schedule>  levalbuterol Inhalation 0.63 milliGRAM(s) Inhalation every 6 hours  melatonin 5 milliGRAM(s) Oral at bedtime  metoprolol tartrate 12.5 milliGRAM(s) Oral every 6 hours  midodrine 30 milliGRAM(s) Oral every 8 hours  pantoprazole    Tablet 40 milliGRAM(s) Oral before breakfast  QUEtiapine 12.5 milliGRAM(s) Oral at bedtime  sodium chloride 3%  Inhalation 4 milliLiter(s) Inhalation every 12 hours    MEDICATIONS  (PRN):  ipratropium    for Nebulization 500 MICROGram(s) Nebulizer every 6 hours PRN Shortness of Breath and/or Wheezing      LABS:                        8.6    2.07  )-----------( 432      ( 2025 00:19 )             27.4         134[L]  |  102  |  23  ----------------------------<  107[H]  4.7   |  22  |  0.89    Ca    8.1[L]      2025 00:19  Phos  3.1       Mg     2.2         TPro  6.2  /  Alb  2.3[L]  /  TBili  0.3  /  DBili  x   /  AST  25  /  ALT  22  /  AlkPhos  133[H]        Urinalysis Basic - ( 2025 00:19 )    Color: x / Appearance: x / SG: x / pH: x  Gluc: 107 mg/dL / Ketone: x  / Bili: x / Urobili: x   Blood: x / Protein: x / Nitrite: x   Leuk Esterase: x / RBC: x / WBC x   Sq Epi: x / Non Sq Epi: x / Bacteria: x      Arterial Blood Gas:   @ 00:07  7.40/41/226/25/99.3/0.5  ABG lactate: --  Arterial Blood Gas:   @ 03:19  7.38/39/101/23/98.6/-1.8  ABG lactate: --        MICROBIOLOGY:     RADIOLOGY:  [ ] Reviewed and interpreted by me    Point of Care Ultrasound Findings;    PFT:    EKG:

## 2025-06-13 NOTE — PROGRESS NOTE ADULT - PROBLEM SELECTOR PLAN 1
- Patient is high risk for pericardial window per Dr. Canada, recommend continue to monitor pericardial effusion  6/11 Repeat CT Chest: Large pericardial effusion, increased in size when compared to 6/4/2024. IR reconsulted for drainage.   - IR pericardiocentesis pericardial drain in place.    -b/l pleural effusions   - Palliative care following for GOC  - Thoracic will continue to follow.   - Global care per primary / CICU team - Patient is high risk for pericardial window per Dr. Canada, recommend continue to monitor pericardial effusion  6/11 Repeat CT Chest: Large pericardial effusion, increased in size when compared to 6/4/2024. IR reconsulted for drainage.   - IR pericardiocentesis pericardial drain in place.    -b/l pleural effusions     drainage as per cicu team  - Palliative care following for GOC  - Thoracic will continue to follow.   - Global care per primary / CICU team

## 2025-06-13 NOTE — PROGRESS NOTE ADULT - SUBJECTIVE AND OBJECTIVE BOX
GINO GRAHAM  MRN-[id number]  Patient is a 89y old  Female who presents with a chief complaint of shaking chills, hypotension, poor appetite (12 Jun 2025 19:51)    HPI:  89F c hx GERD, R breast CA (40-50y ago) s/p B/L mastectomy and radiation, RUE lymphedema, osteoarthritis/osteoporosis (on monthly ibandronate), HLD, Afib on eliquis, HOCM/severe LVOT/ELEAZAR, small-moderate pericardial effusion/RA diastolic collapse, recent hospitalization 5/29-6/5 for RML PNA, GBS bacteremia (pos cultures 5/29, 5/30) of unclear source on total 10 days abx (IV ceftriaxone inpt, then levaquin 750 q48h until 6/9), presents from [facility] rehab with shaking chills, hypotension, poor appetite    History from pt's daughter/primary decision maker Teresita Shay at bedside.  While at [facility], pt found to have hypotension to SBP 80s. Pt was given IVF and reduced dose of metoprolol. Pt also found to have tachycardia, shaking chills. Pt sent back to the hospital. Reportedly pt has not been eating well, not urinating much. Denies CP, SOB, abd pain, diarrhea, cough, other respiratory symptoms. At baseline pt ambulates without assistive device, drives.    RRT called in the ed for hypotension to SBP 79. (08 Jun 2025 02:41)      24 HOUR EVENTS:    REVIEW OF SYSTEMS:    CONSTITUTIONAL: No weakness, fevers or chills  EYES/ENT: No visual changes;  No vertigo or throat pain   NECK: No pain or stiffness  RESPIRATORY: No cough, wheezing, hemoptysis; No shortness of breath  CARDIOVASCULAR: No chest pain or palpitations  GASTROINTESTINAL: No abdominal or epigastric pain. No nausea, vomiting, or hematemesis; No diarrhea or constipation. No melena or hematochezia.  GENITOURINARY: No dysuria, frequency or hematuria  NEUROLOGICAL: No numbness or weakness  SKIN: No itching, rashes      ICU Vital Signs Last 24 Hrs  T(C): 36.8 (13 Jun 2025 03:00), Max: 37.1 (12 Jun 2025 11:00)  T(F): 98.3 (13 Jun 2025 03:00), Max: 98.7 (12 Jun 2025 11:00)  HR: 124 (13 Jun 2025 06:30) (103 - 130)  BP: 90/53 (12 Jun 2025 08:27) (90/53 - 90/53)  BP(mean): --  ABP: 122/56 (13 Jun 2025 06:30) (79/38 - 148/69)  ABP(mean): 79 (13 Jun 2025 06:30) (52 - 96)  RR: 30 (13 Jun 2025 06:30) (15 - 43)  SpO2: 100% (13 Jun 2025 06:30) (95% - 100%)    O2 Parameters below as of 13 Jun 2025 06:00  Patient On (Oxygen Delivery Method): nasal cannula  O2 Flow (L/min): 2          CVP(mm Hg): --  CO: --  CI: --  PA: --  PA(mean): --  PA(direct): --  PCWP: --  LA: --  RA: --  SVR: --  SVRI: --  PVR: --  PVRI: --  I&O's Summary    11 Jun 2025 07:01  -  12 Jun 2025 07:00  --------------------------------------------------------  IN: 230 mL / OUT: 1175 mL / NET: -945 mL    12 Jun 2025 07:01  -  13 Jun 2025 06:58  --------------------------------------------------------  IN: 880 mL / OUT: 1135 mL / NET: -255 mL        CAPILLARY BLOOD GLUCOSE    CAPILLARY BLOOD GLUCOSE      POCT Blood Glucose.: 136 mg/dL (12 Jun 2025 22:04)      PHYSICAL EXAM:  GENERAL: No acute distress, well-developed  HEAD:  Atraumatic, Normocephalic  EYES: EOMI, PERRLA, conjunctiva and sclera clear  NECK: Supple, no lymphadenopathy, no JVD  CHEST/LUNG: CTAB; No wheezes, rales, or rhonchi  HEART: Regular rate and rhythm. Normal S1/S2. No murmurs, rubs, or gallops  ABDOMEN: Soft, non-tender, non-distended; normal bowel sounds, no organomegaly  EXTREMITIES:  2+ peripheral pulses b/l, No clubbing, cyanosis, or edema  NEUROLOGY: A&O x 3, no focal deficits  SKIN: No rashes or lesions    ============================I/O===========================   I&O's Detail    11 Jun 2025 07:01  -  12 Jun 2025 07:00  --------------------------------------------------------  IN:    IV PiggyBack: 50 mL    Oral Fluid: 180 mL  Total IN: 230 mL    OUT:    Indwelling Catheter - Urethral (mL): 1175 mL  Total OUT: 1175 mL    Total NET: -945 mL      12 Jun 2025 07:01  -  13 Jun 2025 06:58  --------------------------------------------------------  IN:    Norepinephrine: 2.4 mL    Oral Fluid: 210 mL    Phenylephrine: 167.6 mL    Sodium Chloride 0.9% Bolus: 500 mL  Total IN: 880 mL    OUT:    Drain (mL): 60 mL    Indwelling Catheter - Urethral (mL): 1075 mL  Total OUT: 1135 mL    Total NET: -255 mL        ============================ LABS =========================                        8.6    2.07  )-----------( 432      ( 13 Jun 2025 00:19 )             27.4     06-13    134[L]  |  102  |  23  ----------------------------<  107[H]  4.7   |  22  |  0.89    Ca    8.1[L]      13 Jun 2025 00:19  Phos  3.1     06-13  Mg     2.2     06-13    TPro  6.2  /  Alb  2.3[L]  /  TBili  0.3  /  DBili  x   /  AST  25  /  ALT  22  /  AlkPhos  133[H]  06-13                LIVER FUNCTIONS - ( 13 Jun 2025 00:19 )  Alb: 2.3 g/dL / Pro: 6.2 g/dL / ALK PHOS: 133 U/L / ALT: 22 U/L / AST: 25 U/L / GGT: x             ABG - ( 13 Jun 2025 00:07 )  pH, Arterial: 7.40  pH, Blood: x     /  pCO2: 41    /  pO2: 226   / HCO3: 25    / Base Excess: 0.5   /  SaO2: 99.3              Blood Gas Arterial, Lactate: 0.6 mmol/L (06-13-25 @ 00:07)  Blood Gas Arterial, Lactate: 1.6 mmol/L (06-12-25 @ 03:19)  Blood Gas Venous - Lactate: 1.1 mmol/L (06-11-25 @ 20:36)    Urinalysis Basic - ( 13 Jun 2025 00:19 )    Color: x / Appearance: x / SG: x / pH: x  Gluc: 107 mg/dL / Ketone: x  / Bili: x / Urobili: x   Blood: x / Protein: x / Nitrite: x   Leuk Esterase: x / RBC: x / WBC x   Sq Epi: x / Non Sq Epi: x / Bacteria: x      ======================Micro/Rad/Cardio=================  Telemetry: Reviewed   EKG: Reviewed  CXR: Reviewed  Culture: Reviewed   Echo:   Cath:

## 2025-06-13 NOTE — PROGRESS NOTE ADULT - SUBJECTIVE AND OBJECTIVE BOX
[facility]  BASIL Hooks Y. Patel, S. Shah, G. Casimir  [phone]  ([phone] - weekdays after 5pm and weekends)    Name: GINO GRAHAM  Age/Gender: 89y Female  MRN: [id number]    Interval History:  Patient seen and examined this morning.   Resting comfortably, more awake, denies pain.   Notes reviewed. Afebrile   Allergies: Zosyn (Rash; Urticaria; Hives)      Objective:  Vitals:   T(F): 98.6 (06-13-25 @ 07:30), Max: 98.7 (06-12-25 @ 11:00)  HR: 125 (06-13-25 @ 08:30) (103 - 125)  BP: 137/74 (06-13-25 @ 08:30) (137/74 - 137/74)  RR: 24 (06-13-25 @ 08:30) (20 - 43)  SpO2: 94% (06-13-25 @ 08:30) (91% - 100%)  Physical Examination:  General: no acute distress  HEENT: normocephalic, atraumatic, anicteric  Respiratory: clear to auscultation b/l, pericardial drain   Cardiovascular: S1 and S2 present, tachycardia   Gastrointestinal: soft, nontender, nondistended  Extremities: no edema, no cyanosis  Skin: no visible rash    Laboratory Studies:  CBC:                       8.6    2.07  )-----------( 432      ( 13 Jun 2025 00:19 )             27.4     WBC Trend:  2.07 06-13-25 @ 00:19  3.40 06-12-25 @ 01:26  4.03 06-11-25 @ 00:57  3.60 06-10-25 @ 09:31  3.45 06-09-25 @ 18:46  7.11 06-07-25 @ 20:56    CMP: 06-13    134[L]  |  102  |  23  ----------------------------<  107[H]  4.7   |  22  |  0.89    Ca    8.1[L]      13 Jun 2025 00:19  Phos  3.1     06-13  Mg     2.2     06-13    TPro  6.2  /  Alb  2.3[L]  /  TBili  0.3  /  DBili  x   /  AST  25  /  ALT  22  /  AlkPhos  133[H]  06-13    Creatinine: 0.89 mg/dL (06-13-25 @ 00:19)  Creatinine: 0.65 mg/dL (06-12-25 @ 12:45)  Creatinine: 0.70 mg/dL (06-12-25 @ 05:33)  Creatinine: 0.81 mg/dL (06-12-25 @ 01:26)  Creatinine: 0.84 mg/dL (06-11-25 @ 06:00)  Creatinine: 0.77 mg/dL (06-11-25 @ 01:33)  Creatinine: 0.61 mg/dL (06-10-25 @ 09:31)  Creatinine: 0.62 mg/dL (06-09-25 @ 18:46)  Creatinine: 0.89 mg/dL (06-07-25 @ 20:56)      LIVER FUNCTIONS - ( 13 Jun 2025 00:19 )  Alb: 2.3 g/dL / Pro: 6.2 g/dL / ALK PHOS: 133 U/L / ALT: 22 U/L / AST: 25 U/L / GGT: x           Cell Count, Body Fluid (06.12.25 @ 14:05)    Tube Type: Sterile   Fluid Type: Pericardial Fluid   Body Fluid Appearance: Bloody   Color - Body Fluid: Red   Total Nucleated Cell Count, Body Fluid: 78: cells/uL   Total RBC Count: 17195 cells/uL   Neutrophils-Body Fluid: 13 %   BF Lymphocytes: 81 %   Monocyte/Macrophage Count - Body Fluid: 6 %    Microbiology: reviewed   Culture - Body Fluid with Gram Stain (collected 06-12-25 @ 14:05)  Source: Pericardial Other  Gram Stain (06-13-25 @ 00:20):    polymorphonuclear leukocytes seen by cytocentrifuge    No organisms seen by cytocentrifuge    Urinalysis with Rflx Culture (collected 06-08-25 @ 13:01)    Culture - Blood (collected 06-07-25 @ 20:45)  Source: Blood Blood-Peripheral  Final Report (06-13-25 @ 02:01):    No growth at 5 days    Culture - Blood (collected 06-07-25 @ 20:30)  Source: Blood Blood-Peripheral  Final Report (06-13-25 @ 02:01):    No growth at 5 days    Culture - Blood (collected 06-01-25 @ 10:51)  Source: Blood Blood-Venous  Final Report (06-06-25 @ 14:00):    No growth at 5 days    Culture - Blood (collected 05-31-25 @ 20:48)  Source: Blood Blood-Peripheral  Final Report (06-06-25 @ 02:01):    No growth at 5 days    Radiology: reviewed     Medications:  acetaminophen   IVPB .. 1000 milliGRAM(s) IV Intermittent once  artificial  tears Solution 1 Drop(s) Both EYES two times a day  cefTRIAXone   IVPB 2000 milliGRAM(s) IV Intermittent every 24 hours  chlorhexidine 2% Cloths 1 Application(s) Topical <User Schedule>  insulin lispro (ADMELOG) corrective regimen sliding scale   SubCutaneous three times a day before meals  insulin lispro (ADMELOG) corrective regimen sliding scale   SubCutaneous at bedtime  ipratropium    for Nebulization 500 MICROGram(s) Nebulizer every 6 hours PRN  levalbuterol Inhalation 0.63 milliGRAM(s) Inhalation every 6 hours  melatonin 5 milliGRAM(s) Oral at bedtime  midodrine 30 milliGRAM(s) Oral every 8 hours  pantoprazole    Tablet 40 milliGRAM(s) Oral before breakfast  phenylephrine    Infusion 0.1 MICROgram(s)/kG/Min IV Continuous <Continuous>  QUEtiapine 12.5 milliGRAM(s) Oral at bedtime  sodium chloride 3%  Inhalation 4 milliLiter(s) Inhalation every 12 hours    Current Antimicrobials:  cefTRIAXone   IVPB 2000 milliGRAM(s) IV Intermittent every 24 hours    Prior/Completed Antimicrobials:  cefepime   IVPB  vancomycin  IVPB.

## 2025-06-13 NOTE — CONSULT NOTE ADULT - ASSESSMENT
89F c hx GERD, R breast CA (40-50y ago) s/p B/L mastectomy and radiation, RUE lymphedema, osteoarthritis/osteoporosis (on monthly ibandronate), HLD, Afib on eliquis, HOCM/severe LVOT/ELEAZAR, small-moderate pericardial effusion/RA diastolic collapse, recent hospitalization 5/29-6/5 for RML PNA, GBS bacteremia (pos cultures 5/29, 5/30) of unclear source on total 10 days abx (IV ceftriaxone inpt, then levaquin 750 q48h until 6/9). s/p pericardial effusion drainage.     IP consulted for right pleural effusion  - Patient on room air, no indication of immediate drainage at this time given borderline hemodynamic status.  - Obtain new coagulation profile to ensure safe for thoracentesis  - If worsening respiratory status noted, or new oxygen requirements noted, please let IP team know to reassess for drainage.  - IP team to follow as needed.  - Continue CICU care.  - Management of pericardial drain per CICU/thoracic sx/IR

## 2025-06-13 NOTE — PROGRESS NOTE ADULT - SUBJECTIVE AND OBJECTIVE BOX
Date of Service: 06-13-25 @ 16:44    Patient is a 89y old  Female who presents with a chief complaint of shaking chills, hypotension, poor appetite (13 Jun 2025 14:12)      Any change in ROS: son at bedside:   pt is on room air:  93% sao2:   no sob:   team asked us to tap the fluid:   but pt is not in any resp distress     MEDICATIONS  (STANDING):  artificial  tears Solution 1 Drop(s) Both EYES two times a day  cefTRIAXone   IVPB 2000 milliGRAM(s) IV Intermittent every 24 hours  chlorhexidine 2% Cloths 1 Application(s) Topical <User Schedule>  levalbuterol Inhalation 0.63 milliGRAM(s) Inhalation every 6 hours  melatonin 5 milliGRAM(s) Oral at bedtime  metoprolol tartrate 12.5 milliGRAM(s) Oral every 6 hours  midodrine 30 milliGRAM(s) Oral every 8 hours  pantoprazole    Tablet 40 milliGRAM(s) Oral before breakfast  QUEtiapine 12.5 milliGRAM(s) Oral at bedtime  sodium chloride 3%  Inhalation 4 milliLiter(s) Inhalation every 12 hours    MEDICATIONS  (PRN):  ipratropium    for Nebulization 500 MICROGram(s) Nebulizer every 6 hours PRN Shortness of Breath and/or Wheezing    Vital Signs Last 24 Hrs  T(C): 37.4 (13 Jun 2025 16:00), Max: 37.9 (13 Jun 2025 11:45)  T(F): 99.3 (13 Jun 2025 16:00), Max: 100.2 (13 Jun 2025 11:45)  HR: 105 (13 Jun 2025 16:30) (103 - 127)  BP: 137/74 (13 Jun 2025 08:30) (137/74 - 137/74)  BP(mean): 99 (13 Jun 2025 08:30) (99 - 99)  RR: 23 (13 Jun 2025 16:30) (20 - 43)  SpO2: 93% (13 Jun 2025 16:30) (91% - 100%)    Parameters below as of 13 Jun 2025 16:30  Patient On (Oxygen Delivery Method): room air        I&O's Summary    12 Jun 2025 07:01  -  13 Jun 2025 07:00  --------------------------------------------------------  IN: 896.6 mL / OUT: 1155 mL / NET: -258.4 mL    13 Jun 2025 07:01  -  13 Jun 2025 16:44  --------------------------------------------------------  IN: 368.5 mL / OUT: 185 mL / NET: 183.5 mL          Physical Exam:   GENERAL: cacechtic  HEENT: BRYSON/   Atraumatic, Normocephalic  ENMT: No tonsillar erythema, exudates, or enlargement; Moist mucous membranes, Good dentition, No lesions  NECK: Supple, No JVD, Normal thyroid  CHEST/LUNG: Clear to auscultaion, ; No rales, rhonchi, wheezing, or rubs  CVS: Regular rate and rhythm; No murmurs, rubs, or gallops  GI: : Soft, Nontender, Nondistended; Bowel sounds present  NERVOUS SYSTEM:  Alert & Oriented X3  EXTREMITIES:  - edema  LYMPH: No lymphadenopathy noted  SKIN: No rashes or lesions  ENDOCRINOLOGY: No Thyromegaly  PSYCH: calm     Labs:  ABG - ( 13 Jun 2025 00:07 )  pH, Arterial: 7.40  pH, Blood: x     /  pCO2: 41    /  pO2: 226   / HCO3: 25    / Base Excess: 0.5   /  SaO2: 99.3            36.0, 27, 27, 26                            8.6    2.07  )-----------( 432      ( 13 Jun 2025 00:19 )             27.4                         9.4    3.40  )-----------( 452      ( 12 Jun 2025 01:26 )             29.5                         11.6   4.03  )-----------( 466      ( 11 Jun 2025 00:57 )             37.4                         10.3   3.60  )-----------( 467      ( 10 Ernesto 2025 09:31 )             32.1                         10.1   3.45  )-----------( 417      ( 09 Jun 2025 18:46 )             31.5     06-13    134[L]  |  102  |  23  ----------------------------<  107[H]  4.7   |  22  |  0.89  06-12    131[L]  |  100  |  23  ----------------------------<  101[H]  5.2   |  23  |  0.65  06-12    129[L]  |  99  |  25[H]  ----------------------------<  138[H]  5.4[H]   |  19[L]  |  0.70  06-12    132[L]  |  98  |  28[H]  ----------------------------<  134[H]  5.7[H]   |  22  |  0.81  06-11    129[L]  |  98  |  25[H]  ----------------------------<  118[H]  5.3   |  20[L]  |  0.84  06-11    130[L]  |  98  |  23  ----------------------------<  106[H]  6.3[HH]   |  19[L]  |  0.77  06-10    127[L]  |  96  |  19  ----------------------------<  118[H]  5.2   |  21[L]  |  0.61  06-09    128[L]  |  97  |  22  ----------------------------<  183[H]  4.8   |  21[L]  |  0.62    Ca    8.1[L]      13 Jun 2025 00:19  Ca    8.4      12 Jun 2025 12:45  Ca    8.4      12 Jun 2025 05:33  Ca    8.6      12 Jun 2025 01:26  Phos  3.1     06-13  Phos  2.6     06-12  Phos  2.9     06-12  Mg     2.2     06-13  Mg     2.3     06-12  Mg     2.4     06-12    TPro  6.2  /  Alb  2.3[L]  /  TBili  0.3  /  DBili  x   /  AST  25  /  ALT  22  /  AlkPhos  133[H]  06-13  TPro  6.5  /  Alb  2.8[L]  /  TBili  0.5  /  DBili  x   /  AST  23  /  ALT  23  /  AlkPhos  147[H]  06-12  TPro  6.8  /  Alb  2.6[L]  /  TBili  0.6  /  DBili  x   /  AST  28  /  ALT  28  /  AlkPhos  168[H]  06-12  TPro  7.0  /  Alb  2.7[L]  /  TBili  0.6  /  DBili  x   /  AST  32  /  ALT  29  /  AlkPhos  173[H]  06-12  TPro  6.3  /  Alb  2.2[L]  /  TBili  0.7  /  DBili  x   /  AST  28  /  ALT  30  /  AlkPhos  158[H]  06-11  TPro  7.0  /  Alb  2.4[L]  /  TBili  0.8  /  DBili  x   /  AST  53[H]  /  ALT  38  /  AlkPhos  198[H]  06-11    CAPILLARY BLOOD GLUCOSE      POCT Blood Glucose.: 142 mg/dL (13 Jun 2025 11:24)  POCT Blood Glucose.: 95 mg/dL (13 Jun 2025 07:42)  POCT Blood Glucose.: 136 mg/dL (12 Jun 2025 22:04)      LIVER FUNCTIONS - ( 13 Jun 2025 00:19 )  Alb: 2.3 g/dL / Pro: 6.2 g/dL / ALK PHOS: 133 U/L / ALT: 22 U/L / AST: 25 U/L / GGT: x             Urinalysis Basic - ( 13 Jun 2025 00:19 )    Color: x / Appearance: x / SG: x / pH: x  Gluc: 107 mg/dL / Ketone: x  / Bili: x / Urobili: x   Blood: x / Protein: x / Nitrite: x   Leuk Esterase: x / RBC: x / WBC x   Sq Epi: x / Non Sq Epi: x / Bacteria: x      Fluid Source --  Albumin, Fluid1.9 g/dL  Glucose, [id number] mg/dL  Protein total, Fluid4.3 g/dL  Lacatate Dehydrogenase, [id number] U/L  pH, Fluid--  Cytopathology-Non Gyn Report--        RECENT CULTURES:  06-12 @ 14:05 Pericardial Pericardial Fluid       polymorphonuclear leukocytes seen by cytocentrifuge  No organisms seen by cytocentrifuge           Testing in progress    06-07 @ 20:45 Blood Blood-Peripheral                No growth at 5 days    06-07 @ 20:30 Blood Blood-Peripheral                No growth at 5 days          RESPIRATORY CULTURES:          Studies  Chest X-RAY  CT SCAN Chest   Venous Dopplers: LE:   CT Abdomen  Others

## 2025-06-13 NOTE — PROGRESS NOTE ADULT - ASSESSMENT
89F c hx GERD, R breast CA (40-50y ago) s/p B/L mastectomy and radiation, RUE lymphedema, osteoarthritis/osteoporosis (on monthly ibandronate), HLD, Afib on eliquis, HOCM/severe LVOT/ELEAZAR, small-moderate pericardial effusion/RA diastolic collapse, recent hospitalization 5/29-6/5 for RML PNA, GBS bacteremia (pos cultures 5/29, 5/30) of unclear source on total 10 days abx (IV ceftriaxone inpt, then levaquin 750 q48h until 6/9), pw hypotension, severe sepsis of unclear source, demand ischemia, afib c rvr    Severe Sepsis/sirs, Hypotension 2/2 unclear source  Severe LVOT obstruction/HCM, worsening pericardial effusion  CXR w/ worsening aeration at the bases  Flu/COVID/RSV negative  UA negative  6/7 Bcx NGTD x2   Zosyn allergy noted, tolerates cefepime  CTAP with mod-large pericardial effusion inc since 5/29/25, stable mod R and small L pleural effusion and atelectasis   TTE with increased pericardial effusion since 6/5 -- moderate pericardial effusion adjacent to RV, small pericardial effusion adjacent to RA and large pericardia effusion noted adjacent to posterior LV with no evidence of tamponade physiology  6/10 note with tachypnea and expiratory wheeze, CCU consulted for decompensated heart failure iso mod-severe pericardial effusion with concerns for impending hemodynamic collapse given complex cardiac physiology with severe LVOT obstruction, now transferred to CICU for closer monitor and management of enlarging pericardial effusion  CTS eval noted-patient high risk for pericardial window - rec continue to monitor pericardial effusion with repeat TTE  6/11 CT chest with large pericardial effusion increased since 6/4, mod R and small L pleural effusion, no pneumonia;  cefepime changed to ceftriaxone d/t concern for possible neurotoxicity   6/12 hypotensive and tachycardic despite volume resuscitation -- s/p emergent pericardiocentesis with drain placed    - noted drained 400cc ss pericardial fluid, cell count noted, gram stain with no organisms     s/p vancomycin 6/7-6/10  s/p cefepime 6/7-6/11    Recommendations:   Follow pericardial fluid culture   Continue ceftriaxone 2g IV Q24h   - if culture remains negative, can plan to complete ceftriaxone on 6/20  IC, CTS, Pulmonary following   Drain care   Trend temps/WBC  Continue rest of care per primary team     Over the weekend Dr. Deepthi Freitas will be covering for our group. If you have any questions, concerns or new micro data please reach out to them using TEAMS *PREFERRED* or by calling our service at [phone].     Wes Grullon M.D.  [facility]  Available on [employer] TEAMS - *PREFERRED*  [phone]  After 5pm on weekdays and all day on weekends - please call [phone]     Thank you for consulting us and involving us in the management of this patients case. In addition to reviewing history, imaging, documents, labs, microbiology, took into account antibiotic stewardship, local antibiogram and infection control strategies and potential transmission issues at time of treatment decision making process.

## 2025-06-13 NOTE — CONSULT NOTE ADULT - TIME BILLING
time spent in review of laboratory data, radiology images and results, discussion with primary team/patient, and monitoring for potential decompensation. Interventions performed as documented above. In addition, time also spent to risks and benefits of the procedure, alternative procedures, diagnostic and therapeutic outcomes as well as further management plan. Complex patient requiring services. Interventional Pulmonology services provided to the patient were separate from general pulmonary service due to complexity of issues and interventions required. Time spent separate from time spent doing any procedures.
Symptom assessment and management, supportive counseling, coordination of care

## 2025-06-13 NOTE — CONSULT NOTE ADULT - CONSULT REQUESTED BY NAME
Dr Laquita Echavarria
primary
Dr REBEKA Wong
Reyes Monegro, Emelin
Dr. Wong
DR Wong
Dr. De León
Dr. Cazares
Primary team
Dr. Mercedes

## 2025-06-13 NOTE — CONSULT NOTE ADULT - CONSULT REASON
Pleural Effusion
AMS and Hypotension
pericardial effusion
chills,  slepiness
tachycardia, shortness of breath
Sepsis
Pericardial drain
Elevated trop
pericardial effusion
GOC

## 2025-06-13 NOTE — PROGRESS NOTE ADULT - SUBJECTIVE AND OBJECTIVE BOX
Subjective: Patient seen and examined. No new events except as noted.     SUBJECTIVE/ROS:  more awake, alert  appears comfortable       MEDICATIONS:  MEDICATIONS  (STANDING):  acetaminophen   IVPB .. 1000 milliGRAM(s) IV Intermittent once  artificial  tears Solution 1 Drop(s) Both EYES two times a day  cefTRIAXone   IVPB 2000 milliGRAM(s) IV Intermittent every 24 hours  chlorhexidine 2% Cloths 1 Application(s) Topical <User Schedule>  insulin lispro (ADMELOG) corrective regimen sliding scale   SubCutaneous three times a day before meals  insulin lispro (ADMELOG) corrective regimen sliding scale   SubCutaneous at bedtime  levalbuterol Inhalation 0.63 milliGRAM(s) Inhalation every 6 hours  melatonin 5 milliGRAM(s) Oral at bedtime  midodrine 30 milliGRAM(s) Oral every 8 hours  pantoprazole    Tablet 40 milliGRAM(s) Oral before breakfast  phenylephrine    Infusion 0.1 MICROgram(s)/kG/Min (2.37 mL/Hr) IV Continuous <Continuous>  QUEtiapine 12.5 milliGRAM(s) Oral at bedtime  sodium chloride 3%  Inhalation 4 milliLiter(s) Inhalation every 12 hours      PHYSICAL EXAM:  T(C): 37 (06-13-25 @ 07:30), Max: 37.1 (06-12-25 @ 11:00)  HR: 119 (06-13-25 @ 09:15) (103 - 125)  BP: 137/74 (06-13-25 @ 08:30) (137/74 - 137/74)  RR: 24 (06-13-25 @ 09:15) (20 - 43)  SpO2: 92% (06-13-25 @ 09:15) (91% - 100%)  Wt(kg): --  I&O's Summary    12 Jun 2025 07:01  -  13 Jun 2025 07:00  --------------------------------------------------------  IN: 896.6 mL / OUT: 1155 mL / NET: -258.4 mL    13 Jun 2025 07:01  -  13 Jun 2025 09:35  --------------------------------------------------------  IN: 248.5 mL / OUT: 15 mL / NET: 233.5 mL            JVP: Normal  Neck: supple  Lung: clear   CV: S1 S2 ,  Abd: soft  Ext: No edema  neuro: Awake / alert  Psych: flat affect  Skin: normal``    LABS/DATA:    CARDIAC MARKERS:                                8.6    2.07  )-----------( 432      ( 13 Jun 2025 00:19 )             27.4     06-13    134[L]  |  102  |  23  ----------------------------<  107[H]  4.7   |  22  |  0.89    Ca    8.1[L]      13 Jun 2025 00:19  Phos  3.1     06-13  Mg     2.2     06-13    TPro  6.2  /  Alb  2.3[L]  /  TBili  0.3  /  DBili  x   /  AST  25  /  ALT  22  /  AlkPhos  133[H]  06-13    proBNP:   Lipid Profile:   HgA1c:   TSH:

## 2025-06-13 NOTE — PROGRESS NOTE ADULT - ASSESSMENT
89-y/o female with PMHx of HOCM with severe LVOT obstruction, atrial fibrillation on eliquis, small-moderate pericardial effusion with RA diastolic collapse, recent hospitalization (5/29-6/5) for RML pneumonia and GBS bacteremia, and remote history of right breast cancer status post bilateral mastectomy with radiation who presented with septic shock and hypotension, initially managed on the floor before requiring CCU admission for hemodynamic instability secondary to enlarging pericardial effusion, rapid atrial fibrillation, and decompensated heart failure. Thoracic surgery consulted for pericardial window.     6/11 Previous TTE & CT Chest reviewed by Dr. Canada - TTE without evidence of tamponade physiology, hemodynamically stable, patient is high risk for pericardial window per Thoracic Attending, recommend continue to monitor pericardial effusion with repeat TTE and CT chest today. Per RN at bedside, primary team to discuss GOC with patient's family today. Thoracic will continue to follow.   6/12 Patient hypotensive this AM and sent to IR for emergent pericardiocentesis.   6/13 VSS peric dr Bashir

## 2025-06-13 NOTE — PROGRESS NOTE ADULT - SUBJECTIVE AND OBJECTIVE BOX
INTERVENTIONAL RADIOLOGY DAILY PROGRESS NOTE:     SUBJECTIVE/ROS: Patient seen at bedside. Resting comfortably Tachy 110s.     24h Events:   - Overnight, no acute events    OBJECTIVE:  Vital Signs Last 24 Hrs  T(C): 37.9 (13 Jun 2025 11:45), Max: 37.9 (13 Jun 2025 11:45)  T(F): 100.2 (13 Jun 2025 11:45), Max: 100.2 (13 Jun 2025 11:45)  HR: 127 (13 Jun 2025 14:00) (103 - 127)  BP: 137/74 (13 Jun 2025 08:30) (137/74 - 137/74)  BP(mean): 99 (13 Jun 2025 08:30) (99 - 99)  RR: 25 (13 Jun 2025 14:00) (20 - 43)  SpO2: 97% (13 Jun 2025 14:00) (91% - 100%)    Parameters below as of 13 Jun 2025 14:00  Patient On (Oxygen Delivery Method): room air      I&O's Detail    12 Jun 2025 07:01  -  13 Jun 2025 07:00  --------------------------------------------------------  IN:    Norepinephrine: 2.4 mL    Oral Fluid: 210 mL    Phenylephrine: 184.2 mL    Sodium Chloride 0.9% Bolus: 500 mL  Total IN: 896.6 mL    OUT:    Drain (mL): 60 mL    Indwelling Catheter - Urethral (mL): 1095 mL  Total OUT: 1155 mL    Total NET: -258.4 mL      13 Jun 2025 07:01  -  13 Jun 2025 14:13  --------------------------------------------------------  IN:    Oral Fluid: 360 mL    Phenylephrine: 8.5 mL  Total IN: 368.5 mL    OUT:    Drain (mL): 0 mL    Indwelling Catheter - Urethral (mL): 155 mL  Total OUT: 155 mL    Total NET: 213.5 mL        Daily     Daily   MEDICATIONS  (STANDING):  artificial  tears Solution 1 Drop(s) Both EYES two times a day  cefTRIAXone   IVPB 2000 milliGRAM(s) IV Intermittent every 24 hours  chlorhexidine 2% Cloths 1 Application(s) Topical <User Schedule>  levalbuterol Inhalation 0.63 milliGRAM(s) Inhalation every 6 hours  melatonin 5 milliGRAM(s) Oral at bedtime  metoprolol tartrate 12.5 milliGRAM(s) Oral every 6 hours  midodrine 30 milliGRAM(s) Oral every 8 hours  pantoprazole    Tablet 40 milliGRAM(s) Oral before breakfast  QUEtiapine 12.5 milliGRAM(s) Oral at bedtime  sodium chloride 3%  Inhalation 4 milliLiter(s) Inhalation every 12 hours    MEDICATIONS  (PRN):  ipratropium    for Nebulization 500 MICROGram(s) Nebulizer every 6 hours PRN Shortness of Breath and/or Wheezing      LABS:                        8.6    2.07  )-----------( 432      ( 13 Jun 2025 00:19 )             27.4     06-13    134[L]  |  102  |  23  ----------------------------<  107[H]  4.7   |  22  |  0.89    Ca    8.1[L]      13 Jun 2025 00:19  Phos  3.1     06-13  Mg     2.2     06-13    TPro  6.2  /  Alb  2.3[L]  /  TBili  0.3  /  DBili  x   /  AST  25  /  ALT  22  /  AlkPhos  133[H]  06-13      Urinalysis Basic - ( 13 Jun 2025 00:19 )    Color: x / Appearance: x / SG: x / pH: x  Gluc: 107 mg/dL / Ketone: x  / Bili: x / Urobili: x   Blood: x / Protein: x / Nitrite: x   Leuk Esterase: x / RBC: x / WBC x   Sq Epi: x / Non Sq Epi: x / Bacteria: x      PHYSICAL EXAM:  Gen: AAOx3, non-toxic  Head: NCAT  HEENT: EOMI, oral mucosa moist, normal conjunctiva  Lung: Breathing on RA, unlabored   Abd: soft, NTND, no guarding.   MSK: no visible deformities  Neuro: No focal sensory or motor deficits

## 2025-06-13 NOTE — PROGRESS NOTE ADULT - ASSESSMENT
ASSESSMENT: 89F with PMH HOCM/severe LVOT obstruction, AFib on eliquis, pericardial effusion, breast CA s/p XRT with CC septic shock after hospital course c/b demand ischemia, AFib RVR, enlarging pericardial effusion. CCU admission for hemodynamic monitoring and management of moderate-severe pericardial effusion.    Plan:  NEURO  A&Ox3  - sundowning and anxiety overnight, continue seroquel 12.5  - continue to monitor mental status as per protocol     RESPIRATORY  #Acute hypoxic respiratory failure  - likely multifactorial in etiology  - nebs q6  - continue to monitor SpO2 with goal >94%    CARDIO  #Pericardial effusion  - s/p pericardiocentesis 6/12 with IR -400cc sanguinous fluid removed   - holding AC  - monitor drain output  - fluid PRN for transient hypotension  - Arnaldo if pressors required given HOCM    #Atrial fibrillation with RVR  - rate control: lopressor 12.5 BID  - AC: holding post drainage  - EP following    RENAL/  #CKD  - baseline Cr 0.6-0.9  - hold diuresis with HOCM & pericardial effusion  - Continue monitoring urine output, lytes, SCr/ BUN  - replete lytes prn with goal K >4 and Mg >2    GI  Tolerating PO diet    ENDO  No active issues    HEME  - Monitor H/H and plts  - DVT PPX: SCDs    ID  #Sepsis/bacteremia  - Recent GBS bacteremia, current source unclear  - Continue cefepime 1g q8h  - Blood cultures pending  - ID consulted  - monitor and trend WBC and temperature curve

## 2025-06-13 NOTE — PROGRESS NOTE ADULT - ASSESSMENT
Afib, severe HCM LVOT obstruction, Pericardial effusion, recent admission for PNA / Bacteremia   now admitted with sepsis, tachycardia ( afib with RVR ) and shock . Cardiology is called for elevated troponin     Elevated troponin   Demand ischemia   in setting of afib with RVR, shock, severe HCM and sepsis   its downtrending     Shock   likely multifactorial   in setting of sepsis, pericardial effusion and HCM with tachycardia  s/p emergent pericardial drain by IR   wean off phenylephrine as tolerated       Afib  FU with EP for eventual DCCV  cont a/c

## 2025-06-13 NOTE — CONSULT NOTE ADULT - CONSULT REQUESTED DATE/TIME
08-Jun-2025 15:30
09-Jun-2025 17:55
08-Jun-2025 12:35
08-Jun-2025 10:45
09-Jun-2025 15:23
08-Jun-2025 10:13
08-Jun-2025 13:22
13-Jun-2025 22:31
11-Jun-2025
10-Ernesto-2025 13:00

## 2025-06-13 NOTE — PROGRESS NOTE ADULT - ASSESSMENT
89F c hx GERD, R breast CA (40-50y ago) s/p B/L mastectomy and radiation, RUE lymphedema, osteoarthritis/osteoporosis (on monthly ibandronate), HLD, Afib on eliquis, HOCM/severe LVOT/LORA, small-moderate pericardial effusion/RA diastolic collapse, recent hospitalization 5/29-6/5 for RML PNA, GBS bacteremia (pos cultures 5/29, 5/30) of unclear source on total 10 days abx (IV ceftriaxone inpt, then levaquin 750 q48h until 6/9), presents from [facility] rehab with shaking chills, hypotension, poor appetite  History from pt's daughter/primary decision maker Teresita Day at bedside.  While at [facility], pt found to have hypotension to SBP 80s. Pt was given IVF and reduced dose of metoprolol. Pt also found to have tachycardia, shaking chills. Pt sent back to the hospital. Reportedly pt has not been eating well, not urinating much. Denies CP, SOB, abd pain, diarrhea, cough, other respiratory symptoms. At baseline pt ambulates without assistive device, drives.  RRT called in the ed for hypotension to SBP 79. (08 Jun 2025 02:41)  to me pt daughter says she was very sleepy in the morning too  as she was given ambien at 4 AM in the morning:   now she is alert and awake and is on 2 L of oxygen ;  she is not sob:  and does not look to be in any resp distress:       Severe sepsis.   unclear source of fevers  cont empiric vanc, cefepime  f/u blood cx. if positive will likely need MARIA G  monitor for any localizing symptoms  pt had recent pan ct scan that did not elucidate cause of bacteremia  recent blood cultures have been negative   - IVF  on cefepime  she is febrile too   vbg on admission IS ok;   MONITOR BLOOD PRESSURE CLOSELY:   OIF SHE DROPS HER BLOOD PRESSURE MORE:  WOULD NEED MICU  CO NSULT:    6/9: seems slightly more tored today  ; cont antibtiocs:  per ID:  she remains on 2 L of oxygen : she is alert and awake:  daughter at bedside:  reviewed the labs and echo and ct scan chest with the daughter in detail :   she has large pericardial effusions:  per ir no good window and effusion seemd small to drain : ct scan chest read as mod to large pericardial effusion : defer to cards :     6/10: seems to be doing  ok :  no sob:   no  cough  ;   no  phlegm   on 2 L of oxygen :  thoracic to see:    no emergency in tapping the pleural effusion:   de cardiologist  6/11: on ceftriaxone   seems O K   6/12: seems OK:  s/p pericardial drain:  has 400 cc output   await cytology    6/13: cont c urrent rx:   on antibtiocs    Pericardial effusion ;   the ct chest shows increasing pericardial effusion  : which is of more pressing concern then pleural effusion :  needs a tap:  ir guided tap    6/12: as above   6/13: asked up to tap:  need to repeat inr and pt  last was  >  2.0:  needs to be less then 1.5:  ip contacted will evalute for tap        Hypotension.  suspect combination of infection, hocm, lora, tachycardia, pericardial effusion, metoprolol  pt has somewhat tenuous hemodynamic status  consider cardioversion, consider repeat TTE  pt is full code.  pts blood pressure is slightly low   on midodrine    6/9: cont on midodrine  6/10; controlled:  on midodrine  6/11: on midodrine  6/12: currently she is on vasopressors   6/13; off vasopressors now     Chronic atrial fibrillation.  reduce metoprolol dose to tartrate 25mg BID  goal HR <100 in setting of HOCM with hypotension  consider amiodarone or cardioversion  PER CARDS    6/9: defer to cards   6/10: off Eliquis for now   6/11: remains off eliquis   6/12: off ac  6/13: keep off ac if thoracentesis needed to be done over the weekend:  though it is not emergent       Type 2 myocardial infarction.  suspect demand ischemia from recent hypotension, infections  start asa   cont home eliquis.  CONT CURRENT MEDS     6/9; no chest pain : cont current rx:   6/10: seems to be stable:   6/12: per cardiology        HOCM (hypertrophic obstructive cardiomyopathy).  cont metoprolol as above. uptitrate as BP tolerates  per cards    Acute respiratory failure with hypoxia.   recent CT scans showing right bronchiectasis, right fibrosis likely 2/2 radiation, poss RML PNA.  cont BD :   ct chest showed: No pulmonary embolism. Small to moderate right and small left pleural effusions with associated  atelectasis. 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  Cardiomegaly. Moderate pericardial effusion.  needs echo     9/6: new echo reviewed:  seen by cardiology :  monitor her blood pressure closely:  if she drops her blood pressure call CCU     6/10: cont to manain o2 sao2 above 90% all the t hayden  6/11; n 2 L of oxygen    6/12: she is not in any resp distress    6/13: sheis on rooma ir:  doing well :  satting at 93%    dw ccu attending and IP

## 2025-06-13 NOTE — PROGRESS NOTE ADULT - SUBJECTIVE AND OBJECTIVE BOX
Subjective:  feels ok     PAST MEDICAL & SURGICAL HISTORY:  Breast cancer  s/p b/l mastectomy      H/O bilateral mastectomy  + breast implants      History of cataract surgery, unspecified laterality                       MEDICATIONS  acetaminophen   IVPB .. 1000 milliGRAM(s) IV Intermittent once  artificial  tears Solution 1 Drop(s) Both EYES two times a day  cefTRIAXone   IVPB 2000 milliGRAM(s) IV Intermittent every 24 hours  chlorhexidine 2% Cloths 1 Application(s) Topical <User Schedule>  insulin lispro (ADMELOG) corrective regimen sliding scale   SubCutaneous three times a day before meals  insulin lispro (ADMELOG) corrective regimen sliding scale   SubCutaneous at bedtime  ipratropium    for Nebulization 500 MICROGram(s) Nebulizer every 6 hours PRN  levalbuterol Inhalation 0.63 milliGRAM(s) Inhalation every 6 hours  melatonin 5 milliGRAM(s) Oral at bedtime  midodrine 30 milliGRAM(s) Oral every 8 hours  pantoprazole    Tablet 40 milliGRAM(s) Oral before breakfast  phenylephrine    Infusion 0.1 MICROgram(s)/kG/Min IV Continuous <Continuous>  QUEtiapine 12.5 milliGRAM(s) Oral at bedtime  sodium chloride 3%  Inhalation 4 milliLiter(s) Inhalation every 12 hours      Vital Signs Last 24 Hrs  T(C): 37 (13 Jun 2025 07:30), Max: 37.1 (12 Jun 2025 11:00)  T(F): 98.6 (13 Jun 2025 07:30), Max: 98.7 (12 Jun 2025 11:00)  HR: 107 (13 Jun 2025 10:00) (103 - 125)  BP: 137/74 (13 Jun 2025 08:30) (137/74 - 137/74)  BP(mean): 99 (13 Jun 2025 08:30) (99 - 99)  RR: 29 (13 Jun 2025 10:00) (20 - 43)  SpO2: 93% (13 Jun 2025 10:00) (91% - 100%)    Parameters below as of 13 Jun 2025 09:15  Patient On (Oxygen Delivery Method): room air          PHYSICAL EXAM:  Neurology: alert and oriented x 3, nonfocal, no gross deficits     viridiana harris 60cc/24    LABS  06-13    134[L]  |  102  |  23  ----------------------------<  107[H]  4.7   |  22  |  0.89    Ca    8.1[L]      13 Jun 2025 00:19  Phos  3.1     06-13  Mg     2.2     06-13    TPro  6.2  /  Alb  2.3[L]  /  TBili  0.3  /  DBili  x   /  AST  25  /  ALT  22  /  AlkPhos  133[H]  06-13                                 8.6    2.07  )-----------( 432      ( 13 Jun 2025 00:19 )             27.4              Culture - Body Fluid with Gram Stain (collected 06-12-25 @ 14:05)  Source: Pericardial Other  Gram Stain (06-13-25 @ 00:20):    polymorphonuclear leukocytes seen by cytocentrifuge    No organisms seen by cytocentrifuge    Culture - Fungal, Body Fluid (collected 06-12-25 @ 14:05)  Source: Pericardial Pericardial Fluid  Preliminary Report (06-13-25 @ 09:39):    Testing in progress

## 2025-06-13 NOTE — PROGRESS NOTE ADULT - SUBJECTIVE AND OBJECTIVE BOX
EP Attending  HISTORY OF PRESENT ILLNESS: HPI:  89F c hx GERD, R breast CA (40-50y ago) s/p B/L mastectomy and radiation, RUE lymphedema, osteoarthritis/osteoporosis (on monthly ibandronate), HLD, Afib on eliquis, HOCM/severe LVOT/ELEAZAR, small-moderate pericardial effusion/RA diastolic collapse, recent hospitalization 5/29-6/5 for RML PNA, GBS bacteremia (pos cultures 5/29, 5/30) of unclear source on total 10 days abx (IV ceftriaxone inpt, then levaquin 750 q48h until 6/9), presents from [facility] rehab with shaking chills, hypotension, poor appetite    History from pt's daughter/primary decision maker Teresitagisel Day at bedside.  While at [facility], pt found to have hypotension to SBP 80s. Pt was given IVF and reduced dose of metoprolol. Pt also found to have tachycardia, shaking chills. Pt sent back to the hospital. Reportedly pt has not been eating well, not urinating much. Denies CP, SOB, abd pain, diarrhea, cough, other respiratory symptoms. At baseline pt ambulates without assistive device, drives.  RRT called in the ed for hypotension to SBP 79. (08 Jun 2025 02:41)    Ms Cloud is a pleasant 89yo woman here with shortness of breath.  Sees Dr Adolfo Leung for Cardiology.  Being managed on this inpatient stay by Dr Coelho.  Known to Dr Young after outpatient referral for HOCM management (lVOT gradient 80s-100mmHg+), and had a brief trial of Mavacamten, stopped for feelings of "leg heaviness".    EP called re: rapid AFib, refractory to low-dose metoprolol thus far, and complicated by management of HCM and new/worsening pericardial effusion.  Resting comfortably in bed on supplemental O2.  SOB and fatigued but no palpitations or fainting.  A 10 pt ROS is otherwise negative.    6/11- moved to CCU for closer monitoring.  lethargic / unable to obtain ROS today.  worsening pleural effusion, no plan for tap.  no plan for pericardiocentesis either.  hypotensive requiring midodrine.  6/12- s/p urgent pericardiocentesis via Interventional Radiology/ lateral thorax approach.  Feels much better- more energetic, has an appetite, more talkative.  Date of service 6/13- resting in bed in CICU. on phenylephrine today.  no new complaints. feeling better overall after pericardiocentesis.  awaiting possible thoracentesis?      PAST MEDICAL & SURGICAL HISTORY:  Breast cancer  s/p b/l mastectomy  H/O bilateral mastectomy  + breast implants  History of cataract surgery, unspecified laterality    acetaminophen   IVPB .. 1000 milliGRAM(s) IV Intermittent once  artificial  tears Solution 1 Drop(s) Both EYES two times a day  cefTRIAXone   IVPB 2000 milliGRAM(s) IV Intermittent every 24 hours  chlorhexidine 2% Cloths 1 Application(s) Topical <User Schedule>  insulin lispro (ADMELOG) corrective regimen sliding scale   SubCutaneous three times a day before meals  insulin lispro (ADMELOG) corrective regimen sliding scale   SubCutaneous at bedtime  ipratropium    for Nebulization 500 MICROGram(s) Nebulizer every 6 hours PRN  levalbuterol Inhalation 0.63 milliGRAM(s) Inhalation every 6 hours  melatonin 5 milliGRAM(s) Oral at bedtime  midodrine 30 milliGRAM(s) Oral every 8 hours  pantoprazole    Tablet 40 milliGRAM(s) Oral before breakfast  phenylephrine    Infusion 0.1 MICROgram(s)/kG/Min IV Continuous <Continuous>  QUEtiapine 12.5 milliGRAM(s) Oral at bedtime  sodium chloride 3%  Inhalation 4 milliLiter(s) Inhalation every 12 hours                            8.6    2.07  )-----------( 432      ( 13 Jun 2025 00:19 )             27.4       06-13    134[L]  |  102  |  23  ----------------------------<  107[H]  4.7   |  22  |  0.89    Ca    8.1[L]      13 Jun 2025 00:19  Phos  3.1     06-13  Mg     2.2     06-13    TPro  6.2  /  Alb  2.3[L]  /  TBili  0.3  /  DBili  x   /  AST  25  /  ALT  22  /  AlkPhos  133[H]  06-13    T(C): 37 (06-13-25 @ 07:30), Max: 37.1 (06-12-25 @ 11:00)  HR: 119 (06-13-25 @ 09:15) (103 - 125)  BP: 137/74 (06-13-25 @ 08:30) (137/74 - 137/74)  RR: 24 (06-13-25 @ 09:15) (20 - 43)  SpO2: 92% (06-13-25 @ 09:15) (91% - 100%)  Wt(kg): --    I&O's Summary    12 Jun 2025 07:01  -  13 Jun 2025 07:00  --------------------------------------------------------  IN: 896.6 mL / OUT: 1155 mL / NET: -258.4 mL    13 Jun 2025 07:01  -  13 Jun 2025 09:51  --------------------------------------------------------  IN: 248.5 mL / OUT: 15 mL / NET: 233.5 mL    Appearance: frail elderly woman in no acute distress  HEENT:   Normal oral mucosa, PERRL, EOMI	  Lymphatic: No lymphadenopathy , no edema  Cardiovascular: rapid irregular S1 S2, No JVD, No murmurs , Peripheral pulses palpable 2+ bilaterally.  s/p pericardial drain placement from left chest.  Respiratory: poor air entry BL  normal effort 	  Gastrointestinal:  Soft, Non-tender, + BS	  Skin: No rashes, No ecchymoses, No cyanosis, warm to touch  Musculoskeletal: Normal range of motion, normal strength  Psychiatry:  Mood & affect appropriate    TELEMETRY: AFib, narrow QRS  ECG:  	AFib, atypical LVHypertrphy  Echo:  < from: TTE W or WO Ultrasound Enhancing Agent (06.05.25 @ 07:16) >  CONCLUSIONS:      1. Limited TTE to assess for pericardial effusion.   2. Trace pericardial effusion noted adjacent to the posterolateral left ventricle, small pericardial effusion noted adjacent to the anterior right ventricle and small pericardial effusion noted adjacent to the right atrium.   3. The inferior vena cava is normal in size measuring 1.70 cm in diameter, (normal <2.1cm) with normal inspiratory collapse (normal >50%) consistent with normal right atrial pressure (~3, range 0-5mmHg).   4. Compared to the transthoracic echocardiogram performed on 5/30/2025, the right atrium is not as well visualized on this study. There is right atrial diastolic collapse visualized in the 4 chamber view but unable to quantify the duration of the collapse. The effusion appears unchanged in size and distribution. No other signs of cardiac tamponade are present.    < end of copied text >    CT Chest - personally reviewed the images.  right breat prosthesis with signs of rupture.  left breast prosthesis OK.  prominent LV hypertrophy visible.  pericardial effusion enlarged.  pleural effusion present on the right.  	  ASSESSMENT/PLAN: Ms Cloud is a pleasant 89y Female here with shortness of breath.    Multifactorial in the setting of pericardial effusion, Hypertrophic Cardiomyopathy with severe LVOT obstruction, and rapid AFib.  [id number] is at least 3.  Holding apixaban.  Surveillance of pericardial effusion which as bloody drain output.  Rule out malignancy on cytology.  Recommend add'l beta blockers for AFib rate control.  Goal HR <100bpm at rest, and negative inotropy helpful in the setting of HOCM.  Continue alpha-agonists to support her.  Discussed w/ Dr Young, and agree w/ recs to avoid diltiazem and other vasodilators, and to avoid diuretics.  Recommend evaluation for thoracentesis, to ease her respiratory effort.  Worthwhile even as a palliative measure.  Outpatient, needs to re-enroll with a HCM specialist.  Strongly recommend outpatient re-trial of Camzyos, even at the lowest dose.  This cannot be started in the hospital.  There is a possibility- albeit small - for Cardioversion before discharge.  Only if still symptomatic after everything else is optimized, and she is able to maintain uninterrupted anticoagulation.        Barney Lau M.D.  Cardiac Electrophysiology    office [phone]  pager [phone]

## 2025-06-13 NOTE — CONSULT NOTE ADULT - REASON FOR ADMISSION
Letter by Sarah Julian RN at      Author: Sarah Julian RN Service: -- Author Type: --    Filed:  Encounter Date: 1/12/2021 Status: (Other)         Inocente Rios  [address] [place] [facility] [address]      January 12, 2021      Dear Mr. Rios,    RE: Remote Results    We are writing to you regarding your recent Remote ICD check from home. Your transmission was received successfully. Battery status is satisfactory at this time.     Your results are within normal limits.    Your next device appointment will be a clinic visit.  Please call in feb 2021 to schedule.   with device and Dr. Katz.     To schedule or reschedule, please call [phone] and press 1.    NOTE: If you would like to do an extra transmission, please call [phone] and press 3 to speak to a nurse BEFORE transmitting. This ensures that the Device Clinic staff is aware of the reason you are sending a transmission, and can follow-up with you after it has been reviewed.    We will be checking your implanted device from home (remotely) every three months unless otherwise instructed. We will need to see you in the clinic at least once a year. You may need to be seen in the clinic sooner depending on the results of your check.    Please be aware:    The follow-up schedule is like a Physician prescription.    Your remote monitor is paired to your specific implanted device.      Sincerely,    [facility]       
shaking chills, hypotension, poor appetite

## 2025-06-13 NOTE — PROGRESS NOTE ADULT - ASSESSMENT
89-y/o female with PMHx of HOCM with severe LVOT obstruction, atrial fibrillation on eliquis, small-moderate pericardial effusion with RA diastolic collapse, recent hospitalization (5/29-6/5) for RML pneumonia and GBS bacteremia, and remote history of right breast cancer status post bilateral mastectomy with radiation who presented with septic shock and hypotension, initially managed on the floor before requiring CCU admission for hemodynamic instability secondary to enlarging pericardial effusion, rapid atrial fibrillation, and decompensated heart failure s/p CT guided pericardial drainage w/60cc of fluid documented removed     -Rec Echo prior to removal of drainage

## 2025-06-13 NOTE — PROGRESS NOTE ADULT - SUBJECTIVE AND OBJECTIVE BOX
Patient is a 89y old  Female who presents with a chief complaint of shaking chills, hypotension, poor appetite (13 Jun 2025 16:43)    INTERVAL HISTORY:  - remains off arnaldo     SUBJECTIVE  - Patient seen and evaluated at bedside.     MEDICATIONS:  MEDICATIONS  (STANDING):  artificial  tears Solution 1 Drop(s) Both EYES two times a day  cefTRIAXone   IVPB 2000 milliGRAM(s) IV Intermittent every 24 hours  chlorhexidine 2% Cloths 1 Application(s) Topical <User Schedule>  levalbuterol Inhalation 0.63 milliGRAM(s) Inhalation every 6 hours  melatonin 5 milliGRAM(s) Oral at bedtime  metoprolol tartrate 12.5 milliGRAM(s) Oral every 6 hours  midodrine 30 milliGRAM(s) Oral every 8 hours  pantoprazole    Tablet 40 milliGRAM(s) Oral before breakfast  QUEtiapine 12.5 milliGRAM(s) Oral at bedtime  sodium chloride 3%  Inhalation 4 milliLiter(s) Inhalation every 12 hours    MEDICATIONS  (PRN):  ipratropium    for Nebulization 500 MICROGram(s) Nebulizer every 6 hours PRN Shortness of Breath and/or Wheezing    OBJECTIVE:  ICU Vital Signs Last 24 Hrs  T(C): 37.5 (13 Jun 2025 19:00), Max: 37.9 (13 Jun 2025 11:45)  T(F): 99.5 (13 Jun 2025 19:00), Max: 100.2 (13 Jun 2025 11:45)  HR: 103 (13 Jun 2025 22:00) (103 - 127)  BP: 137/74 (13 Jun 2025 08:30) (137/74 - 137/74)  BP(mean): 99 (13 Jun 2025 08:30) (99 - 99)  ABP: 133/65 (13 Jun 2025 22:00) (79/38 - 148/69)  ABP(mean): 91 (13 Jun 2025 22:00) (52 - 96)  RR: 46 (13 Jun 2025 22:00) (20 - 46)  SpO2: 95% (13 Jun 2025 22:00) (91% - 100%)    O2 Parameters below as of 13 Jun 2025 22:00  Patient On (Oxygen Delivery Method): room air    CAPILLARY BLOOD GLUCOSE  POCT Blood Glucose.: 142 mg/dL (13 Jun 2025 11:24)  POCT Blood Glucose.: 95 mg/dL (13 Jun 2025 07:42)    CAPILLARY BLOOD GLUCOSE  POCT Blood Glucose.: 142 mg/dL (13 Jun 2025 11:24)    I&O's Summary  12 Jun 2025 07:01  -  13 Jun 2025 07:00  --------------------------------------------------------  IN: 896.6 mL / OUT: 1155 mL / NET: -258.4 mL    13 Jun 2025 07:01  -  13 Jun 2025 22:14  --------------------------------------------------------  IN: 418.5 mL / OUT: 355 mL / NET: 63.5 mL    PHYSICAL EXAM:  General: NAD, well-groomed, well-developed  Chest: Clear to auscultation bilaterally; no rales, rhonchi, or wheezing  Heart: Regular rate and rhythm; normal S1 and S2  Abd: Soft, nontender, nondistended  Nervous System: AAOX3  Ext: no peripheral LE edema bilaterally    LABS:  ABG - ( 13 Jun 2025 00:07 )  pH, Arterial: 7.40  pH, Blood: x     /  pCO2: 41    /  pO2: 226   / HCO3: 25    / Base Excess: 0.5   /  SaO2: 99.3                        8.6    2.07  )-----------( 432      ( 13 Jun 2025 00:19 )             27.4     06-13    134[L]  |  102  |  23  ----------------------------<  107[H]  4.7   |  22  |  0.89    Ca    8.1[L]      13 Jun 2025 00:19  Phos  3.1     06-13  Mg     2.2     06-13    TPro  6.2  /  Alb  2.3[L]  /  TBili  0.3  /  DBili  x   /  AST  25  /  ALT  22  /  AlkPhos  133[H]  06-13    LIVER FUNCTIONS - ( 13 Jun 2025 00:19 )  Alb: 2.3 g/dL / Pro: 6.2 g/dL / ALK PHOS: 133 U/L / ALT: 22 U/L / AST: 25 U/L / GGT: x             ASSESSMENT: 89F with PMH HOCM/severe LVOT obstruction, AFib on eliquis, pericardial effusion, breast CA s/p XRT with CC septic shock after hospital course c/b demand ischemia, AFib RVR, enlarging pericardial effusion. CCU admission for hemodynamic monitoring and management of moderate-severe pericardial effusion.    Plan:  NEURO  A&Ox3  - sundowning and anxiety overnight, continue seroquel 12.5  - continue to monitor mental status as per protocol     RESPIRATORY  #Acute hypoxic respiratory failure  - likely multifactorial in etiology  - nebs q6  - continue to monitor SpO2 with goal >94%    CARDIO  #Pericardial effusion  - s/p pericardiocentesis 6/12 with IR -400cc sanguinous fluid removed   - holding AC  - monitor drain output  - fluid PRN for transient hypotension  - Arnaldo if pressors required given HOCM    #Atrial fibrillation with RVR  - rate control: lopressor 12.5 BID  - AC: holding post drainage  - EP following    RENAL/  #CKD  - baseline Cr 0.6-0.9  - Continue monitoring urine output, lytes, SCr/ BUN  - replete lytes prn with goal K >4 and Mg >2    GI  Tolerating PO diet    ENDO  No active issues    HEME  - Monitor H/H and plts  - DVT PPX: SCDs    ID  #Sepsis/bacteremia  - Recent GBS bacteremia, current source unclear  - Continue CTX  - ID following  - monitor and trend WBC and temperature curve       Dispo: Maintain in ICU.    I have personally provided 35 minutes of direct critical care time, excluding time spent on separate procedures, in addition to the CICU Attending Dr. Cazares.    Swapna Castillo PA-C

## 2025-06-14 ENCOUNTER — RESULT REVIEW (OUTPATIENT)
Age: 89
End: 2025-06-14

## 2025-06-14 NOTE — PROGRESS NOTE ADULT - SUBJECTIVE AND OBJECTIVE BOX
PATIENT:  GINO GRAHAM  [id number]    CHIEF COMPLAINT:  Patient is a 89y old  Female who presents with a chief complaint of shaking chills, hypotension, poor appetite (2025 22:27)      INTERVAL HISTORY/OVERNIGHT EVENTS:  The patient was seen and examined at bedside.     Additional Review of Symptoms:     MEDICATIONS:  MEDICATIONS  (STANDING):  artificial  tears Solution 1 Drop(s) Both EYES two times a day  cefTRIAXone   IVPB 2000 milliGRAM(s) IV Intermittent every 24 hours  chlorhexidine 2% Cloths 1 Application(s) Topical <User Schedule>  levalbuterol Inhalation 0.63 milliGRAM(s) Inhalation every 6 hours  melatonin 5 milliGRAM(s) Oral at bedtime  metoprolol tartrate 12.5 milliGRAM(s) Oral every 6 hours  midodrine 30 milliGRAM(s) Oral every 8 hours  pantoprazole    Tablet 40 milliGRAM(s) Oral before breakfast  QUEtiapine 12.5 milliGRAM(s) Oral at bedtime  sodium chloride 3%  Inhalation 4 milliLiter(s) Inhalation every 12 hours    MEDICATIONS  (PRN):  ipratropium    for Nebulization 500 MICROGram(s) Nebulizer every 6 hours PRN Shortness of Breath and/or Wheezing      ALLERGIES:  Allergies    Zosyn (Rash; Urticaria; Hives)    Intolerances        OBJECTIVE:  ICU Vital Signs Last 24 Hrs  T(C): 37 (2025 08:00), Max: 38.1 (2025 03:00)  T(F): 98.6 (2025 08:00), Max: 100.6 (2025 03:00)  HR: 95 (2025 08:00) (94 - 137)  BP: --  BP(mean): --  ABP: 97/88 (2025 08:00) (88/46 - 134/62)  ABP(mean): 94 (2025 08:00) (59 - 94)  RR: 25 (2025 08:00) (21 - 46)  SpO2: 93% (2025 08:00) (91% - 99%)    O2 Parameters below as of 2025 08:00  Patient On (Oxygen Delivery Method): room air            Adult Advanced Hemodynamics Last 24 Hrs  CVP(mm Hg): --  CVP(cm H2O): --  CO: --  CI: --  PA: --  PA(mean): --  PCWP: --  SVR: --  SVRI: --  PVR: --  PVRI: --  CAPILLARY BLOOD GLUCOSE      POCT Blood Glucose.: 142 mg/dL (2025 11:24)    CAPILLARY BLOOD GLUCOSE      POCT Blood Glucose.: 142 mg/dL (2025 11:24)    I&O's Summary    2025 07:01  -  2025 07:00  --------------------------------------------------------  IN: 568.5 mL / OUT: 575 mL / NET: -6.5 mL    2025 07:01  -  2025 08:45  --------------------------------------------------------  IN: 0 mL / OUT: 75 mL / NET: -75 mL      Daily     Daily Weight in k.2 (2025 05:00)    PHYSICAL EXAMINATION:  General: WN/WD NAD  HEENT: PERRLA, EOMI, moist mucous membranes  Neurology: A&Ox3, nonfocal, LUGO x 4  Respiratory: CTA B/L, normal respiratory effort, no wheezes, crackles, rales  CV: RRR, S1S2, no murmurs, rubs or gallops  Abdominal: Soft, NT, ND +BS, Last BM  Extremities: No edema, + peripheral pulses  Incisions:   Tubes:    LABS:  ABG - ( 2025 00:22 )  pH, Arterial: 7.45  pH, Blood: x     /  pCO2: 37    /  pO2: 91    / HCO3: 26    / Base Excess: 1.7   /  SaO2: 98.6                                    9.2    2.70  )-----------( 367      ( 2025 00:29 )             30.0     06-14    134[L]  |  104  |  23  ----------------------------<  104[H]  5.0   |  20[L]  |  0.68    Ca    8.0[L]      2025 00:29  Phos  2.7     06-  Mg     2.2     -    TPro  6.1  /  Alb  2.4[L]  /  TBili  0.3  /  DBili  x   /  AST  27  /  ALT  20  /  AlkPhos  134[H]  -    LIVER FUNCTIONS - ( 2025 00:29 )  Alb: 2.4 g/dL / Pro: 6.1 g/dL / ALK PHOS: 134 U/L / ALT: 20 U/L / AST: 27 U/L / GGT: x           PT/INR - ( 2025 00:29 )   PT: 15.6 sec;   INR: 1.36 ratio         PTT - ( 2025 00:29 )  PTT:25.9 sec        Urinalysis Basic - ( 2025 00:29 )    Color: x / Appearance: x / SG: x / pH: x  Gluc: 104 mg/dL / Ketone: x  / Bili: x / Urobili: x   Blood: x / Protein: x / Nitrite: x   Leuk Esterase: x / RBC: x / WBC x   Sq Epi: x / Non Sq Epi: x / Bacteria: x        TELEMETRY:     EKG:     IMAGING:

## 2025-06-14 NOTE — PROGRESS NOTE ADULT - ASSESSMENT
Afib, severe HCM LVOT obstruction, Pericardial effusion, recent admission for PNA / Bacteremia   now admitted with sepsis, tachycardia ( afib with RVR ) and shock . Cardiology is called for elevated troponin     Elevated troponin   Demand ischemia   in setting of afib with RVR, shock, severe HCM and sepsis   its downtrending     Shock   likely multifactorial   in setting of sepsis, pericardial effusion and HCM with tachycardia  s/p emergent pericardial drain by IR   on midodrine , wean off as tolerated     Afib  FU with EP for eventual DCCV   needs better HR control   cont a/c

## 2025-06-14 NOTE — PROGRESS NOTE ADULT - PROBLEM SELECTOR PLAN 1
- Patient high risk for pericardial window per Dr. Canada, recommend continue to monitor pericardial effusion  6/11 Repeat CT Chest: Large pericardial effusion, increased in size when compared to 6/4/2024. IR reconsulted for drainage.   - IR pericardiocentesis pericardial drain in place.    -b/l pleural effusions     drainage as per cicu team  - Palliative care following for GOC  - Thoracic will continue to follow.   - Global care per primary / CICU team

## 2025-06-14 NOTE — PROGRESS NOTE ADULT - ASSESSMENT
89F c hx GERD, R breast CA (40-50y ago) s/p B/L mastectomy and radiation, RUE lymphedema, osteoarthritis/osteoporosis (on monthly ibandronate), HLD, Afib on eliquis, HOCM/severe LVOT/LORA, small-moderate pericardial effusion/RA diastolic collapse, recent hospitalization 5/29-6/5 for RML PNA, GBS bacteremia (pos cultures 5/29, 5/30) of unclear source on total 10 days abx (IV ceftriaxone inpt, then levaquin 750 q48h until 6/9), presents from [facility] rehab with shaking chills, hypotension, poor appetite  History from pt's daughter/primary decision maker Teresita Day at bedside.  While at [facility], pt found to have hypotension to SBP 80s. Pt was given IVF and reduced dose of metoprolol. Pt also found to have tachycardia, shaking chills. Pt sent back to the hospital. Reportedly pt has not been eating well, not urinating much. Denies CP, SOB, abd pain, diarrhea, cough, other respiratory symptoms. At baseline pt ambulates without assistive device, drives.  RRT called in the ed for hypotension to SBP 79. (08 Jun 2025 02:41)  to me pt daughter says she was very sleepy in the morning too  as she was given ambien at 4 AM in the morning:   now she is alert and awake and is on 2 L of oxygen ;  she is not sob:  and does not look to be in any resp distress:       Severe sepsis.   unclear source of fevers  cont empiric vanc, cefepime  f/u blood cx. if positive will likely need MARIA G  monitor for any localizing symptoms  pt had recent pan ct scan that did not elucidate cause of bacteremia  recent blood cultures have been negative   - IVF  on cefepime  she is febrile too   vbg on admission IS ok;   MONITOR BLOOD PRESSURE CLOSELY:   OIF SHE DROPS HER BLOOD PRESSURE MORE:  WOULD NEED MICU  CO NSULT:    6/9: seems slightly more tored today  ; cont antibtiocs:  per ID:  she remains on 2 L of oxygen : she is alert and awake:  daughter at bedside:  reviewed the labs and echo and ct scan chest with the daughter in detail :   she has large pericardial effusions:  per ir no good window and effusion seemd small to drain : ct scan chest read as mod to large pericardial effusion : defer to cards :     6/10: seems to be doing  ok :  no sob:   no  cough  ;   no  phlegm   on 2 L of oxygen :  thoracic to see:    no emergency in tapping the pleural effusion:   de cardiologist  6/11: on ceftriaxone   seems O K   6/12: seems OK:  s/p pericardial drain:  has 400 cc output   await cytology    6/13: cont c urrent rx:   on antibiotics  6/14: on ceftriaoxone    Pericardial effusion ;   the ct chest shows increasing pericardial effusion  : which is of more pressing concern then pleural effusion :  needs a tap:  ir guided tap    6/12: as above   6/13: asked up to tap:  need to repeat inr and pt  last was  >  2.0:  needs to be less then 1.5:  ip contacted will evalute for tap    6/14; defer to p r imary team      Hypotension.  suspect combination of infection, hocm, lora, tachycardia, pericardial effusion, metoprolol  pt has somewhat tenuous hemodynamic status  consider cardioversion, consider repeat TTE  pt is full code.  pts blood pressure is slightly low   on midodrine    6/9: cont on midodrine  6/10; controlled:  on midodrine  6/11: on midodrine  6/12: currently she is on vasopressors   6/13; off vasopressors now   6/14; blood pressure soft:  on midodrine     Chronic atrial fibrillation.  reduce metoprolol dose to tartrate 25mg BID  goal HR <100 in setting of HOCM with hypotension  consider amiodarone or cardioversion  PER CARDS    6/9: defer to cards   6/10: off Eliquis for now   6/11: remains off eliquis   6/12: off ac  6/13: keep off ac if thoracentesis needed to be done over the weekend:  though it is not emergent   6/14" can restart ac:  if required as there is now no plan for thoracentesis:  dw attending       Type 2 myocardial infarction.  suspect demand ischemia from recent hypotension, infections  start asa   cont home eliquis.  CONT CURRENT MEDS     6/9; no chest pain : cont current rx:   6/10: seems to be stable:   6/12: per cardiology        HOCM (hypertrophic obstructive cardiomyopathy).  cont metoprolol as above. uptitrate as BP tolerates  per cards    Acute respiratory failure with hypoxia.   recent CT scans showing right bronchiectasis, right fibrosis likely 2/2 radiation, poss RML PNA.  cont BD :   ct chest showed: No pulmonary embolism. Small to moderate right and small left pleural effusions with associated  atelectasis. 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  Cardiomegaly. Moderate pericardial effusion.  needs echo     9/6: new echo reviewed:  seen by cardiology :  monitor her blood pressure closely:  if she drops her blood pressure call CCU     6/10: cont to manain o2 sao2 above 90% all the t hayden  6/11; n 2 L of oxygen    6/12: she is not in any resp distress    6/13: she is on rooma ir:  doing well :  satting at 93%  6/14: pulm wise seems pretty good;   on rooma ir;   no plan for tap now     dw ccu attending and family

## 2025-06-14 NOTE — PROGRESS NOTE ADULT - SUBJECTIVE AND OBJECTIVE BOX
Izabela Chu PA-C  Contact via [facility]    CCU PROGRESS NOTE:    GINO GRAHAM  MRN-[id number]  Patient is a 89y old  Female who presents with a chief complaint of shaking chills, hypotension, poor appetite (14 Jun 2025 15:32)    INTERVAL HPI/OVERNIGHT EVENTS: No acute events overnight.    SUBJECTIVE: Patient seen and examined at bedside. No acute complaints.     MEDICATIONS  (STANDING):  cefTRIAXone   IVPB 2000 milliGRAM(s) IV Intermittent every 24 hours  chlorhexidine 2% Cloths 1 Application(s) Topical <User Schedule>  levalbuterol Inhalation 0.63 milliGRAM(s) Inhalation every 6 hours  melatonin 5 milliGRAM(s) Oral at bedtime  metoprolol tartrate 25 milliGRAM(s) Oral every 6 hours  midodrine 30 milliGRAM(s) Oral every 8 hours  pantoprazole    Tablet 40 milliGRAM(s) Oral before breakfast  zolpidem 5 milliGRAM(s) Oral at bedtime    MEDICATIONS  (PRN):  ipratropium    for Nebulization 500 MICROGram(s) Nebulizer every 6 hours PRN Shortness of Breath and/or Wheezing    OBJECTIVE:  ICU Vital Signs Last 24 Hrs  T(C): 36.9 (14 Jun 2025 23:00), Max: 38.1 (14 Jun 2025 03:00)  T(F): 98.4 (14 Jun 2025 23:00), Max: 100.6 (14 Jun 2025 03:00)  HR: 94 (14 Jun 2025 23:19) (94 - 137)  BP: 158/84 (14 Jun 2025 23:00) (158/84 - 158/84)  BP(mean): 111 (14 Jun 2025 23:00) (111 - 111)  ABP: 98/80 (14 Jun 2025 22:00) (83/72 - 134/62)  ABP(mean): 89 (14 Jun 2025 22:00) (65 - 105)  RR: 40 (14 Jun 2025 23:00) (21 - 42)  SpO2: 100% (14 Jun 2025 23:19) (90% - 100%)    O2 Parameters below as of 14 Jun 2025 23:19  Patient On (Oxygen Delivery Method): nasal cannula    I&O's Summary    13 Jun 2025 07:01  -  14 Jun 2025 07:00  --------------------------------------------------------  IN: 568.5 mL / OUT: 575 mL / NET: -6.5 mL    14 Jun 2025 07:01  -  14 Jun 2025 23:39  --------------------------------------------------------  IN: 290 mL / OUT: 300 mL / NET: -10 mL    CAPILLARY BLOOD GLUCOSE    GENERAL: NAD, lying in bed comfortably  NECK: no JVD  HEART: S1, S2, regular rate and rhythm, no murmurs, rubs, or gallops  LUNGS: Unlabored respirations.  Clear to auscultation bilaterally, no crackles, wheezing, or rhonchi  ABDOMEN: Soft, nontender, nondistended, +BS  EXTREMITIES: 2+ peripheral pulses bilaterally. No clubbing, cyanosis, or edema    ============================I/O===========================   I&O's Detail    13 Jun 2025 07:01  -  14 Jun 2025 07:00  --------------------------------------------------------  IN:    IV PiggyBack: 100 mL    Oral Fluid: 460 mL    Phenylephrine: 8.5 mL  Total IN: 568.5 mL    OUT:    Drain (mL): 0 mL    Indwelling Catheter - Urethral (mL): 575 mL  Total OUT: 575 mL    Total NET: -6.5 mL    14 Jun 2025 07:01  -  14 Jun 2025 23:39  --------------------------------------------------------  IN:    IV PiggyBack: 50 mL    Oral Fluid: 240 mL  Total IN: 290 mL    OUT:    Drain (mL): 0 mL    Indwelling Catheter - Urethral (mL): 300 mL  Total OUT: 300 mL    Total NET: -10 mL    ============================ LABS =========================                        9.2    2.70  )-----------( 367      ( 14 Jun 2025 00:29 )             30.0     06-14    134[L]  |  104  |  23  ----------------------------<  104[H]  5.0   |  20[L]  |  0.68    Ca    8.0[L]      14 Jun 2025 00:29  Phos  2.7     06-14  Mg     2.2     06-14    TPro  6.1  /  Alb  2.4[L]  /  TBili  0.3  /  DBili  x   /  AST  27  /  ALT  20  /  AlkPhos  134[H]  06-14    LIVER FUNCTIONS - ( 14 Jun 2025 00:29 )  Alb: 2.4 g/dL / Pro: 6.1 g/dL / ALK PHOS: 134 U/L / ALT: 20 U/L / AST: 27 U/L / GGT: x           PT/INR - ( 14 Jun 2025 00:29 )   PT: 15.6 sec;   INR: 1.36 ratio      PTT - ( 14 Jun 2025 00:29 )  PTT:25.9 sec  ABG - ( 14 Jun 2025 00:22 )  pH, Arterial: 7.45  pH, Blood: x     /  pCO2: 37    /  pO2: 91    / HCO3: 26    / Base Excess: 1.7   /  SaO2: 98.6      Blood Gas Arterial, Lactate: 0.9 mmol/L (06-14-25 @ 00:22)  Blood Gas Arterial, Lactate: 0.6 mmol/L (06-13-25 @ 00:07)  Blood Gas Arterial, Lactate: 1.6 mmol/L (06-12-25 @ 03:19)    Urinalysis Basic - ( 14 Jun 2025 00:29 )    Color: x / Appearance: x / SG: x / pH: x  Gluc: 104 mg/dL / Ketone: x  / Bili: x / Urobili: x   Blood: x / Protein: x / Nitrite: x   Leuk Esterase: x / RBC: x / WBC x   Sq Epi: x / Non Sq Epi: x / Bacteria: x      ======================MICRO/RAD/CARDIO=================  Telemetry: Reviewed   EKG: Reviewed  CXR: Reviewed  Culture: Reviewed   Echo:   Cath:

## 2025-06-14 NOTE — PROGRESS NOTE ADULT - SUBJECTIVE AND OBJECTIVE BOX
VITAL SIGNS    Vital Signs Last 24 Hrs  T(C): 37.3 (25 @ 11:00), Max: 38.1 (25 @ 03:00)  T(F): 99.1 (25 @ 11:00), Max: 100.6 (25 @ 03:00)  HR: 107 (25 @ 11:40) (94 - 137)  BP: --  RR: 25 (25 @ 11:00) (21 - 46)  SpO2: 97% (25 @ 11:40) (91% - 99%)                   Daily     Daily Weight in k.2 (2025 05:00)      Bilirubin Total: 0.3 mg/dL ( @ 00:29)    CAPILLARY BLOOD GLUCOSE              Drains:    pericardial drain                    PHYSICAL EXAM    Neurology: alert and oriented , moves all extremities with no defecits  CV :  RRR    Lungs:   CTA B/L  Abdomen: soft, nontender, nondistended, positive bowel sounds  Extremities:     no edema

## 2025-06-14 NOTE — PROGRESS NOTE ADULT - ASSESSMENT
89F with history of HOCM, AFib, breast cancer s/p RT and b/l mastectomy with breast implants and RUE lymphedema, known pericardial effusion, with recent hospitalizatoin for RML pneumonia and parapneumonic effusion with GBS bacteremia  here for septic shock with unclear source and hemodynamic monitoring iso mod-severe pericardial effusion.     NEURO  Baseline mentation.   -ALMAZ     #Insomnia   -Restart home zolpidem 5 mg po qhs   -D/c quetiapine 12.5 mg po qhs     CARDIOVASCULAR   #Pericardial effusion- likely malignant   On admission was mod-large and got pericardiocentesis with 400 cc drainage. Minimal output from pericardial drain with sanguinous>serous. Repeat TTE showing interval decrease in size.   -6/12 IR did pericardiocentesis drainage   -Pericardial fluid with NGTD on cx, low neutrophils and lymphocytes; pending cytology   -Repeat 6/13 TTE showing LV hyperdynamic with LVEF 75% and evidence of HOCM, small-mod effusion   -F/u repeat 6/14 TTE report     #Afib   Persistently RVR.   -Uptitrate metoprolol tartrate 12.5 mg to 25 mg po q6   -Hold full AC given sanguinous quality of pericardial fluid     #HCM   Closer to euvolemic.   -C/w midodrine 30 mg po q8   -Hold diuresis     RESPIRATORY  -ALMAZ     #Hx known R pleural effusion   No respiratory distress. Asymptomatic.   -Appreciate interventional pulmonary input, limited role for thoracentesis at this time     #AHRF- RESOLVED     GASTROINTESTINAL   -ALMAZ   -Diet: Regular   -Stress ppx: c/w pantoprazpole 40 mg po qd     GENITOURINARY/RENAL   -ALMAZ     ENDOCRINE   -ALMAZ     INFECTIOUS DISEASE   #Septic shock unclear source #Recent GBS bacteremia with RML pneumonia and parapneumonic effusion   Clinically improved. Febrile, leukopenic.   -C/w ceftriaxone 2 g IV qd (empiric)   -ID following     HEME/ONC   #Breast cancer s/p b/l mastectomy with RT s/p breast implants   Reported in remission.     -DVT ppx: hold given sanguinous fluid     ETHICS  -FULL

## 2025-06-14 NOTE — PROGRESS NOTE ADULT - SUBJECTIVE AND OBJECTIVE BOX
Subjective: Patient seen and examined. No new events except as noted.     SUBJECTIVE/ROS:  awake, alert    MEDICATIONS:  MEDICATIONS  (STANDING):  artificial  tears Solution 1 Drop(s) Both EYES two times a day  cefTRIAXone   IVPB 2000 milliGRAM(s) IV Intermittent every 24 hours  chlorhexidine 2% Cloths 1 Application(s) Topical <User Schedule>  levalbuterol Inhalation 0.63 milliGRAM(s) Inhalation every 6 hours  melatonin 5 milliGRAM(s) Oral at bedtime  metoprolol tartrate 12.5 milliGRAM(s) Oral every 6 hours  midodrine 30 milliGRAM(s) Oral every 8 hours  pantoprazole    Tablet 40 milliGRAM(s) Oral before breakfast  QUEtiapine 12.5 milliGRAM(s) Oral at bedtime  sodium chloride 3%  Inhalation 4 milliLiter(s) Inhalation every 12 hours      PHYSICAL EXAM:  T(C): 37 (06-14-25 @ 08:00), Max: 38.1 (06-14-25 @ 03:00)  HR: 101 (06-14-25 @ 09:00) (94 - 137)  BP: -- 100/70  RR: 24 (06-14-25 @ 09:00) (22 - 46)  SpO2: 94% (06-14-25 @ 09:00) (91% - 99%)  Wt(kg): --  I&O's Summary    13 Jun 2025 07:01  -  14 Jun 2025 07:00  --------------------------------------------------------  IN: 568.5 mL / OUT: 575 mL / NET: -6.5 mL    14 Jun 2025 07:01  -  14 Jun 2025 09:26  --------------------------------------------------------  IN: 0 mL / OUT: 100 mL / NET: -100 mL            JVP: Normal  Neck: supple  Lung: clear   CV: S1 S2 ,  Abd: soft  Ext: No edema  neuro: Awake / alert  Psych: flat affect  Skin: normal``    LABS/DATA:    CARDIAC MARKERS:                                9.2    2.70  )-----------( 367      ( 14 Jun 2025 00:29 )             30.0     06-14    134[L]  |  104  |  23  ----------------------------<  104[H]  5.0   |  20[L]  |  0.68    Ca    8.0[L]      14 Jun 2025 00:29  Phos  2.7     06-14  Mg     2.2     06-14    TPro  6.1  /  Alb  2.4[L]  /  TBili  0.3  /  DBili  x   /  AST  27  /  ALT  20  /  AlkPhos  134[H]  06-14    proBNP:   Lipid Profile:   HgA1c:   TSH:

## 2025-06-14 NOTE — PROGRESS NOTE ADULT - SUBJECTIVE AND OBJECTIVE BOX
EP Attending  HISTORY OF PRESENT ILLNESS: HPI:  89F c hx GERD, R breast CA (40-50y ago) s/p B/L mastectomy and radiation, RUE lymphedema, osteoarthritis/osteoporosis (on monthly ibandronate), HLD, Afib on eliquis, HOCM/severe LVOT/ELEAZAR, small-moderate pericardial effusion/RA diastolic collapse, recent hospitalization 5/29-6/5 for RML PNA, GBS bacteremia (pos cultures 5/29, 5/30) of unclear source on total 10 days abx (IV ceftriaxone inpt, then levaquin 750 q48h until 6/9), presents from [facility] rehab with shaking chills, hypotension, poor appetite    History from pt's daughter/primary decision maker Teresitagisel Day at bedside.  While at [facility], pt found to have hypotension to SBP 80s. Pt was given IVF and reduced dose of metoprolol. Pt also found to have tachycardia, shaking chills. Pt sent back to the hospital. Reportedly pt has not been eating well, not urinating much. Denies CP, SOB, abd pain, diarrhea, cough, other respiratory symptoms. At baseline pt ambulates without assistive device, drives.  RRT called in the ed for hypotension to SBP 79. (08 Jun 2025 02:41)    Ms Cloud is a pleasant 89yo woman here with shortness of breath.  Sees Dr Adolfo Leung for Cardiology.  Being managed on this inpatient stay by Dr Coelho.  Known to Dr Young after outpatient referral for HOCM management (lVOT gradient 80s-100mmHg+), and had a brief trial of Mavacamten, stopped for feelings of "leg heaviness".    EP called re: rapid AFib, refractory to low-dose metoprolol thus far, and complicated by management of HCM and new/worsening pericardial effusion.  Resting comfortably in bed on supplemental O2.  SOB and fatigued but no palpitations or fainting.  A 10 pt ROS is otherwise negative.    6/11- moved to CCU for closer monitoring.  lethargic / unable to obtain ROS today.  worsening pleural effusion, no plan for tap.  no plan for pericardiocentesis either.  hypotensive requiring midodrine.  6/12- s/p urgent pericardiocentesis via Interventional Radiology/ lateral thorax approach.  Feels much better- more energetic, has an appetite, more talkative.  6/13- resting in bed in CICU. on phenylephrine today.  no new complaints. feeling better overall after pericardiocentesis.  awaiting possible thoracentesis?  Date of service 6/14- resting in chair. no new complaints.  slow drainage out of pericardial drain.  AFib HR now contrlled for first time on this stay.      PAST MEDICAL & SURGICAL HISTORY:  Breast cancer  s/p b/l mastectomy  H/O bilateral mastectomy  + breast implants  History of cataract surgery, unspecified laterality    artificial  tears Solution 1 Drop(s) Both EYES two times a day  cefTRIAXone   IVPB 2000 milliGRAM(s) IV Intermittent every 24 hours  chlorhexidine 2% Cloths 1 Application(s) Topical <User Schedule>  ipratropium    for Nebulization 500 MICROGram(s) Nebulizer every 6 hours PRN  levalbuterol Inhalation 0.63 milliGRAM(s) Inhalation every 6 hours  melatonin 5 milliGRAM(s) Oral at bedtime  metoprolol tartrate 25 milliGRAM(s) Oral every 6 hours  midodrine 30 milliGRAM(s) Oral every 8 hours  pantoprazole    Tablet 40 milliGRAM(s) Oral before breakfast  sodium chloride 3%  Inhalation 4 milliLiter(s) Inhalation every 12 hours  zolpidem 5 milliGRAM(s) Oral at bedtime                            9.2    2.70  )-----------( 367      ( 14 Jun 2025 00:29 )             30.0       06-14    134[L]  |  104  |  23  ----------------------------<  104[H]  5.0   |  20[L]  |  0.68    Ca    8.0[L]      14 Jun 2025 00:29  Phos  2.7     06-14  Mg     2.2     06-14    TPro  6.1  /  Alb  2.4[L]  /  TBili  0.3  /  DBili  x   /  AST  27  /  ALT  20  /  AlkPhos  134[H]  06-14    T(C): 37.3 (06-14-25 @ 11:00), Max: 38.1 (06-14-25 @ 03:00)  HR: 114 (06-14-25 @ 15:00) (94 - 137)  BP: --  RR: 24 (06-14-25 @ 15:00) (21 - 46)  SpO2: 93% (06-14-25 @ 15:00) (91% - 99%)  Wt(kg): --    I&O's Summary    13 Jun 2025 07:01  -  14 Jun 2025 07:00  --------------------------------------------------------  IN: 568.5 mL / OUT: 575 mL / NET: -6.5 mL    14 Jun 2025 07:01  -  14 Jun 2025 15:32  --------------------------------------------------------  IN: 240 mL / OUT: 250 mL / NET: -10 mL    Appearance: frail elderly woman in no acute distress  HEENT:   Normal oral mucosa, PERRL, EOMI	  Lymphatic: No lymphadenopathy , no edema  Cardiovascular: rapid irregular S1 S2, No JVD, No murmurs , Peripheral pulses palpable 2+ bilaterally.  s/p pericardial drain placement from left chest.  Respiratory: poor air entry BL  normal effort 	  Gastrointestinal:  Soft, Non-tender, + BS	  Skin: No rashes, No ecchymoses, No cyanosis, warm to touch  Musculoskeletal: Normal range of motion, normal strength  Psychiatry:  Mood & affect appropriate    TELEMETRY: AFib, narrow QRS  ECG:  	AFib, atypical LVHypertrphy  Echo:  < from: TTE W or WO Ultrasound Enhancing Agent (06.05.25 @ 07:16) >  CONCLUSIONS:      1. Limited TTE to assess for pericardial effusion.   2. Trace pericardial effusion noted adjacent to the posterolateral left ventricle, small pericardial effusion noted adjacent to the anterior right ventricle and small pericardial effusion noted adjacent to the right atrium.   3. The inferior vena cava is normal in size measuring 1.70 cm in diameter, (normal <2.1cm) with normal inspiratory collapse (normal >50%) consistent with normal right atrial pressure (~3, range 0-5mmHg).   4. Compared to the transthoracic echocardiogram performed on 5/30/2025, the right atrium is not as well visualized on this study. There is right atrial diastolic collapse visualized in the 4 chamber view but unable to quantify the duration of the collapse. The effusion appears unchanged in size and distribution. No other signs of cardiac tamponade are present.    < end of copied text >    CT Chest - personally reviewed the images.  right breat prosthesis with signs of rupture.  left breast prosthesis OK.  prominent LV hypertrophy visible.  pericardial effusion enlarged.  pleural effusion present on the right.  	  ASSESSMENT/PLAN: Ms Cloud is a pleasant 89y Female here with shortness of breath.    Multifactorial in the setting of pericardial effusion, Hypertrophic Cardiomyopathy with severe LVOT obstruction, and rapid AFib.  [id number] is at least 3.  Holding apixaban.  Surveillance of pericardial effusion which as bloody drain output.  Rule out malignancy on cytology.  Goal HR <100bpm at rest, and negative inotropy helpful in the setting of HOCM.  Continue metoprolol 25mg PO q6hrs with holding parameters.  Continue alpha-agonists to support her.  Discussed w/ Dr Young, and agree w/ recs to avoid diltiazem and other vasodilators, and to avoid diuretics.  Recommend evaluation for thoracentesis, to ease her respiratory effort.  Worthwhile even as a palliative measure.  Outpatient, needs to re-enroll with a HCM specialist.  Strongly recommend outpatient re-trial of Camzyos, even at the lowest dose.  This cannot be started in the hospital.  There is a possibility- albeit small - for Cardioversion before discharge.  Only if still symptomatic after everything else is optimized, and she is able to maintain uninterrupted anticoagulation.        Barney Lau M.D.  Cardiac Electrophysiology    office [phone]  pager [phone]

## 2025-06-14 NOTE — PROGRESS NOTE ADULT - SUBJECTIVE AND OBJECTIVE BOX
Date of Service: 06-14-25 @ 13:29    Patient is a 89y old  Female who presents with a chief complaint of pericardial effusion (14 Jun 2025 12:08)      Any change in ROS: seems pretty good:   no sob:   on rooma ir 96% sao2:   sitting in ch air:       MEDICATIONS  (STANDING):  artificial  tears Solution 1 Drop(s) Both EYES two times a day  cefTRIAXone   IVPB 2000 milliGRAM(s) IV Intermittent every 24 hours  chlorhexidine 2% Cloths 1 Application(s) Topical <User Schedule>  levalbuterol Inhalation 0.63 milliGRAM(s) Inhalation every 6 hours  melatonin 5 milliGRAM(s) Oral at bedtime  metoprolol tartrate 25 milliGRAM(s) Oral every 6 hours  midodrine 30 milliGRAM(s) Oral every 8 hours  pantoprazole    Tablet 40 milliGRAM(s) Oral before breakfast  sodium chloride 3%  Inhalation 4 milliLiter(s) Inhalation every 12 hours  zolpidem 5 milliGRAM(s) Oral at bedtime    MEDICATIONS  (PRN):  ipratropium    for Nebulization 500 MICROGram(s) Nebulizer every 6 hours PRN Shortness of Breath and/or Wheezing    Vital Signs Last 24 Hrs  T(C): 37.3 (14 Jun 2025 11:00), Max: 38.1 (14 Jun 2025 03:00)  T(F): 99.1 (14 Jun 2025 11:00), Max: 100.6 (14 Jun 2025 03:00)  HR: 103 (14 Jun 2025 12:00) (94 - 137)  BP: --  BP(mean): --  RR: 24 (14 Jun 2025 12:00) (21 - 46)  SpO2: 97% (14 Jun 2025 12:00) (91% - 99%)    Parameters below as of 14 Jun 2025 12:00  Patient On (Oxygen Delivery Method): room air        I&O's Summary    13 Jun 2025 07:01  -  14 Jun 2025 07:00  --------------------------------------------------------  IN: 568.5 mL / OUT: 575 mL / NET: -6.5 mL    14 Jun 2025 07:01  -  14 Jun 2025 13:29  --------------------------------------------------------  IN: 240 mL / OUT: 180 mL / NET: 60 mL          Physical Exam:   GENERAL: cacechtic  HEENT: BRYSON/   Atraumatic, Normocephalic  ENMT: No tonsillar erythema, exudates, or enlargement; Moist mucous membranes, Good dentition, No lesions  NECK: Supple, No JVD, Normal thyroid  Chest/lung: decreased air entry bi laterally:   CVS: Regular rate and rhythm; No murmurs, rubs, or gallops  GI: : Soft, Nontender, Nondistended; Bowel sounds present  NERVOUS SYSTEM:  Alert & Oriented X3  EXTREMITIES: - edema  LYMPH: No lymphadenopathy noted  SKIN: No rashes or lesions  ENDOCRINOLOGY: No Thyromegaly  PSYCH: Appropriate    Labs:  ABG - ( 14 Jun 2025 00:22 )  pH, Arterial: 7.45  pH, Blood: x     /  pCO2: 37    /  pO2: 91    / HCO3: 26    / Base Excess: 1.7   /  SaO2: 98.6            36.0, 27, 27, 26                            9.2    2.70  )-----------( 367      ( 14 Jun 2025 00:29 )             30.0                         8.6    2.07  )-----------( 432      ( 13 Jun 2025 00:19 )             27.4                         9.4    3.40  )-----------( 452      ( 12 Jun 2025 01:26 )             29.5                         11.6   4.03  )-----------( 466      ( 11 Jun 2025 00:57 )             37.4     06-14    134[L]  |  104  |  23  ----------------------------<  104[H]  5.0   |  20[L]  |  0.68  06-13    134[L]  |  102  |  23  ----------------------------<  107[H]  4.7   |  22  |  0.89  06-12    131[L]  |  100  |  23  ----------------------------<  101[H]  5.2   |  23  |  0.65  06-12    129[L]  |  99  |  25[H]  ----------------------------<  138[H]  5.4[H]   |  19[L]  |  0.70  06-12    132[L]  |  98  |  28[H]  ----------------------------<  134[H]  5.7[H]   |  22  |  0.81  06-11    129[L]  |  98  |  25[H]  ----------------------------<  118[H]  5.3   |  20[L]  |  0.84  06-11    130[L]  |  98  |  23  ----------------------------<  106[H]  6.3[HH]   |  19[L]  |  0.77    Ca    8.0[L]      14 Jun 2025 00:29  Ca    8.1[L]      13 Jun 2025 00:19  Phos  2.7     06-14  Phos  3.1     06-13  Mg     2.2     06-14  Mg     2.2     06-13    TPro  6.1  /  Alb  2.4[L]  /  TBili  0.3  /  DBili  x   /  AST  27  /  ALT  20  /  AlkPhos  134[H]  06-14  TPro  6.2  /  Alb  2.3[L]  /  TBili  0.3  /  DBili  x   /  AST  25  /  ALT  22  /  AlkPhos  133[H]  06-13  TPro  6.5  /  Alb  2.8[L]  /  TBili  0.5  /  DBili  x   /  AST  23  /  ALT  23  /  AlkPhos  147[H]  06-12  TPro  6.8  /  Alb  2.6[L]  /  TBili  0.6  /  DBili  x   /  AST  28  /  ALT  28  /  AlkPhos  168[H]  06-12  TPro  7.0  /  Alb  2.7[L]  /  TBili  0.6  /  DBili  x   /  AST  32  /  ALT  29  /  AlkPhos  173[H]  06-12  TPro  6.3  /  Alb  2.2[L]  /  TBili  0.7  /  DBili  x   /  AST  28  /  ALT  30  /  AlkPhos  158[H]  06-11    CAPILLARY BLOOD GLUCOSE          LIVER FUNCTIONS - ( 14 Jun 2025 00:29 )  Alb: 2.4 g/dL / Pro: 6.1 g/dL / ALK PHOS: 134 U/L / ALT: 20 U/L / AST: 27 U/L / GGT: x           PT/INR - ( 14 Jun 2025 00:29 )   PT: 15.6 sec;   INR: 1.36 ratio         PTT - ( 14 Jun 2025 00:29 )  PTT:25.9 sec  Urinalysis Basic - ( 14 Jun 2025 00:29 )    Color: x / Appearance: x / SG: x / pH: x  Gluc: 104 mg/dL / Ketone: x  / Bili: x / Urobili: x   Blood: x / Protein: x / Nitrite: x   Leuk Esterase: x / RBC: x / WBC x   Sq Epi: x / Non Sq Epi: x / Bacteria: x      Fluid Source --  Albumin, Fluid1.9 g/dL  Glucose, [id number] mg/dL  Protein total, Fluid4.3 g/dL  Lacatate Dehydrogenase, [id number] U/L  pH, Fluid--  Cytopathology-Non Gyn Report--        RECENT CULTURES:  06-12 @ 14:05 Pericardial Pericardial Fluid       polymorphonuclear leukocytes seen by cytocentrifuge  No organisms seen by cytocentrifuge           Testing in progress    06-07 @ 20:45 Blood Blood-Peripheral                No growth at 5 days    06-07 @ 20:30 Blood Blood-Peripheral     rad< from: Xray Chest 1 View- PORTABLE-Routine (Xray Chest 1 View- PORTABLE-Routine in AM.) (06.12.25 @ 07:23) >    TECHNIQUE: AP radiograph of the chest.    FINDINGS:  Bilateral breast implants.  Heart is not accurately assessed on this AP projection.  Small bilateral predominantly basilar patchy opacities. No effusions,   right greater than left.  No pneumothorax.    IMPRESSION:    No significant interval change    --- End of Report ---            YAJAIRA FREIRE MD; Attending Radiologist  This document has been electronically signed. Jun 13 2025  2:31PM    < end of copied text >             No growth at 5 days          RESPIRATORY CULTURES:          Studies  Chest X-RAY  CT SCAN Chest   Venous Dopplers: LE:   CT Abdomen  Others

## 2025-06-14 NOTE — PROGRESS NOTE ADULT - ASSESSMENT
89-y/o female with PMHx of HOCM with severe LVOT obstruction, atrial fibrillation on eliquis, small-moderate pericardial effusion with RA diastolic collapse, recent hospitalization (5/29-6/5) for RML pneumonia and GBS bacteremia, and remote history of right breast cancer status post bilateral mastectomy with radiation who presented with septic shock and hypotension, initially managed on the floor before requiring CCU admission for hemodynamic instability secondary to enlarging pericardial effusion, rapid atrial fibrillation, and decompensated heart failure. Thoracic surgery consulted for pericardial window.     6/11 Previous TTE & CT Chest reviewed by Dr. Canada - TTE without evidence of tamponade physiology, hemodynamically stable, patient is high risk for pericardial window per Thoracic Attending, recommend continue to monitor pericardial effusion with repeat TTE and CT chest today. Per RN at bedside, primary team to discuss GOC with patient's family today. Thoracic will continue to follow.   6/12 Patient hypotensive this AM and sent to IR for emergent pericardiocentesis.   6/13 VSS peric dr 60  6/14     pericardial drain minimal drainage,   vss

## 2025-06-15 NOTE — PROGRESS NOTE ADULT - ASSESSMENT
89F with history of HOCM, AFib, breast cancer s/p RT and b/l mastectomy with breast implants and RUE lymphedema, known pericardial effusion, with recent hospitalizatoin for RML pneumonia and parapneumonic effusion with GBS bacteremia here for septic shock with unclear source and hemodynamic monitoring iso mod-severe pericardial effusion.     NEURO  - no acute issues  - monitor neuro checks per ICU protocol     #Insomnia   -Restart home zolpidem 5 mg po qhs   -D/c quetiapine 12.5 mg po qhs     CARDIOVASCULAR   #Pericardial effusion- likely malignant   On admission was mod-large and got pericardiocentesis with 400 cc drainage. Minimal output from pericardial drain with sanguinous>serous. Repeat TTE showing interval decrease in size.   -6/12 IR did pericardiocentesis drainage   -Pericardial fluid with NGTD on cx, low neutrophils and lymphocytes; pending cytology   -Repeat 6/13 TTE showing LV hyperdynamic with LVEF 75% and evidence of HOCM, small-mod effusion   -F/u repeat 6/14 TTE report   - off maycol 6/13    #Afib   Persistently RVR.   -cont metoprolol tartrate 25 mg po q6   -Hold full AC given sanguinous quality of pericardial fluid     #HCM   Closer to euvolemic.   -Hold diuresis     RESPIRATORY  -ALMAZ     #Hx known R pleural effusion   No respiratory distress. Asymptomatic.   -Appreciate interventional pulmonary input, limited role for thoracentesis at this time     #AHRF- RESOLVED     GASTROINTESTINAL   -ALMAZ   -Diet: Regular   -Stress ppx: c/w pantoprazpole 40 mg po qd     GENITOURINARY/RENAL   -ALMAZ     ENDOCRINE   -ALMAZ     INFECTIOUS DISEASE   #Septic shock unclear source #Recent GBS bacteremia with RML pneumonia and parapneumonic effusion   Clinically improved. Febrile, leukopenic.   -C/w ceftriaxone 2 g IV qd until 6/20 if Cx remain negative  -ID following     HEME/ONC   #Breast cancer s/p b/l mastectomy with RT s/p breast implants   Reported in remission.     -DVT ppx: hold given sanguinous fluid

## 2025-06-15 NOTE — PROGRESS NOTE ADULT - SUBJECTIVE AND OBJECTIVE BOX
GINO GRAHAM  MRN-[id number]  Patient is a 89y old  Female who presents with a chief complaint of shaking chills, hypotension, poor appetite (14 Jun 2025 23:38)    HPI:  89F c hx GERD, R breast CA (40-50y ago) s/p B/L mastectomy and radiation, RUE lymphedema, osteoarthritis/osteoporosis (on monthly ibandronate), HLD, Afib on eliquis, HOCM/severe LVOT/ELEAZAR, small-moderate pericardial effusion/RA diastolic collapse, recent hospitalization 5/29-6/5 for RML PNA, GBS bacteremia (pos cultures 5/29, 5/30) of unclear source on total 10 days abx (IV ceftriaxone inpt, then levaquin 750 q48h until 6/9), presents from [facility] rehab with shaking chills, hypotension, poor appetite    History from pt's daughter/primary decision maker Teresita Shay at bedside.  While at [facility], pt found to have hypotension to SBP 80s. Pt was given IVF and reduced dose of metoprolol. Pt also found to have tachycardia, shaking chills. Pt sent back to the hospital. Reportedly pt has not been eating well, not urinating much. Denies CP, SOB, abd pain, diarrhea, cough, other respiratory symptoms. At baseline pt ambulates without assistive device, drives.    RRT called in the ed for hypotension to SBP 79. (08 Jun 2025 02:41)        Overnight events:    REVIEW OF SYSTEMS:    CONSTITUTIONAL: No weakness, fevers or chills  EYES/ENT: No visual changes;  No vertigo or throat pain   NECK: No pain or stiffness  RESPIRATORY: No cough, wheezing, hemoptysis; No shortness of breath  CARDIOVASCULAR: No chest pain or palpitations  GASTROINTESTINAL: No abdominal or epigastric pain. No nausea, vomiting, or hematemesis; No diarrhea or constipation. No melena or hematochezia.  GENITOURINARY: No dysuria, frequency or hematuria  NEUROLOGICAL: No numbness or weakness  SKIN: No itching, rashes      Physical Exam:  Vital Signs Last 24 Hrs  T(C): 36.7 (15 Ernesto 2025 03:00), Max: 37.3 (14 Jun 2025 11:00)  T(F): 98.1 (15 Ernesto 2025 03:00), Max: 99.1 (14 Jun 2025 11:00)  HR: 108 (15 Ernesto 2025 06:20) (94 - 124)  BP: 160/81 (15 Ernesto 2025 06:00) (124/60 - 185/119)  BP(mean): 106 (15 Ernesto 2025 06:00) (87 - 136)  RR: 35 (15 Ernesto 2025 06:00) (21 - 47)  SpO2: 97% (15 Ernesto 2025 06:20) (90% - 100%)    Parameters below as of 15 Ernesto 2025 06:20  Patient On (Oxygen Delivery Method): nasal cannula      Physical Exam:   Constitutional: NAD, well-groomed, well-developed  HEENT: PERRLA, EOMI, no drainage or redness  Neck: supple,  No JVD  Respiratory: Breath Sounds equal & clear bilaterally to auscultation, no rales/rhonchi/wheezing, no accessory muscle use noted  Cardiovascular: Regular rate, regular rhythm, normal S1, S2; no murmurs or rub  Gastrointestinal: Soft, non-tender, non distended, + bowel sounds  Extremities: LUGO x 4, no peripheral edema, no cyanosis, no clubbing   Neurological: A+O x 3; speech clear and intact; no sensory, motor  deficits, normal reflexes  Skin: warm, dry, well perfused    ============================I/O===========================   I&O's Detail    14 Jun 2025 07:01  -  15 Ernesto 2025 07:00  --------------------------------------------------------  IN:    IV PiggyBack: 50 mL    Oral Fluid: 290 mL  Total IN: 340 mL    OUT:    Drain (mL): 0 mL    Indwelling Catheter - Urethral (mL): 300 mL  Total OUT: 300 mL    Total NET: 40 mL        ============================ LABS =========================                        10.1   3.11  )-----------( 431      ( 15 Ernesto 2025 00:48 )             32.4     06-15    132[L]  |  101  |  22  ----------------------------<  112[H]  5.3   |  22  |  0.68    Ca    8.4      15 Ernesto 2025 02:11  Phos  3.0     06-15  Mg     2.1     06-15    TPro  6.5  /  Alb  2.3[L]  /  TBili  0.3  /  DBili  x   /  AST  35  /  ALT  19  /  AlkPhos  129[H]  06-15    LIVER FUNCTIONS - ( 15 Ernesto 2025 02:11 )  Alb: 2.3 g/dL / Pro: 6.5 g/dL / ALK PHOS: 129 U/L / ALT: 19 U/L / AST: 35 U/L / GGT: x           PT/INR - ( 15 Ernesto 2025 00:49 )   PT: 16.5 sec;   INR: 1.45 ratio         PTT - ( 15 Ernesto 2025 00:49 )  PTT:25.1 sec  ABG - ( 14 Jun 2025 00:22 )  pH, Arterial: 7.45  pH, Blood: x     /  pCO2: 37    /  pO2: 91    / HCO3: 26    / Base Excess: 1.7   /  SaO2: 98.6              Urinalysis Basic - ( 15 Ernesto 2025 02:11 )    Color: x / Appearance: x / SG: x / pH: x  Gluc: 112 mg/dL / Ketone: x  / Bili: x / Urobili: x   Blood: x / Protein: x / Nitrite: x   Leuk Esterase: x / RBC: x / WBC x   Sq Epi: x / Non Sq Epi: x / Bacteria: x      ======================Micro/Rad/Cardio=================  Culture: Reviewed   CXR: Reviewed  Echo:Reviewed  ======================================================  PAST MEDICAL & SURGICAL HISTORY:  Breast cancer  s/p b/l mastectomy      H/O bilateral mastectomy  + breast implants      History of cataract surgery, unspecified laterality

## 2025-06-15 NOTE — PROGRESS NOTE ADULT - SUBJECTIVE AND OBJECTIVE BOX
Izabela Chu PA-C  Contact via [employer]    CCU PROGRESS NOTE:    GINO GRAHAM  MRN-[id number]  Patient is a 89y old  Female who presents with a chief complaint of shaking chills, hypotension, poor appetite (15 Ernesto 2025 11:09)    INTERVAL HPI/OVERNIGHT EVENTS: No acute events.  SUBJECTIVE: Patient seen and examined at bedside. No acute complaints.     MEDICATIONS  (STANDING):  cefTRIAXone   IVPB 2000 milliGRAM(s) IV Intermittent every 24 hours  chlorhexidine 2% Cloths 1 Application(s) Topical <User Schedule>  levalbuterol Inhalation 0.63 milliGRAM(s) Inhalation every 6 hours  melatonin 5 milliGRAM(s) Oral at bedtime  pantoprazole    Tablet 40 milliGRAM(s) Oral before breakfast  zolpidem 5 milliGRAM(s) Oral at bedtime    MEDICATIONS  (PRN):  ipratropium    for Nebulization 500 MICROGram(s) Nebulizer every 6 hours PRN Shortness of Breath and/or Wheezing    OBJECTIVE:  ICU Vital Signs Last 24 Hrs  T(C): 36.7 (15 Ernesto 2025 19:00), Max: 36.9 (14 Jun 2025 23:00)  T(F): 98 (15 Ernesto 2025 19:00), Max: 98.4 (14 Jun 2025 23:00)  HR: 100 (15 Ernesto 2025 22:00) (94 - 128)  BP: 108/71 (15 Ernesto 2025 22:00) (97/66 - 185/119)  BP(mean): 84 (15 Ernesto 2025 22:00) (74 - 136)  RR: 32 (15 Ernesto 2025 22:00) (12 - 47)  SpO2: 100% (15 Ernesto 2025 22:00) (93% - 100%)    O2 Parameters below as of 15 Ernesto 2025 22:00  Patient On (Oxygen Delivery Method): nasal cannula  O2 Flow (L/min): 2    I&O's Summary    14 Jun 2025 07:01  -  15 Ernesto 2025 07:00  --------------------------------------------------------  IN: 340 mL / OUT: 300 mL / NET: 40 mL    15 Ernesto 2025 07:01  -  15 Ernesto 2025 22:28  --------------------------------------------------------  IN: 820 mL / OUT: 510 mL / NET: 310 mL    CAPILLARY BLOOD GLUCOSE    GENERAL: NAD, lying in bed comfortably  NECK: no JVD  HEART: S1, S2, regular rate and rhythm, no murmurs, rubs, or gallops  LUNGS: Unlabored respirations.  Clear to auscultation bilaterally, no crackles, wheezing, or rhonchi  ABDOMEN: Soft, nontender, nondistended, +BS  EXTREMITIES: 2+ peripheral pulses bilaterally. No clubbing, cyanosis, or edema    ============================I/O===========================   I&O's Detail    14 Jun 2025 07:01  -  15 Ernesto 2025 07:00  --------------------------------------------------------  IN:    IV PiggyBack: 50 mL    Oral Fluid: 290 mL  Total IN: 340 mL    OUT:    Drain (mL): 0 mL    Indwelling Catheter - Urethral (mL): 300 mL  Total OUT: 300 mL    Total NET: 40 mL    15 Ernesto 2025 07:01  -  15 Ernesto 2025 22:28  --------------------------------------------------------  IN:    IV PiggyBack: 50 mL    Oral Fluid: 770 mL  Total IN: 820 mL    OUT:    Drain (mL): 10 mL    Voided (mL): 500 mL  Total OUT: 510 mL    Total NET: 310 mL    ============================ LABS =========================                        10.1   3.11  )-----------( 431      ( 15 Ernesto 2025 00:48 )             32.4     06-15    132[L]  |  101  |  22  ----------------------------<  112[H]  5.3   |  22  |  0.68    Ca    8.4      15 Ernesto 2025 02:11  Phos  3.0     06-15  Mg     2.1     06-15    TPro  6.5  /  Alb  2.3[L]  /  TBili  0.3  /  DBili  x   /  AST  35  /  ALT  19  /  AlkPhos  129[H]  06-15    LIVER FUNCTIONS - ( 15 Ernesto 2025 02:11 )  Alb: 2.3 g/dL / Pro: 6.5 g/dL / ALK PHOS: 129 U/L / ALT: 19 U/L / AST: 35 U/L / GGT: x           PT/INR - ( 15 Ernesto 2025 00:49 )   PT: 16.5 sec;   INR: 1.45 ratio      PTT - ( 15 Ernesto 2025 00:49 )  PTT:25.1 sec  ABG - ( 14 Jun 2025 00:22 )  pH, Arterial: 7.45  pH, Blood: x     /  pCO2: 37    /  pO2: 91    / HCO3: 26    / Base Excess: 1.7   /  SaO2: 98.6      Blood Gas Arterial, Lactate: 0.9 mmol/L (06-14-25 @ 00:22)  Blood Gas Arterial, Lactate: 0.6 mmol/L (06-13-25 @ 00:07)    Urinalysis Basic - ( 15 Ernesto 2025 02:11 )    Color: x / Appearance: x / SG: x / pH: x  Gluc: 112 mg/dL / Ketone: x  / Bili: x / Urobili: x   Blood: x / Protein: x / Nitrite: x   Leuk Esterase: x / RBC: x / WBC x   Sq Epi: x / Non Sq Epi: x / Bacteria: x      ======================MICRO/RAD/CARDIO=================  Telemetry: Reviewed   EKG: Reviewed  CXR: Reviewed  Culture: Reviewed   Echo:   Cath:

## 2025-06-15 NOTE — PROGRESS NOTE ADULT - THIS PATIENT HAS THE FOLLOWING CONDITION(S)/DIAGNOSES ON THIS ADMISSION:
None
Shock
Shock/Acute Respiratory Failure
None
Acute Respiratory Failure
Shock/Acute Respiratory Failure
Shock

## 2025-06-15 NOTE — PROGRESS NOTE ADULT - ASSESSMENT
Afib, severe HCM LVOT obstruction, Pericardial effusion, recent admission for PNA / Bacteremia   now admitted with sepsis, tachycardia ( afib with RVR ) and shock . Cardiology is called for elevated troponin     Elevated troponin   Demand ischemia   in setting of afib with RVR, shock, severe HCM and sepsis   its downtrending     Shock   resolved  off middorine     Afib  Hr control   fu with EP for management   cont a/c

## 2025-06-15 NOTE — PROGRESS NOTE ADULT - SUBJECTIVE AND OBJECTIVE BOX
Subjective: Patient seen and examined. No new events except as noted.     SUBJECTIVE/ROS:  awake, alert  nad      MEDICATIONS:  MEDICATIONS  (STANDING):  cefTRIAXone   IVPB 2000 milliGRAM(s) IV Intermittent every 24 hours  chlorhexidine 2% Cloths 1 Application(s) Topical <User Schedule>  levalbuterol Inhalation 0.63 milliGRAM(s) Inhalation every 6 hours  melatonin 5 milliGRAM(s) Oral at bedtime  metoprolol tartrate 25 milliGRAM(s) Oral every 6 hours  pantoprazole    Tablet 40 milliGRAM(s) Oral before breakfast  zolpidem 5 milliGRAM(s) Oral at bedtime      PHYSICAL EXAM:  T(C): 36.7 (06-15-25 @ 07:00), Max: 37.3 (06-14-25 @ 11:00)  HR: 102 (06-15-25 @ 07:00) (94 - 124)  BP: 154/72 (06-15-25 @ 07:00) (124/60 - 185/119)  RR: 20 (06-15-25 @ 07:00) (20 - 47)  SpO2: 97% (06-15-25 @ 07:00) (90% - 100%)  Wt(kg): --  I&O's Summary    14 Jun 2025 07:01  -  15 Ernetso 2025 07:00  --------------------------------------------------------  IN: 340 mL / OUT: 300 mL / NET: 40 mL            JVP: Normal  Neck: supple  Lung: clear   CV: S1 S2 ,  Abd: soft  Ext: No edema  neuro: Awake / alert  Psych: flat affect  Skin: normal``    LABS/DATA:    CARDIAC MARKERS:                                10.1   3.11  )-----------( 431      ( 15 Ernesto 2025 00:48 )             32.4     06-15    132[L]  |  101  |  22  ----------------------------<  112[H]  5.3   |  22  |  0.68    Ca    8.4      15 Ernesto 2025 02:11  Phos  3.0     06-15  Mg     2.1     06-15    TPro  6.5  /  Alb  2.3[L]  /  TBili  0.3  /  DBili  x   /  AST  35  /  ALT  19  /  AlkPhos  129[H]  06-15    proBNP:   Lipid Profile:   HgA1c:   TSH:

## 2025-06-15 NOTE — CHART NOTE - NSCHARTNOTEFT_GEN_A_CORE
Minimal drainage from Pericardial drain,  spoke to CCU team, IR to remove PTC in am,  Thoracic to sign off, please call if any further issues.  MCKAY Aguilar  NP

## 2025-06-15 NOTE — PROGRESS NOTE ADULT - SUBJECTIVE AND OBJECTIVE BOX
EP Attending  HISTORY OF PRESENT ILLNESS: HPI:  89F c hx GERD, R breast CA (40-50y ago) s/p B/L mastectomy and radiation, RUE lymphedema, osteoarthritis/osteoporosis (on monthly ibandronate), HLD, Afib on eliquis, HOCM/severe LVOT/ELEAZAR, small-moderate pericardial effusion/RA diastolic collapse, recent hospitalization 5/29-6/5 for RML PNA, GBS bacteremia (pos cultures 5/29, 5/30) of unclear source on total 10 days abx (IV ceftriaxone inpt, then levaquin 750 q48h until 6/9), presents from [facility] rehab with shaking chills, hypotension, poor appetite    History from pt's daughter/primary decision maker Teresitagisel Day at bedside.  While at [facility], pt found to have hypotension to SBP 80s. Pt was given IVF and reduced dose of metoprolol. Pt also found to have tachycardia, shaking chills. Pt sent back to the hospital. Reportedly pt has not been eating well, not urinating much. Denies CP, SOB, abd pain, diarrhea, cough, other respiratory symptoms. At baseline pt ambulates without assistive device, drives.  RRT called in the ed for hypotension to SBP 79. (08 Jun 2025 02:41)    Ms Cloud is a pleasant 89yo woman here with shortness of breath.  Sees Dr Adolfo Leung for Cardiology.  Being managed on this inpatient stay by Dr Coelho.  Known to Dr Young after outpatient referral for HOCM management (lVOT gradient 80s-100mmHg+), and had a brief trial of Mavacamten, stopped for feelings of "leg heaviness".    EP called re: rapid AFib, refractory to low-dose metoprolol thus far, and complicated by management of HCM and new/worsening pericardial effusion.  Resting comfortably in bed on supplemental O2.  SOB and fatigued but no palpitations or fainting.  A 10 pt ROS is otherwise negative.    6/11- moved to CCU for closer monitoring.  lethargic / unable to obtain ROS today.  worsening pleural effusion, no plan for tap.  no plan for pericardiocentesis either.  hypotensive requiring midodrine.  6/12- s/p urgent pericardiocentesis via Interventional Radiology/ lateral thorax approach.  Feels much better- more energetic, has an appetite, more talkative.  6/13- resting in bed in CICU. on phenylephrine today.  no new complaints. feeling better overall after pericardiocentesis.  awaiting possible thoracentesis?  6/14- resting in chair. no new complaints.  slow drainage out of pericardial drain.  AFib HR now contrlled for first time on this stay.  Date of service 6/15- resting in recliner, poor appetite. no new issues today.      PAST MEDICAL & SURGICAL HISTORY:  Breast cancer  s/p b/l mastectomy  H/O bilateral mastectomy  + breast implants  History of cataract surgery, unspecified laterality    cefTRIAXone   IVPB 2000 milliGRAM(s) IV Intermittent every 24 hours  chlorhexidine 2% Cloths 1 Application(s) Topical <User Schedule>  ipratropium    for Nebulization 500 MICROGram(s) Nebulizer every 6 hours PRN  levalbuterol Inhalation 0.63 milliGRAM(s) Inhalation every 6 hours  melatonin 5 milliGRAM(s) Oral at bedtime  metoprolol tartrate 25 milliGRAM(s) Oral every 6 hours  pantoprazole    Tablet 40 milliGRAM(s) Oral before breakfast  zolpidem 5 milliGRAM(s) Oral at bedtime                            10.1   3.11  )-----------( 431      ( 15 Ernesto 2025 00:48 )             32.4       06-15    132[L]  |  101  |  22  ----------------------------<  112[H]  5.3   |  22  |  0.68    Ca    8.4      15 Ernesto 2025 02:11  Phos  3.0     06-15  Mg     2.1     06-15    TPro  6.5  /  Alb  2.3[L]  /  TBili  0.3  /  DBili  x   /  AST  35  /  ALT  19  /  AlkPhos  129[H]  06-15      T(C): 36.7 (06-15-25 @ 07:00), Max: 36.9 (06-14-25 @ 23:00)  HR: 105 (06-15-25 @ 11:00) (94 - 124)  BP: 116/73 (06-15-25 @ 11:00) (116/73 - 185/119)  RR: 20 (06-15-25 @ 11:00) (12 - 47)  SpO2: 97% (06-15-25 @ 11:00) (90% - 100%)  Wt(kg): --    I&O's Summary    14 Jun 2025 07:01  -  15 Ernesto 2025 07:00  --------------------------------------------------------  IN: 340 mL / OUT: 300 mL / NET: 40 mL    15 Ernesto 2025 07:01  -  15 Ernesto 2025 11:09  --------------------------------------------------------  IN: 450 mL / OUT: 100 mL / NET: 350 mL        Appearance: frail elderly woman in no acute distress  HEENT:   Normal oral mucosa, PERRL, EOMI	  Lymphatic: No lymphadenopathy , no edema  Cardiovascular: rapid irregular S1 S2, No JVD, No murmurs , Peripheral pulses palpable 2+ bilaterally.  s/p pericardial drain placement from left chest.  Respiratory: poor air entry BL  normal effort 	  Gastrointestinal:  Soft, Non-tender, + BS	  Skin: No rashes, No ecchymoses, No cyanosis, warm to touch  Musculoskeletal: Normal range of motion, normal strength  Psychiatry:  Mood & affect appropriate    TELEMETRY: AFib, narrow QRS, 110bpm at rest  ECG:  	AFib, atypical LVHypertrphy  Echo:  < from: TTE W or WO Ultrasound Enhancing Agent (06.05.25 @ 07:16) >  CONCLUSIONS:      1. Limited TTE to assess for pericardial effusion.   2. Trace pericardial effusion noted adjacent to the posterolateral left ventricle, small pericardial effusion noted adjacent to the anterior right ventricle and small pericardial effusion noted adjacent to the right atrium.   3. The inferior vena cava is normal in size measuring 1.70 cm in diameter, (normal <2.1cm) with normal inspiratory collapse (normal >50%) consistent with normal right atrial pressure (~3, range 0-5mmHg).   4. Compared to the transthoracic echocardiogram performed on 5/30/2025, the right atrium is not as well visualized on this study. There is right atrial diastolic collapse visualized in the 4 chamber view but unable to quantify the duration of the collapse. The effusion appears unchanged in size and distribution. No other signs of cardiac tamponade are present.    < end of copied text >    CT Chest - personally reviewed the images.  right breat prosthesis with signs of rupture.  left breast prosthesis OK.  prominent LV hypertrophy visible.  pericardial effusion enlarged.  pleural effusion present on the right.  	  ASSESSMENT/PLAN: Ms Cloud is a pleasant 89y Female here with shortness of breath.    Multifactorial in the setting of pericardial effusion, Hypertrophic Cardiomyopathy with severe LVOT obstruction, and rapid AFib.  [id number] is at least 3.  Holding apixaban.  Surveillance of pericardial effusion which as bloody drain output.  Rule out malignancy on cytology.  Goal HR <100bpm at rest, and negative inotropy helpful in the setting of HOCM.  Continue metoprolol 25mg PO q6hrs with holding parameters.  May need to INCREASE THE DOSE to 37.5-50mg q6hrs if getting all doses.  Continue alpha-agonists to support her.  Discussed w/ Dr Young, and agree w/ recs to avoid diltiazem and other vasodilators, and to avoid diuretics.  Recommend evaluation for thoracentesis, to ease her respiratory effort.  Worthwhile even as a palliative measure.  Outpatient, needs to re-enroll with a HCM specialist.  Strongly recommend outpatient re-trial of Camzyos, even at the lowest dose.  This cannot be started in the hospital.  There is a possibility- albeit small - for Cardioversion before discharge.  Only if still symptomatic after everything else is optimized, and she is able to maintain uninterrupted anticoagulation.        Barney Lau M.D.  Cardiac Electrophysiology    office [phone]  pager [phone]

## 2025-06-16 ENCOUNTER — RESULT REVIEW (OUTPATIENT)
Age: 89
End: 2025-06-16

## 2025-06-16 NOTE — PROGRESS NOTE ADULT - SUBJECTIVE AND OBJECTIVE BOX
Subjective: Patient seen and examined. No new events except as noted.     SUBJECTIVE/ROS:  nad      MEDICATIONS:  MEDICATIONS  (STANDING):  cefTRIAXone   IVPB 2000 milliGRAM(s) IV Intermittent every 24 hours  chlorhexidine 2% Cloths 1 Application(s) Topical <User Schedule>  levalbuterol Inhalation 0.63 milliGRAM(s) Inhalation every 6 hours  melatonin 5 milliGRAM(s) Oral at bedtime  metoprolol tartrate 50 milliGRAM(s) Oral every 6 hours  pantoprazole    Tablet 40 milliGRAM(s) Oral before breakfast  zolpidem 5 milliGRAM(s) Oral at bedtime      PHYSICAL EXAM:  T(C): 36.8 (06-16-25 @ 07:00), Max: 37 (06-16-25 @ 03:00)  HR: 115 (06-16-25 @ 08:00) (99 - 128)  BP: 114/61 (06-16-25 @ 08:00) (96/72 - 143/70)  RR: 15 (06-16-25 @ 08:00) (12 - 43)  SpO2: 99% (06-16-25 @ 08:00) (93% - 100%)  Wt(kg): --  I&O's Summary    15 Ernesto 2025 07:01  -  16 Jun 2025 07:00  --------------------------------------------------------  IN: 870 mL / OUT: 710 mL / NET: 160 mL            JVP: Normal  Neck: supple  Lung: clear   CV: S1 S2 , pos jelani   Abd: soft  Ext: No edema  neuro: Awake / alert  Psych: flat affect  Skin: normal``    LABS/DATA:    CARDIAC MARKERS:                                10.3   3.08  )-----------( 287      ( 16 Jun 2025 01:19 )             33.1     06-16    134[L]  |  101  |  20  ----------------------------<  109[H]  4.6   |  22  |  0.63    Ca    8.6      16 Jun 2025 01:19  Phos  2.9     06-16  Mg     2.1     06-16    TPro  6.8  /  Alb  2.4[L]  /  TBili  0.4  /  DBili  x   /  AST  27  /  ALT  18  /  AlkPhos  127[H]  06-16    proBNP:   Lipid Profile:   HgA1c:   TSH:

## 2025-06-16 NOTE — CHART NOTE - NSCHARTNOTEFT_GEN_A_CORE
Brief f/u from Geriatrics and Palliative Medicine Team, chart reviewed and d/w CCU team, note pt remains hemodynamically stable s/p pericardiocentesis with plans to remove drain due to minimal output. Advised team that I will follow peripherally as goals were identified as stated in thee consult note. Please call us back if further GOC needs arise.    Kathy Verdugo MD  [facility] Team Consults  Please call if we can be of assistance, [phone]

## 2025-06-16 NOTE — PROGRESS NOTE ADULT - SUBJECTIVE AND OBJECTIVE BOX
Date of Service: 06-16-25 @ 15:27    Patient is a 89y old  Female who presents with a chief complaint of shaking chills, hypotension, poor appetite (16 Jun 2025 10:30)      Any change in ROS: she is on 1 Lof oxygen ;  no resp distress;  alert and awake and sitting in ch air:   pericardial drain removed:   LDH pretty high in pericardial fluid       MEDICATIONS  (STANDING):  cefTRIAXone   IVPB 2000 milliGRAM(s) IV Intermittent every 24 hours  chlorhexidine 2% Cloths 1 Application(s) Topical <User Schedule>  levalbuterol Inhalation 0.63 milliGRAM(s) Inhalation every 6 hours  melatonin 5 milliGRAM(s) Oral at bedtime  metoprolol tartrate 50 milliGRAM(s) Oral every 6 hours  pantoprazole    Tablet 40 milliGRAM(s) Oral before breakfast  zolpidem 5 milliGRAM(s) Oral at bedtime    MEDICATIONS  (PRN):  ipratropium    for Nebulization 500 MICROGram(s) Nebulizer every 6 hours PRN Shortness of Breath and/or Wheezing    Vital Signs Last 24 Hrs  T(C): 36.8 (16 Jun 2025 15:00), Max: 37 (16 Jun 2025 03:00)  T(F): 98.3 (16 Jun 2025 15:00), Max: 98.6 (16 Jun 2025 03:00)  HR: 97 (16 Jun 2025 15:00) (97 - 120)  BP: 134/60 (16 Jun 2025 15:00) (96/72 - 157/92)  BP(mean): 86 (16 Jun 2025 15:00) (74 - 116)  RR: 20 (16 Jun 2025 15:00) (13 - 43)  SpO2: 97% (16 Jun 2025 15:00) (93% - 100%)    Parameters below as of 16 Jun 2025 15:00  Patient On (Oxygen Delivery Method): nasal cannula  O2 Flow (L/min): 1      I&O's Summary    15 Ernesto 2025 07:01  -  16 Jun 2025 07:00  --------------------------------------------------------  IN: 870 mL / OUT: 710 mL / NET: 160 mL    16 Jun 2025 07:01  -  16 Jun 2025 15:27  --------------------------------------------------------  IN: 580 mL / OUT: 200 mL / NET: 380 mL          Physical Exam:   GENERAL: NAD, well-groomed, well-developed  HEENT: BRYSON/   Atraumatic, Normocephalic  ENMT: No tonsillar erythema, exudates, or enlargement; Moist mucous membranes, Good dentition, No lesions  NECK: Supple, No JVD, Normal thyroid  CHEST/LUNG: decreased air enery right base   CVS: Regular rate and rhythm; No murmurs, rubs, or gallops  GI: : Soft, Nontender, Nondistended; Bowel sounds present  NERVOUS SYSTEM:  Alert & awake and responsive  EXTREMITIES:- edema  LYMPH: No lymphadenopathy noted  SKIN: No rashes or lesions  ENDOCRINOLOGY: No Thyromegaly  PSYCH: Appropriate    Labs:  36.0                            10.3   3.08  )-----------( 287      ( 16 Jun 2025 01:19 )             33.1                         10.1   3.11  )-----------( 431      ( 15 Ernesto 2025 00:48 )             32.4                         9.2    2.70  )-----------( 367      ( 14 Jun 2025 00:29 )             30.0                         8.6    2.07  )-----------( 432      ( 13 Jun 2025 00:19 )             27.4     06-16    134[L]  |  101  |  20  ----------------------------<  109[H]  4.6   |  22  |  0.63  06-15    132[L]  |  101  |  22  ----------------------------<  112[H]  5.3   |  22  |  0.68  06-15    134[L]  |  102  |  22  ----------------------------<  110[H]  5.5[H]   |  20[L]  |  0.58  06-14    134[L]  |  104  |  23  ----------------------------<  104[H]  5.0   |  20[L]  |  0.68  06-13    134[L]  |  102  |  23  ----------------------------<  107[H]  4.7   |  22  |  0.89    Ca    8.6      16 Jun 2025 01:19  Ca    8.4      15 Ernesto 2025 02:11  Ca    8.3[L]      15 Ernesto 2025 00:48  Phos  2.9     06-16  Phos  3.0     06-15  Mg     2.1     06-16  Mg     2.1     06-15    TPro  6.8  /  Alb  2.4[L]  /  TBili  0.4  /  DBili  x   /  AST  27  /  ALT  18  /  AlkPhos  127[H]  06-16  TPro  6.5  /  Alb  2.3[L]  /  TBili  0.3  /  DBili  x   /  AST  35  /  ALT  19  /  AlkPhos  129[H]  06-15  TPro  6.8  /  Alb  2.5[L]  /  TBili  0.3  /  DBili  x   /  AST  41[H]  /  ALT  20  /  AlkPhos  137[H]  06-15  TPro  6.1  /  Alb  2.4[L]  /  TBili  0.3  /  DBili  x   /  AST  27  /  ALT  20  /  AlkPhos  134[H]  06-14  TPro  6.2  /  Alb  2.3[L]  /  TBili  0.3  /  DBili  x   /  AST  25  /  ALT  22  /  AlkPhos  133[H]  06-13    CAPILLARY BLOOD GLUCOSE          LIVER FUNCTIONS - ( 16 Jun 2025 01:19 )  Alb: 2.4 g/dL / Pro: 6.8 g/dL / ALK PHOS: 127 U/L / ALT: 18 U/L / AST: 27 U/L / GGT: x           PT/INR - ( 15 Ernesto 2025 00:49 )   PT: 16.5 sec;   INR: 1.45 ratio         PTT - ( 15 Ernesto 2025 00:49 )  PTT:25.1 sec  Urinalysis Basic - ( 16 Jun 2025 01:19 )    Color: x / Appearance: x / SG: x / pH: x  Gluc: 109 mg/dL / Ketone: x  / Bili: x / Urobili: x   Blood: x / Protein: x / Nitrite: x   Leuk Esterase: x / RBC: x / WBC x   Sq Epi: x / Non Sq Epi: x / Bacteria: x      Fluid Source --  Albumin, Fluid1.9 g/dL  Glucose, [id number] mg/dL  Protein total, Fluid4.3 g/dL  Lacatate Dehydrogenase, [id number] U/L  pH, Fluid--  Cytopathology-Non Gyn Report--        RECENT CULTURES:  06-12 @ 14:05 Pericardial Pericardial Fluid       polymorphonuclear leukocytes seen by cytocentrifuge  No organisms seen by cytocentrifuge           No growth      - trend vs/labs  - regarding AC can plan to resume as follows:      -Heparin IV tentative resumption 12-24 hours (no bolus) post-procedure if no concern for bleeding.        -Apixaban (Eliquis) tentative resumption 48-72 hours post-procedure if no concern for bleeding.  - No further IR intervention indicated at this time  - IR to sign off, please reconsult as necessary.TTE from 6/15 with small pericardial effusion improved in size from 6/14.  - case d/w IR attending Dr. Mcclelland who agreed with plan to remove tube today  - pericardial drain removed bedside today by IR without complication    RESPIRATORY CULTURES:          Studies  Chest X-RAY  CT SCAN Chest   Venous Dopplers: LE:   CT Abdomen  Others

## 2025-06-16 NOTE — CHART NOTE - NSCHARTNOTEFT_GEN_A_CORE
CCU Transfer Note    Transfer from: CCU  Transfer to:  (  ) Medicine    ( x  ) Telemetry    (  ) RCU    (  ) Palliative    (  ) Stroke Unit    (  ) _______________  Accepting physician: Dr. Surjit Wong    CCU COURSE:        ASSESSMENT & PLAN:   89F with history of HOCM, AFib, breast cancer s/p RT and b/l mastectomy with breast implants and RUE lymphedema, known pericardial effusion, with recent hospitalizatoin for RML pneumonia and parapneumonic effusion with GBS bacteremia here for septic shock with unclear source and hemodynamic monitoring iso mod-severe pericardial effusion.     NEURO  - no acute issues  - monitor neuro checks per ICU protocol     #Insomnia   -Restart home zolpidem 5 mg po qhs   -D/c quetiapine 12.5 mg po qhs     CARDIOVASCULAR   #Pericardial effusion- unknown etiology, differential includes post-viral , malignancy, idiopathic  On admission was mod-large and got pericardiocentesis with 400 cc drainage. Minimal output from pericardial drain with sanguinous>serous. Repeat TTE showing interval decrease in size.   -6/12 IR did pericardiocentesis drainage; now s/p pericardial drain removal on 6/16 by IR  -Pericardial fluid with NGTD on cx, low neutrophils and lymphocytes; pending cytology   -Repeat 6/13 TTE showing LV hyperdynamic with LVEF 75% and evidence of HOCM, small-mod effusion   -F/u repeat 6/14 TTE report   - off maycol 6/13    #Afib   Persistently RVR.   -cont metoprolol tartrate 50 mg po q6; goal rate < 100  -Hold full AC given sanguinous quality of pericardial fluid; restart AC per IR recommendations/timeline     #HCM   Closer to euvolemic.   -Hold diuresis     RESPIRATORY  -ALMAZ     #Hx known R pleural effusion   No respiratory distress. Asymptomatic.   -Appreciate interventional pulmonary input, limited role for thoracentesis at this time     #AHRF- RESOLVED     GASTROINTESTINAL   -ALMAZ   -Diet: Regular   -Stress ppx: c/w pantoprazpole 40 mg po qd     GENITOURINARY/RENAL   -ALMAZ     ENDOCRINE   -ALMAZ     INFECTIOUS DISEASE   #Septic shock unclear source #Recent GBS bacteremia with RML pneumonia and parapneumonic effusion   Clinically improved. Febrile, leukopenic.   -C/w ceftriaxone 2 g IV qd until 6/20 if Cx remain negative  -ID following     HEME/ONC   #Breast cancer s/p b/l mastectomy with RT s/p breast implants   Reported in remission.     -DVT ppx: hold given sanguinous fluid; r/s per IR timeline/recs        For Follow-Up:    MEDICATIONS:  STANDING MEDICATIONS  cefTRIAXone   IVPB 2000 milliGRAM(s) IV Intermittent every 24 hours  chlorhexidine 2% Cloths 1 Application(s) Topical <User Schedule>  levalbuterol Inhalation 0.63 milliGRAM(s) Inhalation every 6 hours  melatonin 5 milliGRAM(s) Oral at bedtime  metoprolol tartrate 50 milliGRAM(s) Oral every 6 hours  pantoprazole    Tablet 40 milliGRAM(s) Oral before breakfast  zolpidem 5 milliGRAM(s) Oral at bedtime    PRN MEDICATIONS  ipratropium    for Nebulization 500 MICROGram(s) Nebulizer every 6 hours PRN      VITAL SIGNS: Last 24 Hours  T(C): 36.6 (16 Jun 2025 11:00), Max: 37 (16 Jun 2025 03:00)  T(F): 97.8 (16 Jun 2025 11:00), Max: 98.6 (16 Jun 2025 03:00)  HR: 115 (16 Jun 2025 13:00) (99 - 120)  BP: 109/63 (16 Jun 2025 13:00) (96/72 - 157/92)  BP(mean): 79 (16 Jun 2025 13:00) (74 - 116)  RR: 22 (16 Jun 2025 13:00) (13 - 43)  SpO2: 96% (16 Jun 2025 13:00) (93% - 100%)    LABS:                        10.3   3.08  )-----------( 287      ( 16 Jun 2025 01:19 )             33.1     06-16    134[L]  |  101  |  20  ----------------------------<  109[H]  4.6   |  22  |  0.63    Ca    8.6      16 Jun 2025 01:19  Phos  2.9     06-16  Mg     2.1     06-16    TPro  6.8  /  Alb  2.4[L]  /  TBili  0.4  /  DBili  x   /  AST  27  /  ALT  18  /  AlkPhos  127[H]  06-16    PT/INR - ( 15 Ernesto 2025 00:49 )   PT: 16.5 sec;   INR: 1.45 ratio         PTT - ( 15 Ernesto 2025 00:49 )  PTT:25.1 sec  Urinalysis Basic - ( 16 Jun 2025 01:19 )    Color: x / Appearance: x / SG: x / pH: x  Gluc: 109 mg/dL / Ketone: x  / Bili: x / Urobili: x   Blood: x / Protein: x / Nitrite: x   Leuk Esterase: x / RBC: x / WBC x   Sq Epi: x / Non Sq Epi: x / Bacteria: x              RADIOLOGY: CCU Transfer Note    Transfer from: CCU  Transfer to:  (  ) Medicine    ( x  ) Telemetry    (  ) RCU    (  ) Palliative    (  ) Stroke Unit    (  ) _______________  Accepting physician: Dr. Surjit Wong    CCU COURSE:  Patient admitted to CCU on 6/10/2025 for hemodynamic monitoring in the setting of large pericardial effusion, atrial fibrillation with RVR to consider heart failure with ongoing hemodynamic instability.  In the setting of patient hospitalization for right middle lobe pneumonia and GBS bacteremia.  Patient initially hemodynamic instability in the emergency department was improved after discontinuing Ambien and increasing midodrine.  Over subsequent days patient developed worsening tachypnea and hemodynamic instability.  Imaging revealed enlarging pericardial effusion with A-fib RVR refractory to low-dose metoprolol, c/b HOCM.  CCU was consulted for decompensated heart failure in the setting of moderate to severe pericardial effusion with impending concern for hemodynamic collapse with complex cardiac physiology and severe LVOT obstruction.  Shock was deemed to be multifactorial including sepsis, effusion and HOCM.  Interventional radiology was consulted with subsequent placement of pericardial drain.  Ongoing management/evaluation for a pleural effusion was performed however patient was unlikely to benefit from thoracentesis.  On 6/12 patient underwent a pericardiocentesis with 400 cc of sanguinous fluid removed.  Pericardial fluid culture was no growth low neutrophils and lymphocytes.  Differential remains broad including malignant versus postviral infectious versus idiopathic.  Patient required up titration of metoprolol for persistent A-fib with RVR.  Output of pericardial drain was monitored and noted to be normal to no drainage on 6/15 into 6/16 and she was made to have pericardial drain removed by interventional radiology.  Patient hemodynamically stable.  Decision made to downgrade on 6/16.  Patient signed out to Dr. Surjit Wong.        ASSESSMENT & PLAN:   89F with history of HOCM, AFib, breast cancer s/p RT and b/l mastectomy with breast implants and RUE lymphedema, known pericardial effusion, with recent hospitalizatoin for RML pneumonia and parapneumonic effusion with GBS bacteremia here for septic shock with unclear source and hemodynamic monitoring iso mod-severe pericardial effusion.     NEURO  - no acute issues  - monitor neuro checks per ICU protocol     #Insomnia   -Restart home zolpidem 5 mg po qhs   -D/c quetiapine 12.5 mg po qhs     CARDIOVASCULAR   #Pericardial effusion- unknown etiology, differential includes post-viral , malignancy, idiopathic  On admission was mod-large and got pericardiocentesis with 400 cc drainage. Minimal output from pericardial drain with sanguinous>serous. Repeat TTE showing interval decrease in size.   -6/12 IR did pericardiocentesis drainage; now s/p pericardial drain removal on 6/16 by IR  -Pericardial fluid with NGTD on cx, low neutrophils and lymphocytes; pending cytology   -Repeat 6/13 TTE showing LV hyperdynamic with LVEF 75% and evidence of HOCM, small-mod effusion   -F/u repeat 6/14 TTE report   - off maycol 6/13    #Afib   Persistently RVR.   -cont metoprolol tartrate 50 mg po q6; goal rate < 100  -Hold full AC given sanguinous quality of pericardial fluid; restart AC per IR recommendations/timeline     #HCM   Closer to euvolemic.   -Hold diuresis     RESPIRATORY  -ALMAZ     #Hx known R pleural effusion   No respiratory distress. Asymptomatic.   -Appreciate interventional pulmonary input, limited role for thoracentesis at this time     #AHRF- RESOLVED     GASTROINTESTINAL   -ALMAZ   -Diet: Regular   -Stress ppx: c/w pantoprazpole 40 mg po qd     GENITOURINARY/RENAL   -ALMAZ     ENDOCRINE   -ALMAZ     INFECTIOUS DISEASE   #Septic shock unclear source #Recent GBS bacteremia with RML pneumonia and parapneumonic effusion   Clinically improved. Febrile, leukopenic.   -C/w ceftriaxone 2 g IV qd until 6/20 if Cx remain negative  -ID following     HEME/ONC   #Breast cancer s/p b/l mastectomy with RT s/p breast implants   Reported in remission.     -DVT ppx: hold given sanguinous fluid; r/s per IR timeline/recs        For Follow-Up:    MEDICATIONS:  STANDING MEDICATIONS  cefTRIAXone   IVPB 2000 milliGRAM(s) IV Intermittent every 24 hours  chlorhexidine 2% Cloths 1 Application(s) Topical <User Schedule>  levalbuterol Inhalation 0.63 milliGRAM(s) Inhalation every 6 hours  melatonin 5 milliGRAM(s) Oral at bedtime  metoprolol tartrate 50 milliGRAM(s) Oral every 6 hours  pantoprazole    Tablet 40 milliGRAM(s) Oral before breakfast  zolpidem 5 milliGRAM(s) Oral at bedtime    PRN MEDICATIONS  ipratropium    for Nebulization 500 MICROGram(s) Nebulizer every 6 hours PRN      VITAL SIGNS: Last 24 Hours  T(C): 36.6 (16 Jun 2025 11:00), Max: 37 (16 Jun 2025 03:00)  T(F): 97.8 (16 Jun 2025 11:00), Max: 98.6 (16 Jun 2025 03:00)  HR: 115 (16 Jun 2025 13:00) (99 - 120)  BP: 109/63 (16 Jun 2025 13:00) (96/72 - 157/92)  BP(mean): 79 (16 Jun 2025 13:00) (74 - 116)  RR: 22 (16 Jun 2025 13:00) (13 - 43)  SpO2: 96% (16 Jun 2025 13:00) (93% - 100%)    LABS:                        10.3   3.08  )-----------( 287      ( 16 Jun 2025 01:19 )             33.1     06-16    134[L]  |  101  |  20  ----------------------------<  109[H]  4.6   |  22  |  0.63    Ca    8.6      16 Jun 2025 01:19  Phos  2.9     06-16  Mg     2.1     06-16    TPro  6.8  /  Alb  2.4[L]  /  TBili  0.4  /  DBili  x   /  AST  27  /  ALT  18  /  AlkPhos  127[H]  06-16    PT/INR - ( 15 Ernesto 2025 00:49 )   PT: 16.5 sec;   INR: 1.45 ratio         PTT - ( 15 Ernesto 2025 00:49 )  PTT:25.1 sec  Urinalysis Basic - ( 16 Jun 2025 01:19 )    Color: x / Appearance: x / SG: x / pH: x  Gluc: 109 mg/dL / Ketone: x  / Bili: x / Urobili: x   Blood: x / Protein: x / Nitrite: x   Leuk Esterase: x / RBC: x / WBC x   Sq Epi: x / Non Sq Epi: x / Bacteria: x              RADIOLOGY: CCU Transfer Note    Transfer from: CCU  Transfer to:  (  ) Medicine    ( x  ) Telemetry    (  ) RCU    (  ) Palliative    (  ) Stroke Unit    (  ) _______________  Accepting physician: Dr. Surjit Wong  Signed out to Maggie CASTILLO Zeta [id number]    CCU COURSE:  Patient admitted to CCU on 6/10/2025 for hemodynamic monitoring in the setting of large pericardial effusion, atrial fibrillation with RVR to consider heart failure with ongoing hemodynamic instability.  In the setting of patient hospitalization for right middle lobe pneumonia and GBS bacteremia.  Patient initially hemodynamic instability in the emergency department was improved after discontinuing Ambien and increasing midodrine.  Over subsequent days patient developed worsening tachypnea and hemodynamic instability.  Imaging revealed enlarging pericardial effusion with A-fib RVR refractory to low-dose metoprolol, c/b HOCM.  CCU was consulted for decompensated heart failure in the setting of moderate to severe pericardial effusion with impending concern for hemodynamic collapse with complex cardiac physiology and severe LVOT obstruction.  Shock was deemed to be multifactorial including sepsis, effusion and HOCM.  Interventional radiology was consulted with subsequent placement of pericardial drain.  Ongoing management/evaluation for a pleural effusion was performed however patient was unlikely to benefit from thoracentesis.  On 6/12 patient underwent a pericardiocentesis with 400 cc of sanguinous fluid removed.  Pericardial fluid culture was no growth low neutrophils and lymphocytes.  Differential remains broad including malignant versus postviral infectious versus idiopathic.  Patient required up titration of metoprolol for persistent A-fib with RVR.  Output of pericardial drain was monitored and noted to be normal to no drainage on 6/15 into 6/16 and she was made to have pericardial drain removed by interventional radiology.  Patient hemodynamically stable.  Decision made to downgrade on 6/16.  Patient signed out to Dr. Surjit Wong.        ASSESSMENT & PLAN:   89F with history of HOCM, AFib, breast cancer s/p RT and b/l mastectomy with breast implants and RUE lymphedema, known pericardial effusion, with recent hospitalizatoin for RML pneumonia and parapneumonic effusion with GBS bacteremia here for septic shock with unclear source and hemodynamic monitoring iso mod-severe pericardial effusion.     NEURO  - no acute issues  - monitor neuro checks per ICU protocol     #Insomnia   -Restart home zolpidem 5 mg po qhs   -D/c quetiapine 12.5 mg po qhs     CARDIOVASCULAR   #Pericardial effusion- unknown etiology, differential includes post-viral , malignancy, idiopathic  On admission was mod-large and got pericardiocentesis with 400 cc drainage. Minimal output from pericardial drain with sanguinous>serous. Repeat TTE showing interval decrease in size.   -6/12 IR did pericardiocentesis drainage; now s/p pericardial drain removal on 6/16 by IR  -Pericardial fluid with NGTD on cx, low neutrophils and lymphocytes; pending cytology   -Repeat 6/13 TTE showing LV hyperdynamic with LVEF 75% and evidence of HOCM, small-mod effusion   -F/u repeat 6/14 TTE report   - off maycol 6/13    #Afib   Persistently RVR.   -cont metoprolol tartrate 50 mg po q6; goal rate < 100  -Hold full AC given sanguinous quality of pericardial fluid; restart AC per IR recommendations/timeline     #HCM   Closer to euvolemic.   -Hold diuresis     RESPIRATORY  -ALMAZ     #Hx known R pleural effusion   No respiratory distress. Asymptomatic.   -Appreciate interventional pulmonary input, limited role for thoracentesis at this time     #AHRF- RESOLVED     GASTROINTESTINAL   -ALMAZ   -Diet: Regular   -Stress ppx: c/w pantoprazpole 40 mg po qd     GENITOURINARY/RENAL   -ALMAZ     ENDOCRINE   -ALMAZ     INFECTIOUS DISEASE   #Septic shock unclear source #Recent GBS bacteremia with RML pneumonia and parapneumonic effusion   Clinically improved. Febrile, leukopenic.   -C/w ceftriaxone 2 g IV qd until 6/20 if Cx remain negative  -ID following     HEME/ONC   #Breast cancer s/p b/l mastectomy with RT s/p breast implants   Reported in remission.     -DVT ppx: hold given sanguinous fluid; r/s per IR timeline/recs        For Follow-Up:    MEDICATIONS:  STANDING MEDICATIONS  cefTRIAXone   IVPB 2000 milliGRAM(s) IV Intermittent every 24 hours  chlorhexidine 2% Cloths 1 Application(s) Topical <User Schedule>  levalbuterol Inhalation 0.63 milliGRAM(s) Inhalation every 6 hours  melatonin 5 milliGRAM(s) Oral at bedtime  metoprolol tartrate 50 milliGRAM(s) Oral every 6 hours  pantoprazole    Tablet 40 milliGRAM(s) Oral before breakfast  zolpidem 5 milliGRAM(s) Oral at bedtime    PRN MEDICATIONS  ipratropium    for Nebulization 500 MICROGram(s) Nebulizer every 6 hours PRN      VITAL SIGNS: Last 24 Hours  T(C): 36.6 (16 Jun 2025 11:00), Max: 37 (16 Jun 2025 03:00)  T(F): 97.8 (16 Jun 2025 11:00), Max: 98.6 (16 Jun 2025 03:00)  HR: 115 (16 Jun 2025 13:00) (99 - 120)  BP: 109/63 (16 Jun 2025 13:00) (96/72 - 157/92)  BP(mean): 79 (16 Jun 2025 13:00) (74 - 116)  RR: 22 (16 Jun 2025 13:00) (13 - 43)  SpO2: 96% (16 Jun 2025 13:00) (93% - 100%)    LABS:                        10.3   3.08  )-----------( 287      ( 16 Jun 2025 01:19 )             33.1     06-16    134[L]  |  101  |  20  ----------------------------<  109[H]  4.6   |  22  |  0.63    Ca    8.6      16 Jun 2025 01:19  Phos  2.9     06-16  Mg     2.1     06-16    TPro  6.8  /  Alb  2.4[L]  /  TBili  0.4  /  DBili  x   /  AST  27  /  ALT  18  /  AlkPhos  127[H]  06-16    PT/INR - ( 15 Ernesto 2025 00:49 )   PT: 16.5 sec;   INR: 1.45 ratio         PTT - ( 15 Ernesto 2025 00:49 )  PTT:25.1 sec  Urinalysis Basic - ( 16 Jun 2025 01:19 )    Color: x / Appearance: x / SG: x / pH: x  Gluc: 109 mg/dL / Ketone: x  / Bili: x / Urobili: x   Blood: x / Protein: x / Nitrite: x   Leuk Esterase: x / RBC: x / WBC x   Sq Epi: x / Non Sq Epi: x / Bacteria: x              RADIOLOGY:

## 2025-06-16 NOTE — PROGRESS NOTE ADULT - SUBJECTIVE AND OBJECTIVE BOX
EP Attending  HISTORY OF PRESENT ILLNESS: HPI:  89F c hx GERD, R breast CA (40-50y ago) s/p B/L mastectomy and radiation, RUE lymphedema, osteoarthritis/osteoporosis (on monthly ibandronate), HLD, Afib on eliquis, HOCM/severe LVOT/ELEAZAR, small-moderate pericardial effusion/RA diastolic collapse, recent hospitalization 5/29-6/5 for RML PNA, GBS bacteremia (pos cultures 5/29, 5/30) of unclear source on total 10 days abx (IV ceftriaxone inpt, then levaquin 750 q48h until 6/9), presents from [facility] rehab with shaking chills, hypotension, poor appetite    History from pt's daughter/primary decision maker Teresitagisel Day at bedside.  While at [facility], pt found to have hypotension to SBP 80s. Pt was given IVF and reduced dose of metoprolol. Pt also found to have tachycardia, shaking chills. Pt sent back to the hospital. Reportedly pt has not been eating well, not urinating much. Denies CP, SOB, abd pain, diarrhea, cough, other respiratory symptoms. At baseline pt ambulates without assistive device, drives.  RRT called in the ed for hypotension to SBP 79. (08 Jun 2025 02:41)    Ms Cloud is a pleasant 89yo woman here with shortness of breath.  Sees Dr Adolfo Leung for Cardiology.  Being managed on this inpatient stay by Dr Coelho.  Known to Dr Young after outpatient referral for HOCM management (lVOT gradient 80s-100mmHg+), and had a brief trial of Mavacamten, stopped for feelings of "leg heaviness".    EP called re: rapid AFib, refractory to low-dose metoprolol thus far, and complicated by management of HCM and new/worsening pericardial effusion.  Resting comfortably in bed on supplemental O2.  SOB and fatigued but no palpitations or fainting.  A 10 pt ROS is otherwise negative.    6/11- moved to CCU for closer monitoring.  lethargic / unable to obtain ROS today.  worsening pleural effusion, no plan for tap.  no plan for pericardiocentesis either.  hypotensive requiring midodrine.  6/12- s/p urgent pericardiocentesis via Interventional Radiology/ lateral thorax approach.  Feels much better- more energetic, has an appetite, more talkative.  6/13- resting in bed in CICU. on phenylephrine today.  no new complaints. feeling better overall after pericardiocentesis.  awaiting possible thoracentesis?  6/14- resting in chair. no new complaints.  slow drainage out of pericardial drain.  AFib HR now contrlled for first time on this stay.  6/15- resting in recliner, poor appetite. no new issues today.  Date of service 6/16- resting in recliner, trying to eat breakfast today.  HR 110bpm, up-titrated to 50q6 metoprolol last night.      PAST MEDICAL & SURGICAL HISTORY:  Breast cancer  s/p b/l mastectomy  H/O bilateral mastectomy  + breast implants  History of cataract surgery, unspecified laterality    cefTRIAXone   IVPB 2000 milliGRAM(s) IV Intermittent every 24 hours  chlorhexidine 2% Cloths 1 Application(s) Topical <User Schedule>  ipratropium    for Nebulization 500 MICROGram(s) Nebulizer every 6 hours PRN  levalbuterol Inhalation 0.63 milliGRAM(s) Inhalation every 6 hours  melatonin 5 milliGRAM(s) Oral at bedtime  metoprolol tartrate 50 milliGRAM(s) Oral every 6 hours  pantoprazole    Tablet 40 milliGRAM(s) Oral before breakfast  zolpidem 5 milliGRAM(s) Oral at bedtime                            10.3   3.08  )-----------( 287      ( 16 Jun 2025 01:19 )             33.1       06-16    134[L]  |  101  |  20  ----------------------------<  109[H]  4.6   |  22  |  0.63    Ca    8.6      16 Jun 2025 01:19  Phos  2.9     06-16  Mg     2.1     06-16    TPro  6.8  /  Alb  2.4[L]  /  TBili  0.4  /  DBili  x   /  AST  27  /  ALT  18  /  AlkPhos  127[H]  06-16    T(C): 36.8 (06-16-25 @ 07:00), Max: 37 (06-16-25 @ 03:00)  HR: 116 (06-16-25 @ 09:00) (99 - 128)  BP: 111/60 (06-16-25 @ 09:00) (96/72 - 143/70)  RR: 20 (06-16-25 @ 09:00) (13 - 43)  SpO2: 93% (06-16-25 @ 09:00) (93% - 100%)  Wt(kg): --    I&O's Summary    15 Ernesto 2025 07:01  -  16 Jun 2025 07:00  --------------------------------------------------------  IN: 870 mL / OUT: 710 mL / NET: 160 mL    16 Jun 2025 07:01  -  16 Jun 2025 09:10  --------------------------------------------------------  IN: 360 mL / OUT: 0 mL / NET: 360 mL    Appearance: frail elderly woman in no acute distress  HEENT:   Normal oral mucosa, PERRL, EOMI	  Lymphatic: No lymphadenopathy , no edema  Cardiovascular: rapid irregular S1 S2, No JVD, No murmurs , Peripheral pulses palpable 2+ bilaterally.  s/p pericardial drain placement from left chest.  Respiratory: poor air entry BL  normal effort 	  Gastrointestinal:  Soft, Non-tender, + BS	  Skin: No rashes, No ecchymoses, No cyanosis, warm to touch  Musculoskeletal: Normal range of motion, normal strength  Psychiatry:  Mood & affect appropriate    TELEMETRY: AFib, narrow QRS, 110bpm at rest  ECG:  	AFib, atypical LVHypertrphy  Echo:  < from: TTE W or WO Ultrasound Enhancing Agent (06.05.25 @ 07:16) >  CONCLUSIONS:      1. Limited TTE to assess for pericardial effusion.   2. Trace pericardial effusion noted adjacent to the posterolateral left ventricle, small pericardial effusion noted adjacent to the anterior right ventricle and small pericardial effusion noted adjacent to the right atrium.   3. The inferior vena cava is normal in size measuring 1.70 cm in diameter, (normal <2.1cm) with normal inspiratory collapse (normal >50%) consistent with normal right atrial pressure (~3, range 0-5mmHg).   4. Compared to the transthoracic echocardiogram performed on 5/30/2025, the right atrium is not as well visualized on this study. There is right atrial diastolic collapse visualized in the 4 chamber view but unable to quantify the duration of the collapse. The effusion appears unchanged in size and distribution. No other signs of cardiac tamponade are present.    < end of copied text >    CT Chest - personally reviewed the images.  right breat prosthesis with signs of rupture.  left breast prosthesis OK.  prominent LV hypertrophy visible.  pericardial effusion enlarged.  pleural effusion present on the right.  	  ASSESSMENT/PLAN: Ms Cloud is a pleasant 89y Female here with shortness of breath.    Multifactorial in the setting of pericardial effusion, Hypertrophic Cardiomyopathy with severe LVOT obstruction, and rapid AFib.  [id number] is at least 3.  Apixaban on hold for now.  Plan to resume it if no further invasive procedures are planned.  Pericardial drain pending removal.  Continue metoprolol 50mg q6hrs.  Goal HR <100bpm at rest.  Continue alpha-agonists to support her.  Discussed w/ Dr Young, and agree w/ recs to avoid diltiazem and other vasodilators, and to avoid diuretics.  Recommend evaluation for thoracentesis, to ease her respiratory effort.  Worthwhile even as a palliative measure.  Outpatient, needs to re-enroll with a HCM specialist.  Strongly recommend outpatient re-trial of Camzyos, even at the lowest dose.  This cannot be started in the hospital.  There is a possibility- albeit small - for Cardioversion before discharge.  Only if still symptomatic after everything else is optimized, and she is able to maintain uninterrupted anticoagulation.        Barney Lau M.D.  Cardiac Electrophysiology    office [phone]  pager [phone]

## 2025-06-16 NOTE — PROGRESS NOTE ADULT - ASSESSMENT
89F c hx GERD, R breast CA (40-50y ago) s/p B/L mastectomy and radiation, RUE lymphedema, osteoarthritis/osteoporosis (on monthly ibandronate), HLD, Afib on eliquis, HOCM/severe LVOT/LORA, small-moderate pericardial effusion/RA diastolic collapse, recent hospitalization 5/29-6/5 for RML PNA, GBS bacteremia (pos cultures 5/29, 5/30) of unclear source on total 10 days abx (IV ceftriaxone inpt, then levaquin 750 q48h until 6/9), presents from [facility] rehab with shaking chills, hypotension, poor appetite  History from pt's daughter/primary decision maker Teresita Day at bedside.  While at [facility], pt found to have hypotension to SBP 80s. Pt was given IVF and reduced dose of metoprolol. Pt also found to have tachycardia, shaking chills. Pt sent back to the hospital. Reportedly pt has not been eating well, not urinating much. Denies CP, SOB, abd pain, diarrhea, cough, other respiratory symptoms. At baseline pt ambulates without assistive device, drives.  RRT called in the ed for hypotension to SBP 79. (08 Jun 2025 02:41)  to me pt daughter says she was very sleepy in the morning too  as she was given ambien at 4 AM in the morning:   now she is alert and awake and is on 2 L of oxygen ;  she is not sob:  and does not look to be in any resp distress:       Severe sepsis.   unclear source of fevers  cont empiric vanc, cefepime  f/u blood cx. if positive will likely need MARIA G  monitor for any localizing symptoms  pt had recent pan ct scan that did not elucidate cause of bacteremia  recent blood cultures have been negative   - IVF  on cefepime  she is febrile too   vbg on admission IS ok;   MONITOR BLOOD PRESSURE CLOSELY:   OIF SHE DROPS HER BLOOD PRESSURE MORE:  WOULD NEED MICU  CO NSULT:    6/9: seems slightly more tored today  ; cont antibtiocs:  per ID:  she remains on 2 L of oxygen : she is alert and awake:  daughter at bedside:  reviewed the labs and echo and ct scan chest with the daughter in detail :   she has large pericardial effusions:  per ir no good window and effusion seemd small to drain : ct scan chest read as mod to large pericardial effusion : defer to cards :     6/10: seems to be doing  ok :  no sob:   no  cough  ;   no  phlegm   on 2 L of oxygen :  thoracic to see:    no emergency in tapping the pleural effusion:   de cardiologist  6/11: on ceftriaxone   seems O K   6/12: seems OK:  s/p pericardial drain:  has 400 cc output   await cytology    6/13: cont c urrent rx:   on antibiotics  6/14: on ceftriaxone    6/15/l seems to be doing  ok ;  no sob;  no cough :   no phlegm      Pericardial effusion ;   the ct chest shows increasing pericardial effusion  : which is of more pressing concern then pleural effusion :  needs a tap:  ir guided tap    6/12: as above   6/13: asked up to tap:  need to repeat inr and pt  last was  >  2.0:  needs to be less then 1.5:  ip contacted will evalute for tap    6/14; defer to p r imary team  6/17/: seems OK;'' pericardial effusion removed        Hypotension.  suspect combination of infection, hocm, lora, tachycardia, pericardial effusion, metoprolol  pt has somewhat tenuous hemodynamic status  consider cardioversion, consider repeat TTE  pt is full code.  pts blood pressure is slightly low   on midodrine    6/9: cont on midodrine  6/10; controlled:  on midodrine  6/11: on midodrine  6/12: currently she is on vasopressors   6/13; off vasopressors now   6/14; blood pressure soft:  on midodrine   6/15: her blood pressure is pretty good:     Chronic atrial fibrillation.  reduce metoprolol dose to tartrate 25mg BID  goal HR <100 in setting of HOCM with hypotension  consider amiodarone or cardioversion  PER CARDS    6/9: defer to cards   6/10: off Eliquis for now   6/11: remains off eliquis   6/12: off ac  6/13: keep off ac if thoracentesis needed to be done over the weekend:  though it is not emergent   6/14" can restart ac:  if required as there is now no plan for thoracentesis:  dw attending   6/16; off ac as pericardial arnie is removed today       Type 2 myocardial infarction.  suspect demand ischemia from recent hypotension, infections  start asa   cont home eliquis.  CONT CURRENT MEDS     HOCM (hypertrophic obstructive cardiomyopathy).  cont metoprolol as above. uptitrate as BP tolerates  per cards    Acute respiratory failure with hypoxia.   recent CT scans showing right bronchiectasis, right fibrosis likely 2/2 radiation, poss RML PNA.  cont BD :   ct chest showed: No pulmonary embolism. Small to moderate right and small left pleural effusions with associated  atelectasis. 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  Cardiomegaly. Moderate pericardial effusion.  needs echo     9/6: new echo reviewed:  seen by cardiology :  monitor her blood pressure closely:  if she drops her blood pressure call CCU     6/10: cont to manain o2 sao2 above 90% all the t hayden  6/11; n 2 L of oxygen    6/12: she is not in any resp distress    6/13: she is on rooma ir:  doing well :  satting at 93%  6/14: pulm wise seems pretty good;   on rooma ir;   no plan for tap now     6/16; she seems OK;  she is on 1 L of oxygen ; would suggest to repeat cxr

## 2025-06-16 NOTE — PROGRESS NOTE ADULT - ASSESSMENT
89F c hx GERD, R breast CA (40-50y ago) s/p B/L mastectomy and radiation, RUE lymphedema, osteoarthritis/osteoporosis (on monthly ibandronate), HLD, Afib on eliquis, HOCM/severe LVOT/ELEAZAR, small-moderate pericardial effusion/RA diastolic collapse, recent hospitalization 5/29-6/5 for RML PNA, GBS bacteremia (pos cultures 5/29, 5/30) of unclear source on total 10 days abx (IV ceftriaxone inpt, then levaquin 750 q48h until 6/9), pw hypotension, severe sepsis of unclear source, demand ischemia, afib c rvr    Severe Sepsis/sirs, Hypotension 2/2 unclear source  Severe LVOT obstruction/HCM, worsening pericardial effusion  CXR w/ worsening aeration at the bases  Flu/COVID/RSV negative  UA negative  6/7 Bcx NGTD x2   Zosyn allergy noted, tolerates cefepime  CTAP with mod-large pericardial effusion inc since 5/29/25, stable mod R and small L pleural effusion and atelectasis   TTE with increased pericardial effusion since 6/5 -- moderate pericardial effusion adjacent to RV, small pericardial effusion adjacent to RA and large pericardia effusion noted adjacent to posterior LV with no evidence of tamponade physiology  6/10 note with tachypnea and expiratory wheeze, CCU consulted for decompensated heart failure iso mod-severe pericardial effusion with concerns for impending hemodynamic collapse given complex cardiac physiology with severe LVOT obstruction, now transferred to CICU for closer monitor and management of enlarging pericardial effusion  CTS eval noted-patient high risk for pericardial window - rec continue to monitor pericardial effusion with repeat TTE  6/11 CT chest with large pericardial effusion increased since 6/4, mod R and small L pleural effusion, no pneumonia;  cefepime changed to ceftriaxone d/t concern for possible neurotoxicity   6/12 hypotensive and tachycardic despite volume resuscitation -- s/p emergent pericardiocentesis with drain placed    - noted drained 400cc ss pericardial fluid, cell count noted, gram stain with no organisms, culture remains NGTD    s/p vancomycin 6/7-6/10  s/p cefepime 6/7-6/11    Recommendations:   Follow pericardial fluid culture - NGTD   Continue ceftriaxone 2g IV Q24h - plan to complete course on 6/20  Drain care -noted plan for removal today  Trend temps/WBC  Continue rest of care per primary team       Wes Grullon M.D.  [facility]  Available on [employer] TEAMS - *PREFERRED*  [phone]  After 5pm on weekdays and all day on weekends - please call [phone]     Thank you for consulting us and involving us in the management of this patients case. In addition to reviewing history, imaging, documents, labs, microbiology, took into account antibiotic stewardship, local antibiogram and infection control strategies and potential transmission issues at time of treatment decision making process.

## 2025-06-16 NOTE — PROGRESS NOTE ADULT - SUBJECTIVE AND OBJECTIVE BOX
Interventional Radiology Follow-Up Note    This is a 89y Female s/p pericardial drain placement on 6/12 in Interventional Radiology with Dr. Mcclelland.     S: Patient seen and examined @ bedside. No complaints offered.     Medication:     cefTRIAXone   IVPB: (06-15)  metoprolol tartrate: (06-15)  metoprolol tartrate: (06-15)  metoprolol tartrate: (06-16)  midodrine: (06-14)    Vitals:   T(F): 97.8, Max: 98.6 (03:00)  HR: 107  BP: 123/58  RR: 17  SpO2: 98%    Physical Exam:  General: Nontoxic, in NAD, A&O x3.  Abdomen: soft, NTND, no peritoneal signs.  Drain Device: Drain intact attached to pleurvac with serous drainage in tubing. Dressing clean, dry, intact. Dressing was removed, site sterilized, tube clamped and cut, stitch removed, tube then unclamped and removed and covered with vaseline gauze,dry gauze and tegaderm. There was a small amount of leakage of serous fluid from the insertion site s/p removal.    24hr Drain output: 10cc    LABS:  WBC 3.08 / Hgb 10.3 / Hct 33.1 / Plt 287  Na 134 / K 4.6 / CO2 22 / Cl 101 / BUN 20 / Cr 0.63 / Glucose 109  ALT 18 / AST 27 / Alk Phos 127 / Tbili 0.4  Ptt -- / Pt -- / INR --      Assessment/Plan:  89-y/o female with PMHx of HOCM with severe LVOT obstruction, atrial fibrillation on eliquis, small-moderate pericardial effusion with RA diastolic collapse, recent hospitalization (5/29-6/5) for RML pneumonia and GBS bacteremia, and remote history of right breast cancer status post bilateral mastectomy with radiation who presented with septic shock and hypotension, initially managed on the floor before requiring CCU admission for hemodynamic instability secondary to enlarging pericardial effusion, rapid atrial fibrillation, and decompensated heart failure s/p CT guided pericardial drainage w/60cc of fluid documented removed     -d/w CICU team they would like pericardial drain removed today as there has been low output and most recent TTE from 6/15 with small pericardial effusion improved in size from 6/14.  - case d/w IR attending Dr. Mcclelland who agreed with plan to remove tube today  - pericardial drain removed bedside today by IR without complication  - trend vs/labs  - regarding AC can plan to resume as follows:      -Heparin IV tentative resumption 12-24 hours (no bolus) post-procedure if no concern for bleeding.        -Apixaban (Eliquis) tentative resumption 48-72 hours post-procedure if no concern for bleeding.  - No further IR intervention indicated at this time  - IR to sign off, please reconsult as necessary.     --  Andry Srivastava NP  Interventional Radiology  Available on [employer] TEAMS / [employer]    For EMERGENT inquiries/questions:  IR Pager ([facility]): [phone]    For non-emergent consults/questions:   Please place a sunrise order "Consult- Interventional Radiology" with an appropriate callback number    For questions about scheduling during appropriate work hours, call IR :  [facility]: [phone]    For outpatient IR booking:  [facility]: [phone]

## 2025-06-16 NOTE — PROGRESS NOTE ADULT - ATTENDING COMMENTS
Patient with history of breast ca  Now with chronic pleural effusions  Pericardial effusion that has grown since prior imaging. Hypotension that responds well to fluid. Persistent tachycardia.    Pericardiocentesis by IR today.  Hemodynamic improvement post pericardiocentesis.  IV fluids as needed.  Monitor drain output.
Patient with history of breast ca  Now with chronic pleural effusions  Pericardial effusion that has grown since prior imaging. Hypotension that responds well to fluid. Persistent tachycardia.    Pericardiocentesis by IR today.  Hemodynamic improvement post pericardiocentesis.  IV fluids as needed. Pulmonary edema noted.  Beta-blocker as tolerated for HCM.  Monitor drain output.
Patient with history of breast ca  Now with chronic pleural effusions  Pericardial effusion that has grown since prior imaging. Hypotension that responds well to fluid. Persistent tachycardia.    Pericardiocentesis by IR on 6/12.  Hemodynamic improvement post pericardiocentesis.  IV fluids as needed. Pulmonary edema noted.  Beta-blocker as tolerated for HCM.  Monitor drain output.
89yo woman with breast CA with pericardial effusion and tachycardia with intermittent hypotension, fluid responsive, drained by IR, unclear etiology. PT needs to see     Neuro more lucid today, no issues  Pulm remains on 4L NC, cont xopenex   CV remains in AFib with RVR, cont lopressor for HCM. Requiring midodrine. S/p drainage with smaller pericardial effusion after IR drainage now with minimal output, drain to come out today   Renal Cr wnl   GI DASH   ID no indication for abx   Heme have been holding eliquis but should resume for AFib, talk to IR  Endo BG controlled   Lines no central access
Patient with history of breast ca  Now with chronic pleural effusions  Pericardial effusion that has grown since prior imaging. Hypotension that responds well to fluid. Persistent tachycardia.    Pericardiocentesis by IR today.  Hemodynamic improvement post pericardiocentesis.  IV fluids as needed. Pulmonary edema noted.  Beta-blocker as tolerated for HCM.  Monitor drain output.

## 2025-06-16 NOTE — PROGRESS NOTE ADULT - ASSESSMENT
Afib, severe HCM LVOT obstruction, Pericardial effusion, recent admission for PNA / Bacteremia   now admitted with sepsis, tachycardia ( afib with RVR ) and shock . Cardiology is called for elevated troponin     Elevated troponin   Demand ischemia   in setting of afib with RVR, shock, severe HCM and sepsis   stable now    Shock   resolved  off middorine     Afib  as per EP   cont a/c

## 2025-06-16 NOTE — PROGRESS NOTE ADULT - TIME BILLING
pt counseling lab and imaging interpretation med mgmt care coordination pt counseling lab and imaging interpretation med mangement care coordination

## 2025-06-16 NOTE — CHART NOTE - NSCHARTNOTEFT_GEN_A_CORE
Discussed with CICU Attending Dr. Mercedes, plan for discontinuance of pericardial drain given improvement in effusion and minimal to no output. Discussed with DOYLE Srivastava, chuck d/c drain.     Aries Hill MD, PGY2

## 2025-06-16 NOTE — PROGRESS NOTE ADULT - ASSESSMENT
89F with history of HOCM, AFib, breast cancer s/p RT and b/l mastectomy with breast implants and RUE lymphedema, known pericardial effusion, with recent hospitalizatoin for RML pneumonia and parapneumonic effusion with GBS bacteremia here for septic shock with unclear source and hemodynamic monitoring iso mod-severe pericardial effusion.     NEURO  - no acute issues  - monitor neuro checks per ICU protocol     #Insomnia   -Restart home zolpidem 5 mg po qhs   -D/c quetiapine 12.5 mg po qhs     CARDIOVASCULAR   #Pericardial effusion- likely malignant   On admission was mod-large and got pericardiocentesis with 400 cc drainage. Minimal output from pericardial drain with sanguinous>serous. Repeat TTE showing interval decrease in size.   -6/12 IR did pericardiocentesis drainage   -Pericardial fluid with NGTD on cx, low neutrophils and lymphocytes; pending cytology   -Repeat 6/13 TTE showing LV hyperdynamic with LVEF 75% and evidence of HOCM, small-mod effusion   -F/u repeat 6/14 TTE report   - off maycol 6/13    #Afib   Persistently RVR.   -cont metoprolol tartrate 25 mg po q6   -Hold full AC given sanguinous quality of pericardial fluid     #HCM   Closer to euvolemic.   -Hold diuresis     RESPIRATORY  -ALMAZ     #Hx known R pleural effusion   No respiratory distress. Asymptomatic.   -Appreciate interventional pulmonary input, limited role for thoracentesis at this time     #AHRF- RESOLVED     GASTROINTESTINAL   -ALMAZ   -Diet: Regular   -Stress ppx: c/w pantoprazpole 40 mg po qd     GENITOURINARY/RENAL   -ALMAZ     ENDOCRINE   -ALMAZ     INFECTIOUS DISEASE   #Septic shock unclear source #Recent GBS bacteremia with RML pneumonia and parapneumonic effusion   Clinically improved. Febrile, leukopenic.   -C/w ceftriaxone 2 g IV qd until 6/20 if Cx remain negative  -ID following     HEME/ONC   #Breast cancer s/p b/l mastectomy with RT s/p breast implants   Reported in remission.     -DVT ppx: hold given sanguinous fluid      89F with history of HOCM, AFib, breast cancer s/p RT and b/l mastectomy with breast implants and RUE lymphedema, known pericardial effusion, with recent hospitalizatoin for RML pneumonia and parapneumonic effusion with GBS bacteremia here for septic shock with unclear source and hemodynamic monitoring iso mod-severe pericardial effusion.     NEURO  - no acute issues  - monitor neuro checks per ICU protocol     #Insomnia   -Restart home zolpidem 5 mg po qhs   -D/c quetiapine 12.5 mg po qhs     CARDIOVASCULAR   #Pericardial effusion- unknown etiology, differential includes post-viral , malignancy, idiopathic  On admission was mod-large and got pericardiocentesis with 400 cc drainage. Minimal output from pericardial drain with sanguinous>serous. Repeat TTE showing interval decrease in size.   -6/12 IR did pericardiocentesis drainage; now s/p pericardial drain removal on 6/16 by IR  -Pericardial fluid with NGTD on cx, low neutrophils and lymphocytes; pending cytology   -Repeat 6/13 TTE showing LV hyperdynamic with LVEF 75% and evidence of HOCM, small-mod effusion   -F/u repeat 6/14 TTE report   - off maycol 6/13    #Afib   Persistently RVR.   -cont metoprolol tartrate 50 mg po q6; goal rate < 100  -Hold full AC given sanguinous quality of pericardial fluid; restart AC per IR recommendations/timeline     #HCM   Closer to euvolemic.   -Hold diuresis     RESPIRATORY  -ALMAZ     #Hx known R pleural effusion   No respiratory distress. Asymptomatic.   -Appreciate interventional pulmonary input, limited role for thoracentesis at this time     #AHRF- RESOLVED     GASTROINTESTINAL   -ALMAZ   -Diet: Regular   -Stress ppx: c/w pantoprazpole 40 mg po qd     GENITOURINARY/RENAL   -ALMAZ     ENDOCRINE   -ALMAZ     INFECTIOUS DISEASE   #Septic shock unclear source #Recent GBS bacteremia with RML pneumonia and parapneumonic effusion   Clinically improved. Febrile, leukopenic.   -C/w ceftriaxone 2 g IV qd until 6/20 if Cx remain negative  -ID following     HEME/ONC   #Breast cancer s/p b/l mastectomy with RT s/p breast implants   Reported in remission.     -DVT ppx: hold given sanguinous fluid; r/s per IR timeline/recs

## 2025-06-16 NOTE — PROGRESS NOTE ADULT - SUBJECTIVE AND OBJECTIVE BOX
[facility]  BASIL [place] Y. Patel, S. Shah, G. Casimir  [phone]  ([phone] - weekdays after 5pm and weekends)    Name: GINO GRAHAM  Age/Gender: 89y Female  MRN: [id number]    Interval History:  Patient seen and examined this morning.   Sitting up in recliner, reports generalized pain.   Notes reviewed  No concerning overnight events  Afebrile   Allergies: Zosyn (Rash; Urticaria; Hives)      Objective:  Vitals:   T(F): 98.2 (06-16-25 @ 07:00), Max: 98.6 (06-16-25 @ 03:00)  HR: 110 (06-16-25 @ 10:00) (99 - 128)  BP: 157/92 (06-16-25 @ 10:00) (96/72 - 157/92)  RR: 13 (06-16-25 @ 10:00) (13 - 43)  SpO2: 98% (06-16-25 @ 10:00) (93% - 100%)  Physical Examination:  General: no acute distress  HEENT: normocephalic, atraumatic, anicteric  Respiratory: decreased breath sounds b/l  Chest: left sided pericardial drain  Cardiovascular: S1 and S2 present, normal rate   Gastrointestinal: normal appearing, nondistended  Extremities: no edema, no cyanosis  Skin: no visible rash    Laboratory Studies:  CBC:                       10.3   3.08  )-----------( 287      ( 16 Jun 2025 01:19 )             33.1     WBC Trend:  3.08 06-16-25 @ 01:19  3.11 06-15-25 @ 00:48  2.70 06-14-25 @ 00:29  2.07 06-13-25 @ 00:19  3.40 06-12-25 @ 01:26  4.03 06-11-25 @ 00:57  3.60 06-10-25 @ 09:31  3.45 06-09-25 @ 18:46    CMP: 06-16    134[L]  |  101  |  20  ----------------------------<  109[H]  4.6   |  22  |  0.63    Ca    8.6      16 Jun 2025 01:19  Phos  2.9     06-16  Mg     2.1     06-16    TPro  6.8  /  Alb  2.4[L]  /  TBili  0.4  /  DBili  x   /  AST  27  /  ALT  18  /  AlkPhos  127[H]  06-16    Creatinine: 0.63 mg/dL (06-16-25 @ 01:19)  Creatinine: 0.68 mg/dL (06-15-25 @ 02:11)  Creatinine: 0.58 mg/dL (06-15-25 @ 00:48)  Creatinine: 0.68 mg/dL (06-14-25 @ 00:29)  Creatinine: 0.89 mg/dL (06-13-25 @ 00:19)  Creatinine: 0.65 mg/dL (06-12-25 @ 12:45)  Creatinine: 0.70 mg/dL (06-12-25 @ 05:33)  Creatinine: 0.81 mg/dL (06-12-25 @ 01:26)  Creatinine: 0.84 mg/dL (06-11-25 @ 06:00)  Creatinine: 0.77 mg/dL (06-11-25 @ 01:33)  Creatinine: 0.61 mg/dL (06-10-25 @ 09:31)  Creatinine: 0.62 mg/dL (06-09-25 @ 18:46)    LIVER FUNCTIONS - ( 16 Jun 2025 01:19 )  Alb: 2.4 g/dL / Pro: 6.8 g/dL / ALK PHOS: 127 U/L / ALT: 18 U/L / AST: 27 U/L / GGT: x           Microbiology: reviewed   Culture - Fungal, Body Fluid (collected 06-12-25 @ 14:05)  Source: Pericardial Pericardial Fluid  Preliminary Report (06-15-25 @ 08:16):    No growth    Culture - Body Fluid with Gram Stain (collected 06-12-25 @ 14:05)  Source: Pericardial Other  Gram Stain (06-13-25 @ 00:20):    polymorphonuclear leukocytes seen by cytocentrifuge    No organisms seen by cytocentrifuge  Preliminary Report (06-13-25 @ 16:05):    No growth    Culture - Acid Fast - Body Fluid w/Smear (collected 06-12-25 @ 14:05)  Source: Pericardial Other  Preliminary Report (06-14-25 @ 23:07):    Culture is being performed.    Urinalysis with Rflx Culture (collected 06-08-25 @ 13:01)    Culture - Blood (collected 06-07-25 @ 20:45)  Source: Blood Blood-Peripheral  Final Report (06-13-25 @ 02:01):    No growth at 5 days    Culture - Blood (collected 06-07-25 @ 20:30)  Source: Blood Blood-Peripheral  Final Report (06-13-25 @ 02:01):    No growth at 5 days    Radiology: reviewed     Medications:  acetaminophen   IVPB .. 1000 milliGRAM(s) IV Intermittent every 6 hours PRN  cefTRIAXone   IVPB 2000 milliGRAM(s) IV Intermittent every 24 hours  chlorhexidine 2% Cloths 1 Application(s) Topical <User Schedule>  ipratropium    for Nebulization 500 MICROGram(s) Nebulizer every 6 hours PRN  levalbuterol Inhalation 0.63 milliGRAM(s) Inhalation every 6 hours  melatonin 5 milliGRAM(s) Oral at bedtime  metoprolol tartrate 50 milliGRAM(s) Oral every 6 hours  pantoprazole    Tablet 40 milliGRAM(s) Oral before breakfast  zolpidem 5 milliGRAM(s) Oral at bedtime    Current Antimicrobials:  cefTRIAXone   IVPB 2000 milliGRAM(s) IV Intermittent every 24 hours    Prior/Completed Antimicrobials:  cefepime   IVPB  vancomycin  IVPB.

## 2025-06-16 NOTE — PROGRESS NOTE ADULT - SUBJECTIVE AND OBJECTIVE BOX
PATIENT:  GINO GRAHAM  [id number]    CHIEF COMPLAINT:  Patient is a 89y old  Female who presents with a chief complaint of shaking chills, hypotension, poor appetite (15 Ernesto 2025 22:28)      INTERVAL HISTORY/OVERNIGHT EVENTS:  No acute events overnight. Tele overnight showed no significant changes. Patient seen and examined at bedside this AM. Pt remains afebrile and hemodynamically stable. Patient reports feeling     General: Denies dizziness, fatigue  Eyes: Denies blurry vision  ENMT: Denies rhinorrhea  Respiratory: Denies cough, SOB  Cardiovascular: Denies palpitations, CP  Gastrointestinal: Denies abd pain, N/V/D/C, hematochezia, melena  : Denies dysuria, increased freq  Musculoskeletal: Denies edema, joint pain  Endocrine: Denies increased thirst, increased frequency  Allergic/Immunologic: Denies rashes or hives  Neuro: Denies weakness, numbness  Psych: Denies anxiety, depression  All ROS negative unless indicated above     MEDICATIONS:  MEDICATIONS  (STANDING):  cefTRIAXone   IVPB 2000 milliGRAM(s) IV Intermittent every 24 hours  chlorhexidine 2% Cloths 1 Application(s) Topical <User Schedule>  levalbuterol Inhalation 0.63 milliGRAM(s) Inhalation every 6 hours  melatonin 5 milliGRAM(s) Oral at bedtime  metoprolol tartrate 50 milliGRAM(s) Oral every 6 hours  pantoprazole    Tablet 40 milliGRAM(s) Oral before breakfast  zolpidem 5 milliGRAM(s) Oral at bedtime    MEDICATIONS  (PRN):  ipratropium    for Nebulization 500 MICROGram(s) Nebulizer every 6 hours PRN Shortness of Breath and/or Wheezing      ALLERGIES:  Allergies    Zosyn (Rash; Urticaria; Hives)    Intolerances        OBJECTIVE:  ICU Vital Signs Last 24 Hrs  T(C): 37 (2025 03:00), Max: 37 (2025 03:00)  T(F): 98.6 (2025 03:00), Max: 98.6 (2025 03:00)  HR: 109 (2025 06:00) (100 - 128)  BP: 143/70 (2025 06:00) (96/72 - 167/84)  BP(mean): 100 (2025 06:00) (74 - 121)  ABP: --  ABP(mean): --  RR: 37 (2025 06:00) (12 - 43)  SpO2: 97% (2025 06:00) (93% - 100%)    O2 Parameters below as of 2025 06:00  Patient On (Oxygen Delivery Method): nasal cannula  O2 Flow (L/min): 2          CAPILLARY BLOOD GLUCOSE        CAPILLARY BLOOD GLUCOSE        I&O's Summary    15 Ernesto 2025 07:01  -  2025 07:00  --------------------------------------------------------  IN: 870 mL / OUT: 710 mL / NET: 160 mL      Daily     Daily Weight in k.9 (2025 05:00)    PHYSICAL EXAMINATION:  CONSTITUTIONAL: NAD; well-developed  HEENT: EOMI; conjunctiva clear  RESPIRATORY: Normal respiratory effort; lungs are clear to auscultation bilaterally; No Crackles/Rhonchi/Wheezing  CARDIOVASCULAR: Regular rate and rhythm, normal S1 and S2, no murmur/rub/gallop; No lower extremity edema; Peripheral pulses are 2+ bilaterally; Cap refill 2+  ABDOMEN: Nontender to palpation, no rebound/guarding; No hepatosplenomegaly  MUSCULOSKELETAL: No clubbing or cyanosis of digits; no joint swelling or tenderness to palpation  NEURO: A&Ox3; no focal deficits   SKIN: No erythema; no rash  PSYCH: Normal mood; Appropriate affect    LABS:                          10.3   3.08  )-----------( 287      ( 2025 01:19 )             33.1     06-16    134[L]  |  101  |  20  ----------------------------<  109[H]  4.6   |  22  |  0.63    Ca    8.6      2025 01:19  Phos  2.9     06-16  Mg     2.1     06-16    TPro  6.8  /  Alb  2.4[L]  /  TBili  0.4  /  DBili  x   /  AST  27  /  ALT  18  /  AlkPhos  127[H]  06-16    LIVER FUNCTIONS - ( 2025 01:19 )  Alb: 2.4 g/dL / Pro: 6.8 g/dL / ALK PHOS: 127 U/L / ALT: 18 U/L / AST: 27 U/L / GGT: x           PT/INR - ( 15 Ernesto 2025 00:49 )   PT: 16.5 sec;   INR: 1.45 ratio         PTT - ( 15 Ernesto 2025 00:49 )  PTT:25.1 sec        Urinalysis Basic - ( 2025 01:19 )    Color: x / Appearance: x / SG: x / pH: x  Gluc: 109 mg/dL / Ketone: x  / Bili: x / Urobili: x   Blood: x / Protein: x / Nitrite: x   Leuk Esterase: x / RBC: x / WBC x   Sq Epi: x / Non Sq Epi: x / Bacteria: x        RADIOLOGY & ADDITIONAL TESTS: Reviewed. PATIENT:  GINO GRAHAM  [id number]    CHIEF COMPLAINT:  Patient is a 89y old  Female who presents with a chief complaint of shaking chills, hypotension, poor appetite (15 Ernesto 2025 22:28)      INTERVAL HISTORY/OVERNIGHT EVENTS:  No acute events overnight. Tele overnight showed no significant changes. Patient seen and examined at bedside this AM. Pt remains afebrile and hemodynamically stable. Patient reports feeling well. Minimal to no output from pericardial drain.     General: Denies dizziness, fatigue  Eyes: Denies blurry vision  ENMT: Denies rhinorrhea  Respiratory: Denies cough, SOB  Cardiovascular: Denies palpitations, CP  Gastrointestinal: Denies abd pain, N/V/D/C, hematochezia, melena  : Denies dysuria, increased freq  Musculoskeletal: Denies edema, joint pain  Endocrine: Denies increased thirst, increased frequency  Allergic/Immunologic: Denies rashes or hives  Neuro: Denies weakness, numbness  Psych: Denies anxiety, depression  All ROS negative unless indicated above     MEDICATIONS:  MEDICATIONS  (STANDING):  cefTRIAXone   IVPB 2000 milliGRAM(s) IV Intermittent every 24 hours  chlorhexidine 2% Cloths 1 Application(s) Topical <User Schedule>  levalbuterol Inhalation 0.63 milliGRAM(s) Inhalation every 6 hours  melatonin 5 milliGRAM(s) Oral at bedtime  metoprolol tartrate 50 milliGRAM(s) Oral every 6 hours  pantoprazole    Tablet 40 milliGRAM(s) Oral before breakfast  zolpidem 5 milliGRAM(s) Oral at bedtime    MEDICATIONS  (PRN):  ipratropium    for Nebulization 500 MICROGram(s) Nebulizer every 6 hours PRN Shortness of Breath and/or Wheezing      ALLERGIES:  Allergies    Zosyn (Rash; Urticaria; Hives)    Intolerances        OBJECTIVE:  ICU Vital Signs Last 24 Hrs  T(C): 37 (2025 03:00), Max: 37 (2025 03:00)  T(F): 98.6 (2025 03:00), Max: 98.6 (2025 03:00)  HR: 109 (2025 06:00) (100 - 128)  BP: 143/70 (2025 06:00) (96/72 - 167/84)  BP(mean): 100 (2025 06:00) (74 - 121)  ABP: --  ABP(mean): --  RR: 37 (2025 06:00) (12 - 43)  SpO2: 97% (2025 06:00) (93% - 100%)    O2 Parameters below as of 2025 06:00  Patient On (Oxygen Delivery Method): nasal cannula  O2 Flow (L/min): 2          CAPILLARY BLOOD GLUCOSE        CAPILLARY BLOOD GLUCOSE        I&O's Summary    15 Ernesto 2025 07:01  -  2025 07:00  --------------------------------------------------------  IN: 870 mL / OUT: 710 mL / NET: 160 mL      Daily     Daily Weight in k.9 (2025 05:00)    PHYSICAL EXAMINATION:  CONSTITUTIONAL: NAD; well-developed  HEENT: EOMI; conjunctiva clear  RESPIRATORY: Normal respiratory effort; lungs are clear but mildly diminished bilaterally; No Crackles/Rhonchi/Wheezing  CARDIOVASCULAR: Regular rate and rhythm, normal S1 and S2, no murmur/rub/gallop; No lower extremity edema; Peripheral pulses are 2+ bilaterally; Cap refill 2+. Pericardial drain site c/d/i  ABDOMEN: Nontender to palpation, no rebound/guarding; No hepatosplenomegaly  MUSCULOSKELETAL: No clubbing or cyanosis of digits; no joint swelling or tenderness to palpation  NEURO: A&Ox3; no focal deficits   SKIN: No erythema; no rash  PSYCH: Normal mood; Appropriate affect    LABS:                          10.3   3.08  )-----------( 287      ( 2025 01:19 )             33.1     06-16    134[L]  |  101  |  20  ----------------------------<  109[H]  4.6   |  22  |  0.63    Ca    8.6      2025 01:19  Phos  2.9     06-16  Mg     2.1     06-16    TPro  6.8  /  Alb  2.4[L]  /  TBili  0.4  /  DBili  x   /  AST  27  /  ALT  18  /  AlkPhos  127[H]  06-16    LIVER FUNCTIONS - ( 2025 01:19 )  Alb: 2.4 g/dL / Pro: 6.8 g/dL / ALK PHOS: 127 U/L / ALT: 18 U/L / AST: 27 U/L / GGT: x           PT/INR - ( 15 Ernesto 2025 00:49 )   PT: 16.5 sec;   INR: 1.45 ratio         PTT - ( 15 Ernesto 2025 00:49 )  PTT:25.1 sec        Urinalysis Basic - ( 2025 01:19 )    Color: x / Appearance: x / SG: x / pH: x  Gluc: 109 mg/dL / Ketone: x  / Bili: x / Urobili: x   Blood: x / Protein: x / Nitrite: x   Leuk Esterase: x / RBC: x / WBC x   Sq Epi: x / Non Sq Epi: x / Bacteria: x        RADIOLOGY & ADDITIONAL TESTS: Reviewed.

## 2025-06-17 NOTE — PROGRESS NOTE ADULT - SUBJECTIVE AND OBJECTIVE BOX
EP Attending  HISTORY OF PRESENT ILLNESS: HPI:  89F c hx GERD, R breast CA (40-50y ago) s/p B/L mastectomy and radiation, RUE lymphedema, osteoarthritis/osteoporosis (on monthly ibandronate), HLD, Afib on eliquis, HOCM/severe LVOT/ELEAZAR, small-moderate pericardial effusion/RA diastolic collapse, recent hospitalization 5/29-6/5 for RML PNA, GBS bacteremia (pos cultures 5/29, 5/30) of unclear source on total 10 days abx (IV ceftriaxone inpt, then levaquin 750 q48h until 6/9), presents from [facility] rehab with shaking chills, hypotension, poor appetite    History from pt's daughter/primary decision maker Teresitagisel Day at bedside.  While at [facility], pt found to have hypotension to SBP 80s. Pt was given IVF and reduced dose of metoprolol. Pt also found to have tachycardia, shaking chills. Pt sent back to the hospital. Reportedly pt has not been eating well, not urinating much. Denies CP, SOB, abd pain, diarrhea, cough, other respiratory symptoms. At baseline pt ambulates without assistive device, drives.  RRT called in the ed for hypotension to SBP 79. (08 Jun 2025 02:41)    Ms Cloud is a pleasant 89yo woman here with shortness of breath.  Sees Dr Adolfo Leung for Cardiology.  Being managed on this inpatient stay by Dr Coelho.  Known to Dr Young after outpatient referral for HOCM management (lVOT gradient 80s-100mmHg+), and had a brief trial of Mavacamten, stopped for feelings of "leg heaviness".    EP called re: rapid AFib, refractory to low-dose metoprolol thus far, and complicated by management of HCM and new/worsening pericardial effusion.  Resting comfortably in bed on supplemental O2.  SOB and fatigued but no palpitations or fainting.  A 10 pt ROS is otherwise negative.    6/11- moved to CCU for closer monitoring.  lethargic / unable to obtain ROS today.  worsening pleural effusion, no plan for tap.  no plan for pericardiocentesis either.  hypotensive requiring midodrine.  6/12- s/p urgent pericardiocentesis via Interventional Radiology/ lateral thorax approach.  Feels much better- more energetic, has an appetite, more talkative.  6/13- resting in bed in CICU. on phenylephrine today.  no new complaints. feeling better overall after pericardiocentesis.  awaiting possible thoracentesis?  6/14- resting in chair. no new complaints.  slow drainage out of pericardial drain.  AFib HR now contrlled for first time on this stay.  6/15- resting in recliner, poor appetite. no new issues today.  6/16- resting in recliner, trying to eat breakfast today.  HR 110bpm, up-titrated to 50q6 metoprolol last night.  Date of service 6/17- resting in bed, no new issues.      PAST MEDICAL & SURGICAL HISTORY:  Breast cancer  s/p b/l mastectomy  H/O bilateral mastectomy  + breast implants  History of cataract surgery, unspecified laterality    albuterol/ipratropium for Nebulization 3 milliLiter(s) Nebulizer every 6 hours PRN  cefTRIAXone   IVPB 2000 milliGRAM(s) IV Intermittent every 24 hours  ipratropium    for Nebulization 500 MICROGram(s) Nebulizer every 6 hours PRN  levalbuterol Inhalation 0.63 milliGRAM(s) Inhalation every 6 hours  melatonin 5 milliGRAM(s) Oral at bedtime  metoprolol tartrate 50 milliGRAM(s) Oral every 6 hours  pantoprazole    Tablet 40 milliGRAM(s) Oral before breakfast  zolpidem 5 milliGRAM(s) Oral at bedtime                            10.3   3.08  )-----------( 287      ( 16 Jun 2025 01:19 )             33.1       06-16    134[L]  |  101  |  20  ----------------------------<  109[H]  4.6   |  22  |  0.63    Ca    8.6      16 Jun 2025 01:19  Phos  2.9     06-16  Mg     2.1     06-16    TPro  6.8  /  Alb  2.4[L]  /  TBili  0.4  /  DBili  x   /  AST  27  /  ALT  18  /  AlkPhos  127[H]  06-16      T(C): 36.5 (06-17-25 @ 12:14), Max: 36.8 (06-16-25 @ 15:00)  HR: 115 (06-17-25 @ 12:14) (97 - 118)  BP: 138/75 (06-17-25 @ 12:14) (109/63 - 148/82)  RR: 18 (06-17-25 @ 12:14) (17 - 22)  SpO2: 96% (06-17-25 @ 12:14) (94% - 98%)  Wt(kg): --    I&O's Summary    16 Jun 2025 07:01  -  17 Jun 2025 07:00  --------------------------------------------------------  IN: 700 mL / OUT: 550 mL / NET: 150 mL        Appearance: frail elderly woman in no acute distress  HEENT:   Normal oral mucosa, PERRL, EOMI	  Lymphatic: No lymphadenopathy , no edema  Cardiovascular: rapid irregular S1 S2, No JVD, No murmurs , Peripheral pulses palpable 2+ bilaterally.  s/p pericardial drain placement from left chest.  Respiratory: poor air entry BL  normal effort 	  Gastrointestinal:  Soft, Non-tender, + BS	  Skin: No rashes, No ecchymoses, No cyanosis, warm to touch  Musculoskeletal: Normal range of motion, normal strength  Psychiatry:  Mood & affect appropriate    TELEMETRY: AFib, narrow QRS, 110bpm at rest  ECG:  	AFib, atypical LVHypertrphy  Echo:  < from: TTE W or WO Ultrasound Enhancing Agent (06.05.25 @ 07:16) >  CONCLUSIONS:      1. Limited TTE to assess for pericardial effusion.   2. Trace pericardial effusion noted adjacent to the posterolateral left ventricle, small pericardial effusion noted adjacent to the anterior right ventricle and small pericardial effusion noted adjacent to the right atrium.   3. The inferior vena cava is normal in size measuring 1.70 cm in diameter, (normal <2.1cm) with normal inspiratory collapse (normal >50%) consistent with normal right atrial pressure (~3, range 0-5mmHg).   4. Compared to the transthoracic echocardiogram performed on 5/30/2025, the right atrium is not as well visualized on this study. There is right atrial diastolic collapse visualized in the 4 chamber view but unable to quantify the duration of the collapse. The effusion appears unchanged in size and distribution. No other signs of cardiac tamponade are present.    < end of copied text >    CT Chest - personally reviewed the images.  right breat prosthesis with signs of rupture.  left breast prosthesis OK.  prominent LV hypertrophy visible.  pericardial effusion enlarged.  pleural effusion present on the right.  	  ASSESSMENT/PLAN: Ms Cloud is a pleasant 89y Female here with shortness of breath.    Multifactorial in the setting of pericardial effusion, Hypertrophic Cardiomyopathy with severe LVOT obstruction, and rapid AFib.  [id number] is at least 3.  Apixaban on hold for now.  Plan to resume it if no further invasive procedures are planned.  Pericardial drain pending removal.  Continue metoprolol 50mg q6hrs.  Goal HR <100bpm at rest.  Continue alpha-agonists to support her.  Discussed w/ Dr Young, and agree w/ recs to avoid diltiazem and other vasodilators, and to avoid diuretics.  Recommend evaluation for thoracentesis, to ease her respiratory effort.  Worthwhile even as a palliative measure.  Outpatient, needs to re-enroll with a HCM specialist.  Strongly recommend outpatient re-trial of Camzyos, even at the lowest dose.  This cannot be started in the hospital.  There is a possibility- albeit small - for Cardioversion before discharge.  Only if still symptomatic after everything else is optimized, and she is able to maintain uninterrupted anticoagulation.        Barney Lau M.D.  Cardiac Electrophysiology    office [phone]  pager [phone]

## 2025-06-17 NOTE — PROGRESS NOTE ADULT - ASSESSMENT
89F c hx GERD, R breast CA (40-50y ago) s/p B/L mastectomy and radiation, RUE lymphedema, osteoarthritis/osteoporosis (on monthly ibandronate), HLD, Afib on eliquis, HOCM/severe LVOT/ELEAZAR, small-moderate pericardial effusion/RA diastolic collapse, recent hospitalization 5/29-6/5 for RML PNA, GBS bacteremia (pos cultures 5/29, 5/30) of unclear source on total 10 days abx (IV ceftriaxone inpt, then levaquin 750 q48h until 6/9), pw hypotension, severe sepsis of unclear source, demand ischemia, afib c rvr      Problem/Plan - 1:  ·  Problem: Severe sepsis.   ·  Plan: -   - ID fu   - abx as per ID     Problem/Plan - 2:  ·  Problem: Pericardial effusion   ·  Plan: - sp CCU stay and drain       Problem/Plan - 3:  ·  Problem: Chronic atrial fibrillation.  ·  Plan: - reduce metoprolol dose to tartrate 25mg BID  - goal HR <100 in setting of HOCM with hypotension  - EP fu   - restart AC once ok with CTS     Problem/Plan - 4:  ·  Problem: Type 2 myocardial infarction.  ·  Plan: - tele  - start asa   - cont home eliquis.    Problem/Plan - 5:  ·  Problem: HOCM (hypertrophic obstructive cardiomyopathy).  ·  Plan: - cont metoprolol as above. uptitrate as BP tolerates  - IVF.

## 2025-06-17 NOTE — PROGRESS NOTE ADULT - SUBJECTIVE AND OBJECTIVE BOX
Patient is a 89y old  Female who presents with a chief complaint of shaking chills, hypotension, poor appetite (17 Jun 2025 14:41)    Date of servie : 06-17-25 @ 16:46  INTERVAL HPI/OVERNIGHT EVENTS:  T(C): 36.5 (06-17-25 @ 12:14), Max: 36.6 (06-17-25 @ 00:10)  HR: 115 (06-17-25 @ 12:14) (99 - 118)  BP: 138/75 (06-17-25 @ 12:14) (118/73 - 148/82)  RR: 18 (06-17-25 @ 12:14) (18 - 20)  SpO2: 96% (06-17-25 @ 12:14) (94% - 97%)  Wt(kg): --  I&O's Summary    16 Jun 2025 07:01  -  17 Jun 2025 07:00  --------------------------------------------------------  IN: 700 mL / OUT: 550 mL / NET: 150 mL    17 Jun 2025 07:01  -  17 Jun 2025 16:46  --------------------------------------------------------  IN: 0 mL / OUT: 200 mL / NET: -200 mL        LABS:                        10.3   3.08  )-----------( 287      ( 16 Jun 2025 01:19 )             33.1     06-16    134[L]  |  101  |  20  ----------------------------<  109[H]  4.6   |  22  |  0.63    Ca    8.6      16 Jun 2025 01:19  Phos  2.9     06-16  Mg     2.1     06-16    TPro  6.8  /  Alb  2.4[L]  /  TBili  0.4  /  DBili  x   /  AST  27  /  ALT  18  /  AlkPhos  127[H]  06-16      Urinalysis Basic - ( 16 Jun 2025 01:19 )    Color: x / Appearance: x / SG: x / pH: x  Gluc: 109 mg/dL / Ketone: x  / Bili: x / Urobili: x   Blood: x / Protein: x / Nitrite: x   Leuk Esterase: x / RBC: x / WBC x   Sq Epi: x / Non Sq Epi: x / Bacteria: x      CAPILLARY BLOOD GLUCOSE        ABG - ( 17 Jun 2025 14:00 )  pH, Arterial: 7.42  pH, Blood: x     /  pCO2: 45    /  pO2: 82    / HCO3: 29    / Base Excess: 4.0   /  SaO2: 98.2                Urinalysis Basic - ( 16 Jun 2025 01:19 )    Color: x / Appearance: x / SG: x / pH: x  Gluc: 109 mg/dL / Ketone: x  / Bili: x / Urobili: x   Blood: x / Protein: x / Nitrite: x   Leuk Esterase: x / RBC: x / WBC x   Sq Epi: x / Non Sq Epi: x / Bacteria: x        MEDICATIONS  (STANDING):  cefTRIAXone   IVPB 2000 milliGRAM(s) IV Intermittent every 24 hours  levalbuterol Inhalation 0.63 milliGRAM(s) Inhalation every 6 hours  melatonin 5 milliGRAM(s) Oral at bedtime  metoprolol tartrate 50 milliGRAM(s) Oral every 6 hours  pantoprazole    Tablet 40 milliGRAM(s) Oral before breakfast  zolpidem 5 milliGRAM(s) Oral at bedtime    MEDICATIONS  (PRN):  albuterol/ipratropium for Nebulization 3 milliLiter(s) Nebulizer every 6 hours PRN Shortness of Breath and/or Wheezing  ipratropium    for Nebulization 500 MICROGram(s) Nebulizer every 6 hours PRN Shortness of Breath and/or Wheezing          PHYSICAL EXAM:  GENERAL: NAD, well-groomed, well-developed  HEAD:  Atraumatic, Normocephalic  CHEST/LUNG: Clear to percussion bilaterally; No rales, rhonchi, wheezing, or rubs  HEART: Regular rate and rhythm; No murmurs, rubs, or gallops  ABDOMEN: Soft, Nontender, Nondistended; Bowel sounds present  EXTREMITIES:  2+ Peripheral Pulses, No clubbing, cyanosis, or edema  LYMPH: No lymphadenopathy noted  SKIN: No rashes or lesions    Care Discussed with Consultants/Other Providers [ ] YES  [ ] NO

## 2025-06-17 NOTE — PROGRESS NOTE ADULT - SUBJECTIVE AND OBJECTIVE BOX
Date of Service: 06-17-25 @ 14:42    Patient is a 89y old  Female who presents with a chief complaint of shaking chills, hypotension, poor appetite (17 Jun 2025 12:58)      Any change in ROS: pt does not look good today  ; daughter is at bedside:   pt is mildly tachypneic   lethargic  per pts daughter  she is sleeping   she cold not sleep before   tachycardic on 2 L of oxygen        MEDICATIONS  (STANDING):  cefTRIAXone   IVPB 2000 milliGRAM(s) IV Intermittent every 24 hours  levalbuterol Inhalation 0.63 milliGRAM(s) Inhalation every 6 hours  melatonin 5 milliGRAM(s) Oral at bedtime  metoprolol tartrate 50 milliGRAM(s) Oral every 6 hours  pantoprazole    Tablet 40 milliGRAM(s) Oral before breakfast  zolpidem 5 milliGRAM(s) Oral at bedtime    MEDICATIONS  (PRN):  albuterol/ipratropium for Nebulization 3 milliLiter(s) Nebulizer every 6 hours PRN Shortness of Breath and/or Wheezing  ipratropium    for Nebulization 500 MICROGram(s) Nebulizer every 6 hours PRN Shortness of Breath and/or Wheezing    Vital Signs Last 24 Hrs  T(C): 36.5 (17 Jun 2025 12:14), Max: 36.8 (16 Jun 2025 15:00)  T(F): 97.7 (17 Jun 2025 12:14), Max: 98.3 (16 Jun 2025 15:00)  HR: 115 (17 Jun 2025 12:14) (97 - 118)  BP: 138/75 (17 Jun 2025 12:14) (118/73 - 148/82)  BP(mean): 87 (16 Jun 2025 18:00) (86 - 100)  RR: 18 (17 Jun 2025 12:14) (17 - 20)  SpO2: 96% (17 Jun 2025 12:14) (94% - 98%)    Parameters below as of 17 Jun 2025 12:14  Patient On (Oxygen Delivery Method): nasal cannula  O2 Flow (L/min): 2      I&O's Summary    16 Jun 2025 07:01  -  17 Jun 2025 07:00  --------------------------------------------------------  IN: 700 mL / OUT: 550 mL / NET: 150 mL          Physical Exam:   GENERAL: NAD, well-groomed, well-developed  HEENT: BRYSON/   Atraumatic, Normocephalic  ENMT: No tonsillar erythema, exudates, or enlargement; Moist mucous membranes, Good dentition, No lesions  NECK: Supple, No JVD, Normal thyroid  CHEST/LUNG: Clear to auscultaion  CVS: Regular rate and rhythm; No murmurs, rubs, or gallops  GI: : Soft, Nontender, Nondistended; Bowel sounds present  NERVOUS SYSTEM:  sleepy  EXTREMITIES:  - edema  LYMPH: No lymphadenopathy noted  SKIN: No rashes or lesions  ENDOCRINOLOGY: No Thyromegaly  PSYCH: sleepy    Labs:  ABG - ( 17 Jun 2025 14:00 )  pH, Arterial: 7.42  pH, Blood: x     /  pCO2: 45    /  pO2: 82    / HCO3: 29    / Base Excess: 4.0   /  SaO2: 98.2            36.0                            10.3   3.08  )-----------( 287      ( 16 Jun 2025 01:19 )             33.1                         10.1   3.11  )-----------( 431      ( 15 Ernesto 2025 00:48 )             32.4                         9.2    2.70  )-----------( 367      ( 14 Jun 2025 00:29 )             30.0     06-16    134[L]  |  101  |  20  ----------------------------<  109[H]  4.6   |  22  |  0.63  06-15    132[L]  |  101  |  22  ----------------------------<  112[H]  5.3   |  22  |  0.68  06-15    134[L]  |  102  |  22  ----------------------------<  110[H]  5.5[H]   |  20[L]  |  0.58  06-14    134[L]  |  104  |  23  ----------------------------<  104[H]  5.0   |  20[L]  |  0.68    Ca    8.6      16 Jun 2025 01:19  Phos  2.9     06-16  Mg     2.1     06-16    TPro  6.8  /  Alb  2.4[L]  /  TBili  0.4  /  DBili  x   /  AST  27  /  ALT  18  /  AlkPhos  127[H]  06-16  TPro  6.5  /  Alb  2.3[L]  /  TBili  0.3  /  DBili  x   /  AST  35  /  ALT  19  /  AlkPhos  129[H]  06-15  TPro  6.8  /  Alb  2.5[L]  /  TBili  0.3  /  DBili  x   /  AST  41[H]  /  ALT  20  /  AlkPhos  137[H]  06-15  TPro  6.1  /  Alb  2.4[L]  /  TBili  0.3  /  DBili  x   /  AST  27  /  ALT  20  /  AlkPhos  134[H]  06-14    CAPILLARY BLOOD GLUCOSE          LIVER FUNCTIONS - ( 16 Jun 2025 01:19 )  Alb: 2.4 g/dL / Pro: 6.8 g/dL / ALK PHOS: 127 U/L / ALT: 18 U/L / AST: 27 U/L / GGT: x             Urinalysis Basic - ( 16 Jun 2025 01:19 )    Color: x / Appearance: x / SG: x / pH: x  Gluc: 109 mg/dL / Ketone: x  / Bili: x / Urobili: x   Blood: x / Protein: x / Nitrite: x   Leuk Esterase: x / RBC: x / WBC x   Sq Epi: x / Non Sq Epi: x / Bacteria: x      Fluid Source --  Albumin, Fluid1.9 g/dL  Glucose, [id number] mg/dL  Protein total, Fluid4.3 g/dL  Lacatate Dehydrogenase, [id number] U/L  pH, Fluid--  Cytopathology-Non Gyn Report--        RECENT CULTURES:  06-12 @ 14:05 Pericardial Pericardial Fluid       polymorphonuclear leukocytes seen by cytocentrifuge  No organisms seen by cytocentrifuge           No growth    rad< from: Xray Chest 1 View- PORTABLE-Routine (Xray Chest 1 View- PORTABLE-Routine in AM.) (06.12.25 @ 07:23) >    ACC: [id number] EXAM:  XR CHEST PORTABLE ROUTINE 1V   ORDERED BY: SHAHLA BONNER     PROCEDURE DATE:  06/12/2025          INTERPRETATION:  DATE OF STUDY: 6/12/25    PRIOR: 6/10/25    CLINICAL INDICATION: SOB    TECHNIQUE: AP radiograph of the chest.    FINDINGS:  Bilateral breast implants.  Heart is not accurately assessed on this AP projection.  Small bilateral predominantly basilar patchy opacities. No effusions,   right greater than left.  No pneumothorax.    IMPRESSION:    No significant interval change    --- End of Report ---            YAJAIRA FREIRE MD; Attending Radiologist  This document has been electronically signed. Jun 13 2025  2:31PM    < end of copied text >        RESPIRATORY CULTURES:          Studies  Chest X-RAY  CT SCAN Chest   Venous Dopplers: LE:   CT Abdomen  Others

## 2025-06-17 NOTE — PROGRESS NOTE ADULT - SUBJECTIVE AND OBJECTIVE BOX
[facility]  BASIL [place]s Y. Patel, S. Shah, G. Casimir  [phone]  ([phone] - weekdays after 5pm and weekends)    Name: GINO GRAHAM  Age/Gender: 89y Female  MRN: [id number]    Interval History:  Patient seen and examined this morning.   No new complaints noted.  Notes reviewed  No concerning overnight events  Afebrile   Allergies: Zosyn (Rash; Urticaria; Hives)      Objective:  Vitals:   T(F): 97.7 (06-17-25 @ 05:00), Max: 98.3 (06-16-25 @ 15:00)  HR: 104 (06-17-25 @ 05:00) (97 - 118)  BP: 148/82 (06-17-25 @ 05:00) (109/63 - 148/82)  RR: 20 (06-17-25 @ 05:00) (17 - 31)  SpO2: 97% (06-17-25 @ 05:00) (94% - 99%)  Physical Examination:  General: no acute distress, NC   HEENT: normocephalic, atraumatic, anicteric  Respiratory: no acc muscle use, breathing comfortably  Cardiovascular: S1 and S2 present  Gastrointestinal: normal appearing, nondistended  Extremities: no edema, no cyanosis  Skin: no visible rash    Laboratory Studies:  CBC:                       10.3   3.08  )-----------( 287      ( 16 Jun 2025 01:19 )             33.1     WBC Trend:  3.08 06-16-25 @ 01:19  3.11 06-15-25 @ 00:48  2.70 06-14-25 @ 00:29  2.07 06-13-25 @ 00:19  3.40 06-12-25 @ 01:26  4.03 06-11-25 @ 00:57    CMP: 06-16    134[L]  |  101  |  20  ----------------------------<  109[H]  4.6   |  22  |  0.63    Ca    8.6      16 Jun 2025 01:19  Phos  2.9     06-16  Mg     2.1     06-16    TPro  6.8  /  Alb  2.4[L]  /  TBili  0.4  /  DBili  x   /  AST  27  /  ALT  18  /  AlkPhos  127[H]  06-16    Creatinine: 0.63 mg/dL (06-16-25 @ 01:19)  Creatinine: 0.68 mg/dL (06-15-25 @ 02:11)  Creatinine: 0.58 mg/dL (06-15-25 @ 00:48)  Creatinine: 0.68 mg/dL (06-14-25 @ 00:29)  Creatinine: 0.89 mg/dL (06-13-25 @ 00:19)  Creatinine: 0.65 mg/dL (06-12-25 @ 12:45)  Creatinine: 0.70 mg/dL (06-12-25 @ 05:33)  Creatinine: 0.81 mg/dL (06-12-25 @ 01:26)  Creatinine: 0.84 mg/dL (06-11-25 @ 06:00)  Creatinine: 0.77 mg/dL (06-11-25 @ 01:33)      LIVER FUNCTIONS - ( 16 Jun 2025 01:19 )  Alb: 2.4 g/dL / Pro: 6.8 g/dL / ALK PHOS: 127 U/L / ALT: 18 U/L / AST: 27 U/L / GGT: x           Microbiology: reviewed   Culture - Fungal, Body Fluid (collected 06-12-25 @ 14:05)  Source: Pericardial Pericardial Fluid  Preliminary Report (06-15-25 @ 08:16):    No growth    Culture - Body Fluid with Gram Stain (collected 06-12-25 @ 14:05)  Source: Pericardial Other  Gram Stain (06-13-25 @ 00:20):    polymorphonuclear leukocytes seen by cytocentrifuge    No organisms seen by cytocentrifuge  Preliminary Report (06-13-25 @ 16:05):    No growth    Culture - Acid Fast - Body Fluid w/Smear (collected 06-12-25 @ 14:05)  Source: Pericardial Other  Preliminary Report (06-14-25 @ 23:07):    Culture is being performed.    Urinalysis with Rflx Culture (collected 06-08-25 @ 13:01)    Culture - Blood (collected 06-07-25 @ 20:45)  Source: Blood Blood-Peripheral  Final Report (06-13-25 @ 02:01):    No growth at 5 days    Culture - Blood (collected 06-07-25 @ 20:30)  Source: Blood Blood-Peripheral  Final Report (06-13-25 @ 02:01):    No growth at 5 days    Radiology: reviewed     Medications:  cefTRIAXone   IVPB 2000 milliGRAM(s) IV Intermittent every 24 hours  ipratropium    for Nebulization 500 MICROGram(s) Nebulizer every 6 hours PRN  levalbuterol Inhalation 0.63 milliGRAM(s) Inhalation every 6 hours  melatonin 5 milliGRAM(s) Oral at bedtime  metoprolol tartrate 50 milliGRAM(s) Oral every 6 hours  pantoprazole    Tablet 40 milliGRAM(s) Oral before breakfast  zolpidem 5 milliGRAM(s) Oral at bedtime    Current Antimicrobials:  cefTRIAXone   IVPB 2000 milliGRAM(s) IV Intermittent every 24 hours    Prior/Completed Antimicrobials:  cefepime   IVPB  vancomycin  IVPB.

## 2025-06-17 NOTE — PROGRESS NOTE ADULT - SUBJECTIVE AND OBJECTIVE BOX
Subjective: Patient seen and examined. No new events except as noted.     SUBJECTIVE/ROS:  nad      MEDICATIONS:  MEDICATIONS  (STANDING):  cefTRIAXone   IVPB 2000 milliGRAM(s) IV Intermittent every 24 hours  levalbuterol Inhalation 0.63 milliGRAM(s) Inhalation every 6 hours  melatonin 5 milliGRAM(s) Oral at bedtime  metoprolol tartrate 50 milliGRAM(s) Oral every 6 hours  pantoprazole    Tablet 40 milliGRAM(s) Oral before breakfast  zolpidem 5 milliGRAM(s) Oral at bedtime      PHYSICAL EXAM:  T(C): 36.5 (06-17-25 @ 05:00), Max: 36.8 (06-16-25 @ 15:00)  HR: 104 (06-17-25 @ 05:00) (97 - 118)  BP: 148/82 (06-17-25 @ 05:00) (109/63 - 157/92)  RR: 20 (06-17-25 @ 05:00) (13 - 22)  SpO2: 97% (06-17-25 @ 05:00) (93% - 98%)  Wt(kg): --  I&O's Summary    16 Jun 2025 07:01  -  17 Jun 2025 07:00  --------------------------------------------------------  IN: 700 mL / OUT: 550 mL / NET: 150 mL            JVP: Normal  Neck: supple  Lung: clear   CV: S1 S2 ,  Abd: soft  Ext: No edema  neuro: Awake / alert  Psych: flat affect  Skin: normal``    LABS/DATA:    CARDIAC MARKERS:                                10.3   3.08  )-----------( 287      ( 16 Jun 2025 01:19 )             33.1     06-16    134[L]  |  101  |  20  ----------------------------<  109[H]  4.6   |  22  |  0.63    Ca    8.6      16 Jun 2025 01:19  Phos  2.9     06-16  Mg     2.1     06-16    TPro  6.8  /  Alb  2.4[L]  /  TBili  0.4  /  DBili  x   /  AST  27  /  ALT  18  /  AlkPhos  127[H]  06-16    proBNP:   Lipid Profile:   HgA1c:   TSH:

## 2025-06-17 NOTE — PROGRESS NOTE ADULT - ASSESSMENT
89F c hx GERD, R breast CA (40-50y ago) s/p B/L mastectomy and radiation, RUE lymphedema, osteoarthritis/osteoporosis (on monthly ibandronate), HLD, Afib on eliquis, HOCM/severe LVOT/LORA, small-moderate pericardial effusion/RA diastolic collapse, recent hospitalization 5/29-6/5 for RML PNA, GBS bacteremia (pos cultures 5/29, 5/30) of unclear source on total 10 days abx (IV ceftriaxone inpt, then levaquin 750 q48h until 6/9), presents from [facility] rehab with shaking chills, hypotension, poor appetite  History from pt's daughter/primary decision maker Teresita Day at bedside.  While at [facility], pt found to have hypotension to SBP 80s. Pt was given IVF and reduced dose of metoprolol. Pt also found to have tachycardia, shaking chills. Pt sent back to the hospital. Reportedly pt has not been eating well, not urinating much. Denies CP, SOB, abd pain, diarrhea, cough, other respiratory symptoms. At baseline pt ambulates without assistive device, drives.  RRT called in the ed for hypotension to SBP 79. (08 Jun 2025 02:41)  to me pt daughter says she was very sleepy in the morning too  as she was given ambien at 4 AM in the morning:   now she is alert and awake and is on 2 L of oxygen ;  she is not sob:  and does not look to be in any resp distress:       Severe sepsis.   unclear source of fevers  cont empiric vanc, cefepime  f/u blood cx. if positive will likely need MARIA G  monitor for any localizing symptoms  pt had recent pan ct scan that did not elucidate cause of bacteremia  recent blood cultures have been negative   - IVF  on cefepime  she is febrile too   vbg on admission IS ok;   MONITOR BLOOD PRESSURE CLOSELY:   OIF SHE DROPS HER BLOOD PRESSURE MORE:  WOULD NEED MICU  CO NSULT:    6/9: seems slightly more tored today  ; cont antibtiocs:  per ID:  she remains on 2 L of oxygen : she is alert and awake:  daughter at bedside:  reviewed the labs and echo and ct scan chest with the daughter in detail :   she has large pericardial effusions:  per ir no good window and effusion seemd small to drain : ct scan chest read as mod to large pericardial effusion : defer to cards :     6/10: seems to be doing  ok :  no sob:   no  cough  ;   no  phlegm   on 2 L of oxygen :  thoracic to see:    no emergency in tapping the pleural effusion:   de cardiologist  6/11: on ceftriaxone   seems O K   6/12: seems OK:  s/p pericardial drain:  has 400 cc output   await cytology    6/13: cont c urrent rx:   on antibiotics  6/14: on ceftriaxone    6/15/l seems to be doing  ok ;  no sob;  no cough :   no phlegm    6/17; pulm wise she looks tachypneic today ; rpt limited echo with no change from before;  cxr done today with no significant change from before;   abg done today seems pretty reasonable   will do di mine;  last d dimer on 3rd was slightly high ;  if the d dimer has increased significantly  would do cta;  dw acp : her creat is normal:  she has been off ac for pericardial drain for some time;   on antibiotics   40 mg ov iv solumedrol given       Pericardial effusion ;   the ct chest shows increasing pericardial effusion  : which is of more pressing concern then pleural effusion :  needs a tap:  ir guided tap    6/12: as above   6/13: asked up to tap:  need to repeat inr and pt  last was  >  2.0:  needs to be less then 1.5:  ip contacted will evalute for tap    6/14; defer to p bill lawsary team  6/16/: seems OK;'' pericardial effusion removed    6/17: limited echo today with no change       Hypotension.  suspect combination of infection, hocm, lora, tachycardia, pericardial effusion, metoprolol  pt has somewhat tenuous hemodynamic status  consider cardioversion, consider repeat TTE  pt is full code.  pts blood pressure is slightly low   on midodrine    6/9: cont on midodrine  6/10; controlled:  on midodrine  6/11: on midodrine  6/12: currently she is on vasopressors   6/13; off vasopressors now   6/14; blood pressure soft:  on midodrine   6/15: her blood pressure is pretty good:   6/17: blood pressure today is reasonable;     Chronic atrial fibrillation.  reduce metoprolol dose to tartrate 25mg BID  goal HR <100 in setting of HOCM with hypotension  consider amiodarone or cardioversion  PER CARDS    6/9: defer to cards   6/10: off Eliquis for now   6/11: remains off eliquis   6/12: off ac  6/13: keep off ac if thoracentesis needed to be done over the weekend:  though it is not emergent   6/14" can restart ac:  if required as there is now no plan for thoracentesis:  dw attending   6/16; off ac as pericardial arnie is removed today   6/17; remains off ac:  restart as recommended by ir note      Type 2 myocardial infarction.  suspect demand ischemia from recent hypotension, infections  start asa   cont home eliquis.  CONT CURRENT MEDS     HOCM (hypertrophic obstructive cardiomyopathy).  cont metoprolol as above. uptitrate as BP tolerates  per cards    Acute respiratory failure with hypoxia.   recent CT scans showing right bronchiectasis, right fibrosis likely 2/2 radiation, poss RML PNA.  cont BD :   ct chest showed: No pulmonary embolism. Small to moderate right and small left pleural effusions with associated  atelectasis. 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  Cardiomegaly. Moderate pericardial effusion.  needs echo     9/6: new echo reviewed:  seen by cardiology :  monitor her blood pressure closely:  if she drops her blood pressure call CCU     6/10: cont to manain o2 sao2 above 90% all the t hayden  6/11; n 2 L of oxygen    6/12: she is not in any resp distress    6/13: she is on rooma ir:  doing well :  satting at 93%  6/14: pulm wise seems pretty good;   on rooma ir;   no plan for tap now     6/16; she seems OK;  she is on 1 L of oxygen ; would suggest to repeat cxr   6/17; she is on 2 L of oxygen  :  satting at 96%    dw acp and daughter at bedside   if she deteriorates further ;  call ccu/ MICU

## 2025-06-17 NOTE — PROGRESS NOTE ADULT - ASSESSMENT
Afib, severe HCM LVOT obstruction, Pericardial effusion, recent admission for PNA / Bacteremia   now admitted with sepsis, tachycardia ( afib with RVR ) and shock . Cardiology is called for elevated troponin     Elevated troponin   Demand ischemia   in setting of afib with RVR, shock, severe HCM and sepsis   stable now    Shock   resolved  off middorine     Afib  as per EP   cont a/c    HCM  on BB  fu with Dr Young to resume debbies

## 2025-06-17 NOTE — PROGRESS NOTE ADULT - ASSESSMENT
89F c hx GERD, R breast CA (40-50y ago) s/p B/L mastectomy and radiation, RUE lymphedema, osteoarthritis/osteoporosis (on monthly ibandronate), HLD, Afib on eliquis, HOCM/severe LVOT/ELEAZAR, small-moderate pericardial effusion/RA diastolic collapse, recent hospitalization 5/29-6/5 for RML PNA, GBS bacteremia (pos cultures 5/29, 5/30) of unclear source on total 10 days abx (IV ceftriaxone inpt, then levaquin 750 q48h until 6/9), pw hypotension, severe sepsis of unclear source, demand ischemia, afib c rvr    Severe Sepsis/sirs, Hypotension 2/2 unclear source  Severe LVOT obstruction/HCM, worsening pericardial effusion  CXR w/ worsening aeration at the bases  Flu/COVID/RSV negative  UA negative  6/7 Bcx NGTD x2   Zosyn allergy noted, tolerates cefepime  CTAP with mod-large pericardial effusion inc since 5/29/25, stable mod R and small L pleural effusion and atelectasis   TTE with increased pericardial effusion since 6/5 -- moderate pericardial effusion adjacent to RV, small pericardial effusion adjacent to RA and large pericardia effusion noted adjacent to posterior LV with no evidence of tamponade physiology  6/10 note with tachypnea and expiratory wheeze, CCU consulted for decompensated heart failure iso mod-severe pericardial effusion with concerns for impending hemodynamic collapse given complex cardiac physiology with severe LVOT obstruction, now transferred to CICU for closer monitor and management of enlarging pericardial effusion  CTS eval noted-patient high risk for pericardial window - rec continue to monitor pericardial effusion with repeat TTE  6/11 CT chest with large pericardial effusion increased since 6/4, mod R and small L pleural effusion, no pneumonia;  cefepime changed to ceftriaxone d/t concern for possible neurotoxicity   6/12 hypotensive and tachycardic despite volume resuscitation -- s/p emergent pericardiocentesis with drain placed    - noted drained 400cc ss pericardial fluid, cell count noted, gram stain with no organisms, culture remains NGTD  6/16 s/p pericardial drain removal     s/p vancomycin 6/7-6/10  s/p cefepime 6/7-6/11    Recommendations:   Continue ceftriaxone 2g IV Q24h - plan to complete course on 6/20  Trend temps/WBC  Continue rest of care per primary team       Wes Grullon M.D.  [facility]  Available on [employer] TEAMS - *PREFERRED*  [phone]  After 5pm on weekdays and all day on weekends - please call [phone]     Thank you for consulting us and involving us in the management of this patients case. In addition to reviewing history, imaging, documents, labs, microbiology, took into account antibiotic stewardship, local antibiogram and infection control strategies and potential transmission issues at time of treatment decision making process.

## 2025-06-18 NOTE — PROGRESS NOTE ADULT - SUBJECTIVE AND OBJECTIVE BOX
Patient is a 89y old  Female who presents with a chief complaint of shaking chills, hypotension, poor appetite (18 Jun 2025 10:00)    Date of servie : 06-18-25 @ 13:14  INTERVAL HPI/OVERNIGHT EVENTS:  T(C): 36.3 (06-18-25 @ 12:37), Max: 36.9 (06-17-25 @ 18:24)  HR: 115 (06-18-25 @ 12:37) (115 - 126)  BP: 153/85 (06-18-25 @ 12:37) (142/87 - 170/96)  RR: 20 (06-18-25 @ 12:37) (17 - 23)  SpO2: 97% (06-18-25 @ 12:37) (96% - 97%)  Wt(kg): --  I&O's Summary    17 Jun 2025 07:01  -  18 Jun 2025 07:00  --------------------------------------------------------  IN: 0 mL / OUT: 200 mL / NET: -200 mL        LABS:                        10.5   4.48  )-----------( 310      ( 18 Jun 2025 07:27 )             34.0     06-18    136  |  101  |  25[H]  ----------------------------<  145[H]  5.6[H]   |  19[L]  |  0.58    Ca    9.0      18 Jun 2025 07:15        Urinalysis Basic - ( 18 Jun 2025 07:15 )    Color: x / Appearance: x / SG: x / pH: x  Gluc: 145 mg/dL / Ketone: x  / Bili: x / Urobili: x   Blood: x / Protein: x / Nitrite: x   Leuk Esterase: x / RBC: x / WBC x   Sq Epi: x / Non Sq Epi: x / Bacteria: x      CAPILLARY BLOOD GLUCOSE        ABG - ( 17 Jun 2025 14:00 )  pH, Arterial: 7.42  pH, Blood: x     /  pCO2: 45    /  pO2: 82    / HCO3: 29    / Base Excess: 4.0   /  SaO2: 98.2                Urinalysis Basic - ( 18 Jun 2025 07:15 )    Color: x / Appearance: x / SG: x / pH: x  Gluc: 145 mg/dL / Ketone: x  / Bili: x / Urobili: x   Blood: x / Protein: x / Nitrite: x   Leuk Esterase: x / RBC: x / WBC x   Sq Epi: x / Non Sq Epi: x / Bacteria: x        MEDICATIONS  (STANDING):  cefTRIAXone   IVPB 2000 milliGRAM(s) IV Intermittent every 24 hours  levalbuterol Inhalation 0.63 milliGRAM(s) Inhalation every 6 hours  melatonin 5 milliGRAM(s) Oral at bedtime  metoprolol tartrate 50 milliGRAM(s) Oral every 6 hours  pantoprazole    Tablet 40 milliGRAM(s) Oral before breakfast  zolpidem 5 milliGRAM(s) Oral at bedtime    MEDICATIONS  (PRN):  albuterol/ipratropium for Nebulization 3 milliLiter(s) Nebulizer every 6 hours PRN Shortness of Breath and/or Wheezing  ipratropium    for Nebulization 500 MICROGram(s) Nebulizer every 6 hours PRN Shortness of Breath and/or Wheezing          PHYSICAL EXAM:  GENERAL: frail  CHEST/LUNG: Crackles +  HEART: S1S2+  ABDOMEN: Soft, Nontender, Nondistended; Bowel sounds present  EXTREMITIES: edema +    Care Discussed with Consultants/Other Providers [ x] YES  [ ] NO

## 2025-06-18 NOTE — PROGRESS NOTE ADULT - ASSESSMENT
Afib, severe HCM LVOT obstruction, Pericardial effusion, recent admission for PNA / Bacteremia   now admitted with sepsis, tachycardia ( afib with RVR ) and shock . Cardiology is called for elevated troponin     Elevated troponin   Demand ischemia   in setting of afib with RVR, shock, severe HCM and sepsis   stable now    Shock   resolved  off midodrine     Afib  as per EP   a/c recommended     HCM  on BB  fu with Dr Young to resume camzyos     Elevated D Dimer  Hypoxia  fu with pulm   obtain CTA rule out PE            Afib, severe HCM LVOT obstruction, Pericardial effusion, recent admission for PNA / Bacteremia   now admitted with sepsis, tachycardia ( afib with RVR ) and shock . Cardiology is called for elevated troponin     Elevated troponin   Demand ischemia   in setting of afib with RVR, shock, severe HCM and sepsis   stable now    Shock   resolved  off midodrine     Afib  as per EP   a/c recommended     HCM  on BB  fu with Dr Young to resume camzyos     Elevated D Dimer  Hypoxia  fu with pulm   obtain CTA rule out PE   possible D DImer very high from recent pericardial drain

## 2025-06-18 NOTE — PROGRESS NOTE ADULT - ASSESSMENT
89F c hx GERD, R breast CA (40-50y ago) s/p B/L mastectomy and radiation, RUE lymphedema, osteoarthritis/osteoporosis (on monthly ibandronate), HLD, Afib on eliquis, HOCM/severe LVOT/LORA, small-moderate pericardial effusion/RA diastolic collapse, recent hospitalization 5/29-6/5 for RML PNA, GBS bacteremia (pos cultures 5/29, 5/30) of unclear source on total 10 days abx (IV ceftriaxone inpt, then levaquin 750 q48h until 6/9), presents from [facility] rehab with shaking chills, hypotension, poor appetite  History from pt's daughter/primary decision maker Teresita Day at bedside.  While at [facility], pt found to have hypotension to SBP 80s. Pt was given IVF and reduced dose of metoprolol. Pt also found to have tachycardia, shaking chills. Pt sent back to the hospital. Reportedly pt has not been eating well, not urinating much. Denies CP, SOB, abd pain, diarrhea, cough, other respiratory symptoms. At baseline pt ambulates without assistive device, drives.  RRT called in the ed for hypotension to SBP 79. (08 Jun 2025 02:41)  to me pt daughter says she was very sleepy in the morning too  as she was given ambien at 4 AM in the morning:   now she is alert and awake and is on 2 L of oxygen ;  she is not sob:  and does not look to be in any resp distress:       Severe sepsis.   unclear source of fevers  cont empiric vanc, cefepime  f/u blood cx. if positive will likely need MARIA G  monitor for any localizing symptoms  pt had recent pan ct scan that did not elucidate cause of bacteremia  recent blood cultures have been negative   - IVF  on cefepime  she is febrile too   vbg on admission IS ok;   MONITOR BLOOD PRESSURE CLOSELY:   OIF SHE DROPS HER BLOOD PRESSURE MORE:  WOULD NEED MICU  CO NSULT:    6/9: seems slightly more tored today  ; cont antibtiocs:  per ID:  she remains on 2 L of oxygen : she is alert and awake:  daughter at bedside:  reviewed the labs and echo and ct scan chest with the daughter in detail :   she has large pericardial effusions:  per ir no good window and effusion seemd small to drain : ct scan chest read as mod to large pericardial effusion : defer to cards :     6/10: seems to be doing  ok :  no sob:   no  cough  ;   no  phlegm   on 2 L of oxygen :  thoracic to see:    no emergency in tapping the pleural effusion:   de cardiologist  6/11: on ceftriaxone   seems O K   6/12: seems OK:  s/p pericardial drain:  has 400 cc output   await cytology    6/13: cont c urrent rx:   on antibiotics  6/14: on ceftriaxone    6/15/l seems to be doing  ok ;  no sob;  no cough :   no phlegm      6/17; pulm wise she looks tachypneic today ; rpt limited echo with no change from before;  cxr done today with no significant change from before;   abg done today seems pretty reasonable   will do di mine;  last d dimer on 3rd was slightly high ;  if the d dimer has increased significantly  would do cta;  dw acp : her creat is normal:  she has been off ac for pericardial drain for some time;   on antibiotics   40 mg ov iv solumedrol given     6/18: HER D DIMER IS VERY HIGH :  going for cta;    depending upon the cta:  if she has significant pl effusion , would like to tap, if there is no pe    dw acp       Pericardial effusion ;   the ct chest shows increasing pericardial effusion  : which is of more pressing concern then pleural effusion :  needs a tap:  ir guided tap    6/12: as above   6/13: asked up to tap:  need to repeat inr and pt  last was  >  2.0:  needs to be less then 1.5:  ip contacted will evalute for tap    6/14; defer to p r imary team  6/16/: seems OK;'' pericardial effusion removed    6/17: limited echo today with no change   6/18: defer to cards : cyto is still pending       Hypotension.  suspect combination of infection, hocm, lora, tachycardia, pericardial effusion, metoprolol  pt has somewhat tenuous hemodynamic status  consider cardioversion, consider repeat TTE  pt is full code.  pts blood pressure is slightly low   on midodrine  6/9: cont on midodrine  6/10; controlled:  on midodrine  6/11: on midodrine  6/12: currently she is on vasopressors   6/13; off vasopressors now   6/14; blood pressure soft:  on midodrine   6/15: her blood pressure is pretty good:   6/17: blood pressure today is reasonable;   6/18: off midodrine and blood pressure is pretty good:  on metoprolol     Chronic atrial fibrillation.  reduce metoprolol dose to tartrate 25mg BID  goal HR <100 in setting of HOCM with hypotension  consider amiodarone or cardioversion  PER CARDS    6/9: defer to cards   6/10: off Eliquis for now   6/11: remains off eliquis   6/12: off ac  6/13: keep off ac if thoracentesis needed to be done over the weekend:  though it is not emergent   6/14" can restart ac:  if required as there is now no plan for thoracentesis:  dw attending   6/16; off ac as pericardial arnie is removed today   6/17; remains off ac:  restart as recommended by ir note  6/18: still off heparin :  cta awaited do urgent       Type 2 myocardial infarction.  suspect demand ischemia from recent hypotension, infections  start asa   cont home eliquis.  CONT CURRENT MEDS     HOCM (hypertrophic obstructive cardiomyopathy).  cont metoprolol as above. uptitrate as BP tolerates  per cards    Acute respiratory failure with hypoxia.   recent CT scans showing right bronchiectasis, right fibrosis likely 2/2 radiation, poss RML PNA.  cont BD :   ct chest showed: No pulmonary embolism. Small to moderate right and small left pleural effusions with associated  atelectasis. 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  Cardiomegaly. Moderate pericardial effusion.  needs echo     9/6: new echo reviewed:  seen by cardiology :  monitor her blood pressure closely:  if she drops her blood pressure call CCU     6/10: cont to manain o2 sao2 above 90% all the t hayden  6/11; n 2 L of oxygen    6/12: she is not in any resp distress    6/13: she is on rooma ir:  doing well :  satting at 93%  6/14: pulm wise seems pretty good;   on rooma ir;   no plan for tap now     6/16; she seems OK;  she is on 1 L of oxygen ; would suggest to repeat cxr   6/17; she is on 2 L of oxygen  :  satting at 96%    dw acp and son  at bedside   if she deteriorates further ;  call ccu/ MICU

## 2025-06-18 NOTE — PROGRESS NOTE ADULT - ASSESSMENT
89F c hx GERD, R breast CA (40-50y ago) s/p B/L mastectomy and radiation, RUE lymphedema, osteoarthritis/osteoporosis (on monthly ibandronate), HLD, Afib on eliquis, HOCM/severe LVOT/ELEAZAR, small-moderate pericardial effusion/RA diastolic collapse, recent hospitalization 5/29-6/5 for RML PNA, GBS bacteremia (pos cultures 5/29, 5/30) of unclear source on total 10 days abx (IV ceftriaxone inpt, then levaquin 750 q48h until 6/9), pw hypotension, severe sepsis of unclear source, demand ischemia, afib c rvr    Severe Sepsis/sirs, Hypotension 2/2 unclear source  Severe LVOT obstruction/HCM, worsening pericardial effusion  CXR w/ worsening aeration at the bases  Flu/COVID/RSV negative  UA negative  6/7 Bcx NGTD x2   Zosyn allergy noted, tolerates cefepime  CTAP with mod-large pericardial effusion inc since 5/29/25, stable mod R and small L pleural effusion and atelectasis   TTE with increased pericardial effusion since 6/5 -- moderate pericardial effusion adjacent to RV, small pericardial effusion adjacent to RA and large pericardia effusion noted adjacent to posterior LV with no evidence of tamponade physiology  6/10 note with tachypnea and expiratory wheeze, CCU consulted for decompensated heart failure iso mod-severe pericardial effusion with concerns for impending hemodynamic collapse given complex cardiac physiology with severe LVOT obstruction, now transferred to CICU for closer monitor and management of enlarging pericardial effusion  CTS eval noted-patient high risk for pericardial window - rec continue to monitor pericardial effusion with repeat TTE  6/11 CT chest with large pericardial effusion increased since 6/4, mod R and small L pleural effusion, no pneumonia;  cefepime changed to ceftriaxone d/t concern for possible neurotoxicity   6/12 hypotensive and tachycardic despite volume resuscitation -- s/p emergent pericardiocentesis with drain placed    - noted drained 400cc ss pericardial fluid, cell count noted, gram stain with no organisms, culture remains NGTD  6/16 s/p pericardial drain removal     s/p vancomycin 6/7-6/10  s/p cefepime 6/7-6/11    Recommendations:   Continue ceftriaxone 2g IV Q24h - plan to complete course on 6/20  Trend temps/WBC  Continue rest of care per primary team       Wes Grullon M.D.  [facility]  Available on [employer] TEAMS - *PREFERRED*  [phone]  After 5pm on weekdays and all day on weekends - please call [phone]     Thank you for consulting us and involving us in the management of this patients case. In addition to reviewing history, imaging, documents, labs, microbiology, took into account antibiotic stewardship, local antibiogram and infection control strategies and potential transmission issues at time of treatment decision making process.

## 2025-06-18 NOTE — CHART NOTE - NSCHARTNOTEFT_GEN_A_CORE
NUTRITION FOLLOW UP NOTE    PATIENT SEEN FOR: malnutrition follow-up    SOURCE: [] Patient  [x] Current Medical Record  [] RN  [x] Family/support person at bedside--son at bedside   [x] Patient unavailable/inappropriate--pt sleeping   [] Other:    CHART REVIEWED/EVENTS NOTED.  [] No changes to nutrition care plan to note  [x] Nutrition Status:  -pt admitted with hypotension, severe sepsis of unclear source, demand ischemia, on antibiotics   -on IV antibiotics   -transferred from ICU to floor on      DIET ORDER:   Diet, Regular:   Supplement Feeding Modality:  Oral  Ensure Plus High Protein Cans or Servings Per Day:  3       Frequency:  Three Times a day (25)      CURRENT DIET ORDER IS:  [x] Appropriate:  [] Inadequate:  [] Other:    NUTRITION INTAKE/PROVISION:  [x] PO: Son reports pt with poor appetite, didn't want much for breakfast, consumed ~ 1/2 sandwich for lunch. Sipping on Ensure shakes though having < 1 per day. Encouraged intake with after or between meals to help supplement PO intake.,   [] Enteral Nutrition:  [] Parenteral Nutrition:    ANTHROPOMETRICS:  Drug Dosing Weight  Height (cm): 157.5 (2025 08:27)  Weight (kg): 63.1 (2025 08:27)  BMI (kg/m2): 25.4 (2025 08:27)  BSA (m2): 1.64 (2025 08:27)  Weights:   Daily Weight in k.9 (), Weight in k.2 ()     MEDICATIONS:  MEDICATIONS  (STANDING):  cefTRIAXone   IVPB 2000 milliGRAM(s) IV Intermittent every 24 hours  levalbuterol Inhalation 0.63 milliGRAM(s) Inhalation every 6 hours  melatonin 5 milliGRAM(s) Oral at bedtime  metoprolol tartrate 50 milliGRAM(s) Oral every 6 hours  pantoprazole    Tablet 40 milliGRAM(s) Oral before breakfast  zolpidem 5 milliGRAM(s) Oral at bedtime    MEDICATIONS  (PRN):  albuterol/ipratropium for Nebulization 3 milliLiter(s) Nebulizer every 6 hours PRN Shortness of Breath and/or Wheezing  ipratropium    for Nebulization 500 MICROGram(s) Nebulizer every 6 hours PRN Shortness of Breath and/or Wheezing      NUTRITIONALLY PERTINENT LABS:   Na136 mmol/L Glu 145 mg/dL[H] K+ 5.6 mmol/L[H] Cr  0.58 mg/dL BUN 25 mg/dL[H]  Phos 2.9 mg/dL  Alb 2.4 g/dL[L]  Chol 129 mg/dL LDL --    HDL 24 mg/dL[L] Trig 80 mg/dL ALT 18 U/L AST 27 U/L Alkaline Phosphatase 127 U/L[H]  25 @ 00:57 a1c 6.1    A1C with Estimated Average Glucose Result: 6.1 % (25 @ 00:57)    Finger Sticks:      NUTRITIONALLY PERTINENT MEDICATIONS/LABS:  [x] Reviewed  [x] Relevant notes on medications/labs: hyperkalemia noted, will continue to monitor.     EDEMA:  [x] Reviewed  [x] Relevant notes: +4 right arm     GI/ I&O:  [x] Reviewed  [] Relevant notes:  [] Other: last bowel movement     SKIN:   [x] No pressure injuries documented, per nursing flowsheet  [] Pressure injury previously noted  [] Change in pressure injury documentation:  [] Other:    ESTIMATED NEEDS:  [x] No change:  [] Updated:  Energy: 1798-2572 kcal/day (27-32 kcal/kg)  Protein: 67.9-84.9 g/day (1.2-1.5g/kg)  Fluid:   ml/day or [x] defer to team  Based on: dosing weight 56.5Kg       NUTRITION DIAGNOSIS:  [x] Prior Dx: Severe acute malnutrition   [] New Dx:    EDUCATION:  [x Yes: Discussed with son importance of PO intake and prioritizing sources of protein to maintain lean body mass. Encourage intake of nutrition supplements. Obtained food preferences  [] Not appropriate/warranted    NUTRITION CARE PLAN:  1. Diet: Continue regular diet as tolerated  2. Supplements: Continue Ensure Plus High Protein shakes TID, monitor intake and need to adjust   3. Multivitamin/mineral supplementation: consider addition of multivitamin daily if no contraindications   4: Obtain/honor food preferences as able     MONITORING AND EVALUATION:   RD remains available upon request and will follow up per protocol.    Domi Alvarado, RD, CDN, CDCES, Available on Teams

## 2025-06-18 NOTE — PROGRESS NOTE ADULT - SUBJECTIVE AND OBJECTIVE BOX
EP Attending  HISTORY OF PRESENT ILLNESS: HPI:  89F c hx GERD, R breast CA (40-50y ago) s/p B/L mastectomy and radiation, RUE lymphedema, osteoarthritis/osteoporosis (on monthly ibandronate), HLD, Afib on eliquis, HOCM/severe LVOT/ELEAZAR, small-moderate pericardial effusion/RA diastolic collapse, recent hospitalization - for RML PNA, GBS bacteremia (pos cultures , ) of unclear source on total 10 days abx (IV ceftriaxone inpt, then levaquin 750 q48h until ), presents from [facility] rehab with shaking chills, hypotension, poor appetite    History from pt's daughter/primary decision maker Teresitagisel Day at bedside.  While at [facility], pt found to have hypotension to SBP 80s. Pt was given IVF and reduced dose of metoprolol. Pt also found to have tachycardia, shaking chills. Pt sent back to the hospital. Reportedly pt has not been eating well, not urinating much. Denies CP, SOB, abd pain, diarrhea, cough, other respiratory symptoms. At baseline pt ambulates without assistive device, drives.  RRT called in the ed for hypotension to SBP 79. (2025 02:41)    Ms Cloud is a pleasant 89yo woman here with shortness of breath.  Sees Dr Adolfo Leung for Cardiology.  Being managed on this inpatient stay by Dr Coelho.  Known to Dr Young after outpatient referral for HOCM management (lVOT gradient 80s-100mmHg+), and had a brief trial of Mavacamten, stopped for feelings of "leg heaviness".    EP called re: rapid AFib, refractory to low-dose metoprolol thus far, and complicated by management of HCM and new/worsening pericardial effusion.  Resting comfortably in bed on supplemental O2.  SOB and fatigued but no palpitations or fainting.  A 10 pt ROS is otherwise negative.    - moved to CCU for closer monitoring.  lethargic / unable to obtain ROS today.  worsening pleural effusion, no plan for tap.  no plan for pericardiocentesis either.  hypotensive requiring midodrine.  - s/p urgent pericardiocentesis via Interventional Radiology/ lateral thorax approach.  Feels much better- more energetic, has an appetite, more talkative.  - resting in bed in CICU. on phenylephrine today.  no new complaints. feeling better overall after pericardiocentesis.  awaiting possible thoracentesis?  - resting in chair. no new complaints.  slow drainage out of pericardial drain.  AFib HR now contrlled for first time on this stay.  6/15- resting in recliner, poor appetite. no new issues today.  - resting in recliner, trying to eat breakfast today.  HR 110bpm, up-titrated to 50q6 metoprolol last night.  - resting in bed, no new issues.  Date of service - resting in bed, tachypneic, poor appetite, anxious.      PAST MEDICAL & SURGICAL HISTORY:  Breast cancer  s/p b/l mastectomy  H/O bilateral mastectomy  + breast implants  History of cataract surgery, unspecified laterality    albuterol/ipratropium for Nebulization 3 milliLiter(s) Nebulizer every 6 hours PRN  cefTRIAXone   IVPB 2000 milliGRAM(s) IV Intermittent every 24 hours  ipratropium    for Nebulization 500 MICROGram(s) Nebulizer every 6 hours PRN  levalbuterol Inhalation 0.63 milliGRAM(s) Inhalation every 6 hours  melatonin 5 milliGRAM(s) Oral at bedtime  metoprolol tartrate 50 milliGRAM(s) Oral every 6 hours  pantoprazole    Tablet 40 milliGRAM(s) Oral before breakfast  zolpidem 5 milliGRAM(s) Oral at bedtime                       10.5   4.48  )-----------( 310      ( 2025 07:27 )             34.0           136  |  101  |  25[H]  ----------------------------<  145[H]  5.6[H]   |  19[L]  |  0.58    Ca    9.0      2025 07:15    T(C): 36.3 (25 @ 12:37), Max: 36.9 (25 @ 18:24)  HR: 115 (25 @ 12:37) (115 - 126)  BP: 153/85 (25 @ 12:37) (142/87 - 170/96)  RR: 20 (25 @ 12:37) (17 - 23)  SpO2: 97% (25 @ 12:37) (96% - 97%)  Wt(kg): --    I&O's Summary    2025 07:01  -  2025 07:00  --------------------------------------------------------  IN: 0 mL / OUT: 200 mL / NET: -200 mL      Appearance: frail elderly woman. tachypneic.  HEENT:   Normal oral mucosa, PERRL, EOMI	  Lymphatic: No lymphadenopathy , no edema  Cardiovascular: rapid irregular S1 S2, No JVD, No murmurs , Peripheral pulses palpable 2+ bilaterally.  s/p pericardial drain placement from left chest.  Respiratory: poor air entry BL  normal effort 	  Gastrointestinal:  Soft, Non-tender, + BS	  Skin: No rashes, No ecchymoses, No cyanosis, warm to touch  Musculoskeletal: Normal range of motion, normal strength  Psychiatry:  Mood & affect appropriate    TELEMETRY: AFib, narrow QRS, 110bpm at rest  ECG:  	AFib, atypical LVHypertrphy  Echo:  < from: TTE W or WO Ultrasound Enhancing Agent (25 @ 07:16) >  CONCLUSIONS:      1. Limited TTE to assess for pericardial effusion.   2. Trace pericardial effusion noted adjacent to the posterolateral left ventricle, small pericardial effusion noted adjacent to the anterior right ventricle and small pericardial effusion noted adjacent to the right atrium.   3. The inferior vena cava is normal in size measuring 1.70 cm in diameter, (normal <2.1cm) with normal inspiratory collapse (normal >50%) consistent with normal right atrial pressure (~3, range 0-5mmHg).   4. Compared to the transthoracic echocardiogram performed on 2025, the right atrium is not as well visualized on this study. There is right atrial diastolic collapse visualized in the 4 chamber view but unable to quantify the duration of the collapse. The effusion appears unchanged in size and distribution. No other signs of cardiac tamponade are present.    < end of copied text >    CT Chest - personally reviewed the images.  right breat prosthesis with signs of rupture.  left breast prosthesis OK.  prominent LV hypertrophy visible.  pericardial effusion enlarged.  pleural effusion present on the right.  	  ASSESSMENT/PLAN: Ms Cloud is a pleasant 89y Female here with shortness of breath.    Multifactorial in the setting of pericardial effusion (treated w/ drainage), Hypertrophic Cardiomyopathy with severe LVOT obstruction, and rapid AFib (on maximal beta blocker dose).  [id number] is at least 3.  Apixaban on hold for now.  Plan to resume it if no further invasive procedures are planned.  Pericardial drain removed.  Continue metoprolol 50mg q6hrs.  Goal HR <100bpm at rest.  Continue alpha-agonists to support her blood pressure.  Discussed w/ Dr Young, and agree w/ recs to avoid diltiazem and other vasodilators, and to avoid diuretics.  Awaiting CTA- Pulmonary Artery results re: pleural effusions or pulmonary emboli.  Family anxious about patient's ongoing tachypnea and stalled improvement despite adherence to vigorous medical/procedural plan of care.  Counselled that she is old and quite frail... 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  Overall her prognosis remains guarded.  Outpatient, needs to re-enroll with a HCM specialist.  Strongly recommend outpatient re-trial of Camzyos, even at the lowest dose.  This cannot be started in the hospital.  There is a possibility- albeit small - for Cardioversion before discharge.  Only if still symptomatic after everything else is optimized, and she is able to maintain uninterrupted anticoagulation.        Barney Lau M.D.  Cardiac Electrophysiology    office [phone]  pager [phone]

## 2025-06-18 NOTE — PROGRESS NOTE ADULT - SUBJECTIVE AND OBJECTIVE BOX
Subjective: Patient seen and examined. No new events except as noted.     SUBJECTIVE/ROS:  MEDICATIONS:  MEDICATIONS  (STANDING):  cefTRIAXone   IVPB 2000 milliGRAM(s) IV Intermittent every 24 hours  levalbuterol Inhalation 0.63 milliGRAM(s) Inhalation every 6 hours  melatonin 5 milliGRAM(s) Oral at bedtime  metoprolol tartrate 50 milliGRAM(s) Oral every 6 hours  pantoprazole    Tablet 40 milliGRAM(s) Oral before breakfast  zolpidem 5 milliGRAM(s) Oral at bedtime      PHYSICAL EXAM:  T(C): 36.1 (06-18-25 @ 04:00), Max: 36.9 (06-17-25 @ 18:24)  HR: 115 (06-18-25 @ 04:00) (115 - 126)  BP: 170/96 (06-18-25 @ 04:00) (138/75 - 170/96)  RR: 17 (06-18-25 @ 04:00) (17 - 23)  SpO2: 96% (06-18-25 @ 04:00) (96% - 97%)  Wt(kg): --  I&O's Summary    17 Jun 2025 07:01  -  18 Jun 2025 07:00  --------------------------------------------------------  IN: 0 mL / OUT: 200 mL / NET: -200 mL            JVP: Normal  Neck: supple  Lung: clear   CV: S1 S2 , pos jelani   Abd: soft  Ext: No edema  neuro: Awake  Psych: flat affect  Skin: normal``    LABS/DATA:    CARDIAC MARKERS:                  proBNP:   Lipid Profile:   HgA1c:   TSH:

## 2025-06-18 NOTE — PROGRESS NOTE ADULT - ASSESSMENT
89F c hx GERD, R breast CA (40-50y ago) s/p B/L mastectomy and radiation, RUE lymphedema, osteoarthritis/osteoporosis (on monthly ibandronate), HLD, Afib on eliquis, HOCM/severe LVOT/ELEAZAR, small-moderate pericardial effusion/RA diastolic collapse, recent hospitalization 5/29-6/5 for RML PNA, GBS bacteremia (pos cultures 5/29, 5/30) of unclear source on total 10 days abx (IV ceftriaxone inpt, then levaquin 750 q48h until 6/9), pw hypotension, severe sepsis of unclear source, demand ischemia, afib c rvr      Problem/Plan - 1:  ·  Problem: Severe sepsis.   ·  Plan: -   - ID fu   - abx as per ID     Problem/Plan - 2:  ·  Problem: Pericardial effusion   ·  Plan: - sp CCU stay and drain   check cT chest today   Problem/Plan - 3:  ·  Problem: Chronic atrial fibrillation.  ·  Plan: - reduce metoprolol dose to tartrate 25mg BID  - goal HR <100 in setting of HOCM with hypotension  - EP fu   - restart AC once ok with CTS , hold for now till CT chest is done   - dw EP attending     Problem/Plan - 4:  ·  Problem: Type 2 myocardial infarction.  ·  Plan: - tele  - start asa   - hold eliquis  - dw cards     Problem/Plan - 5:  ·  Problem: HOCM (hypertrophic obstructive cardiomyopathy).  ·  Plan: - cont metoprolol as above. uptitrate as BP tolerates      dw son at bedside for 40 min

## 2025-06-18 NOTE — PROGRESS NOTE ADULT - SUBJECTIVE AND OBJECTIVE BOX
Date of Service: 06-18-25 @ 16:09    Patient is a 89y old  Female who presents with a chief complaint of shaking chills, hypotension, poor appetite (18 Jun 2025 14:17)      Any change in ROS: son at bedside:   she seems weak and sleepy:  earlier was awake:  yesterday abg WAS GOOD:       MEDICATIONS  (STANDING):  cefTRIAXone   IVPB 2000 milliGRAM(s) IV Intermittent every 24 hours  levalbuterol Inhalation 0.63 milliGRAM(s) Inhalation every 6 hours  melatonin 5 milliGRAM(s) Oral at bedtime  metoprolol tartrate 50 milliGRAM(s) Oral every 6 hours  pantoprazole    Tablet 40 milliGRAM(s) Oral before breakfast  zolpidem 5 milliGRAM(s) Oral at bedtime    MEDICATIONS  (PRN):  albuterol/ipratropium for Nebulization 3 milliLiter(s) Nebulizer every 6 hours PRN Shortness of Breath and/or Wheezing  ipratropium    for Nebulization 500 MICROGram(s) Nebulizer every 6 hours PRN Shortness of Breath and/or Wheezing    Vital Signs Last 24 Hrs  T(C): 36.3 (18 Jun 2025 12:37), Max: 36.9 (17 Jun 2025 18:24)  T(F): 97.4 (18 Jun 2025 12:37), Max: 98.5 (17 Jun 2025 20:31)  HR: 115 (18 Jun 2025 12:37) (115 - 126)  BP: 153/85 (18 Jun 2025 12:37) (142/87 - 170/96)  BP(mean): --  RR: 20 (18 Jun 2025 12:37) (17 - 23)  SpO2: 97% (18 Jun 2025 12:37) (96% - 97%)    Parameters below as of 18 Jun 2025 12:37  Patient On (Oxygen Delivery Method): nasal cannula  O2 Flow (L/min): 2      I&O's Summary    17 Jun 2025 07:01  -  18 Jun 2025 07:00  --------------------------------------------------------  IN: 0 mL / OUT: 200 mL / NET: -200 mL          Physical Exam:   GENERAL: NAD, well-groomed, well-developed  HEENT: BRYSON/   Atraumatic, Normocephalic  ENMT: No tonsillar erythema, exudates, or enlargement; Moist mucous membranes, Good dentition, No lesions  NECK: Supple, No JVD, Normal thyroid  CHEST/LUNG: DECREASED AIR ENTRY BILATERALLY;   CVS: Regular rate and rhythm; No murmurs, rubs, or gallops  GI: : Soft, Nontender, Nondistended; Bowel sounds present  NERVOUS SYSTEM: SLEEPY   EXTREMITIES:  -edema  LYMPH: No lymphadenopathy noted  SKIN: No rashes or lesions  ENDOCRINOLOGY: No Thyromegaly  PSYCH: CALM     Labs:  ABG - ( 17 Jun 2025 14:00 )  pH, Arterial: 7.42  pH, Blood: x     /  pCO2: 45    /  pO2: 82    / HCO3: 29    / Base Excess: 4.0   /  SaO2: 98.2            36.0                            10.5   4.48  )-----------( 310      ( 18 Jun 2025 07:27 )             34.0                         10.3   3.08  )-----------( 287      ( 16 Jun 2025 01:19 )             33.1                         10.1   3.11  )-----------( 431      ( 15 Ernesto 2025 00:48 )             32.4     06-18    136  |  101  |  25[H]  ----------------------------<  145[H]  5.6[H]   |  19[L]  |  0.58  06-16    134[L]  |  101  |  20  ----------------------------<  109[H]  4.6   |  22  |  0.63  06-15    132[L]  |  101  |  22  ----------------------------<  112[H]  5.3   |  22  |  0.68  06-15    134[L]  |  102  |  22  ----------------------------<  110[H]  5.5[H]   |  20[L]  |  0.58    Ca    9.0      18 Jun 2025 07:15    TPro  6.8  /  Alb  2.4[L]  /  TBili  0.4  /  DBili  x   /  AST  27  /  ALT  18  /  AlkPhos  127[H]  06-16  TPro  6.5  /  Alb  2.3[L]  /  TBili  0.3  /  DBili  x   /  AST  35  /  ALT  19  /  AlkPhos  129[H]  06-15  TPro  6.8  /  Alb  2.5[L]  /  TBili  0.3  /  DBili  x   /  AST  41[H]  /  ALT  20  /  AlkPhos  137[H]  06-15    CAPILLARY BLOOD GLUCOSE              Urinalysis Basic - ( 18 Jun 2025 07:15 )    Color: x / Appearance: x / SG: x / pH: x  Gluc: 145 mg/dL / Ketone: x  / Bili: x / Urobili: x   Blood: x / Protein: x / Nitrite: x   Leuk Esterase: x / RBC: x / WBC x   Sq Epi: x / Non Sq Epi: x / Bacteria: x      D-Dimer Assay, Quantitative: 5056 ng/mL DDU (06-18 @ 07:17)  Fluid Source --  Albumin, Fluid1.9 g/dL  Glucose, [id number] mg/dL  Protein total, Fluid4.3 g/dL  Lacatate Dehydrogenase, [id number] U/L  pH, Fluid--  Cytopathology-Non Gyn Report--        RECENT CULTURES:  06-12 @ 14:05 Pericardial Pericardial Fluid       polymorphonuclear leukocytes seen by cytocentrifuge  No organisms seen by cytocentrifuge           No growth    RAD< from: Xray Chest 1 View- PORTABLE-Urgent (Xray Chest 1 View- PORTABLE-Urgent .) (06.17.25 @ 13:39) >  No acute osseous abnormalities    IMPRESSION:  Bilateral lower lobe patchy airspace opacities, grossly unchanged.  Bilateral small pleural effusions with associated atelectasis, right   greater than left.    --- End of Report ---          HAMIDA TORRE MD; Resident Radiologist  This document has been electronically signed.  STEFANY GLOVER MD; Attending Radiologist  This document has been electronically signed. Jun 17 2025 10:03PM    < end of copied text >        RESPIRATORY CULTURES:          Studies  Chest X-RAY  CT SCAN Chest   Venous Dopplers: LE:   CT Abdomen  Others

## 2025-06-18 NOTE — PROGRESS NOTE ADULT - SUBJECTIVE AND OBJECTIVE BOX
[facility]  BASIL Hooks Y. Patel, S. Shah, G. Casimir  [phone]  ([phone] - weekdays after 5pm and weekends)    Name: GINO GRAHAM  Age/Gender: 89y Female  MRN: [id number]    Interval History:  Patient seen and examined this morning.   No new complaints noted.  Notes reviewed  No concerning overnight events  Afebrile   Allergies: Zosyn (Rash; Urticaria; Hives)      Objective:  Vitals:   T(F): 97 (06-18-25 @ 04:00), Max: 98.5 (06-17-25 @ 20:31)  HR: 115 (06-18-25 @ 04:00) (115 - 126)  BP: 170/96 (06-18-25 @ 04:00) (138/75 - 170/96)  RR: 17 (06-18-25 @ 04:00) (17 - 23)  SpO2: 96% (06-18-25 @ 04:00) (96% - 97%)  Physical Examination:  General: no acute distress, NC   HEENT: normocephalic, atraumatic, anicteric  Respiratory: no acc muscle use, breathing comfortably  Cardiovascular: S1 and S2 present  Gastrointestinal: normal appearing, nondistended  Extremities: no edema, no cyanosis  Skin: no visible rash    Laboratory Studies:  CBC:                       10.5   4.48  )-----------( 310      ( 18 Jun 2025 07:27 )             34.0     WBC Trend:  4.48 06-18-25 @ 07:27  3.08 06-16-25 @ 01:19  3.11 06-15-25 @ 00:48  2.70 06-14-25 @ 00:29  2.07 06-13-25 @ 00:19  3.40 06-12-25 @ 01:26    CMP: 06-18    136  |  101  |  25[H]  ----------------------------<  145[H]  5.6[H]   |  19[L]  |  0.58    Ca    9.0      18 Jun 2025 07:15      Creatinine: 0.58 mg/dL (06-18-25 @ 07:15)  Creatinine: 0.63 mg/dL (06-16-25 @ 01:19)  Creatinine: 0.68 mg/dL (06-15-25 @ 02:11)  Creatinine: 0.58 mg/dL (06-15-25 @ 00:48)  Creatinine: 0.68 mg/dL (06-14-25 @ 00:29)  Creatinine: 0.89 mg/dL (06-13-25 @ 00:19)  Creatinine: 0.65 mg/dL (06-12-25 @ 12:45)  Creatinine: 0.70 mg/dL (06-12-25 @ 05:33)  Creatinine: 0.81 mg/dL (06-12-25 @ 01:26)    Microbiology: reviewed   Culture - Fungal, Body Fluid (collected 06-12-25 @ 14:05)  Source: Pericardial Pericardial Fluid  Preliminary Report (06-15-25 @ 08:16):    No growth    Culture - Body Fluid with Gram Stain (collected 06-12-25 @ 14:05)  Source: Pericardial Other  Gram Stain (06-13-25 @ 00:20):    polymorphonuclear leukocytes seen by cytocentrifuge    No organisms seen by cytocentrifuge  Final Report (06-17-25 @ 16:17):    No growth at 5 days    Culture - Acid Fast - Body Fluid w/Smear (collected 06-12-25 @ 14:05)  Source: Pericardial Other  Preliminary Report (06-14-25 @ 23:07):    Culture is being performed.    Urinalysis with Rflx Culture (collected 06-08-25 @ 13:01)    Culture - Blood (collected 06-07-25 @ 20:45)  Source: Blood Blood-Peripheral  Final Report (06-13-25 @ 02:01):    No growth at 5 days    Culture - Blood (collected 06-07-25 @ 20:30)  Source: Blood Blood-Peripheral  Final Report (06-13-25 @ 02:01):    No growth at 5 days    Radiology: reviewed     Medications:  albuterol/ipratropium for Nebulization 3 milliLiter(s) Nebulizer every 6 hours PRN  cefTRIAXone   IVPB 2000 milliGRAM(s) IV Intermittent every 24 hours  ipratropium    for Nebulization 500 MICROGram(s) Nebulizer every 6 hours PRN  levalbuterol Inhalation 0.63 milliGRAM(s) Inhalation every 6 hours  melatonin 5 milliGRAM(s) Oral at bedtime  metoprolol tartrate 50 milliGRAM(s) Oral every 6 hours  pantoprazole    Tablet 40 milliGRAM(s) Oral before breakfast  zolpidem 5 milliGRAM(s) Oral at bedtime    Current Antimicrobials:  cefTRIAXone   IVPB 2000 milliGRAM(s) IV Intermittent every 24 hours    Prior/Completed Antimicrobials:  cefepime   IVPB  vancomycin  IVPB.

## 2025-06-19 NOTE — PROGRESS NOTE ADULT - SUBJECTIVE AND OBJECTIVE BOX
Date of Service: 06-19-25 @ 16:06    Patient is a 89y old  Female who presents with a chief complaint of shaking chills, hypotension, poor appetite (19 Jun 2025 10:38)      Any change in ROS: seems OK; no sob:   no cough ;  no phlegm : no sob: on 2 L of oxygen      MEDICATIONS  (STANDING):  cefTRIAXone   IVPB 2000 milliGRAM(s) IV Intermittent every 24 hours  levalbuterol Inhalation 0.63 milliGRAM(s) Inhalation every 6 hours  melatonin 5 milliGRAM(s) Oral at bedtime  metoprolol tartrate 50 milliGRAM(s) Oral every 6 hours  pantoprazole    Tablet 40 milliGRAM(s) Oral before breakfast  zolpidem 5 milliGRAM(s) Oral at bedtime    MEDICATIONS  (PRN):  albuterol/ipratropium for Nebulization 3 milliLiter(s) Nebulizer every 6 hours PRN Shortness of Breath and/or Wheezing  heparin   Injectable 5000 Unit(s) IV Push every 6 hours PRN For aPTT less than 40  heparin   Injectable 2500 Unit(s) IV Push every 6 hours PRN For aPTT between 40 - 57  ipratropium    for Nebulization 500 MICROGram(s) Nebulizer every 6 hours PRN Shortness of Breath and/or Wheezing    Vital Signs Last 24 Hrs  T(C): 36.2 (19 Jun 2025 11:45), Max: 36.3 (18 Jun 2025 20:09)  T(F): 97.2 (19 Jun 2025 11:45), Max: 97.4 (18 Jun 2025 20:09)  HR: 79 (19 Jun 2025 11:45) (79 - 121)  BP: 140/78 (19 Jun 2025 11:45) (129/75 - 140/78)  BP(mean): --  RR: 18 (19 Jun 2025 11:45) (18 - 18)  SpO2: 95% (19 Jun 2025 11:45) (95% - 98%)    Parameters below as of 19 Jun 2025 11:45  Patient On (Oxygen Delivery Method): nasal cannula  O2 Flow (L/min): 2      I&O's Summary        Physical Exam:   GENERAL: cachecic  HEENT: BRYSON/   Atraumatic, Normocephalic  ENMT: No tonsillar erythema, exudates, or enlargement; Moist mucous membranes, Good dentition, No lesions  NECK: Supple, No JVD, Normal thyroid  CHEST/LUNG: decreased air entry rigth side   CVS: Regular rate and rhythm; No murmurs, rubs, or gallops  GI: : Soft, Nontender, Nondistended; Bowel sounds present  NERVOUS SYSTEM:  Alert & Oriented X3  EXTREMITIES: +edema  LYMPH: No lymphadenopathy noted  SKIN: No rashes or lesions  ENDOCRINOLOGY: No Thyromegaly  PSYCH: Appropriate    Labs:                              9.8    5.80  )-----------( 309      ( 19 Jun 2025 04:24 )             31.9                         10.5   4.48  )-----------( 310      ( 18 Jun 2025 07:27 )             34.0                         10.3   3.08  )-----------( 287      ( 16 Jun 2025 01:19 )             33.1     06-19    136  |  102  |  31[H]  ----------------------------<  108[H]  5.2   |  26  |  0.63  06-18    x   |  x   |  x   ----------------------------<  x   5.0   |  x   |  x   06-18    136  |  101  |  25[H]  ----------------------------<  145[H]  5.6[H]   |  19[L]  |  0.58  06-16    134[L]  |  101  |  20  ----------------------------<  109[H]  4.6   |  22  |  0.63    Ca    8.8      19 Jun 2025 04:24  Ca    9.0      18 Jun 2025 07:15    TPro  6.9  /  Alb  x   /  TBili  x   /  DBili  x   /  AST  x   /  ALT  x   /  AlkPhos  x   06-19  TPro  6.8  /  Alb  2.4[L]  /  TBili  0.4  /  DBili  x   /  AST  27  /  ALT  18  /  AlkPhos  127[H]  06-16    CAPILLARY BLOOD GLUCOSE         (06-12 @ 09:59)    LIVER FUNCTIONS - ( 19 Jun 2025 04:24 )  Alb: x     / Pro: 6.9 g/dL / ALK PHOS: x     / ALT: x     / AST: x     / GGT: x           PT/INR - ( 18 Jun 2025 20:44 )   PT: 14.8 sec;   INR: 1.30 ratio         PTT - ( 19 Jun 2025 04:24 )  PTT:55.7 sec  Urinalysis Basic - ( 19 Jun 2025 04:24 )    Color: x / Appearance: x / SG: x / pH: x  Gluc: 108 mg/dL / Ketone: x  / Bili: x / Urobili: x   Blood: x / Protein: x / Nitrite: x   Leuk Esterase: x / RBC: x / WBC x   Sq Epi: x / Non Sq Epi: x / Bacteria: x      D-Dimer Assay, Quantitative: 5056 ng/mL DDU (06-18 @ 07:17)        RECENT CULTURES:        RESPIRATORY CULTURES:      rad< from: CT Angio Chest PE Protocol w/ IV Cont (06.18.25 @ 19:40) >  unremarkable. There is no pneumothorax.    HEART AND VESSELS: The heart is normal in size.   There are   atherosclerotic calcifications of the aorta and coronary arteries.  There   is no pericardial effusion.    UPPER ABDOMEN: Images of the upper abdomen demonstrate no abnormality.    BONES AND SOFT TISSUES: The bonesare unremarkable.  Bilateral saline   breast implants with intracapsular rupture and peripheral capsular   calcification, more prominent in the right breast.    IMPRESSION:  No pulmonary embolism.    Large loculated right pleural effusion with partial atelectasis right   lung. Small left pleural effusion. Post radiation changes deep to the   right breast. Patchy biapical lung opacities similar to most recent study.        --- End of Report ---          ALEXI SOLO MD; Resident Radiologist  This document has been electronically signed.  JAMISON MOBLEY MD; Attending Radiologist  This document has been electronically signed. Jun 19 2025  7:34AM    < end of copied text >      Studies  Chest X-RAY  CT SCAN Chest   Venous Dopplers: LE:   CT Abdomen  Others

## 2025-06-19 NOTE — PROGRESS NOTE ADULT - ASSESSMENT
89F c hx GERD, R breast CA (40-50y ago) s/p B/L mastectomy and radiation, RUE lymphedema, osteoarthritis/osteoporosis (on monthly ibandronate), HLD, Afib on eliquis, HOCM/severe LVOT/ELEAZAR, small-moderate pericardial effusion/RA diastolic collapse, recent hospitalization 5/29-6/5 for RML PNA, GBS bacteremia (pos cultures 5/29, 5/30) of unclear source on total 10 days abx (IV ceftriaxone inpt, then levaquin 750 q48h until 6/9), pw hypotension, severe sepsis of unclear source, demand ischemia, afib c rvr    Severe Sepsis/sirs, Hypotension 2/2 unclear source  Severe LVOT obstruction/HCM, worsening pericardial effusion  CXR w/ worsening aeration at the bases  Flu/COVID/RSV negative  UA negative  6/7 Bcx negative   Zosyn allergy noted, tolerates cephalosporins   CTAP with mod-large pericardial effusion inc since 5/29/25, stable mod R and small L pleural effusion and atelectasis   TTE with increased pericardial effusion since 6/5 -- moderate pericardial effusion adjacent to RV, small pericardial effusion adjacent to RA and large pericardia effusion noted adjacent to posterior LV with no evidence of tamponade physiology  6/10 note with tachypnea and expiratory wheeze, CCU consulted for decompensated heart failure iso mod-severe pericardial effusion with concerns for impending hemodynamic collapse given complex cardiac physiology with severe LVOT obstruction, now transferred to CICU for closer monitor and management of enlarging pericardial effusion  CTS eval noted-patient high risk for pericardial window - rec continue to monitor pericardial effusion with repeat TTE  6/11 CT chest with large pericardial effusion increased since 6/4, mod R and small L pleural effusion, no pneumonia;  cefepime changed to ceftriaxone d/t concern for possible neurotoxicity   6/12 hypotensive and tachycardic despite volume resuscitation -- s/p emergent pericardiocentesis with drain placed    - noted drained 400cc ss pericardial fluid, cell count noted, gram stain with no organisms, culture remains NGTD  6/16 s/p pericardial drain removal   6/18 CTA chest with no PE; large loculated R pleural effusion with partial atelectasis R lung, small L pleural effusion, post radiation changes deep to R breast, stable patchy biapical lung opacities     s/p vancomycin 6/7-6/10  s/p cefepime 6/7-6/11    Recommendations:   Continue ceftriaxone 2g IV Q24h - plan to complete course on 6/20  Pulmonary following for thoracocentesis   Trend temps/WBC  Continue rest of care per primary team       Wes Grullon M.D.  [facility]  Available on [employer] TEAMS - *PREFERRED*  [phone]  After 5pm on weekdays and all day on weekends - please call [phone]     Thank you for consulting us and involving us in the management of this patients case. In addition to reviewing history, imaging, documents, labs, microbiology, took into account antibiotic stewardship, local antibiogram and infection control strategies and potential transmission issues at time of treatment decision making process.

## 2025-06-19 NOTE — PROGRESS NOTE ADULT - SUBJECTIVE AND OBJECTIVE BOX
Subjective: Patient seen and examined. No new events except as noted.     SUBJECTIVE/ROS:  nad    MEDICATIONS:  MEDICATIONS  (STANDING):  heparin  Infusion.  Unit(s)/Hr (11 mL/Hr) IV Continuous <Continuous>  levalbuterol Inhalation 0.63 milliGRAM(s) Inhalation every 6 hours  melatonin 5 milliGRAM(s) Oral at bedtime  metoprolol tartrate 50 milliGRAM(s) Oral every 6 hours  pantoprazole    Tablet 40 milliGRAM(s) Oral before breakfast  zolpidem 5 milliGRAM(s) Oral at bedtime      PHYSICAL EXAM:  T(C): 36.2 (06-19-25 @ 04:55), Max: 36.3 (06-18-25 @ 12:37)  HR: 99 (06-19-25 @ 04:55) (99 - 121)  BP: 129/75 (06-19-25 @ 04:55) (129/75 - 153/85)  RR: 18 (06-19-25 @ 04:55) (18 - 20)  SpO2: 98% (06-19-25 @ 04:55) (96% - 98%)  Wt(kg): --  I&O's Summary          JVP: Normal  Neck: supple  Lung: clear   CV: S1 S2 ,  Abd: soft  Ext: No edema  Psych: flat affect  Skin: normal``    LABS/DATA:    CARDIAC MARKERS:                                9.8    5.80  )-----------( 309      ( 19 Jun 2025 04:24 )             31.9     06-19    136  |  102  |  31[H]  ----------------------------<  108[H]  5.2   |  26  |  0.63    Ca    8.8      19 Jun 2025 04:24    TPro  6.9  /  Alb  x   /  TBili  x   /  DBili  x   /  AST  x   /  ALT  x   /  AlkPhos  x   06-19    proBNP:   Lipid Profile:   HgA1c:   TSH:

## 2025-06-19 NOTE — PROGRESS NOTE ADULT - SUBJECTIVE AND OBJECTIVE BOX
EP Attending  HISTORY OF PRESENT ILLNESS: HPI:  89F c hx GERD, R breast CA (40-50y ago) s/p B/L mastectomy and radiation, RUE lymphedema, osteoarthritis/osteoporosis (on monthly ibandronate), HLD, Afib on eliquis, HOCM/severe LVOT/ELEAZAR, small-moderate pericardial effusion/RA diastolic collapse, recent hospitalization - for RML PNA, GBS bacteremia (pos cultures , ) of unclear source on total 10 days abx (IV ceftriaxone inpt, then levaquin 750 q48h until ), presents from [facility] rehab with shaking chills, hypotension, poor appetite    History from pt's daughter/primary decision maker Teresitagisel Day at bedside.  While at [facility], pt found to have hypotension to SBP 80s. Pt was given IVF and reduced dose of metoprolol. Pt also found to have tachycardia, shaking chills. Pt sent back to the hospital. Reportedly pt has not been eating well, not urinating much. Denies CP, SOB, abd pain, diarrhea, cough, other respiratory symptoms. At baseline pt ambulates without assistive device, drives.  RRT called in the ed for hypotension to SBP 79. (2025 02:41)    Ms Cloud is a pleasant 89yo woman here with shortness of breath.  Sees Dr Adolfo Leung for Cardiology.  Being managed on this inpatient stay by Dr Coelho.  Known to Dr Young after outpatient referral for HOCM management (lVOT gradient 80s-100mmHg+), and had a brief trial of Mavacamten, stopped for feelings of "leg heaviness".    EP called re: rapid AFib, refractory to low-dose metoprolol thus far, and complicated by management of HCM and new/worsening pericardial effusion.  Resting comfortably in bed on supplemental O2.  SOB and fatigued but no palpitations or fainting.  A 10 pt ROS is otherwise negative.    PAST MEDICAL & SURGICAL HISTORY:  Breast cancer  s/p b/l mastectomy  H/O bilateral mastectomy  + breast implants  History of cataract surgery, unspecified laterality    - moved to CCU for closer monitoring.  lethargic / unable to obtain ROS today.  worsening pleural effusion, no plan for tap.  no plan for pericardiocentesis either.  hypotensive requiring midodrine.  - s/p urgent pericardiocentesis via Interventional Radiology/ lateral thorax approach.  Feels much better- more energetic, has an appetite, more talkative.  - resting in bed in CICU. on phenylephrine today.  no new complaints. feeling better overall after pericardiocentesis.  awaiting possible thoracentesis?  - resting in chair. no new complaints.  slow drainage out of pericardial drain.  AFib HR now contrlled for first time on this stay.  6/15- resting in recliner, poor appetite. no new issues today.  - resting in recliner, trying to eat breakfast today.  HR 110bpm, up-titrated to 50q6 metoprolol last night.  - resting in bed, no new issues.  - resting in bed, tachypneic, poor appetite, anxious.  Date of service - resting in bed. CT scan results reviewed. awaiting thoracentesis (right side large pleural effusion)    albuterol/ipratropium for Nebulization 3 milliLiter(s) Nebulizer every 6 hours PRN  heparin   Injectable 5000 Unit(s) IV Push every 6 hours PRN  heparin   Injectable 2500 Unit(s) IV Push every 6 hours PRN  heparin  Infusion.  Unit(s)/Hr IV Continuous <Continuous>  ipratropium    for Nebulization 500 MICROGram(s) Nebulizer every 6 hours PRN  levalbuterol Inhalation 0.63 milliGRAM(s) Inhalation every 6 hours  melatonin 5 milliGRAM(s) Oral at bedtime  metoprolol tartrate 50 milliGRAM(s) Oral every 6 hours  pantoprazole    Tablet 40 milliGRAM(s) Oral before breakfast  zolpidem 5 milliGRAM(s) Oral at bedtime                          9.8    5.80  )-----------( 309      ( 2025 04:24 )             31.9           136  |  102  |  31[H]  ----------------------------<  108[H]  5.2   |  26  |  0.63    Ca    8.8      2025 04:24    TPro  6.9  /  Alb  x   /  TBili  x   /  DBili  x   /  AST  x   /  ALT  x   /  AlkPhos  x         T(C): 36.2 (25 @ 04:55), Max: 36.3 (25 @ 12:37)  HR: 99 (25 @ 04:55) (99 - 121)  BP: 129/75 (25 @ 04:55) (129/75 - 153/85)  RR: 18 (25 @ 04:55) (18 - 20)  SpO2: 98% (25 @ 04:55) (96% - 98%)  Wt(kg): --    I&O's Summary      Appearance: frail elderly woman. tachypneic.  HEENT:   Normal oral mucosa, PERRL, EOMI	  Lymphatic: No lymphadenopathy , no edema  Cardiovascular: rapid irregular S1 S2, No JVD, No murmurs , Peripheral pulses palpable 2+ bilaterally.  s/p pericardial drain placement from left chest.  Respiratory: poor air entry BL  normal effort 	  Gastrointestinal:  Soft, Non-tender, + BS	  Skin: No rashes, No ecchymoses, No cyanosis, warm to touch  Musculoskeletal: Normal range of motion, normal strength  Psychiatry:  Mood & affect appropriate    TELEMETRY: AFib, narrow QRS, 110bpm at rest  ECG:  	AFib, atypical LVHypertrphy  Echo:  < from: TTE W or WO Ultrasound Enhancing Agent (25 @ 07:16) >  CONCLUSIONS:      1. Limited TTE to assess for pericardial effusion.   2. Trace pericardial effusion noted adjacent to the posterolateral left ventricle, small pericardial effusion noted adjacent to the anterior right ventricle and small pericardial effusion noted adjacent to the right atrium.   3. The inferior vena cava is normal in size measuring 1.70 cm in diameter, (normal <2.1cm) with normal inspiratory collapse (normal >50%) consistent with normal right atrial pressure (~3, range 0-5mmHg).   4. Compared to the transthoracic echocardiogram performed on 2025, the right atrium is not as well visualized on this study. There is right atrial diastolic collapse visualized in the 4 chamber view but unable to quantify the duration of the collapse. The effusion appears unchanged in size and distribution. No other signs of cardiac tamponade are present.    < end of copied text >    CT Chest - personally reviewed the images.  right breat prosthesis with signs of rupture.  left breast prosthesis OK.  prominent LV hypertrophy visible.  pericardial effusion enlarged.  pleural effusion present on the right.  	  CTA - PE .  No pericardial effusion. Right pleural effusion is large, loculated.    ASSESSMENT/PLAN: Ms Cloud is a pleasant 89y Female here with shortness of breath.    Multifactorial in the setting of pericardial effusion (treated w/ drainage), Hypertrophic Cardiomyopathy with severe LVOT obstruction, and rapid AFib (on maximal beta blocker dose).  [id number] is at least 3.  Apixaban on hold for now.  Plan to resume it if no further invasive procedures are planned.  Pericardial drain removed.  CT scan shows it was drained to completion.  Continue metoprolol 50mg q6hrs.  Goal HR <100bpm at rest.  Continue alpha-agonists to support her blood pressure.  Discussed w/ Dr Young, and agree w/ recs to avoid diltiazem and other vasodilators, and to avoid diuretics.  CTA chest shows no pulmonary embolus, but a large pleural effusion. Awaiting drainage to improve respiratory mechanics.  Family anxious about patient's ongoing tachypnea and stalled improvement despite adherence to vigorous medical/procedural plan of care.  Counselled that she is old and quite frail... 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  Overall her prognosis remains guarded.  Outpatient, needs to re-enroll with a HCM specialist.  Strongly recommend outpatient re-trial of Camzyos, even at the lowest dose.  This cannot be started in the hospital.  There is a possibility- albeit small - for Cardioversion before discharge.  Only if still symptomatic after everything else is optimized, and she is able to maintain uninterrupted anticoagulation.        Barney Lau M.D.  Cardiac Electrophysiology    office [phone]  pager [phone]

## 2025-06-19 NOTE — PROGRESS NOTE ADULT - ASSESSMENT
89F c hx GERD, R breast CA (40-50y ago) s/p B/L mastectomy and radiation, RUE lymphedema, osteoarthritis/osteoporosis (on monthly ibandronate), HLD, Afib on eliquis, HOCM/severe LVOT/LORA, small-moderate pericardial effusion/RA diastolic collapse, recent hospitalization 5/29-6/5 for RML PNA, GBS bacteremia (pos cultures 5/29, 5/30) of unclear source on total 10 days abx (IV ceftriaxone inpt, then levaquin 750 q48h until 6/9), presents from [facility] rehab with shaking chills, hypotension, poor appetite  History from pt's daughter/primary decision maker Teresita Day at bedside.  While at [facility], pt found to have hypotension to SBP 80s. Pt was given IVF and reduced dose of metoprolol. Pt also found to have tachycardia, shaking chills. Pt sent back to the hospital. Reportedly pt has not been eating well, not urinating much. Denies CP, SOB, abd pain, diarrhea, cough, other respiratory symptoms. At baseline pt ambulates without assistive device, drives.  RRT called in the ed for hypotension to SBP 79. (08 Jun 2025 02:41)  to me pt daughter says she was very sleepy in the morning too  as she was given ambien at 4 AM in the morning:   now she is alert and awake and is on 2 L of oxygen ;  she is not sob:  and does not look to be in any resp distress:       Severe sepsis.   unclear source of fevers  cont empiric vanc, cefepime  f/u blood cx. if positive will likely need MARIA G  monitor for any localizing symptoms  pt had recent pan ct scan that did not elucidate cause of bacteremia  recent blood cultures have been negative   - IVF  on cefepime  she is febrile too   vbg on admission IS ok;   MONITOR BLOOD PRESSURE CLOSELY:   OIF SHE DROPS HER BLOOD PRESSURE MORE:  WOULD NEED MICU  CO NSULT:    6/9: seems slightly more tored today  ; cont antibtiocs:  per ID:  she remains on 2 L of oxygen : she is alert and awake:  daughter at bedside:  reviewed the labs and echo and ct scan chest with the daughter in detail :   she has large pericardial effusions:  per ir no good window and effusion seemd small to drain : ct scan chest read as mod to large pericardial effusion : defer to cards :     6/10: seems to be doing  ok :  no sob:   no  cough  ;   no  phlegm   on 2 L of oxygen :  thoracic to see:    no emergency in tapping the pleural effusion:   de cardiologist  6/11: on ceftriaxone   seems O K   6/12: seems OK:  s/p pericardial drain:  has 400 cc output   await cytology    6/13: cont c urrent rx:   on antibiotics  6/14: on ceftriaxone    6/15/l seems to be doing  ok ;  no sob;  no cough :   no phlegm      6/17; pulm wise she looks tachypneic today ; rpt limited echo with no change from before;  cxr done today with no significant change from before;   abg done today seems pretty reasonable   will do di mine;  last d dimer on 3rd was slightly high ;  if the d dimer has increased significantly  would do cta;  dw acp : her creat is normal:  she has been off ac for pericardial drain for some time;   on antibiotics   40 mg ov iv solumedrol given     6/18: HER D DIMER IS VERY HIGH :  going for cta;    depending upon the cta:  if she has significant pl effusion , would like to tap, if there is no pe    dw acp     6/19: seems to be doing  ok ;  for thoracentesis ; today  : confirmed with USG   ; off ac for now:  to restart after the procedure       Pericardial effusion ;   the ct chest shows increasing pericardial effusion  : which is of more pressing concern then pleural effusion :  needs a tap:  ir guided tap    6/12: as above   6/13: asked up to tap:  need to repeat inr and pt  last was  >  2.0:  needs to be less then 1.5:  ip contacted will evalute for tap    6/14; defer to p r imary team  6/16/: seems OK;'' pericardial effusion removed    6/17: limited echo today with no change   6/18: defer to cards : cyto is still pending   6/19; cyto i n  pl fluid is negative for malignant cells:         Hypotension.  suspect combination of infection, hocm, lora, tachycardia, pericardial effusion, metoprolol  pt has somewhat tenuous hemodynamic status  consider cardioversion, consider repeat TTE  pt is full code.  pts blood pressure is slightly low   on midodrine  6/9: cont on midodrine  6/10; controlled:  on midodrine  6/11: on midodrine  6/12: currently she is on vasopressors   6/13; off vasopressors now   6/14; blood pressure soft:  on midodrine   6/15: her blood pressure is pretty good:   6/17: blood pressure today is reasonable;   6/18: off midodrine and blood pressure is pretty good:  on metoprolol   6/19/ on metoprolol :       Chronic atrial fibrillation.  reduce metoprolol dose to tartrate 25mg BID  goal HR <100 in setting of HOCM with hypotension  consider amiodarone or cardioversion  PER CARDS    6/9: defer to cards   6/10: off Eliquis for now   6/11: remains off eliquis   6/12: off ac  6/13: keep off ac if thoracentesis needed to be done over the weekend:  though it is not emergent   6/14" can restart ac:  if required as there is now no plan for thoracentesis:  dw attending   6/16; off ac as pericardial arnie is removed today   6/17; remains off ac:  restart as recommended by ir note  6/18: still off heparin :  cta awaited do urgent   6/19:  cta done: no pe:  has mod to large effusion on rigth side: for tap today        Type 2 myocardial infarction.  suspect demand ischemia from recent hypotension, infections  start asa   cont home eliquis.  CONT CURRENT MEDS     HOCM (hypertrophic obstructive cardiomyopathy).  cont metoprolol as above. uptitrate as BP tolerates  per cards    Acute respiratory failure with hypoxia.   recent CT scans showing right bronchiectasis, right fibrosis likely 2/2 radiation, poss RML PNA.  cont BD :   ct chest showed: No pulmonary embolism. Small to moderate right and small left pleural effusions with associated  atelectasis. 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  Cardiomegaly. Moderate pericardial effusion.  needs echo     9/6: new echo reviewed:  seen by cardiology :  monitor her blood pressure closely:  if she drops her blood pressure call CCU     6/10: cont to manain o2 sao2 above 90% all the t hayden  6/11; n 2 L of oxygen    6/12: she is not in any resp distress    6/13: she is on rooma ir:  doing well :  satting at 93%  6/14: pulm wise seems pretty good;   on rooma ir;   no plan for tap now     6/16; she seems OK;  she is on 1 L of oxygen ; would suggest to repeat cxr   6/17; she is on 2 L of oxygen  :  satting at 96%    dw acp and daughter   if she deteriorates further ;  call ccu/ MICU

## 2025-06-19 NOTE — PROGRESS NOTE ADULT - ASSESSMENT
89F c hx GERD, R breast CA (40-50y ago) s/p B/L mastectomy and radiation, RUE lymphedema, osteoarthritis/osteoporosis (on monthly ibandronate), HLD, Afib on eliquis, HOCM/severe LVOT/ELEAZAR, small-moderate pericardial effusion/RA diastolic collapse, recent hospitalization 5/29-6/5 for RML PNA, GBS bacteremia (pos cultures 5/29, 5/30) of unclear source on total 10 days abx (IV ceftriaxone inpt, then levaquin 750 q48h until 6/9), pw hypotension, severe sepsis of unclear source, demand ischemia, afib c rvr      Problem/Plan - 1:  ·  Problem: Severe sepsis.   ·  Plan: -   - ID fu   - abx as per ID     Problem/Plan - 2:  ·  Problem: Pericardial effusion , pleural effusion  ·  Plan: - sp CCU stay and drain   CTS fu   thoracentesis today     Problem/Plan - 3:  ·  Problem: Chronic atrial fibrillation.  ·  Plan: - reduce metoprolol dose to tartrate 25mg BID  - goal HR <100 in setting of HOCM with hypotension  - EP fu   - restart AC once ok with CTS , hold for now till CT chest is done   - dw EP attending     Problem/Plan - 4:  ·  Problem: Type 2 myocardial infarction.  ·  Plan: - tele  - start asa   - hold eliquis  - dw cards     Problem/Plan - 5:  ·  Problem: HOCM (hypertrophic obstructive cardiomyopathy).  ·  Plan: - cont metoprolol as above. uptitrate as BP tolerates

## 2025-06-19 NOTE — PROGRESS NOTE ADULT - SUBJECTIVE AND OBJECTIVE BOX
Patient is a 89y old  Female who presents with a chief complaint of shaking chills, hypotension, poor appetite (19 Jun 2025 16:05)    Date of servie : 06-19-25 @ 16:51  INTERVAL HPI/OVERNIGHT EVENTS:  T(C): 36.2 (06-19-25 @ 11:45), Max: 36.3 (06-18-25 @ 20:09)  HR: 79 (06-19-25 @ 11:45) (79 - 121)  BP: 140/78 (06-19-25 @ 11:45) (129/75 - 140/78)  RR: 18 (06-19-25 @ 11:45) (18 - 18)  SpO2: 95% (06-19-25 @ 11:45) (95% - 98%)  Wt(kg): --  I&O's Summary      LABS:                        9.8    5.80  )-----------( 309      ( 19 Jun 2025 04:24 )             31.9     06-19    136  |  102  |  31[H]  ----------------------------<  108[H]  5.2   |  26  |  0.63    Ca    8.8      19 Jun 2025 04:24    TPro  6.9  /  Alb  x   /  TBili  x   /  DBili  x   /  AST  x   /  ALT  x   /  AlkPhos  x   06-19    PT/INR - ( 18 Jun 2025 20:44 )   PT: 14.8 sec;   INR: 1.30 ratio         PTT - ( 19 Jun 2025 04:24 )  PTT:55.7 sec  Urinalysis Basic - ( 19 Jun 2025 04:24 )    Color: x / Appearance: x / SG: x / pH: x  Gluc: 108 mg/dL / Ketone: x  / Bili: x / Urobili: x   Blood: x / Protein: x / Nitrite: x   Leuk Esterase: x / RBC: x / WBC x   Sq Epi: x / Non Sq Epi: x / Bacteria: x      CAPILLARY BLOOD GLUCOSE            Urinalysis Basic - ( 19 Jun 2025 04:24 )    Color: x / Appearance: x / SG: x / pH: x  Gluc: 108 mg/dL / Ketone: x  / Bili: x / Urobili: x   Blood: x / Protein: x / Nitrite: x   Leuk Esterase: x / RBC: x / WBC x   Sq Epi: x / Non Sq Epi: x / Bacteria: x        MEDICATIONS  (STANDING):  cefTRIAXone   IVPB 2000 milliGRAM(s) IV Intermittent every 24 hours  levalbuterol Inhalation 0.63 milliGRAM(s) Inhalation every 6 hours  melatonin 5 milliGRAM(s) Oral at bedtime  metoprolol tartrate 50 milliGRAM(s) Oral every 6 hours  pantoprazole    Tablet 40 milliGRAM(s) Oral before breakfast  zolpidem 5 milliGRAM(s) Oral at bedtime    MEDICATIONS  (PRN):  albuterol/ipratropium for Nebulization 3 milliLiter(s) Nebulizer every 6 hours PRN Shortness of Breath and/or Wheezing  heparin   Injectable 5000 Unit(s) IV Push every 6 hours PRN For aPTT less than 40  heparin   Injectable 2500 Unit(s) IV Push every 6 hours PRN For aPTT between 40 - 57  ipratropium    for Nebulization 500 MICROGram(s) Nebulizer every 6 hours PRN Shortness of Breath and/or Wheezing          PHYSICAL EXAM:  GENERAL: NAD, well-groomed, well-developed  HEAD:  Atraumatic, Normocephalic  CHEST/LUNG: Clear to percussion bilaterally; No rales, rhonchi, wheezing, or rubs  HEART: Regular rate and rhythm; No murmurs, rubs, or gallops  ABDOMEN: Soft, Nontender, Nondistended; Bowel sounds present  EXTREMITIES:  2+ Peripheral Pulses, No clubbing, cyanosis, or edema  LYMPH: No lymphadenopathy noted  SKIN: No rashes or lesions    Care Discussed with Consultants/Other Providers [ ] YES  [ ] NO

## 2025-06-19 NOTE — PROGRESS NOTE ADULT - ASSESSMENT
Afib, severe HCM LVOT obstruction, Pericardial effusion, recent admission for PNA / Bacteremia   now admitted with sepsis, tachycardia ( afib with RVR ) and shock . Cardiology is called for elevated troponin     Elevated troponin   Demand ischemia   in setting of afib with RVR, shock, severe HCM and sepsis   stable now    Shock   resolved  off midodrine     Afib  as per EP   on a/c    HCM  on BB  fu with Dr Young to resume camzyos     Pericardial effusion   s/p drain   stable now on recent CT     Pl effusion   s/p drain   fu with pulm

## 2025-06-20 NOTE — PROGRESS NOTE ADULT - ASSESSMENT
Afib, severe HCM LVOT obstruction, Pericardial effusion, recent admission for PNA / Bacteremia   now admitted with sepsis, tachycardia ( afib with RVR ) and shock . Cardiology is called for elevated troponin     Elevated troponin   Demand ischemia   in setting of afib with RVR, shock, severe HCM and sepsis   stable now    Shock   resolved  off midodrine     Afib  HR stable   as per EP   on a/c    HCM  on BB  fu with Dr Young to resume camzyos     Pericardial effusion   s/p drain   stable now on recent CT     Pl effusion   s/p drain   fu with pulm

## 2025-06-20 NOTE — PROGRESS NOTE ADULT - SUBJECTIVE AND OBJECTIVE BOX
[facility]  [facility] [place] Y. Patel, S. Shah, G. Casimir  [phone]  ([phone] - weekdays after 5pm and weekends)    Name: GINO GRAHAM  Age/Gender: 89y Female  MRN: [id number]    Interval History:  Patient seen and examined this morning.   No new complaints noted.  Notes reviewed  No concerning overnight events  Afebrile   Allergies: Zosyn (Rash; Urticaria; Hives)      Objective:  Vitals:   T(F): 97.5 (06-20-25 @ 05:00), Max: 97.5 (06-20-25 @ 05:00)  HR: 79 (06-20-25 @ 05:00) (77 - 97)  BP: 133/85 (06-20-25 @ 05:00) (112/75 - 140/78)  RR: 18 (06-20-25 @ 05:00) (18 - 18)  SpO2: 96% (06-20-25 @ 05:00) (94% - 96%)  Physical Examination:  General: no acute distress, NC   HEENT: normocephalic, atraumatic, anicteric  Respiratory: no acc muscle use, breathing comfortably  Cardiovascular: S1 and S2 present  Gastrointestinal: normal appearing, nondistended  Extremities: no edema, no cyanosis  Skin: no visible rash    Laboratory Studies:  CBC:                       10.5   4.52  )-----------( 306      ( 20 Jun 2025 06:59 )             34.6     WBC Trend:  4.52 06-20-25 @ 06:59  5.80 06-19-25 @ 04:24  4.48 06-18-25 @ 07:27  3.08 06-16-25 @ 01:19  3.11 06-15-25 @ 00:48  2.70 06-14-25 @ 00:29    CMP: 06-19    136  |  102  |  31[H]  ----------------------------<  108[H]  5.2   |  26  |  0.63    Ca    8.8      19 Jun 2025 04:24    TPro  6.9  /  Alb  x   /  TBili  x   /  DBili  x   /  AST  x   /  ALT  x   /  AlkPhos  x   06-19    Creatinine: 0.63 mg/dL (06-19-25 @ 04:24)  Creatinine: 0.58 mg/dL (06-18-25 @ 07:15)  Creatinine: 0.63 mg/dL (06-16-25 @ 01:19)  Creatinine: 0.68 mg/dL (06-15-25 @ 02:11)  Creatinine: 0.58 mg/dL (06-15-25 @ 00:48)  Creatinine: 0.68 mg/dL (06-14-25 @ 00:29)    LIVER FUNCTIONS - ( 19 Jun 2025 04:24 )  Alb: x     / Pro: 6.9 g/dL / ALK PHOS: x     / ALT: x     / AST: x     / GGT: x           Microbiology: reviewed     Culture - Body Fluid with Gram Stain (collected 06-19-25 @ 16:34)  Source: Pleural Fl Pleural Fluid  Gram Stain (06-20-25 @ 02:16):    No polymorphonuclear cells seen    No organisms seen    by cytocentrifuge    Culture - Fungal, Body Fluid (collected 06-12-25 @ 14:05)  Source: Pericardial Pericardial Fluid  Preliminary Report (06-15-25 @ 08:16):    No growth    Culture - Acid Fast - Body Fluid w/Smear (collected 06-12-25 @ 14:05)  Source: Pericardial Other  Preliminary Report (06-14-25 @ 23:07):    Culture is being performed.    Culture - Body Fluid with Gram Stain (collected 06-12-25 @ 14:05)  Source: Pericardial Other  Gram Stain (06-13-25 @ 00:20):    polymorphonuclear leukocytes seen by cytocentrifuge    No organisms seen by cytocentrifuge  Final Report (06-17-25 @ 16:17):    No growth at 5 days    Urinalysis with Rflx Culture (collected 06-08-25 @ 13:01)    Culture - Blood (collected 06-07-25 @ 20:45)  Source: Blood Blood-Peripheral  Final Report (06-13-25 @ 02:01):    No growth at 5 days    Culture - Blood (collected 06-07-25 @ 20:30)  Source: Blood Blood-Peripheral  Final Report (06-13-25 @ 02:01):    No growth at 5 days    Radiology: reviewed     Medications:  albuterol/ipratropium for Nebulization 3 milliLiter(s) Nebulizer every 6 hours PRN  cefTRIAXone   IVPB 2000 milliGRAM(s) IV Intermittent every 24 hours  chlorhexidine 2% Cloths 1 Application(s) Topical daily  heparin  Infusion.  Unit(s)/Hr IV Continuous <Continuous>  ipratropium    for Nebulization 500 MICROGram(s) Nebulizer every 6 hours PRN  levalbuterol Inhalation 0.63 milliGRAM(s) Inhalation every 6 hours  melatonin 5 milliGRAM(s) Oral at bedtime  metoprolol tartrate 50 milliGRAM(s) Oral every 6 hours  pantoprazole    Tablet 40 milliGRAM(s) Oral before breakfast  zolpidem 5 milliGRAM(s) Oral at bedtime    Current Antimicrobials:  cefTRIAXone   IVPB 2000 milliGRAM(s) IV Intermittent every 24 hours    Prior/Completed Antimicrobials:  cefepime   IVPB  cefTRIAXone   IVPB  vancomycin  IVPB.

## 2025-06-20 NOTE — PROGRESS NOTE ADULT - SUBJECTIVE AND OBJECTIVE BOX
Patient is a 89y old  Female who presents with a chief complaint of shaking chills, hypotension, poor appetite (20 Jun 2025 14:24)    Date of servie : 06-20-25 @ 15:41  INTERVAL HPI/OVERNIGHT EVENTS:  T(C): 36.7 (06-20-25 @ 12:00), Max: 36.7 (06-20-25 @ 12:00)  HR: 77 (06-20-25 @ 12:00) (77 - 97)  BP: 110/73 (06-20-25 @ 12:00) (110/73 - 133/85)  RR: 18 (06-20-25 @ 12:00) (18 - 18)  SpO2: 98% (06-20-25 @ 12:00) (94% - 98%)  Wt(kg): --  I&O's Summary    19 Jun 2025 07:01  -  20 Jun 2025 07:00  --------------------------------------------------------  IN: 0 mL / OUT: 200 mL / NET: -200 mL        LABS:                        10.5   4.52  )-----------( 306      ( 20 Jun 2025 06:59 )             34.6     06-19    136  |  102  |  31[H]  ----------------------------<  108[H]  5.2   |  26  |  0.63    Ca    8.8      19 Jun 2025 04:24    TPro  6.9  /  Alb  x   /  TBili  x   /  DBili  x   /  AST  x   /  ALT  x   /  AlkPhos  x   06-19    PT/INR - ( 18 Jun 2025 20:44 )   PT: 14.8 sec;   INR: 1.30 ratio         PTT - ( 20 Jun 2025 14:42 )  PTT:51.4 sec  Urinalysis Basic - ( 19 Jun 2025 04:24 )    Color: x / Appearance: x / SG: x / pH: x  Gluc: 108 mg/dL / Ketone: x  / Bili: x / Urobili: x   Blood: x / Protein: x / Nitrite: x   Leuk Esterase: x / RBC: x / WBC x   Sq Epi: x / Non Sq Epi: x / Bacteria: x      CAPILLARY BLOOD GLUCOSE            Urinalysis Basic - ( 19 Jun 2025 04:24 )    Color: x / Appearance: x / SG: x / pH: x  Gluc: 108 mg/dL / Ketone: x  / Bili: x / Urobili: x   Blood: x / Protein: x / Nitrite: x   Leuk Esterase: x / RBC: x / WBC x   Sq Epi: x / Non Sq Epi: x / Bacteria: x        MEDICATIONS  (STANDING):  acetaminophen     Tablet .. 650 milliGRAM(s) Oral once  cefTRIAXone   IVPB 2000 milliGRAM(s) IV Intermittent every 24 hours  chlorhexidine 2% Cloths 1 Application(s) Topical daily  heparin  Infusion.  Unit(s)/Hr (11 mL/Hr) IV Continuous <Continuous>  levalbuterol Inhalation 0.63 milliGRAM(s) Inhalation every 6 hours  melatonin 5 milliGRAM(s) Oral at bedtime  metoprolol tartrate 50 milliGRAM(s) Oral every 6 hours  pantoprazole    Tablet 40 milliGRAM(s) Oral before breakfast  zolpidem 5 milliGRAM(s) Oral at bedtime    MEDICATIONS  (PRN):  albuterol/ipratropium for Nebulization 3 milliLiter(s) Nebulizer every 6 hours PRN Shortness of Breath and/or Wheezing  ipratropium    for Nebulization 500 MICROGram(s) Nebulizer every 6 hours PRN Shortness of Breath and/or Wheezing          PHYSICAL EXAM:  GENERAL: NAD, well-groomed, well-developed  HEAD:  Atraumatic, Normocephalic  CHEST/LUNG: Clear to percussion bilaterally; No rales, rhonchi, wheezing, or rubs  HEART: Regular rate and rhythm; No murmurs, rubs, or gallops  ABDOMEN: Soft, Nontender, Nondistended; Bowel sounds present  EXTREMITIES:  2+ Peripheral Pulses, No clubbing, cyanosis, or edema  LYMPH: No lymphadenopathy noted  SKIN: No rashes or lesions    Care Discussed with Consultants/Other Providers [ ] YES  [ ] NO

## 2025-06-20 NOTE — PROGRESS NOTE ADULT - SUBJECTIVE AND OBJECTIVE BOX
Date of Service: 06-20-25 @ 14:25    Patient is a 89y old  Female who presents with a chief complaint of shaking chills, hypotension, poor appetite (20 Jun 2025 10:15)      Any change in ROS:   +MEADE, denies SOB at rest  S/p R thoracentesis 6/19 with 1.25L drained     MEDICATIONS  (STANDING):  cefTRIAXone   IVPB 2000 milliGRAM(s) IV Intermittent every 24 hours  chlorhexidine 2% Cloths 1 Application(s) Topical daily  heparin  Infusion.  Unit(s)/Hr (11 mL/Hr) IV Continuous <Continuous>  levalbuterol Inhalation 0.63 milliGRAM(s) Inhalation every 6 hours  melatonin 5 milliGRAM(s) Oral at bedtime  metoprolol tartrate 50 milliGRAM(s) Oral every 6 hours  pantoprazole    Tablet 40 milliGRAM(s) Oral before breakfast  zolpidem 5 milliGRAM(s) Oral at bedtime    MEDICATIONS  (PRN):  albuterol/ipratropium for Nebulization 3 milliLiter(s) Nebulizer every 6 hours PRN Shortness of Breath and/or Wheezing  ipratropium    for Nebulization 500 MICROGram(s) Nebulizer every 6 hours PRN Shortness of Breath and/or Wheezing    Vital Signs Last 24 Hrs  T(C): 36.7 (20 Jun 2025 12:00), Max: 36.7 (20 Jun 2025 12:00)  T(F): 98 (20 Jun 2025 12:00), Max: 98 (20 Jun 2025 12:00)  HR: 77 (20 Jun 2025 12:00) (77 - 97)  BP: 110/73 (20 Jun 2025 12:00) (110/73 - 133/85)  BP(mean): --  RR: 18 (20 Jun 2025 12:00) (18 - 18)  SpO2: 98% (20 Jun 2025 12:00) (94% - 98%)    Parameters below as of 20 Jun 2025 12:00  Patient On (Oxygen Delivery Method): room air        I&O's Summary    19 Jun 2025 07:01  -  20 Jun 2025 07:00  --------------------------------------------------------  IN: 0 mL / OUT: 200 mL / NET: -200 mL          Physical Exam:   GENERAL: NAD, well-groomed, well-developed  HEENT: BRYSON/   Atraumatic, Normocephalic  ENMT: No tonsillar erythema, exudates, or enlargement; Moist mucous membranes, Good dentition, No lesions  NECK: Supple, No JVD, Normal thyroid  CHEST/LUNG: Decreased BS   CVS: Regular rate and rhythm; No murmurs, rubs, or gallops  GI: : Soft, Nontender, Nondistended; Bowel sounds present  NERVOUS SYSTEM:  Alert & Oriented X3  EXTREMITIES:  2+ Peripheral Pulses, No clubbing, cyanosis, or edema  LYMPH: No lymphadenopathy noted  SKIN: No rashes or lesions  ENDOCRINOLOGY: No Thyromegaly  PSYCH: Appropriate    Labs:                              10.5   4.52  )-----------( 306      ( 20 Jun 2025 06:59 )             34.6                         9.8    5.80  )-----------( 309      ( 19 Jun 2025 04:24 )             31.9                         10.5   4.48  )-----------( 310      ( 18 Jun 2025 07:27 )             34.0     06-19    136  |  102  |  31[H]  ----------------------------<  108[H]  5.2   |  26  |  0.63  06-18    x   |  x   |  x   ----------------------------<  x   5.0   |  x   |  x   06-18    136  |  101  |  25[H]  ----------------------------<  145[H]  5.6[H]   |  19[L]  |  0.58    Ca    8.8      19 Jun 2025 04:24    TPro  6.9  /  Alb  x   /  TBili  x   /  DBili  x   /  AST  x   /  ALT  x   /  AlkPhos  x   06-19    CAPILLARY BLOOD GLUCOSE          LIVER FUNCTIONS - ( 19 Jun 2025 04:24 )  Alb: x     / Pro: 6.9 g/dL / ALK PHOS: x     / ALT: x     / AST: x     / GGT: x           PT/INR - ( 18 Jun 2025 20:44 )   PT: 14.8 sec;   INR: 1.30 ratio         PTT - ( 20 Jun 2025 06:51 )  PTT:65.1 sec  Urinalysis Basic - ( 19 Jun 2025 04:24 )    Color: x / Appearance: x / SG: x / pH: x  Gluc: 108 mg/dL / Ketone: x  / Bili: x / Urobili: x   Blood: x / Protein: x / Nitrite: x   Leuk Esterase: x / RBC: x / WBC x   Sq Epi: x / Non Sq Epi: x / Bacteria: x      D-Dimer Assay, Quantitative: 5056 ng/mL DDU (06-18 @ 07:17)  Fluid Source --  Albumin, Fluid0.8 g/dL  Glucose, [id number] mg/dL  Protein total, Fluid2.1 g/dL  Lacatate Dehydrogenase, [id number] U/L  pH, Fluid7.55  Cytopathology-Non Gyn Report--        RECENT CULTURES:  06-19 @ 16:34 Pleural Fl Pleural Fluid       No polymorphonuclear cells seen  No organisms seen  by cytocentrifuge           Testing in progress        Studies  < from: Xray Chest 1 View AP/PA (06.19.25 @ 17:40) >    ACC: [id number] EXAM:  XR CHEST AP OR PA 1V   ORDERED BY: TI HAYES     PROCEDURE DATE:  06/19/2025          INTERPRETATION:  Chest one view    HISTORY: Post thoracentesis    COMPARISON STUDY: 6/17/2025    Frontal expiratory view of the chest showsthe heart to be similar in   size. Breast implants are present. The lungs show mild to moderate   pulmonary congestion with small left effusion. Small residual right   effusion is present and there is no evidence of pneumothorax.    IMPRESSION:  No pneumothorax.        Thank you for the courtesy of this referral.    --- End of Report ---        < end of copied text >               Date of Service: 06-20-25 @ 14:25    Patient is a 89y old  Female who presents with a chief complaint of shaking chills, hypotension, poor appetite (20 Jun 2025 10:15)      Any change in ROS:   +MEADE, denies SOB at rest  S/p R thoracentesis 6/19 with 1.25L drained     MEDICATIONS  (STANDING):  cefTRIAXone   IVPB 2000 milliGRAM(s) IV Intermittent every 24 hours  chlorhexidine 2% Cloths 1 Application(s) Topical daily  heparin  Infusion.  Unit(s)/Hr (11 mL/Hr) IV Continuous <Continuous>  levalbuterol Inhalation 0.63 milliGRAM(s) Inhalation every 6 hours  melatonin 5 milliGRAM(s) Oral at bedtime  metoprolol tartrate 50 milliGRAM(s) Oral every 6 hours  pantoprazole    Tablet 40 milliGRAM(s) Oral before breakfast  zolpidem 5 milliGRAM(s) Oral at bedtime    MEDICATIONS  (PRN):  albuterol/ipratropium for Nebulization 3 milliLiter(s) Nebulizer every 6 hours PRN Shortness of Breath and/or Wheezing  ipratropium    for Nebulization 500 MICROGram(s) Nebulizer every 6 hours PRN Shortness of Breath and/or Wheezing    Vital Signs Last 24 Hrs  T(C): 36.7 (20 Jun 2025 12:00), Max: 36.7 (20 Jun 2025 12:00)  T(F): 98 (20 Jun 2025 12:00), Max: 98 (20 Jun 2025 12:00)  HR: 77 (20 Jun 2025 12:00) (77 - 97)  BP: 110/73 (20 Jun 2025 12:00) (110/73 - 133/85)  BP(mean): --  RR: 18 (20 Jun 2025 12:00) (18 - 18)  SpO2: 98% (20 Jun 2025 12:00) (94% - 98%)    Parameters below as of 20 Jun 2025 12:00  Patient On (Oxygen Delivery Method): room air        I&O's Summary    19 Jun 2025 07:01  -  20 Jun 2025 07:00  --------------------------------------------------------  IN: 0 mL / OUT: 200 mL / NET: -200 mL          Physical Exam:   GENERAL: NAD, well-groomed, well-developed  HEENT: BRYSON/   Atraumatic, Normocephalic  ENMT: No tonsillar erythema, exudates, or enlargement; Moist mucous membranes, Good dentition, No lesions  NECK: Supple, No JVD, Normal thyroid  CHEST/LUNG: Decreased BS   CVS: Regular rate and rhythm; No murmurs, rubs, or gallops  GI: : Soft, Nontender, Nondistended; Bowel sounds present  NERVOUS SYSTEM:  Alert & Oriented X3  EXTREMITIES:  2+ Peripheral Pulses, No clubbing, cyanosis, or edema  LYMPH: No lymphadenopathy noted  SKIN: No rashes or lesions  ENDOCRINOLOGY: No Thyromegaly  PSYCH: Appropriate    Labs:                              10.5   4.52  )-----------( 306      ( 20 Jun 2025 06:59 )             34.6                         9.8    5.80  )-----------( 309      ( 19 Jun 2025 04:24 )             31.9                         10.5   4.48  )-----------( 310      ( 18 Jun 2025 07:27 )             34.0     06-19    136  |  102  |  31[H]  ----------------------------<  108[H]  5.2   |  26  |  0.63  06-18    x   |  x   |  x   ----------------------------<  x   5.0   |  x   |  x   06-18    136  |  101  |  25[H]  ----------------------------<  145[H]  5.6[H]   |  19[L]  |  0.58    Ca    8.8      19 Jun 2025 04:24    TPro  6.9  /  Alb  x   /  TBili  x   /  DBili  x   /  AST  x   /  ALT  x   /  AlkPhos  x   06-19    CAPILLARY BLOOD GLUCOSE          LIVER FUNCTIONS - ( 19 Jun 2025 04:24 )  Alb: x     / Pro: 6.9 g/dL / ALK PHOS: x     / ALT: x     / AST: x     / GGT: x           PT/INR - ( 18 Jun 2025 20:44 )   PT: 14.8 sec;   INR: 1.30 ratio         PTT - ( 20 Jun 2025 06:51 )  PTT:65.1 sec  Urinalysis Basic - ( 19 Jun 2025 04:24 )    Color: x / Appearance: x / SG: x / pH: x  Gluc: 108 mg/dL / Ketone: x  / Bili: x / Urobili: x   Blood: x / Protein: x / Nitrite: x   Leuk Esterase: x / RBC: x / WBC x   Sq Epi: x / Non Sq Epi: x / Bacteria: x      D-Dimer Assay, Quantitative: 5056 ng/mL DDU (06-18 @ 07:17)  Fluid Source --  Albumin, Fluid0.8 g/dL  Glucose, [id number] mg/dL  Protein total, Fluid2.1 g/dL  Lacatate Dehydrogenase, Ialin218 U/L  pH, Fluid7.55  Cytopathology-Non Gyn Report--        RECENT CULTURES:  06-19 @ 16:34 Pleural Fl Pleural Fluid       No polymorphonuclear cells seen  No organisms seen  by cytocentrifuge           Testing in progress        Studies  < from: Xray Chest 1 View AP/PA (06.19.25 @ 17:40) >    ACC: [id number] EXAM:  XR CHEST AP OR PA 1V   ORDERED BY: TI HAYES     PROCEDURE DATE:  06/19/2025          INTERPRETATION:  Chest one view    HISTORY: Post thoracentesis    COMPARISON STUDY: 6/17/2025    Frontal expiratory view of the chest showsthe heart to be similar in   size. Breast implants are present. The lungs show mild to moderate   pulmonary congestion with small left effusion. Small residual right   effusion is present and there is no evidence of pneumothorax.    IMPRESSION:  No pneumothorax.        Thank you for the courtesy of this referral.    --- End of Report ---        < end of copied text >      rad< from: Xray Chest 1 View AP/PA (06.19.25 @ 17:40) >  ACC: [id number] EXAM:  XR CHEST AP OR PA 1V   ORDERED BY: TI HAYES     PROCEDURE DATE:  06/19/2025          INTERPRETATION:  Chest one view    HISTORY: Post thoracentesis    COMPARISON STUDY: 6/17/2025    Frontal expiratory view of the chest showsthe heart to be similar in   size. Breast implants are present. The lungs show mild to moderate   pulmonary congestion with small left effusion. Small residual right   effusion is present and there is no evidence of pneumothorax.    IMPRESSION:  No pneumothorax.        Thank you for the courtesy of this referral.    --- End of Report ---            SILVIA JOHNSON MD; Attending Interventional Radiologist  This document has been electronically signed. Jun 20 2025  2:07PM    < end of copied text >

## 2025-06-20 NOTE — PROGRESS NOTE ADULT - SUBJECTIVE AND OBJECTIVE BOX
Subjective: Patient seen and examined. No new events except as noted.     SUBJECTIVE/ROS:  MEDICATIONS:  MEDICATIONS  (STANDING):  cefTRIAXone   IVPB 2000 milliGRAM(s) IV Intermittent every 24 hours  heparin  Infusion.  Unit(s)/Hr (11 mL/Hr) IV Continuous <Continuous>  levalbuterol Inhalation 0.63 milliGRAM(s) Inhalation every 6 hours  melatonin 5 milliGRAM(s) Oral at bedtime  metoprolol tartrate 50 milliGRAM(s) Oral every 6 hours  pantoprazole    Tablet 40 milliGRAM(s) Oral before breakfast  zolpidem 5 milliGRAM(s) Oral at bedtime      PHYSICAL EXAM:  T(C): 36.4 (06-20-25 @ 05:00), Max: 36.4 (06-20-25 @ 05:00)  HR: 79 (06-20-25 @ 05:00) (77 - 97)  BP: 133/85 (06-20-25 @ 05:00) (112/75 - 140/78)  RR: 18 (06-20-25 @ 05:00) (18 - 18)  SpO2: 96% (06-20-25 @ 05:00) (94% - 96%)  Wt(kg): --  I&O's Summary    19 Jun 2025 07:01  -  20 Jun 2025 07:00  --------------------------------------------------------  IN: 0 mL / OUT: 200 mL / NET: -200 mL            JVP: Normal  Neck: supple  Lung: clear   CV: S1 S2 ,  Abd: soft  Ext: No edema  neuro: Awake / alert  Psych: flat affect  Skin: normal``    LABS/DATA:    CARDIAC MARKERS:                                10.5   4.52  )-----------( 306      ( 20 Jun 2025 06:59 )             34.6     06-19    136  |  102  |  31[H]  ----------------------------<  108[H]  5.2   |  26  |  0.63    Ca    8.8      19 Jun 2025 04:24    TPro  6.9  /  Alb  x   /  TBili  x   /  DBili  x   /  AST  x   /  ALT  x   /  AlkPhos  x   06-19    proBNP:   Lipid Profile:   HgA1c:   TSH:

## 2025-06-20 NOTE — PROGRESS NOTE ADULT - SUBJECTIVE AND OBJECTIVE BOX
EP Attending  HISTORY OF PRESENT ILLNESS: HPI:  89F c hx GERD, R breast CA (40-50y ago) s/p B/L mastectomy and radiation, RUE lymphedema, osteoarthritis/osteoporosis (on monthly ibandronate), HLD, Afib on eliquis, HOCM/severe LVOT/ELEAZAR, small-moderate pericardial effusion/RA diastolic collapse, recent hospitalization 5/29-6/5 for RML PNA, GBS bacteremia (pos cultures 5/29, 5/30) of unclear source on total 10 days abx (IV ceftriaxone inpt, then levaquin 750 q48h until 6/9), presents from [facility] rehab with shaking chills, hypotension, poor appetite    History from pt's daughter/primary decision maker Teresitagisel Day at bedside.  While at [facility], pt found to have hypotension to SBP 80s. Pt was given IVF and reduced dose of metoprolol. Pt also found to have tachycardia, shaking chills. Pt sent back to the hospital. Reportedly pt has not been eating well, not urinating much. Denies CP, SOB, abd pain, diarrhea, cough, other respiratory symptoms. At baseline pt ambulates without assistive device, drives.  RRT called in the ed for hypotension to SBP 79. (08 Jun 2025 02:41)    Ms Cloud is a pleasant 89yo woman here with shortness of breath.  Sees Dr Adolfo Leung for Cardiology.  Being managed on this inpatient stay by Dr Coelho.  Known to Dr Young after outpatient referral for HOCM management (lVOT gradient 80s-100mmHg+), and had a brief trial of Mavacamten, stopped for feelings of "leg heaviness".    EP called re: rapid AFib, refractory to low-dose metoprolol thus far, and complicated by management of HCM and new/worsening pericardial effusion.  Resting comfortably in bed on supplemental O2.  SOB and fatigued but no palpitations or fainting.  A 10 pt ROS is otherwise negative.  Date of service 6/20- resting in bed. Feels much better after thoracentesis yesterady.    PAST MEDICAL & SURGICAL HISTORY:  Breast cancer  s/p b/l mastectomy  H/O bilateral mastectomy  + breast implants  History of cataract surgery, unspecified laterality    6/11- moved to CCU for closer monitoring.  lethargic / unable to obtain ROS today.  worsening pleural effusion, no plan for tap.  no plan for pericardiocentesis either.  hypotensive requiring midodrine.  6/12- s/p urgent pericardiocentesis via Interventional Radiology/ lateral thorax approach.  Feels much better- more energetic, has an appetite, more talkative.  6/13- resting in bed in CICU. on phenylephrine today.  no new complaints. feeling better overall after pericardiocentesis.  awaiting possible thoracentesis?  6/14- resting in chair. no new complaints.  slow drainage out of pericardial drain.  AFib HR now contrlled for first time on this stay.  6/15- resting in recliner, poor appetite. no new issues today.  6/16- resting in recliner, trying to eat breakfast today.  HR 110bpm, up-titrated to 50q6 metoprolol last night.  6/17- resting in bed, no new issues.  6/18- resting in bed, tachypneic, poor appetite, anxious.  Date of service 6/19- resting in bed. CT scan results reviewed. awaiting thoracentesis (right side large pleural effusion)    albuterol/ipratropium for Nebulization 3 milliLiter(s) Nebulizer every 6 hours PRN  cefTRIAXone   IVPB 2000 milliGRAM(s) IV Intermittent every 24 hours  heparin  Infusion.  Unit(s)/Hr IV Continuous <Continuous>  ipratropium    for Nebulization 500 MICROGram(s) Nebulizer every 6 hours PRN  levalbuterol Inhalation 0.63 milliGRAM(s) Inhalation every 6 hours  melatonin 5 milliGRAM(s) Oral at bedtime  metoprolol tartrate 50 milliGRAM(s) Oral every 6 hours  pantoprazole    Tablet 40 milliGRAM(s) Oral before breakfast  zolpidem 5 milliGRAM(s) Oral at bedtime                            10.5   4.52  )-----------( 306      ( 20 Jun 2025 06:59 )             34.6       06-19    136  |  102  |  31[H]  ----------------------------<  108[H]  5.2   |  26  |  0.63    Ca    8.8      19 Jun 2025 04:24    TPro  6.9  /  Alb  x   /  TBili  x   /  DBili  x   /  AST  x   /  ALT  x   /  AlkPhos  x   06-19      T(C): 36.4 (06-20-25 @ 05:00), Max: 36.4 (06-20-25 @ 05:00)  HR: 79 (06-20-25 @ 05:00) (77 - 97)  BP: 133/85 (06-20-25 @ 05:00) (112/75 - 140/78)  RR: 18 (06-20-25 @ 05:00) (18 - 18)  SpO2: 96% (06-20-25 @ 05:00) (94% - 96%)  Wt(kg): --    I&O's Summary    19 Jun 2025 07:01  -  20 Jun 2025 07:00  --------------------------------------------------------  IN: 0 mL / OUT: 200 mL / NET: -200 mL        Appearance: frail elderly woman. tachypneic.  HEENT:   Normal oral mucosa, PERRL, EOMI	  Lymphatic: No lymphadenopathy , no edema  Cardiovascular: irregular S1 S2, No JVD, No murmurs , Peripheral pulses palpable 2+ bilaterally.    Respiratory: poor air entry BL  normal effort 	  Gastrointestinal:  Soft, Non-tender, + BS	  Skin: No rashes, No ecchymoses, No cyanosis, warm to touch  Musculoskeletal: Normal range of motion, normal strength  Psychiatry:  Mood & affect appropriate    TELEMETRY: AFib, narrow QRS, 70s-80s  ECG:  	AFib, atypical LVHypertrphy  Echo:  < from: TTE W or WO Ultrasound Enhancing Agent (06.05.25 @ 07:16) >  CONCLUSIONS:      1. Limited TTE to assess for pericardial effusion.   2. Trace pericardial effusion noted adjacent to the posterolateral left ventricle, small pericardial effusion noted adjacent to the anterior right ventricle and small pericardial effusion noted adjacent to the right atrium.   3. The inferior vena cava is normal in size measuring 1.70 cm in diameter, (normal <2.1cm) with normal inspiratory collapse (normal >50%) consistent with normal right atrial pressure (~3, range 0-5mmHg).   4. Compared to the transthoracic echocardiogram performed on 5/30/2025, the right atrium is not as well visualized on this study. There is right atrial diastolic collapse visualized in the 4 chamber view but unable to quantify the duration of the collapse. The effusion appears unchanged in size and distribution. No other signs of cardiac tamponade are present.    < end of copied text >    CT Chest - personally reviewed the images.  right breat prosthesis with signs of rupture.  left breast prosthesis OK.  prominent LV hypertrophy visible.  pericardial effusion enlarged.  pleural effusion present on the right.  	  CTA - PE 6/18.  No pericardial effusion. Right pleural effusion is large, loculated.    ASSESSMENT/PLAN: Ms Cloud is a pleasant 89y Female here with shortness of breath.    Multifactorial in the setting of pericardial effusion (treated w/ drainage), Hypertrophic Cardiomyopathy with severe LVOT obstruction, and rapid AFib (on maximal beta blocker dose).  [id number] is at least 3.  No other invasive procedures planned?  On heparin. Plan to restart Apixaban.  please check a standing weight.  if <60kg, Apixaban dose is reduced to 2.5mg BID.  Pericardial drain removed.  CT scan shows it was drained to completion.  s/p thoracentesis on 6/19, with significant improvement in shortness of breath, and heartrate.  Continue metoprolol. Transition to Metoprolol Succinate 200mg daily, or Metoprolol tartrate 100mg BID.  Continue alpha-agonists to support her blood pressure.  Outpatient, needs to re-enroll with a HCM specialist.  Strongly recommend outpatient re-trial of Camzyos, even at the lowest dose.  This cannot be started in the hospital.  If able to lie flat over the weekend, order a Left Atrial Thrombus Cardiac CTA, and we can plan to cardiovert afterwards.  Outpatient followup w/ Dr Leung.        Barney Lau M.D.  Cardiac Electrophysiology    office [phone]  pager [phone]

## 2025-06-20 NOTE — CHART NOTE - NSCHARTNOTEFT_GEN_A_CORE
Cell Count, Body Fluid (06.18.25 @ 20:44)   Tube Type: Lavender  Fluid Type: Pleural Fluid  Body Fluid Appearance: Bloody  Color - Body Fluid: Red  Total Nucleated Cell Count, Body Fluid: 6528: Reference Ranges:   Assays listed with no reference range: The reference interval(s) are   unavailable for this body fluid. Comparison of the result with   concentration in the Blood, Serum or Plasma is recommended.   Synovial Fluid   Total nucleated cells < 150 cells/uL   Neutrophils <25%   Lymphocytes 10-15%   Monocytes/Macrophages   Other parameters- No established reference ranges.   Bronchoalveolar lavage   Macrophages >85%   Lymphocytes 10-15%   Neutrophils <3%   Eosinophils <1%   Other parameters-No established reference ranges.

## 2025-06-20 NOTE — PROGRESS NOTE ADULT - ASSESSMENT
89F c hx GERD, R breast CA (40-50y ago) s/p B/L mastectomy and radiation, RUE lymphedema, osteoarthritis/osteoporosis (on monthly ibandronate), HLD, Afib on eliquis, HOCM/severe LVOT/ELEAZAR, small-moderate pericardial effusion/RA diastolic collapse, recent hospitalization 5/29-6/5 for RML PNA, GBS bacteremia (pos cultures 5/29, 5/30) of unclear source on total 10 days abx (IV ceftriaxone inpt, then levaquin 750 q48h until 6/9), pw hypotension, severe sepsis of unclear source, demand ischemia, afib c rvr    Severe Sepsis/sirs, Hypotension 2/2 unclear source  Severe LVOT obstruction/HCM, worsening pericardial effusion  CXR w/ worsening aeration at the bases  Flu/COVID/RSV negative  UA negative  6/7 Bcx negative   Zosyn allergy noted, tolerates cephalosporins   CTAP with mod-large pericardial effusion inc since 5/29/25, stable mod R and small L pleural effusion and atelectasis   TTE with increased pericardial effusion since 6/5 -- moderate pericardial effusion adjacent to RV, small pericardial effusion adjacent to RA and large pericardia effusion noted adjacent to posterior LV with no evidence of tamponade physiology  6/10 note with tachypnea and expiratory wheeze, CCU consulted for decompensated heart failure iso mod-severe pericardial effusion with concerns for impending hemodynamic collapse given complex cardiac physiology with severe LVOT obstruction, now transferred to CICU for closer monitor and management of enlarging pericardial effusion  CTS eval noted-patient high risk for pericardial window - rec continue to monitor pericardial effusion with repeat TTE  6/11 CT chest with large pericardial effusion increased since 6/4, mod R and small L pleural effusion, no pneumonia;  cefepime changed to ceftriaxone d/t concern for possible neurotoxicity   6/12 hypotensive and tachycardic despite volume resuscitation -- s/p emergent pericardiocentesis with drain placed    - noted drained 400cc ss pericardial fluid, cell count noted, culture NGTD, path with no malignant cells   6/16 s/p pericardial drain removal   6/18 CTA chest with no PE; large loculated R pleural effusion with partial atelectasis R lung, small L pleural effusion, post radiation changes deep to R breast, stable patchy biapical lung opacities   6/19 s/p US guided R thoracocentesis - drained 1.25L transudative fluid     s/p vancomycin 6/7-6/10  s/p cefepime 6/7-6/11    Recommendations:   Follow pleural fluid culture - gram stain negative; cytopath  Continue ceftriaxone 2g IV Q24h - plan to complete course today 6/20  Trend temps/WBC  Continue rest of care per primary team       Wes Grullon M.D.  [facility]  Available on [employer] TEAMS - *PREFERRED*  [phone]  After 5pm on weekdays and all day on weekends - please call [phone]     Thank you for consulting us and involving us in the management of this patients case. In addition to reviewing history, imaging, documents, labs, microbiology, took into account antibiotic stewardship, local antibiogram and infection control strategies and potential transmission issues at time of treatment decision making process.

## 2025-06-20 NOTE — PROGRESS NOTE ADULT - ASSESSMENT
89F c hx GERD, R breast CA (40-50y ago) s/p B/L mastectomy and radiation, RUE lymphedema, osteoarthritis/osteoporosis (on monthly ibandronate), HLD, Afib on eliquis, HOCM/severe LVOT/ELEAZAR, small-moderate pericardial effusion/RA diastolic collapse, recent hospitalization 5/29-6/5 for RML PNA, GBS bacteremia (pos cultures 5/29, 5/30) of unclear source on total 10 days abx (IV ceftriaxone inpt, then levaquin 750 q48h until 6/9), pw hypotension, severe sepsis of unclear source, demand ischemia, afib c rvr      Problem/Plan - 1:  ·  Problem: Severe sepsis.   ·  Plan: -   - ID fu   - abx as per ID     Problem/Plan - 2:  ·  Problem: Pericardial effusion , pleural effusion  ·  Plan: - sp CCU stay and drain   CTS fu   sp thoracentesis     Problem/Plan - 3:  ·  Problem: Chronic atrial fibrillation.  ·  Plan: - reduce metoprolol dose to tartrate 25mg BID  - goal HR <100 in setting of HOCM with hypotension  - EP fu   - restart AC       Problem/Plan - 4:  ·  Problem: Type 2 myocardial infarction.  ·  Plan: - tele- start asa   - restart  eliquis  - dw cards     Problem/Plan - 5:  ·  Problem: HOCM (hypertrophic obstructive cardiomyopathy).  ·  Plan: - cont metoprolol as above. uptitrate as BP tolerates      dw daughter at bedside

## 2025-06-20 NOTE — PROGRESS NOTE ADULT - ASSESSMENT
89F c hx GERD, R breast CA (40-50y ago) s/p B/L mastectomy and radiation, RUE lymphedema, osteoarthritis/osteoporosis (on monthly ibandronate), HLD, Afib on eliquis, HOCM/severe LVOT/LORA, small-moderate pericardial effusion/RA diastolic collapse, recent hospitalization 5/29-6/5 for RML PNA, GBS bacteremia (pos cultures 5/29, 5/30) of unclear source on total 10 days abx (IV ceftriaxone inpt, then levaquin 750 q48h until 6/9), presents from [facility] rehab with shaking chills, hypotension, poor appetite  History from pt's daughter/primary decision maker Teresita Day at bedside.  While at [facility], pt found to have hypotension to SBP 80s. Pt was given IVF and reduced dose of metoprolol. Pt also found to have tachycardia, shaking chills. Pt sent back to the hospital. Reportedly pt has not been eating well, not urinating much. Denies CP, SOB, abd pain, diarrhea, cough, other respiratory symptoms. At baseline pt ambulates without assistive device, drives.  RRT called in the ed for hypotension to SBP 79. (08 Jun 2025 02:41)  to me pt daughter says she was very sleepy in the morning too  as she was given ambien at 4 AM in the morning:   now she is alert and awake and is on 2 L of oxygen ;  she is not sob:  and does not look to be in any resp distress:       Severe sepsis.   unclear source of fevers  cont empiric vanc, cefepime  f/u blood cx. if positive will likely need MARIA G  monitor for any localizing symptoms  pt had recent pan ct scan that did not elucidate cause of bacteremia  recent blood cultures have been negative   - IVF  on cefepime  she is febrile too   vbg on admission IS ok;   MONITOR BLOOD PRESSURE CLOSELY:   OIF SHE DROPS HER BLOOD PRESSURE MORE:  WOULD NEED MICU  CO NSULT:    6/9: seems slightly more tored today  ; cont antibtiocs:  per ID:  she remains on 2 L of oxygen : she is alert and awake:  daughter at bedside:  reviewed the labs and echo and ct scan chest with the daughter in detail :   she has large pericardial effusions:  per ir no good window and effusion seemd small to drain : ct scan chest read as mod to large pericardial effusion : defer to cards :     6/10: seems to be doing  ok :  no sob:   no  cough  ;   no  phlegm   on 2 L of oxygen :  thoracic to see:    no emergency in tapping the pleural effusion:   de cardiologist  6/11: on ceftriaxone   seems O K   6/12: seems OK:  s/p pericardial drain:  has 400 cc output   await cytology    6/13: cont c urrent rx:   on antibiotics  6/14: on ceftriaxone    6/15/l seems to be doing  ok ;  no sob;  no cough :   no phlegm      6/17; pulm wise she looks tachypneic today ; rpt limited echo with no change from before;  cxr done today with no significant change from before;   abg done today seems pretty reasonable   will do di mine;  last d dimer on 3rd was slightly high ;  if the d dimer has increased significantly  would do cta;  dw acp : her creat is normal:  she has been off ac for pericardial drain for some time;   on antibiotics   40 mg ov iv solumedrol given     6/18: HER D DIMER IS VERY HIGH :  going for cta;    depending upon the cta:  if she has significant pl effusion , would like to tap, if there is no pe    dw acp     6/19: seems to be doing  ok ;  for thoracentesis ; today  : confirmed with USG   ; off ac for now:  to restart after the procedure     6/20:  ABX as per ID     Pleural Effusion  -S/p R US guided thoracentesis 6/19, 1.25 L drained  -CXR post procedure with mild congestion, residual effusions but overall improved. No clear ptx  -F/u pleural fluid studies  -Keep O>I as tolerated       Pericardial effusion ;   the ct chest shows increasing pericardial effusion  : which is of more pressing concern then pleural effusion :  needs a tap:  ir guided tap    6/12: as above   6/13: asked up to tap:  need to repeat inr and pt  last was  >  2.0:  needs to be less then 1.5:  ip contacted will evalute for tap    6/14; defer to p r imary team  6/16/: seems OK;'' pericardial effusion removed    6/17: limited echo today with no change   6/18: defer to cards : cyto is still pending   6/19; cyto i n  pl fluid is negative for malignant cells:   6/20: Management per cards         Hypotension.  suspect combination of infection, hocm, lora, tachycardia, pericardial effusion, metoprolol  pt has somewhat tenuous hemodynamic status  consider cardioversion, consider repeat TTE  pt is full code.  pts blood pressure is slightly low   on midodrine  6/9: cont on midodrine  6/10; controlled:  on midodrine  6/11: on midodrine  6/12: currently she is on vasopressors   6/13; off vasopressors now   6/14; blood pressure soft:  on midodrine   6/15: her blood pressure is pretty good:   6/17: blood pressure today is reasonable;   6/18: off midodrine and blood pressure is pretty good:  on metoprolol   6/19/ on metoprolol :   6/20: BP controlled        Chronic atrial fibrillation.  reduce metoprolol dose to tartrate 25mg BID  goal HR <100 in setting of HOCM with hypotension  consider amiodarone or cardioversion  PER CARDS    6/9: defer to cards   6/10: off Eliquis for now   6/11: remains off eliquis   6/12: off ac  6/13: keep off ac if thoracentesis needed to be done over the weekend:  though it is not emergent   6/14" can restart ac:  if required as there is now no plan for thoracentesis:  dw attending   6/16; off ac as pericardial arnie is removed today   6/17; remains off ac:  restart as recommended by ir note  6/18: still off heparin :  cta awaited do urgent   6/19:  cta done: no pe:  has mod to large effusion on rigth side: for tap today    6/20: AC with heparin drip       Type 2 myocardial infarction.  -per cards    HOCM (hypertrophic obstructive cardiomyopathy).  -per cards    Acute respiratory failure with hypoxia.   recent CT scans showing right bronchiectasis, right fibrosis likely 2/2 radiation, poss RML PNA.  cont BD :   ct chest showed: No pulmonary embolism. Small to moderate right and small left pleural effusions with associated  atelectasis. 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  Cardiomegaly. Moderate pericardial effusion.  needs echo     9/6: new echo reviewed:  seen by cardiology :  monitor her blood pressure closely:  if she drops her blood pressure call CCU     6/10: cont to manain o2 sao2 above 90% all the t hayden  6/11; n 2 L of oxygen    6/12: she is not in any resp distress    6/13: she is on rooma ir:  doing well :  satting at 93%  6/14: pulm wise seems pretty good;   on rooma ir;   no plan for tap now     6/16; she seems OK;  she is on 1 L of oxygen ; would suggest to repeat cxr   6/17; she is on 2 L of oxygen  :  satting at 96%    6/20  Keep sats >90% with O2 PRN  Denies SOB today     dw acp and daughter   if she deteriorates further ;  call ccu/ MICU

## 2025-06-21 NOTE — PROGRESS NOTE ADULT - SUBJECTIVE AND OBJECTIVE BOX
Date of Service: 06-21-25 @ 14:31    Patient is a 89y old  Female who presents with a chief complaint of shaking chills, hypotension, poor appetite (21 Jun 2025 13:29)      Any change in ROS:   on 2 Lof oxygen :  she seems to be doing  ok :  no resp distress:  alert and awake and dw daughter and son in detail ;       MEDICATIONS  (STANDING):  acetaminophen   IVPB .. 1000 milliGRAM(s) IV Intermittent once  apixaban 2.5 milliGRAM(s) Oral two times a day  chlorhexidine 2% Cloths 1 Application(s) Topical daily  levalbuterol Inhalation 0.63 milliGRAM(s) Inhalation every 6 hours  melatonin 5 milliGRAM(s) Oral at bedtime  metoprolol tartrate 50 milliGRAM(s) Oral every 6 hours  pantoprazole    Tablet 40 milliGRAM(s) Oral before breakfast  polyethylene glycol 3350 17 Gram(s) Oral daily  senna 2 Tablet(s) Oral at bedtime  zolpidem 5 milliGRAM(s) Oral at bedtime    MEDICATIONS  (PRN):  albuterol/ipratropium for Nebulization 3 milliLiter(s) Nebulizer every 6 hours PRN Shortness of Breath and/or Wheezing  bisacodyl Suppository 10 milliGRAM(s) Rectal daily PRN Constipation  ipratropium    for Nebulization 500 MICROGram(s) Nebulizer every 6 hours PRN Shortness of Breath and/or Wheezing    Vital Signs Last 24 Hrs  T(C): 36.3 (21 Jun 2025 13:00), Max: 36.4 (21 Jun 2025 04:30)  T(F): 97.3 (21 Jun 2025 13:00), Max: 97.5 (21 Jun 2025 04:30)  HR: 96 (21 Jun 2025 13:00) (76 - 104)  BP: 121/82 (21 Jun 2025 13:00) (121/82 - 140/94)  BP(mean): --  RR: 18 (21 Jun 2025 13:00) (18 - 18)  SpO2: 96% (21 Jun 2025 13:00) (96% - 99%)    Parameters below as of 21 Jun 2025 13:00  Patient On (Oxygen Delivery Method): nasal cannula  O2 Flow (L/min): 2      I&O's Summary    20 Jun 2025 07:01  -  21 Jun 2025 07:00  --------------------------------------------------------  IN: 0 mL / OUT: 350 mL / NET: -350 mL          Physical Exam:   GENERAL: NAD, well-groomed, well-developed  HEENT: BRYSON/   Atraumatic, Normocephalic  ENMT: No tonsillar erythema, exudates, or enlargement; Moist mucous membranes, Good dentition, No lesions  NECK: Supple, No JVD, Normal thyroid  CHEST/LUNG: decreased air entry bilaterally:   CVS: Regular rate and rhythm; No murmurs, rubs, or gallops  GI: : Soft, Nontender, Nondistended; Bowel sounds present  NERVOUS SYSTEM:  Alert & Oriented X3  EXTREMITIES:- edema  LYMPH: No lymphadenopathy noted  SKIN: No rashes or lesions  ENDOCRINOLOGY: No Thyromegaly  PSYCH: Appropriate    Labs:                              10.5   4.52  )-----------( 306      ( 20 Jun 2025 06:59 )             34.6                         9.8    5.80  )-----------( 309      ( 19 Jun 2025 04:24 )             31.9                         10.5   4.48  )-----------( 310      ( 18 Jun 2025 07:27 )             34.0     06-19    136  |  102  |  31[H]  ----------------------------<  108[H]  5.2   |  26  |  0.63  06-18    x   |  x   |  x   ----------------------------<  x   5.0   |  x   |  x   06-18    136  |  101  |  25[H]  ----------------------------<  145[H]  5.6[H]   |  19[L]  |  0.58      TPro  6.9  /  Alb  x   /  TBili  x   /  DBili  x   /  AST  x   /  ALT  x   /  AlkPhos  x   06-19    CAPILLARY BLOOD GLUCOSE            PTT - ( 20 Jun 2025 14:42 )  PTT:51.4 sec    D-Dimer Assay, Quantitative: 5056 ng/mL DDU (06-18 @ 07:17)  Fluid Source --  Albumin, Fluid0.8 g/dL  Glucose, Rowef841 mg/dL  Protein total, Fluid2.1 g/dL  Lacatate Dehydrogenase, Petcr776 U/L  pH, Fluid7.55  Cytopathology-Non Gyn Report--        RECENT CULTURES:  06-19 @ 16:34 Pleural Fl Pleural Fluid       No polymorphonuclear cells seen  No organisms seen  by cytocentrifuge           No growth          RESPIRATORY CULTURES:          Studies  Chest X-RAY  CT SCAN Chest   Venous Dopplers: LE:   CT Abdomen  Others    rad< from: Xray Chest 1 View AP/PA (06.19.25 @ 17:40) >    ACC: [id number] EXAM:  XR CHEST AP OR PA 1V   ORDERED BY: TI HAYES     PROCEDURE DATE:  06/19/2025          INTERPRETATION:  Chest one view    HISTORY: Post thoracentesis    COMPARISON STUDY: 6/17/2025    Frontal expiratory view of the chest showsthe heart to be similar in   size. Breast implants are present. The lungs show mild to moderate   pulmonary congestion with small left effusion. Small residual right   effusion is present and there is no evidence of pneumothorax.    IMPRESSION:  No pneumothorax.        Thank you for the courtesy of this referral.    --- End of Report ---            SILVIA JOHNSON MD; Attending Interventional Radiologist  This document has been electronically signed. Jun 20 2025  2:07PM    < end of copied text >

## 2025-06-21 NOTE — PROGRESS NOTE ADULT - SUBJECTIVE AND OBJECTIVE BOX
[facility]  BASIL Hooks Y. Patel, S. Shah, G. Casimir  [phone]  ([phone] - weekdays after 5pm and weekends)    Name: GINO GRAHAM  Age/Gender: 89y Female  MRN: [id number]    Interval History:  Patient seen and examined this morning.   No new complaints noted.  Notes reviewed  No concerning overnight events  Afebrile   Allergies: Zosyn (Rash; Urticaria; Hives)      Objective:  Vitals:   T(F): 97.5 (06-21-25 @ 04:30), Max: 98 (06-20-25 @ 12:00)  HR: 98 (06-21-25 @ 04:30) (76 - 104)  BP: 132/61 (06-21-25 @ 04:30) (110/73 - 140/94)  RR: 18 (06-21-25 @ 04:30) (18 - 18)  SpO2: 98% (06-21-25 @ 04:30) (98% - 99%)  Physical Examination:  General: no acute distress, NC   HEENT: normocephalic, atraumatic, anicteric  Respiratory: no acc muscle use, breathing comfortably  Cardiovascular: S1 and S2 present  Gastrointestinal: normal appearing, nondistended  Extremities: no edema, no cyanosis  Skin: no visible rash    Laboratory Studies:  CBC:                       10.5   4.52  )-----------( 306      ( 20 Jun 2025 06:59 )             34.6     WBC Trend:  4.52 06-20-25 @ 06:59  5.80 06-19-25 @ 04:24  4.48 06-18-25 @ 07:27  3.08 06-16-25 @ 01:19  3.11 06-15-25 @ 00:48    CMP:   Creatinine: 0.63 mg/dL (06-19-25 @ 04:24)  Creatinine: 0.58 mg/dL (06-18-25 @ 07:15)  Creatinine: 0.63 mg/dL (06-16-25 @ 01:19)  Creatinine: 0.68 mg/dL (06-15-25 @ 02:11)  Creatinine: 0.58 mg/dL (06-15-25 @ 00:48)    Microbiology: reviewed   Culture - Body Fluid with Gram Stain (collected 06-19-25 @ 16:34)  Source: Pleural Fl Pleural Fluid  Gram Stain (06-20-25 @ 02:16):    No polymorphonuclear cells seen    No organisms seen    by cytocentrifuge  Preliminary Report (06-20-25 @ 17:12):    No growth    Culture - Fungal, Body Fluid (collected 06-19-25 @ 16:34)  Source: Pleural Fl Pleural Fluid  Preliminary Report (06-21-25 @ 07:58):    No growth    Culture - Fungal, Body Fluid (collected 06-12-25 @ 14:05)  Source: Pericardial Pericardial Fluid  Preliminary Report (06-15-25 @ 08:16):    No growth    Culture - Acid Fast - Body Fluid w/Smear (collected 06-12-25 @ 14:05)  Source: Pericardial Other  Preliminary Report (06-14-25 @ 23:07):    Culture is being performed.    Culture - Body Fluid with Gram Stain (collected 06-12-25 @ 14:05)  Source: Pericardial Other  Gram Stain (06-13-25 @ 00:20):    polymorphonuclear leukocytes seen by cytocentrifuge    No organisms seen by cytocentrifuge  Final Report (06-17-25 @ 16:17):    No growth at 5 days    Urinalysis with Rflx Culture (collected 06-08-25 @ 13:01)    Culture - Blood (collected 06-07-25 @ 20:45)  Source: Blood Blood-Peripheral  Final Report (06-13-25 @ 02:01):    No growth at 5 days    Culture - Blood (collected 06-07-25 @ 20:30)  Source: Blood Blood-Peripheral  Final Report (06-13-25 @ 02:01):    No growth at 5 days    Radiology: reviewed     Medications:  albuterol/ipratropium for Nebulization 3 milliLiter(s) Nebulizer every 6 hours PRN  apixaban 2.5 milliGRAM(s) Oral two times a day  bisacodyl Suppository 10 milliGRAM(s) Rectal daily PRN  chlorhexidine 2% Cloths 1 Application(s) Topical daily  ipratropium    for Nebulization 500 MICROGram(s) Nebulizer every 6 hours PRN  levalbuterol Inhalation 0.63 milliGRAM(s) Inhalation every 6 hours  melatonin 5 milliGRAM(s) Oral at bedtime  metoprolol tartrate 50 milliGRAM(s) Oral every 6 hours  pantoprazole    Tablet 40 milliGRAM(s) Oral before breakfast  polyethylene glycol 3350 17 Gram(s) Oral daily  senna 2 Tablet(s) Oral at bedtime  zolpidem 5 milliGRAM(s) Oral at bedtime    Prior/Completed Antimicrobials:  cefepime   IVPB  cefTRIAXone   IVPB  cefTRIAXone   IVPB  vancomycin  IVPB.

## 2025-06-21 NOTE — PROGRESS NOTE ADULT - SUBJECTIVE AND OBJECTIVE BOX
"SPIRITUAL HEALTH SERVICES  SPIRITUAL ASSESSMENT Progress Note  [facility]C     Visited with pt and both parents. Pt awake, interactive, on vent. Family shared that \"we're hoping that they might be able to take the breathing tube out pretty soon...\" I confirmed with pt that I had been coming by to pray with her and support her and her parents, offered encouragement and prayer.     PLAN: continue to follow, will see at least 2x weekly while pt on unit.    Yuri Jaramillo) River Bhatti M.Div., [facility]  Staff   Pager [phone]      " Patient is a 89y old  Female who presents with a chief complaint of shaking chills, hypotension, poor appetite (21 Jun 2025 09:41)    Date of servie : 06-21-25 @ 13:29  INTERVAL HPI/OVERNIGHT EVENTS:  T(C): 36.4 (06-21-25 @ 04:30), Max: 36.4 (06-21-25 @ 04:30)  HR: 97 (06-21-25 @ 07:30) (76 - 104)  BP: 132/61 (06-21-25 @ 04:30) (132/61 - 140/94)  RR: 18 (06-21-25 @ 04:30) (18 - 18)  SpO2: 98% (06-21-25 @ 04:30) (98% - 99%)  Wt(kg): --  I&O's Summary    20 Jun 2025 07:01  -  21 Jun 2025 07:00  --------------------------------------------------------  IN: 0 mL / OUT: 350 mL / NET: -350 mL        LABS:                        10.5   4.52  )-----------( 306      ( 20 Jun 2025 06:59 )             34.6           PTT - ( 20 Jun 2025 14:42 )  PTT:51.4 sec    CAPILLARY BLOOD GLUCOSE                MEDICATIONS  (STANDING):  apixaban 2.5 milliGRAM(s) Oral two times a day  chlorhexidine 2% Cloths 1 Application(s) Topical daily  levalbuterol Inhalation 0.63 milliGRAM(s) Inhalation every 6 hours  melatonin 5 milliGRAM(s) Oral at bedtime  metoprolol tartrate 50 milliGRAM(s) Oral every 6 hours  pantoprazole    Tablet 40 milliGRAM(s) Oral before breakfast  polyethylene glycol 3350 17 Gram(s) Oral daily  senna 2 Tablet(s) Oral at bedtime  zolpidem 5 milliGRAM(s) Oral at bedtime    MEDICATIONS  (PRN):  albuterol/ipratropium for Nebulization 3 milliLiter(s) Nebulizer every 6 hours PRN Shortness of Breath and/or Wheezing  bisacodyl Suppository 10 milliGRAM(s) Rectal daily PRN Constipation  ipratropium    for Nebulization 500 MICROGram(s) Nebulizer every 6 hours PRN Shortness of Breath and/or Wheezing          PHYSICAL EXAM:  GENERAL: NAD, well-groomed, well-developed  HEAD:  Atraumatic, Normocephalic  CHEST/LUNG: Clear to percussion bilaterally; No rales, rhonchi, wheezing, or rubs  HEART: Regular rate and rhythm; No murmurs, rubs, or gallops  ABDOMEN: Soft, Nontender, Nondistended; Bowel sounds present  EXTREMITIES:  2+ Peripheral Pulses, No clubbing, cyanosis, or edema  LYMPH: No lymphadenopathy noted  SKIN: No rashes or lesions    Care Discussed with Consultants/Other Providers [ ] YES  [ ] NO

## 2025-06-21 NOTE — PROGRESS NOTE ADULT - SUBJECTIVE AND OBJECTIVE BOX
Subjective: Patient seen and examined. No new events except as noted.     SUBJECTIVE/ROS:  nad      MEDICATIONS:  MEDICATIONS  (STANDING):  apixaban 2.5 milliGRAM(s) Oral two times a day  chlorhexidine 2% Cloths 1 Application(s) Topical daily  levalbuterol Inhalation 0.63 milliGRAM(s) Inhalation every 6 hours  melatonin 5 milliGRAM(s) Oral at bedtime  metoprolol tartrate 50 milliGRAM(s) Oral every 6 hours  pantoprazole    Tablet 40 milliGRAM(s) Oral before breakfast  polyethylene glycol 3350 17 Gram(s) Oral daily  senna 2 Tablet(s) Oral at bedtime  zolpidem 5 milliGRAM(s) Oral at bedtime      PHYSICAL EXAM:  T(C): 36.4 (06-21-25 @ 04:30), Max: 36.7 (06-20-25 @ 12:00)  HR: 98 (06-21-25 @ 04:30) (76 - 104)  BP: 132/61 (06-21-25 @ 04:30) (110/73 - 140/94)  RR: 18 (06-21-25 @ 04:30) (18 - 18)  SpO2: 98% (06-21-25 @ 04:30) (98% - 99%)  Wt(kg): --  I&O's Summary    20 Jun 2025 07:01  -  21 Jun 2025 07:00  --------------------------------------------------------  IN: 0 mL / OUT: 350 mL / NET: -350 mL            JVP: Normal  Neck: supple  Lung: clear   CV: S1 S2 ,  Abd: soft  Ext: No edema  neuro: Awake / alert  Psych: flat affect  Skin: normal``    LABS/DATA:    CARDIAC MARKERS:                                10.5   4.52  )-----------( 306      ( 20 Jun 2025 06:59 )             34.6           proBNP:   Lipid Profile:   HgA1c:   TSH:

## 2025-06-21 NOTE — PROGRESS NOTE ADULT - ASSESSMENT
89F c hx GERD, R breast CA (40-50y ago) s/p B/L mastectomy and radiation, RUE lymphedema, osteoarthritis/osteoporosis (on monthly ibandronate), HLD, Afib on eliquis, HOCM/severe LVOT/ELEAZAR, small-moderate pericardial effusion/RA diastolic collapse, recent hospitalization 5/29-6/5 for RML PNA, GBS bacteremia (pos cultures 5/29, 5/30) of unclear source on total 10 days abx (IV ceftriaxone inpt, then levaquin 750 q48h until 6/9), pw hypotension, severe sepsis of unclear source, demand ischemia, afib c rvr      Problem/Plan - 1:  ·  Problem: Severe sepsis.   ·  Plan: -   - ID fu   - abx as per ID     Problem/Plan - 2:  ·  Problem: Pericardial effusion , pleural effusion  ·  Plan: - sp CCU stay and drain   CTS fu   sp thoracentesis     Problem/Plan - 3:  ·  Problem: Chronic atrial fibrillation.  ·  Plan: - reduce metoprolol dose to tartrate 25mg BID  - goal HR <100 in setting of HOCM with hypotension  - EP fu   - restart AC       Problem/Plan - 4:  ·  Problem: Type 2 myocardial infarction.  ·  Plan: - tele- start asa   - restart  eliquis  - dw cards     Problem/Plan - 5:  ·  Problem: HOCM (hypertrophic obstructive cardiomyopathy).  ·  Plan: - cont metoprolol as above. uptitrate as BP tolerates

## 2025-06-21 NOTE — PROGRESS NOTE ADULT - SUBJECTIVE AND OBJECTIVE BOX
EP     HISTORY OF PRESENT ILLNESS: HPI:  89F c hx GERD, R breast CA (40-50y ago) s/p B/L mastectomy and radiation, RUE lymphedema, osteoarthritis/osteoporosis (on monthly ibandronate), HLD, Afib on eliquis, HOCM/severe LVOT/ELEAZAR, small-moderate pericardial effusion/RA diastolic collapse, recent hospitalization 5/29-6/5 for RML PNA, GBS bacteremia (pos cultures 5/29, 5/30) of unclear source on total 10 days abx (IV ceftriaxone inpt, then levaquin 750 q48h until 6/9), presents from [facility] rehab with shaking chills, hypotension, poor appetite    History from pt's daughter/primary decision maker Teresita Shay at bedside.  While at [facility], pt found to have hypotension to SBP 80s. Pt was given IVF and reduced dose of metoprolol. Pt also found to have tachycardia, shaking chills. Pt sent back to the hospital. Reportedly pt has not been eating well, not urinating much. Denies CP, SOB, abd pain, diarrhea, cough, other respiratory symptoms. At baseline pt ambulates without assistive device, drives.  RRT called in the ed for hypotension to SBP 79. (08 Jun 2025 02:41)    Ms Cloud is a pleasant 89yo woman here with shortness of breath.  Sees Dr Adolfo Leung for Cardiology.  Being managed on this inpatient stay by Dr Coelho.  Known to Dr Young after outpatient referral for HOCM management (lVOT gradient 80s-100mmHg+), and had a brief trial of Mavacamten, stopped for feelings of "leg heaviness".    EP called re: rapid AFib, refractory to low-dose metoprolol thus far, and complicated by management of HCM and new/worsening pericardial effusion.  Resting comfortably in bed on supplemental O2.  SOB and fatigued but no palpitations or fainting.  A 10 pt ROS is otherwise negative.      PAST MEDICAL & SURGICAL HISTORY:  Breast cancer  s/p b/l mastectomy  H/O bilateral mastectomy  + breast implants  History of cataract surgery, unspecified laterality    6/11- moved to CCU for closer monitoring.  lethargic / unable to obtain ROS today.  worsening pleural effusion, no plan for tap.  no plan for pericardiocentesis either.  hypotensive requiring midodrine.  6/12- s/p urgent pericardiocentesis via Interventional Radiology/ lateral thorax approach.  Feels much better- more energetic, has an appetite, more talkative.  6/13- resting in bed in CICU. on phenylephrine today.  no new complaints. feeling better overall after pericardiocentesis.  awaiting possible thoracentesis?  6/14- resting in chair. no new complaints.  slow drainage out of pericardial drain.  AFib HR now contrlled for first time on this stay.  6/15- resting in recliner, poor appetite. no new issues today.  6/16- resting in recliner, trying to eat breakfast today.  HR 110bpm, up-titrated to 50q6 metoprolol last night.  6/17- resting in bed, no new issues.  6/18- resting in bed, tachypneic, poor appetite, anxious.    6/19- resting in bed. CT scan results reviewed. awaiting thoracentesis (right side large pleural effusion)   6/20- resting in bed. Feels much better after thoracentesis yesterady.  Date of service 6/21 pt is resting in bed  , no complaints             albuterol/ipratropium for Nebulization 3 milliLiter(s) Nebulizer every 6 hours PRN  apixaban 2.5 milliGRAM(s) Oral two times a day  bisacodyl Suppository 10 milliGRAM(s) Rectal daily PRN  chlorhexidine 2% Cloths 1 Application(s) Topical daily  ipratropium    for Nebulization 500 MICROGram(s) Nebulizer every 6 hours PRN  levalbuterol Inhalation 0.63 milliGRAM(s) Inhalation every 6 hours  melatonin 5 milliGRAM(s) Oral at bedtime  metoprolol tartrate 50 milliGRAM(s) Oral every 6 hours  pantoprazole    Tablet 40 milliGRAM(s) Oral before breakfast  polyethylene glycol 3350 17 Gram(s) Oral daily  senna 2 Tablet(s) Oral at bedtime  zolpidem 5 milliGRAM(s) Oral at bedtime                            10.5   4.52  )-----------( 306      ( 20 Jun 2025 06:59 )             34.6       Hemoglobin: 10.5 g/dL (06-20 @ 06:59)  Hemoglobin: 9.8 g/dL (06-19 @ 04:24)  Hemoglobin: 10.0 g/dL (06-19 @ 04:24)  Hemoglobin: 10.5 g/dL (06-18 @ 07:27)            Creatinine Trend: 0.63<--, 0.58<--, 0.63<--, 0.68<--, 0.58<--, 0.68<--    COAGS: PTT - ( 20 Jun 2025 14:42 )  PTT:51.4 sec          T(C): 36.4 (06-21-25 @ 04:30), Max: 36.7 (06-20-25 @ 12:00)  HR: 98 (06-21-25 @ 04:30) (76 - 104)  BP: 132/61 (06-21-25 @ 04:30) (110/73 - 140/94)  RR: 18 (06-21-25 @ 04:30) (18 - 18)  SpO2: 98% (06-21-25 @ 04:30) (98% - 99%)  Wt(kg): --    I&O's Summary    20 Jun 2025 07:01  -  21 Jun 2025 07:00  --------------------------------------------------------  IN: 0 mL / OUT: 350 mL / NET: -350 mL      Appearance: frail elderly woman. tachypneic.  HEENT:   Normal oral mucosa, PERRL, EOMI	  Lymphatic: No lymphadenopathy , no edema  Cardiovascular: irregular S1 S2, No JVD, No murmurs , Peripheral pulses palpable 2+ bilaterally.    Respiratory: poor air entry BL  normal effort 	  Gastrointestinal:  Soft, Non-tender, + BS	  Skin: No rashes, No ecchymoses, No cyanosis, warm to touch  Musculoskeletal: Normal range of motion, normal strength  Psychiatry:  Mood & affect appropriate    TELEMETRY: AFib, narrow QRS, 70s-80s  ECG:  	AFib, atypical LVHypertrphy  Echo:  < from: TTE W or WO Ultrasound Enhancing Agent (06.05.25 @ 07:16) >  CONCLUSIONS:      1. Limited TTE to assess for pericardial effusion.   2. Trace pericardial effusion noted adjacent to the posterolateral left ventricle, small pericardial effusion noted adjacent to the anterior right ventricle and small pericardial effusion noted adjacent to the right atrium.   3. The inferior vena cava is normal in size measuring 1.70 cm in diameter, (normal <2.1cm) with normal inspiratory collapse (normal >50%) consistent with normal right atrial pressure (~3, range 0-5mmHg).   4. Compared to the transthoracic echocardiogram performed on 5/30/2025, the right atrium is not as well visualized on this study. There is right atrial diastolic collapse visualized in the 4 chamber view but unable to quantify the duration of the collapse. The effusion appears unchanged in size and distribution. No other signs of cardiac tamponade are present.    < end of copied text >    CT Chest - personally reviewed the images.  right breat prosthesis with signs of rupture.  left breast prosthesis OK.  prominent LV hypertrophy visible.  pericardial effusion enlarged.  pleural effusion present on the right.  	  CTA - PE 6/18.  No pericardial effusion. Right pleural effusion is large, loculated.    ASSESSMENT/PLAN: Ms Cloud is a pleasant 89y Female here with shortness of breath.    Multifactorial in the setting of pericardial effusion (treated w/ drainage), Hypertrophic Cardiomyopathy with severe LVOT obstruction, and rapid AFib (on maximal beta blocker dose).  [id number] is at least 3.  No other invasive procedures planned?  On heparin. Plan to restart Apixaban.  please check a standing weight.  if <60kg, Apixaban dose is reduced to 2.5mg BID.  Pericardial drain removed.  CT scan shows it was drained to completion.  s/p thoracentesis on 6/19, with significant improvement in shortness of breath, and heartrate.  Continue metoprolol. Transition to Metoprolol Succinate 200mg daily, or Metoprolol tartrate 100mg BID.  Continue alpha-agonists to support her blood pressure.  Outpatient, needs to re-enroll with a HCM specialist.  Strongly recommend outpatient re-trial of Camzyos, even at the lowest dose.  This cannot be started in the hospital.  If able to lie flat over the weekend, order a Left Atrial Thrombus Cardiac CTA, and we can plan to cardiovert afterwards.  Outpatient followup w/ Dr Leung.

## 2025-06-21 NOTE — PROGRESS NOTE ADULT - ASSESSMENT
89F c hx GERD, R breast CA (40-50y ago) s/p B/L mastectomy and radiation, RUE lymphedema, osteoarthritis/osteoporosis (on monthly ibandronate), HLD, Afib on eliquis, HOCM/severe LVOT/ELEAZAR, small-moderate pericardial effusion/RA diastolic collapse, recent hospitalization 5/29-6/5 for RML PNA, GBS bacteremia (pos cultures 5/29, 5/30) of unclear source on total 10 days abx (IV ceftriaxone inpt, then levaquin 750 q48h until 6/9), pw hypotension, severe sepsis of unclear source, demand ischemia, afib c rvr    Severe Sepsis/sirs, Hypotension 2/2 unclear source  Severe LVOT obstruction/HCM, worsening pericardial effusion  CXR w/ worsening aeration at the bases  Flu/COVID/RSV negative  UA negative  6/7 Bcx negative   Zosyn allergy noted, tolerates cephalosporins   CTAP with mod-large pericardial effusion inc since 5/29/25, stable mod R and small L pleural effusion and atelectasis   TTE with increased pericardial effusion since 6/5 -- moderate pericardial effusion adjacent to RV, small pericardial effusion adjacent to RA and large pericardia effusion noted adjacent to posterior LV with no evidence of tamponade physiology  6/10 note with tachypnea and expiratory wheeze, CCU consulted for decompensated heart failure iso mod-severe pericardial effusion with concerns for impending hemodynamic collapse given complex cardiac physiology with severe LVOT obstruction, now transferred to CICU for closer monitor and management of enlarging pericardial effusion  CTS eval noted-patient high risk for pericardial window - rec continue to monitor pericardial effusion with repeat TTE  6/11 CT chest with large pericardial effusion increased since 6/4, mod R and small L pleural effusion, no pneumonia;  cefepime changed to ceftriaxone d/t concern for possible neurotoxicity   6/12 hypotensive and tachycardic despite volume resuscitation -- s/p emergent pericardiocentesis with drain placed    - noted drained 400cc ss pericardial fluid, cell count noted, culture NGTD, path with no malignant cells   6/16 s/p pericardial drain removal   6/18 CTA chest with no PE; large loculated R pleural effusion with partial atelectasis R lung, small L pleural effusion, post radiation changes deep to R breast, stable patchy biapical lung opacities   6/19 s/p US guided R thoracocentesis - drained 1.25L transudative fluid -- culture NGTD   remains afebrile, no leukocytosis, completed ceftriaxone 6/20    s/p vancomycin 6/7-6/10  s/p cefepime 6/7-6/11  s/p ceftriaxone 6/11-6/20    Recommendations:   Follow pleural fluid culture (NGTD), cytopath  Continue off antibiotics   Trend temps/WBC  Continue rest of care per primary team       Wes Grullon M.D.  [facility]  Available on [employer] TEAMS - *PREFERRED*  [phone]  After 5pm on weekdays and all day on weekends - please call [phone]     Thank you for consulting us and involving us in the management of this patients case. In addition to reviewing history, imaging, documents, labs, microbiology, took into account antibiotic stewardship, local antibiogram and infection control strategies and potential transmission issues at time of treatment decision making process.

## 2025-06-22 NOTE — PROGRESS NOTE ADULT - ASSESSMENT
89F c hx GERD, R breast CA (40-50y ago) s/p B/L mastectomy and radiation, RUE lymphedema, osteoarthritis/osteoporosis (on monthly ibandronate), HLD, Afib on eliquis, HOCM/severe LVOT/ELEAZAR, small-moderate pericardial effusion/RA diastolic collapse, recent hospitalization 5/29-6/5 for RML PNA, GBS bacteremia (pos cultures 5/29, 5/30) of unclear source on total 10 days abx (IV ceftriaxone inpt, then levaquin 750 q48h until 6/9), pw hypotension, severe sepsis of unclear source, demand ischemia, afib c rvr      Problem/Plan - 1:  ·  Problem: Severe sepsis.   ·  Plan: -   - ID fu   - abx as per ID     Problem/Plan - 2:  ·  Problem: Pericardial effusion , pleural effusion  ·  Plan: - sp CCU stay and drain   CTS fu   sp thoracentesis     Problem/Plan - 3:  ·  Problem: Chronic atrial fibrillation.  ·  Plan: - reduce metoprolol dose to tartrate 25mg BID  - goal HR <100 in setting of HOCM with hypotension  - EP fu   - restart AC       Problem/Plan - 4:  ·  Problem: Type 2 myocardial infarction.  ·  Plan: - tele  - start asa   - restart  Eliquis  - dw cards     Problem/Plan - 5:  ·  Problem: HOCM (hypertrophic obstructive cardiomyopathy).  ·  Plan: - cont metoprolol as above.   uptitrate as BP tolerates      case dw daughter at length

## 2025-06-22 NOTE — PROGRESS NOTE ADULT - SUBJECTIVE AND OBJECTIVE BOX
Subjective: Patient seen and examined. No new events except as noted.     SUBJECTIVE/ROS:  nad      MEDICATIONS:  MEDICATIONS  (STANDING):  apixaban 2.5 milliGRAM(s) Oral two times a day  chlorhexidine 2% Cloths 1 Application(s) Topical daily  levalbuterol Inhalation 0.63 milliGRAM(s) Inhalation every 6 hours  melatonin 5 milliGRAM(s) Oral at bedtime  metoprolol tartrate 50 milliGRAM(s) Oral every 6 hours  pantoprazole    Tablet 40 milliGRAM(s) Oral before breakfast  polyethylene glycol 3350 17 Gram(s) Oral daily  senna 2 Tablet(s) Oral at bedtime  zolpidem 5 milliGRAM(s) Oral at bedtime      PHYSICAL EXAM:  T(C): 36.2 (06-22-25 @ 07:25), Max: 36.6 (06-21-25 @ 18:11)  HR: 113 (06-22-25 @ 07:25) (92 - 113)  BP: 150/93 (06-22-25 @ 07:25) (121/82 - 150/93)  RR: 18 (06-22-25 @ 07:25) (18 - 18)  SpO2: 95% (06-22-25 @ 07:25) (95% - 98%)  Wt(kg): --  I&O's Summary    21 Jun 2025 07:01  -  22 Jun 2025 07:00  --------------------------------------------------------  IN: 0 mL / OUT: 1100 mL / NET: -1100 mL            JVP: Normal  Neck: supple  Lung: clear   CV: S1 S2 , pos jelani   Abd: soft  Ext: No edema  neuro: Awake / alert  Psych: flat affect  Skin: normal``    LABS/DATA:    CARDIAC MARKERS:                  proBNP:   Lipid Profile:   HgA1c:   TSH:

## 2025-06-22 NOTE — PROGRESS NOTE ADULT - ASSESSMENT
Afib, severe HCM LVOT obstruction, Pericardial effusion, recent admission for PNA / Bacteremia   now admitted with sepsis, tachycardia ( afib with RVR ) and shock . Cardiology is called for elevated troponin     Elevated troponin   Demand ischemia   in setting of afib with RVR, shock, severe HCM and sepsis   stable now    Shock   resolved  off midodrine     Afib  HR elevated   needs better HR control , prefer rhythm control   will defer management to EP   on a/c    HCM  on BB  fu with Dr Young to resume camzyos     Pericardial effusion   s/p drain   stable now on recent CT     Pl effusion   s/p drain   fu with pulm   etiology likely third spacing from poor oncotic pressure   nutritional optimization

## 2025-06-22 NOTE — PROGRESS NOTE ADULT - SUBJECTIVE AND OBJECTIVE BOX
[facility]  BSAIL Hooks Y. Patel, S. Shah, G. Casimir  [phone]  ([phone] - weekdays after 5pm and weekends)    Name: GINO GRAHAM  Age/Gender: 89y Female  MRN: [id number]    Interval History:  Patient seen and examined this morning.   Reports feeling pounding in her chest, denies any pain.   No other new complaints noted.  Notes reviewed  No concerning overnight events  Afebrile. D/w RNs at bedside.  Allergies: Zosyn (Rash; Urticaria; Hives)      Objective:  Vitals:   T(F): 97.1 (06-22-25 @ 07:25), Max: 97.8 (06-21-25 @ 18:11)  HR: 113 (06-22-25 @ 07:25) (92 - 113)  BP: 150/93 (06-22-25 @ 07:25) (121/82 - 150/93)  RR: 18 (06-22-25 @ 07:25) (18 - 18)  SpO2: 95% (06-22-25 @ 07:25) (95% - 98%)  Physical Examination:  General: no acute distress, NC   HEENT: normocephalic, atraumatic, anicteric  Respiratory: decreased breath sounds b/l   Cardiovascular: S1 and S2 present, tachycardia   Gastrointestinal: soft, nontender, nondistended  Extremities: no edema, no cyanosis  Skin: no visible rash    Laboratory Studies:  CBC:   WBC Trend:  4.52 06-20-25 @ 06:59  5.80 06-19-25 @ 04:24  4.48 06-18-25 @ 07:27  3.08 06-16-25 @ 01:19    CMP:   Creatinine: 0.63 mg/dL (06-19-25 @ 04:24)  Creatinine: 0.58 mg/dL (06-18-25 @ 07:15)  Creatinine: 0.63 mg/dL (06-16-25 @ 01:19)    Microbiology: reviewed   Culture - Body Fluid with Gram Stain (collected 06-19-25 @ 16:34)  Source: Pleural Fl Pleural Fluid  Gram Stain (06-20-25 @ 02:16):    No polymorphonuclear cells seen    No organisms seen    by cytocentrifuge  Preliminary Report (06-20-25 @ 17:12):    No growth    Culture - Fungal, Body Fluid (collected 06-19-25 @ 16:34)  Source: Pleural Fl Pleural Fluid  Preliminary Report (06-22-25 @ 08:05):    No growth    Culture - Fungal, Body Fluid (collected 06-12-25 @ 14:05)  Source: Pericardial Pericardial Fluid  Preliminary Report (06-21-25 @ 23:02):    No fungus isolated at 1 week.    Culture - Acid Fast - Body Fluid w/Smear (collected 06-12-25 @ 14:05)  Source: Pericardial Other  Preliminary Report (06-21-25 @ 23:06):    No acid-fast bacilli isolated at 1 week. ****Acid-fast cultures are held    for 6 weeks.****    Culture - Body Fluid with Gram Stain (collected 06-12-25 @ 14:05)  Source: Pericardial Other  Gram Stain (06-13-25 @ 00:20):    polymorphonuclear leukocytes seen by cytocentrifuge    No organisms seen by cytocentrifuge  Final Report (06-17-25 @ 16:17):    No growth at 5 days    Urinalysis with Rflx Culture (collected 06-08-25 @ 13:01)    Radiology: reviewed     Medications:  albuterol/ipratropium for Nebulization 3 milliLiter(s) Nebulizer every 6 hours PRN  apixaban 2.5 milliGRAM(s) Oral two times a day  bisacodyl Suppository 10 milliGRAM(s) Rectal daily PRN  chlorhexidine 2% Cloths 1 Application(s) Topical daily  ipratropium    for Nebulization 500 MICROGram(s) Nebulizer every 6 hours PRN  levalbuterol Inhalation 0.63 milliGRAM(s) Inhalation every 6 hours  melatonin 5 milliGRAM(s) Oral at bedtime  metoprolol tartrate 50 milliGRAM(s) Oral every 6 hours  pantoprazole    Tablet 40 milliGRAM(s) Oral before breakfast  polyethylene glycol 3350 17 Gram(s) Oral daily  senna 2 Tablet(s) Oral at bedtime    Prior/Completed Antimicrobials:  cefepime   IVPB  cefTRIAXone   IVPB  cefTRIAXone   IVPB  vancomycin  IVPB.

## 2025-06-22 NOTE — PROGRESS NOTE ADULT - SUBJECTIVE AND OBJECTIVE BOX
EP     HISTORY OF PRESENT ILLNESS: HPI:  89F c hx GERD, R breast CA (40-50y ago) s/p B/L mastectomy and radiation, RUE lymphedema, osteoarthritis/osteoporosis (on monthly ibandronate), HLD, Afib on eliquis, HOCM/severe LVOT/ELEAZAR, small-moderate pericardial effusion/RA diastolic collapse, recent hospitalization 5/29-6/5 for RML PNA, GBS bacteremia (pos cultures 5/29, 5/30) of unclear source on total 10 days abx (IV ceftriaxone inpt, then levaquin 750 q48h until 6/9), presents from [facility] rehab with shaking chills, hypotension, poor appetite    History from pt's daughter/primary decision maker Teresita Shay at bedside.  While at [facility], pt found to have hypotension to SBP 80s. Pt was given IVF and reduced dose of metoprolol. Pt also found to have tachycardia, shaking chills. Pt sent back to the hospital. Reportedly pt has not been eating well, not urinating much. Denies CP, SOB, abd pain, diarrhea, cough, other respiratory symptoms. At baseline pt ambulates without assistive device, drives.  RRT called in the ed for hypotension to SBP 79. (08 Jun 2025 02:41)    Ms Cloud is a pleasant 89yo woman here with shortness of breath.  Sees Dr Adolfo Leung for Cardiology.  Being managed on this inpatient stay by Dr Coelho.  Known to Dr Young after outpatient referral for HOCM management (lVOT gradient 80s-100mmHg+), and had a brief trial of Mavacamten, stopped for feelings of "leg heaviness".    EP called re: rapid AFib, refractory to low-dose metoprolol thus far, and complicated by management of HCM and new/worsening pericardial effusion.  Resting comfortably in bed on supplemental O2.  SOB and fatigued but no palpitations or fainting.  A 10 pt ROS is otherwise negative.      PAST MEDICAL & SURGICAL HISTORY:  Breast cancer  s/p b/l mastectomy  H/O bilateral mastectomy  + breast implants  History of cataract surgery, unspecified laterality    6/11- moved to CCU for closer monitoring.  lethargic / unable to obtain ROS today.  worsening pleural effusion, no plan for tap.  no plan for pericardiocentesis either.  hypotensive requiring midodrine.  6/12- s/p urgent pericardiocentesis via Interventional Radiology/ lateral thorax approach.  Feels much better- more energetic, has an appetite, more talkative.  6/13- resting in bed in CICU. on phenylephrine today.  no new complaints. feeling better overall after pericardiocentesis.  awaiting possible thoracentesis?  6/14- resting in chair. no new complaints.  slow drainage out of pericardial drain.  AFib HR now contrlled for first time on this stay.  6/15- resting in recliner, poor appetite. no new issues today.  6/16- resting in recliner, trying to eat breakfast today.  HR 110bpm, up-titrated to 50q6 metoprolol last night.  6/17- resting in bed, no new issues.  6/18- resting in bed, tachypneic, poor appetite, anxious.    6/19- resting in bed. CT scan results reviewed. awaiting thoracentesis (right side large pleural effusion)   6/20- resting in bed. Feels much better after thoracentesis yesterady.   6/21 pt is resting in bed  , no complaints    Date of service 6/22 pt remains in Afib rate controlled, no complaints otherwise          albuterol/ipratropium for Nebulization 3 milliLiter(s) Nebulizer every 6 hours PRN  apixaban 2.5 milliGRAM(s) Oral two times a day  bisacodyl Suppository 10 milliGRAM(s) Rectal daily PRN  chlorhexidine 2% Cloths 1 Application(s) Topical daily  ipratropium    for Nebulization 500 MICROGram(s) Nebulizer every 6 hours PRN  levalbuterol Inhalation 0.63 milliGRAM(s) Inhalation every 6 hours  melatonin 5 milliGRAM(s) Oral at bedtime  metoprolol tartrate 50 milliGRAM(s) Oral every 6 hours  pantoprazole    Tablet 40 milliGRAM(s) Oral before breakfast  polyethylene glycol 3350 17 Gram(s) Oral daily  senna 2 Tablet(s) Oral at bedtime  zolpidem 5 milliGRAM(s) Oral at bedtime          Hemoglobin: 10.5 g/dL (06-20 @ 06:59)  Hemoglobin: 9.8 g/dL (06-19 @ 04:24)  Hemoglobin: 10.0 g/dL (06-19 @ 04:24)  Hemoglobin: 10.5 g/dL (06-18 @ 07:27)            Creatinine Trend: 0.63<--, 0.58<--, 0.63<--, 0.68<--, 0.58<--, 0.68<--    COAGS:           T(C): 36.2 (06-22-25 @ 07:25), Max: 36.6 (06-21-25 @ 18:11)  HR: 113 (06-22-25 @ 07:25) (92 - 113)  BP: 150/93 (06-22-25 @ 07:25) (121/82 - 150/93)  RR: 18 (06-22-25 @ 07:25) (18 - 18)  SpO2: 95% (06-22-25 @ 07:25) (95% - 98%)  Wt(kg): --    I&O's Summary    21 Jun 2025 07:01  -  22 Jun 2025 07:00  --------------------------------------------------------  IN: 0 mL / OUT: 1100 mL / NET: -1100 mL             Appearance: frail elderly woman. tachypneic.  HEENT:   Normal oral mucosa, PERRL, EOMI	  Lymphatic: No lymphadenopathy , no edema  Cardiovascular: irregular S1 S2, No JVD, No murmurs , Peripheral pulses palpable 2+ bilaterally.    Respiratory: poor air entry BL  normal effort 	  Gastrointestinal:  Soft, Non-tender, + BS	  Skin: No rashes, No ecchymoses, No cyanosis, warm to touch  Musculoskeletal: Normal range of motion, normal strength  Psychiatry:  Mood & affect appropriate    TELEMETRY: AFib, narrow QRS, 70s-80s  ECG:  	AFib, atypical LVHypertrphy  Echo:  < from: TTE W or WO Ultrasound Enhancing Agent (06.05.25 @ 07:16) >  CONCLUSIONS:      1. Limited TTE to assess for pericardial effusion.   2. Trace pericardial effusion noted adjacent to the posterolateral left ventricle, small pericardial effusion noted adjacent to the anterior right ventricle and small pericardial effusion noted adjacent to the right atrium.   3. The inferior vena cava is normal in size measuring 1.70 cm in diameter, (normal <2.1cm) with normal inspiratory collapse (normal >50%) consistent with normal right atrial pressure (~3, range 0-5mmHg).   4. Compared to the transthoracic echocardiogram performed on 5/30/2025, the right atrium is not as well visualized on this study. There is right atrial diastolic collapse visualized in the 4 chamber view but unable to quantify the duration of the collapse. The effusion appears unchanged in size and distribution. No other signs of cardiac tamponade are present.    < end of copied text >    CT Chest - personally reviewed the images.  right breat prosthesis with signs of rupture.  left breast prosthesis OK.  prominent LV hypertrophy visible.  pericardial effusion enlarged.  pleural effusion present on the right.  	  CTA - PE 6/18.  No pericardial effusion. Right pleural effusion is large, loculated.    ASSESSMENT/PLAN: Ms Cloud is a pleasant 89y Female here with shortness of breath.    Multifactorial in the setting of pericardial effusion (treated w/ drainage), Hypertrophic Cardiomyopathy with severe LVOT obstruction, and rapid AFib (on maximal beta blocker dose).  [id number] is at least 3.  No other invasive procedures planned?  On heparin. Plan to restart Apixaban.  please check a standing weight.  if <60kg, Apixaban dose is reduced to 2.5mg BID.  Pericardial drain removed.  CT scan shows it was drained to completion.  s/p thoracentesis on 6/19, with significant improvement in shortness of breath, and heartrate.  Continue metoprolol. Transition to Metoprolol Succinate 200mg daily, or Metoprolol tartrate 100mg BID.  Continue alpha-agonists to support her blood pressure.  Outpatient, needs to re-enroll with a HCM specialist.  Strongly recommend outpatient re-trial of Camzyos, even at the lowest dose.  This cannot be started in the hospital.  If able to lie flat over the weekend, order a Left Atrial Thrombus Cardiac CTA, and we can plan to cardiovert afterwards.  Outpatient followup w/ Dr Leung.

## 2025-06-22 NOTE — PROGRESS NOTE ADULT - SUBJECTIVE AND OBJECTIVE BOX
Patient is a 89y old  Female who presents with a chief complaint of shaking chills, hypotension, poor appetite (22 Jun 2025 10:21)    Date of servie : 06-22-25 @ 11:07  INTERVAL HPI/OVERNIGHT EVENTS:  T(C): 36.2 (06-22-25 @ 07:25), Max: 36.6 (06-21-25 @ 18:11)  HR: 113 (06-22-25 @ 07:25) (92 - 113)  BP: 150/93 (06-22-25 @ 07:25) (121/82 - 150/93)  RR: 18 (06-22-25 @ 07:25) (18 - 18)  SpO2: 95% (06-22-25 @ 07:25) (95% - 98%)  Wt(kg): --  I&O's Summary    21 Jun 2025 07:01  -  22 Jun 2025 07:00  --------------------------------------------------------  IN: 0 mL / OUT: 1100 mL / NET: -1100 mL        LABS:          PTT - ( 20 Jun 2025 14:42 )  PTT:51.4 sec    CAPILLARY BLOOD GLUCOSE                MEDICATIONS  (STANDING):  apixaban 2.5 milliGRAM(s) Oral two times a day  chlorhexidine 2% Cloths 1 Application(s) Topical daily  levalbuterol Inhalation 0.63 milliGRAM(s) Inhalation every 6 hours  melatonin 5 milliGRAM(s) Oral at bedtime  metoprolol tartrate 50 milliGRAM(s) Oral every 6 hours  pantoprazole    Tablet 40 milliGRAM(s) Oral before breakfast  polyethylene glycol 3350 17 Gram(s) Oral daily  senna 2 Tablet(s) Oral at bedtime  zolpidem 5 milliGRAM(s) Oral at bedtime    MEDICATIONS  (PRN):  albuterol/ipratropium for Nebulization 3 milliLiter(s) Nebulizer every 6 hours PRN Shortness of Breath and/or Wheezing  bisacodyl Suppository 10 milliGRAM(s) Rectal daily PRN Constipation  ipratropium    for Nebulization 500 MICROGram(s) Nebulizer every 6 hours PRN Shortness of Breath and/or Wheezing          PHYSICAL EXAM:  GENERAL: frail  CHEST/LUNG: bibasilar crackles +  HEART: S1S2+  ABDOMEN: Soft, Nontender, Nondistended; Bowel sounds present  EXTREMITIES: edema +    Care Discussed with Consultants/Other Providers [ x] YES  [ ] NO

## 2025-06-22 NOTE — PROGRESS NOTE ADULT - ASSESSMENT
89F c hx GERD, R breast CA (40-50y ago) s/p B/L mastectomy and radiation, RUE lymphedema, osteoarthritis/osteoporosis (on monthly ibandronate), HLD, Afib on eliquis, HOCM/severe LVOT/ELEAZAR, small-moderate pericardial effusion/RA diastolic collapse, recent hospitalization 5/29-6/5 for RML PNA, GBS bacteremia (pos cultures 5/29, 5/30) of unclear source on total 10 days abx (IV ceftriaxone inpt, then levaquin 750 q48h until 6/9), pw hypotension, severe sepsis of unclear source, demand ischemia, afib c rvr    Severe Sepsis/sirs, Hypotension 2/2 unclear source  Severe LVOT obstruction/HCM, worsening pericardial effusion  CXR w/ worsening aeration at the bases  Flu/COVID/RSV negative  UA negative  6/7 Bcx negative   Zosyn allergy noted, tolerates cephalosporins   CTAP with mod-large pericardial effusion inc since 5/29/25, stable mod R and small L pleural effusion and atelectasis   TTE with increased pericardial effusion since 6/5 -- moderate pericardial effusion adjacent to RV, small pericardial effusion adjacent to RA and large pericardia effusion noted adjacent to posterior LV with no evidence of tamponade physiology  6/10 note with tachypnea and expiratory wheeze, CCU consulted for decompensated heart failure iso mod-severe pericardial effusion with concerns for impending hemodynamic collapse given complex cardiac physiology with severe LVOT obstruction, now transferred to CICU for closer monitor and management of enlarging pericardial effusion  CTS eval noted-patient high risk for pericardial window - rec continue to monitor pericardial effusion with repeat TTE  6/11 CT chest with large pericardial effusion increased since 6/4, mod R and small L pleural effusion, no pneumonia;  cefepime changed to ceftriaxone d/t concern for possible neurotoxicity   6/12 hypotensive and tachycardic despite volume resuscitation -- s/p emergent pericardiocentesis with drain placed    - noted drained 400cc ss pericardial fluid, cell count noted, culture NGTD, path with no malignant cells   6/16 s/p pericardial drain removal   6/18 CTA chest with no PE; large loculated R pleural effusion with partial atelectasis R lung, small L pleural effusion, post radiation changes deep to R breast, stable patchy biapical lung opacities   6/19 s/p US guided R thoracocentesis - drained 1.25L transudative fluid -- culture NGTD   remains afebrile, no leukocytosis, completed ceftriaxone 6/20    s/p vancomycin 6/7-6/10  s/p cefepime 6/7-6/11  s/p ceftriaxone 6/11-6/20    Recommendations:   Follow pleural fluid culture (NGTD), cytopath  Continue off antibiotics   Cardiology/EP following   Trend temps/WBC  Continue rest of care per primary team       Wes Grullon M.D.  [facility]  Available on [employer] TEAMS - *PREFERRED*  [phone]  After 5pm on weekdays and all day on weekends - please call [phone]     Thank you for consulting us and involving us in the management of this patients case. In addition to reviewing history, imaging, documents, labs, microbiology, took into account antibiotic stewardship, local antibiogram and infection control strategies and potential transmission issues at time of treatment decision making process.

## 2025-06-22 NOTE — PROGRESS NOTE ADULT - SUBJECTIVE AND OBJECTIVE BOX
Date of Service: 06-22-25 @ 14:45    Patient is a 89y old  Female who presents with a chief complaint of shaking chills, hypotension, poor appetite (22 Jun 2025 11:07)      Any change in ROS: at ti ms she feels somebody is hitting with a hammer at her lower chest:  denies pain ; has mild sob:       MEDICATIONS  (STANDING):  apixaban 2.5 milliGRAM(s) Oral two times a day  benzonatate 100 milliGRAM(s) Oral every 8 hours  chlorhexidine 2% Cloths 1 Application(s) Topical daily  levalbuterol Inhalation 0.63 milliGRAM(s) Inhalation every 6 hours  melatonin 5 milliGRAM(s) Oral at bedtime  metoprolol tartrate 50 milliGRAM(s) Oral every 6 hours  pantoprazole    Tablet 40 milliGRAM(s) Oral before breakfast  polyethylene glycol 3350 17 Gram(s) Oral daily  senna 2 Tablet(s) Oral at bedtime  zolpidem 5 milliGRAM(s) Oral at bedtime    MEDICATIONS  (PRN):  acetaminophen     Tablet .. 650 milliGRAM(s) Oral every 6 hours PRN Mild Pain (1 - 3)  albuterol/ipratropium for Nebulization 3 milliLiter(s) Nebulizer every 6 hours PRN Shortness of Breath and/or Wheezing  bisacodyl Suppository 10 milliGRAM(s) Rectal daily PRN Constipation  ipratropium    for Nebulization 500 MICROGram(s) Nebulizer every 6 hours PRN Shortness of Breath and/or Wheezing    Vital Signs Last 24 Hrs  T(C): 36.2 (22 Jun 2025 07:25), Max: 36.6 (21 Jun 2025 18:11)  T(F): 97.1 (22 Jun 2025 07:25), Max: 97.8 (21 Jun 2025 18:11)  HR: 113 (22 Jun 2025 07:25) (92 - 113)  BP: 150/93 (22 Jun 2025 07:25) (133/79 - 150/93)  BP(mean): --  RR: 18 (22 Jun 2025 07:25) (18 - 18)  SpO2: 95% (22 Jun 2025 07:25) (95% - 98%)    Parameters below as of 22 Jun 2025 07:32  Patient On (Oxygen Delivery Method): nasal cannula        I&O's Summary    21 Jun 2025 07:01  -  22 Jun 2025 07:00  --------------------------------------------------------  IN: 0 mL / OUT: 1100 mL / NET: -1100 mL          Physical Exam:   GENERAL: NAD, well-groomed, well-developed  HEENT: BRYSON/   Atraumatic, Normocephalic  ENMT: No tonsillar erythema, exudates, or enlargement; Moist mucous membranes, Good dentition, No lesions  NECK: Supple, No JVD, Normal thyroid  CHEST/LUNG: decreased air entry bilaterally:  r> l   CVS: Regular rate and rhythm; No murmurs, rubs, or gallops  GI: : Soft, Nontender, Nondistended; Bowel sounds present  NERVOUS SYSTEM:  Alert & Oriented X3  EXTREMITIES:  -+ edema  LYMPH: No lymphadenopathy noted  SKIN: No rashes or lesions  ENDOCRINOLOGY: No Thyromegaly  PSYCH: Appropriate    Labs:                              10.5   4.52  )-----------( 306      ( 20 Jun 2025 06:59 )             34.6                         9.8    5.80  )-----------( 309      ( 19 Jun 2025 04:24 )             31.9     06-19    136  |  102  |  31[H]  ----------------------------<  108[H]  5.2   |  26  |  0.63  06-18    x   |  x   |  x   ----------------------------<  x   5.0   |  x   |  x       TPro  6.9  /  Alb  x   /  TBili  x   /  DBili  x   /  AST  x   /  ALT  x   /  AlkPhos  x   06-19    CAPILLARY BLOOD GLUCOSE                D-Dimer Assay, Quantitative: 5056 ng/mL DDU (06-18 @ 07:17)  Fluid Source --  Albumin, Fluid0.8 g/dL  Glucose, [id number] mg/dL  Protein total, Fluid2.1 g/dL  Lacatate Dehydrogenase, [id number] U/L  pH, Fluid7.55  Cytopathology-Non Gyn Report--        RECENT CULTURES:  06-19 @ 16:34 Pleural Fl Pleural Fluid       No polymorphonuclear cells seen  No organisms seen  by cytocentrifuge       rad< from: Xray Chest 1 View- PORTABLE-Urgent (Xray Chest 1 View- PORTABLE-Urgent .) (06.22.25 @ 13:31) >  ACC: [id number] EXAM:  XR CHEST PORTABLE URGENT 1V   ORDERED BY: MALLORY MICHELLE     PROCEDURE DATE:  06/22/2025          INTERPRETATION:  EXAMINATION: XR CHEST URGENT    CLINICAL INDICATION: pl effusion    TECHNIQUE: Single frontal, portable viewof the chest was obtained.    COMPARISON: Chest x-ray 6/19/2025.    FINDINGS:    The heart size is not well assessed in this view.  Increased hazy opacification of the bilateral mid/lower lung fields with   bilateral pleural effusions and bibasilar atelectasis, right greater the   left.  There is no pneumothorax.    IMPRESSION:  Increased bilateral pleural effusions and associated atelectasis, right   greater than left.    --- End of Report ---          KRYSTINA GAINES MD; Resident Radiologist  This document has been electronically signed.  YAJAIRA FREIRE MD; Attending Radiologist  This document has been electronically signed. Jun 22 2025  2:33PM    < end of copied text >      No growth          RESPIRATORY CULTURES:          Studies  Chest X-RAY  CT SCAN Chest   Venous Dopplers: LE:   CT Abdomen  Others

## 2025-06-22 NOTE — PROGRESS NOTE ADULT - ASSESSMENT
89F c hx GERD, R breast CA (40-50y ago) s/p B/L mastectomy and radiation, RUE lymphedema, osteoarthritis/osteoporosis (on monthly ibandronate), HLD, Afib on eliquis, HOCM/severe LVOT/LORA, small-moderate pericardial effusion/RA diastolic collapse, recent hospitalization 5/29-6/5 for RML PNA, GBS bacteremia (pos cultures 5/29, 5/30) of unclear source on total 10 days abx (IV ceftriaxone inpt, then levaquin 750 q48h until 6/9), presents from [facility] rehab with shaking chills, hypotension, poor appetite  History from pt's daughter/primary decision maker Teresita Day at bedside.  While at [facility], pt found to have hypotension to SBP 80s. Pt was given IVF and reduced dose of metoprolol. Pt also found to have tachycardia, shaking chills. Pt sent back to the hospital. Reportedly pt has not been eating well, not urinating much. Denies CP, SOB, abd pain, diarrhea, cough, other respiratory symptoms. At baseline pt ambulates without assistive device, drives.  RRT called in the ed for hypotension to SBP 79. (08 Jun 2025 02:41)  to me pt daughter says she was very sleepy in the morning too  as she was given ambien at 4 AM in the morning:   now she is alert and awake and is on 2 L of oxygen ;  she is not sob:  and does not look to be in any resp distress:       Severe sepsis.   unclear source of fevers  cont empiric vanc, cefepime  f/u blood cx. if positive will likely need MARIA G  monitor for any localizing symptoms  pt had recent pan ct scan that did not elucidate cause of bacteremia  recent blood cultures have been negative   - IVF  on cefepime  she is febrile too   vbg on admission IS ok;   MONITOR BLOOD PRESSURE CLOSELY:   OIF SHE DROPS HER BLOOD PRESSURE MORE:  WOULD NEED MICU  CO NSULT:    6/9: seems slightly more tored today  ; cont antibtiocs:  per ID:  she remains on 2 L of oxygen : she is alert and awake:  daughter at bedside:  reviewed the labs and echo and ct scan chest with the daughter in detail :   she has large pericardial effusions:  per ir no good window and effusion seemd small to drain : ct scan chest read as mod to large pericardial effusion : defer to cards :     6/10: seems to be doing  ok :  no sob:   no  cough  ;   no  phlegm   on 2 L of oxygen :  thoracic to see:    no emergency in tapping the pleural effusion:   de cardiologist  6/11: on ceftriaxone   seems O K   6/12: seems OK:  s/p pericardial drain:  has 400 cc output   await cytology    6/13: cont c urrent rx:   on antibiotics  6/14: on ceftriaxone    6/15/l seems to be doing  ok ;  no sob;  no cough :   no phlegm      6/17; pulm wise she looks tachypneic today ; rpt limited echo with no change from before;  cxr done today with no significant change from before;   abg done today seems pretty reasonable   will do di mine;  last d dimer on 3rd was slightly high ;  if the d dimer has increased significantly  would do cta;  dw acp : her creat is normal:  she has been off ac for pericardial drain for some time;   on antibiotics   40 mg ov iv solumedrol given     6/18: HER D DIMER IS VERY HIGH :  going for cta;    depending upon the cta:  if she has significant pl effusion , would like to tap, if there is no pe    dw acp     6/19: seems to be doing  ok ;  for thoracentesis ; today  : confirmed with USG   ; off ac for now:  to restart after the procedure     6/20:  ABX as per ID     6/21; resolved  now off antibiotics  no fever:   6/22: resolved       Pleural Effusion  -S/p R US guided thoracentesis 6/19, 1.25 L drained  -CXR post procedure with mild congestion, residual effusions but overall improved. No clear ptx  -F/u pleural fluid studies  -Keep O>I as tolerated     6/21; s/p tap:  transudative fluids:    needs diuresis:  hard to give with hocm :  rpt cxr in am:  if sheis rapidly filling up with pl effusion,  she may need pleurx:  uzma son and wilfredoter in detail :     6/22: the pl effusion has increased:  right   . left:   she may need pleurax:         Pericardial effusion ;   the ct chest shows increasing pericardial effusion  : which is of more pressing concern then pleural effusion :  needs a tap:  ir guided tap    6/12: as above   6/13: asked up to tap:  need to repeat inr and pt  last was  >  2.0:  needs to be less then 1.5:  ip contacted will evalute for tap    6/14; defer to p r eusebiaary team  6/16/: seems OK;'' pericardial effusion removed    6/17: limited echo today with no change   6/18: defer to cards : cyto is still pending   6/19; cyto i n  pl fluid is negative for malignant cells:   6/20: Management per cards   6/21: tapped:  doing  ok   6/22; seems to be stable:         Hypotension.  suspect combination of infection, hocm, lora, tachycardia, pericardial effusion, metoprolol  pt has somewhat tenuous hemodynamic status  consider cardioversion, consider repeat TTE  pt is full code.  pts blood pressure is slightly low   on midodrine  6/9: cont on midodrine  6/10; controlled:  on midodrine  6/11: on midodrine  6/12: currently she is on vasopressors   6/13; off vasopressors now   6/14; blood pressure soft:  on midodrine   6/15: her blood pressure is pretty good:   6/17: blood pressure today is reasonable;   6/18: off midodrine and blood pressure is pretty good:  on metoprolol   6/19/ on metoprolol :   6/20: BP controlled    6*21: controlled:  on meto :  off midodrine now:   6/22: blood pressure is OK       Chronic atrial fibrillation.  reduce metoprolol dose to tartrate 25mg BID  goal HR <100 in setting of HOCM with hypotension  consider amiodarone or cardioversion  PER CARDS    6/9: defer to cards   6/10: off Eliquis for now   6/11: remains off eliquis   6/12: off ac  6/13: keep off ac if thoracentesis needed to be done over the weekend:  though it is not emergent   6/14" can restart ac:  if required as there is now no plan for thoracentesis:  dw attending   6/16; off ac as pericardial arnie is removed today   6/17; remains off ac:  restart as recommended by ir note  6/18: still off heparin :  cta awaited do urgent   6/19:  cta done: no pe:  has mod to large effusion on rigth side: for tap today    6/20: AC with heparin drip   6/21: on eliquis now    6/22: her effusions have increased;  she will likely need pleurx on rigth side;       Type 2 myocardial infarction.  -per cards    HOCM (hypertrophic obstructive cardiomyopathy).  -per cards    Acute respiratory failure with hypoxia.   recent CT scans showing right bronchiectasis, right fibrosis likely 2/2 radiation, poss RML PNA.  cont BD :   ct chest showed: No pulmonary embolism. Small to moderate right and small left pleural effusions with associated  atelectasis. 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  Cardiomegaly. Moderate pericardial effusion.  needs echo     9/6: new echo reviewed:  seen by cardiology :  monitor her blood pressure closely:  if she drops her blood pressure call CCU     6/10: cont to manain o2 sao2 above 90% all the t hayden  6/11; n 2 L of oxygen    6/12: she is not in any resp distress    6/13: she is on rooma ir:  doing well :  satting at 93%  6/14: pulm wise seems pretty good;   on rooma ir;   no plan for tap now     6/16; she seems OK;  she is on 1 L of oxygen ; would suggest to repeat cxr   6/17; she is on 2 L of oxygen  :  satting at 96%    6/20  Keep sats >90% with O2 PRN  Denies SOB today     6/22today xray with i ncreasing pll effusion : repeat again tomorrow ; if furtgerh i ncrease  likely would need pleurx cath : uzma family     dw acp and daughter   if she deteriorates further ;  call ccu/ MICU     6/21; uzma acp , son and the daughter   6/22;' uzma family and acp

## 2025-06-23 NOTE — PROGRESS NOTE ADULT - ASSESSMENT
89F c hx GERD, R breast CA (40-50y ago) s/p B/L mastectomy and radiation, RUE lymphedema, osteoarthritis/osteoporosis (on monthly ibandronate), HLD, Afib on eliquis, HOCM/severe LVOT/ELEAZAR, small-moderate pericardial effusion/RA diastolic collapse, recent hospitalization 5/29-6/5 for RML PNA, GBS bacteremia (pos cultures 5/29, 5/30) of unclear source on total 10 days abx (IV ceftriaxone inpt, then levaquin 750 q48h until 6/9), pw hypotension, severe sepsis of unclear source, demand ischemia, afib c rvr    Severe Sepsis/sirs, Hypotension 2/2 unclear source  Severe LVOT obstruction/HCM, worsening pericardial effusion  CXR w/ worsening aeration at the bases  Flu/COVID/RSV negative  UA negative  6/7 Bcx negative   Zosyn allergy noted, tolerates cephalosporins   CTAP with mod-large pericardial effusion inc since 5/29/25, stable mod R and small L pleural effusion and atelectasis   TTE with increased pericardial effusion since 6/5 -- moderate pericardial effusion adjacent to RV, small pericardial effusion adjacent to RA and large pericardia effusion noted adjacent to posterior LV with no evidence of tamponade physiology  6/10 note with tachypnea and expiratory wheeze, CCU consulted for decompensated heart failure iso mod-severe pericardial effusion with concerns for impending hemodynamic collapse given complex cardiac physiology with severe LVOT obstruction, now transferred to CICU for closer monitor and management of enlarging pericardial effusion  CTS eval noted-patient high risk for pericardial window - rec continue to monitor pericardial effusion with repeat TTE  6/11 CT chest with large pericardial effusion increased since 6/4, mod R and small L pleural effusion, no pneumonia;  cefepime changed to ceftriaxone d/t concern for possible neurotoxicity   6/12 hypotensive and tachycardic despite volume resuscitation -- s/p emergent pericardiocentesis with drain placed    - noted drained 400cc ss pericardial fluid, cell count noted, culture NGTD, path with no malignant cells   6/16 s/p pericardial drain removal   6/18 CTA chest with no PE; large loculated R pleural effusion with partial atelectasis R lung, small L pleural effusion, post radiation changes deep to R breast, stable patchy biapical lung opacities   6/19 s/p US guided R thoracocentesis - drained 1.25L transudative fluid -- culture NGTD   remains afebrile, no leukocytosis, completed ceftriaxone 6/20    s/p vancomycin 6/7-6/10  s/p cefepime 6/7-6/11  s/p ceftriaxone 6/11-6/20    Recommendations:   Follow pleural fluid culture (NGTD), cytopath  Continue off antibiotics   Cardiology/EP following   Trend temps/WBC  Continue rest of care per primary team       Wes Grullon M.D.  [facility]  Available on [employer] TEAMS - *PREFERRED*  [phone]  After 5pm on weekdays and all day on weekends - please call [phone]     Thank you for consulting us and involving us in the management of this patients case. In addition to reviewing history, imaging, documents, labs, microbiology, took into account antibiotic stewardship, local antibiogram and infection control strategies and potential transmission issues at time of treatment decision making process.

## 2025-06-23 NOTE — PROGRESS NOTE ADULT - SUBJECTIVE AND OBJECTIVE BOX
Patient is a 89y old  Female who presents with a chief complaint of shaking chills, hypotension, poor appetite (23 Jun 2025 13:42)    Date of servie : 06-23-25 @ 14:26  INTERVAL HPI/OVERNIGHT EVENTS:  T(C): 36.2 (06-23-25 @ 11:00), Max: 36.8 (06-22-25 @ 20:54)  HR: 99 (06-23-25 @ 12:35) (93 - 113)  BP: 137/71 (06-23-25 @ 12:35) (131/76 - 153/85)  RR: 18 (06-23-25 @ 11:00) (18 - 18)  SpO2: 93% (06-23-25 @ 11:00) (93% - 98%)  Wt(kg): --  I&O's Summary    22 Jun 2025 07:01  -  23 Jun 2025 07:00  --------------------------------------------------------  IN: 250 mL / OUT: 500 mL / NET: -250 mL        LABS:                        11.0   3.88  )-----------( 287      ( 23 Jun 2025 07:10 )             36.4     06-23    138  |  102  |  20  ----------------------------<  105[H]  5.0   |  26  |  0.57    Ca    8.8      23 Jun 2025 07:18        Urinalysis Basic - ( 23 Jun 2025 07:18 )    Color: x / Appearance: x / SG: x / pH: x  Gluc: 105 mg/dL / Ketone: x  / Bili: x / Urobili: x   Blood: x / Protein: x / Nitrite: x   Leuk Esterase: x / RBC: x / WBC x   Sq Epi: x / Non Sq Epi: x / Bacteria: x      CAPILLARY BLOOD GLUCOSE            Urinalysis Basic - ( 23 Jun 2025 07:18 )    Color: x / Appearance: x / SG: x / pH: x  Gluc: 105 mg/dL / Ketone: x  / Bili: x / Urobili: x   Blood: x / Protein: x / Nitrite: x   Leuk Esterase: x / RBC: x / WBC x   Sq Epi: x / Non Sq Epi: x / Bacteria: x        MEDICATIONS  (STANDING):  apixaban 2.5 milliGRAM(s) Oral two times a day  benzonatate 100 milliGRAM(s) Oral every 8 hours  chlorhexidine 2% Cloths 1 Application(s) Topical daily  levalbuterol Inhalation 0.63 milliGRAM(s) Inhalation every 6 hours  melatonin 5 milliGRAM(s) Oral at bedtime  metoprolol tartrate 50 milliGRAM(s) Oral every 6 hours  pantoprazole    Tablet 40 milliGRAM(s) Oral before breakfast  polyethylene glycol 3350 17 Gram(s) Oral daily  senna 2 Tablet(s) Oral at bedtime  sodium chloride 0.9%. 600 milliLiter(s) (50 mL/Hr) IV Continuous <Continuous>  zolpidem 5 milliGRAM(s) Oral at bedtime    MEDICATIONS  (PRN):  acetaminophen     Tablet .. 650 milliGRAM(s) Oral every 6 hours PRN Mild Pain (1 - 3)  albuterol/ipratropium for Nebulization 3 milliLiter(s) Nebulizer every 6 hours PRN Shortness of Breath and/or Wheezing  bisacodyl Suppository 10 milliGRAM(s) Rectal daily PRN Constipation  guaifenesin/dextromethorphan Oral Liquid 5 milliLiter(s) Oral every 6 hours PRN Cough  ipratropium    for Nebulization 500 MICROGram(s) Nebulizer every 6 hours PRN Shortness of Breath and/or Wheezing          PHYSICAL EXAM:  GENERAL: ill appearing   CHEST/LUNG: Crackles +  HEART: S1S2+  ABDOMEN: Soft, Nontender, Nondistended; Bowel sounds present  EXTREMITIES:  roslyn +    Care Discussed with Consultants/Other Providers [ ] YES  [ ] NO

## 2025-06-23 NOTE — PROGRESS NOTE ADULT - SUBJECTIVE AND OBJECTIVE BOX
Date of Service: 06-23-25 @ 13:42    Patient is a 89y old  Female who presents with a chief complaint of shaking chills, hypotension, poor appetite (23 Jun 2025 11:00)      Any change in ROS:   No new respiratory events overnight  +MEADE  O2 sats 93% on 2LNC     MEDICATIONS  (STANDING):  apixaban 2.5 milliGRAM(s) Oral two times a day  benzonatate 100 milliGRAM(s) Oral every 8 hours  chlorhexidine 2% Cloths 1 Application(s) Topical daily  levalbuterol Inhalation 0.63 milliGRAM(s) Inhalation every 6 hours  melatonin 5 milliGRAM(s) Oral at bedtime  metoprolol tartrate 50 milliGRAM(s) Oral every 6 hours  pantoprazole    Tablet 40 milliGRAM(s) Oral before breakfast  polyethylene glycol 3350 17 Gram(s) Oral daily  senna 2 Tablet(s) Oral at bedtime  sodium chloride 0.9%. 600 milliLiter(s) (50 mL/Hr) IV Continuous <Continuous>  zolpidem 5 milliGRAM(s) Oral at bedtime    MEDICATIONS  (PRN):  acetaminophen     Tablet .. 650 milliGRAM(s) Oral every 6 hours PRN Mild Pain (1 - 3)  albuterol/ipratropium for Nebulization 3 milliLiter(s) Nebulizer every 6 hours PRN Shortness of Breath and/or Wheezing  bisacodyl Suppository 10 milliGRAM(s) Rectal daily PRN Constipation  guaifenesin/dextromethorphan Oral Liquid 5 milliLiter(s) Oral every 6 hours PRN Cough  ipratropium    for Nebulization 500 MICROGram(s) Nebulizer every 6 hours PRN Shortness of Breath and/or Wheezing    Vital Signs Last 24 Hrs  T(C): 36.2 (23 Jun 2025 11:00), Max: 36.8 (22 Jun 2025 20:54)  T(F): 97.1 (23 Jun 2025 11:00), Max: 98.2 (22 Jun 2025 20:54)  HR: 99 (23 Jun 2025 12:35) (93 - 113)  BP: 137/71 (23 Jun 2025 12:35) (131/76 - 153/85)  BP(mean): --  RR: 18 (23 Jun 2025 11:00) (18 - 18)  SpO2: 93% (23 Jun 2025 11:00) (93% - 98%)    Parameters below as of 23 Jun 2025 11:00  Patient On (Oxygen Delivery Method): nasal cannula  O2 Flow (L/min): 2      I&O's Summary    22 Jun 2025 07:01  -  23 Jun 2025 07:00  --------------------------------------------------------  IN: 250 mL / OUT: 500 mL / NET: -250 mL          Physical Exam:   GENERAL: NAD, well-groomed, well-developed  HEENT: BRYSON/   Atraumatic, Normocephalic  ENMT: No tonsillar erythema, exudates, or enlargement; Moist mucous membranes, Good dentition, No lesions  NECK: Supple, No JVD, Normal thyroid  CHEST/LUNG: Decreased at bases   CVS: Regular rate and rhythm; No murmurs, rubs, or gallops  GI: : Soft, Nontender, Nondistended; Bowel sounds present  NERVOUS SYSTEM:  Alert & Oriented X3, Good concentration; Motor Strength 5/5 B/L upper and lower extremities; DTRs 2+ intact and symmetric  EXTREMITIES:  2+ Peripheral Pulses,+edema   LYMPH: No lymphadenopathy noted  SKIN: No rashes or lesions  ENDOCRINOLOGY: No Thyromegaly  PSYCH: Appropriate    Labs:                              11.0   3.88  )-----------( 287      ( 23 Jun 2025 07:10 )             36.4                         10.5   4.52  )-----------( 306      ( 20 Jun 2025 06:59 )             34.6     06-23    138  |  102  |  20  ----------------------------<  105[H]  5.0   |  26  |  0.57    Ca    8.8      23 Jun 2025 07:18      CAPILLARY BLOOD GLUCOSE              Urinalysis Basic - ( 23 Jun 2025 07:18 )    Color: x / Appearance: x / SG: x / pH: x  Gluc: 105 mg/dL / Ketone: x  / Bili: x / Urobili: x   Blood: x / Protein: x / Nitrite: x   Leuk Esterase: x / RBC: x / WBC x   Sq Epi: x / Non Sq Epi: x / Bacteria: x      D-Dimer Assay, Quantitative: 5056 ng/mL DDU (06-18 @ 07:17)  Fluid Source --  Albumin, Fluid0.8 g/dL  Glucose, [id number] mg/dL  Protein total, Fluid2.1 g/dL  Lacatate Dehydrogenase, [id number] U/L  pH, Fluid7.55  Cytopathology-Non Gyn Report--        RECENT CULTURES:  06-19 @ 16:34 Pleural Fl Pleural Fluid       No polymorphonuclear cells seen  No organisms seen  by cytocentrifuge           No growth          RESPIRATORY CULTURES:          Studies  < from: Xray Chest 1 View- PORTABLE-Urgent (Xray Chest 1 View- PORTABLE-Urgent .) (06.22.25 @ 13:31) >    ACC: [id number] EXAM:  XR CHEST PORTABLE URGENT 1V   ORDERED BY: MALLORY MICHELLE     PROCEDURE DATE:  06/22/2025          INTERPRETATION:  EXAMINATION: XR CHEST URGENT    CLINICAL INDICATION: pl effusion    TECHNIQUE: Single frontal, portable viewof the chest was obtained.    COMPARISON: Chest x-ray 6/19/2025.    FINDINGS:    The heart size is not well assessed in this view.  Increased hazy opacification of the bilateral mid/lower lung fields with   bilateral pleural effusions and bibasilar atelectasis, right greater the   left.  There is no pneumothorax.    IMPRESSION:  Increased bilateral pleural effusions and associated atelectasis, right   greater than left.    --- End of Report ---          < end of copied text >

## 2025-06-23 NOTE — PROGRESS NOTE ADULT - SUBJECTIVE AND OBJECTIVE BOX
EP     HISTORY OF PRESENT ILLNESS: HPI:  89F c hx GERD, R breast CA (40-50y ago) s/p B/L mastectomy and radiation, RUE lymphedema, osteoarthritis/osteoporosis (on monthly ibandronate), HLD, Afib on eliquis, HOCM/severe LVOT/ELEAZAR, small-moderate pericardial effusion/RA diastolic collapse, recent hospitalization 5/29-6/5 for RML PNA, GBS bacteremia (pos cultures 5/29, 5/30) of unclear source on total 10 days abx (IV ceftriaxone inpt, then levaquin 750 q48h until 6/9), presents from [facility] rehab with shaking chills, hypotension, poor appetite    History from pt's daughter/primary decision maker Teresita Shay at bedside.  While at [facility], pt found to have hypotension to SBP 80s. Pt was given IVF and reduced dose of metoprolol. Pt also found to have tachycardia, shaking chills. Pt sent back to the hospital. Reportedly pt has not been eating well, not urinating much. Denies CP, SOB, abd pain, diarrhea, cough, other respiratory symptoms. At baseline pt ambulates without assistive device, drives.  RRT called in the ed for hypotension to SBP 79. (08 Jun 2025 02:41)    Ms Cloud is a pleasant 89yo woman here with shortness of breath.  Sees Dr Adolfo Leung for Cardiology.  Being managed on this inpatient stay by Dr Coelho.  Known to Dr Young after outpatient referral for HOCM management (lVOT gradient 80s-100mmHg+), and had a brief trial of Mavacamten, stopped for feelings of "leg heaviness".    EP called re: rapid AFib, refractory to low-dose metoprolol thus far, and complicated by management of HCM and new/worsening pericardial effusion.  Resting comfortably in bed on supplemental O2.  SOB and fatigued but no palpitations or fainting.  A 10 pt ROS is otherwise negative.      PAST MEDICAL & SURGICAL HISTORY:  Breast cancer  s/p b/l mastectomy  H/O bilateral mastectomy  + breast implants  History of cataract surgery, unspecified laterality    6/11- moved to CCU for closer monitoring.  lethargic / unable to obtain ROS today.  worsening pleural effusion, no plan for tap.  no plan for pericardiocentesis either.  hypotensive requiring midodrine.  6/12- s/p urgent pericardiocentesis via Interventional Radiology/ lateral thorax approach.  Feels much better- more energetic, has an appetite, more talkative.  6/13- resting in bed in CICU. on phenylephrine today.  no new complaints. feeling better overall after pericardiocentesis.  awaiting possible thoracentesis?  6/14- resting in chair. no new complaints.  slow drainage out of pericardial drain.  AFib HR now contrlled for first time on this stay.  6/15- resting in recliner, poor appetite. no new issues today.  6/16- resting in recliner, trying to eat breakfast today.  HR 110bpm, up-titrated to 50q6 metoprolol last night.  6/17- resting in bed, no new issues.  6/18- resting in bed, tachypneic, poor appetite, anxious.    6/19- resting in bed. CT scan results reviewed. awaiting thoracentesis (right side large pleural effusion)   6/20- resting in bed. Feels much better after thoracentesis yesterady.   6/21 pt is resting in bed  , no complaints   6/22 pt remains in Afib rate controlled, no complaints otherwise    Date of service 6/23- a bit more short of breath today.  awaiting palliative care eval.    acetaminophen     Tablet .. 650 milliGRAM(s) Oral every 6 hours PRN  albuterol/ipratropium for Nebulization 3 milliLiter(s) Nebulizer every 6 hours PRN  apixaban 2.5 milliGRAM(s) Oral two times a day  benzonatate 100 milliGRAM(s) Oral every 8 hours  bisacodyl Suppository 10 milliGRAM(s) Rectal daily PRN  chlorhexidine 2% Cloths 1 Application(s) Topical daily  guaifenesin/dextromethorphan Oral Liquid 5 milliLiter(s) Oral every 6 hours PRN  ipratropium    for Nebulization 500 MICROGram(s) Nebulizer every 6 hours PRN  levalbuterol Inhalation 0.63 milliGRAM(s) Inhalation every 6 hours  melatonin 5 milliGRAM(s) Oral at bedtime  metoprolol tartrate 50 milliGRAM(s) Oral every 6 hours  pantoprazole    Tablet 40 milliGRAM(s) Oral before breakfast  polyethylene glycol 3350 17 Gram(s) Oral daily  senna 2 Tablet(s) Oral at bedtime  sodium chloride 0.9%. 600 milliLiter(s) IV Continuous <Continuous>  zolpidem 5 milliGRAM(s) Oral at bedtime                        11.0   3.88  )-----------( 287      ( 23 Jun 2025 07:10 )             36.4       06-23    138  |  102  |  20  ----------------------------<  105[H]  5.0   |  26  |  0.57    Ca    8.8      23 Jun 2025 07:18    T(C): 36.3 (06-23-25 @ 04:30), Max: 36.8 (06-22-25 @ 20:54)  HR: 110 (06-23-25 @ 04:30) (106 - 113)  BP: 153/85 (06-23-25 @ 04:30) (137/78 - 153/85)  RR: 18 (06-23-25 @ 04:30) (18 - 18)  SpO2: 98% (06-23-25 @ 04:30) (94% - 98%)  Wt(kg): --    I&O's Summary    22 Jun 2025 07:01  -  23 Jun 2025 07:00  --------------------------------------------------------  IN: 250 mL / OUT: 500 mL / NET: -250 mL            Appearance: frail elderly woman. tachypneic.  HEENT:   Normal oral mucosa, PERRL, EOMI	  Lymphatic: No lymphadenopathy , no edema  Cardiovascular: irregular S1 S2, No JVD, No murmurs , Peripheral pulses palpable 2+ bilaterally.    Respiratory: poor air entry BL  normal effort 	  Gastrointestinal:  Soft, Non-tender, + BS	  Skin: No rashes, No ecchymoses, No cyanosis, warm to touch  Musculoskeletal: Normal range of motion, normal strength  Psychiatry:  Mood & affect appropriate    TELEMETRY: AFib, narrow QRS, 80s-100s  ECG:  	AFib, atypical LVHypertrphy  Echo:  < from: TTE W or WO Ultrasound Enhancing Agent (06.05.25 @ 07:16) >  CONCLUSIONS:      1. Limited TTE to assess for pericardial effusion.   2. Trace pericardial effusion noted adjacent to the posterolateral left ventricle, small pericardial effusion noted adjacent to the anterior right ventricle and small pericardial effusion noted adjacent to the right atrium.   3. The inferior vena cava is normal in size measuring 1.70 cm in diameter, (normal <2.1cm) with normal inspiratory collapse (normal >50%) consistent with normal right atrial pressure (~3, range 0-5mmHg).   4. Compared to the transthoracic echocardiogram performed on 5/30/2025, the right atrium is not as well visualized on this study. There is right atrial diastolic collapse visualized in the 4 chamber view but unable to quantify the duration of the collapse. The effusion appears unchanged in size and distribution. No other signs of cardiac tamponade are present.    < end of copied text >    CT Chest - personally reviewed the images.  right breat prosthesis with signs of rupture.  left breast prosthesis OK.  prominent LV hypertrophy visible.  pericardial effusion enlarged.  pleural effusion present on the right.  	  CTA - PE 6/18.  No pericardial effusion. Right pleural effusion is large, loculated.    ASSESSMENT/PLAN: Ms Cloud is a pleasant 89y Female here with shortness of breath.    Multifactorial in the setting of pericardial effusion (treated w/ drainage), Hypertrophic Cardiomyopathy with severe LVOT obstruction, and rapid AFib (on maximal beta blocker dose).  [id number] is at least 3.  No other invasive procedures planned?  continue apixaban 2.5mg BID for strke prevention.  Pericardial drain removed.  CT scan shows it was drained to completion.  s/p thoracentesis on 6/19, with significant improvement in shortness of breath, and heartrate.  chest xray over the weekend shows right pleural effusion has returned.  Continue metoprolol for AFib rate control.  Continue alpha-agonists to support her blood pressure.  Outpatient Mavacamten resumption, if within goals of care.  Cardiac CTA/Cardioversion if within goals of care.  Any and all invasive procedure should happen beforehand, as cardioversion would mandate uninterrupted anticoagulation for 4 weeks afterwards.  Palliative evaluation pending (re: DNR status or hospice?)  Outpatient followup w/ Dr Leung.    Barney Lau M.D.  Cardiac Electrophysiology  [phone]

## 2025-06-23 NOTE — PROGRESS NOTE ADULT - SUBJECTIVE AND OBJECTIVE BOX
[facility]  BASIL [place] Y. Patel, S. Shah, G. Casimir  [phone]  ([phone] - weekdays after 5pm and weekends)    Name: GINO GRAHAM  Age/Gender: 89y Female  MRN: [id number]    Interval History:  Patient seen and examined this morning.   No new complaints noted.  Notes reviewed  No concerning overnight events  Afebrile   Allergies: Zosyn (Rash; Urticaria; Hives)      Objective:  Vitals:   T(F): 97.1 (06-23-25 @ 11:00), Max: 98.2 (06-22-25 @ 20:54)  HR: 93 (06-23-25 @ 11:00) (93 - 113)  BP: 131/76 (06-23-25 @ 11:00) (131/76 - 153/85)  RR: 18 (06-23-25 @ 11:00) (18 - 18)  SpO2: 93% (06-23-25 @ 11:00) (93% - 98%)  Physical Examination:  General: no acute distress, NC, nontoxic appearing   HEENT: normocephalic, atraumatic, anicteric  Respiratory: no acc muscle use, breathing comfortably  Cardiovascular: S1 and S2 present  Gastrointestinal: normal appearing, nondistended  Extremities: no edema, no cyanosis  Skin: no visible rash    Laboratory Studies:  CBC:                       11.0   3.88  )-----------( 287      ( 23 Jun 2025 07:10 )             36.4     WBC Trend:  3.88 06-23-25 @ 07:10  4.52 06-20-25 @ 06:59  5.80 06-19-25 @ 04:24  4.48 06-18-25 @ 07:27    CMP: 06-23    138  |  102  |  20  ----------------------------<  105[H]  5.0   |  26  |  0.57    Ca    8.8      23 Jun 2025 07:18    Creatinine: 0.57 mg/dL (06-23-25 @ 07:18)  Creatinine: 0.63 mg/dL (06-19-25 @ 04:24)  Creatinine: 0.58 mg/dL (06-18-25 @ 07:15)    Microbiology: reviewed   Culture - Body Fluid with Gram Stain (collected 06-19-25 @ 16:34)  Source: Pleural Fl Pleural Fluid  Gram Stain (06-20-25 @ 02:16):    No polymorphonuclear cells seen    No organisms seen    by cytocentrifuge  Preliminary Report (06-20-25 @ 17:12):    No growth    Culture - Fungal, Body Fluid (collected 06-19-25 @ 16:34)  Source: Pleural Fl Pleural Fluid  Preliminary Report (06-22-25 @ 08:05):    No growth    Culture - Fungal, Body Fluid (collected 06-12-25 @ 14:05)  Source: Pericardial Pericardial Fluid  Preliminary Report (06-21-25 @ 23:02):    No fungus isolated at 1 week.    Culture - Acid Fast - Body Fluid w/Smear (collected 06-12-25 @ 14:05)  Source: Pericardial Other  Preliminary Report (06-21-25 @ 23:06):    No acid-fast bacilli isolated at 1 week. ****Acid-fast cultures are held    for 6 weeks.****    Culture - Body Fluid with Gram Stain (collected 06-12-25 @ 14:05)  Source: Pericardial Other  Gram Stain (06-13-25 @ 00:20):    polymorphonuclear leukocytes seen by cytocentrifuge    No organisms seen by cytocentrifuge  Final Report (06-17-25 @ 16:17):    No growth at 5 days    06-22-25 @ 18:23 SARS-CoV-2 NotDetec/Influenza A NotDetec/Influenza B NotDetec/RSV NotDetec    Radiology: reviewed     Medications:  acetaminophen     Tablet .. 650 milliGRAM(s) Oral every 6 hours PRN  albuterol/ipratropium for Nebulization 3 milliLiter(s) Nebulizer every 6 hours PRN  apixaban 2.5 milliGRAM(s) Oral two times a day  benzonatate 100 milliGRAM(s) Oral every 8 hours  bisacodyl Suppository 10 milliGRAM(s) Rectal daily PRN  chlorhexidine 2% Cloths 1 Application(s) Topical daily  guaifenesin/dextromethorphan Oral Liquid 5 milliLiter(s) Oral every 6 hours PRN  ipratropium    for Nebulization 500 MICROGram(s) Nebulizer every 6 hours PRN  levalbuterol Inhalation 0.63 milliGRAM(s) Inhalation every 6 hours  melatonin 5 milliGRAM(s) Oral at bedtime  metoprolol tartrate 50 milliGRAM(s) Oral every 6 hours  pantoprazole    Tablet 40 milliGRAM(s) Oral before breakfast  polyethylene glycol 3350 17 Gram(s) Oral daily  senna 2 Tablet(s) Oral at bedtime  sodium chloride 0.9%. 600 milliLiter(s) IV Continuous <Continuous>  zolpidem 5 milliGRAM(s) Oral at bedtime    Prior/Completed Antimicrobials:  cefepime   IVPB  cefTRIAXone   IVPB  cefTRIAXone   IVPB  vancomycin  IVPB.

## 2025-06-23 NOTE — PROGRESS NOTE ADULT - SUBJECTIVE AND OBJECTIVE BOX
Subjective: Patient seen and examined. No new events except as noted.     SUBJECTIVE/ROS:  MEDICATIONS:  MEDICATIONS  (STANDING):  apixaban 2.5 milliGRAM(s) Oral two times a day  benzonatate 100 milliGRAM(s) Oral every 8 hours  chlorhexidine 2% Cloths 1 Application(s) Topical daily  levalbuterol Inhalation 0.63 milliGRAM(s) Inhalation every 6 hours  melatonin 5 milliGRAM(s) Oral at bedtime  metoprolol tartrate 50 milliGRAM(s) Oral every 6 hours  pantoprazole    Tablet 40 milliGRAM(s) Oral before breakfast  polyethylene glycol 3350 17 Gram(s) Oral daily  senna 2 Tablet(s) Oral at bedtime  sodium chloride 0.9%. 600 milliLiter(s) (50 mL/Hr) IV Continuous <Continuous>  zolpidem 5 milliGRAM(s) Oral at bedtime      PHYSICAL EXAM:  T(C): 36.3 (06-23-25 @ 04:30), Max: 36.8 (06-22-25 @ 20:54)  HR: 110 (06-23-25 @ 04:30) (106 - 113)  BP: 153/85 (06-23-25 @ 04:30) (137/78 - 153/85)  RR: 18 (06-23-25 @ 04:30) (18 - 18)  SpO2: 98% (06-23-25 @ 04:30) (94% - 98%)  Wt(kg): --  I&O's Summary    22 Jun 2025 07:01  -  23 Jun 2025 07:00  --------------------------------------------------------  IN: 250 mL / OUT: 500 mL / NET: -250 mL            JVP: Normal  Neck: supple  Lung: clear   CV: S1 S2 ,  Abd: soft  Ext: No edema  neuro: Awake / alert  Psych: flat affect  Skin: normal``    LABS/DATA:    CARDIAC MARKERS:                                11.0   3.88  )-----------( 287      ( 23 Jun 2025 07:10 )             36.4     06-23    138  |  102  |  20  ----------------------------<  105[H]  5.0   |  26  |  0.57    Ca    8.8      23 Jun 2025 07:18      proBNP:   Lipid Profile:   HgA1c:   TSH:

## 2025-06-23 NOTE — PROGRESS NOTE ADULT - ASSESSMENT
89F c hx GERD, R breast CA (40-50y ago) s/p B/L mastectomy and radiation, RUE lymphedema, osteoarthritis/osteoporosis (on monthly ibandronate), HLD, Afib on eliquis, HOCM/severe LVOT/LORA, small-moderate pericardial effusion/RA diastolic collapse, recent hospitalization 5/29-6/5 for RML PNA, GBS bacteremia (pos cultures 5/29, 5/30) of unclear source on total 10 days abx (IV ceftriaxone inpt, then levaquin 750 q48h until 6/9), presents from [facility] rehab with shaking chills, hypotension, poor appetite  History from pt's daughter/primary decision maker Teresita Day at bedside.  While at [facility], pt found to have hypotension to SBP 80s. Pt was given IVF and reduced dose of metoprolol. Pt also found to have tachycardia, shaking chills. Pt sent back to the hospital. Reportedly pt has not been eating well, not urinating much. Denies CP, SOB, abd pain, diarrhea, cough, other respiratory symptoms. At baseline pt ambulates without assistive device, drives.  RRT called in the ed for hypotension to SBP 79. (08 Jun 2025 02:41)  to me pt daughter says she was very sleepy in the morning too  as she was given ambien at 4 AM in the morning:   now she is alert and awake and is on 2 L of oxygen ;  she is not sob:  and does not look to be in any resp distress:       Severe sepsis.   unclear source of fevers  cont empiric vanc, cefepime  f/u blood cx. if positive will likely need MARIA G  monitor for any localizing symptoms  pt had recent pan ct scan that did not elucidate cause of bacteremia  recent blood cultures have been negative   - IVF  on cefepime  she is febrile too   vbg on admission IS ok;   MONITOR BLOOD PRESSURE CLOSELY:   OIF SHE DROPS HER BLOOD PRESSURE MORE:  WOULD NEED MICU  CO NSULT:    6/9: seems slightly more tored today  ; cont antibtiocs:  per ID:  she remains on 2 L of oxygen : she is alert and awake:  daughter at bedside:  reviewed the labs and echo and ct scan chest with the daughter in detail :   she has large pericardial effusions:  per ir no good window and effusion seemd small to drain : ct scan chest read as mod to large pericardial effusion : defer to cards :     6/10: seems to be doing  ok :  no sob:   no  cough  ;   no  phlegm   on 2 L of oxygen :  thoracic to see:    no emergency in tapping the pleural effusion:   de cardiologist  6/11: on ceftriaxone   seems O K   6/12: seems OK:  s/p pericardial drain:  has 400 cc output   await cytology    6/13: cont c urrent rx:   on antibiotics  6/14: on ceftriaxone    6/15/l seems to be doing  ok ;  no sob;  no cough :   no phlegm      6/17; pulm wise she looks tachypneic today ; rpt limited echo with no change from before;  cxr done today with no significant change from before;   abg done today seems pretty reasonable   will do di mine;  last d dimer on 3rd was slightly high ;  if the d dimer has increased significantly  would do cta;  dw acp : her creat is normal:  she has been off ac for pericardial drain for some time;   on antibiotics   40 mg ov iv solumedrol given     6/18: HER D DIMER IS VERY HIGH :  going for cta;    depending upon the cta:  if she has significant pl effusion , would like to tap, if there is no pe    dw acp     6/19: seems to be doing  ok ;  for thoracentesis ; today  : confirmed with USG   ; off ac for now:  to restart after the procedure     6/20:  ABX as per ID     6/21; resolved  now off antibiotics  no fever:   6/22: resolved   6/23: s/p ABX       Pleural Effusion  -S/p R US guided thoracentesis 6/19, 1.25 L drained  -CXR post procedure with mild congestion, residual effusions but overall improved. No clear ptx  -F/u pleural fluid studies  -Keep O>I as tolerated     6/21; s/p tap:  transudative fluids:    needs diuresis:  hard to give with hocm :  rpt cxr in am:  if sheis rapidly filling up with pl effusion,  she may need pleurx:  uzma abbott and kamron in detail :     6/22: the pl effusion has increased:  right   . left:   she may need pleurax:     6/23   CXR with reaccumulation of fluid  Keep O>I as tolerated   Would consider pleurX if aligns with GOC       Pericardial effusion ;   the ct chest shows increasing pericardial effusion  : which is of more pressing concern then pleural effusion :  needs a tap:  ir guided tap    6/12: as above   6/13: asked up to tap:  need to repeat inr and pt  last was  >  2.0:  needs to be less then 1.5:  ip contacted will evalute for tap    6/14; defer to p r imary team  6/16/: seems OK;'' pericardial effusion removed    6/17: limited echo today with no change   6/18: defer to cards : cyto is still pending   6/19; cyto i n  pl fluid is negative for malignant cells:   6/20: Management per cards   6/21: tapped:  doing  ok   6/22; seems to be stable:         Hypotension.  suspect combination of infection, hocm, lora, tachycardia, pericardial effusion, metoprolol  pt has somewhat tenuous hemodynamic status  consider cardioversion, consider repeat TTE  pt is full code.  pts blood pressure is slightly low   on midodrine  6/9: cont on midodrine  6/10; controlled:  on midodrine  6/11: on midodrine  6/12: currently she is on vasopressors   6/13; off vasopressors now   6/14; blood pressure soft:  on midodrine   6/15: her blood pressure is pretty good:   6/17: blood pressure today is reasonable;   6/18: off midodrine and blood pressure is pretty good:  on metoprolol   6/19/ on metoprolol :   6/20: BP controlled    6*21: controlled:  on meto :  off midodrine now:   6/22: blood pressure is OK       Chronic atrial fibrillation.  reduce metoprolol dose to tartrate 25mg BID  goal HR <100 in setting of HOCM with hypotension  consider amiodarone or cardioversion  PER CARDS    6/9: defer to cards   6/10: off Eliquis for now   6/11: remains off eliquis   6/12: off ac  6/13: keep off ac if thoracentesis needed to be done over the weekend:  though it is not emergent   6/14" can restart ac:  if required as there is now no plan for thoracentesis:  dw attending   6/16; off ac as pericardial arnie is removed today   6/17; remains off ac:  restart as recommended by ir note  6/18: still off heparin :  cta awaited do urgent   6/19:  cta done: no pe:  has mod to large effusion on rigth side: for tap today    6/20: AC with heparin drip   6/21: on eliquis now    6/22: her effusions have increased;  she will likely need pleurx on rigth side;    6/23: AC with Eliquis        Type 2 myocardial infarction.  -per cards    HOCM (hypertrophic obstructive cardiomyopathy).  -per cards    Acute respiratory failure with hypoxia.   recent CT scans showing right bronchiectasis, right fibrosis likely 2/2 radiation, poss RML PNA.  cont BD :   ct chest showed: No pulmonary embolism. Small to moderate right and small left pleural effusions with associated  atelectasis. 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  Cardiomegaly. Moderate pericardial effusion.  needs echo     9/6: new echo reviewed:  seen by cardiology :  monitor her blood pressure closely:  if she drops her blood pressure call CCU     6/10: cont to manain o2 sao2 above 90% all the t hayden  6/11; n 2 L of oxygen    6/12: she is not in any resp distress    6/13: she is on rooma ir:  doing well :  satting at 93%  6/14: pulm wise seems pretty good;   on rooma ir;   no plan for tap now     6/16; she seems OK;  she is on 1 L of oxygen ; would suggest to repeat cxr   6/17; she is on 2 L of oxygen  :  satting at 96%    6/20  Keep sats >90% with O2 PRN  Denies SOB today     6/22today xray with i ncreasing pll effusion : repeat again tomorrow ; if furtgerh i ncrease  likely would need pleurx cath : dw family     dw acp and daughter   if she deteriorates further ;  call ccu/ MICU     6/21; uzma acp , son and the daughter   6/22;' dw family and acp

## 2025-06-24 NOTE — PROGRESS NOTE ADULT - ASSESSMENT
89F with GERD, R breast CA (40-50y ago) s/p B/L mastectomy and radiation, RUE lymphedema, osteoarthritis/osteoporosis (on monthly ibandronate), HLD, Afib on eliquis, HOCM/severe LVOT/ELEAZAR, small-moderate pericardial effusion/RA diastolic collapse, recent hospitalization 5/29-6/5 for RML PNA, GBS bacteremia (pos cultures 5/29, 5/30) of unclear source on total 10 days abx (IV ceftriaxone inpt, then levaquin 750 q48h until 6/9), presents from [facility] with shaking chills, hypotension, poor appetite, found to have worsening pericardial effusion. Pt had thoracentesis and pericardial drain on this admission. Pt with reaccumulation of pleural fluid. Palliative reconsulted for GOC.

## 2025-06-24 NOTE — PROGRESS NOTE ADULT - SUBJECTIVE AND OBJECTIVE BOX
Subjective: Patient seen and examined. No new events except as noted.     SUBJECTIVE/ROS:  MEDICATIONS:  MEDICATIONS  (STANDING):  apixaban 2.5 milliGRAM(s) Oral two times a day  benzonatate 100 milliGRAM(s) Oral every 8 hours  chlorhexidine 2% Cloths 1 Application(s) Topical daily  levalbuterol Inhalation 0.63 milliGRAM(s) Inhalation every 6 hours  melatonin 5 milliGRAM(s) Oral at bedtime  metoprolol tartrate 50 milliGRAM(s) Oral every 6 hours  pantoprazole    Tablet 40 milliGRAM(s) Oral before breakfast  polyethylene glycol 3350 17 Gram(s) Oral daily  senna 2 Tablet(s) Oral at bedtime  sodium chloride 0.9%. 600 milliLiter(s) (50 mL/Hr) IV Continuous <Continuous>  zolpidem 5 milliGRAM(s) Oral at bedtime      PHYSICAL EXAM:  T(C): 36.2 (06-24-25 @ 05:00), Max: 36.5 (06-23-25 @ 16:34)  HR: 83 (06-24-25 @ 05:00) (83 - 107)  BP: 161/95 (06-24-25 @ 05:00) (131/76 - 161/95)  RR: 18 (06-24-25 @ 05:00) (18 - 18)  SpO2: 94% (06-24-25 @ 05:00) (93% - 99%)  Wt(kg): --  I&O's Summary    23 Jun 2025 07:01  -  24 Jun 2025 07:00  --------------------------------------------------------  IN: 540 mL / OUT: 0 mL / NET: 540 mL            JVP: Normal  Neck: supple  Lung: clear   CV: S1 S2 , pos jelani   Abd: soft  Ext: No edema  neuro: Awake   Psych: flat affect  Skin: normal``    LABS/DATA:    CARDIAC MARKERS:                                11.2   4.02  )-----------( 261      ( 24 Jun 2025 07:08 )             36.7     06-24    135  |  98  |  18  ----------------------------<  95  5.0   |  25  |  0.54    Ca    9.0      24 Jun 2025 07:09  Phos  3.0     06-24  Mg     2.2     06-24      proBNP:   Lipid Profile:   HgA1c:   TSH:

## 2025-06-24 NOTE — PROGRESS NOTE ADULT - SUBJECTIVE AND OBJECTIVE BOX
SUBJECTIVE AND OBJECTIVE: Pt seen and examined at bedside. Pt lethargic, intermittently awake on exam.   Indication for Geriatrics and Palliative Care Services/INTERVAL HPI: GOC/symptoms     OVERNIGHT EVENTS: no acute events o/n     DNR on chart:  Allergies    Zosyn (Rash; Urticaria; Hives)    Intolerances    MEDICATIONS  (STANDING):  apixaban 2.5 milliGRAM(s) Oral two times a day  benzonatate 100 milliGRAM(s) Oral every 8 hours  chlorhexidine 2% Cloths 1 Application(s) Topical daily  levalbuterol Inhalation 0.63 milliGRAM(s) Inhalation every 6 hours  melatonin 5 milliGRAM(s) Oral at bedtime  metoprolol tartrate 50 milliGRAM(s) Oral every 6 hours  pantoprazole    Tablet 40 milliGRAM(s) Oral before breakfast  polyethylene glycol 3350 17 Gram(s) Oral daily  senna 2 Tablet(s) Oral at bedtime  sodium chloride 0.9%. 600 milliLiter(s) (50 mL/Hr) IV Continuous <Continuous>  zolpidem 5 milliGRAM(s) Oral at bedtime    MEDICATIONS  (PRN):  acetaminophen     Tablet .. 650 milliGRAM(s) Oral every 6 hours PRN Mild Pain (1 - 3)  albuterol/ipratropium for Nebulization 3 milliLiter(s) Nebulizer every 6 hours PRN Shortness of Breath and/or Wheezing  bisacodyl Suppository 10 milliGRAM(s) Rectal daily PRN Constipation  guaifenesin/dextromethorphan Oral Liquid 5 milliLiter(s) Oral every 6 hours PRN Cough  ipratropium    for Nebulization 500 MICROGram(s) Nebulizer every 6 hours PRN Shortness of Breath and/or Wheezing      ITEMS UNCHECKED ARE NOT PRESENT    PRESENT SYMPTOMS: [ ]Unable to self-report - see [ ] CPOT [ ] PAINADS [ ] RDOS  Source if other than patient:  [ ]Family   [ ]Team     Pain:  [x ]yes [ ]no  QOL impact -   Location -         feet           Aggravating factors -   Quality - stabbing  Radiation -  Timing-  Severity (0-10 scale): 7  Minimal acceptable level (0-10 scale): 3    CPOT:    https://[email or web address]/getattachment/[email or web address]/Critical-Care-Pain-Observation-Tool-(CPOT)    PAINAD Score: See PAINAD tool and score below       Dyspnea:                           [ ]Mild [x ]Moderate [ ]Severe    RDOS: See RDOS tool and score below   0 to 2  minimal or no respiratory distress   3  mild distress  4 to 6 moderate distress  >7 severe distress      Anxiety:                             [ ]Mild [ ]Moderate [ ]Severe  Fatigue:                             [ ]Mild [ ]Moderate [ ]Severe  Nausea:                             [ ]Mild [ ]Moderate [ ]Severe  Loss of appetite:              [ ]Mild [ ]Moderate [ ]Severe  Constipation:                    [ ]Mild [ ]Moderate [ ]Severe    PCSSQ[Palliative Care Spiritual Screening Question]   Severity (0-10):  Score of 4 or > indicate consideration of Chaplaincy referral.  Chaplaincy Referral: [x ] yes [ ] refused [x ] following [ ] Deferred     Caregiver Stanley? : [ ] yes [ ] no [ ] Deferred [ ] Declined             Social work referral [ ] Patient & Family Centered Care Referral [ ]     Anticipatory Grief present?:  [x ] yes [ ] no  [ ] Deferred                  Social work referral [ x] Chaplaincy Referral [ ]    		  Other Symptoms:  [ x]All other review of systems negative     Palliative Performance Status Version 2:   See PPSv2 tool and score below         PHYSICAL EXAM:  Vital Signs Last 24 Hrs  T(C): 36.3 (24 Jun 2025 11:00), Max: 36.5 (23 Jun 2025 16:34)  T(F): 97.3 (24 Jun 2025 11:00), Max: 97.7 (23 Jun 2025 16:34)  HR: 105 (24 Jun 2025 11:00) (83 - 107)  BP: 130/68 (24 Jun 2025 11:00) (130/68 - 161/95)  BP(mean): --  RR: 18 (24 Jun 2025 11:00) (18 - 18)  SpO2: 95% (24 Jun 2025 11:00) (94% - 99%)    Parameters below as of 24 Jun 2025 11:00  Patient On (Oxygen Delivery Method): nasal cannula  O2 Flow (L/min): 2   I&O's Summary    23 Jun 2025 07:01  -  24 Jun 2025 07:00  --------------------------------------------------------  IN: 540 mL / OUT: 0 mL / NET: 540 mL       GENERAL: [ ]Cachexia    [ ]Alert  [x ]Oriented x 2/3  [x ]Lethargic  [ ]Unarousable  [ ]Verbal  [ ]Non-Verbal  Behavioral:   [ ]Anxiety  [ ]Delirium [ ]Agitation [ ]Other  HEENT:  [ ]Normal   [x ]Dry mouth   [ ]ET Tube/Trach  [ ]Oral lesions  PULMONARY:   [ ]Clear [ x]Tachypnea  [ ]Audible excessive secretions   [ ]Rhonchi        [ ]Right [ ]Left [ ]Bilateral  [ ]Crackles        [ ]Right [ ]Left [ ]Bilateral  [ ]Wheezing     [ ]Right [ ]Left [ ]Bilateral  [x ]Diminished BS [ ] Right [ ]Left [x ]Bilateral  CARDIOVASCULAR:    [ x]Regular [ ]Irregular [ ]Tachy  [ ]Eliceo [ ]Murmur [ ]Other  GASTROINTESTINAL:  [ x]Soft  [ ]Distended   [x ]+BS  [ x]Non tender [ ]Tender  [ ]Other [ ]PEG [ ]OGT/ NGT   Last BM:   GENITOURINARY:  [ ]Normal [x ]Incontinent   [ ]Oliguria/Anuria   [ ]Landaverde  MUSCULOSKELETAL:   [ ]Normal   [ x]Weakness  [ x]Bed/Wheelchair bound [ ]Edema  NEUROLOGIC:   [ ]No focal deficits  [x ] Cognitive impairment  [ ] Dysphagia [ ]Dysarthria [ ] Paresis [ ]Other   SKIN:   [ ]Normal  [ ]Rash  [ ]Other  [ ]Pressure ulcer(s) [ ]y [ ]n present on admission    CRITICAL CARE:  [ ]Shock Present  [ ]Septic [ ]Cardiogenic [ ]Neurologic [ ]Hypovolemic  [ ]Vasopressors [ ]Inotropes  [ ]Respiratory failure present [ ]Mechanical Ventilation [ ]Non-invasive ventilatory support [ ]High-Flow   [ ]Acute  [ ]Chronic [ ]Hypoxic  [ ]Hypercarbic [ ]Other  [ ]Other organ failure     LABS:                        11.2   4.02  )-----------( 261      ( 24 Jun 2025 07:08 )             36.7   06-24    135  |  98  |  18  ----------------------------<  95  5.0   |  25  |  0.54    Ca    9.0      24 Jun 2025 07:09  Phos  3.0     06-24  Mg     2.2     06-24    PTT - ( 24 Jun 2025 07:10 )  PTT:27.9 sec    Urinalysis Basic - ( 24 Jun 2025 07:09 )    Color: x / Appearance: x / SG: x / pH: x  Gluc: 95 mg/dL / Ketone: x  / Bili: x / Urobili: x   Blood: x / Protein: x / Nitrite: x   Leuk Esterase: x / RBC: x / WBC x   Sq Epi: x / Non Sq Epi: x / Bacteria: x      RADIOLOGY & ADDITIONAL STUDIES:  < from: Xray Chest 1 View- PORTABLE-Routine (Xray Chest 1 View- PORTABLE-Routine .) (06.23.25 @ 09:39) >    ACC: [id number] EXAM:  XR CHEST PORTABLE ROUTINE 1V   ORDERED BY: ROMULO WILSON     PROCEDURE DATE:  06/23/2025          INTERPRETATION:  DATE OF STUDY: 6/23/25    PRIOR: 6/22/25    CLINICAL INDICATION: Pleural effusion. May need Pleurx.    TECHNIQUE: AP radiograph of the chest.    FINDINGS:  The heart size is not well assessed on this AP projection.  Stable hazy opacification of the bilateral mid/lower lung fields with   stable small left pleural effusion and increase in right pleural effusion.  Increasing patchiness at right lung base - likely due to atelectasis or   to infection in the right clinical setting.  No pneumothorax.    IMPRESSION:    Stable hazy opacification of both mid and lower lungs.  Increase in right pleural effusion.  Increased atelectasis and/or infection at the right lung base.    --- End of Report ---            YAJAIRA FREIRE MD; Attending Radiologist  This document has been electronically signed. Jun 23 2025  4:44PM    < end of copied text >    Protein Calorie Malnutrition Present: [ ]mild [ ]moderate [ ]severe [ ]underweight [ ]morbid obesity  https://www.andeal.org/vault/0190/web/files/ONC/Table_Clinical%20Characteristics%20to%20Document%20Malnutrition-White%20JV%20et%20al%202012.pdf    Height (cm): 157.5 (06-12-25 @ 08:27), 157.5 (05-29-25 @ 13:16)  Weight (kg): 63.1 (06-12-25 @ 08:27), 54.4 (05-29-25 @ 13:16)  BMI (kg/m2): 25.4 (06-12-25 @ 08:27), 21.9 (05-29-25 @ 13:16)    [x ]PPSV2 < or = 30%  [ ]significant weight loss [ ]poor nutritional intake [ ]anasarca[ ]Artificial Nutrition    Other REFERRALS:  [ ]Hospice  [ ]Child Life  [ ]Social Work  [ ]Case management [ ]Holistic Therapy     Goals of Care Document:

## 2025-06-24 NOTE — PROGRESS NOTE ADULT - ASSESSMENT
89F c hx GERD, R breast CA (40-50y ago) s/p B/L mastectomy and radiation, RUE lymphedema, osteoarthritis/osteoporosis (on monthly ibandronate), HLD, Afib on eliquis, HOCM/severe LVOT/LORA, small-moderate pericardial effusion/RA diastolic collapse, recent hospitalization 5/29-6/5 for RML PNA, GBS bacteremia (pos cultures 5/29, 5/30) of unclear source on total 10 days abx (IV ceftriaxone inpt, then levaquin 750 q48h until 6/9), presents from [facility] rehab with shaking chills, hypotension, poor appetite  History from pt's daughter/primary decision maker Teresita Day at bedside.  While at [facility], pt found to have hypotension to SBP 80s. Pt was given IVF and reduced dose of metoprolol. Pt also found to have tachycardia, shaking chills. Pt sent back to the hospital. Reportedly pt has not been eating well, not urinating much. Denies CP, SOB, abd pain, diarrhea, cough, other respiratory symptoms. At baseline pt ambulates without assistive device, drives.  RRT called in the ed for hypotension to SBP 79. (08 Jun 2025 02:41)  to me pt daughter says she was very sleepy in the morning too  as she was given ambien at 4 AM in the morning:   now she is alert and awake and is on 2 L of oxygen ;  she is not sob:  and does not look to be in any resp distress:       Severe sepsis.   unclear source of fevers  cont empiric vanc, cefepime  f/u blood cx. if positive will likely need MARIA G  monitor for any localizing symptoms  pt had recent pan ct scan that did not elucidate cause of bacteremia  recent blood cultures have been negative   - IVF  on cefepime  she is febrile too   vbg on admission IS ok;   MONITOR BLOOD PRESSURE CLOSELY:   OIF SHE DROPS HER BLOOD PRESSURE MORE:  WOULD NEED MICU  CO NSULT:    6/9: seems slightly more tored today  ; cont antibtiocs:  per ID:  she remains on 2 L of oxygen : she is alert and awake:  daughter at bedside:  reviewed the labs and echo and ct scan chest with the daughter in detail :   she has large pericardial effusions:  per ir no good window and effusion seemd small to drain : ct scan chest read as mod to large pericardial effusion : defer to cards :     6/10: seems to be doing  ok :  no sob:   no  cough  ;   no  phlegm   on 2 L of oxygen :  thoracic to see:    no emergency in tapping the pleural effusion:   de cardiologist  6/11: on ceftriaxone   seems O K   6/12: seems OK:  s/p pericardial drain:  has 400 cc output   await cytology    6/13: cont c urrent rx:   on antibiotics  6/14: on ceftriaxone    6/15/l seems to be doing  ok ;  no sob;  no cough :   no phlegm      6/17; pulm wise she looks tachypneic today ; rpt limited echo with no change from before;  cxr done today with no significant change from before;   abg done today seems pretty reasonable   will do di mine;  last d dimer on 3rd was slightly high ;  if the d dimer has increased significantly  would do cta;  dw acp : her creat is normal:  she has been off ac for pericardial drain for some time;   on antibiotics   40 mg ov iv solumedrol given     6/18: HER D DIMER IS VERY HIGH :  going for cta;    depending upon the cta:  if she has significant pl effusion , would like to tap, if there is no pe    dw acp     6/19: seems to be doing  ok ;  for thoracentesis ; today  : confirmed with USG   ; off ac for now:  to restart after the procedure     6/20:  ABX as per ID     6/21; resolved  now off antibiotics  no fever:   6/22: resolved   6/23: s/p ABX   6/24: seems to be doing poorly:    the rigth sided pleural fluid is increased;   dw daughter  no pleurx for now;       Pleural Effusion  -S/p R US guided thoracentesis 6/19, 1.25 L drained  -CXR post procedure with mild congestion, residual effusions but overall improved. No clear ptx  -F/u pleural fluid studies  -Keep O>I as tolerated     6/21; s/p tap:  transudative fluids:    needs diuresis:  hard to give with hocm :  rpt cxr in am:  if sheis rapidly filling up with pl effusion,  she may need pleurx:  uzma son and daugter in detail :     6/22: the pl effusion has increased:  right   . left:   she may need pleurax:     6/23   CXR with reaccumulation of fluid  Keep O>I as tolerated   Would consider pleurX if aligns with GOC     6/24:    cxr with increasing pleural effusion;  dw daughter about pleurx catheter that it is only to draw fluid:  it is not going to change her prognosis:  at this time:  no pleurx:  dc planning to hospice:           Pericardial effusion ;   the ct chest shows increasing pericardial effusion  : which is of more pressing concern then pleural effusion :  needs a tap:  ir guided tap    6/12: as above   6/13: asked up to tap:  need to repeat inr and pt  last was  >  2.0:  needs to be less then 1.5:  ip contacted will evalute for tap    6/14; defer to p r imary team  6/16/: seems OK;'' pericardial effusion removed    6/17: limited echo today with no change   6/18: defer to cards : cyto is still pending   6/19; cyto i n  pl fluid is negative for malignant cells:   6/20: Management per cards   6/21: tapped:  doing  ok   6/22; seems to be stable:         Hypotension.  suspect combination of infection, hocm, lora, tachycardia, pericardial effusion, metoprolol  pt has somewhat tenuous hemodynamic status  consider cardioversion, consider repeat TTE  pt is full code.  pts blood pressure is slightly low   on midodrine  6/9: cont on midodrine  6/10; controlled:  on midodrine  6/11: on midodrine  6/12: currently she is on vasopressors   6/13; off vasopressors now   6/14; blood pressure soft:  on midodrine   6/15: her blood pressure is pretty good:   6/17: blood pressure today is reasonable;   6/18: off midodrine and blood pressure is pretty good:  on metoprolol   6/19/ on metoprolol :   6/20: BP controlled    6*21: controlled:  on meto :  off midodrine now:   6/22: blood pressure is OK   6/24: blood pressure seems reasonable:       Chronic atrial fibrillation.  reduce metoprolol dose to tartrate 25mg BID  goal HR <100 in setting of HOCM with hypotension  consider amiodarone or cardioversion  PER CARDS    6/9: defer to cards   6/10: off Eliquis for now   6/11: remains off eliquis   6/12: off ac  6/13: keep off ac if thoracentesis needed to be done over the weekend:  though it is not emergent   6/14" can restart ac:  if required as there is now no plan for thoracentesis:  dw attending   6/16; off ac as pericardial arnie is removed today   6/17; remains off ac:  restart as recommended by ir note  6/18: still off heparin :  cta awaited do urgent   6/19:  cta done: no pe:  has mod to large effusion on rigth side: for tap today    6/20: AC with heparin drip   6/21: on eliquis now    6/22: her effusions have increased;  she will likely need pleurx on rigth side;    6/23: AC with Eliquis    6/24: cont eliquis       Type 2 myocardial infarction.  -per cards    HOCM (hypertrophic obstructive cardiomyopathy).  -per cards    Acute respiratory failure with hypoxia.   recent CT scans showing right bronchiectasis, right fibrosis likely 2/2 radiation, poss RML PNA.  cont BD :   ct chest showed: No pulmonary embolism. Small to moderate right and small left pleural effusions with associated  atelectasis. 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  Cardiomegaly. Moderate pericardial effusion.  needs echo     9/6: new echo reviewed:  seen by cardiology :  monitor her blood pressure closely:  if she drops her blood pressure call CCU     6/10: cont to manain o2 sao2 above 90% all the t hayden  6/11; n 2 L of oxygen    6/12: she is not in any resp distress    6/13: she is on rooma ir:  doing well :  satting at 93%  6/14: pulm wise seems pretty good;   on rooma ir;   no plan for tap now     6/16; she seems OK;  she is on 1 L of oxygen ; would suggest to repeat cxr   6/17; she is on 2 L of oxygen  :  satting at 96%    6/20  Keep sats >90% with O2 PRN  Denies SOB today     6/22today xray with i ncreasing pll effusion : repeat again tomorrow ; if furtgerh i ncrease  likely would need pleurx cath : dw family     dw acp and daughter   if she deteriorates further ;  call ccu/ MICU     6/21; dw acp , son and the daughter   6/22;' dw family and acp     6/24: dw pcp and daughter  no plan f or pleurx:  conservative management and symptom control with meds

## 2025-06-24 NOTE — PROGRESS NOTE ADULT - SUBJECTIVE AND OBJECTIVE BOX
Patient is a 89y old  Female who presents with a chief complaint of shaking chills, hypotension, poor appetite (24 Jun 2025 11:37)    Date of servie : 06-24-25 @ 13:39  INTERVAL HPI/OVERNIGHT EVENTS:  T(C): 36.3 (06-24-25 @ 11:00), Max: 36.5 (06-23-25 @ 16:34)  HR: 105 (06-24-25 @ 11:00) (83 - 107)  BP: 130/68 (06-24-25 @ 11:00) (130/68 - 161/95)  RR: 18 (06-24-25 @ 11:00) (18 - 18)  SpO2: 95% (06-24-25 @ 11:00) (94% - 99%)  Wt(kg): --  I&O's Summary    23 Jun 2025 07:01  -  24 Jun 2025 07:00  --------------------------------------------------------  IN: 540 mL / OUT: 0 mL / NET: 540 mL        LABS:                        11.2   4.02  )-----------( 261      ( 24 Jun 2025 07:08 )             36.7     06-24    135  |  98  |  18  ----------------------------<  95  5.0   |  25  |  0.54    Ca    9.0      24 Jun 2025 07:09  Phos  3.0     06-24  Mg     2.2     06-24      PTT - ( 24 Jun 2025 07:10 )  PTT:27.9 sec  Urinalysis Basic - ( 24 Jun 2025 07:09 )    Color: x / Appearance: x / SG: x / pH: x  Gluc: 95 mg/dL / Ketone: x  / Bili: x / Urobili: x   Blood: x / Protein: x / Nitrite: x   Leuk Esterase: x / RBC: x / WBC x   Sq Epi: x / Non Sq Epi: x / Bacteria: x      CAPILLARY BLOOD GLUCOSE            Urinalysis Basic - ( 24 Jun 2025 07:09 )    Color: x / Appearance: x / SG: x / pH: x  Gluc: 95 mg/dL / Ketone: x  / Bili: x / Urobili: x   Blood: x / Protein: x / Nitrite: x   Leuk Esterase: x / RBC: x / WBC x   Sq Epi: x / Non Sq Epi: x / Bacteria: x        MEDICATIONS  (STANDING):  apixaban 2.5 milliGRAM(s) Oral two times a day  benzonatate 100 milliGRAM(s) Oral every 8 hours  chlorhexidine 2% Cloths 1 Application(s) Topical daily  levalbuterol Inhalation 0.63 milliGRAM(s) Inhalation every 6 hours  melatonin 5 milliGRAM(s) Oral at bedtime  metoprolol tartrate 50 milliGRAM(s) Oral every 6 hours  pantoprazole    Tablet 40 milliGRAM(s) Oral before breakfast  polyethylene glycol 3350 17 Gram(s) Oral daily  senna 2 Tablet(s) Oral at bedtime  sodium chloride 0.9%. 600 milliLiter(s) (50 mL/Hr) IV Continuous <Continuous>  zolpidem 5 milliGRAM(s) Oral at bedtime    MEDICATIONS  (PRN):  acetaminophen     Tablet .. 650 milliGRAM(s) Oral every 6 hours PRN Mild Pain (1 - 3)  albuterol/ipratropium for Nebulization 3 milliLiter(s) Nebulizer every 6 hours PRN Shortness of Breath and/or Wheezing  bisacodyl Suppository 10 milliGRAM(s) Rectal daily PRN Constipation  guaifenesin/dextromethorphan Oral Liquid 5 milliLiter(s) Oral every 6 hours PRN Cough  ipratropium    for Nebulization 500 MICROGram(s) Nebulizer every 6 hours PRN Shortness of Breath and/or Wheezing  oxycodone    5 mG/acetaminophen 325 mG 1 Tablet(s) Oral every 4 hours PRN Moderate Pain (4 - 6)  oxyCODONE    IR 10 milliGRAM(s) Oral every 4 hours PRN Severe Pain (7 - 10)          PHYSICAL EXAM:  GENERAL: NAD, well-groomed, well-developed  HEAD:  Atraumatic, Normocephalic  CHEST/LUNG: Clear to percussion bilaterally; No rales, rhonchi, wheezing, or rubs  HEART: Regular rate and rhythm; No murmurs, rubs, or gallops  ABDOMEN: Soft, Nontender, Nondistended; Bowel sounds present  EXTREMITIES:  2+ Peripheral Pulses, No clubbing, cyanosis, or edema  LYMPH: No lymphadenopathy noted  SKIN: No rashes or lesions    Care Discussed with Consultants/Other Providers [ ] YES  [ ] NO

## 2025-06-24 NOTE — PROGRESS NOTE ADULT - CONVERSATION DETAILS
poor prognosis and GOC discussed with family for 40 min  family understands pt is towards end of life  they choose comfort care and hospice evaluation  palliative called for symptom management and possible PCU bed    pts son, daughter and  present at the meeting     dw all consultants
Spoke with pt and family. Pt with difficulty participating 2/2 to lethargy.   Introduced myself and the role of palliative care. Family has a clear understanding of hospital course, and based on information provided by primary team with reaccumulation of fluid in lungs their goal would be to transition focus towards comfort and caring for mom at home.   Clarified advanced directives including DNR/DNI, family confirmed code status as DNR/DNI and MOLST form completed. As per all family members in today's discussion, Teresita is documented HCP, requested form.   Reviewed hospice, inpt vs home hospice qualifications, supports provided, and overall resources. Family shared that mom's primary priority is being home. Discussed home hospice referral which family was comfortable with. We discussed transfer to PCU for symptom directed care and ongoing d/c planning. Based on pt's clinical status, ongoing d/c plan will be discussed.   At this time family would like to continue care with Dr. Wong and Dr. De León as they have been a part of mom's care. We also discussed the option of a pleurx catheter for reaccumulation of fluid in pleural space. Family would like to hear from pulm and primary team to determine if this is the appropriate next step. Education provided that this palliative procedure could allow for better symptom management in the long term.   Family would also like to continue current po medications. Shared if pt unable to tolerate po medications we will discuss deescalation further but can continue with PO medications at this time.   Discussed priority in PCU will be focused on symptom management/comfort. Family is amenable to deescalation of blood draws and further work up other then for a potential pleurx.   Discussed guarded prognosis in the setting of worsening respiratory status. Shared although unclear prognosis explained anything can happen at any time.   Palliative contact info provided. Emotional support provided.

## 2025-06-24 NOTE — PROGRESS NOTE ADULT - ASSESSMENT
89F c hx GERD, R breast CA (40-50y ago) s/p B/L mastectomy and radiation, RUE lymphedema, osteoarthritis/osteoporosis (on monthly ibandronate), HLD, Afib on eliquis, HOCM/severe LVOT/ELEAZAR, small-moderate pericardial effusion/RA diastolic collapse, recent hospitalization 5/29-6/5 for RML PNA, GBS bacteremia (pos cultures 5/29, 5/30) of unclear source on total 10 days abx (IV ceftriaxone inpt, then levaquin 750 q48h until 6/9), pw hypotension, severe sepsis of unclear source, demand ischemia, afib c rvr      Problem/Plan - 1:  ·  Problem: Severe sepsis.   ·  Plan: -   - ID fu   - abx as per ID     Problem/Plan - 2:  ·  Problem: Pericardial effusion , pleural effusion  ·  Plan: - sp CCU stay and drain   CTS fu   sp thoracentesis     Problem/Plan - 3:  ·  Problem: Chronic atrial fibrillation.  ·  Plan: - reduce metoprolol dose to tartrate 25mg BID  - goal HR <100 in setting of HOCM with hypotension  - EP fu   - cw AC       Problem/Plan - 4:  ·  Problem: Type 2 myocardial infarction.  ·  Plan: - tele  - start asa   - cw  Eliquis  - dw cards     Problem/Plan - 5:  ·  Problem: HOCM (hypertrophic obstructive cardiomyopathy).  ·  Plan: - cont metoprolol as above.   uptitrate as BP tolerates    dw family and palliative consult   awaiting PCU bed

## 2025-06-24 NOTE — PROGRESS NOTE ADULT - SUBJECTIVE AND OBJECTIVE BOX
EP     HISTORY OF PRESENT ILLNESS: HPI:  89F c hx GERD, R breast CA (40-50y ago) s/p B/L mastectomy and radiation, RUE lymphedema, osteoarthritis/osteoporosis (on monthly ibandronate), HLD, Afib on eliquis, HOCM/severe LVOT/ELEAZAR, small-moderate pericardial effusion/RA diastolic collapse, recent hospitalization 5/29-6/5 for RML PNA, GBS bacteremia (pos cultures 5/29, 5/30) of unclear source on total 10 days abx (IV ceftriaxone inpt, then levaquin 750 q48h until 6/9), presents from [facility] rehab with shaking chills, hypotension, poor appetite    History from pt's daughter/primary decision maker Teresita Shay at bedside.  While at [facility], pt found to have hypotension to SBP 80s. Pt was given IVF and reduced dose of metoprolol. Pt also found to have tachycardia, shaking chills. Pt sent back to the hospital. Reportedly pt has not been eating well, not urinating much. Denies CP, SOB, abd pain, diarrhea, cough, other respiratory symptoms. At baseline pt ambulates without assistive device, drives.  RRT called in the ed for hypotension to SBP 79. (08 Jun 2025 02:41)    Ms Cloud is a pleasant 89yo woman here with shortness of breath.  Sees Dr Adolfo Leung for Cardiology.  Being managed on this inpatient stay by Dr Coelho.  Known to Dr Young after outpatient referral for HOCM management (lVOT gradient 80s-100mmHg+), and had a brief trial of Mavacamten, stopped for feelings of "leg heaviness".    EP called re: rapid AFib, refractory to low-dose metoprolol thus far, and complicated by management of HCM and new/worsening pericardial effusion.  Resting comfortably in bed on supplemental O2.  SOB and fatigued but no palpitations or fainting.  A 10 pt ROS is otherwise negative.      PAST MEDICAL & SURGICAL HISTORY:  Breast cancer  s/p b/l mastectomy  H/O bilateral mastectomy  + breast implants  History of cataract surgery, unspecified laterality    6/11- moved to CCU for closer monitoring.  lethargic / unable to obtain ROS today.  worsening pleural effusion, no plan for tap.  no plan for pericardiocentesis either.  hypotensive requiring midodrine.  6/12- s/p urgent pericardiocentesis via Interventional Radiology/ lateral thorax approach.  Feels much better- more energetic, has an appetite, more talkative.  6/13- resting in bed in CICU. on phenylephrine today.  no new complaints. feeling better overall after pericardiocentesis.  awaiting possible thoracentesis?  6/14- resting in chair. no new complaints.  slow drainage out of pericardial drain.  AFib HR now contrlled for first time on this stay.  6/15- resting in recliner, poor appetite. no new issues today.  6/16- resting in recliner, trying to eat breakfast today.  HR 110bpm, up-titrated to 50q6 metoprolol last night.  6/17- resting in bed, no new issues.  6/18- resting in bed, tachypneic, poor appetite, anxious.    6/19- resting in bed. CT scan results reviewed. awaiting thoracentesis (right side large pleural effusion)   6/20- resting in bed. Feels much better after thoracentesis yesterady.   6/21 pt is resting in bed  , no complaints   6/22 pt remains in Afib rate controlled, no complaints otherwise   6/23- a bit more short of breath today.  awaiting palliative care eval.   Date of service 6/24- resting comfortably.    acetaminophen     Tablet .. 650 milliGRAM(s) Oral every 6 hours PRN  albuterol/ipratropium for Nebulization 3 milliLiter(s) Nebulizer every 6 hours PRN  apixaban 2.5 milliGRAM(s) Oral two times a day  benzonatate 100 milliGRAM(s) Oral every 8 hours  bisacodyl Suppository 10 milliGRAM(s) Rectal daily PRN  chlorhexidine 2% Cloths 1 Application(s) Topical daily  guaifenesin/dextromethorphan Oral Liquid 5 milliLiter(s) Oral every 6 hours PRN  ipratropium    for Nebulization 500 MICROGram(s) Nebulizer every 6 hours PRN  levalbuterol Inhalation 0.63 milliGRAM(s) Inhalation every 6 hours  melatonin 5 milliGRAM(s) Oral at bedtime  metoprolol tartrate 50 milliGRAM(s) Oral every 6 hours  pantoprazole    Tablet 40 milliGRAM(s) Oral before breakfast  polyethylene glycol 3350 17 Gram(s) Oral daily  senna 2 Tablet(s) Oral at bedtime  sodium chloride 0.9%. 600 milliLiter(s) IV Continuous <Continuous>  zolpidem 5 milliGRAM(s) Oral at bedtime                            11.2   4.02  )-----------( 261      ( 24 Jun 2025 07:08 )             36.7       06-24    135  |  98  |  18  ----------------------------<  95  5.0   |  25  |  0.54    Ca    9.0      24 Jun 2025 07:09  Phos  3.0     06-24  Mg     2.2     06-24    T(C): 36.2 (06-24-25 @ 05:00), Max: 36.5 (06-23-25 @ 16:34)  HR: 83 (06-24-25 @ 05:00) (83 - 107)  BP: 161/95 (06-24-25 @ 05:00) (133/80 - 161/95)  RR: 18 (06-24-25 @ 05:00) (18 - 18)  SpO2: 94% (06-24-25 @ 05:00) (94% - 99%)  Wt(kg): --    I&O's Summary    23 Jun 2025 07:01  -  24 Jun 2025 07:00  --------------------------------------------------------  IN: 540 mL / OUT: 0 mL / NET: 540 mL    Appearance: frail elderly woman. tachypneic.  HEENT:   Normal oral mucosa, PERRL, EOMI	  Lymphatic: No lymphadenopathy , no edema  Cardiovascular: irregular S1 S2, No JVD, No murmurs , Peripheral pulses palpable 2+ bilaterally.    Respiratory: poor air entry BL  normal effort 	  Gastrointestinal:  Soft, Non-tender, + BS	  Skin: No rashes, No ecchymoses, No cyanosis, warm to touch  Musculoskeletal: Normal range of motion, normal strength  Psychiatry:  Mood & affect appropriate    TELEMETRY: AFib, narrow QRS, 80s-100s  ECG:  	AFib, atypical LVHypertrphy  Echo:  < from: TTE W or WO Ultrasound Enhancing Agent (06.05.25 @ 07:16) >  CONCLUSIONS:      1. Limited TTE to assess for pericardial effusion.   2. Trace pericardial effusion noted adjacent to the posterolateral left ventricle, small pericardial effusion noted adjacent to the anterior right ventricle and small pericardial effusion noted adjacent to the right atrium.   3. The inferior vena cava is normal in size measuring 1.70 cm in diameter, (normal <2.1cm) with normal inspiratory collapse (normal >50%) consistent with normal right atrial pressure (~3, range 0-5mmHg).   4. Compared to the transthoracic echocardiogram performed on 5/30/2025, the right atrium is not as well visualized on this study. There is right atrial diastolic collapse visualized in the 4 chamber view but unable to quantify the duration of the collapse. The effusion appears unchanged in size and distribution. No other signs of cardiac tamponade are present.    < end of copied text >    CT Chest - personally reviewed the images.  right breat prosthesis with signs of rupture.  left breast prosthesis OK.  prominent LV hypertrophy visible.  pericardial effusion enlarged.  pleural effusion present on the right.  	  CTA - PE 6/18.  No pericardial effusion. Right pleural effusion is large, loculated.    ASSESSMENT/PLAN: Ms Cloud is a pleasant 89y Female here with shortness of breath.    Multifactorial in the setting of pericardial effusion (treated w/ drainage), Hypertrophic Cardiomyopathy with severe LVOT obstruction, and rapid AFib (on maximal beta blocker dose).  [id number] is at least 3.  No other invasive procedures planned?  continue apixaban 2.5mg BID for strke prevention.  Pericardial drain removed.  CT scan shows it was drained to completion.  s/p thoracentesis on 6/19, with significant improvement in shortness of breath, and heartrate.  chest xray over the weekend shows right pleural effusion has returned.  Continue metoprolol for AFib rate control.  At goal HR of <100bpm at rest.  Continue alpha-agonists to support her blood pressure.  Outpatient Mavacamten resumption, if within goals of care.  Cardiac CTA/Cardioversion if within goals of care.  Any and all invasive procedure should happen beforehand, as cardioversion would mandate uninterrupted anticoagulation for 4 weeks afterwards.  Palliative evaluation pending (re: DNR status or hospice?)  Outpatient followup w/ Dr Leung.    Barney Lau M.D.  Cardiac Electrophysiology  [phone]

## 2025-06-24 NOTE — PROGRESS NOTE ADULT - PROBLEM SELECTOR PLAN 2
s/p thoracentesis on 6/19, with significant improvement in shortness of breath, and heartrate.  chest xray over the weekend shows right pleural effusion has returned.  now family considering option of palliative pleurx as goals are focused on comfort-> pulm and primary team to f/u  O2 for comfort  will start 6mg PO Morphine concentrate q4 hours prn for dyspnea

## 2025-06-24 NOTE — PROGRESS NOTE ADULT - ASSESSMENT
89F c hx GERD, R breast CA (40-50y ago) s/p B/L mastectomy and radiation, RUE lymphedema, osteoarthritis/osteoporosis (on monthly ibandronate), HLD, Afib on eliquis, HOCM/severe LVOT/ELEAZAR, small-moderate pericardial effusion/RA diastolic collapse, recent hospitalization 5/29-6/5 for RML PNA, GBS bacteremia (pos cultures 5/29, 5/30) of unclear source on total 10 days abx (IV ceftriaxone inpt, then levaquin 750 q48h until 6/9), pw hypotension, severe sepsis of unclear source, demand ischemia, afib c rvr    Severe Sepsis/sirs, Hypotension 2/2 unclear source  Severe LVOT obstruction/HCM, worsening pericardial effusion  CXR w/ worsening aeration at the bases  Flu/COVID/RSV negative  UA negative  6/7 Bcx negative   Zosyn allergy noted, tolerates cephalosporins   CTAP with mod-large pericardial effusion inc since 5/29/25, stable mod R and small L pleural effusion and atelectasis   TTE with increased pericardial effusion since 6/5 -- moderate pericardial effusion adjacent to RV, small pericardial effusion adjacent to RA and large pericardia effusion noted adjacent to posterior LV with no evidence of tamponade physiology  6/10 note with tachypnea and expiratory wheeze, CCU consulted for decompensated heart failure iso mod-severe pericardial effusion with concerns for impending hemodynamic collapse given complex cardiac physiology with severe LVOT obstruction, now transferred to CICU for closer monitor and management of enlarging pericardial effusion  CTS eval noted-patient high risk for pericardial window - rec continue to monitor pericardial effusion with repeat TTE  6/11 CT chest with large pericardial effusion increased since 6/4, mod R and small L pleural effusion, no pneumonia;  cefepime changed to ceftriaxone d/t concern for possible neurotoxicity   6/12 hypotensive and tachycardic despite volume resuscitation -- s/p emergent pericardiocentesis with drain placed    - noted drained 400cc ss pericardial fluid, cell count noted, culture NGTD, path with no malignant cells   6/16 s/p pericardial drain removal   6/18 CTA chest with no PE; large loculated R pleural effusion with partial atelectasis R lung, small L pleural effusion, post radiation changes deep to R breast, stable patchy biapical lung opacities   6/19 s/p US guided R thoracocentesis - drained 1.25L transudative fluid -- culture NGTD, path with no malignant cells    -noted with reaccumulation of fluid on imaging, family decided on palliative care, no pleurx for now   remains afebrile, no leukocytosis, completed ceftriaxone 6/20    s/p vancomycin 6/7-6/10  s/p cefepime 6/7-6/11  s/p ceftriaxone 6/11-6/20    Recommendations:   Continue off antibiotics   Cardiology/EP following   Trend temps/WBC  Continue rest of care per primary team       Wes Grullon M.D.  [facility]  Available on [employer] TEAMS - *PREFERRED*  [phone]  After 5pm on weekdays and all day on weekends - please call [phone]     Thank you for consulting us and involving us in the management of this patients case. In addition to reviewing history, imaging, documents, labs, microbiology, took into account antibiotic stewardship, local antibiogram and infection control strategies and potential transmission issues at time of treatment decision making process.

## 2025-06-24 NOTE — PROGRESS NOTE ADULT - ASSESSMENT
Afib, severe HCM LVOT obstruction, Pericardial effusion, recent admission for PNA / Bacteremia   now admitted with sepsis, tachycardia ( afib with RVR ) and shock . Cardiology is called for elevated troponin     Elevated troponin   Demand ischemia   in setting of afib with RVR, shock, severe HCM and sepsis   stable now    Shock   resolved  off midodrine     Afib  HR elevated   needs better HR control , prefer rhythm control   will defer management to EP   on a/c    HCM  on BB  fu with Dr Young to resume camzyos     Pericardial effusion   s/p drain   stable now on recent CT     Pl effusion   s/p drain   fu with pulm   etiology likely third spacing from poor oncotic pressure   nutritional optimization     GOC discussion as per primary team

## 2025-06-24 NOTE — PROGRESS NOTE ADULT - SUBJECTIVE AND OBJECTIVE BOX
Date of Service: 06-24-25 @ 14:11    Patient is a 89y old  Female who presents with a chief complaint of shaking chills, hypotension, poor appetite (24 Jun 2025 13:39)      Any change in ROS: on 2 L of oxygen : she is not in any resp distress:   breathing seems stable:   family at bedside talking to palliative care     MEDICATIONS  (STANDING):  apixaban 2.5 milliGRAM(s) Oral two times a day  benzonatate 100 milliGRAM(s) Oral every 8 hours  chlorhexidine 2% Cloths 1 Application(s) Topical daily  levalbuterol Inhalation 0.63 milliGRAM(s) Inhalation every 6 hours  melatonin 5 milliGRAM(s) Oral at bedtime  metoprolol tartrate 50 milliGRAM(s) Oral every 6 hours  pantoprazole    Tablet 40 milliGRAM(s) Oral before breakfast  polyethylene glycol 3350 17 Gram(s) Oral daily  senna 2 Tablet(s) Oral at bedtime  zolpidem 5 milliGRAM(s) Oral at bedtime    MEDICATIONS  (PRN):  acetaminophen     Tablet .. 650 milliGRAM(s) Oral every 6 hours PRN Mild Pain (1 - 3)  albuterol/ipratropium for Nebulization 3 milliLiter(s) Nebulizer every 6 hours PRN Shortness of Breath and/or Wheezing  bisacodyl Suppository 10 milliGRAM(s) Rectal daily PRN Constipation  glycopyrrolate 1 milliGRAM(s) Oral every 8 hours PRN excessive audible secretions  guaifenesin/dextromethorphan Oral Liquid 5 milliLiter(s) Oral every 6 hours PRN Cough  ipratropium    for Nebulization 500 MICROGram(s) Nebulizer every 6 hours PRN Shortness of Breath and/or Wheezing  LORazepam    Concentrate 0.5 milliGRAM(s) Oral every 4 hours PRN anxiety, agitation, delirium  morphine  - Injectable 2 milliGRAM(s) IV Push every 4 hours PRN refractory dyspnea/breakthrough pain  morphine Concentrate 6 milliGRAM(s) Oral every 4 hours PRN Severe Pain (7 - 10)  morphine Concentrate 3 milliGRAM(s) Oral every 4 hours PRN Moderate Pain (4 - 6)  morphine Concentrate 6 milliGRAM(s) Oral every 4 hours PRN Dyspnea    Vital Signs Last 24 Hrs  T(C): 36.3 (24 Jun 2025 11:00), Max: 36.5 (23 Jun 2025 16:34)  T(F): 97.3 (24 Jun 2025 11:00), Max: 97.7 (23 Jun 2025 16:34)  HR: 105 (24 Jun 2025 11:00) (83 - 107)  BP: 130/68 (24 Jun 2025 11:00) (130/68 - 161/95)  BP(mean): --  RR: 18 (24 Jun 2025 11:00) (18 - 18)  SpO2: 95% (24 Jun 2025 11:00) (94% - 99%)    Parameters below as of 24 Jun 2025 11:00  Patient On (Oxygen Delivery Method): nasal cannula  O2 Flow (L/min): 2      I&O's Summary    23 Jun 2025 07:01  -  24 Jun 2025 07:00  --------------------------------------------------------  IN: 540 mL / OUT: 0 mL / NET: 540 mL          Physical Exam:   GENERAL: cacechtic  HEENT: BRYSON/   Atraumatic, Normocephalic  ENMT: No tonsillar erythema, exudates, or enlargement; Moist mucous membranes, Good dentition, No lesions  NECK: Supple, No JVD, Normal thyroid  CHEST/LUNG: decreased air entry bilateral bases   CVS: Regular rate and rhythm; No murmurs, rubs, or gallops  GI: : Soft, Nontender, Nondistended; Bowel sounds present  NERVOUS SYSTEM:  Alert & Oriented X3  EXTREMITIES:  - edema  LYMPH: No lymphadenopathy noted  SKIN: No rashes or lesions  ENDOCRINOLOGY: No Thyromegaly  PSYCH: Appropriate    Labs:                              11.2   4.02  )-----------( 261      ( 24 Jun 2025 07:08 )             36.7                         11.0   3.88  )-----------( 287      ( 23 Jun 2025 07:10 )             36.4     06-24    135  |  98  |  18  ----------------------------<  95  5.0   |  25  |  0.54  06-23    138  |  102  |  20  ----------------------------<  105[H]  5.0   |  26  |  0.57    Ca    9.0      24 Jun 2025 07:09  Ca    8.8      23 Jun 2025 07:18  Phos  3.0     06-24  Mg     2.2     06-24      CAPILLARY BLOOD GLUCOSE         (06-19 @ 16:00)      PTT - ( 24 Jun 2025 07:10 )  PTT:27.9 sec  Urinalysis Basic - ( 24 Jun 2025 07:09 )    Color: x / Appearance: x / SG: x / pH: x  Gluc: 95 mg/dL / Ketone: x  / Bili: x / Urobili: x   Blood: x / Protein: x / Nitrite: x   Leuk Esterase: x / RBC: x / WBC x   Sq Epi: x / Non Sq Epi: x / Bacteria: x      D-Dimer Assay, Quantitative: 5056 ng/mL DDU (06-18 @ 07:17)  Fluid Source --  Albumin, Fluid0.8 g/dL  Glucose, [id number] mg/dL  Protein total, Fluid2.1 g/dL  Lacatate Dehydrogenase, [id number] U/L  pH, Fluid7.55  Cytopathology-Non Gyn Report--  Fluid Source --  Albumin, Fluid--  Glucose, Fluid--  Protein total, Fluid--  Lacatate Dehydrogenase, Fluid--  pH, Fluid--  Cytopathology-Non Gyn Report  ACCESSION No:  [id number]  Patient:     GINO GRAHAM      Accession:                             [id number]    Collected Date/Time:                   6/19/2025 16:00 EDT  Received Date/Time:                    6/19/2025 22:26 EDT    Non-Gynecologic Report - Auth (Verified)    Specimen(s) Submitted  PLEURAL FLUID, RIGHT    Final Diagnosis  PLEURAL FLUID, RIGHT  NEGATIVE FOR MALIGNANT CELLS.    Cytology slides and cell block show    reactive mesothelial cells,  histiocytes  and mixed inflammatory cells.    Screened by: Marisela BURROWS(ASCP)  Verified by: Mary Conn MD  (Electronic Signature)  Reported on: 06/23/25 22:03 EDT, [facility] [address] [place] [address]  Phone: [phone]   Fax: [phone]  Cytology processed at [facility], [address] [place] [address],  [place] [address]  _________________________________________________________________      Clinical Information  511.9  Pleural effusion.    Gross Description  Received: 100  ml of cloudy fluid in CytoLyt  Prepared: 1 ThinPrep slide, 1 smear, 1 cell block        RECENT CULTURES:  06-19 @ 16:34 Pleural Fl Pleural Fluid       No polymorphonuclear cells seen  No organisms seen  by cytocentrifuge    rad< from: Xray Chest 1 View- PORTABLE-Routine (Xray Chest 1 View- PORTABLE-Routine .) (06.23.25 @ 09:39) >  to infection in the right clinical setting.  No pneumothorax.    IMPRESSION:    Stable hazy opacification of both mid and lower lungs.  Increase in right pleural effusion.  Increased atelectasis and/or infection at the right lung base.    --- End of Report ---            YAJAIRA FREIRE MD; Attending Radiologist  This document has been electronically signed. Jun 23 2025  4:44PM    < end of copied text >         No growth          RESPIRATORY CULTURES:          Studies  Chest X-RAY  CT SCAN Chest   Venous Dopplers: LE:   CT Abdomen  Others

## 2025-06-24 NOTE — PROGRESS NOTE ADULT - PROBLEM SELECTOR PLAN 1
Cardiomyopathy with severe LVOT obstruction, and rapid AFib   S/p pericardial drain  defer to primary team for ongoing management of oral medications

## 2025-06-24 NOTE — PROGRESS NOTE ADULT - SUBJECTIVE AND OBJECTIVE BOX
[facility]  [facility] [place] Y. Patel, S. Shah, G. Casimir  [phone]  ([phone] - weekdays after 5pm and weekends)    Name: GINO GRAHAM  Age/Gender: 89y Female  MRN: [id number]    Interval History:  Patient seen and examined this morning.   Resting on NC, states breathing is ok.   Denies fever or any pain.   Notes reviewed  No concerning overnight events  Afebrile   Allergies: Zosyn (Rash; Urticaria; Hives)      Objective:  Vitals:   T(F): 97.3 (06-24-25 @ 11:00), Max: 97.7 (06-23-25 @ 16:34)  HR: 105 (06-24-25 @ 11:00) (83 - 107)  BP: 130/68 (06-24-25 @ 11:00) (130/68 - 161/95)  RR: 18 (06-24-25 @ 11:00) (18 - 18)  SpO2: 95% (06-24-25 @ 11:00) (94% - 99%)  Physical Examination:  General: no acute distress, NC   HEENT: normocephalic, atraumatic, anicteric  Respiratory: decreased breath sounds b/l   Cardiovascular: S1 and S2 present, normal rate   Gastrointestinal: nondistended  Extremities: no edema, no cyanosis  Skin: no visible rash    Laboratory Studies:  CBC:                       11.2   4.02  )-----------( 261      ( 24 Jun 2025 07:08 )             36.7     WBC Trend:  4.02 06-24-25 @ 07:08  3.88 06-23-25 @ 07:10  4.52 06-20-25 @ 06:59  5.80 06-19-25 @ 04:24  4.48 06-18-25 @ 07:27    CMP: 06-24    135  |  98  |  18  ----------------------------<  95  5.0   |  25  |  0.54    Ca    9.0      24 Jun 2025 07:09  Phos  3.0     06-24  Mg     2.2     06-24    Creatinine: 0.54 mg/dL (06-24-25 @ 07:09)  Creatinine: 0.57 mg/dL (06-23-25 @ 07:18)  Creatinine: 0.63 mg/dL (06-19-25 @ 04:24)  Creatinine: 0.58 mg/dL (06-18-25 @ 07:15)    Microbiology: reviewed   Culture - Body Fluid with Gram Stain (collected 06-19-25 @ 16:34)  Source: Pleural Fl Pleural Fluid  Gram Stain (06-20-25 @ 02:16):    No polymorphonuclear cells seen    No organisms seen    by cytocentrifuge  Preliminary Report (06-20-25 @ 17:12):    No growth    Culture - Fungal, Body Fluid (collected 06-19-25 @ 16:34)  Source: Pleural Fl Pleural Fluid  Preliminary Report (06-22-25 @ 08:05):    No growth    Culture - Fungal, Body Fluid (collected 06-12-25 @ 14:05)  Source: Pericardial Pericardial Fluid  Preliminary Report (06-21-25 @ 23:02):    No fungus isolated at 1 week.    Culture - Acid Fast - Body Fluid w/Smear (collected 06-12-25 @ 14:05)  Source: Pericardial Other  Preliminary Report (06-21-25 @ 23:06):    No acid-fast bacilli isolated at 1 week. ****Acid-fast cultures are held    for 6 weeks.****    Culture - Body Fluid with Gram Stain (collected 06-12-25 @ 14:05)  Source: Pericardial Other  Gram Stain (06-13-25 @ 00:20):    polymorphonuclear leukocytes seen by cytocentrifuge    No organisms seen by cytocentrifuge  Final Report (06-17-25 @ 16:17):    No growth at 5 days    06-22-25 @ 18:23 SARS-CoV-2 NotDetec/Influenza A NotDetec/Influenza B NotDetec/RSV NotDetec    Radiology: reviewed     Medications:  acetaminophen     Tablet .. 650 milliGRAM(s) Oral every 6 hours PRN  albuterol/ipratropium for Nebulization 3 milliLiter(s) Nebulizer every 6 hours PRN  apixaban 2.5 milliGRAM(s) Oral two times a day  benzonatate 100 milliGRAM(s) Oral every 8 hours  bisacodyl Suppository 10 milliGRAM(s) Rectal daily PRN  chlorhexidine 2% Cloths 1 Application(s) Topical daily  guaifenesin/dextromethorphan Oral Liquid 5 milliLiter(s) Oral every 6 hours PRN  ipratropium    for Nebulization 500 MICROGram(s) Nebulizer every 6 hours PRN  levalbuterol Inhalation 0.63 milliGRAM(s) Inhalation every 6 hours  melatonin 5 milliGRAM(s) Oral at bedtime  metoprolol tartrate 50 milliGRAM(s) Oral every 6 hours  pantoprazole    Tablet 40 milliGRAM(s) Oral before breakfast  polyethylene glycol 3350 17 Gram(s) Oral daily  senna 2 Tablet(s) Oral at bedtime  sodium chloride 0.9%. 600 milliLiter(s) IV Continuous <Continuous>  zolpidem 5 milliGRAM(s) Oral at bedtime    Prior/Completed Antimicrobials:  cefepime   IVPB  cefTRIAXone   IVPB  cefTRIAXone   IVPB  vancomycin  IVPB.

## 2025-06-25 NOTE — PROGRESS NOTE ADULT - ASSESSMENT
89F c hx GERD, R breast CA (40-50y ago) s/p B/L mastectomy and radiation, RUE lymphedema, osteoarthritis/osteoporosis (on monthly ibandronate), HLD, Afib on eliquis, HOCM/severe LVOT/LORA, small-moderate pericardial effusion/RA diastolic collapse, recent hospitalization 5/29-6/5 for RML PNA, GBS bacteremia (pos cultures 5/29, 5/30) of unclear source on total 10 days abx (IV ceftriaxone inpt, then levaquin 750 q48h until 6/9), presents from [facility] rehab with shaking chills, hypotension, poor appetite  History from pt's daughter/primary decision maker Teresita Day at bedside.  While at [facility], pt found to have hypotension to SBP 80s. Pt was given IVF and reduced dose of metoprolol. Pt also found to have tachycardia, shaking chills. Pt sent back to the hospital. Reportedly pt has not been eating well, not urinating much. Denies CP, SOB, abd pain, diarrhea, cough, other respiratory symptoms. At baseline pt ambulates without assistive device, drives.  RRT called in the ed for hypotension to SBP 79. (08 Jun 2025 02:41)  to me pt daughter says she was very sleepy in the morning too  as she was given ambien at 4 AM in the morning:   now she is alert and awake and is on 2 L of oxygen ;  she is not sob:  and does not look to be in any resp distress:       Severe sepsis.   unclear source of fevers  cont empiric vanc, cefepime  f/u blood cx. if positive will likely need MARIA G  monitor for any localizing symptoms  pt had recent pan ct scan that did not elucidate cause of bacteremia  recent blood cultures have been negative   - IVF  on cefepime  she is febrile too   vbg on admission IS ok;   MONITOR BLOOD PRESSURE CLOSELY:   OIF SHE DROPS HER BLOOD PRESSURE MORE:  WOULD NEED MICU  CO NSULT:    6/9: seems slightly more tored today  ; cont antibtiocs:  per ID:  she remains on 2 L of oxygen : she is alert and awake:  daughter at bedside:  reviewed the labs and echo and ct scan chest with the daughter in detail :   she has large pericardial effusions:  per ir no good window and effusion seemd small to drain : ct scan chest read as mod to large pericardial effusion : defer to cards :     6/10: seems to be doing  ok :  no sob:   no  cough  ;   no  phlegm   on 2 L of oxygen :  thoracic to see:    no emergency in tapping the pleural effusion:   de cardiologist  6/11: on ceftriaxone   seems O K   6/12: seems OK:  s/p pericardial drain:  has 400 cc output   await cytology    6/13: cont c urrent rx:   on antibiotics  6/14: on ceftriaxone    6/15/l seems to be doing  ok ;  no sob;  no cough :   no phlegm      6/17; pulm wise she looks tachypneic today ; rpt limited echo with no change from before;  cxr done today with no significant change from before;   abg done today seems pretty reasonable   will do di mine;  last d dimer on 3rd was slightly high ;  if the d dimer has increased significantly  would do cta;  dw acp : her creat is normal:  she has been off ac for pericardial drain for some time;   on antibiotics   40 mg ov iv solumedrol given     6/18: HER D DIMER IS VERY HIGH :  going for cta;    depending upon the cta:  if she has significant pl effusion , would like to tap, if there is no pe    dw acp     6/19: seems to be doing  ok ;  for thoracentesis ; today  : confirmed with USG   ; off ac for now:  to restart after the procedure     6/20:  ABX as per ID     6/21; resolved  now off antibiotics  no fever:   6/22: resolved   6/23: s/p ABX   6/24: seems to be doing poorly:    the rigth sided pleural fluid is increased;   dw daughter  no pleurx for now;     6/25: sepsis wise seems to be doing  ok ;  no labs today : remains afebrile        Pleural Effusion  -S/p R US guided thoracentesis 6/19, 1.25 L drained  -CXR post procedure with mild congestion, residual effusions but overall improved. No clear ptx  -F/u pleural fluid studies  -Keep O>I as tolerated     6/21; s/p tap:  transudative fluids:    needs diuresis:  hard to give with hocm :  rpt cxr in am:  if sheis rapidly filling up with pl effusion,  she may need pleurx:  dw son and daugter in detail :     6/22: the pl effusion has increased:  right   . left:   she may need pleurax:     6/23   CXR with reaccumulation of fluid  Keep O>I as tolerated   Would consider pleurX if aligns with GOC     6/24:    cxr with increasing pleural effusion;  dw daughter about pleurx catheter that it is only to draw fluid:  it is not going to change her prognosis:  at this time:  no pleurx:  dc planning to hospice:     6/25; dw pts daughter  and son ; trying to arrange for everything at home:  needs nebs at home:   cont supportive care     Pericardial effusion ;   the ct chest shows increasing pericardial effusion  : which is of more pressing concern then pleural effusion :  needs a tap:  ir guided tap  6/12: as above   6/13: asked up to tap:  need to repeat inr and pt  last was  >  2.0:  needs to be less then 1.5:  ip contacted will evalute for tap    6/14; defer to p r imary team  6/16/: seems OK;'' pericardial effusion removed    6/17: limited echo today with no change   6/18: defer to cards : cyto is still pending   6/19; cyto i n  pl fluid is negative for malignant cells:   6/20: Management per cards   6/21: tapped:  doing  ok   6/22; seems to be stable:   6/25: sems OK:  blood pressure is OK         Hypotension.  suspect combination of infection, hocm, lora, tachycardia, pericardial effusion, metoprolol  pt has somewhat tenuous hemodynamic status  consider cardioversion, consider repeat TTE  pt is full code.  pts blood pressure is slightly low   on midodrine  6/9: cont on midodrine  6/10; controlled:  on midodrine  6/11: on midodrine  6/12: currently she is on vasopressors   6/13; off vasopressors now   6/14; blood pressure soft:  on midodrine   6/15: her blood pressure is pretty good:   6/17: blood pressure today is reasonable;   6/18: off midodrine and blood pressure is pretty good:  on metoprolol   6/19/ on metoprolol :   6/20: BP controlled    6*21: controlled:  on meto :  off midodrine now:   6/22: blood pressure is OK   6/24: blood pressure seems reasonable:   6/25: seems stable:       Chronic atrial fibrillation.  reduce metoprolol dose to tartrate 25mg BID  goal HR <100 in setting of HOCM with hypotension  consider amiodarone or cardioversion  PER CARDS    6/9: defer to cards   6/10: off Eliquis for now   6/11: remains off eliquis   6/12: off ac  6/13: keep off ac if thoracentesis needed to be done over the weekend:  though it is not emergent   6/14" can restart ac:  if required as there is now no plan for thoracentesis:  dw attending   6/16; off ac as pericardial arnie is removed today   6/17; remains off ac:  restart as recommended by ir note  6/18: still off heparin :  cta awaited do urgent   6/19:  cta done: no pe:  has mod to large effusion on rigth side: for tap today    6/20: AC with heparin drip   6/21: on eliquis now    6/22: her effusions have increased;  she will likely need pleurx on rigth side;    6/23: AC with Eliquis    6/24: cont eliquis   6/25: remains on eliquis       Type 2 myocardial infarction.  -per cards    HOCM (hypertrophic obstructive cardiomyopathy).  -per cards    Acute respiratory failure with hypoxia.   recent CT scans showing right bronchiectasis, right fibrosis likely 2/2 radiation, poss RML PNA.  cont BD :   ct chest showed: No pulmonary embolism. Small to moderate right and small left pleural effusions with associated  atelectasis. 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  Cardiomegaly. Moderate pericardial effusion.  needs echo     9/6: new echo reviewed:  seen by cardiology :  monitor her blood pressure closely:  if she drops her blood pressure call CCU     6/10: cont to manain o2 sao2 above 90% all the t hayden  6/11; n 2 L of oxygen    6/12: she is not in any resp distress    6/13: she is on rooma ir:  doing well :  satting at 93%  6/14: pulm wise seems pretty good;   on rooma ir;   no plan for tap now     6/16; she seems OK;  she is on 1 L of oxygen ; would suggest to repeat cxr   6/17; she is on 2 L of oxygen  :  satting at 96%    6/20  Keep sats >90% with O2 PRN  Denies SOB today     6/22today xray with increasing pll effusion : repeat again tomorrow ; if further increase  likely would need pleurx cath : uzma family     6/25: she is still being contd on full treatment; ; uzma with the family in detail : no pleurx now:  for dc to home hospice     dw acp and daughter   if she deteriorates further ;  call ccu/ MICU     6/21; uzma acp , son and the daughter   6/22;' uzma family and acp   6/25: uzma daughter and son      6/24: uzma pcp and daughter  no plan f or pleurx:  conservative management and symptom control with meds

## 2025-06-25 NOTE — PROGRESS NOTE ADULT - PROBLEM SELECTOR PLAN 3
- Continue with Morphine 3mg oral concentrate q4hr PRN for moderate pain. ( None required in a 24hr period 7am-7am)   - Continue with Morphine 6mg oral concentrate q4 hours PRN for severe pain. ( 1 required in a 24hr period 7am-7am)   - Continue with Morphine 2mg IV q4hr PRN for breakthrough pain. ( 2 required in a 24hr period 7am-7am)   - Bowel regimen while on opioids Controlled with current regimen:  - Continue with Morphine 3mg oral concentrate q4hr PRN for moderate pain. ( None required in a 24hr period 7am-7am)   - Continue with Morphine 6mg oral concentrate q4 hours PRN for severe pain. ( 1 required in a 24hr period 7am-7am)   - Continue with Morphine 2mg IV q4hr PRN for breakthrough pain. ( 2 required in a 24hr period 7am-7am)   - Bowel regimen while on opioids

## 2025-06-25 NOTE — PROGRESS NOTE ADULT - SUBJECTIVE AND OBJECTIVE BOX
Date of Service: 06-25-25 @ 15:06    Patient is a 89y old  Female who presents with a chief complaint of shaking chills, hypotension, poor appetite (25 Jun 2025 10:26)      Any change in ROS: she seems to be doing  ok ; : no sob:  on 2 L:   no sob:  to me  : she is alert and awake;         MEDICATIONS  (STANDING):  apixaban 2.5 milliGRAM(s) Oral two times a day  benzonatate 100 milliGRAM(s) Oral every 8 hours  chlorhexidine 2% Cloths 1 Application(s) Topical daily  levalbuterol Inhalation 0.63 milliGRAM(s) Inhalation every 6 hours  melatonin 5 milliGRAM(s) Oral at bedtime  metoprolol tartrate 50 milliGRAM(s) Oral every 6 hours  pantoprazole    Tablet 40 milliGRAM(s) Oral before breakfast  polyethylene glycol 3350 17 Gram(s) Oral daily  senna 2 Tablet(s) Oral at bedtime  zolpidem 5 milliGRAM(s) Oral at bedtime    MEDICATIONS  (PRN):  acetaminophen     Tablet .. 650 milliGRAM(s) Oral every 6 hours PRN Mild Pain (1 - 3)  albuterol/ipratropium for Nebulization 3 milliLiter(s) Nebulizer every 6 hours PRN Shortness of Breath and/or Wheezing  bisacodyl Suppository 10 milliGRAM(s) Rectal daily PRN Constipation  glycopyrrolate 1 milliGRAM(s) Oral every 8 hours PRN excessive audible secretions  guaifenesin/dextromethorphan Oral Liquid 5 milliLiter(s) Oral every 6 hours PRN Cough  ipratropium    for Nebulization 500 MICROGram(s) Nebulizer every 6 hours PRN Shortness of Breath and/or Wheezing  LORazepam    Concentrate 0.5 milliGRAM(s) Oral every 4 hours PRN anxiety, agitation, delirium  morphine  - Injectable 2 milliGRAM(s) IV Push every 4 hours PRN refractory dyspnea/breakthrough pain  morphine Concentrate 6 milliGRAM(s) Oral every 4 hours PRN Severe Pain (7 - 10)  morphine Concentrate 3 milliGRAM(s) Oral every 4 hours PRN Moderate Pain (4 - 6)  morphine Concentrate 6 milliGRAM(s) Oral every 4 hours PRN Dyspnea    Vital Signs Last 24 Hrs  T(C): 36.4 (25 Jun 2025 13:01), Max: 36.6 (24 Jun 2025 20:38)  T(F): 97.6 (25 Jun 2025 13:01), Max: 97.9 (24 Jun 2025 20:38)  HR: 111 (25 Jun 2025 13:01) (92 - 111)  BP: 122/79 (25 Jun 2025 13:01) (117/78 - 144/79)  BP(mean): --  RR: 18 (25 Jun 2025 13:01) (16 - 22)  SpO2: 96% (25 Jun 2025 13:01) (95% - 100%)    Parameters below as of 25 Jun 2025 13:01  Patient On (Oxygen Delivery Method): nasal cannula  O2 Flow (L/min): 2      I&O's Summary        Physical Exam:   GENERAL: NAD, well-groomed, well-developed  HEENT: BRYSON/   Atraumatic, Normocephalic  ENMT: No tonsillar erythema, exudates, or enlargement; Moist mucous membranes, Good dentition, No lesions  NECK: Supple, No JVD, Normal thyroid  CHEST/LUNG: decreased air entry bilaterally   CVS: Regular rate and rhythm; No murmurs, rubs, or gallops  GI: : Soft, Nontender, Nondistended; Bowel sounds present  NERVOUS SYSTEM:  Alert & Oriented X3  EXTREMITIES:- edema  LYMPH: No lymphadenopathy noted  SKIN: No rashes or lesions  ENDOCRINOLOGY: No Thyromegaly  PSYCH: Appropriate    Labs:                              11.2   4.02  )-----------( 261      ( 24 Jun 2025 07:08 )             36.7                         11.0   3.88  )-----------( 287      ( 23 Jun 2025 07:10 )             36.4     06-24    135  |  98  |  18  ----------------------------<  95  5.0   |  25  |  0.54  06-23    138  |  102  |  20  ----------------------------<  105[H]  5.0   |  26  |  0.57    Ca    9.0      24 Jun 2025 07:09  Phos  3.0     06-24  Mg     2.2     06-24      CAPILLARY BLOOD GLUCOSE         (06-19 @ 16:00)      PTT - ( 24 Jun 2025 07:10 )  PTT:27.9 sec  Urinalysis Basic - ( 24 Jun 2025 07:09 )    Color: x / Appearance: x / SG: x / pH: x  Gluc: 95 mg/dL / Ketone: x  / Bili: x / Urobili: x   Blood: x / Protein: x / Nitrite: x   Leuk Esterase: x / RBC: x / WBC x   Sq Epi: x / Non Sq Epi: x / Bacteria: x      Fluid Source --  Albumin, Fluid0.8 g/dL  Glucose, [id number] mg/dL  Protein total, Fluid2.1 g/dL  Lacatate Dehydrogenase, [id number] U/L  pH, Fluid7.55  Cytopathology-Non Gyn Report--  Fluid Source --  Albumin, Fluid--  Glucose, Fluid--  Protein total, Fluid--  Lacatate Dehydrogenase, Fluid--  pH, Fluid--  Cytopathology-Non Gyn Report  ACCESSION No:  [id number]  Patient:     GINO GRAHAM      Accession:                             [id number]    Collected Date/Time:                   6/19/2025 16:00 EDT  Received Date/Time:                    6/19/2025 22:26 EDT    Non-Gynecologic Report - Auth (Verified)    Specimen(s) Submitted  PLEURAL FLUID, RIGHT    Final Diagnosis  PLEURAL FLUID, RIGHT  NEGATIVE FOR MALIGNANT CELLS.    Cytology slides and cell block show    reactive mesothelial cells,  histiocytes  and mixed inflammatory cells.    Screened by: Marisela BURROWS(ASCP)  Verified by: Mary Conn MD  (Electronic Signature)  Reported on: 06/23/25 22:03 EDT, [facility], [address] [address]  Phone: [phone]   Fax: [phone]  Cytology processed at [facility], [address] [place] [address]  _________________________________________________________________      Clinical Information  511.9  Pleural effusion.    Gross Description  Received: 100  ml of cloudy fluid in CytoLyt  Prepared: 1 ThinPrep slide, 1 smear, 1 cell block        RECENT CULTURES:  06-19 @ 16:34 Pleural Fl Pleural Fluid       No polymorphonuclear cells seen  No organisms seen  by cytocentrifuge           No growth at 5 days          RESPIRATORY CULTURES:          Studies  Chest X-RAY  CT SCAN Chest   Venous Dopplers: LE:   CT Abdomen  Others

## 2025-06-25 NOTE — PROGRESS NOTE ADULT - SUBJECTIVE AND OBJECTIVE BOX
Patient is a 89y old  Female who presents with a chief complaint of shaking chills, hypotension, poor appetite (25 Jun 2025 15:06)    Date of servie : 06-25-25 @ 21:25  INTERVAL HPI/OVERNIGHT EVENTS:  T(C): 36.7 (06-25-25 @ 16:19), Max: 36.7 (06-25-25 @ 16:19)  HR: 98 (06-25-25 @ 18:00) (98 - 111)  BP: 125/77 (06-25-25 @ 18:00) (122/72 - 128/71)  RR: 20 (06-25-25 @ 16:19) (18 - 22)  SpO2: 94% (06-25-25 @ 16:19) (94% - 100%)  Wt(kg): --  I&O's Summary      LABS:                        11.2   4.02  )-----------( 261      ( 24 Jun 2025 07:08 )             36.7     06-24    135  |  98  |  18  ----------------------------<  95  5.0   |  25  |  0.54    Ca    9.0      24 Jun 2025 07:09  Phos  3.0     06-24  Mg     2.2     06-24      PTT - ( 24 Jun 2025 07:10 )  PTT:27.9 sec  Urinalysis Basic - ( 24 Jun 2025 07:09 )    Color: x / Appearance: x / SG: x / pH: x  Gluc: 95 mg/dL / Ketone: x  / Bili: x / Urobili: x   Blood: x / Protein: x / Nitrite: x   Leuk Esterase: x / RBC: x / WBC x   Sq Epi: x / Non Sq Epi: x / Bacteria: x      CAPILLARY BLOOD GLUCOSE            Urinalysis Basic - ( 24 Jun 2025 07:09 )    Color: x / Appearance: x / SG: x / pH: x  Gluc: 95 mg/dL / Ketone: x  / Bili: x / Urobili: x   Blood: x / Protein: x / Nitrite: x   Leuk Esterase: x / RBC: x / WBC x   Sq Epi: x / Non Sq Epi: x / Bacteria: x        MEDICATIONS  (STANDING):  benzonatate 100 milliGRAM(s) Oral every 8 hours  chlorhexidine 2% Cloths 1 Application(s) Topical daily  levalbuterol Inhalation 0.63 milliGRAM(s) Inhalation every 6 hours  melatonin 5 milliGRAM(s) Oral at bedtime  metoprolol tartrate 50 milliGRAM(s) Oral every 6 hours  pantoprazole    Tablet 40 milliGRAM(s) Oral before breakfast  polyethylene glycol 3350 17 Gram(s) Oral daily  senna 2 Tablet(s) Oral at bedtime  zolpidem 5 milliGRAM(s) Oral at bedtime    MEDICATIONS  (PRN):  acetaminophen     Tablet .. 650 milliGRAM(s) Oral every 6 hours PRN Mild Pain (1 - 3)  albuterol/ipratropium for Nebulization 3 milliLiter(s) Nebulizer every 6 hours PRN Shortness of Breath and/or Wheezing  bisacodyl Suppository 10 milliGRAM(s) Rectal daily PRN Constipation  glycopyrrolate 1 milliGRAM(s) Oral every 8 hours PRN excessive audible secretions  guaifenesin/dextromethorphan Oral Liquid 5 milliLiter(s) Oral every 6 hours PRN Cough  ipratropium    for Nebulization 500 MICROGram(s) Nebulizer every 6 hours PRN Shortness of Breath and/or Wheezing  LORazepam    Concentrate 0.5 milliGRAM(s) Oral every 4 hours PRN anxiety, agitation, delirium  morphine  - Injectable 2 milliGRAM(s) IV Push every 4 hours PRN refractory dyspnea/breakthrough pain  morphine Concentrate 6 milliGRAM(s) Oral every 4 hours PRN Severe Pain (7 - 10)  morphine Concentrate 3 milliGRAM(s) Oral every 4 hours PRN Moderate Pain (4 - 6)  morphine Concentrate 6 milliGRAM(s) Oral every 4 hours PRN Dyspnea          PHYSICAL EXAM:  GENERAL: frail  HEAD:  Atraumatic, Normocephalic  CHEST/LUNG: Crackles +  HEART: S1S2+  ABDOMEN: Soft, Nontender, Nondistended; Bowel sounds present  EXTREMITIES: edema +    Care Discussed with Consultants/Other Providers [ ] YES  [ ] NO

## 2025-06-25 NOTE — PROGRESS NOTE ADULT - TIME BILLING
Total Time Spent 50 minutes.    This includes chart review, patient assessment, discussion and collaboration with interdisciplinary team members, excluding ACP.

## 2025-06-25 NOTE — PROVIDER CONTACT NOTE (OTHER) - REASON
Change in mental status , resp distress
Pt refused blood work this am
Discontinuation of peripheral IV
Pt BP 98/65

## 2025-06-25 NOTE — PROVIDER CONTACT NOTE (OTHER) - ACTION/TREATMENT ORDERED:
ACP made aware
PA aware -- she is comfort care , plan is to be under palliative care service tomorrow
Provider aware and notified. Plan of care ongoing.

## 2025-06-25 NOTE — CHART NOTE - NSCHARTNOTESELECT_GEN_ALL_CORE
Event Note
Off Service Note
AMS/Event Note
CCU Admission
CICU Downgrade
CICU/Event Note
Event Note
Event Note
IR - Pericardial Effusion Drainage
Medicine NP Corrected body fluid results/Event Note
Nutrition Services
Palliative Care/Event Note
Thoracic/Off Service Note

## 2025-06-25 NOTE — PROVIDER CONTACT NOTE (OTHER) - RECOMMENDATIONS
Pt was given prn neb treatment and morphine ivp prn. Daughter Teresita  called and made aware of mental status change Pt was given prn neb treatment and morphine ivp prn. Daughter Teresita  called and made aware of mental status change, states she has had similar episodes

## 2025-06-25 NOTE — PROGRESS NOTE ADULT - SUBJECTIVE AND OBJECTIVE BOX
GAP TEAM PALLIATIVE CARE UNIT PROGRESS NOTE:      [  ] Patient on hospice program.    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 She completed 10 days of antibiotics. She presented again from rehab with shaking chills, hypotension, and poor appetite, and was found to have a worsening pericardial effusion requiring thoracentesis and pericardial drain placement. Due to reaccumulation of pleural fluid and declining clinical status, her goals of care have shifted to comfort measures.    OVERNIGHT EVENTS: Chart reviewed. The patient is seen and examined at the bedside. She required PRN Morphine 6mg oral concentate X1 for severe pain and PRN Morphine 2mg IV X2 for breakthrough pain within a 24hr period 7am-7am.    Do Not Resuscitate:   DNR/DNI (06-24-25 @ 13:47)      Allergies    Zosyn (Rash; Urticaria; Hives)    Intolerances    MEDICATIONS  (STANDING):  apixaban 2.5 milliGRAM(s) Oral two times a day  benzonatate 100 milliGRAM(s) Oral every 8 hours  chlorhexidine 2% Cloths 1 Application(s) Topical daily  levalbuterol Inhalation 0.63 milliGRAM(s) Inhalation every 6 hours  melatonin 5 milliGRAM(s) Oral at bedtime  metoprolol tartrate 50 milliGRAM(s) Oral every 6 hours  pantoprazole    Tablet 40 milliGRAM(s) Oral before breakfast  polyethylene glycol 3350 17 Gram(s) Oral daily  senna 2 Tablet(s) Oral at bedtime  zolpidem 5 milliGRAM(s) Oral at bedtime    MEDICATIONS  (PRN):  acetaminophen     Tablet .. 650 milliGRAM(s) Oral every 6 hours PRN Mild Pain (1 - 3)  albuterol/ipratropium for Nebulization 3 milliLiter(s) Nebulizer every 6 hours PRN Shortness of Breath and/or Wheezing  bisacodyl Suppository 10 milliGRAM(s) Rectal daily PRN Constipation  glycopyrrolate 1 milliGRAM(s) Oral every 8 hours PRN excessive audible secretions  guaifenesin/dextromethorphan Oral Liquid 5 milliLiter(s) Oral every 6 hours PRN Cough  ipratropium    for Nebulization 500 MICROGram(s) Nebulizer every 6 hours PRN Shortness of Breath and/or Wheezing  LORazepam    Concentrate 0.5 milliGRAM(s) Oral every 4 hours PRN anxiety, agitation, delirium  morphine  - Injectable 2 milliGRAM(s) IV Push every 4 hours PRN refractory dyspnea/breakthrough pain  morphine Concentrate 6 milliGRAM(s) Oral every 4 hours PRN Severe Pain (7 - 10)  morphine Concentrate 3 milliGRAM(s) Oral every 4 hours PRN Moderate Pain (4 - 6)  morphine Concentrate 6 milliGRAM(s) Oral every 4 hours PRN Dyspnea    ITEMS UNCHECKED ARE NOT PRESENT    PRESENT SYMPTOMS: [ ]Unable to self-report see PAINAD, RDOS below  Source if other than patient:  [ ]Family   [ ]Team     Pain: [ ] yes [ ] no  QOL impact -   Location -                    Aggravating factors -  Quality -  Radiation -  Timing-  Severity (0-10 scale):  Minimal acceptable level (0-10 scale):       PCSSQ [Palliative Care Spiritual Screening Question]   Severity (0-10):  Score of 4 or > indicate consideration of Chaplaincy referral.  Chaplaincy Referral: [ ] yes [ ] refused [ ] following [ ] deferred    Caregiver [place]? : [ ] yes [ ] no [ ] deferred:  Social work referral [ ] Patient & Family Centered Care Referral [ ]   Anticipatory Grief present?:  [ ] yes [ ] no [ ] deferred:  Social work referral [ ] Patient & Family Centered Care Referral [ ]  	  Other Symptoms:  [ ]All other review of systems negative     PHYSICAL EXAM:   Vital Signs Last 24 Hrs  T(C): 36.6 (24 Jun 2025 20:38), Max: 36.6 (24 Jun 2025 20:38)  T(F): 97.9 (24 Jun 2025 20:38), Max: 97.9 (24 Jun 2025 20:38)  HR: 101 (24 Jun 2025 22:40) (92 - 105)  BP: 128/71 (24 Jun 2025 22:40) (117/78 - 144/79)  BP(mean): --  RR: 22 (24 Jun 2025 22:40) (16 - 22)  SpO2: 100% (24 Jun 2025 22:40) (95% - 100%)    Parameters below as of 24 Jun 2025 22:40  Patient On (Oxygen Delivery Method): nasal cannula  O2 Flow (L/min): 3   I&O's Summary    GENERAL: [ ] Cachexia  [ ]Alert  [ ]Oriented x   [ ]Lethargic  [ ]Unarousable  [ ]Verbal  [ ]Non-Verbal  Behavioral:   [ ] Anxiety  [ ] Delirium [ ] Agitation [ ] Other  HEENT:  [ ]Normal   [ ]Dry mouth   [ ]ET Tube/Trach  [ ]Oral lesions  PULMONARY:   [ ]Clear [ ]Tachypnea  [ ]Audible excessive secretions   [ ]Rhonchi        [ ]Right [ ]Left [ ]Bilateral  [ ]Crackles        [ ]Right [ ]Left [ ]Bilateral  [ ]Wheezing     [ ]Right [ ]Left [ ]Bilateral  [ ]Diminished BS [ ]Right [ ]Left [ ]Bilateral    CARDIOVASCULAR:    [ ]Regular [ ]Irregular [ ]Tachy  [ ]Eliceo [ ]Murmur [ ]Other  GASTROINTESTINAL:  [ ]Soft  [ ]Distended   [ ]+BS  [ ]Non tender [ ]Tender  [ ]Other [ ]PEG [ ]OGT/ NGT   Last BM:    GENITOURINARY:  [ ]Normal [ ] Incontinent   [ ]Oliguria/Anuria   [ ]Landaverde  MUSCULOSKELETAL:   [ ]Normal   [ ]Weakness  [ ]Bed/Wheelchair bound [ ]Edema  NEUROLOGIC:   [ ]No focal deficits  [ ] Cognitive impairment  [ ] Dysphagia [ ]Dysarthria [ ] Paresis [ ]Other   SKIN:   [ ]Normal  [ ]Rash  [ ]Other  [ ]Pressure ulcer(s)  [ ]y [ ]n  Present on admission      CRITICAL CARE:  [ ] Shock Present  [ ]Septic [ ]Cardiogenic [ ]Neurologic [ ]Hypovolemic  [ ]  Vasopressors [ ]  Inotropes   [ ] Respiratory failure present [ ] Mechanical Ventilation [ ] Non-invasive ventilatory support [ ] High-Flow    [ ] Acute  [ ] Chronic [ ] Hypoxic  [ ] Hypercarbic [ ] Other  [ ] Other organ failure     LABS:                        11.2   4.02  )-----------( 261      ( 24 Jun 2025 07:08 )             36.7   06-24    135  |  98  |  18  ----------------------------<  95  5.0   |  25  |  0.54    Ca    9.0      24 Jun 2025 07:09  Phos  3.0     06-24  Mg     2.2     06-24    PTT - ( 24 Jun 2025 07:10 )  PTT:27.9 sec  Urinalysis Basic - ( 24 Jun 2025 07:09 )    Color: x / Appearance: x / SG: x / pH: x  Gluc: 95 mg/dL / Ketone: x  / Bili: x / Urobili: x   Blood: x / Protein: x / Nitrite: x   Leuk Esterase: x / RBC: x / WBC x   Sq Epi: x / Non Sq Epi: x / Bacteria: x      RADIOLOGY & ADDITIONAL STUDIES:    PROTEIN CALORIE MALNUTRITION: [ ] mild [ ] moderate [ ] severe  [ ] underweight [ ] morbid obesity    https://www.andeal.org/vault/2440/web/files/ONC/Table_Clinical%20Characteristics%20to%20Document%20Malnutrition-White%20JV%20et%20al%202012.pdf        [ ] PPSV2 < or = 30% [ ] significant weight loss [ ] poor nutritional intake [ ] anasarca   Artificial Nutrition [ ]     Other REFERRALS:    [ ] Hospice  [ ]Child Life  [ ]Social Work  [ ]Case management [ ]Holistic Therapy [ ] Physical Therapy [ ] Dietary     Progress Notes - Care Coordination [C. Provider] (06-24-25 @ 15:34)                                       Progress Notes    PROGRESS NOTE  Date & Time of Note   2025-06-24 15:28    Notes    Notes: remains acute,  this cm was informed by Palliative team that there is  pending transfer to PCU but ultimate goal of family is for home hospice and  palliative team asked this cm to send referral to Home hospice . Palliative  care team notes with [facility] sent in acm to  [facility] for now. Will  ff up in am.       Electronically signed by:  Mustapha Wong  Electronically signed on:  2025-06-24  15:34            Palliative Performance Scale:  http://npcrc.org/files/news/palliative_performance_scale_ppsv2.pdf  (Ctrl +  left click to view)  Respiratory Distress Observation Tool:  https://homecareinformation.net/handouts/hen/Respiratory_Distress_Observation_Scale.pdf (Ctrl +  left click to view)  PAINAD Score:  http://geriatrictoolkit.[email or web address]/cog/painad.pdf (Ctrl +  left click to view)   GAP TEAM PALLIATIVE CARE UNIT PROGRESS NOTE:      [  ] Patient on hospice program.    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 She completed 10 days of antibiotics. She presented again from rehab with shaking chills, hypotension, and poor appetite, and was found to have a worsening pericardial effusion requiring thoracentesis and pericardial drain placement. Due to reaccumulation of pleural fluid and declining clinical status, her goals of care have shifted to comfort measures.    OVERNIGHT EVENTS: Chart reviewed. The patient is seen and examined at the bedside. She required PRN Morphine 6mg oral concentate X1 for severe pain and PRN Morphine 2mg IV X2 for breakthrough pain within a 24hr period 7am-7am.    Do Not Resuscitate:   DNR/DNI (06-24-25 @ 13:47)      Allergies    Zosyn (Rash; Urticaria; Hives)    Intolerances    MEDICATIONS  (STANDING):  apixaban 2.5 milliGRAM(s) Oral two times a day  benzonatate 100 milliGRAM(s) Oral every 8 hours  chlorhexidine 2% Cloths 1 Application(s) Topical daily  levalbuterol Inhalation 0.63 milliGRAM(s) Inhalation every 6 hours  melatonin 5 milliGRAM(s) Oral at bedtime  metoprolol tartrate 50 milliGRAM(s) Oral every 6 hours  pantoprazole    Tablet 40 milliGRAM(s) Oral before breakfast  polyethylene glycol 3350 17 Gram(s) Oral daily  senna 2 Tablet(s) Oral at bedtime  zolpidem 5 milliGRAM(s) Oral at bedtime    MEDICATIONS  (PRN):  acetaminophen     Tablet .. 650 milliGRAM(s) Oral every 6 hours PRN Mild Pain (1 - 3)  albuterol/ipratropium for Nebulization 3 milliLiter(s) Nebulizer every 6 hours PRN Shortness of Breath and/or Wheezing  bisacodyl Suppository 10 milliGRAM(s) Rectal daily PRN Constipation  glycopyrrolate 1 milliGRAM(s) Oral every 8 hours PRN excessive audible secretions  guaifenesin/dextromethorphan Oral Liquid 5 milliLiter(s) Oral every 6 hours PRN Cough  ipratropium    for Nebulization 500 MICROGram(s) Nebulizer every 6 hours PRN Shortness of Breath and/or Wheezing  LORazepam    Concentrate 0.5 milliGRAM(s) Oral every 4 hours PRN anxiety, agitation, delirium  morphine  - Injectable 2 milliGRAM(s) IV Push every 4 hours PRN refractory dyspnea/breakthrough pain  morphine Concentrate 6 milliGRAM(s) Oral every 4 hours PRN Severe Pain (7 - 10)  morphine Concentrate 3 milliGRAM(s) Oral every 4 hours PRN Moderate Pain (4 - 6)  morphine Concentrate 6 milliGRAM(s) Oral every 4 hours PRN Dyspnea    ITEMS UNCHECKED ARE NOT PRESENT    PRESENT SYMPTOMS: [ ]Unable to self-report see PAINAD, RDOS below  Source if other than patient:  [ ]Family   [ ]Team     Pain: [ ] yes [ X] no  QOL impact -   Location -                    Aggravating factors -  Quality -  Radiation -  Timing-  Severity (0-10 scale):  Minimal acceptable level (0-10 scale):       PCSSQ [Palliative Care Spiritual Screening Question]   Severity (0-10):  Score of 4 or > indicate consideration of Chaplaincy referral.  Chaplaincy Referral: [ ] yes [ ] refused [ ] following [ ] deferred    Caregiver [place]? : [ ] yes [ ] no [ ] deferred:  Social work referral [ ] Patient & Family Centered Care Referral [ ]   Anticipatory Grief present?:  [ ] yes [ ] no [ ] deferred:  Social work referral [ ] Patient & Family Centered Care Referral [ ]  	  Other Symptoms:  [ ]All other review of systems negative     PHYSICAL EXAM:   Vital Signs Last 24 Hrs  T(C): 36.6 (24 Jun 2025 20:38), Max: 36.6 (24 Jun 2025 20:38)  T(F): 97.9 (24 Jun 2025 20:38), Max: 97.9 (24 Jun 2025 20:38)  HR: 101 (24 Jun 2025 22:40) (92 - 105)  BP: 128/71 (24 Jun 2025 22:40) (117/78 - 144/79)  BP(mean): --  RR: 22 (24 Jun 2025 22:40) (16 - 22)  SpO2: 100% (24 Jun 2025 22:40) (95% - 100%)    Parameters below as of 24 Jun 2025 22:40  Patient On (Oxygen Delivery Method): nasal cannula  O2 Flow (L/min): 3   I&O's Summary    GENERAL: [ ] Cachexia  [ ]Alert  [ ]Oriented x   [ ]Lethargic  [ ]Unarousable  [ ]Verbal  [ ]Non-Verbal  Behavioral:   [ ] Anxiety  [ ] Delirium [ ] Agitation [ ] Other  HEENT:  [ ]Normal   [ ]Dry mouth   [ ]ET Tube/Trach  [ ]Oral lesions  PULMONARY:   [ ]Clear [ ]Tachypnea  [ ]Audible excessive secretions   [ ]Rhonchi        [ ]Right [ ]Left [ ]Bilateral  [ ]Crackles        [ ]Right [ ]Left [ ]Bilateral  [ ]Wheezing     [ ]Right [ ]Left [ ]Bilateral  [ ]Diminished BS [ ]Right [ ]Left [ ]Bilateral    CARDIOVASCULAR:    [ ]Regular [ ]Irregular [ ]Tachy  [ ]Eliceo [ ]Murmur [ ]Other  GASTROINTESTINAL:  [ ]Soft  [ ]Distended   [ ]+BS  [ ]Non tender [ ]Tender  [ ]Other [ ]PEG [ ]OGT/ NGT   Last BM:    GENITOURINARY:  [ ]Normal [ ] Incontinent   [ ]Oliguria/Anuria   [ ]Landaverde  MUSCULOSKELETAL:   [ ]Normal   [ ]Weakness  [ ]Bed/Wheelchair bound [ ]Edema  NEUROLOGIC:   [ ]No focal deficits  [ ] Cognitive impairment  [ ] Dysphagia [ ]Dysarthria [ ] Paresis [ ]Other   SKIN:   [ ]Normal  [ ]Rash  [ ]Other  [ ]Pressure ulcer(s)  [ ]y [ ]n  Present on admission      CRITICAL CARE:  [ ] Shock Present  [ ]Septic [ ]Cardiogenic [ ]Neurologic [ ]Hypovolemic  [ ]  Vasopressors [ ]  Inotropes   [ ] Respiratory failure present [ ] Mechanical Ventilation [ ] Non-invasive ventilatory support [ ] High-Flow    [ ] Acute  [ ] Chronic [ ] Hypoxic  [ ] Hypercarbic [ ] Other  [ ] Other organ failure     LABS:                        11.2   4.02  )-----------( 261      ( 24 Jun 2025 07:08 )             36.7   06-24    135  |  98  |  18  ----------------------------<  95  5.0   |  25  |  0.54    Ca    9.0      24 Jun 2025 07:09  Phos  3.0     06-24  Mg     2.2     06-24    PTT - ( 24 Jun 2025 07:10 )  PTT:27.9 sec  Urinalysis Basic - ( 24 Jun 2025 07:09 )    Color: x / Appearance: x / SG: x / pH: x  Gluc: 95 mg/dL / Ketone: x  / Bili: x / Urobili: x   Blood: x / Protein: x / Nitrite: x   Leuk Esterase: x / RBC: x / WBC x   Sq Epi: x / Non Sq Epi: x / Bacteria: x      RADIOLOGY & ADDITIONAL STUDIES:    PROTEIN CALORIE MALNUTRITION: [ ] mild [ ] moderate [ ] severe  [ ] underweight [ ] morbid obesity    https://www.andeal.org/vault/2440/web/files/ONC/Table_Clinical%20Characteristics%20to%20Document%20Malnutrition-White%20JV%20et%20al%202012.pdf        [ ] PPSV2 < or = 30% [ ] significant weight loss [ ] poor nutritional intake [ ] anasarca   Artificial Nutrition [ ]     Other REFERRALS:    [ ] Hospice  [ ]Child Life  [ ]Social Work  [ ]Case management [ ]Holistic Therapy [ ] Physical Therapy [ ] Dietary     Progress Notes - Care Coordination [C. Provider] (06-24-25 @ 15:34)                                       Progress Notes    PROGRESS NOTE  Date & Time of Note   2025-06-24 15:28    Notes    Notes: remains acute,  this cm was informed by Palliative team that there is  pending transfer to PCU but ultimate goal of family is for home hospice and  palliative team asked this cm to send referral to Home hospice . Palliative  care team notes with [facility] sent in acm to  [facility] care [facility] for now. Will  ff up in am.       Electronically signed by:  Mustapha Wong  Electronically signed on:  2025-06-24  15:34            Palliative Performance Scale:  http://npcrc.org/files/news/palliative_performance_scale_ppsv2.pdf  (Ctrl +  left click to view)  Respiratory Distress Observation Tool:  https://homecareinformation.net/handouts/hen/Respiratory_Distress_Observation_Scale.pdf (Ctrl +  left click to view)  PAINAD Score:  http://geriatrictoolkit.[email or web address]/cog/painad.pdf (Ctrl +  left click to view)   GAP TEAM PALLIATIVE CARE UNIT PROGRESS NOTE:      [  ] Patient on hospice program.    INDICATION FOR PALLIATIVE CARE UNIT SERVICES/Interval HPI: Symptom management in the setting of a 89y/o F PMH of breast cancer treated with bilateral mastectomy and radiation, right upper extremity lymphedema, OA, osteoporosis, hyperlipidemial, A-fib, hypertrophic obstructive cardiomyopathy  with severe left ventricular outflow tract obstruction and systolic anterior motion, and a history of a small-moderate pericardial effusion with right atrial diastolic collapse, was recently hospitalized (5/29-6/5) for right middle lobe pneumonia and Group B Streptococcus  bacteremia. She completed 10 days of antibiotics. She presented again from rehab with shaking chills, hypotension, and poor appetite, and was found to have a worsening pericardial effusion requiring thoracentesis and pericardial drain placement. Due to reaccumulation of pleural fluid and declining clinical status, her goals of care have shifted to comfort measures.    OVERNIGHT EVENTS: Chart reviewed. The patient is seen and examined at the bedside. She required PRN Morphine 6mg oral concentate X1 for severe pain and PRN Morphine 2mg IV X2 for breakthrough pain within a 24hr period 7am-7am.    Do Not Resuscitate:   DNR/DNI (06-24-25 @ 13:47)      Allergies    Zosyn (Rash; Urticaria; Hives)    Intolerances    MEDICATIONS  (STANDING):  apixaban 2.5 milliGRAM(s) Oral two times a day  benzonatate 100 milliGRAM(s) Oral every 8 hours  chlorhexidine 2% Cloths 1 Application(s) Topical daily  levalbuterol Inhalation 0.63 milliGRAM(s) Inhalation every 6 hours  melatonin 5 milliGRAM(s) Oral at bedtime  metoprolol tartrate 50 milliGRAM(s) Oral every 6 hours  pantoprazole    Tablet 40 milliGRAM(s) Oral before breakfast  polyethylene glycol 3350 17 Gram(s) Oral daily  senna 2 Tablet(s) Oral at bedtime  zolpidem 5 milliGRAM(s) Oral at bedtime    MEDICATIONS  (PRN):  acetaminophen     Tablet .. 650 milliGRAM(s) Oral every 6 hours PRN Mild Pain (1 - 3)  albuterol/ipratropium for Nebulization 3 milliLiter(s) Nebulizer every 6 hours PRN Shortness of Breath and/or Wheezing  bisacodyl Suppository 10 milliGRAM(s) Rectal daily PRN Constipation  glycopyrrolate 1 milliGRAM(s) Oral every 8 hours PRN excessive audible secretions  guaifenesin/dextromethorphan Oral Liquid 5 milliLiter(s) Oral every 6 hours PRN Cough  ipratropium    for Nebulization 500 MICROGram(s) Nebulizer every 6 hours PRN Shortness of Breath and/or Wheezing  LORazepam    Concentrate 0.5 milliGRAM(s) Oral every 4 hours PRN anxiety, agitation, delirium  morphine  - Injectable 2 milliGRAM(s) IV Push every 4 hours PRN refractory dyspnea/breakthrough pain  morphine Concentrate 6 milliGRAM(s) Oral every 4 hours PRN Severe Pain (7 - 10)  morphine Concentrate 3 milliGRAM(s) Oral every 4 hours PRN Moderate Pain (4 - 6)  morphine Concentrate 6 milliGRAM(s) Oral every 4 hours PRN Dyspnea    ITEMS UNCHECKED ARE NOT PRESENT    PRESENT SYMPTOMS: [ ]Unable to self-report see PAINAD, RDOS below  Source if other than patient:  [ ]Family   [ ]Team     Pain: [ ] yes [ X] no  QOL impact -   Location -                    Aggravating factors -  Quality -  Radiation -  Timing-  Severity (0-10 scale):  Minimal acceptable level (0-10 scale):       PCSSQ [Palliative Care Spiritual Screening Question]   Severity (0-10):  Score of 4 or > indicate consideration of Chaplaincy referral.  Chaplaincy Referral: [ ] yes [ ] refused [X ] following [ ] deferred Last seen on 6/17    Caregiver [place]? : [ ] yes [ ] no [X ] deferred:  Social work referral [ ] Patient & Family Centered Care Referral [ ]   Anticipatory Grief present?:  [X ] yes [ ] no [ ] deferred:  Social work referral [X ] Patient & Family Centered Care Referral [ ]  	  Other Symptoms:  [ X]All other review of systems negative  PHYSICAL EXAM:   Vital Signs Last 24 Hrs  T(C): 36.6 (24 Jun 2025 20:38), Max: 36.6 (24 Jun 2025 20:38)  T(F): 97.9 (24 Jun 2025 20:38), Max: 97.9 (24 Jun 2025 20:38)  HR: 101 (24 Jun 2025 22:40) (92 - 105)  BP: 128/71 (24 Jun 2025 22:40) (117/78 - 144/79)  BP(mean): --  RR: 22 (24 Jun 2025 22:40) (16 - 22)  SpO2: 100% (24 Jun 2025 22:40) (95% - 100%)    Parameters below as of 24 Jun 2025 22:40  Patient On (Oxygen Delivery Method): nasal cannula  O2 Flow (L/min): 3   I&O's Summary    GENERAL: [ ] Cachexia  [ X]Alert  [ X]Oriented x1-2   [ ]Lethargic  [ ]Unarousable  [ X]Verbal  [ ]Non-Verbal  Behavioral:   [ ] Anxiety  [ ] Delirium [ ] Agitation [ ] Other  HEENT:  [X ]Normal   [ ]Dry mouth   [ ]ET Tube/Trach  [ ]Oral lesions  PULMONARY:   [ ]Clear [ ]Tachypnea  [ ]Audible excessive secretions   [ ]Rhonchi        [ ]Right [ ]Left [ ]Bilateral  [X ]Crackles        [ ]Right [ ]Left [X ]Bilateral  [ ]Wheezing     [ ]Right [ ]Left [ ]Bilateral  [ ]Diminished BS [ ]Right [ ]Left [ ]Bilateral    CARDIOVASCULAR:    [ ]Regular [ ]Irregular [ X]Tachy  [ ]Eliceo [ ]Murmur [ ]Other  GASTROINTESTINAL:  [X ]Soft  [ ]Distended   [X ]+BS  [ X]Non tender [ ]Tender  [ ]Other [ ]PEG [ ]OGT/ NGT   Last BM: 6/24   GENITOURINARY:  [X ]Normal [X ] Incontinent   [ ]Oliguria/Anuria   [ ]Landaverde [X] External Cath  MUSCULOSKELETAL:   [ ]Normal   [X ]Weakness  [ ]Bed/Wheelchair bound [ ]Edema  NEUROLOGIC:   [ ]No focal deficits  [ X] Cognitive impairment  [ ] Dysphagia [ ]Dysarthria [ ] Paresis [ ]Other   SKIN: Ecchymosis   [ ]Normal  [ ]Rash  [ X]Other Incontinence associated dematitis. Please see nursing assessment for further details.  [ ]Pressure ulcer(s)  [ ]y [ ]n  Present on admission      CRITICAL CARE:  [ ] Shock Present  [ ]Septic [ ]Cardiogenic [ ]Neurologic [ ]Hypovolemic  [ ]  Vasopressors [ ]  Inotropes   [ ] Respiratory failure present [ ] Mechanical Ventilation [ ] Non-invasive ventilatory support [ ] High-Flow    [ ] Acute  [ ] Chronic [ ] Hypoxic  [ ] Hypercarbic [ ] Other  [ ] Other organ failure     LABS:                        11.2   4.02  )-----------( 261      ( 24 Jun 2025 07:08 )             36.7   06-24    135  |  98  |  18  ----------------------------<  95  5.0   |  25  |  0.54    Ca    9.0      24 Jun 2025 07:09  Phos  3.0     06-24  Mg     2.2     06-24    PTT - ( 24 Jun 2025 07:10 )  PTT:27.9 sec  Urinalysis Basic - ( 24 Jun 2025 07:09 )    Color: x / Appearance: x / SG: x / pH: x  Gluc: 95 mg/dL / Ketone: x  / Bili: x / Urobili: x   Blood: x / Protein: x / Nitrite: x   Leuk Esterase: x / RBC: x / WBC x   Sq Epi: x / Non Sq Epi: x / Bacteria: x      RADIOLOGY & ADDITIONAL STUDIES: Reviewed.    PROTEIN CALORIE MALNUTRITION: [ ] mild [ ] moderate [ ] severe  [ ] underweight [ ] morbid obesity    https://www.andeal.org/vault/2440/web/files/ONC/Table_Clinical%20Characteristics%20to%20Document%20Malnutrition-White%20JV%20et%20al%233456.pdf        [X ] PPSV2 < or = 30% [ ] significant weight loss [ ] poor nutritional intake [ ] anasarca   Artificial Nutrition [ ]     Other REFERRALS:    [ ] Hospice  [ ]Child Life  [ X]Social Work  [ ]Case management [ ]Holistic Therapy [ ] Physical Therapy [ ] Dietary     Progress Notes - Care Coordination [C. Provider] (06-24-25 @ 15:34)                                       Progress Notes    PROGRESS NOTE  Date & Time of Note   2025-06-24 15:28    Notes    Notes: remains acute,  this cm was informed by Palliative team that there is  pending transfer to PCU but ultimate goal of family is for home hospice and  palliative team asked this cm to send referral to Home hospice . Palliative  care team notes with [facility] sent in acm to  hospice care network for now. Will  ff up in am.       Electronically signed by:  Mustapha Wong  Electronically signed on:  2025-06-24  15:34            Palliative Performance Scale:  http://npcrc.org/files/news/palliative_performance_scale_ppsv2.pdf  (Ctrl +  left click to view)  Respiratory Distress Observation Tool:  https://[email or web address]/handouts/hen/Respiratory_Distress_Observation_Scale.pdf (Ctrl +  left click to view)  PAINAD Score:  http://geriatrictoolkit.[facility]/cog/painad.pdf (Ctrl +  left click to view)   GAP TEAM PALLIATIVE CARE UNIT PROGRESS NOTE:      [  ] Patient on hospice program.    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 She completed 10 days of antibiotics. She presented again from rehab with shaking chills, hypotension, and poor appetite, and was found to have a worsening pericardial effusion requiring thoracentesis and pericardial drain placement. Due to reaccumulation of pleural fluid and declining clinical status, her goals of care have shifted to comfort measures.    OVERNIGHT EVENTS: Chart reviewed. The patient is seen and examined at the bedside. She required PRN Morphine 6mg oral concentate X1 for severe pain and PRN Morphine 2mg IV X2 for breakthrough pain within a 24hr period 7am-7am.    Do Not Resuscitate:   DNR/DNI (06-24-25 @ 13:47)      Allergies    Zosyn (Rash; Urticaria; Hives)    Intolerances    MEDICATIONS  (STANDING):  apixaban 2.5 milliGRAM(s) Oral two times a day  benzonatate 100 milliGRAM(s) Oral every 8 hours  chlorhexidine 2% Cloths 1 Application(s) Topical daily  levalbuterol Inhalation 0.63 milliGRAM(s) Inhalation every 6 hours  melatonin 5 milliGRAM(s) Oral at bedtime  metoprolol tartrate 50 milliGRAM(s) Oral every 6 hours  pantoprazole    Tablet 40 milliGRAM(s) Oral before breakfast  polyethylene glycol 3350 17 Gram(s) Oral daily  senna 2 Tablet(s) Oral at bedtime  zolpidem 5 milliGRAM(s) Oral at bedtime    MEDICATIONS  (PRN):  acetaminophen     Tablet .. 650 milliGRAM(s) Oral every 6 hours PRN Mild Pain (1 - 3)  albuterol/ipratropium for Nebulization 3 milliLiter(s) Nebulizer every 6 hours PRN Shortness of Breath and/or Wheezing  bisacodyl Suppository 10 milliGRAM(s) Rectal daily PRN Constipation  glycopyrrolate 1 milliGRAM(s) Oral every 8 hours PRN excessive audible secretions  guaifenesin/dextromethorphan Oral Liquid 5 milliLiter(s) Oral every 6 hours PRN Cough  ipratropium    for Nebulization 500 MICROGram(s) Nebulizer every 6 hours PRN Shortness of Breath and/or Wheezing  LORazepam    Concentrate 0.5 milliGRAM(s) Oral every 4 hours PRN anxiety, agitation, delirium  morphine  - Injectable 2 milliGRAM(s) IV Push every 4 hours PRN refractory dyspnea/breakthrough pain  morphine Concentrate 6 milliGRAM(s) Oral every 4 hours PRN Severe Pain (7 - 10)  morphine Concentrate 3 milliGRAM(s) Oral every 4 hours PRN Moderate Pain (4 - 6)  morphine Concentrate 6 milliGRAM(s) Oral every 4 hours PRN Dyspnea    ITEMS UNCHECKED ARE NOT PRESENT    PRESENT SYMPTOMS: [ ]Unable to self-report see PAINAD, RDOS below  Source if other than patient:  [ ]Family   [ ]Team     Pain: [ ] yes [ X] no  QOL impact -   Location -                    Aggravating factors -  Quality -  Radiation -  Timing-  Severity (0-10 scale):  Minimal acceptable level (0-10 scale):       PCSSQ [Palliative Care Spiritual Screening Question]   Severity (0-10):  Score of 4 or > indicate consideration of Chaplaincy referral.  Chaplaincy Referral: [ ] yes [ ] refused [X ] following [ ] deferred Last seen on 6/17    Caregiver [place]? : [ ] yes [ ] no [X ] deferred:  Social work referral [ ] Patient & Family Centered Care Referral [ ]   Anticipatory Grief present?:  [X ] yes [ ] no [ ] deferred:  Social work referral [X ] Patient & Family Centered Care Referral [ ]  	  Other Symptoms:  [ X]All other review of systems negative  PHYSICAL EXAM:   Vital Signs Last 24 Hrs  T(C): 36.6 (24 Jun 2025 20:38), Max: 36.6 (24 Jun 2025 20:38)  T(F): 97.9 (24 Jun 2025 20:38), Max: 97.9 (24 Jun 2025 20:38)  HR: 101 (24 Jun 2025 22:40) (92 - 105)  BP: 128/71 (24 Jun 2025 22:40) (117/78 - 144/79)  BP(mean): --  RR: 22 (24 Jun 2025 22:40) (16 - 22)  SpO2: 100% (24 Jun 2025 22:40) (95% - 100%)    Parameters below as of 24 Jun 2025 22:40  Patient On (Oxygen Delivery Method): nasal cannula  O2 Flow (L/min): 3   I&O's Summary    GENERAL: [ ] Cachexia  [ X]Alert  [ X]Oriented x1-2   [ ]Lethargic  [ ]Unarousable  [ X]Verbal  [ ]Non-Verbal  Behavioral:   [ ] Anxiety  [ ] Delirium [ ] Agitation [ ] Other  HEENT:  [X ]Normal   [ ]Dry mouth   [ ]ET Tube/Trach  [ ]Oral lesions  PULMONARY:   [ ]Clear [ ]Tachypnea  [ ]Audible excessive secretions   [ ]Rhonchi        [ ]Right [ ]Left [ ]Bilateral  [X ]Crackles        [ ]Right [ ]Left [X ]Bilateral  [ ]Wheezing     [ ]Right [ ]Left [ ]Bilateral  [ ]Diminished BS [ ]Right [ ]Left [ ]Bilateral    CARDIOVASCULAR:    [ ]Regular [ ]Irregular [ X]Tachy  [ ]Eliceo [ ]Murmur [ ]Other  GASTROINTESTINAL:  [X ]Soft  [ ]Distended   [X ]+BS  [ X]Non tender [ ]Tender  [ ]Other [ ]PEG [ ]OGT/ NGT   Last BM: 6/24   GENITOURINARY:  [X ]Normal [X ] Incontinent   [ ]Oliguria/Anuria   [ ]Landaverde [X] External Cath  MUSCULOSKELETAL:   [ ]Normal   [X ]Weakness  [ ]Bed/Wheelchair bound [ ]Edema  NEUROLOGIC:   [ ]No focal deficits  [ X] Cognitive impairment  [ ] Dysphagia [ ]Dysarthria [ ] Paresis [ ]Other   SKIN: Ecchymosis   [ ]Normal  [ ]Rash  [ X]Other Incontinence associated dematitis. Please see nursing assessment for further details.  [ ]Pressure ulcer(s)  [ ]y [ ]n  Present on admission      CRITICAL CARE:  [ ] Shock Present  [ ]Septic [ ]Cardiogenic [ ]Neurologic [ ]Hypovolemic  [ ]  Vasopressors [ ]  Inotropes   [ ] Respiratory failure present [ ] Mechanical Ventilation [ ] Non-invasive ventilatory support [ ] High-Flow    [ ] Acute  [ ] Chronic [ ] Hypoxic  [ ] Hypercarbic [ ] Other  [ ] Other organ failure     LABS:                        11.2   4.02  )-----------( 261      ( 24 Jun 2025 07:08 )             36.7   06-24    135  |  98  |  18  ----------------------------<  95  5.0   |  25  |  0.54    Ca    9.0      24 Jun 2025 07:09  Phos  3.0     06-24  Mg     2.2     06-24    PTT - ( 24 Jun 2025 07:10 )  PTT:27.9 sec  Urinalysis Basic - ( 24 Jun 2025 07:09 )    Color: x / Appearance: x / SG: x / pH: x  Gluc: 95 mg/dL / Ketone: x  / Bili: x / Urobili: x   Blood: x / Protein: x / Nitrite: x   Leuk Esterase: x / RBC: x / WBC x   Sq Epi: x / Non Sq Epi: x / Bacteria: x      RADIOLOGY & ADDITIONAL STUDIES:     < from: Xray Chest 1 View AP/PA (06.19.25 @ 17:40) >  Frontal expiratory view of the chest showsthe heart to be similar in   size. Breast implants are present. The lungs show mild to moderate   pulmonary congestion with small left effusion. Small residual right   effusion is present and there is no evidence of pneumothorax.    IMPRESSION:  No pneumothorax.      < end of copied text >  < from: CT Angio Chest PE Protocol w/ IV Cont (06.18.25 @ 19:40) >  IMPRESSION:  No pulmonary embolism.    Large loculated right pleural effusion with partial atelectasis right   lung. Small left pleural effusion. Post radiation changes deep to the   right breast. Patchy biapical lung opacities similar to most recent study.        --- End of Report ---    < end of copied text >      PROTEIN CALORIE MALNUTRITION: [ ] mild [ ] moderate [ ] severe  [ ] underweight [ ] morbid obesity    https://www.andeal.org/vault/2440/web/files/ONC/Table_Clinical%20Characteristics%20to%20Document%20Malnutrition-White%20JV%20et%20al%202012.pdf        [X ] PPSV2 < or = 30% [ ] significant weight loss [ ] poor nutritional intake [ ] anasarca   Artificial Nutrition [ ]     Other REFERRALS:    [ ] Hospice  [ ]Child Life  [ X]Social Work  [ ]Case management [ ]Holistic Therapy [ ] Physical Therapy [ ] Dietary     Progress Notes - Care Coordination [C. Provider] (06-24-25 @ 15:34)                                       Progress Notes    PROGRESS NOTE  Date & Time of Note   2025-06-24 15:28    Notes    Notes: remains acute,  this cm was informed by Palliative team that there is  pending transfer to PCU but ultimate goal of family is for home hospice and  palliative team asked this cm to send referral to Home hospice . Palliative  care team notes with [facility] sent [facility] acm [facility] for now. Will  ff up in am.       Electronically signed by:  Mustapha Wong  Electronically signed on:  2025-06-24  15:34            Palliative Performance Scale:  http://npcrc.org/files/news/palliative_performance_scale_ppsv2.pdf  (Ctrl +  left click to view)  Respiratory Distress Observation Tool:  https://homecareinformation.net/handouts/hen/Respiratory_Distress_Observation_Scale.pdf (Ctrl +  left click to view)  PAINAD Score:  http://geriatrictoolkit.[place].[facility]/cog/painad.pdf (Ctrl +  left click to view)

## 2025-06-25 NOTE — PROGRESS NOTE ADULT - ASSESSMENT
89F c hx GERD, R breast CA (40-50y ago) s/p B/L mastectomy and radiation, RUE lymphedema, osteoarthritis/osteoporosis (on monthly ibandronate), HLD, Afib on eliquis, HOCM/severe LVOT/ELEAZAR, small-moderate pericardial effusion/RA diastolic collapse, recent hospitalization 5/29-6/5 for RML PNA, GBS bacteremia (pos cultures 5/29, 5/30) of unclear source on total 10 days abx (IV ceftriaxone inpt, then levaquin 750 q48h until 6/9), pw hypotension, severe sepsis of unclear source, demand ischemia, afib c rvr       Pericardial effusion , pleural effusion  sp CCU stay and drain   sp thoracentesis   pulm fu   family decided against pigtail     Chronic atrial fibrillation.  - dc eliquis  - now on comfort care      HOCM (hypertrophic obstructive cardiomyopathy).  - cards and EP signed off    Symptom management as per palliative care   awaiting hospice  dw family

## 2025-06-25 NOTE — PROGRESS NOTE ADULT - ASSESSMENT
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 She completed 10 days of antibiotics. She presented again from rehab with shaking chills, hypotension, and poor appetite, and was found to have a worsening pericardial effusion requiring thoracentesis and pericardial drain placement. Due to reaccumulation of pleural fluid and declining clinical status, her goals of care have shifted to comfort measures. Palliative reconsulted for GOC.

## 2025-06-25 NOTE — PROGRESS NOTE ADULT - PROBLEM SELECTOR PLAN 2
- s/p thoracentesis on 6/19, with significant improvement in shortness of breath, and heartrate.  chest xray over the weekend shows right pleural effusion has returned.  - now family considering option of palliative pleurx as goals are focused on comfort-> pulm and primary team to f/u  - O2 for comfort  - Continue with Morphine 6mg oral concentrate q4 hours PRN for dyspnea  - Morphine 2mg IV q4hr PRN for breakthrough dyspnea.  - Bowel regimen while on opioids - s/p thoracentesis on 6/19, with significant improvement in shortness of breath, and heartrate.  chest xray over the weekend shows right pleural effusion has returned.  - Pulm following: no plans for Pleurx   - Continue with Morphine 6mg oral concentrate q4 hours PRN for dyspnea  - Morphine 2mg IV q4hr PRN for breakthrough dyspnea.  - Bowel regimen while on opioids

## 2025-06-25 NOTE — PROVIDER CONTACT NOTE (OTHER) - BACKGROUND
As per PA and pts daughter , pt has had similar episodes in the past As per PA and pts daughter , pt has had similar episodes in the past. Pt was feeling anxious and overwhelmed right prior . Had been given ambien for sleep

## 2025-06-26 NOTE — PROGRESS NOTE ADULT - PROBLEM SELECTOR PLAN 2
- s/p thoracentesis on 6/19, with significant improvement in shortness of breath, and heartrate.  chest xray over the weekend shows right pleural effusion has returned.  - Pulm following: no plans for Pleurx   - Continue with Morphine 6mg oral concentrate q4 hours PRN for dyspnea  - Bowel regimen while on opioids

## 2025-06-26 NOTE — PROGRESS NOTE ADULT - NS ATTEND OPT1A GEN_ALL_CORE
Medical decision making
Medical decision making
History/Exam/Medical decision making
History/Exam/Medical decision making
Medical decision making

## 2025-06-26 NOTE — PROGRESS NOTE ADULT - ASSESSMENT
89F c hx GERD, R breast CA (40-50y ago) s/p B/L mastectomy and radiation, RUE lymphedema, osteoarthritis/osteoporosis (on monthly ibandronate), HLD, Afib on eliquis, HOCM/severe LVOT/ELEAZAR, small-moderate pericardial effusion/RA diastolic collapse, recent hospitalization 5/29-6/5 for RML PNA, GBS bacteremia (pos cultures 5/29, 5/30) of unclear source on total 10 days abx (IV ceftriaxone inpt, then levaquin 750 q48h until 6/9), pw hypotension, severe sepsis of unclear source, demand ischemia, afib c rvr       Pericardial effusion , pleural effusion  sp CCU stay and drain   sp thoracentesis   pulm fu   family decided against pigtail     Chronic atrial fibrillation.  - dc eliquis  - now on comfort care      HOCM (hypertrophic obstructive cardiomyopathy).  - cards and EP signed off    Symptom management as per palliative care   awaiting home hospice on Saturday   dw family at bedside

## 2025-06-26 NOTE — PROGRESS NOTE ADULT - SUBJECTIVE AND OBJECTIVE BOX
Patient is a 89y old  Female who presents with a chief complaint of shaking chills, hypotension, poor appetite (26 Jun 2025 11:31)    Date of servie : 06-26-25 @ 14:27  INTERVAL HPI/OVERNIGHT EVENTS:  T(C): 36.6 (06-26-25 @ 05:35), Max: 36.7 (06-25-25 @ 16:19)  HR: 113 (06-26-25 @ 05:35) (98 - 113)  BP: 135/71 (06-26-25 @ 05:35) (122/72 - 135/71)  RR: 20 (06-26-25 @ 05:35) (20 - 20)  SpO2: 99% (06-26-25 @ 05:35) (94% - 99%)  Wt(kg): --  I&O's Summary    25 Jun 2025 07:01  -  26 Jun 2025 07:00  --------------------------------------------------------  IN: 0 mL / OUT: 850 mL / NET: -850 mL        LABS:              CAPILLARY BLOOD GLUCOSE                MEDICATIONS  (STANDING):  benzonatate 100 milliGRAM(s) Oral every 8 hours  chlorhexidine 2% Cloths 1 Application(s) Topical daily  levalbuterol Inhalation 0.63 milliGRAM(s) Inhalation every 6 hours  melatonin 5 milliGRAM(s) Oral at bedtime  metoprolol tartrate 50 milliGRAM(s) Oral every 6 hours  pantoprazole    Tablet 40 milliGRAM(s) Oral before breakfast  polyethylene glycol 3350 17 Gram(s) Oral daily  senna 2 Tablet(s) Oral at bedtime  zolpidem 5 milliGRAM(s) Oral at bedtime    MEDICATIONS  (PRN):  acetaminophen     Tablet .. 650 milliGRAM(s) Oral every 6 hours PRN Mild Pain (1 - 3)  albuterol/ipratropium for Nebulization 3 milliLiter(s) Nebulizer every 6 hours PRN Shortness of Breath and/or Wheezing  bisacodyl Suppository 10 milliGRAM(s) Rectal daily PRN Constipation  glycopyrrolate 1 milliGRAM(s) Oral every 8 hours PRN excessive audible secretions  guaifenesin/dextromethorphan Oral Liquid 5 milliLiter(s) Oral every 6 hours PRN Cough  ipratropium    for Nebulization 500 MICROGram(s) Nebulizer every 6 hours PRN Shortness of Breath and/or Wheezing  LORazepam    Concentrate 0.5 milliGRAM(s) Oral every 4 hours PRN anxiety, agitation, delirium  morphine Concentrate 6 milliGRAM(s) Oral every 4 hours PRN Severe Pain (7 - 10)  morphine Concentrate 3 milliGRAM(s) Oral every 4 hours PRN Moderate Pain (4 - 6)  morphine Concentrate 6 milliGRAM(s) Oral every 4 hours PRN Dyspnea          PHYSICAL EXAM:  GENERAL: NAD, well-groomed, well-developed  HEAD:  Atraumatic, Normocephalic  CHEST/LUNG: Clear to percussion bilaterally; No rales, rhonchi, wheezing, or rubs  HEART: Regular rate and rhythm; No murmurs, rubs, or gallops  ABDOMEN: Soft, Nontender, Nondistended; Bowel sounds present  EXTREMITIES:  2+ Peripheral Pulses, No clubbing, cyanosis, or edema  LYMPH: No lymphadenopathy noted  SKIN: No rashes or lesions    Care Discussed with Consultants/Other Providers [ ] YES  [ ] NO

## 2025-06-26 NOTE — PROGRESS NOTE ADULT - TIME BILLING
Total Time Spent 40 minutes.    This includes chart review, patient assessment, discussion and collaboration with interdisciplinary team members, excluding ACP.

## 2025-06-26 NOTE — PROGRESS NOTE ADULT - SUBJECTIVE AND OBJECTIVE BOX
GAP TEAM PALLIATIVE CARE UNIT PROGRESS NOTE:      [  ] Patient on hospice program.      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 She completed 10 days of antibiotics. She presented again from rehab with shaking chills, hypotension, and poor appetite, and was found to have a worsening pericardial effusion requiring thoracentesis and pericardial drain placement. Due to reaccumulation of pleural fluid and declining clinical status, her goals of care have shifted to comfort measures.    OVERNIGHT EVENTS: Chart reviewed. The patient is seen and examined at the bedside. Required Morphine solution 2 mg x2 within 24hrs period 8am-8am       DNR on chart:  Yes   Allergies    Zosyn (Rash; Urticaria; Hives)    Intolerances    MEDICATIONS  (STANDING):  benzonatate 100 milliGRAM(s) Oral every 8 hours  chlorhexidine 2% Cloths 1 Application(s) Topical daily  levalbuterol Inhalation 0.63 milliGRAM(s) Inhalation every 6 hours  melatonin 5 milliGRAM(s) Oral at bedtime  metoprolol tartrate 50 milliGRAM(s) Oral every 6 hours  pantoprazole    Tablet 40 milliGRAM(s) Oral before breakfast  polyethylene glycol 3350 17 Gram(s) Oral daily  senna 2 Tablet(s) Oral at bedtime  zolpidem 5 milliGRAM(s) Oral at bedtime    MEDICATIONS  (PRN):  acetaminophen     Tablet .. 650 milliGRAM(s) Oral every 6 hours PRN Mild Pain (1 - 3)  albuterol/ipratropium for Nebulization 3 milliLiter(s) Nebulizer every 6 hours PRN Shortness of Breath and/or Wheezing  bisacodyl Suppository 10 milliGRAM(s) Rectal daily PRN Constipation  glycopyrrolate 1 milliGRAM(s) Oral every 8 hours PRN excessive audible secretions  guaifenesin/dextromethorphan Oral Liquid 5 milliLiter(s) Oral every 6 hours PRN Cough  ipratropium    for Nebulization 500 MICROGram(s) Nebulizer every 6 hours PRN Shortness of Breath and/or Wheezing  LORazepam    Concentrate 0.5 milliGRAM(s) Oral every 4 hours PRN anxiety, agitation, delirium  morphine Concentrate 6 milliGRAM(s) Oral every 4 hours PRN Severe Pain (7 - 10)  morphine Concentrate 3 milliGRAM(s) Oral every 4 hours PRN Moderate Pain (4 - 6)  morphine Concentrate 6 milliGRAM(s) Oral every 4 hours PRN Dyspnea    ITEMS UNCHECKED ARE NOT PRESENT    PRESENT SYMPTOMS: [ ]Unable to self-report see PAINAD, RDOS below  Source if other than patient:  [ ]Family   [ ]Team     Pain: [ ] yes [ x] no  QOL impact -   Location -                    Aggravating factors -  Quality -  Radiation -  Timing-  Severity (0-10 scale):  Minimal acceptable level (0-10 scale):     Dyspnea:                           [ ]Mild [ ]Moderate [ ]Severe  Anxiety:                             [ ]Mild [ ]Moderate [ ]Severe  Fatigue:                             [ ]Mild [ ]Moderate [ ]Severe  Nausea:                             [ ]Mild [ ]Moderate [ ]Severe  Loss of appetite:              [ ]Mild [ ]Moderate [ ]Severe  Constipation:                    [ ]Mild [ ]Moderate [ ]Severe    PCSSQ [Palliative Care Spiritual Screening Question]   Severity (0-10):  Score of 4 or > indicate consideration of Chaplaincy referral.  Chaplaincy Referral: [ ] yes [ ] refused x[ ] following [ ] deferred    Caregiver [place]? : [ ] yes [ ] no [ x] deferred:  Social work referral [ ] Patient & Family Centered Care Referral [ ]   Anticipatory Grief present?:  [ ] yes [ ] no [x ] deferred:  Social work referral [ ] Patient & Family Centered Care Referral [ ]  	  Other Symptoms:  [ ]xAll other review of systems negative     PHYSICAL EXAM:   Vital Signs Last 24 Hrs  T(C): 36.6 (26 Jun 2025 05:35), Max: 36.7 (25 Jun 2025 16:19)  T(F): 97.9 (26 Jun 2025 05:35), Max: 98 (25 Jun 2025 16:19)  HR: 113 (26 Jun 2025 05:35) (98 - 113)  BP: 135/71 (26 Jun 2025 05:35) (122/72 - 135/71)  BP(mean): --  RR: 20 (26 Jun 2025 05:35) (18 - 20)  SpO2: 99% (26 Jun 2025 05:35) (94% - 99%)    Parameters below as of 26 Jun 2025 05:35  Patient On (Oxygen Delivery Method): nasal cannula     I&O's Summary    25 Jun 2025 07:01  -  26 Jun 2025 07:00  --------------------------------------------------------  IN: 0 mL / OUT: 850 mL / NET: -850 mL      GENERAL:  [ ] Cachexia  [ X]Alert  [ X]Oriented x1-2   [ ]Lethargic  [ ]Unarousable  [ X]Verbal  [ ]Non-Verbal  Behavioral:   [ ] Anxiety  [ ] Delirium [ ] Agitation [ ] Other  HEENT:  [X ]Normal   [ ]Dry mouth   [ ]ET Tube/Trach  [ ]Oral lesions  PULMONARY:   [ ]Clear [ ]Tachypnea  [ ]Audible excessive secretions   [ ]Rhonchi        [ ]Right [ ]Left [ ]Bilateral  [X ]Crackles        [ ]Right [ ]Left [X ]Bilateral  [ ]Wheezing     [ ]Right [ ]Left [ ]Bilateral  [ ]Diminished BS [ ]Right [ ]Left [ ]Bilateral    CARDIOVASCULAR:    [ ]Regular [ ]Irregular [ X]Tachy  [ ]Eliceo [ ]Murmur [ ]Other  GASTROINTESTINAL:  [X ]Soft  [ ]Distended   [X ]+BS  [ X]Non tender [ ]Tender  [ ]Other [ ]PEG [ ]OGT/ NGT   Last BM: 6/24   GENITOURINARY:  [X ]Normal [X ] Incontinent   [ ]Oliguria/Anuria   [ ]Landaverde [X] External Cath  MUSCULOSKELETAL:   [ ]Normal   [X ]Weakness  [ ]Bed/Wheelchair bound [ ]Edema  NEUROLOGIC:   [ ]No focal deficits  [ X] Cognitive impairment  [ ] Dysphagia [ ]Dysarthria [ ] Paresis [ ]Other   SKIN: Ecchymosis   [ ]Normal  [ ]Rash  [ X]Other Incontinence associated dematitis. Please see nursing assessment for further details.  [ ]Pressure ulcer(s)  [ ]y [ ]n  Present on admission      CRITICAL CARE:  [ ] Shock Present  [ ]Septic [ ]Cardiogenic [ ]Neurologic [ ]Hypovolemic  [ ]  Vasopressors [ ]  Inotropes   [ ] Respiratory failure present [ ] Mechanical Ventilation [ ] Non-invasive ventilatory support [ ] High-Flow    [ ] Acute  [ ] Chronic [ ] Hypoxic  [ ] Hypercarbic [ ] Other  [ ] Other organ failure     LABS: reviewed none new    RADIOLOGY & ADDITIONAL STUDIES: reviewed none new      PROTEIN CALORIE MALNUTRITION: [ ] mild [ ] moderate [ ] severe  [ ] underweight [ ] morbid obesity    https://www.andeal.org/vault/2440/web/files/ONC/Table_Clinical%20Characteristics%20to%20Document%20Malnutrition-White%20JV%20et%20al%202012.pdf    Height (cm): 157.5 (06-12-25 @ 08:27), 157.5 (05-29-25 @ 13:16)  Weight (kg): 63.1 (06-12-25 @ 08:27), 54.4 (05-29-25 @ 13:16)  BMI (kg/m2): 25.4 (06-12-25 @ 08:27), 21.9 (05-29-25 @ 13:16)    [ ] PPSV2 < or = 30% [ ] significant weight loss [ ] poor nutritional intake [ ] anasarca   Artificial Nutrition [ ]     Other REFERRALS:    [ ] Hospice  [ ]Child Life  [x ]Social Work  [ ]Case management [ ]Holistic Therapy [ ] Physical Therapy [ ] Dietary                                            Progress Notes    PROGRESS NOTE  Date & Time of Note   2025-06-26 11:21    Notes    Notes: Met with daughter Teresita who confirmed her brother Greg will assist  with home arrangements this evening after work and DME schedule for delivery  Friday and requesting early Saturday discharge and transportation set-up for  11am. Comfort pack medication sent to [facility] tomorrow and Teresita will  pick-up meds and bring home. Confirmed discharge plan with Attending Dr. Wong,  who's in agreement with discharge plan.       Electronically signed by:  Kiesha Pang  Electronically signed on:  2025-06-26  11:22  Palliative Performance Scale:  http://npcrc.org/files/news/palliative_performance_scale_ppsv2.pdf  (Ctrl +  left click to view)  Respiratory Distress Observation Tool:  https://homecareinformation.net/handouts/hen/Respiratory_Distress_Observation_Scale.pdf (Ctrl +  left click to view)  PAINAD Score:  http://[email or web address]/cog/painad.pdf (Ctrl +  left click to view)

## 2025-06-26 NOTE — PROGRESS NOTE ADULT - PROBLEM SELECTOR PLAN 3
Controlled with current regimen:  - Continue with Morphine 3mg oral concentrate q4hr PRN for moderate pain. ( None required in a 24hr period 7am-7am)   - Continue with Morphine 6mg oral concentrate q4 hours PRN for severe pain. ( 1 required in a 24hr period 7am-7am)   - dc Continue with Morphine 2mg IV q4hr PRN for breakthrough pain. ( 2 required in a 24hr period 7am-7am)   - Bowel regimen while on opioids

## 2025-06-26 NOTE — PROGRESS NOTE ADULT - ASSESSMENT
89F c hx GERD, R breast CA (40-50y ago) s/p B/L mastectomy and radiation, RUE lymphedema, osteoarthritis/osteoporosis (on monthly ibandronate), HLD, Afib on eliquis, HOCM/severe LVOT/LORA, small-moderate pericardial effusion/RA diastolic collapse, recent hospitalization 5/29-6/5 for RML PNA, GBS bacteremia (pos cultures 5/29, 5/30) of unclear source on total 10 days abx (IV ceftriaxone inpt, then levaquin 750 q48h until 6/9), presents from [facility] rehab with shaking chills, hypotension, poor appetite  History from pt's daughter/primary decision maker Teresita Day at bedside.  While at [facility], pt found to have hypotension to SBP 80s. Pt was given IVF and reduced dose of metoprolol. Pt also found to have tachycardia, shaking chills. Pt sent back to the hospital. Reportedly pt has not been eating well, not urinating much. Denies CP, SOB, abd pain, diarrhea, cough, other respiratory symptoms. At baseline pt ambulates without assistive device, drives.  RRT called in the ed for hypotension to SBP 79. (08 Jun 2025 02:41)  to me pt daughter says she was very sleepy in the morning too  as she was given ambien at 4 AM in the morning:   now she is alert and awake and is on 2 L of oxygen ;  she is not sob:  and does not look to be in any resp distress:       Severe sepsis.   unclear source of fevers  cont empiric vanc, cefepime  f/u blood cx. if positive will likely need MARIA G  monitor for any localizing symptoms  pt had recent pan ct scan that did not elucidate cause of bacteremia  recent blood cultures have been negative   - IVF  on cefepime  she is febrile too   vbg on admission IS ok;   MONITOR BLOOD PRESSURE CLOSELY:   OIF SHE DROPS HER BLOOD PRESSURE MORE:  WOULD NEED MICU  CO NSULT:    6/9: seems slightly more tored today  ; cont antibtiocs:  per ID:  she remains on 2 L of oxygen : she is alert and awake:  daughter at bedside:  reviewed the labs and echo and ct scan chest with the daughter in detail :   she has large pericardial effusions:  per ir no good window and effusion seemd small to drain : ct scan chest read as mod to large pericardial effusion : defer to cards :     6/10: seems to be doing  ok :  no sob:   no  cough  ;   no  phlegm   on 2 L of oxygen :  thoracic to see:    no emergency in tapping the pleural effusion:   de cardiologist  6/11: on ceftriaxone   seems O K   6/12: seems OK:  s/p pericardial drain:  has 400 cc output   await cytology    6/13: cont c urrent rx:   on antibiotics  6/14: on ceftriaxone    6/15/l seems to be doing  ok ;  no sob;  no cough :   no phlegm      6/17; pulm wise she looks tachypneic today ; rpt limited echo with no change from before;  cxr done today with no significant change from before;   abg done today seems pretty reasonable   will do di mine;  last d dimer on 3rd was slightly high ;  if the d dimer has increased significantly  would do cta;  dw acp : her creat is normal:  she has been off ac for pericardial drain for some time;   on antibiotics   40 mg ov iv solumedrol given     6/18: HER D DIMER IS VERY HIGH :  going for cta;    depending upon the cta:  if she has significant pl effusion , would like to tap, if there is no pe    dw acp     6/19: seems to be doing  ok ;  for thoracentesis ; today  : confirmed with USG   ; off ac for now:  to restart after the procedure     6/20:  ABX as per ID     6/21; resolved  now off antibiotics  no fever:   6/22: resolved   6/23: s/p ABX   6/24: seems to be doing poorly:    the rigth sided pleural fluid is increased;   dw daughter  no pleurx for now;     6/25: sepsis wise seems to be doing  ok ;  no labs today : remains afebrile      6/26  S/p ABX  Afebrile, non-toxic appearing       Pleural Effusion  -S/p R US guided thoracentesis 6/19, 1.25 L drained  -CXR post procedure with mild congestion, residual effusions but overall improved. No clear ptx  -F/u pleural fluid studies  -Keep O>I as tolerated     6/21; s/p tap:  transudative fluids:    needs diuresis:  hard to give with hocm :  rpt cxr in am:  if sheis rapidly filling up with pl effusion,  she may need pleurx:  uzma son and wilfredoter in detail :     6/22: the pl effusion has increased:  right   . left:   she may need pleurax:     6/23   CXR with reaccumulation of fluid  Keep O>I as tolerated   Would consider pleurX if aligns with GOC     6/24:    cxr with increasing pleural effusion;  dw daughter about pleurx catheter that it is only to draw fluid:  it is not going to change her prognosis:  at this time:  no pleurx:  dc planning to hospice:     6/25; dw pts daughter  and son ; trying to arrange for everything at home:  needs nebs at home:   cont supportive care     6/26  -No plans for pleurX at this time, plans for d/c with home hospice  -Symptom management per PCU. Currently with no c/o dyspnea    Pericardial effusion ;   the ct chest shows increasing pericardial effusion  : which is of more pressing concern then pleural effusion :  needs a tap:  ir guided tap  6/12: as above   6/13: asked up to tap:  need to repeat inr and pt  last was  >  2.0:  needs to be less then 1.5:  ip contacted will evalute for tap    6/14; defer to p r imary team  6/16/: seems OK;'' pericardial effusion removed    6/17: limited echo today with no change   6/18: defer to cards : cyto is still pending   6/19; cyto i n  pl fluid is negative for malignant cells:   6/20: Management per cards   6/21: tapped:  doing  ok   6/22; seems to be stable:   6/25: sems OK:  blood pressure is OK         Hypotension.  suspect combination of infection, hocm, lora, tachycardia, pericardial effusion, metoprolol  pt has somewhat tenuous hemodynamic status  consider cardioversion, consider repeat TTE  pt is full code.  pts blood pressure is slightly low   on midodrine  6/9: cont on midodrine  6/10; controlled:  on midodrine  6/11: on midodrine  6/12: currently she is on vasopressors   6/13; off vasopressors now   6/14; blood pressure soft:  on midodrine   6/15: her blood pressure is pretty good:   6/17: blood pressure today is reasonable;   6/18: off midodrine and blood pressure is pretty good:  on metoprolol   6/19/ on metoprolol :   6/20: BP controlled    6*21: controlled:  on meto :  off midodrine now:   6/22: blood pressure is OK   6/24: blood pressure seems reasonable:   6/25: seems stable:       Chronic atrial fibrillation.  reduce metoprolol dose to tartrate 25mg BID  goal HR <100 in setting of HOCM with hypotension  consider amiodarone or cardioversion  PER CARDS    6/9: defer to cards   6/10: off Eliquis for now   6/11: remains off eliquis   6/12: off ac  6/13: keep off ac if thoracentesis needed to be done over the weekend:  though it is not emergent   6/14" can restart ac:  if required as there is now no plan for thoracentesis:  dw attending   6/16; off ac as pericardial arnie is removed today   6/17; remains off ac:  restart as recommended by ir note  6/18: still off heparin :  cta awaited do urgent   6/19:  cta done: no pe:  has mod to large effusion on rigth side: for tap today    6/20: AC with heparin drip   6/21: on eliquis now    6/22: her effusions have increased;  she will likely need pleurx on rigth side;    6/23: AC with Eliquis    6/24: cont eliquis   6/25: remains on eliquis   6/26: Off AC       Type 2 myocardial infarction.  -per cards    HOCM (hypertrophic obstructive cardiomyopathy).  -per cards    Acute respiratory failure with hypoxia.   recent CT scans showing right bronchiectasis, right fibrosis likely 2/2 radiation, poss RML PNA.  cont BD :   ct chest showed: No pulmonary embolism. Small to moderate right and small left pleural effusions with associated  atelectasis. 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  Cardiomegaly. Moderate pericardial effusion.  needs echo     9/6: new echo reviewed:  seen by cardiology :  monitor her blood pressure closely:  if she drops her blood pressure call CCU     6/10: cont to manain o2 sao2 above 90% all the t hayden  6/11; n 2 L of oxygen    6/12: she is not in any resp distress    6/13: she is on rooma ir:  doing well :  satting at 93%  6/14: pulm wise seems pretty good;   on rooma ir;   no plan for tap now     6/16; she seems OK;  she is on 1 L of oxygen ; would suggest to repeat cxr   6/17; she is on 2 L of oxygen  :  satting at 96%    6/20  Keep sats >90% with O2 PRN  Denies SOB today     6/22today xray with increasing pll effusion : repeat again tomorrow ; if further increase  likely would need pleurx cath : uzma family     6/25: she is still being contd on full treatment; ; uzma with the family in detail : no pleurx now:  for dc to home hospice     6/26: D/c planning w/ home hospice

## 2025-06-26 NOTE — PROGRESS NOTE ADULT - SUBJECTIVE AND OBJECTIVE BOX
Date of Service: 06-26-25 @ 10:29    Patient is a 89y old  Female who presents with a chief complaint of shaking chills, hypotension, poor appetite (25 Jun 2025 15:06)      Any change in ROS:   No new respiratory events overnight. Denies SOB/CP at this time  Breathing appears nonlabored      MEDICATIONS  (STANDING):  benzonatate 100 milliGRAM(s) Oral every 8 hours  chlorhexidine 2% Cloths 1 Application(s) Topical daily  levalbuterol Inhalation 0.63 milliGRAM(s) Inhalation every 6 hours  melatonin 5 milliGRAM(s) Oral at bedtime  metoprolol tartrate 50 milliGRAM(s) Oral every 6 hours  pantoprazole    Tablet 40 milliGRAM(s) Oral before breakfast  polyethylene glycol 3350 17 Gram(s) Oral daily  senna 2 Tablet(s) Oral at bedtime  zolpidem 5 milliGRAM(s) Oral at bedtime    MEDICATIONS  (PRN):  acetaminophen     Tablet .. 650 milliGRAM(s) Oral every 6 hours PRN Mild Pain (1 - 3)  albuterol/ipratropium for Nebulization 3 milliLiter(s) Nebulizer every 6 hours PRN Shortness of Breath and/or Wheezing  bisacodyl Suppository 10 milliGRAM(s) Rectal daily PRN Constipation  glycopyrrolate 1 milliGRAM(s) Oral every 8 hours PRN excessive audible secretions  guaifenesin/dextromethorphan Oral Liquid 5 milliLiter(s) Oral every 6 hours PRN Cough  ipratropium    for Nebulization 500 MICROGram(s) Nebulizer every 6 hours PRN Shortness of Breath and/or Wheezing  LORazepam    Concentrate 0.5 milliGRAM(s) Oral every 4 hours PRN anxiety, agitation, delirium  morphine Concentrate 6 milliGRAM(s) Oral every 4 hours PRN Severe Pain (7 - 10)  morphine Concentrate 3 milliGRAM(s) Oral every 4 hours PRN Moderate Pain (4 - 6)  morphine Concentrate 6 milliGRAM(s) Oral every 4 hours PRN Dyspnea    Vital Signs Last 24 Hrs  T(C): 36.6 (26 Jun 2025 05:35), Max: 36.7 (25 Jun 2025 16:19)  T(F): 97.9 (26 Jun 2025 05:35), Max: 98 (25 Jun 2025 16:19)  HR: 113 (26 Jun 2025 05:35) (98 - 113)  BP: 135/71 (26 Jun 2025 05:35) (122/72 - 135/71)  BP(mean): --  RR: 20 (26 Jun 2025 05:35) (18 - 20)  SpO2: 99% (26 Jun 2025 05:35) (94% - 99%)    Parameters below as of 26 Jun 2025 05:35  Patient On (Oxygen Delivery Method): nasal cannula        I&O's Summary    25 Jun 2025 07:01  -  26 Jun 2025 07:00  --------------------------------------------------------  IN: 0 mL / OUT: 850 mL / NET: -850 mL          Physical Exam:   GENERAL: NAD, well-groomed, well-developed  HEENT: BRYSON/   Atraumatic, Normocephalic  ENMT: No tonsillar erythema, exudates, or enlargement; Moist mucous membranes, Good dentition, No lesions  NECK: Supple, No JVD, Normal thyroid  CHEST/LUNG: Decreased BS at bases   CVS: Regular rate and rhythm; No murmurs, rubs, or gallops  GI: : Soft, Nontender, Nondistended; Bowel sounds present  NERVOUS SYSTEM:  Alert & Oriented X3  EXTREMITIES:  2+ Peripheral Pulses, No clubbing, cyanosis, or edema  LYMPH: No lymphadenopathy noted  SKIN: No rashes or lesions  ENDOCRINOLOGY: No Thyromegaly  PSYCH: Appropriate    Labs:                              11.2   4.02  )-----------( 261      ( 24 Jun 2025 07:08 )             36.7                         11.0   3.88  )-----------( 287      ( 23 Jun 2025 07:10 )             36.4     06-24    135  |  98  |  18  ----------------------------<  95  5.0   |  25  |  0.54  06-23    138  |  102  |  20  ----------------------------<  105[H]  5.0   |  26  |  0.57        CAPILLARY BLOOD GLUCOSE         (06-19 @ 16:00)          Fluid Source --  Albumin, Fluid0.8 g/dL  Glucose, [id number] mg/dL  Protein total, Fluid2.1 g/dL  Lacatate Dehydrogenase, [id number] U/L  pH, Fluid7.55  Cytopathology-Non Gyn Report--  Fluid Source --  Albumin, Fluid--  Glucose, Fluid--  Protein total, Fluid--  Lacatate Dehydrogenase, Fluid--  pH, Fluid--  Cytopathology-Non Gyn Report  ACCESSION No:  [id number]  Patient:     GINO GRAHAM      Accession:                             [id number]    Collected Date/Time:                   6/19/2025 16:00 EDT  Received Date/Time:                    6/19/2025 22:26 EDT    Non-Gynecologic Report - Auth (Verified)    Specimen(s) Submitted  PLEURAL FLUID, RIGHT    Final Diagnosis  PLEURAL FLUID, RIGHT  NEGATIVE FOR MALIGNANT CELLS.    Cytology slides and cell block show    reactive mesothelial cells,  histiocytes  and mixed inflammatory cells.    Screened by: Marisela BURROWS(ASCP)  Verified by: Mary Conn MD  (Electronic Signature)  Reported on: 06/23/25 22:03 EDT, [facility] [place] [address]  Phone: [phone]   Fax: [phone]  Cytology processed at [facility], [address] [place] [address]  _________________________________________________________________      Clinical Information  511.9  Pleural effusion.    Gross Description  Received: 100  ml of cloudy fluid in CytoLyt  Prepared: 1 ThinPrep slide, 1 smear, 1 cell block        RECENT CULTURES:  06-19 @ 16:34 Pleural Fl Pleural Fluid       No polymorphonuclear cells seen  No organisms seen  by cytocentrifuge           No growth at 5 days        Studies  < from: Xray Chest 1 View- PORTABLE-Routine (Xray Chest 1 View- PORTABLE-Routine .) (06.23.25 @ 09:39) >    ACC: [id number] EXAM:  XR CHEST PORTABLE ROUTINE 1V   ORDERED BY: ROMULO WILSON     PROCEDURE DATE:  06/23/2025          INTERPRETATION:  DATE OF STUDY: 6/23/25    PRIOR: 6/22/25    CLINICAL INDICATION: Pleural effusion. May need Pleurx.    TECHNIQUE: AP radiograph of the chest.    FINDINGS:  The heart size is not well assessed on this AP projection.  Stable hazy opacification of the bilateral mid/lower lung fields with   stable small left pleural effusion and increase in right pleural effusion.  Increasing patchiness at right lung base - likely due to atelectasis or   to infection in the right clinical setting.  No pneumothorax.    IMPRESSION:    Stable hazy opacification of both mid and lower lungs.  Increase in right pleural effusion.  Increased atelectasis and/or infection at the right lung base.    --- End of Report ---      < end of copied text >

## 2025-06-27 ENCOUNTER — TRANSCRIPTION ENCOUNTER (OUTPATIENT)
Age: 89
End: 2025-06-27

## 2025-06-27 RX ORDER — APIXABAN 2.5 MG/1
1 TABLET, FILM COATED ORAL
Refills: 0 | DISCHARGE

## 2025-06-27 NOTE — DISCHARGE NOTE NURSING/CASE MANAGEMENT/SOCIAL WORK - PATIENT PORTAL LINK FT
You can access the FollowMyHealth Patient Portal offered by [facility] by registering at the following website: [email or web address]. By joining [email or web address] FollowMyHealth portal, you will also be able to view your health information using other applications (apps) compatible with our system.

## 2025-06-27 NOTE — PROGRESS NOTE ADULT - REASON FOR ADMISSION
shaking chills, hypotension, poor appetite
pericardial effusion
shaking chills, hypotension, poor appetite

## 2025-06-27 NOTE — PROGRESS NOTE ADULT - NUTRITIONAL ASSESSMENT
This patient has been assessed with a concern for Malnutrition and has been determined to have a diagnosis/diagnoses of Severe protein-calorie malnutrition.    This patient is being managed with:   Diet Regular-  Supplement Feeding Modality:  Oral  Ensure Plus High Protein Cans or Servings Per Day:  3       Frequency:  Three Times a day  Entered: Jun 12 2025  3:09PM  

## 2025-06-27 NOTE — PROGRESS NOTE ADULT - NS ATTEND OPT1 GEN_ALL_CORE
I independently performed the documented:
I attest my time as attending is greater than 50% of the total combined time spent on qualifying patient care activities by the PA/NP and attending.
I attest my time as attending is greater than 50% of the total combined time spent on qualifying patient care activities by the PA/NP and attending.

## 2025-06-27 NOTE — PROGRESS NOTE ADULT - SUBJECTIVE AND OBJECTIVE BOX
Date of Service: 06-27-25 @ 13:26    Patient is a 89y old  Female who presents with a chief complaint of shaking chills, hypotension, poor appetite (27 Jun 2025 10:44)      Any change in ROS:   No new respiratory events overnight. Denies SOB/CP.      MEDICATIONS  (STANDING):  AQUAPHOR (petrolatum Ointment) 1 Application(s) Topical every 12 hours  benzonatate 100 milliGRAM(s) Oral every 8 hours  chlorhexidine 2% Cloths 1 Application(s) Topical daily  erythromycin   Ointment 1 Application(s) Right EYE two times a day  levalbuterol Inhalation 0.63 milliGRAM(s) Inhalation every 6 hours  melatonin 5 milliGRAM(s) Oral at bedtime  metoprolol tartrate 50 milliGRAM(s) Oral every 6 hours  ondansetron Injectable 4 milliGRAM(s) IV Push once  pantoprazole    Tablet 40 milliGRAM(s) Oral before breakfast  polyethylene glycol 3350 17 Gram(s) Oral daily  senna 2 Tablet(s) Oral at bedtime  zolpidem 5 milliGRAM(s) Oral at bedtime    MEDICATIONS  (PRN):  acetaminophen     Tablet .. 650 milliGRAM(s) Oral every 6 hours PRN Mild Pain (1 - 3)  albuterol/ipratropium for Nebulization 3 milliLiter(s) Nebulizer every 6 hours PRN Shortness of Breath and/or Wheezing  bisacodyl Suppository 10 milliGRAM(s) Rectal daily PRN Constipation  glycopyrrolate 1 milliGRAM(s) Oral every 8 hours PRN excessive audible secretions  guaifenesin/dextromethorphan Oral Liquid 5 milliLiter(s) Oral every 6 hours PRN Cough  ipratropium    for Nebulization 500 MICROGram(s) Nebulizer every 6 hours PRN Shortness of Breath and/or Wheezing  LORazepam    Concentrate 0.5 milliGRAM(s) Oral every 4 hours PRN anxiety, agitation, delirium  morphine Concentrate 6 milliGRAM(s) Oral every 4 hours PRN Severe Pain (7 - 10)  morphine Concentrate 3 milliGRAM(s) Oral every 4 hours PRN Moderate Pain (4 - 6)  morphine Concentrate 6 milliGRAM(s) Oral every 4 hours PRN Dyspnea    Vital Signs Last 24 Hrs  T(C): 36.9 (27 Jun 2025 05:30), Max: 36.9 (27 Jun 2025 05:30)  T(F): 98.5 (27 Jun 2025 05:30), Max: 98.5 (27 Jun 2025 05:30)  HR: 95 (27 Jun 2025 05:30) (88 - 95)  BP: 115/52 (27 Jun 2025 05:30) (115/52 - 152/84)  BP(mean): --  RR: 22 (27 Jun 2025 05:30) (21 - 22)  SpO2: 95% (27 Jun 2025 05:30) (95% - 95%)    Parameters below as of 27 Jun 2025 05:30  Patient On (Oxygen Delivery Method): nasal cannula  O2 Flow (L/min): 3      I&O's Summary    26 Jun 2025 07:01  -  27 Jun 2025 07:00  --------------------------------------------------------  IN: 180 mL / OUT: 1 mL / NET: 179 mL          Physical Exam:   GENERAL: NAD, well-groomed, well-developed  HEENT: BRYSON/   Atraumatic, Normocephalic  ENMT: No tonsillar erythema, exudates, or enlargement; Moist mucous membranes, Good dentition, No lesions  NECK: Supple, No JVD, Normal thyroid  CHEST/LUNG: Decreased BS   CVS: Regular rate and rhythm; No murmurs, rubs, or gallops  GI: : Soft, Nontender, Nondistended; Bowel sounds present  NERVOUS SYSTEM:  Alert & Oriented X2  EXTREMITIES:  2+ Peripheral Pulses, No clubbing, cyanosis, or edema  LYMPH: No lymphadenopathy noted  SKIN: No rashes or lesions  ENDOCRINOLOGY: No Thyromegaly  PSYCH: Appropriate    Labs:                              11.2   4.02  )-----------( 261      ( 24 Jun 2025 07:08 )             36.7     06-24    135  |  98  |  18  ----------------------------<  95  5.0   |  25  |  0.54        CAPILLARY BLOOD GLUCOSE         (06-19 @ 16:00)        Studies  c< from: Xray Chest 1 View- PORTABLE-Routine (Xray Chest 1 View- PORTABLE-Routine .) (06.23.25 @ 09:39) >  ACC: [id number] EXAM:  XR CHEST PORTABLE ROUTINE 1V   ORDERED BY: ROMULO WILSON     PROCEDURE DATE:  06/23/2025          INTERPRETATION:  DATE OF STUDY: 6/23/25    PRIOR: 6/22/25    CLINICAL INDICATION: Pleural effusion. May need Pleurx.    TECHNIQUE: AP radiograph of the chest.    FINDINGS:  The heart size is not well assessed on this AP projection.  Stable hazy opacification of the bilateral mid/lower lung fields with   stable small left pleural effusion and increase in right pleural effusion.  Increasing patchiness at right lung base - likely due to atelectasis or   to infection in the right clinical setting.  No pneumothorax.    IMPRESSION:    Stable hazy opacification of both mid and lower lungs.  Increase in right pleural effusion.  Increased atelectasis and/or infection at the right lung base.    --- End of Report ---          < end of copied text >

## 2025-06-27 NOTE — DISCHARGE NOTE PROVIDER - NSDCFUSCHEDAPPT_GEN_ALL_CORE_FT
Mary Terry  [facility]  Warren Diaz  Scheduled Appointment: 07/21/2025

## 2025-06-27 NOTE — PROGRESS NOTE ADULT - PROBLEM SELECTOR PLAN 4
- PPS 30% The patient requires nursing assistance with all ADLS.  - Supportive care.  - Turn and position.  - Continue with good skin care as per hospital protocol.
see [facility] note above  DNR/I  HCP is pt's daughter Teresita  Goals are focused on comfort, family would like to continue PO medications as tolerated, family considering pleurx for comfort (would like pulms opinion before deciding), pt pending transfer to PCU, ultimate goal is home with hospice
- Continue with Ativan 0.5mg oral concentrate q4hr PRN. ( 2 required in a 24hr period 7am-7am)
- PPS 30% The patient requires nursing assistance with all ADLS.  - Supportive care.  - Turn and position.  - Continue with good skin care as per hospital protocol.

## 2025-06-27 NOTE — PROGRESS NOTE ADULT - PROBLEM SELECTOR PLAN 2
- s/p thoracentesis on 6/19, with significant improvement in shortness of breath, and heartrate.  chest xray over the weekend shows right pleural effusion has returned.  - Pulm following: no plans for Pleurx   - Continue with Morphine 6mg oral concentrate q4 hours PRN for dyspnea. ( None required in a 24hr period 7am-7am)   - Bowel regimen while on opioids

## 2025-06-27 NOTE — DISCHARGE NOTE PROVIDER - NSDCCPCAREPLAN_GEN_ALL_CORE_FT
PRINCIPAL DISCHARGE DIAGNOSIS  Diagnosis: Pneumonia  Assessment and Plan of Treatment: Pneumonia is a lung infection that can cause a fever, cough, and trouble breathing.  Continue all antibiotics as ordered until complete.  Nutrition is important, eat small frequent meals.  Get lots of rest and drink fluids.  Call your health care provider upon arrival home from hospital and make a follow up appointment for one week.  If your cough worsens, you develop fever greater than 101', you have shaking chills, a fast heartbeat, trouble breathing and/or feel your are breathing much faster than usual, call your healthcare provider.  Make sure you wash your hands frequently.        SECONDARY DISCHARGE DIAGNOSES  Diagnosis: Severe sepsis  Assessment and Plan of Treatment: Take all antibiotics as ordered.  Call you Health care provider upon arrival home to make a one week follow up appointment.  If you develop fever, chills, malaise, or change in mental status call your Health Care Provider or go to the Emergency Department.  Nutrition is important, eat small frequent meals to help ensure you get adequate calories.  Do not stay in bed all day!  Increase your activity daily as tolerated.      Diagnosis: HOCM (hypertrophic obstructive cardiomyopathy)  Assessment and Plan of Treatment:     Diagnosis: Type 2 myocardial infarction  Assessment and Plan of Treatment:     Diagnosis: Chronic atrial fibrillation  Assessment and Plan of Treatment:

## 2025-06-27 NOTE — PROGRESS NOTE ADULT - PROBLEM SELECTOR PROBLEM 1
HOCM (hypertrophic obstructive cardiomyopathy)
Pericardial effusion
HOCM (hypertrophic obstructive cardiomyopathy)
Pericardial effusion
HOCM (hypertrophic obstructive cardiomyopathy)
HOCM (hypertrophic obstructive cardiomyopathy)

## 2025-06-27 NOTE — PROGRESS NOTE ADULT - PROBLEM SELECTOR PLAN 6
The plan is to initiate home hospice services on Saturday. The patient's daughter needs time to hire private aids and accommodate her house. The GaP team will continue to follow the patient for symptom management.  Please ensure the patient has received prescriptions for the following:  Morphine: 6mg oral concentrate every 4 hours as needed for pain/dyspnea.  Ativan (lorazepam): 0.5mg oral concentrate every 4 hours as needed for anxiety.  Also, please ensure a bowel regimen has been ordered for the patient.  - On discharge, please prescribe Naloxone spray 4mg/0.1 ml nasal spray  Spray 0.1mL into one nostril. Repeat with second spray into other nostril after 2-3 minutes if no or minimal response. ([email or web address]).  Please be sure the patient's pharmacy has the medication, otherwise, be sure there is a pharmacy where the patient can obtain the Naloxone spray.

## 2025-06-27 NOTE — PROGRESS NOTE ADULT - ASSESSMENT
89F c hx GERD, R breast CA (40-50y ago) s/p B/L mastectomy and radiation, RUE lymphedema, osteoarthritis/osteoporosis (on monthly ibandronate), HLD, Afib on eliquis, HOCM/severe LVOT/ELEAZAR, small-moderate pericardial effusion/RA diastolic collapse, recent hospitalization 5/29-6/5 for RML PNA, GBS bacteremia (pos cultures 5/29, 5/30) of unclear source on total 10 days abx (IV ceftriaxone inpt, then levaquin 750 q48h until 6/9), pw hypotension, severe sepsis of unclear source, demand ischemia, afib c rvr       Pericardial effusion , pleural effusion  sp CCU stay and drain   sp thoracentesis   pulm fu   family decided against pigtail     Chronic atrial fibrillation.  - dc eliquis  - now on comfort care      HOCM (hypertrophic obstructive cardiomyopathy).  - cards and EP signed off    Symptom management as per palliative care   awaiting home hospice on Saturday

## 2025-06-27 NOTE — PROGRESS NOTE ADULT - PROBLEM SELECTOR PLAN 3
Controlled with current regimen:  - Continue with Morphine 3mg oral concentrate q4hr PRN for moderate pain. ( 1 required in a 24hr period 7am-7am)   - Continue with Morphine 6mg oral concentrate q4 hours PRN for severe pain. ( 1 required in a 24hr period 7am-7am)   - Bowel regimen while on opioids

## 2025-06-27 NOTE — DISCHARGE NOTE NURSING/CASE MANAGEMENT/SOCIAL WORK - NSDCPEFALRISK_GEN_ALL_CORE
For information on Fall & Injury Prevention, visit: [email or web address] OR  [email or web address] OR  https://www.cdc.gov/steadi/patient.html

## 2025-06-27 NOTE — DISCHARGE NOTE PROVIDER - DETAILS OF MALNUTRITION DIAGNOSIS/DIAGNOSES
This patient has been assessed with a concern for Malnutrition and was treated during this hospitalization for the following Nutrition diagnosis/diagnoses:     -  06/12/2025: Severe protein-calorie malnutrition

## 2025-06-27 NOTE — DISCHARGE NOTE PROVIDER - HOSPITAL COURSE
HPI:  89F c hx GERD, R breast CA (40-50y ago) s/p B/L mastectomy and radiation, RUE lymphedema, osteoarthritis/osteoporosis (on monthly ibandronate), HLD, Afib on eliquis, HOCM/severe LVOT/ELEAZAR, small-moderate pericardial effusion/RA diastolic collapse, recent hospitalization 5/29-6/5 for RML PNA, GBS bacteremia (pos cultures 5/29, 5/30) of unclear source on total 10 days abx (IV ceftriaxone inpt, then levaquin 750 q48h until 6/9), presents from [facility] rehab with shaking chills, hypotension, poor appetite    History from pt's daughter/primary decision maker Teresitagisel Day at bedside.  While at [facility], pt found to have hypotension to SBP 80s. Pt was given IVF and reduced dose of metoprolol. Pt also found to have tachycardia, shaking chills. Pt sent back to the hospital. Reportedly pt has not been eating well, not urinating much. Denies CP, SOB, abd pain, diarrhea, cough, other respiratory symptoms. At baseline pt ambulates without assistive device, drives.    RRT called in the ed for hypotension to SBP 79. (08 Jun 2025 02:41)    Hospital Course:  Patient admitted with severe sepsis, pleural effusion, pericardial effusion, hypotension, and acute respiratory failure with hypoxia.  The sepsis was treated with antibiotics.  A right-sided ultrasound-guided thoracentesis drained 1.25L of transudative pleural fluid. A post-drainage chest x-ray showed reaccumulation of fluid.  Placement of a PleurX catheter was not pursued.  The pericardial effusion was also drained. Cardiology was consulted for both effusions and the cardiac history. Cytology from the pericardial fluid was negative.  The patient required pressors for hypotension in the cardiac intensive care unit (CICU). The patient’s chronic atrial fibrillation is not currently being treated with anticoagulation. The diagnoses of type 2 myocardial infarction and HOCM were made by Cardiology.  For the acute respiratory failure with hypoxia, oxygen was provided to maintain saturations above 90%. Given the patient's overall clinical picture, the decision was made for discharge with home hospice services.The patient was afebrile and non-toxic appearing at the time of discharge.       Important Medication Changes and Reason:    Active or Pending Issues Requiring Follow-up:    Advanced Directives:   [ ] Full code  [ x] DNR  [ x] Hospice    Discharge Diagnoses:  severe sepsis  pleural effusion  pericardial effusion  hypotension  acute respiratory failure with hypoxia

## 2025-06-27 NOTE — PROGRESS NOTE ADULT - PROBLEM SELECTOR PLAN 5
- PPS 30% The patient requires nursing assistance with all ADLS.  - Supportive care.  - Turn and position.  - Continue with good skin care as per hospital protocol.
The plan is to initiate home hospice services on Saturday. The patient's daughter needs time to hire private aids and accommodate her house. The GaP team will continue to follow the patient for symptom management.      Jenny Dyer MD   Geriatrics and Palliative Care Attending   [facility]
- Patient's daughter, Teresita was at the bedside. I introduced myself and my role. Updates were provided. She had no questions at this time. She shared that she is meeting with the  at 1 p.m. today to discuss home hospice and what to expect.  - [facility] team will continue to follow for symptom management.
will continue to follow for goc/symptoms  case discussed with primary team attending, ACP, and CM   for symptoms will start   6mg PO Morphine concentrate q4 hours prn for severe pain   3mg PO Morphine concentrate q4 hours prn for moderate pain  2mg IVP Morphine q4 hours prn for refractory dyspnea/breakthrough pain  0.5mg PO Ativan concentrate q4 hours prn for anxiety, agitation, delirium   1mg PO Glycopyrrolate tablet q4 hours prn q8 hours for excessive audible secretions   Dulcolax prn for constipation in the setting of opioids   please page palliative if symptoms unmanaged  Can be reached by TEAMS M-F 9-5 Joyce Avendano Any other time please page [phone] if needed

## 2025-06-27 NOTE — DISCHARGE NOTE NURSING/CASE MANAGEMENT/SOCIAL WORK - FINANCIAL ASSISTANCE
[facility] provides services at a reduced cost to those who are determined to be eligible through [facility]’s financial assistance program. Information regarding [facility]’s financial assistance program can be found by going to [email or web address] or by calling [phone].

## 2025-06-27 NOTE — PROGRESS NOTE ADULT - NS ATTEND AMEND GEN_ALL_CORE FT
no change in above plan of care, will follow with you.
pt is doing poorly:   sob:  on oxygen   filling up with pleural effusion : now pts family has decided about palliative cr and hospice:   no pleurx now:  palliative care to see    dw son at bedside:  who agrees with the plan
no change in above plan of care. will follow with you.
she seems comfortable:  no sob:  on 2 L of oxygen :  in palliative care unit:  andre polo on saturday  ;
she seems better today  :  she had 1.25 L tapped:   transudative effusion :  hard to diureses secondary to complicated cardiac history:  if she fills up again , pleurx could be an option   no ptx    dw pt
89y/o F PMH of breast cancer treated with bilateral mastectomy and radiation, right upper extremity lymphedema, OA, osteoporosis, A-fib, hypertrophic obstructive cardiomyopathy  with severe left ventricular outflow tract obstruction and systolic anterior motion, and a history of a small-moderate pericardial effusion with right atrial diastolic collapse, was recently hospitalized (5/29-6/5) for right middle lobe pneumonia. On admission was found to have a worsening pericardial effusion requiring thoracentesis and pericardial drain placement. Due to reaccumulation of pleural fluid and declining clinical status, her goals of care have shifted to comfort measures. Patient transfer as comanagement to PCU for management of symptoms and disposition planning    Patient seen and examined at bedside. Patient appears more lethargic, family at bedside. Pain and dyspnea well controlled with morphine solution, on ativan concentrate PRN for anxiety. No plans for Pleurex at this time.     The patient's son inquired about home medications, specifically Eliquis and metoprolol, as the primary team had previously indicated these could be continued and sent to the pharmacy upon discharge. I explained that if the patient has a viable oral route and it is safe to administer, patient may continue taking it. However, with metoprolol, monitor closely for hypotension and bradycardia and continue administration only as long as tolerated and indicated.    In the setting of controlled substance administration, clinical monitoring required for side effects such as respiratory depression, constipation and opioid induced neurotoxicity due to organ failure. Patient assessment and plan discussed on interdisciplinary team rounds today.     Disposition: Patient will be discharge tomorrow with home hospice services
89y/o F PMH of breast cancer treated with bilateral mastectomy and radiation, right upper extremity lymphedema, OA, osteoporosis, A-fib, hypertrophic obstructive cardiomyopathy  with severe left ventricular outflow tract obstruction and systolic anterior motion, and a history of a small-moderate pericardial effusion with right atrial diastolic collapse, was recently hospitalized (5/29-6/5) for right middle lobe pneumonia. On admission was found to have a worsening pericardial effusion requiring thoracentesis and pericardial drain placement. Due to reaccumulation of pleural fluid and declining clinical status, her goals of care have shifted to comfort measures. Patient transfer as comanagement to PCU for management of symptoms and disposition planning    Patient seen and examined at bedside. Lying in bed comfortably, family at bedside. Pain and dyspnea well controlled with morphine solution, on ativan concentrate PRN for anxiety. No plans for Pleurex at this time. The family has requested a discussion with  regarding hospice services.  In the setting of controlled substance administration, clinical monitoring required for side effects such as respiratory depression, constipation and opioid induced neurotoxicity due to organ failure. Patient assessment and plan discussed on interdisciplinary team rounds today.     Primary team aware of the above plan     Disposition: patient is candidate for home hospice services

## 2025-06-27 NOTE — DISCHARGE NOTE PROVIDER - CARE PROVIDER_API CALL
Mary Payan  Hematology & Oncology  [address] [place] [id number]  Phone: [phone]  Fax: [phone]  Follow Up Time: 1 week

## 2025-06-27 NOTE — DISCHARGE NOTE PROVIDER - NSDCMRMEDTOKEN_GEN_ALL_CORE_FT
Ambien 5 mg oral tablet: 1 tab(s) orally once a day (at bedtime)  benzonatate 100 mg oral capsule: 1 cap(s) orally every 8 hours  bisacodyl 10 mg rectal suppository: 1 suppository(ies) rectal once a day as needed for Constipation  glycopyrrolate 1 mg oral tablet: 1 tab(s) orally every 8 hours as needed for excessive audible secretions  guaifenesin-dextromethorphan 100 mg-10 mg/5 mL oral liquid: 5 milliliter(s) orally every 6 hours as needed for Cough  levalbuterol 0.63 mg/3 mL inhalation solution: 3 milliliter(s) inhaled every 6 hours  LORazepam 2 mg/mL oral concentrate: 0.5 milliliter(s) orally every 4 hours as needed for  agitation anxiety, delirium MDD: 4 doses  metoprolol succinate 100 mg oral tablet, extended release: 1 tab(s) orally once a day  morphine 20 mg/mL oral concentrate: 0.3 milliliter(s) orally every 4 hours as needed for Severe Pain (7 - 10) 0.15m q4 hrs PRN for moderate pain; 0.3 ml q4hrs prn for dyspnea MDD: 4.5ml  Multiple Vitamins oral tablet: 1 tab(s) orally once a day  naloxone 4 mg/0.25 mL nasal spray: 1 spray(s) intranasally once a day Take as directed for opiod overdose  nebulizer machine: use as directed  pantoprazole 40 mg oral delayed release tablet: 1 tab(s) orally once a day  polyethylene glycol 3350 oral powder for reconstitution: 17 gram(s) orally once a day  senna leaf extract oral tablet: 2 tab(s) orally once a day (at bedtime)   Ambien 5 mg oral tablet: 1 tab(s) orally once a day (at bedtime)  benzonatate 100 mg oral capsule: 1 cap(s) orally every 8 hours  bisacodyl 10 mg rectal suppository: 1 suppository(ies) rectal once a day as needed for Constipation  glycopyrrolate 1 mg oral tablet: 1 tab(s) orally every 8 hours as needed for excessive audible secretions  guaifenesin-dextromethorphan 100 mg-10 mg/5 mL oral liquid: 5 milliliter(s) orally every 6 hours as needed for Cough  levalbuterol 0.63 mg/3 mL inhalation solution: 3 milliliter(s) inhaled every 6 hours  LORazepam 2 mg/mL oral concentrate: 0.5 milliliter(s) orally every 4 hours as needed for  agitation anxiety, delirium MDD: 4 doses  metoprolol succinate 100 mg oral tablet, extended release: 1 tab(s) orally once a day  Multiple Vitamins oral tablet: 1 tab(s) orally once a day  naloxone 4 mg/0.25 mL nasal spray: 1 spray(s) intranasally once a day Take as directed for opiod overdose  nebulizer machine: use as directed  pantoprazole 40 mg oral delayed release tablet: 1 tab(s) orally once a day  polyethylene glycol 3350 oral powder for reconstitution: 17 gram(s) orally once a day  senna leaf extract oral tablet: 2 tab(s) orally once a day (at bedtime)   Ambien 5 mg oral tablet: 1 tab(s) orally once a day (at bedtime)  benzonatate 100 mg oral capsule: 1 cap(s) orally every 8 hours  bisacodyl 10 mg rectal suppository: 1 suppository(ies) rectal once a day as needed for Constipation  glycopyrrolate 1 mg oral tablet: 1 tab(s) orally every 8 hours as needed for excessive audible secretions  guaifenesin-dextromethorphan 100 mg-10 mg/5 mL oral liquid: 5 milliliter(s) orally every 6 hours as needed for Cough  levalbuterol 0.63 mg/3 mL inhalation solution: 3 milliliter(s) inhaled every 6 hours  LORazepam 2 mg/mL oral concentrate: 0.5 milliliter(s) orally every 4 hours as needed for  agitation anxiety, delirium MDD: 4 doses  metoprolol succinate 100 mg oral tablet, extended release: 1 tab(s) orally once a day  Multiple Vitamins oral tablet: 1 tab(s) orally once a day  pantoprazole 40 mg oral delayed release tablet: 1 tab(s) orally once a day  polyethylene glycol 3350 oral powder for reconstitution: 17 gram(s) orally once a day  senna leaf extract oral tablet: 2 tab(s) orally once a day (at bedtime)

## 2025-06-27 NOTE — PROGRESS NOTE ADULT - SUBJECTIVE AND OBJECTIVE BOX
Patient is a 89y old  Female who presents with a chief complaint of shaking chills, hypotension, poor appetite (27 Jun 2025 08:37)    Date of servie : 06-27-25 @ 10:45  INTERVAL HPI/OVERNIGHT EVENTS:  T(C): 36.9 (06-27-25 @ 05:30), Max: 36.9 (06-27-25 @ 05:30)  HR: 95 (06-27-25 @ 05:30) (88 - 95)  BP: 115/52 (06-27-25 @ 05:30) (115/52 - 152/84)  RR: 22 (06-27-25 @ 05:30) (21 - 22)  SpO2: 95% (06-27-25 @ 05:30) (95% - 95%)  Wt(kg): --  I&O's Summary    26 Jun 2025 07:01  -  27 Jun 2025 07:00  --------------------------------------------------------  IN: 180 mL / OUT: 1 mL / NET: 179 mL        LABS:              CAPILLARY BLOOD GLUCOSE                MEDICATIONS  (STANDING):  AQUAPHOR (petrolatum Ointment) 1 Application(s) Topical every 12 hours  benzonatate 100 milliGRAM(s) Oral every 8 hours  chlorhexidine 2% Cloths 1 Application(s) Topical daily  erythromycin   Ointment 1 Application(s) Right EYE two times a day  levalbuterol Inhalation 0.63 milliGRAM(s) Inhalation every 6 hours  melatonin 5 milliGRAM(s) Oral at bedtime  metoprolol tartrate 50 milliGRAM(s) Oral every 6 hours  pantoprazole    Tablet 40 milliGRAM(s) Oral before breakfast  polyethylene glycol 3350 17 Gram(s) Oral daily  senna 2 Tablet(s) Oral at bedtime  zolpidem 5 milliGRAM(s) Oral at bedtime    MEDICATIONS  (PRN):  acetaminophen     Tablet .. 650 milliGRAM(s) Oral every 6 hours PRN Mild Pain (1 - 3)  albuterol/ipratropium for Nebulization 3 milliLiter(s) Nebulizer every 6 hours PRN Shortness of Breath and/or Wheezing  bisacodyl Suppository 10 milliGRAM(s) Rectal daily PRN Constipation  glycopyrrolate 1 milliGRAM(s) Oral every 8 hours PRN excessive audible secretions  guaifenesin/dextromethorphan Oral Liquid 5 milliLiter(s) Oral every 6 hours PRN Cough  ipratropium    for Nebulization 500 MICROGram(s) Nebulizer every 6 hours PRN Shortness of Breath and/or Wheezing  LORazepam    Concentrate 0.5 milliGRAM(s) Oral every 4 hours PRN anxiety, agitation, delirium  morphine Concentrate 6 milliGRAM(s) Oral every 4 hours PRN Severe Pain (7 - 10)  morphine Concentrate 3 milliGRAM(s) Oral every 4 hours PRN Moderate Pain (4 - 6)  morphine Concentrate 6 milliGRAM(s) Oral every 4 hours PRN Dyspnea          PHYSICAL EXAM:  GENERAL: lethargic  CHEST/LUNG: Crackles +  HEART: S1S2+  ABDOMEN: Soft, Nontender, Nondistended; Bowel sounds present  EXTREMITIES:  edema +    Care Discussed with Consultants/Other Providers [ ] YES  [ ] NO

## 2025-06-27 NOTE — PROGRESS NOTE ADULT - PROBLEM SELECTOR PROBLEM 5
Functional quadriplegia
Palliative care encounter

## 2025-06-27 NOTE — PROGRESS NOTE ADULT - TIME BILLING
Total Time Spent 50 minutes.    This includes chart review, patient assessment, discussion and collaboration with interdisciplinary team members, excluding ACP. Total Time Spent 70 minutes.    This includes chart review, patient assessment, discussion and collaboration with interdisciplinary team members, excluding ACP.

## 2025-06-27 NOTE — PROGRESS NOTE ADULT - ASSESSMENT
89F c hx GERD, R breast CA (40-50y ago) s/p B/L mastectomy and radiation, RUE lymphedema, osteoarthritis/osteoporosis (on monthly ibandronate), HLD, Afib on eliquis, HOCM/severe LVOT/ELEAZAR, small-moderate pericardial effusion/RA diastolic collapse, recent hospitalization 5/29-6/5 for RML PNA, GBS bacteremia (pos cultures 5/29, 5/30) of unclear source on total 10 days abx (IV ceftriaxone inpt, then levaquin 750 q48h until 6/9), presents from [facility] with shaking chills, hypotension, poor appetite.       Severe sepsis.   -S/p ABX  -Afebrile, nontoxic appearing    Pleural Effusion  -S/p R US guided thoracentesis 6/19, 1.25 L drained. Transudative effusion  -CXR post drainage with reaccumulation of fluid  -Keep O>I as tolerated   -No plans for pleurX at this time, plans for d/c with home hospice  -Symptom management per PCU. Currently with no c/o dyspnea    Pericardial effusion  -S/p drainage  -Per cards  -Cytology negative    Hypotension.  -S/p Pressors in CICU  -Now with plans for home hospice.     Chronic atrial fibrillation  -Off AC    Type 2 myocardial infarction.  -per cards    HOCM (hypertrophic obstructive cardiomyopathy).  -per cards    Acute respiratory failure with hypoxia.   -Keep sats >90% with O2 PRN  -D/c planning with home hospice.

## 2025-06-27 NOTE — PROGRESS NOTE ADULT - SUBJECTIVE AND OBJECTIVE BOX
GAP TEAM PALLIATIVE CARE UNIT PROGRESS NOTE:      [  ] Patient on hospice program.    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 She completed 10 days of antibiotics. She presented again from rehab with shaking chills, hypotension, and poor appetite, and was found to have a worsening pericardial effusion requiring thoracentesis and pericardial drain placement. Due to reaccumulation of pleural fluid and declining clinical status, her goals of care have shifted to comfort measures.    OVERNIGHT EVENTS: Chart reviewed. The patient is seen and examined at the bedside. She required PRN Ativan 0.5mg oral concentrate X2 for anxiety PRN Morphine oral concentrate 3mg X1 for moderate pain and 6mg X1 for severe pain within a 24hr period 7am-7am.    Do Not Resuscitate:   DNR/DNI (06-24-25 @ 13:47)      Allergies    Zosyn (Rash; Urticaria; Hives)    Intolerances    MEDICATIONS  (STANDING):  AQUAPHOR (petrolatum Ointment) 1 Application(s) Topical every 12 hours  benzonatate 100 milliGRAM(s) Oral every 8 hours  chlorhexidine 2% Cloths 1 Application(s) Topical daily  erythromycin   Ointment 1 Application(s) Right EYE two times a day  levalbuterol Inhalation 0.63 milliGRAM(s) Inhalation every 6 hours  melatonin 5 milliGRAM(s) Oral at bedtime  metoprolol tartrate 50 milliGRAM(s) Oral every 6 hours  pantoprazole    Tablet 40 milliGRAM(s) Oral before breakfast  polyethylene glycol 3350 17 Gram(s) Oral daily  senna 2 Tablet(s) Oral at bedtime  zolpidem 5 milliGRAM(s) Oral at bedtime    MEDICATIONS  (PRN):  acetaminophen     Tablet .. 650 milliGRAM(s) Oral every 6 hours PRN Mild Pain (1 - 3)  albuterol/ipratropium for Nebulization 3 milliLiter(s) Nebulizer every 6 hours PRN Shortness of Breath and/or Wheezing  bisacodyl Suppository 10 milliGRAM(s) Rectal daily PRN Constipation  glycopyrrolate 1 milliGRAM(s) Oral every 8 hours PRN excessive audible secretions  guaifenesin/dextromethorphan Oral Liquid 5 milliLiter(s) Oral every 6 hours PRN Cough  ipratropium    for Nebulization 500 MICROGram(s) Nebulizer every 6 hours PRN Shortness of Breath and/or Wheezing  LORazepam    Concentrate 0.5 milliGRAM(s) Oral every 4 hours PRN anxiety, agitation, delirium  morphine Concentrate 6 milliGRAM(s) Oral every 4 hours PRN Severe Pain (7 - 10)  morphine Concentrate 3 milliGRAM(s) Oral every 4 hours PRN Moderate Pain (4 - 6)  morphine Concentrate 6 milliGRAM(s) Oral every 4 hours PRN Dyspnea    ITEMS UNCHECKED ARE NOT PRESENT    PRESENT SYMPTOMS: [X ]Unable to self-report see PAINAD, RDOS below  Source if other than patient:  [ ]Family   [X ]Team     Pain: [ ] yes [ ] no  QOL impact -   Location -                    Aggravating factors -  Quality -  Radiation -  Timing-  Severity (0-10 scale):  Minimal acceptable level (0-10 scale):       PCSSQ [Palliative Care Spiritual Screening Question]   Severity (0-10):  Score of 4 or > indicate consideration of Chaplaincy referral.  Chaplaincy Referral: [ ] yes [ ] refused [X ] following [ ] deferred Last seen on 6/17    Caregiver [place]? : [ ] yes [ ] no [X ] deferred:  Social work referral [ ] Patient & Family Centered Care Referral [ ]   Anticipatory Grief present?:  [X ] yes [ ] no [ ] deferred:  Social work referral [X ] Patient & Family Centered Care Referral [ ]  	  Other Symptoms:  [ X]All other review of systems negative    PHYSICAL EXAM:   Vital Signs Last 24 Hrs  T(C): 36.9 (27 Jun 2025 05:30), Max: 36.9 (27 Jun 2025 05:30)  T(F): 98.5 (27 Jun 2025 05:30), Max: 98.5 (27 Jun 2025 05:30)  HR: 95 (27 Jun 2025 05:30) (88 - 95)  BP: 115/52 (27 Jun 2025 05:30) (115/52 - 152/84)  BP(mean): --  RR: 22 (27 Jun 2025 05:30) (21 - 22)  SpO2: 95% (27 Jun 2025 05:30) (95% - 95%)    Parameters below as of 27 Jun 2025 05:30  Patient On (Oxygen Delivery Method): nasal cannula  O2 Flow (L/min): 3   I&O's Summary    26 Jun 2025 07:01  -  27 Jun 2025 07:00  --------------------------------------------------------  IN: 180 mL / OUT: 1 mL / NET: 179 mL    GENERAL: [ ] Cachexia  [ ]Alert  [  ]Oriented    [ X]Lethargic  [ ]Unarousable  [ X]Verbal  [ ]Non-Verbal  Behavioral:   [ ] Anxiety  [ ] Delirium [ ] Agitation [ ] Other  HEENT:  [X ]Normal   [ ]Dry mouth   [ ]ET Tube/Trach  [ ]Oral lesions  PULMONARY:   [ ]Clear [ ]Tachypnea  [ ]Audible excessive secretions   [ ]Rhonchi        [ ]Right [ ]Left [ ]Bilateral  [ ]Crackles        [ ]Right [ ]Left [ ]Bilateral  [ ]Wheezing     [ ]Right [ ]Left [ ]Bilateral  [X ]Diminished BS [ ]Right [ ]Left [X ]Bilateral    CARDIOVASCULAR:    [ ]Regular [ ]Irregular [ X]Tachy  [ ]Eliceo [ ]Murmur [ ]Other  GASTROINTESTINAL:  [X ]Soft  [ ]Distended   [X ]+BS  [ X]Non tender [ ]Tender  [ ]Other [ ]PEG [ ]OGT/ NGT   Last BM: 6/24   GENITOURINARY:  [X ]Normal [X ] Incontinent   [ ]Oliguria/Anuria   [ ]Landaverde [X] External Cath  MUSCULOSKELETAL:   [ ]Normal   [X ]Weakness  [ ]Bed/Wheelchair bound [ ]Edema  NEUROLOGIC:   [ ]No focal deficits  [ X] Cognitive impairment  [ ] Dysphagia [ ]Dysarthria [ ] Paresis [ ]Other   SKIN: Ecchymosis   [ ]Normal  [ ]Rash  [ X]Other Incontinence associated dematitis. Please see nursing assessment for further details.  [ ]Pressure ulcer(s)  [ ]y [ ]n  Present on admission      CRITICAL CARE:  [ ] Shock Present  [ ]Septic [ ]Cardiogenic [ ]Neurologic [ ]Hypovolemic  [ ]  Vasopressors [ ]  Inotropes   [ ] Respiratory failure present [ ] Mechanical Ventilation [ ] Non-invasive ventilatory support [ ] High-Flow    [ ] Acute  [ ] Chronic [ ] Hypoxic  [ ] Hypercarbic [ ] Other  [ ] Other organ failure     LABS: None new.      RADIOLOGY & ADDITIONAL STUDIES: None new.    PROTEIN CALORIE MALNUTRITION: [ ] mild [ ] moderate [ ] severe  [ ] underweight [ ] morbid obesity    https://www.andeal.org/vault/2440/web/files/ONC/Table_Clinical%20Characteristics%20to%20Document%20Malnutrition-White%20JV%20et%20al%202012.pdf        [ ] PPSV2 < or = 30% [ ] significant weight loss [ ] poor nutritional intake [ ] anasarca   Artificial Nutrition [ ]     Other REFERRALS:    [ ] Hospice  [ ]Child Life  [X ]Social Work  [ ]Case management [ ]Holistic Therapy [ ] Physical Therapy [ ] Dietary     Progress Notes - Care Coordination [C. Provider] (06-26-25 @ 11:22)                                       Progress Notes    PROGRESS NOTE  Date & Time of Note   2025-06-26 11:21    Notes    Notes: Met with daughter Teresita who confirmed her brother Greg will assist  with home arrangements this evening after work and DME schedule for delivery  Friday and requesting early Saturday discharge and transportation set-up for  11am. Comfort pack medication sent to [facility] tomorrow and Teresita will  pick-up meds and bring home. Confirmed discharge plan with Attending Dr. Wong,  who's in agreement with discharge plan.       Electronically signed by:  Kiesha Pang  Electronically signed on:  2025-06-26  11:22            Palliative Performance Scale:  http://npcrc.org/files/news/palliative_performance_scale_ppsv2.pdf  (Ctrl +  left click to view)  Respiratory Distress Observation Tool:  https://homecareinformation.net/handouts/hen/Respiratory_Distress_Observation_Scale.pdf (Ctrl +  left click to view)  PAINAD Score:  http://geriatrictoolkit.[facility]/cog/painad.pdf (Ctrl +  left click to view)

## 2025-06-27 NOTE — PROGRESS NOTE ADULT - PROVIDER SPECIALTY LIST ADULT
Cardiology
Electrophysiology
Hospitalist
Infectious Disease
Intervent Radiology
Pulmonology
Pulmonology
SO
SO
Thoracic Surgery
CT Surgery
Cardiology
Electrophysiology
Hospitalist
Infectious Disease
Pulmonology
SO
SO
CCU
Cardiology
Electrophysiology
Hospitalist
Infectious Disease
Palliative Care
Pulmonology
SO
CCU
Cardiology
Hospitalist
Infectious Disease
Infectious Disease
Intervent Radiology
Pulmonology
SO
Thoracic Surgery
CCU
Hospitalist
Hospitalist
SO
Cardiology
Thoracic Surgery
Palliative Care

## 2025-07-12 LAB
CULTURE RESULTS: SIGNIFICANT CHANGE UP
SPECIMEN SOURCE: SIGNIFICANT CHANGE UP

## 2025-07-19 LAB
CULTURE RESULTS: SIGNIFICANT CHANGE UP
SPECIMEN SOURCE: SIGNIFICANT CHANGE UP

## 2025-07-21 ENCOUNTER — APPOINTMENT (OUTPATIENT)
Dept: HEMATOLOGY ONCOLOGY | Facility: CLINIC | Age: 89
End: 2025-07-21